# Patient Record
Sex: FEMALE | Race: BLACK OR AFRICAN AMERICAN | NOT HISPANIC OR LATINO | Employment: OTHER | URBAN - METROPOLITAN AREA
[De-identification: names, ages, dates, MRNs, and addresses within clinical notes are randomized per-mention and may not be internally consistent; named-entity substitution may affect disease eponyms.]

---

## 2017-03-21 ENCOUNTER — HOSPITAL ENCOUNTER (INPATIENT)
Facility: HOSPITAL | Age: 52
LOS: 3 days | Discharge: HOME HEALTH | DRG: 871 | End: 2017-03-24
Attending: EMERGENCY MEDICINE | Admitting: HOSPITALIST
Payer: MEDICARE

## 2017-03-21 DIAGNOSIS — I50.9 HEART FAILURE: ICD-10-CM

## 2017-03-21 DIAGNOSIS — N17.9 AKI (ACUTE KIDNEY INJURY): ICD-10-CM

## 2017-03-21 DIAGNOSIS — N39.0 URINARY TRACT INFECTION WITHOUT HEMATURIA, SITE UNSPECIFIED: ICD-10-CM

## 2017-03-21 DIAGNOSIS — N18.30 CKD (CHRONIC KIDNEY DISEASE) STAGE 3, GFR 30-59 ML/MIN: ICD-10-CM

## 2017-03-21 DIAGNOSIS — E86.0 DEHYDRATION: ICD-10-CM

## 2017-03-21 DIAGNOSIS — R74.8 ELEVATED LIPASE: ICD-10-CM

## 2017-03-21 DIAGNOSIS — R57.1 HYPOVOLEMIC SHOCK: ICD-10-CM

## 2017-03-21 LAB
ALBUMIN SERPL BCP-MCNC: 3.8 G/DL
ALP SERPL-CCNC: 182 U/L
ALT SERPL W/O P-5'-P-CCNC: 36 U/L
ANION GAP SERPL CALC-SCNC: 13 MMOL/L
AST SERPL-CCNC: 27 U/L
BACTERIA #/AREA URNS HPF: ABNORMAL /HPF
BASOPHILS # BLD AUTO: 0 K/UL
BASOPHILS NFR BLD: 0.3 %
BILIRUB SERPL-MCNC: 0.3 MG/DL
BILIRUB UR QL STRIP: NEGATIVE
BUN SERPL-MCNC: 118 MG/DL
CALCIUM SERPL-MCNC: 10.3 MG/DL
CHLORIDE SERPL-SCNC: 105 MMOL/L
CLARITY UR: ABNORMAL
CO2 SERPL-SCNC: 14 MMOL/L
COLOR UR: YELLOW
CREAT SERPL-MCNC: 5.5 MG/DL
DIFFERENTIAL METHOD: ABNORMAL
EOSINOPHIL # BLD AUTO: 0.1 K/UL
EOSINOPHIL NFR BLD: 0.8 %
ERYTHROCYTE [DISTWIDTH] IN BLOOD BY AUTOMATED COUNT: 14.1 %
EST. GFR  (AFRICAN AMERICAN): 9 ML/MIN/1.73 M^2
EST. GFR  (NON AFRICAN AMERICAN): 8 ML/MIN/1.73 M^2
GLUCOSE SERPL-MCNC: 375 MG/DL
GLUCOSE UR QL STRIP: NEGATIVE
HCT VFR BLD AUTO: 41.9 %
HGB BLD-MCNC: 13.3 G/DL
HGB UR QL STRIP: ABNORMAL
HYALINE CASTS #/AREA URNS LPF: 0 /LPF
KETONES UR QL STRIP: ABNORMAL
LEUKOCYTE ESTERASE UR QL STRIP: ABNORMAL
LIPASE SERPL-CCNC: 213 U/L
LYMPHOCYTES # BLD AUTO: 2.5 K/UL
LYMPHOCYTES NFR BLD: 25.3 %
MCH RBC QN AUTO: 28.3 PG
MCHC RBC AUTO-ENTMCNC: 31.7 %
MCV RBC AUTO: 89 FL
MICROSCOPIC COMMENT: ABNORMAL
MONOCYTES # BLD AUTO: 0.5 K/UL
MONOCYTES NFR BLD: 4.5 %
NEUTROPHILS # BLD AUTO: 6.9 K/UL
NEUTROPHILS NFR BLD: 69.1 %
NITRITE UR QL STRIP: NEGATIVE
PH UR STRIP: 6 [PH] (ref 5–8)
PLATELET # BLD AUTO: 374 K/UL
PMV BLD AUTO: 8.9 FL
POCT GLUCOSE: 322 MG/DL (ref 70–110)
POCT GLUCOSE: 325 MG/DL (ref 70–110)
POTASSIUM SERPL-SCNC: 5.5 MMOL/L
PROT SERPL-MCNC: 8.4 G/DL
PROT UR QL STRIP: ABNORMAL
RBC # BLD AUTO: 4.7 M/UL
RBC #/AREA URNS HPF: 10 /HPF (ref 0–4)
SODIUM SERPL-SCNC: 132 MMOL/L
SP GR UR STRIP: 1.02 (ref 1–1.03)
TROPONIN I SERPL DL<=0.01 NG/ML-MCNC: 0.13 NG/ML
URN SPEC COLLECT METH UR: ABNORMAL
UROBILINOGEN UR STRIP-ACNC: NEGATIVE EU/DL
WBC # BLD AUTO: 10 K/UL
WBC #/AREA URNS HPF: >100 /HPF (ref 0–5)

## 2017-03-21 PROCEDURE — 51701 INSERT BLADDER CATHETER: CPT

## 2017-03-21 PROCEDURE — 87186 SC STD MICRODIL/AGAR DIL: CPT

## 2017-03-21 PROCEDURE — 81000 URINALYSIS NONAUTO W/SCOPE: CPT

## 2017-03-21 PROCEDURE — 82962 GLUCOSE BLOOD TEST: CPT

## 2017-03-21 PROCEDURE — 85025 COMPLETE CBC W/AUTO DIFF WBC: CPT

## 2017-03-21 PROCEDURE — 36415 COLL VENOUS BLD VENIPUNCTURE: CPT

## 2017-03-21 PROCEDURE — 87077 CULTURE AEROBIC IDENTIFY: CPT

## 2017-03-21 PROCEDURE — 84484 ASSAY OF TROPONIN QUANT: CPT

## 2017-03-21 PROCEDURE — 80053 COMPREHEN METABOLIC PANEL: CPT

## 2017-03-21 PROCEDURE — 25000003 PHARM REV CODE 250: Performed by: EMERGENCY MEDICINE

## 2017-03-21 PROCEDURE — 87086 URINE CULTURE/COLONY COUNT: CPT

## 2017-03-21 PROCEDURE — 63600175 PHARM REV CODE 636 W HCPCS: Performed by: EMERGENCY MEDICINE

## 2017-03-21 PROCEDURE — 87088 URINE BACTERIA CULTURE: CPT

## 2017-03-21 PROCEDURE — 20000000 HC ICU ROOM

## 2017-03-21 PROCEDURE — 83690 ASSAY OF LIPASE: CPT

## 2017-03-21 PROCEDURE — 96361 HYDRATE IV INFUSION ADD-ON: CPT

## 2017-03-21 PROCEDURE — 93005 ELECTROCARDIOGRAM TRACING: CPT

## 2017-03-21 PROCEDURE — 99291 CRITICAL CARE FIRST HOUR: CPT | Mod: 25

## 2017-03-21 RX ADMIN — SODIUM CHLORIDE 1000 ML: 0.9 INJECTION, SOLUTION INTRAVENOUS at 09:03

## 2017-03-21 NOTE — IP AVS SNAPSHOT
[address] [place] [address]  Phone: [phone]           Patient Discharge Instructions     Our goal is to set you up for success. This packet includes information on your condition, medications, and your home care. It will help you to care for yourself so you don't get sicker and need to go back to the hospital.     Please ask your nurse if you have any questions.        There are many details to remember when preparing to leave the hospital. Here is what you will need to do:    1. Take your medicine. If you are prescribed medications, review your Medication List in the following pages. You may have new medications to  at the pharmacy and others that you'll need to stop taking. Review the instructions for how and when to take your medications. Talk with your doctor or nurses if you are unsure of what to do.     2. Go to your follow-up appointments. Specific follow-up information is listed in the following pages. Your may be contacted by a transition nurse or clinical provider about future appointments. Be sure we have all of the phone numbers to reach you, if needed. Please contact your provider's office if you are unable to make an appointment.     3. Watch for warning signs. Your doctor or nurse will give you detailed warning signs to watch for and when to call for assistance. These instructions may also include educational information about your condition. If you experience any of warning signs to your health, call your doctor.               ** Verify the list of medication(s) below is accurate and up to date. Carry this with you in case of emergency. If your medications have changed, please notify your healthcare provider.             Medication List      START taking these medications        Additional Info    Begin Date AM Noon PM Bedtime    ciprofloxacin HCl 500 MG tablet   Commonly known as:  CIPRO   Quantity:  8 tablet   Refills:  0   Dose:  500 mg    Indications:  Urinary Tract Infection    Instructions:  Take 1 tablet (500 mg total) by mouth every 12 (twelve) hours.                    3/24/17           sodium bicarbonate 650 MG tablet   Refills:  0   Dose:  650 mg    Last time this was given:  650 mg on 3/24/2017  3:11 PM   Instructions:  Take 1 tablet (650 mg total) by mouth 3 (three) times daily.                    3/24/17  Dose #3             CHANGE how you take these medications        Additional Info    Begin Date AM Noon PM Bedtime    HUMALOG KWIKPEN 100 unit/mL Inpn pen   Refills:  0   Dose:  7 Units   What changed:  how much to take   Generic drug:  insulin lispro    Instructions:  Inject 7 Units into the skin 3 (three) times daily before meals.                    3/24/17  With dinner           insulin detemir 100 unit/mL (3 mL) Inpn pen   Commonly known as:  LEVEMIR FLEXTOUCH   Quantity:  1 Box   Refills:  0   Dose:  10 Units   What changed:  how much to take    Last time this was given:  12 Units on 3/24/2017  9:23 AM   Instructions:  Inject 10 Units into the skin 2 (two) times daily.                    3/24/17             CONTINUE taking these medications        Additional Info    Begin Date AM Noon PM Bedtime    amlodipine 10 MG tablet   Commonly known as:  NORVASC   Refills:  0   Dose:  10 mg    Instructions:  Take 10 mg by mouth once daily.            3/25/17                   aspirin 81 MG Chew   Refills:  0   Dose:  81 mg    Last time this was given:  81 mg on 3/24/2017  9:24 AM   Instructions:  Take 81 mg by mouth once daily. Ran out            3/25/17                   atorvastatin 80 MG tablet   Commonly known as:  LIPITOR   Quantity:  30 tablet   Refills:  11   Dose:  80 mg    Last time this was given:  80 mg on 3/24/2017  9:23 AM   Instructions:  Take 1 tablet (80 mg total) by mouth once daily.            3/25/17                   blood sugar diagnostic Strp   Quantity:  400 strip   Refills:  3    Instructions:  Check blood glucose  4x/day. Freestyle meter; dx code e11.65                            blood-glucose meter kit   Quantity:  1 each   Refills:  0    Instructions:  Use as instructed; dispense freestyle lite meter or whatever brand is covered                            calcitRIOL 0.25 MCG Cap   Commonly known as:  ROCALTROL   Refills:  0    Last time this was given:  0.25 mcg on 3/24/2017  9:24 AM   Instructions:  once daily.            3/25/17                   citalopram 20 MG tablet   Commonly known as:  CELEXA   Refills:  0   Dose:  20 mg    Last time this was given:  20 mg on 3/24/2017  9:24 AM   Instructions:  Take 20 mg by mouth once daily.            3/25/17                   clopidogrel 75 mg tablet   Commonly known as:  PLAVIX   Quantity:  30 tablet   Refills:  1   Dose:  75 mg    Last time this was given:  75 mg on 3/24/2017  9:24 AM   Instructions:  Take 1 tablet (75 mg total) by mouth once daily.            3/25/17                   CREON 24,000-76,000 -120,000 unit capsule   Refills:  0   Dose:  1 capsule   Generic drug:  lipase-protease-amylase 24,000-76,000-120,000 units    Last time this was given:  1 capsule on 3/24/2017 11:47 AM   Instructions:  Take 1 capsule by mouth 4 (four) times daily with meals and nightly.                    3/24/17  With dinner           cyproheptadine 4 mg tablet   Commonly known as:  PERIACTIN   Refills:  0   Dose:  4 mg    Last time this was given:  4 mg on 3/24/2017  9:24 AM   Instructions:  Take 4 mg by mouth 2 (two) times daily.                    3/24/17           ferrous sulfate 325 (65 FE) MG EC tablet   Quantity:  60 tablet   Refills:  0   Dose:  325 mg    Instructions:  Take 1 tablet (325 mg total) by mouth 2 (two) times daily.                    3/24/17           furosemide 40 MG tablet   Commonly known as:  LASIX   Quantity:  30 tablet   Refills:  1   Dose:  40 mg    Instructions:  Take 1 tablet (40 mg total) by mouth once daily.            3/25/17                   lancets [facility]  "  Quantity:  400 each   Refills:  3    Instructions:  Check blood glucose 4x/day. Freestyle meter; dx code e11.65                            lisinopril 2.5 MG tablet   Commonly known as:  PRINIVILZESTRIL   Refills:  0   Dose:  2.5 mg    Instructions:  Take 2.5 mg by mouth once daily.                            magnesium oxide 400 mg tablet   Commonly known as:  MAG-OX   Refills:  0   Dose:  400 mg    Instructions:  Take 1 tablet (400 mg total) by mouth once daily.                            metoprolol succinate 25 MG 24 hr tablet   Commonly known as:  TOPROL-XL   Quantity:  30 tablet   Refills:  0   Dose:  25 mg    Instructions:  Take 1 tablet (25 mg total) by mouth once daily.                    3/24/17           ONE-A-DAY ESSENTIAL ORAL   Refills:  0    Instructions:  Take by mouth once daily.                            PEN NEEDLE 31 gauge x 5/16" Ndle   Refills:  0   Generic drug:  pen needle, diabetic                             quetiapine 100 MG Tab   Commonly known as:  SEROQUEL   Refills:  0   Dose:  100 mg    Last time this was given:  100 mg on 3/23/2017  9:41 PM   Instructions:  100 mg every evening.                        3/24/17       VITAMIN C ORAL   Refills:  0   Dose:  500 mg    Last time this was given:  500 mg on 3/24/2017  9:24 AM   Instructions:  Take 500 mg by mouth once daily.                              STOP taking these medications     potassium chloride SA 10 MEQ tablet   Commonly known as:  K-DUR,KLOR-DAVONTE            Where to Get Your Medications      These medications were sent to [facility] [address] [place] [address] [facility]  [address] [facility] [address]     Phone:  [phone]     ciprofloxacin HCl 500 MG tablet    insulin detemir 100 unit/mL (3 mL) Inpn pen         You can get these medications from any pharmacy     You don't need a prescription for these medications     sodium bicarbonate 650 MG tablet         Information about where to get these medications is " not yet available     ! Ask your nurse or doctor about these medications     HUMALOG KWIKPEN 100 unit/mL Inpn pen                  Please bring to all follow up appointments:    1. A copy of your discharge instructions.  2. All medicines you are currently taking in their original bottles.  3. Identification and insurance card.    Please arrive 15 minutes ahead of scheduled appointment time.    Please call 24 hours in advance if you must reschedule your appointment and/or time.        Your Scheduled Appointments     Apr 07, 2017  8:30 AM CDT   Established Patient with MAKENNA Jack,DINORAHP-TARA Connelly - Endocrinology ([place])    [address] [facility] [address]   [phone]            Apr 17, 2017 10:00 AM CDT   Non-Fasting Lab with NITESH TRAN SAT   [facility] ([place])    [address] [facility] [address]   [phone]            Apr 17, 2017 10:15 AM CDT   Urine with NITESH TRAN SAT   [facility] ([place])    [address] [facility] [address]   [phone]            Apr 24, 2017 10:40 AM CDT   Established Patient Visit with Morris Villegas MD   [facility] ([place]    [address] [facility] [address] [place] [id number] [phone]              Follow-up Information     Follow up with Chica Irene DO In 1 week.    Specialty:  Family Medicine    Contact information:    [address] [facility] [address] [phone]          Follow up with Morris Villegas MD In 1 week.    Specialty:  Nephrology    Contact information:    [address]  [address] FLO[address]  [facility] [address]  [phone]          Follow up with Kelli Bui NP In 1 week.    Specialty:  Endocrinology    Contact information:    [address] [facility] [address] [phone]        Referrals     Future Orders    Amb Referral to Diabetes Empowerment     Ambulatory consult to Diabetic Education     Questions:    Diagnosis:      Reason For Referral (Please  "Check Appropriate Boxes):      Diabetes Self-Management Education Program:      Referral to Home health         Discharge Instructions     Future Orders    RENAL FUNCTION PANEL     Diet renal         Discharge Instructions       Thank you for choosing [facility] for your medical care. The primary doctor who is taking care of you at the time of your discharge is Juan Skinner MD.     You were admitted to the hospital with Hypovolemic shock.     Please note your discharge instructions, including diet/activity restrictions, follow-up appointments, and medication changes.  If you have any questions about your medical issues, prescriptions, or any other questions, please feel free to contact the [facility] Medicine Dept at [phone] and we will help.    If you are previously with Home health, outpatient PT/OT or under a therapy program, you are cleared to return to those programs.    Please direct all long term medication refills and follow up to your primary care provider, Chica Irene DO. Thank you again for letting us take care of your health care needs.        Discharge References/Attachments     BLOOD SUGAR LOG, USING A (ENGLISH)    BLOOD SUGAR, HOW TO CHECK YOUR (ENGLISH)        Primary Diagnosis     Your primary diagnosis was:  Shock      Admission Information     Date & Time Provider Department CSN    3/21/2017  8:33 PM Juan Skinner MD [facility] [id number]      Care Providers     Provider Role Specialty Primary office phone    Juan Skinner MD Attending Provider Rheumatology [phone]    Dk Burks MD Consulting Physician  Nephrology  [phone]      Your Vitals Were     BP Pulse Temp Resp Height Weight    175/94 (BP Location: Right arm, Patient Position: Lying) 84 98.1 °F (36.7 °C) (Oral) 18 5' 7" (1.702 m) 50.3 kg (110 lb 14.3 oz)    Last Period SpO2 BMI          08/15/2012 (Approximate) 99% 17.37 kg/m2        Recent Lab Values        " 7/17/2014 11/7/2014 6/27/2015 10/27/2015 12/26/2015 5/2/2016 10/4/2016 3/22/2017      5:56 AM 11:37 AM  5:30 PM  7:06 AM 10:34 AM  9:29 AM 12:37 PM  3:57 AM    A1C 12.2 (H) 9.6 (H) 13.0 (H) 7.7 (H) 7.9 (H) 9.4 (H) 8.9 (H) 15.3 (H)         7.9 (H)       Comment for A1C at 12:37 PM on 10/4/2016:  According to ADA guidelines, hemoglobin A1C <7.0% represents  optimal control in non-pregnant diabetic patients.  Different  metrics may apply to specific populations.   Standards of Medical Care in Diabetes - 2016.  For the purpose of screening for the presence of diabetes:  <5.7%     Consistent with the absence of diabetes  5.7-6.4%  Consistent with increasing risk for diabetes   (prediabetes)  >or=6.5%  Consistent with diabetes  Currently no consensus exists for use of hemoglobin A1C  for diagnosis of diabetes for children.      Comment for A1C at  3:57 AM on 3/22/2017:  According to ADA guidelines, hemoglobin A1C <7.0% represents  optimal control in non-pregnant diabetic patients.  Different  metrics may apply to specific populations.   Standards of Medical Care in Diabetes - 2016.  For the purpose of screening for the presence of diabetes:  <5.7%     Consistent with the absence of diabetes  5.7-6.4%  Consistent with increasing risk for diabetes   (prediabetes)  >or=6.5%  Consistent with diabetes  Currently no consensus exists for use of hemoglobin A1C  for diagnosis of diabetes for children.        Pending Labs     Order Current Status    Blood culture Preliminary result    Blood culture Preliminary result      Allergies as of 3/24/2017     No Known Allergies      [facility]     [facility] Nurse Care Line - 24/7 Assistance  Unless otherwise directed by your provider, please contact [facility], our nurse care line that is available for 24/7 assistance.     Registered nurses in the [facility] provide clinical advisement, health education, appointment booking, and other advisory services.  Call  for this free service at [phone].        Advance Directives     An advance directive is a document which, in the event you are no longer able to make decisions for yourself, tells your healthcare team what kind of treatment you do or do not want to receive, or who you would like to make those decisions for you.  If you do not currently have an advance directive, [facility] encourages you to create one.  For more information call:  [phone]), [phone] ([phone]),  or log on to [email or web address].        Smoking Cessation     If you would like to quit smoking:   You may be eligible for free services if you are a [place] resident and started smoking cigarettes before September 1, 1988.  Call the Smoking Cessation Trust (SCT) toll free at (531) 399-0876 or (055) 341-4067.   Call 0-367-QUIT-NOW if you do not meet the above criteria.            Language Assistance Services     ATTENTION: Language assistance services are available, free of charge. Please call [phone].      ATENCIÓN: Si habla español, tiene a ridley disposición servicios gratuitos de asistencia lingüística. Llame al [phone].     CHÚ Ý: N?u b?n nói Ti?ng Vi?t, có các d?ch v? h? tr? ngôn ng? mi?n phí dành cho b?n. G?i s? [phone].        Heart Failure Education       Heart Failure: Being Active  You have a condition called heart failure. Being active doesnt mean that you have to wear yourself out. Even a little movement each day helps to strengthen your heart. If you cant get out to exercise, you can do simple stretching and strengthening exercises at home. These are good ways to keep you well-conditioned and prevent you and your heart from becoming excessively weak.    Ideas to get you started  · Add a little movement to things you do now. Walk to mail letters. Park your car at the far end of the parking lot and walk to the store. Walk up a flight of stairs instead of taking the elevator.  · Choose  activities you enjoy. You might walk, swim, or ride an exercise bike. Things like gardening and washing the car count, too. Other possibilities include: washing dishes, walking the dog, walking around the mall, and doing aerobic activities with friends.  · Join a group exercise program at a [facility] or [facility], a senior center, or a community center. Or look into a hospital cardiac rehabilitation program. Ask your doctor if you qualify.  Tips to keep you going  · Get up and get dressed each day. Go to a coffee shop and read a newspaper or go somewhere that you'll be in the presence of other active people. Youll feel more like being active.  · Make a plan. Choose one or more activities that you enjoy and that you can easily do. Then plan to do at least one each day. You might write your plan on a calendar.  · Go with a friend or a group if you like company. This can help you feel supported and stay motivated, too.  · Plan social events that you enjoy. This will keep you mentally engaged as well as physically motivated to do things you find pleasure in.  For your safety  · Talk with your healthcare provider before starting an exercise program.  · Exercise indoors when its too hot or too cold outside, or when the air quality is poor. Try walking at a shopping mall.  · Wear socks and sturdy shoes to maintain your balance and prevent falls.  · Start slowly. Do a few minutes several times a day at first. Increase your time and speed little by little.  · Stop and rest whenever you feel tired or get short of breath.  · Dont push yourself on days when you dont feel well.  Date Last Reviewed: 3/20/2016  © 6654-5987 "[employer]. [address] [place] [address], [place] [address]. All rights reserved. This information is not intended as a substitute for professional medical care. Always follow your healthcare professional's instructions.              Heart Failure: Evaluating Your Heart  You have a condition called heart  failure. To evaluate your condition, your doctor will examine you, ask questions, and do some tests. Along with looking for signs of heart failure, the doctor looks for any other health problems that may have led to heart failure. The results of your evaluation will help your doctor form a treatment plan.  Health history and physical exam  Your visit will start with a health history. Tell the doctor about any symptoms youve noticed and about all medicines you take. Then youll have a physical exam. This includes listening to your heartbeat and breathing. Youll also be checked for swelling (edema) in your legs and neck. When you have fluid buildup or fluid in the lungs, it may be called congestive heart failure.  Diagnosing heart failure     During an echocardiogram, sound waves bounce off the heart. These are converted into a picture on the screen.   The following may be done to help your doctor form a diagnosis:  · X-rays show the size and shape of your heart. These pictures can also show fluid in your lungs.  · An electrocardiogram (ECG or EKG) shows the pattern of your heartbeat. Small pads (electrodes) are placed on your chest, arms, and legs. Wires connect the pads to the ECG machine, which records your hearts electrical signals. This can give the doctor information about heart function.  · An echocardiogram uses ultrasound waves to show the structure and movement of your heart muscle. This shows how well the heart pumps. It also shows the thickness of the heart walls, and if the heart is enlarged. It is one of the most useful, non-invasive tests as it provides information about the heart's general function. This helps your doctor make treatment decisions.  · Lab tests evaluate small amounts of blood or urine for signs of problems. A BNP lab test can help diagnose and evaluate heart failure. BNP stands for B-type natriuretic peptide. The ventricles secrete more BNP when heart failure worsens. Lab tests can  also provide information about metabolic dysfunction or heart dysfunction.  Your treatment plan  Based on the results of your evaluation and tests, your doctor will develop a treatment plan. This plan is designed to relieve some of your heart failure symptoms and help make you more comfortable. Your treatment plan may include:  · Medicine to help your heart work better and improve your quality of life  · Changes in what you eat and drink to help prevent fluid from backing up in your body  · Daily monitoring of your weight and heart failure symptoms to see how well your treatment plan is working  · Exercise to help you stay healthy  · Help with quitting smoking  · Emotional and psychological support to help adjust to the changes  · Referrals to other specialists to make sure you are being treated comprehensively  Date Last Reviewed: 3/21/2016  © 7149-6537 [employer]. [address] [place] [address], [place] [address]. All rights reserved. This information is not intended as a substitute for professional medical care. Always follow your healthcare professional's instructions.              Heart Failure: Making Changes to Your Diet  You have a condition called heart failure. When you have heart failure, excess fluid is more likely to build up in your body because your heart isn't working well. This makes the heart work harder to pump blood. Fluid buildup causes symptoms such as shortness of breath and swelling (edema). This is often referred to as congestive heart failure or CHF. Controlling the amount of salt (sodium) you eat may help stop fluid from building up. Your doctor may also tell you to reduce the amount of fluid you drink.  Reading food labels    Your healthcare provider will tell you how much sodium you can eat each day. Read food labels to keep track. Keep in mind that certain foods are high in salt. These include canned, frozen, and processed foods. Check the amount of sodium in each serving. Watch  out for high-sodium ingredients. These include MSG (monosodium glutamate), baking soda, and sodium phosphate.   Eating less salt  Give yourself time to get used to eating less salt. It may take a little while. Here are some tips to help:  · Take the saltshaker off the table. Replace it with salt-free herb mixes and spices.  · Eat fresh or plain frozen vegetables. These have much less salt than canned vegetables.  · Choose low-sodium snacks like sodium-free pretzels, crackers, or air-popped popcorn.  · Dont add salt to your food when youre cooking. Instead, season your foods with pepper, lemon, garlic, or onion.  · When you eat out, ask that your food be cooked without added salt.  · Avoid eating fried foods as these often have a great deal of salt.  If youre told to limit fluids  You may need to limit how much fluid you have to help prevent swelling. This includes anything that is liquid at room temperature, such as ice cream and soup. If your doctor tells you to limit fluid, try these tips:  · Measure drinks in a measuring cup before you drink them. This will help you meet daily goals.  · Chill drinks to make them more refreshing.  · Suck on frozen lemon wedges to quench thirst.  · Only drink when youre thirsty.  · Chew sugarless gum or suck on hard candy to keep your mouth moist.  · Weigh yourself daily to know if your body's fluid content is rising.  My sodium goal  Your healthcare provider may give you a sodium goal to meet each day. This includes sodium found in food as well as salt that you add. My goal is to eat no more than ___________ mg of sodium per day.     When to call your doctor  Call your doctor right away if you have any symptoms of worsening heart failure. These can include:  · Sudden weight gain  · Increased swelling of your legs or ankles  · Trouble breathing when youre resting or at night  · Increase in the number of pillows you have to sleep on  · Chest pain, pressure, discomfort, or pain  in the jaw, neck, or back   Date Last Reviewed: 3/21/2016  © 7736-0777 [employer]. [address] [place] [address], [place] [address]. All rights reserved. This information is not intended as a substitute for professional medical care. Always follow your healthcare professional's instructions.              Heart Failure: Medicines to Help Your Heart    You have a condition called heart failure (also known as congestive heart failure, or CHF). Your doctor will likely prescribe medicines for heart failure and any underlying health problems you have. Most heart failure patients take one or more types of medicinen. Your healthcare provider will work to find the combination of medicines that works best for you.  Heart failure medicines  Here are the most common heart failure medicines:  · ACE inhibitors lower blood pressure and decrease strain on the heart. This makes it easier for the heart to pump. Angiotensin receptor blockers have similar effects. These are prescribed for some patients instead of ACE inhibitors.  · Beta-blockers relieve stress on the heart. They also improve symptoms. They may also improve the heart's pumping action over time.  · Diuretics (also called water pills) help rid your body of excess water. This can help rid your body of swelling (edema). Having less fluid to pump means your heart doesnt have to work as hard. Some diuretics make your body lose a mineral called potassium. Your doctor will tell you if you need to take supplements or eat more foods high in potassium.  · Digoxin helps your heart pump with more strength. This helps your heart pump more blood with each beat. So, more oxygen-rich blood travels to the rest of the body.  · Aldosterone antagonists help alter hormones and decrease strain on the heart.  · Hydralazine and nitrates are two separate medicines used together to treat heart failure. They may come in one combination pill. They lower blood pressure and decrease how  hard the heart has to pump.  Medicines for related conditions  Controlling other heart problems helps keep heart failure under control, too. Depending on other heart problems you have, medicines may be prescribed to:  · Lower blood pressure (antihypertensives).  · Lower cholesterol levels (statins).  · Prevent blood clots (anticoagulants or aspirin).  · Keep the heartbeat steady (antiarrhythmics).  Date Last Reviewed: 3/5/2016  © 6852-7190 [employer]. [address] [place] [address], [place] [address]. All rights reserved. This information is not intended as a substitute for professional medical care. Always follow your healthcare professional's instructions.              Heart Failure: Procedures That May Help    The heart is a muscle that pumps oxygen-rich blood to all parts of the body. When you have heart failure, the heart is not able to pump as well as it should. Blood and fluid may back up into the lungs (congestive heart failure), and some parts of the body dont get enough oxygen-rich blood to work normally. These problems lead to the symptoms of heart failure.     Certain procedures may help the heart pump better in some cases of heart failure. Some procedures are done to treat health problems that may have caused the heart failure such as coronary artery disease or heart rhythm problems. For more serious heart failure, other options are available.  Treating artery and valve problems  If you have coronary artery disease or valve disease, procedures may be done to improve blood flow. This helps the heart pump better, which can improve heart failure symptoms. First, your doctor may do a cardiac catheterization to help detect clogged blood vessels or valve damage. During this procedure, a  thin tube (catheter) in inserted into a blood vessel and guided to the heart. There a dye is injected and a special type of X-ray (angiogram) is taken of the blood vessels. Procedures to open a blocked artery or fix  damaged valves can also be done using catheterization.  · Angioplasty uses a balloon-tipped instrument at the end of the catheter. The balloon is inflated to widen the narrowed artery. In many cases, a stent is expanded to further support the narrowed artery. A stent is a metal mesh tube.  · Valve surgery repairs or replacement of faulty valves can also be done during catheterization so blood can flow properly through the chambers of the heart.  Bypass surgery is another option to help treat blocked arteries. It uses a healthy blood vessel from elsewhere in the body. The healthy blood vessel is attached above and below the blocked area so that blood can flow around the blocked artery.  Treating heart rhythm problems  A device may be placed in the chest to help a weak heart maintain a healthy, heartbeat so the heart can pump more effectively:  · Pacemaker. A pacemaker is an implanted device that regulates your heartbeat electronically. It monitors your heart's rhythm and generates a painless electric impulse that helps the heart beat in a regular rhythm. A pacemaker is programmed to meet your specific heart rhythm needs.  · Biventricular pacing/cardiac resynchronization therapy. A type of pacemaker that paces both pumping chambers of the heart at the same time to coordinate contractions and to improve the heart's function. Some people with heart failure are candidates for this therapy.  · Implantable cardioverter defibrillator. A device similar to a pacemaker that senses when the heart is beating too fast and delivers an electrical shock to convert the fast rhythm to a normal rhythm. This can be a life saving device.  In severe cases  In more serious cases of heart failure when other treatments no longer work, other options may include:  · Ventricular assist devices (VADs). These are mechanical devices used to take over the pumping function for one or both of the heart's ventricles, or pumping chambers. A VAD may be  necessary when heart failure progresses to the point that medicines and other treatments no longer help. In some cases, a VAD may be used as a bridge to transplant.  · Heart transplant. This is replacing the diseased heart with a healthy one from a donor. This is an option for a few people who are very sick. A heart transplant is very serious and not an option for all patients. Your doctor can tell you more.  Date Last Reviewed: 3/20/2016  © 7568-5707 [employer]. [address] [place] [address]. All rights reserved. This information is not intended as a substitute for professional medical care. Always follow your healthcare professional's instructions.              Heart Failure: Tracking Your Weight  You have a condition called heart failure. When you have heart failure, a sudden weight gain or a steady rise in weight is a warning sign that your body is retaining too much water and salt. This could mean your heart failure is getting worse. If left untreated, it can cause problems for your lungs and result in shortness of breath. Weighing yourself each day is the best way to know if youre retaining water. If your weight goes up quickly, call your doctor. You will be given instructions on how to get rid of the excess water. You will likely need medicines and to avoid salt. This will help your heart work better.  Call your doctor if you gain more than 2 pounds in 1 day, more than 5 pounds in 1 week, or whatever weight gain you were told to report by your doctor. This is often a sign of worsening heart failure and needs to be evaluated and treated. Your doctor will tell you what to do next.   Tips for weighing yourself    · Weigh yourself at the same time each morning, wearing the same clothes. Weigh yourself after urinating and before eating.  · Use the same scale each day. Make sure the numbers are easy to read. Put the scale on a flat, hard surface -- not on a rug or carpet.  · Do not stop  weighing yourself. If you forget one day, weigh again the next morning.  How to use your weight chart  · Keep your weight chart near the scale. Write your weight on the chart as soon as you get off the scale.  · Fill in the month and the start date on the chart. Then write down your weight each day. Your chart will look like this:    · If you miss a day, leave the space blank. Weigh yourself the next day and write your weight in the next space.  · Take your weight chart with you when you go to see your doctor.  Date Last Reviewed: 3/20/2016  © 9030-5025 [employer]. [address] [place] [address], [place] [address]. All rights reserved. This information is not intended as a substitute for professional medical care. Always follow your healthcare professional's instructions.              Heart Failure: Warning Signs of a Flare-Up  You have a condition called heart failure. Once you have heart failure, flare-ups can happen. Below are signs that can mean your heart failure is getting worse. If you notice any of these warning signs, call your healthcare provider.  Swelling    · Your feet, ankles, or lower legs get puffier.  · You notice skin changes on your lower legs.  · Your shoes feel too tight.  · Your clothes are tighter in the waist.  · You have trouble getting rings on or off your fingers.  Shortness of breath  · You have to breathe harder even when youre doing your normal activities or when youre resting.  · You are short of breath walking up stairs or even short distances.  · You wake up at night short of breath or coughing.  · You need to use more pillows or sit up to sleep.  · You wake up tired or restless.  Other warning signs  · You feel weaker, dizzy, or more tired.  · You have chest pain or changes in your heartbeat.  · You have a cough that wont go away.  · You cant remember things or dont feel like eating.  Tracking your weight  Gaining weight is often the first warning sign that heart failure  is getting worse. Gaining even a few pounds can be a sign that your body is retaining excess water and salt. Weighing yourself each day in the morning after you urinate and before you eat, is the best way to know if you're retaining water. Get a scale that is easy to read and make sure you wear the same clothes and use the same scale every time you weigh. Your healthcare provider will show you how to track your weight. Call your doctor if you gain more than 2 pounds in 1 day, 5 pounds in 1 week, or whatever weight gain you were told to report by your doctor. This is often a sign of worsening heart failure and needs to be evaluated and treated before it compromises your breathing. Your doctor will tell you what to do next.    Date Last Reviewed: 3/15/2016  © 1710-3146 [employer]. [address] [place] [address]. All rights reserved. This information is not intended as a substitute for professional medical care. Always follow your healthcare professional's instructions.              Pneumonmia Discharge Instructions                Chronic Kindey Disease Education             Diabetes Discharge Instructions                                    [facility] complies with applicable Federal civil rights laws and does not discriminate on the basis of race, color, national origin, age, disability, or sex.

## 2017-03-22 PROBLEM — E44.1 MILD MALNUTRITION: Status: ACTIVE | Noted: 2017-03-22

## 2017-03-22 PROBLEM — E86.0 DEHYDRATION, SEVERE: Status: ACTIVE | Noted: 2017-03-22

## 2017-03-22 PROBLEM — K86.1 CHRONIC PANCREATITIS: Status: ACTIVE | Noted: 2017-03-22

## 2017-03-22 PROBLEM — R57.1 HYPOVOLEMIC SHOCK: Status: ACTIVE | Noted: 2017-03-22

## 2017-03-22 PROBLEM — I95.9 HYPOTENSION: Status: ACTIVE | Noted: 2017-03-22

## 2017-03-22 PROBLEM — E44.1 MILD MALNUTRITION: Status: RESOLVED | Noted: 2017-03-22 | Resolved: 2017-03-22

## 2017-03-22 LAB
ALBUMIN SERPL BCP-MCNC: 3.3 G/DL
ALP SERPL-CCNC: 168 U/L
ALT SERPL W/O P-5'-P-CCNC: 35 U/L
ANION GAP SERPL CALC-SCNC: 11 MMOL/L
ANION GAP SERPL CALC-SCNC: 8 MMOL/L
AST SERPL-CCNC: 35 U/L
BASOPHILS # BLD AUTO: 0 K/UL
BASOPHILS NFR BLD: 0.2 %
BILIRUB SERPL-MCNC: 0.3 MG/DL
BUN SERPL-MCNC: 104 MG/DL
BUN SERPL-MCNC: 83 MG/DL
CALCIUM SERPL-MCNC: 9 MG/DL
CALCIUM SERPL-MCNC: 9.2 MG/DL
CHLORIDE SERPL-SCNC: 115 MMOL/L
CHLORIDE SERPL-SCNC: 116 MMOL/L
CO2 SERPL-SCNC: 12 MMOL/L
CO2 SERPL-SCNC: 14 MMOL/L
CREAT SERPL-MCNC: 3.4 MG/DL
CREAT SERPL-MCNC: 4.5 MG/DL
CREAT UR-MCNC: 41.2 MG/DL
DIASTOLIC DYSFUNCTION: NO
DIFFERENTIAL METHOD: ABNORMAL
EOSINOPHIL # BLD AUTO: 0.1 K/UL
EOSINOPHIL NFR BLD: 0.7 %
ERYTHROCYTE [DISTWIDTH] IN BLOOD BY AUTOMATED COUNT: 14.2 %
EST. GFR  (AFRICAN AMERICAN): 12 ML/MIN/1.73 M^2
EST. GFR  (AFRICAN AMERICAN): 17 ML/MIN/1.73 M^2
EST. GFR  (NON AFRICAN AMERICAN): 10 ML/MIN/1.73 M^2
EST. GFR  (NON AFRICAN AMERICAN): 15 ML/MIN/1.73 M^2
ESTIMATED AVG GLUCOSE: 392 MG/DL
ESTIMATED PA SYSTOLIC PRESSURE: 22.36
GLUCOSE SERPL-MCNC: 102 MG/DL
GLUCOSE SERPL-MCNC: 222 MG/DL
HBA1C MFR BLD HPLC: 15.3 %
HCT VFR BLD AUTO: 38 %
HGB BLD-MCNC: 12.3 G/DL
HIV1+2 IGG SERPL QL IA.RAPID: NEGATIVE
LACTATE SERPL-SCNC: 0.6 MMOL/L
LIPASE SERPL-CCNC: 182 U/L
LYMPHOCYTES # BLD AUTO: 2.4 K/UL
LYMPHOCYTES NFR BLD: 23.9 %
MAGNESIUM SERPL-MCNC: 2.1 MG/DL
MCH RBC QN AUTO: 29.3 PG
MCHC RBC AUTO-ENTMCNC: 32.4 %
MCV RBC AUTO: 90 FL
MONOCYTES # BLD AUTO: 0.4 K/UL
MONOCYTES NFR BLD: 3.6 %
NEUTROPHILS # BLD AUTO: 7.2 K/UL
NEUTROPHILS NFR BLD: 71.6 %
PHOSPHATE SERPL-MCNC: 4.3 MG/DL
PLATELET # BLD AUTO: 339 K/UL
PMV BLD AUTO: 8.8 FL
POCT GLUCOSE: 106 MG/DL (ref 70–110)
POCT GLUCOSE: 159 MG/DL (ref 70–110)
POCT GLUCOSE: 225 MG/DL (ref 70–110)
POCT GLUCOSE: 250 MG/DL (ref 70–110)
POCT GLUCOSE: 347 MG/DL (ref 70–110)
POCT GLUCOSE: 59 MG/DL (ref 70–110)
POCT GLUCOSE: 81 MG/DL (ref 70–110)
POTASSIUM SERPL-SCNC: 4.7 MMOL/L
POTASSIUM SERPL-SCNC: 5.1 MMOL/L
PROT SERPL-MCNC: 7.1 G/DL
RBC # BLD AUTO: 4.22 M/UL
RETIRED EF AND QEF - SEE NOTES: 70 (ref 55–65)
SODIUM SERPL-SCNC: 137 MMOL/L
SODIUM SERPL-SCNC: 139 MMOL/L
SODIUM UR-SCNC: 97 MMOL/L
T4 FREE SERPL-MCNC: 0.84 NG/DL
TRICUSPID VALVE REGURGITATION: NORMAL
TROPONIN I SERPL DL<=0.01 NG/ML-MCNC: 0.13 NG/ML
TSH SERPL DL<=0.005 MIU/L-ACNC: 0.29 UIU/ML
URATE UR-MCNC: 14.4 MG/DL
VANCOMYCIN SERPL-MCNC: <1.1 UG/ML
WBC # BLD AUTO: 10.1 K/UL

## 2017-03-22 PROCEDURE — 20000000 HC ICU ROOM

## 2017-03-22 PROCEDURE — 25000003 PHARM REV CODE 250: Performed by: INTERNAL MEDICINE

## 2017-03-22 PROCEDURE — 97802 MEDICAL NUTRITION INDIV IN: CPT

## 2017-03-22 PROCEDURE — 83690 ASSAY OF LIPASE: CPT

## 2017-03-22 PROCEDURE — 83036 HEMOGLOBIN GLYCOSYLATED A1C: CPT

## 2017-03-22 PROCEDURE — 84100 ASSAY OF PHOSPHORUS: CPT

## 2017-03-22 PROCEDURE — 25000003 PHARM REV CODE 250: Performed by: PHYSICIAN ASSISTANT

## 2017-03-22 PROCEDURE — 84439 ASSAY OF FREE THYROXINE: CPT

## 2017-03-22 PROCEDURE — 83735 ASSAY OF MAGNESIUM: CPT

## 2017-03-22 PROCEDURE — 80048 BASIC METABOLIC PNL TOTAL CA: CPT

## 2017-03-22 PROCEDURE — 84484 ASSAY OF TROPONIN QUANT: CPT

## 2017-03-22 PROCEDURE — 84300 ASSAY OF URINE SODIUM: CPT

## 2017-03-22 PROCEDURE — 85025 COMPLETE CBC W/AUTO DIFF WBC: CPT

## 2017-03-22 PROCEDURE — 96365 THER/PROPH/DIAG IV INF INIT: CPT

## 2017-03-22 PROCEDURE — 84560 ASSAY OF URINE/URIC ACID: CPT

## 2017-03-22 PROCEDURE — 25000003 PHARM REV CODE 250: Performed by: EMERGENCY MEDICINE

## 2017-03-22 PROCEDURE — 87040 BLOOD CULTURE FOR BACTERIA: CPT | Mod: 59

## 2017-03-22 PROCEDURE — C8929 TTE W OR WO FOL WCON,DOPPLER: HCPCS

## 2017-03-22 PROCEDURE — 86701 HIV-1ANTIBODY: CPT

## 2017-03-22 PROCEDURE — 80202 ASSAY OF VANCOMYCIN: CPT

## 2017-03-22 PROCEDURE — 83605 ASSAY OF LACTIC ACID: CPT

## 2017-03-22 PROCEDURE — 63600175 PHARM REV CODE 636 W HCPCS: Performed by: INTERNAL MEDICINE

## 2017-03-22 PROCEDURE — 63600175 PHARM REV CODE 636 W HCPCS: Performed by: PHYSICIAN ASSISTANT

## 2017-03-22 PROCEDURE — 80053 COMPREHEN METABOLIC PANEL: CPT

## 2017-03-22 PROCEDURE — 82570 ASSAY OF URINE CREATININE: CPT

## 2017-03-22 PROCEDURE — 36415 COLL VENOUS BLD VENIPUNCTURE: CPT

## 2017-03-22 PROCEDURE — 86480 TB TEST CELL IMMUN MEASURE: CPT

## 2017-03-22 PROCEDURE — 84443 ASSAY THYROID STIM HORMONE: CPT

## 2017-03-22 PROCEDURE — 25000003 PHARM REV CODE 250: Performed by: HOSPITALIST

## 2017-03-22 PROCEDURE — 82962 GLUCOSE BLOOD TEST: CPT

## 2017-03-22 PROCEDURE — 63600175 PHARM REV CODE 636 W HCPCS: Performed by: HOSPITALIST

## 2017-03-22 RX ORDER — HEPARIN SODIUM 5000 [USP'U]/ML
5000 INJECTION, SOLUTION INTRAVENOUS; SUBCUTANEOUS EVERY 8 HOURS
Status: DISCONTINUED | OUTPATIENT
Start: 2017-03-22 | End: 2017-03-24 | Stop reason: HOSPADM

## 2017-03-22 RX ORDER — CITALOPRAM 10 MG/1
20 TABLET ORAL DAILY
Status: DISCONTINUED | OUTPATIENT
Start: 2017-03-22 | End: 2017-03-24 | Stop reason: HOSPADM

## 2017-03-22 RX ORDER — IBUPROFEN 200 MG
16 TABLET ORAL
Status: DISCONTINUED | OUTPATIENT
Start: 2017-03-22 | End: 2017-03-24 | Stop reason: HOSPADM

## 2017-03-22 RX ORDER — ASCORBIC ACID 500 MG
500 TABLET ORAL DAILY
Status: DISCONTINUED | OUTPATIENT
Start: 2017-03-22 | End: 2017-03-24 | Stop reason: HOSPADM

## 2017-03-22 RX ORDER — CALCITRIOL 0.25 UG/1
0.25 CAPSULE ORAL DAILY
Status: DISCONTINUED | OUTPATIENT
Start: 2017-03-22 | End: 2017-03-24 | Stop reason: HOSPADM

## 2017-03-22 RX ORDER — ATORVASTATIN CALCIUM 40 MG/1
80 TABLET, FILM COATED ORAL DAILY
Status: DISCONTINUED | OUTPATIENT
Start: 2017-03-22 | End: 2017-03-24 | Stop reason: HOSPADM

## 2017-03-22 RX ORDER — NAPROXEN SODIUM 220 MG/1
81 TABLET, FILM COATED ORAL DAILY
Status: DISCONTINUED | OUTPATIENT
Start: 2017-03-22 | End: 2017-03-24 | Stop reason: HOSPADM

## 2017-03-22 RX ORDER — IBUPROFEN 200 MG
24 TABLET ORAL
Status: DISCONTINUED | OUTPATIENT
Start: 2017-03-22 | End: 2017-03-24 | Stop reason: HOSPADM

## 2017-03-22 RX ORDER — INSULIN ASPART 100 [IU]/ML
5 INJECTION, SOLUTION INTRAVENOUS; SUBCUTANEOUS
Status: DISCONTINUED | OUTPATIENT
Start: 2017-03-22 | End: 2017-03-24

## 2017-03-22 RX ORDER — AMOXICILLIN 250 MG
1 CAPSULE ORAL 2 TIMES DAILY PRN
Status: DISCONTINUED | OUTPATIENT
Start: 2017-03-22 | End: 2017-03-24 | Stop reason: HOSPADM

## 2017-03-22 RX ORDER — ONDANSETRON 2 MG/ML
4 INJECTION INTRAMUSCULAR; INTRAVENOUS EVERY 6 HOURS PRN
Status: DISCONTINUED | OUTPATIENT
Start: 2017-03-22 | End: 2017-03-24 | Stop reason: HOSPADM

## 2017-03-22 RX ORDER — QUETIAPINE FUMARATE 100 MG/1
100 TABLET, FILM COATED ORAL NIGHTLY
Status: DISCONTINUED | OUTPATIENT
Start: 2017-03-22 | End: 2017-03-24 | Stop reason: HOSPADM

## 2017-03-22 RX ORDER — CLOPIDOGREL BISULFATE 75 MG/1
75 TABLET ORAL DAILY
Status: DISCONTINUED | OUTPATIENT
Start: 2017-03-22 | End: 2017-03-24 | Stop reason: HOSPADM

## 2017-03-22 RX ORDER — INSULIN ASPART 100 [IU]/ML
0-5 INJECTION, SOLUTION INTRAVENOUS; SUBCUTANEOUS
Status: DISCONTINUED | OUTPATIENT
Start: 2017-03-22 | End: 2017-03-24 | Stop reason: HOSPADM

## 2017-03-22 RX ORDER — ACETAMINOPHEN 500 MG
1000 TABLET ORAL EVERY 6 HOURS PRN
Status: DISCONTINUED | OUTPATIENT
Start: 2017-03-22 | End: 2017-03-24 | Stop reason: HOSPADM

## 2017-03-22 RX ORDER — CYPROHEPTADINE HYDROCHLORIDE 4 MG/1
4 TABLET ORAL 2 TIMES DAILY
Status: DISCONTINUED | OUTPATIENT
Start: 2017-03-22 | End: 2017-03-24 | Stop reason: HOSPADM

## 2017-03-22 RX ORDER — SODIUM CHLORIDE 9 MG/ML
INJECTION, SOLUTION INTRAVENOUS CONTINUOUS
Status: DISCONTINUED | OUTPATIENT
Start: 2017-03-22 | End: 2017-03-24 | Stop reason: HOSPADM

## 2017-03-22 RX ORDER — GLUCAGON 1 MG
1 KIT INJECTION
Status: DISCONTINUED | OUTPATIENT
Start: 2017-03-22 | End: 2017-03-24 | Stop reason: HOSPADM

## 2017-03-22 RX ORDER — ACETAMINOPHEN 325 MG/1
650 TABLET ORAL EVERY 8 HOURS PRN
Status: DISCONTINUED | OUTPATIENT
Start: 2017-03-22 | End: 2017-03-22

## 2017-03-22 RX ADMIN — ATORVASTATIN CALCIUM 80 MG: 40 TABLET, FILM COATED ORAL at 09:03

## 2017-03-22 RX ADMIN — HUMAN ALBUMIN MICROSPHERES AND PERFLUTREN 0.11 MG: 10; .22 INJECTION, SOLUTION INTRAVENOUS at 01:03

## 2017-03-22 RX ADMIN — SODIUM CHLORIDE: 0.9 INJECTION, SOLUTION INTRAVENOUS at 03:03

## 2017-03-22 RX ADMIN — Medication 16 G: at 11:03

## 2017-03-22 RX ADMIN — PANCRELIPASE 1 CAPSULE: 24000; 76000; 120000 CAPSULE, DELAYED RELEASE PELLETS ORAL at 09:03

## 2017-03-22 RX ADMIN — ASPIRIN 81 MG CHEWABLE TABLET 81 MG: 81 TABLET CHEWABLE at 11:03

## 2017-03-22 RX ADMIN — INSULIN ASPART 1 UNITS: 100 INJECTION, SOLUTION INTRAVENOUS; SUBCUTANEOUS at 09:03

## 2017-03-22 RX ADMIN — PANCRELIPASE 1 CAPSULE: 24000; 76000; 120000 CAPSULE, DELAYED RELEASE PELLETS ORAL at 05:03

## 2017-03-22 RX ADMIN — INSULIN ASPART 4 UNITS: 100 INJECTION, SOLUTION INTRAVENOUS; SUBCUTANEOUS at 05:03

## 2017-03-22 RX ADMIN — OXYCODONE HYDROCHLORIDE AND ACETAMINOPHEN 500 MG: 500 TABLET ORAL at 09:03

## 2017-03-22 RX ADMIN — SODIUM CHLORIDE: 0.9 INJECTION, SOLUTION INTRAVENOUS at 10:03

## 2017-03-22 RX ADMIN — INSULIN DETEMIR 12 UNITS: 100 INJECTION, SOLUTION SUBCUTANEOUS at 11:03

## 2017-03-22 RX ADMIN — PIPERACILLIN AND TAZOBACTAM 4.5 G: 4; .5 INJECTION, POWDER, LYOPHILIZED, FOR SOLUTION INTRAVENOUS; PARENTERAL at 01:03

## 2017-03-22 RX ADMIN — HEPARIN SODIUM 5000 UNITS: 5000 INJECTION, SOLUTION INTRAVENOUS; SUBCUTANEOUS at 06:03

## 2017-03-22 RX ADMIN — HEPARIN SODIUM 5000 UNITS: 5000 INJECTION, SOLUTION INTRAVENOUS; SUBCUTANEOUS at 09:03

## 2017-03-22 RX ADMIN — QUETIAPINE FUMARATE 100 MG: 100 TABLET, FILM COATED ORAL at 09:03

## 2017-03-22 RX ADMIN — CITALOPRAM HYDROBROMIDE 20 MG: 10 TABLET ORAL at 11:03

## 2017-03-22 RX ADMIN — VANCOMYCIN HYDROCHLORIDE 1000 MG: 1 INJECTION, POWDER, LYOPHILIZED, FOR SOLUTION INTRAVENOUS at 01:03

## 2017-03-22 RX ADMIN — HEPARIN SODIUM 5000 UNITS: 5000 INJECTION, SOLUTION INTRAVENOUS; SUBCUTANEOUS at 01:03

## 2017-03-22 RX ADMIN — PANCRELIPASE 1 CAPSULE: 24000; 76000; 120000 CAPSULE, DELAYED RELEASE PELLETS ORAL at 11:03

## 2017-03-22 RX ADMIN — SODIUM CHLORIDE: 0.9 INJECTION, SOLUTION INTRAVENOUS at 01:03

## 2017-03-22 RX ADMIN — CLOPIDOGREL BISULFATE 75 MG: 75 TABLET ORAL at 09:03

## 2017-03-22 RX ADMIN — SODIUM CHLORIDE 1000 ML: 0.9 INJECTION, SOLUTION INTRAVENOUS at 03:03

## 2017-03-22 RX ADMIN — CYPROHEPTADINE HYDROCHLORIDE 4 MG: 4 TABLET ORAL at 09:03

## 2017-03-22 RX ADMIN — PIPERACILLIN AND TAZOBACTAM 3.38 G: 3; .375 INJECTION, POWDER, LYOPHILIZED, FOR SOLUTION INTRAVENOUS; PARENTERAL at 12:03

## 2017-03-22 RX ADMIN — CALCITRIOL 0.25 MCG: 0.25 CAPSULE, LIQUID FILLED ORAL at 09:03

## 2017-03-22 RX ADMIN — CYPROHEPTADINE HYDROCHLORIDE 4 MG: 4 TABLET ORAL at 11:03

## 2017-03-22 RX ADMIN — INSULIN ASPART 5 UNITS: 100 INJECTION, SOLUTION INTRAVENOUS; SUBCUTANEOUS at 05:03

## 2017-03-22 NOTE — ED NOTES
Patient is resting in bed with eyes closed, chest rise is symmetrical, respirations are regular and unlabored. LUCÍA BARRETO

## 2017-03-22 NOTE — ASSESSMENT & PLAN NOTE
Severe. Likely due to hypotension, hypovolemia.  Continue IV fluid  Monitor urine output closely  Monitor for signs, symptoms of uremia. Trend potassium. Monitor for edema.  Avoid non-essential nephrotoxins (, dose medications according to GFR  Check urine sodium, creatinine - calculate FENa  Check urine uric acid - calculate FEUrea  Check renal bladder US to rule out obstructive nephropathy  Consult a nephrologist for evaluation, ?need for initiation of HD

## 2017-03-22 NOTE — PROGRESS NOTES
Patient refused benaprotein powder and nutrition drink for both lunch and dinner. Patient refused to taste it, after telling her she may like it if she tries it.

## 2017-03-22 NOTE — PLAN OF CARE
Problem: Patient Care Overview  Goal: Individualization & Mutuality  Outcome: Ongoing (interventions implemented as appropriate)  0140 received pt to room 514 from er,alert and oriented,vs stable,no obvious distress noted,Abi RN assumed care of pt upon admission to icu

## 2017-03-22 NOTE — PLAN OF CARE
Problem: Patient Care Overview  Goal: Plan of Care Review  Outcome: Ongoing (interventions implemented as appropriate)  Care plan reviewed with patient and .

## 2017-03-22 NOTE — CONSULTS
[facility]  Adult Nutrition  Consult Note    SUMMARY     Recommendations    Recommendation/Intervention: 1) Recommend renal, diabetic 1800 calorie diet for blood glucose control and wt gain 2) Recommend d/c Beneprotein and Boost Glucose Control and add Suplena BID due to renal concerns  3) RD follow up with CHO counting educaiton  Goals: 1) Meal intake at least 75% 2) Utilize nutrition supplement 3) Pt will receive diabetic education  Nutrition Goal Status: new  Communication of RD Recs: reviewed with RN    Continuum of Care Plan    Referral to Outpatient Services:  (Discharge Plan: Recommend renal, diabetic 1800 calorie diet )    Reason for Assessment    Reason for Assessment: physician consult     Interdisciplinary Rounds: attended     General Information Comments: Spoke with pt and spouse, who report they have had diabetic nutrition education previously. Noted they are interested in further information.  Reports her appetite is poor, yet ate 100% of lunch meal.     Nutrition Prescription Ordered    Current Diet Order: Diabetic 2000 calorie    Evaluation of Received Nutrients/Fluid Intake     Energy Calories Required: meeting needs (during hospitalization)  Protein Required: exceed needs (r/t limitations of protein in renal disease)   IV Fluid (mL): 125 (mls/hr)  Other Fluid (mL): 720 (per I&O)   Fluid Required: meeting needs     Nutrition Risk Screen     Nutrition Risk Screen: reduced oral intake over the last month    Nutrition/Diet History    Patient Reported Diet/Restrictions/Preferences: general  Food Preferences: No religous or cultural preferences identified     Labs/Tests/Procedures/Meds     Pertinent Labs Reviewed: reviewed, pertinent  Pertinent Labs Comments: POCT 250, 159, 81, 59, 106; A1C 15.3;  GFR 10/12, , Creatinie 4.5  Pertinent Medications Reviewed: reviewed, pertinent  Pertinent Medications Comments: Vitamin c, statin, insulin, vancomycin    Physical Findings      Oral/Mouth Cavity: teeth absent states she has no chewing difficulties  Skin:  (Michael score 20)    Anthropometrics    Height Method: Stated  Height (inches): 66.93 in  Weight Method: Stated  Weight (kg): 49.5 kg  Ideal Body Weight (IBW), Female: 134.65 lb  % Ideal Body Weight, Female (lb): 81.05 lb  BMI (kg/m2): 17.13  BMI Grade: 16 - 16.9 protein-energy malnutrition grade II  Usual Body Weight (UBW), k.27 kg (in undetermined time frame)  % Usual Body Weight: 94.7  Weight Loss: unintentional     Anthropometrics (Special Considerations)      Estimated/Assessed Needs    Weight Used For Calorie Calculations: 49.5 kg (109 lb 2 oz)   Height (cm): 170 cm     Energy Need Method: Kcal/kg (25-35 kcal/kg 2057-9511 per renal recs and 35 kcal/kg for wt gain)  25 kcal/kg (kcal): 1237.5 and 35 kcal/kg (kcal): 1732.5   RMR ([place]-St. Jeor Equation): 1135.25   Weight Used For Protein Calculations: 49.5 kg (109 lb 2 oz)  0.6 gm Protein (gm): 29.76 and 0.8 gm Protein (gm): 39.68  Fluid Need Method:  (Urine output + 500 or per MD rec) 1220 mls goal based on UOP 3/22  Electrolyte/Mineral Requirements: Potassium, Phosphorus (Monitor)     Monitor and Evaluation    Food and Nutrient Intake: energy intake  Food and Nutrient Adminstration: diet order  Knowledge/Beliefs/Attitudes: food and nutrition knowledge/skill  Anthropometric Measurements: weight, weight change  Biochemical Data, Medical Tests and Procedures: electrolyte and renal panel, glucose/endocrine profile, inflammatory profile, lipid profile  Nutrition-Focused Physical Findings: overall appearance, skin    Nutrition Risk    High, 2 x weekly    Nutrition Follow-Up    RD Follow-up?: Yes    Assessment and Plan    Mild malnutrition  Nutrition Problem:   Mild Malnutrition    % Intake of Estimated Energy Needs: 50 - 75 %  % Meal Intake: 100% for one meal, new admit, only one meal provided  Malnutrition Reason: Illness Related  Moderate Weight Loss Acute Illness/Injury: Other:  BMI 17.13/Pt underweight    Etiology/Related to:   Uncontrolled blood glucose as evidenced by A1C 15.3 with 5% unintentional wt loss in undetermined time resulting in pt underweight    Treatment Strategy:   Recommend Renal, diabetic 1800 calorie diet with Suplena supplement, BID and diabetes education    Nutrition Diagnosis Status:   New

## 2017-03-22 NOTE — EICU
51 years old female admitted with acute renal failure,metabolic acidosis with ketosis and most probably urosepsis in the background of poor glycemic control.  Blood pressure very responsive to fluid bolus.Urine output minimal.  Now BP 90/70 I have given her another 1 liter normal saline bolus and will follow hourly urine output.May require pressors if fluid bolus not improving her MAP.  Ordered urine culture(received antibiotics in ED)on piperacillin/tazobactam and vancomycin

## 2017-03-22 NOTE — ASSESSMENT & PLAN NOTE
Due to TIMOTHY  Trend potassium. Treat if potassium rises or ECG abnormalities. Monitor on telemetry

## 2017-03-22 NOTE — ASSESSMENT & PLAN NOTE
Due to poor oral intake - ?due to depression vs other  Antidepressant and encourage oral intake  Rule out HIV, TB  Glucerna, protein supplementation if she can tolerate with renal function/dysfunction

## 2017-03-22 NOTE — PROGRESS NOTES
Brief Renal Consult note.    Chart reviewed.  Agree with current plan and management.  Full note to follow soon.    Dk Burks MD  3/22/2017  11:30 AM

## 2017-03-22 NOTE — ED NOTES
Presents to the ER with c/o generalized illness. Associated complaints are decreased appetite, fatigue and hypotension. Family reports they were unable to get a BP reading 2 days ago. Mucous membranes are pink and moist. Skin is warm, dry and intact. Lungs are clear bilaterally, respirations are regular and unlabored. Denies cough, congestion, rhinorrhea or SOB. BS active x4, no tenderness with palpation, abd is soft and not distended. Denies any appetite or activity change. S1S2, capillary refill is < 2 seconds. Denies dysuria, difficulty urinating, frequency, numbness, tingling or weakness. MARY LOU STEVENSS

## 2017-03-22 NOTE — PLAN OF CARE
Problem: Nutrition, Imbalanced: Inadequate Oral Intake (Adult)  Intervention: Promote/Optimize Nutrition  Recommendation/Intervention: 1) Recommend renal, diabetic 1800 calorie diet for blood glucose control and wt gain 2) Recommend d/c Beneprotein and Boost Glucose Control and add Suplena BID due to renal concerns 3) RD follow up with CHO counting educaiton  Goals: 1) Meal intake at least 75% 2) Utilize nutrition supplement 3) Pt will receive diabetic education  Nutrition Goal Status: new  Communication of RD Recs: reviewed with RN

## 2017-03-22 NOTE — ASSESSMENT & PLAN NOTE
Diabetes poorly-controlled.   Check BG prior to meals and QHS. Administer rapid-acting insulin according to low-dose sliding scale  Consider dosing long-acting and scheduled premeal insulin, with caution given TIMOTHY

## 2017-03-22 NOTE — CONSULTS
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 She feels much better now.    Past Medical History:   Diagnosis Date    Arthritis     Asthma     Blood transfusion     CHF (congestive heart failure)     Coronary artery disease     Diabetes mellitus     Hypertension     Pancreatitis     Type 2 diabetes mellitus with hyperglycemia 7/13/2015     Past Surgical History:   Procedure Laterality Date    CARDIAC SURGERY      CABG    CHOLECYSTECTOMY      CORONARY ARTERY BYPASS GRAFT       Family History   Problem Relation Age of Onset    Diabetes Mother     Diabetes Father     Diabetes Sister     Hearing loss Sister     Heart disease Sister     Hypertension Sister     Learning disabilities Sister     Vision loss Sister     Asthma Brother     Depression Brother     Cancer Maternal Grandmother      Social History   Substance Use Topics    Smoking status: Current Every Day Smoker     Packs/day: 0.50     Years: 35.00     Types: Cigarettes    Smokeless tobacco: Never Used    Alcohol use No      Comment: questionable per        Review of patient's allergies indicates:  No Known Allergies     Review of Systems:  General ROS: negative for - fever or night sweats  Psychological ROS: negative for - behavioral disorder or depression  ENT ROS: negative for - headaches or visual changes  Hematological and Lymphatic ROS: negative for - bleeding problems or bruising  Endocrine ROS: negative for - temperature intolerance or unexpected weight changes  Respiratory ROS: no cough, shortness of breath, or wheezing  Cardiovascular ROS: no chest pain or dyspnea on  exertion  Gastrointestinal ROS: no abdominal pain, change in bowel habits, or black or bloody stools  Genito-Urinary ROS: no dysuria, trouble voiding, or hematuria  Musculoskeletal ROS: negative for - joint pain or joint swelling  Neurological ROS: no TIA or stroke symptoms  Dermatological ROS: negative for rash and skin lesion changes    OBJECTIVE:     Vital Signs Range (Last 24H):  Temp:  [97.5 °F (36.4 °C)-97.8 °F (36.6 °C)]   Pulse:  [74-97]   Resp:  [14-23]   BP: ()/(47-83)   SpO2:  [22 %-100 %]     Physical Exam:  General- Patient alert and oriented x3 in NAD  HEENT- WNL  Neck- supple  CV- Regular rate and rhythm  Resp- Lungs No increased WOB  GI- Non tender/non-distended  Extrem- No cyanosis, clubbing, edema.  Derm- No rashes, masses, or lesions noted  Neuro-  No flap. No focal deficits noted.     Body mass index is 17.09 kg/(m^2).    Laboratory:  CBC:   Recent Labs  Lab 03/22/17 0357   WBC 10.10   RBC 4.22   HGB 12.3   HCT 38.0      MCV 90   MCH 29.3   MCHC 32.4     CMP:   Recent Labs  Lab 03/22/17 0357      CALCIUM 9.2   ALBUMIN 3.3*   PROT 7.1      K 5.1   CO2 12*   *   *   CREATININE 4.5*   ALKPHOS 168*   ALT 35   AST 35   BILITOT 0.3     LFTs:   Recent Labs  Lab 03/22/17 0357   ALT 35   AST 35   ALKPHOS 168*   BILITOT 0.3   PROT 7.1   ALBUMIN 3.3*       Cardiac markers:   Recent Labs  Lab 03/22/17 0357   TROPONINI 0.134*     ABGs: No results for input(s): PH, PCO2, PO2, HCO3, POCSATURATED, BE in the last 168 hours.  Microbiology Results (last 7 days)     Procedure Component Value Units Date/Time    Blood culture [[id number]] Collected:  03/22/17 1314    Order Status:  Sent Specimen:  Blood Updated:  03/22/17 1315    Narrative:       Take 2 sets, 1 from central line, one from peripheral site.  Take both aerobic and anaerobic bottles.    Blood culture [[id number]] Collected:  03/22/17 1301    Order Status:  Sent Specimen:  Blood Updated:  03/22/17 1301    Narrative:        Take 2 sets, 1 from central line, one from peripheral site.  Take both aerobic and anaerobic bottles.    Urine culture [[id number]] Collected:  03/21/17 2144    Order Status:  Sent Specimen:  Urine from Urine, Clean Catch Updated:  03/22/17 0007    Narrative:       Cath if necessary        Specimen     None          Recent Labs  Lab 03/21/17 2144   COLORU Yellow   SPECGRAV 1.020   PHUR 6.0   PROTEINUA 2+*   BACTERIA Many*   NITRITE Negative   LEUKOCYTESUR 3+*   UROBILINOGEN Negative   HYALINECASTS 0       Diagnostic Results:  Labs: Reviewed  X-Ray: Reviewed      ASSESSMENT/PLAN:     Active Hospital Problems    Diagnosis  POA    *TIMOTHY (acute kidney injury) [N17.9]  Yes    Dehydration, severe [E86.0]  Yes    Chronic pancreatitis [K86.1]  Yes    Hypotension [I95.9]  Yes    Moderate tobacco dependence [F17.200]  Yes    Type 2 diabetes mellitus with complication, with long-term current use of insulin [E11.8, Z79.4]  Not Applicable    Chronic diastolic CHF (congestive heart failure) [I50.32]  Yes    Hyperkalemia [E87.5]  Yes    Metabolic acidosis- severe [E87.2]  Yes    Coronary artery disease involving native coronary artery without angina pectoris [I25.10]  Yes    Protein-calorie malnutrition, severe [E43]  Yes    Elevated troponin [R79.89]  Yes    Major depressive disorder [F32.9]  Yes    Hypertension associated with diabetes [E11.59, I10]  Yes    Hypercholesteremia [E78.00]  Yes      Resolved Hospital Problems    Diagnosis Date Resolved POA    Mild malnutrition [E44.1] 03/22/2017 Yes       Assessment and plan:    TIMOTHY, likely hemodynamic/infection related ATN  Diabetic proteinuric CKD stage 3-4  HTN  Anermia  SHPT  HTN  CHF      TIMOTHY - agree with current management.   Treat infection per cultures. Broad coverage for now.  IVF, monitor UO, dose meds for GFR < 30 ml/min.  Avoid NSAIDs, ACEI, COXii, IV contrast and other toxins.  Renal, low K diet.    Anemia - stable, monitor for now.  SHPT - monitor  Ca and Phos, contineu calcitriol.  DM2 - optimize BG as best as possible, management per primary team.    Thank you for allowing us to participate in the care of your patient.   We will follow the patient and provide recommendations as needed.

## 2017-03-22 NOTE — SUBJECTIVE & OBJECTIVE
Past Medical History:   Diagnosis Date    Arthritis     Asthma     Blood transfusion     CHF (congestive heart failure)     Coronary artery disease     Diabetes mellitus     Hypertension     Pancreatitis     Type 2 diabetes mellitus with hyperglycemia 7/13/2015       Past Surgical History:   Procedure Laterality Date    CARDIAC SURGERY      CABG    CHOLECYSTECTOMY      CORONARY ARTERY BYPASS GRAFT         Review of patient's allergies indicates:  No Known Allergies    No current facility-administered medications on file prior to encounter.      Current Outpatient Prescriptions on File Prior to Encounter   Medication Sig    amlodipine (NORVASC) 10 MG tablet Take 10 mg by mouth once daily.     ASCORBATE CALCIUM (VITAMIN C ORAL) Take 500 mg by mouth once daily.     aspirin 81 MG chewable tablet Take 81 mg by mouth once daily. Ran out    atorvastatin (LIPITOR) 80 MG tablet Take 1 tablet (80 mg total) by mouth once daily.    blood sugar diagnostic Strp Check blood glucose 4x/day. Freestyle meter; dx code e11.65    blood-glucose meter kit Use as instructed; dispense freestyle lite meter or whatever brand is covered    calcitRIOL (ROCALTROL) 0.25 MCG Cap once daily.     citalopram (CELEXA) 20 MG tablet Take 20 mg by mouth once daily.     clopidogrel (PLAVIX) 75 mg tablet Take 1 tablet (75 mg total) by mouth once daily.    CREON 24,000-76,000 -120,000 unit capsule Take 1 capsule by mouth 4 (four) times daily with meals and nightly.     cyproheptadine (PERIACTIN) 4 mg tablet Take 4 mg by mouth 2 (two) times daily.    ferrous sulfate 325 (65 FE) MG EC tablet Take 1 tablet (325 mg total) by mouth 2 (two) times daily.    furosemide (LASIX) 40 MG tablet Take 1 tablet (40 mg total) by mouth once daily.    HUMALOG KWIKPEN 100 unit/mL InPn pen Inject 12 Units into the skin 3 (three) times daily before meals.    insulin detemir (LEVEMIR FLEXTOUCH) 100 unit/mL (3 mL) SubQ InPn pen Inject 12 Units into the  "skin 2 (two) times daily.    lancets Misc Check blood glucose 4x/day. Freestyle meter; dx code e11.65    lisinopril (PRINIVIL,ZESTRIL) 2.5 MG tablet Take 2.5 mg by mouth once daily.     magnesium oxide (MAG-OX) 400 mg tablet Take 1 tablet (400 mg total) by mouth once daily.    metoprolol succinate (TOPROL-XL) 25 MG 24 hr tablet Take 1 tablet (25 mg total) by mouth once daily.    MULTIVITAMIN (ONE-A-DAY ESSENTIAL ORAL) Take by mouth once daily.    PEN NEEDLE 31 X 5/16 " Ndle     potassium chloride SA (K-DUR,KLOR-CON) 10 MEQ tablet Take 2 tablets (20 mEq total) by mouth once daily.    quetiapine (SEROQUEL) 100 MG Tab 100 mg every evening.      Family History     Problem Relation (Age of Onset)    Asthma Brother    Cancer Maternal Grandmother    Depression Brother    Diabetes Mother, Father, Sister    Hearing loss Sister    Heart disease Sister    Hypertension Sister    Learning disabilities Sister    Vision loss Sister        Social History Main Topics    Smoking status: Current Every Day Smoker     Packs/day: 0.50     Years: 35.00     Types: Cigarettes    Smokeless tobacco: Never Used    Alcohol use No      Comment: questionable per     Drug use: No    Sexual activity: Not Currently     Partners: Male     Birth control/ protection: None     Review of Systems   Constitutional: Positive for appetite change. Negative for chills and fever.   HENT: Negative for congestion and sore throat.    Eyes: Negative for photophobia and visual disturbance.   Respiratory: Negative for chest tightness and shortness of breath.    Gastrointestinal: Negative for abdominal pain, constipation, diarrhea and vomiting.   Genitourinary: Negative for dysuria, flank pain and hematuria.   Musculoskeletal: Negative for neck pain and neck stiffness.   Skin: Negative for rash and wound.   Neurological: Positive for weakness. Negative for dizziness and syncope.   Psychiatric/Behavioral: Positive for dysphoric mood. The patient is " not nervous/anxious.      Objective:     Vital Signs (Most Recent):  Temp: 97.6 °F (36.4 °C) (03/22/17 0148)  Pulse: 90 (03/22/17 0148)  Resp: 18 (03/22/17 0148)  BP: 127/63 (03/22/17 0148)  SpO2: 98 % (03/22/17 0148) Vital Signs (24h Range):  Temp:  [97.5 °F (36.4 °C)-97.6 °F (36.4 °C)] 97.6 °F (36.4 °C)  Pulse:  [87-97] 90  Resp:  [16-18] 18  SpO2:  [98 %-100 %] 98 %  BP: ()/(47-69) 127/63     Weight: 49.5 kg (109 lb 2 oz)  Body mass index is 17.09 kg/(m^2).    Physical Exam   Constitutional: She is oriented to person, place, and time. She appears well-developed. No distress.   HENT:   Head: Normocephalic and atraumatic.   Nose: Nose normal.   Oral mucosa dry   Eyes: Conjunctivae are normal. Right eye exhibits no discharge. Left eye exhibits no discharge.   Neck: Neck supple. No JVD present.   Cardiovascular: Normal rate and regular rhythm.    Pulmonary/Chest: Effort normal. No respiratory distress. She has no wheezes.   Abdominal: Soft. Bowel sounds are normal. She exhibits no distension.   Suprapubic tenderness to palpation   Musculoskeletal: She exhibits no edema or tenderness.   Neurological: She is alert and oriented to person, place, and time.   Skin: Skin is warm and dry. She is not diaphoretic.   Psychiatric: Her affect is blunt. She is not agitated and not aggressive.   Nursing note and vitals reviewed.       Significant Labs:   Recent Lab Results       03/22/17  0046 03/22/17  0020 03/21/17  2356 03/21/17  2144 03/21/17  2139      Albumin     3.8     Alkaline Phosphatase     182(H)     ALT     36     Anion Gap     13     Appearance, UA    Cloudy(A)      AST     27     Bacteria, UA    Many(A)      Baso #     0.00     Basophil%     0.3     Bilirubin (UA)    Negative      Total Bilirubin     0.3  Comment:  For infants and newborns, interpretation of results should be based  on gestational age, weight and in agreement with clinical  observations.  Premature Infant recommended reference ranges:  Up to  24 hours.............<8.0 mg/dL  Up to 48 hours............<12.0 mg/dL  3-5 days..................<15.0 mg/dL  6-29 days.................<15.0 mg/dL       BUN, Bld     118(H)     Calcium     10.3     Chloride     105     CO2     14(L)     Color, UA    Yellow      Creatinine     5.5(H)     Differential Method     Automated     eGFR if      9(A)     eGFR if non      8  Comment:  Calculation used to obtain the estimated glomerular filtration  rate (eGFR) is the CKD-EPI equation. Since race is unknown   in our information system, the eGFR values for   -American and Non--American patients are given   for each creatinine result.  (A)     Eos #     0.1     Eosinophil%     0.8     Glucose     375(H)     Glucose, UA    Negative      Gran #     6.9     Gran%     69.1     Hematocrit     41.9     Hemoglobin     13.3     Hyaline Casts, UA    0      Ketones, UA    Trace(A)      Lactate, Philip  0.6  Comment:  Falsely low lactic acid results can be found in samples   containing >=13.0 mg/dL total bilirubin and/or >=3.5 mg/dL   direct bilirubin.          Leukocytes, UA    3+(A)      Lipase     213(H)     Lymph #     2.5     Lymph%     25.3     MCH     28.3     MCHC     31.7(L)     MCV     89     Microscopic Comment    SEE COMMENT  Comment:  Other formed elements not mentioned in the report are not   present in the microscopic examination.         Mono #     0.5     Mono%     4.5     MPV     8.9(L)     Nitrite, UA    Negative      Occult Blood UA    Trace(A)      pH, UA    6.0      Platelets     374(H)     POCT Glucose 250(H)  322(H)       Potassium     5.5  Comment:  No visible hemolysis(H)     Total Protein     8.4     Protein, UA    2+  Comment:  Recommend a 24 hour urine protein or a urine   protein/creatinine ratio if globulin induced proteinuria is  clinically suspected.  (A)      RBC     4.70     RBC, UA    10(H)      RDW     14.1     Sodium     132(L)     Specific Gravity, UA     1.020      Specimen UA    Urine, Catheterized      Troponin I     0.129  Comment:  The reference interval for Troponin I represents the 99th percentile   cutoff   for our facility and is consistent with 3rd generation assay   performance.  (H)     Urobilinogen, UA    Negative      WBC, UA    >100(H)      WBC     10.00                 03/21/17  2038      Albumin      Alkaline Phosphatase      ALT      Anion Gap      Appearance, UA      AST      Bacteria, UA      Baso #      Basophil%      Bilirubin (UA)      Total Bilirubin      BUN, Bld      Calcium      Chloride      CO2      Color, UA      Creatinine      Differential Method      eGFR if       eGFR if non       Eos #      Eosinophil%      Glucose      Glucose, UA      Gran #      Gran%      Hematocrit      Hemoglobin      Hyaline Casts, UA      Ketones, UA      Lactate, Philip      Leukocytes, UA      Lipase      Lymph #      Lymph%      MCH      MCHC      MCV      Microscopic Comment      Mono #      Mono%      MPV      Nitrite, UA      Occult Blood UA      pH, UA      Platelets      POCT Glucose 325(H)     Potassium      Total Protein      Protein, UA      RBC      RBC, UA      RDW      Sodium      Specific Gravity, UA      Specimen UA      Troponin I      Urobilinogen, UA      WBC, UA      WBC          All pertinent labs within the past 24 hours have been reviewed.    ECG: Not peaked t waves    Significant Imaging:     Chest x-ray (independantly interpreted. Compared to prior):  No obvious detrimental change when compared to prior

## 2017-03-22 NOTE — PLAN OF CARE
"Met with pt to complete her assessment.  Pt lives at home with her sig other and two other male friends.  Pt verified her PCP as Dr. Irene and insurance as [employer].  Prior to admission pt did not have home health.  Pt is requesting home health upon discharge.Pt's prior home health was with [facility].  CM will continue to follow.         03/22/17 4275   Discharge Assessment   Assessment Type Discharge Planning Assessment   Confirmed/corrected address and phone number on facesheet? Yes   Assessment information obtained from? Patient   Communicated expected length of stay with patient/caregiver no   Type of Healthcare Directive Received (Denies.  )   If Healthcare Directive is received, is it scanned into Epic? no (comment)   Prior to hospitilization cognitive status: Alert/Oriented   Prior to hospitalization functional status: Needs Assistance  ("sometimes need help with dresisng")   Current cognitive status: Alert/Oriented   Current Functional Status: Needs Assistance   Arrived From home or self-care   Lives With significant other;friend(s)  (significant other and two male friends)   Able to Return to Prior Arrangements yes   Is patient able to care for self after discharge? Unable to determine at this time (comments)   How many people do you have in your home that can help with your care after discharge? 1   Who are your caregiver(s) and their phone number(s)? (sig other li Green, [phone])   Patient's perception of discharge disposition home health   Readmission Within The Last 30 Days no previous admission in last 30 days   Patient currently being followed by outpatient case management? Yes   If yes, name of outpatient case management following: insurance company assigned oupatient case management  (Pt does not know her name and states it's been several months since she received a call. )   Patient currently receives home health services? No  (Hx with [facility])   Does the patient currently use HME? " Yes   Patient currently receives private duty nursing? No   Patient currently receives any other outside agency services? No   Equipment Currently Used at Home cane, straight;bedside commode   Do you have any problems affording any of your prescribed medications? No  (pharmacy is [facility])   Is the patient taking medications as prescribed? yes   Do you have any financial concerns preventing you from receiving the healthcare you need? No   Does the patient have transportation to healthcare appointments? Yes   Transportation Available family or friend will provide   On Dialysis? No   Does the patient receive services at the [facility]mad[facility] Clinic? No   Discharge Plan A Home Health   Discharge Plan B Home with family   Patient/Family In Agreement With Plan yes

## 2017-03-22 NOTE — ASSESSMENT & PLAN NOTE
Due to poor oral intake, poor appetite  IV fluid - NS  Monitor for electrolyte derangements  Consider appetite stimulant, antidepressant (?Mirtazapine)

## 2017-03-22 NOTE — ED PROVIDER NOTES
"Encounter Date: 3/21/2017    SCRIBE #1 NOTE: I, Keri Ochoa, am scribing for, and in the presence of, Dr. Riojas.       History     Chief Complaint   Patient presents with    General Illness     pt denies pain states " just dont feel good; decreased appetite      Review of patient's allergies indicates:  No Known Allergies  HPI Comments: 3/21/2017  8:41 PM     Chief Complaint: General illness    The patient is a 51 y.o. female with PMHx of DM, HTN, CHF, CAD and pancreatitis who presents with "I just don't feel good". Patient's family reports gradual onset of decreased appetite that has been constant for 3-4 days. Pt is not eating and does not have an appetite. Pt developed dull pains to the upper mid abdomen 1 hour ago. Her pain has subsided on its own. No current abdominal pain. Patient's family also reports he was unable to get a reading of blood pressure 2 days ago. Pt's blood sugar usually runs in 200's and 300's. No fevers, sob, cough, cp, dysuria, leg swelling, nausea, vomiting, diarrhea, blood in stools or black tarry stools. Shx of cholecystectomy, cardiac surgery and CABG.    The history is provided by the patient.     Past Medical History:   Diagnosis Date    Arthritis     Asthma     Blood transfusion     CHF (congestive heart failure)     Coronary artery disease     Diabetes mellitus     Hypertension     Pancreatitis     Type 2 diabetes mellitus with hyperglycemia 7/13/2015     Past Surgical History:   Procedure Laterality Date    CARDIAC SURGERY      CABG    CHOLECYSTECTOMY      CORONARY ARTERY BYPASS GRAFT       Family History   Problem Relation Age of Onset    Diabetes Mother     Diabetes Father     Diabetes Sister     Hearing loss Sister     Heart disease Sister     Hypertension Sister     Learning disabilities Sister     Vision loss Sister     Asthma Brother     Depression Brother     Cancer Maternal Grandmother      Social History   Substance Use Topics    Smoking " status: Current Every Day Smoker     Packs/day: 0.50     Years: 35.00     Types: Cigarettes    Smokeless tobacco: Never Used    Alcohol use No      Comment: questionable per      Review of Systems   Constitutional: Positive for appetite change (decreased). Negative for chills and fever.   HENT: Negative for congestion, rhinorrhea and sore throat.    Respiratory: Negative for cough and shortness of breath.    Cardiovascular: Negative for chest pain.   Gastrointestinal: Positive for abdominal pain. Negative for diarrhea, nausea and vomiting.   Genitourinary: Negative for dysuria.   Musculoskeletal: Negative for back pain and myalgias.   Skin: Negative for rash.   Neurological: Negative for weakness and numbness.   Hematological: Does not bruise/bleed easily.   All other systems reviewed and are negative.      Physical Exam   Initial Vitals   BP Pulse Resp Temp SpO2   03/21/17 2028 03/21/17 2028 03/21/17 2028 03/21/17 2028 03/21/17 2028   67/47 95 16 97.5 °F (36.4 °C) 99 %     Physical Exam    Nursing note and vitals reviewed.  Constitutional: No distress.   HENT:   Head: Normocephalic and atraumatic.   Mouth/Throat: Oropharynx is clear and moist. Mucous membranes are dry.   Eyes: EOM are normal. Pupils are equal, round, and reactive to light.   Neck: Normal range of motion.   Cardiovascular: Normal rate, regular rhythm, normal heart sounds and intact distal pulses. Exam reveals no gallop and no friction rub.    No murmur heard.  Pulmonary/Chest: Breath sounds normal. She has no wheezes. She has no rhonchi. She has no rales.   Abdominal: Soft. She exhibits no distension. There is no tenderness. There is no rebound and no guarding.   No reproducible abdominal TTP. No flank TTP.   Musculoskeletal: Normal range of motion. She exhibits no edema.   Neurological: She is alert and oriented to person, place, and time. She has normal strength.   Skin: Skin is dry. No rash noted. No erythema.   Psychiatric: She has a  normal mood and affect.         ED Course   Critical Care  Date/Time: 3/22/2017 6:55 AM  Performed by: CINDI LANE  Authorized by: REGINA CORDOBA   Direct patient critical care time: 16 minutes  Additional history critical care time: 4 minutes  Ordering / reviewing critical care time: 6 minutes  Documentation critical care time: 4 minutes  Consulting other physicians critical care time: 4 minutes  Total critical care time (exclusive of procedural time) : 34 minutes        Labs Reviewed   CBC W/ AUTO DIFFERENTIAL - Abnormal; Notable for the following:        Result Value    MCHC 31.7 (*)     Platelets 374 (*)     MPV 8.9 (*)     All other components within normal limits   COMPREHENSIVE METABOLIC PANEL - Abnormal; Notable for the following:     Sodium 132 (*)     Potassium 5.5 (*)     CO2 14 (*)     Glucose 375 (*)     BUN, Bld 118 (*)     Creatinine 5.5 (*)     Alkaline Phosphatase 182 (*)     eGFR if  9 (*)     eGFR if non  8 (*)     All other components within normal limits   LIPASE - Abnormal; Notable for the following:     Lipase 213 (*)     All other components within normal limits   TROPONIN I - Abnormal; Notable for the following:     Troponin I 0.129 (*)     All other components within normal limits   URINALYSIS - Abnormal; Notable for the following:     Appearance, UA Cloudy (*)     Protein, UA 2+ (*)     Ketones, UA Trace (*)     Occult Blood UA Trace (*)     Leukocytes, UA 3+ (*)     All other components within normal limits   URINALYSIS MICROSCOPIC - Abnormal; Notable for the following:     RBC, UA 10 (*)     WBC, UA >100 (*)     Bacteria, UA Many (*)     All other components within normal limits   POCT GLUCOSE - Abnormal; Notable for the following:     POCT Glucose 325 (*)     All other components within normal limits   POCT GLUCOSE - Abnormal; Notable for the following:     POCT Glucose 322 (*)     All other components within normal limits   POCT GLUCOSE -  Abnormal; Notable for the following:     POCT Glucose 250 (*)     All other components within normal limits   CULTURE, URINE    Narrative:     Cath if necessary   LACTIC ACID, PLASMA                        Scribe Attestation:   Scribe #1: I performed the above scribed service and the documentation accurately describes the services I performed. I attest to the accuracy of the note.    Attending Attestation:           Physician Attestation for Scribe:  Physician Attestation Statement for Scribe #1: I, Dr. Riojas, reviewed documentation, as scribed by Keri Ochoa in my presence, and it is both accurate and complete.         Errol Keita is a 51 y.o. female presenting with Suspicion of sepsis with aggressive fluid resuscitation resolving hypotension likely secondary to volume depletion in the setting of renal failure and DKA with likely UTI.  Broad-spectrum antibiotics following urine culture initiated here with insulin drip for benign gap acidosis and hyperglycemia pending resolution of acidosis and anion gap.  Mild hyperkalemia without EKG changes associated evident in concordance with renal failure.  I do not think IV calcium or intracellular shifting is indicated.  I have discussed with hospitalist service who will assume care.          ED Course   Comment By Time   EKG:  NSR, rate of 98, normal intervals.  L axis.  No new ST/T wave changes compared to last prior with no sign of acute ischemia or infarction.  Old borderline lateral elevation as well as other changes are all old. Edwin Riojas MD 03/21 2050     Clinical Impression:   The primary encounter diagnosis was TIMOTHY (acute kidney injury). Diagnoses of Dehydration, Urinary tract infection without hematuria, site unspecified, and Elevated lipase were also pertinent to this visit.          Edwin Riojas MD  03/22/17 [id number]

## 2017-03-22 NOTE — CONSULTS
Errol Keita [id number] is a 51 y.o. female who has been consulted for vancomycin dosing.    The patient has the following labs:     Date Creatinine (mg/dl)    WBC Count   3/22/2017 Estimated Creatinine Clearance: 11.3 mL/min (based on Cr of 4.5). Lab Results   Component Value Date    WBC 10.10 03/22/2017        Current weight is 49.5 kg (109 lb 2 oz)    Vancomycin being given for Urosepsis.    Pt's renal function is not stable.  Pharmacy will dose by level.  Vanc 1000 mg given initially 3/22/17 at 11:00.  Target goal is 15-20 mg/dL.   A vancomycin level will be ordered on 3/23/17 at 10:00.        Patient will be followed by pharmacy for changes in renal function, toxicity, and efficacy.    Thank you for allowing us to participate in this patient's care.     Meir Schneider, BronwynD

## 2017-03-22 NOTE — H&P
"[facility] Medicine  History & Physical    Patient Name: Errol Keita  MRN: [id number]  Admission Date: 3/21/2017  Attending Physician: Foreign Greco MD   Primary Care Provider: Chica Irene DO         Patient information was obtained from patient, past medical records and ER records.     Subjective:     Principal Problem:TIMOTHY (acute kidney injury)    Chief Complaint:   Chief Complaint   Patient presents with    General Illness     pt denies pain states " just dont feel good; decreased appetite         HPI: Miss Keita presents for evaluation of weakness and poor appetite. She has been experiencing this for 2 weeks. She denies dysphagia or odynophagia. She denies post prandial or abdominal pain. She denies vomiting, diarrhea, constipation. She denies fever or chills. She denies shortness of breath or cough. She denies dysuria, hematuria, decreased urination. She denies taking new medication. She states her blood glucose has been in the 300s. She denies dizziness, LOC. She denies chest pain or palpitations. She denies melena or hematochezia. She is unsure when her last colonoscopy was. She is unsure when her last mammogram was. She denies know abnormal pap smear. She denies history of immediate family members with malignancy. She denies history of multiple sexual partners. She denies history of illicit drug use/abuse. She denies history of TB. She reports smoking 1+ packs of cigarettes daily. She denies recent hospitalization.    Past Medical History:   Diagnosis Date    Arthritis     Asthma     Blood transfusion     CHF (congestive heart failure)     Coronary artery disease     Diabetes mellitus     Hypertension     Pancreatitis     Type 2 diabetes mellitus with hyperglycemia 7/13/2015       Past Surgical History:   Procedure Laterality Date    CARDIAC SURGERY      CABG    CHOLECYSTECTOMY      CORONARY ARTERY BYPASS GRAFT         Review of patient's allergies " indicates:  No Known Allergies    No current facility-administered medications on file prior to encounter.      Current Outpatient Prescriptions on File Prior to Encounter   Medication Sig    amlodipine (NORVASC) 10 MG tablet Take 10 mg by mouth once daily.     ASCORBATE CALCIUM (VITAMIN C ORAL) Take 500 mg by mouth once daily.     aspirin 81 MG chewable tablet Take 81 mg by mouth once daily. Ran out    atorvastatin (LIPITOR) 80 MG tablet Take 1 tablet (80 mg total) by mouth once daily.    blood sugar diagnostic Strp Check blood glucose 4x/day. Freestyle meter; dx code e11.65    blood-glucose meter kit Use as instructed; dispense freestyle lite meter or whatever brand is covered    calcitRIOL (ROCALTROL) 0.25 MCG Cap once daily.     citalopram (CELEXA) 20 MG tablet Take 20 mg by mouth once daily.     clopidogrel (PLAVIX) 75 mg tablet Take 1 tablet (75 mg total) by mouth once daily.    CREON 24,000-76,000 -120,000 unit capsule Take 1 capsule by mouth 4 (four) times daily with meals and nightly.     cyproheptadine (PERIACTIN) 4 mg tablet Take 4 mg by mouth 2 (two) times daily.    ferrous sulfate 325 (65 FE) MG EC tablet Take 1 tablet (325 mg total) by mouth 2 (two) times daily.    furosemide (LASIX) 40 MG tablet Take 1 tablet (40 mg total) by mouth once daily.    HUMALOG KWIKPEN 100 unit/mL InPn pen Inject 12 Units into the skin 3 (three) times daily before meals.    insulin detemir (LEVEMIR FLEXTOUCH) 100 unit/mL (3 mL) SubQ InPn pen Inject 12 Units into the skin 2 (two) times daily.    lancets Misc Check blood glucose 4x/day. Freestyle meter; dx code e11.65    lisinopril (PRINIVIL,ZESTRIL) 2.5 MG tablet Take 2.5 mg by mouth once daily.     magnesium oxide (MAG-OX) 400 mg tablet Take 1 tablet (400 mg total) by mouth once daily.    metoprolol succinate (TOPROL-XL) 25 MG 24 hr tablet Take 1 tablet (25 mg total) by mouth once daily.    MULTIVITAMIN (ONE-A-DAY ESSENTIAL ORAL) Take by mouth once daily.  "   PEN NEEDLE 31 X 5/16 " Ndle     potassium chloride SA (K-DUR,KLOR-CON) 10 MEQ tablet Take 2 tablets (20 mEq total) by mouth once daily.    quetiapine (SEROQUEL) 100 MG Tab 100 mg every evening.      Family History     Problem Relation (Age of Onset)    Asthma Brother    Cancer Maternal Grandmother    Depression Brother    Diabetes Mother, Father, Sister    Hearing loss Sister    Heart disease Sister    Hypertension Sister    Learning disabilities Sister    Vision loss Sister        Social History Main Topics    Smoking status: Current Every Day Smoker     Packs/day: 0.50     Years: 35.00     Types: Cigarettes    Smokeless tobacco: Never Used    Alcohol use No      Comment: questionable per     Drug use: No    Sexual activity: Not Currently     Partners: Male     Birth control/ protection: None     Review of Systems   Constitutional: Positive for appetite change. Negative for chills and fever.   HENT: Negative for congestion and sore throat.    Eyes: Negative for photophobia and visual disturbance.   Respiratory: Negative for chest tightness and shortness of breath.    Gastrointestinal: Negative for abdominal pain, constipation, diarrhea and vomiting.   Genitourinary: Negative for dysuria, flank pain and hematuria.   Musculoskeletal: Negative for neck pain and neck stiffness.   Skin: Negative for rash and wound.   Neurological: Positive for weakness. Negative for dizziness and syncope.   Psychiatric/Behavioral: Positive for dysphoric mood. The patient is not nervous/anxious.      Objective:     Vital Signs (Most Recent):  Temp: 97.6 °F (36.4 °C) (03/22/17 0148)  Pulse: 90 (03/22/17 0148)  Resp: 18 (03/22/17 0148)  BP: 127/63 (03/22/17 0148)  SpO2: 98 % (03/22/17 0148) Vital Signs (24h Range):  Temp:  [97.5 °F (36.4 °C)-97.6 °F (36.4 °C)] 97.6 °F (36.4 °C)  Pulse:  [87-97] 90  Resp:  [16-18] 18  SpO2:  [98 %-100 %] 98 %  BP: ()/(47-69) 127/63     Weight: 49.5 kg (109 lb 2 oz)  Body mass index " is 17.09 kg/(m^2).    Physical Exam   Constitutional: She is oriented to person, place, and time. She appears well-developed. No distress.   HENT:   Head: Normocephalic and atraumatic.   Nose: Nose normal.   Oral mucosa dry   Eyes: Conjunctivae are normal. Right eye exhibits no discharge. Left eye exhibits no discharge.   Neck: Neck supple. No JVD present.   Cardiovascular: Normal rate and regular rhythm.    Pulmonary/Chest: Effort normal. No respiratory distress. She has no wheezes.   Abdominal: Soft. Bowel sounds are normal. She exhibits no distension.   Suprapubic tenderness to palpation   Musculoskeletal: She exhibits no edema or tenderness.   Neurological: She is alert and oriented to person, place, and time.   Skin: Skin is warm and dry. She is not diaphoretic.   Psychiatric: Her affect is blunt. She is not agitated and not aggressive.   Nursing note and vitals reviewed.       Significant Labs:   Recent Lab Results       03/22/17  0046 03/22/17  0020 03/21/17  2356 03/21/17  2144 03/21/17  2139      Albumin     3.8     Alkaline Phosphatase     182(H)     ALT     36     Anion Gap     13     Appearance, UA    Cloudy(A)      AST     27     Bacteria, UA    Many(A)      Baso #     0.00     Basophil%     0.3     Bilirubin (UA)    Negative      Total Bilirubin     0.3  Comment:  For infants and newborns, interpretation of results should be based  on gestational age, weight and in agreement with clinical  observations.  Premature Infant recommended reference ranges:  Up to 24 hours.............<8.0 mg/dL  Up to 48 hours............<12.0 mg/dL  3-5 days..................<15.0 mg/dL  6-29 days.................<15.0 mg/dL       BUN, Bld     118(H)     Calcium     10.3     Chloride     105     CO2     14(L)     Color, UA    Yellow      Creatinine     5.5(H)     Differential Method     Automated     eGFR if      9(A)     eGFR if non      8  Comment:  Calculation used to obtain the estimated  glomerular filtration  rate (eGFR) is the CKD-EPI equation. Since race is unknown   in our information system, the eGFR values for   -American and Non--American patients are given   for each creatinine result.  (A)     Eos #     0.1     Eosinophil%     0.8     Glucose     375(H)     Glucose, UA    Negative      Gran #     6.9     Gran%     69.1     Hematocrit     41.9     Hemoglobin     13.3     Hyaline Casts, UA    0      Ketones, UA    Trace(A)      Lactate, Philip  0.6  Comment:  Falsely low lactic acid results can be found in samples   containing >=13.0 mg/dL total bilirubin and/or >=3.5 mg/dL   direct bilirubin.          Leukocytes, UA    3+(A)      Lipase     213(H)     Lymph #     2.5     Lymph%     25.3     MCH     28.3     MCHC     31.7(L)     MCV     89     Microscopic Comment    SEE COMMENT  Comment:  Other formed elements not mentioned in the report are not   present in the microscopic examination.         Mono #     0.5     Mono%     4.5     MPV     8.9(L)     Nitrite, UA    Negative      Occult Blood UA    Trace(A)      pH, UA    6.0      Platelets     374(H)     POCT Glucose 250(H)  322(H)       Potassium     5.5  Comment:  No visible hemolysis(H)     Total Protein     8.4     Protein, UA    2+  Comment:  Recommend a 24 hour urine protein or a urine   protein/creatinine ratio if globulin induced proteinuria is  clinically suspected.  (A)      RBC     4.70     RBC, UA    10(H)      RDW     14.1     Sodium     132(L)     Specific Gravity, UA    1.020      Specimen UA    Urine, Catheterized      Troponin I     0.129  Comment:  The reference interval for Troponin I represents the 99th percentile   cutoff   for our facility and is consistent with 3rd generation assay   performance.  (H)     Urobilinogen, UA    Negative      WBC, UA    >100(H)      WBC     10.00                 03/21/17  2038      Albumin      Alkaline Phosphatase      ALT      Anion Gap      Appearance, UA      AST       Bacteria, UA      Baso #      Basophil%      Bilirubin (UA)      Total Bilirubin      BUN, Bld      Calcium      Chloride      CO2      Color, UA      Creatinine      Differential Method      eGFR if       eGFR if non       Eos #      Eosinophil%      Glucose      Glucose, UA      Gran #      Gran%      Hematocrit      Hemoglobin      Hyaline Casts, UA      Ketones, UA      Lactate, Philip      Leukocytes, UA      Lipase      Lymph #      Lymph%      MCH      MCHC      MCV      Microscopic Comment      Mono #      Mono%      MPV      Nitrite, UA      Occult Blood UA      pH, UA      Platelets      POCT Glucose 325(H)     Potassium      Total Protein      Protein, UA      RBC      RBC, UA      RDW      Sodium      Specific Gravity, UA      Specimen UA      Troponin I      Urobilinogen, UA      WBC, UA      WBC          All pertinent labs within the past 24 hours have been reviewed.    ECG: Not peaked t waves    Significant Imaging:     Chest x-ray (independantly interpreted. Compared to prior):  No obvious detrimental change when compared to prior    Assessment/Plan:     * TIMOTHY (acute kidney injury)  Severe. Likely due to hypotension, hypovolemia.  Continue IV fluid  Monitor urine output closely  Monitor for signs, symptoms of uremia. Trend potassium. Monitor for edema.  Avoid non-essential nephrotoxins (, dose medications according to GFR  Check urine sodium, creatinine - calculate FENa  Check urine uric acid - calculate FEUrea  Check renal bladder US to rule out obstructive nephropathy  Consult a nephrologist for evaluation, ?need for initiation of HD        Hypotension  Due to hypovolemia, dehydration  Continue IVF - NS  Monitor BP. Vasopressors if unable to keep MAP > 65  IV antibiotics for possible occult infection. Follow up blood and urine cultures      Metabolic acidosis- severe  Due to TIMOTHY, CKD  Consider adding sodium bicarbonate - oral or IV.       Hyperkalemia  Due to TIMOTHY  Trend  potassium. Treat if potassium rises or ECG abnormalities. Monitor on telemetry      Dehydration, severe  Due to poor oral intake, poor appetite  IV fluid - NS  Monitor for electrolyte derangements  Consider appetite stimulant, antidepressant (?Mirtazapine)        Elevated troponin  Due to TIMOTHY  Trend troponin and monitor for signs, symptoms of ACS      Chronic diastolic CHF (congestive heart failure)  Daily weight, monitor I/O  Hold Metoprolol and Furosemide because of hypotension and TIMOTHY      Hypercholesteremia  Continue Atorvastatin    Hypertension associated with diabetes  Currently hypotense  Hold antihypertensives for now      Major depressive disorder  Continue Citalopram. Consider another medication.      Protein-calorie malnutrition, severe  Due to poor oral intake - ?due to depression vs other  Antidepressant and encourage oral intake  Rule out HIV, TB  Glucerna, protein supplementation if she can tolerate with renal function/dysfunction      Coronary artery disease involving native coronary artery without angina pectoris  Continue Aspirin, Clopidogrel, Atorvastatin  Glycemic control  Monitor for ACS      Type 2 diabetes mellitus with complication, with long-term current use of insulin  Diabetes poorly-controlled.   Check BG prior to meals and QHS. Administer rapid-acting insulin according to low-dose sliding scale  Consider dosing long-acting and scheduled premeal insulin, with caution given TIMOTHY      Moderate tobacco dependence  Discussed smoking cesation  Continue discussion and consider starting medication - ?Wellbutrin      Chronic pancreatitis  With pancreatic insufficiency  Start Creon when advancing diet  Monitor for pain  Trend Lipase      Mild malnutrition, resolved as of 3/22/2017        VTE Risk Mitigation         Ordered     heparin (porcine) injection 5,000 Units  Every 8 hours     Route:  Subcutaneous        03/22/17 0147     Medium Risk of VTE  Once      03/22/17 0147        Marquise Simpson  DESIREE  Department of Hospital Medicine   [facility]

## 2017-03-22 NOTE — ED NOTES
Patient is resting in bed without any needs or questions at this time. Patient has been updated on plan of care and lab results.

## 2017-03-22 NOTE — ASSESSMENT & PLAN NOTE
Due to hypovolemia, dehydration  Continue IVF - NS  Monitor BP. Vasopressors if unable to keep MAP > 65

## 2017-03-22 NOTE — PLAN OF CARE
Problem: Patient Care Overview  Goal: Individualization & Mutuality  Outcome: Ongoing (interventions implemented as appropriate)  Room kept warm for patient and extra blankets provided.   Patient appetite fair to good. Has refused benaprotien and nutrition drinks.     Problem: Infection, Risk/Actual (Adult)  Goal: Infection Prevention/Resolution  Patient will demonstrate the desired outcomes by discharge/transition of care.   Outcome: Ongoing (interventions implemented as appropriate)  Patient receiving IV antibiotics for infection.   Patient has been afebrile this shift.     Problem: Renal Failure/Kidney Injury, Acute (Adult)  Goal: Signs and Symptoms of Listed Potential Problems Will be Absent, Minimized or Managed (Renal Failure/Kidney Injury, Acute)  Signs and symptoms of listed potential problems will be absent, minimized or managed by discharge/transition of care (reference Renal Failure/Kidney Injury, Acute (Adult) CPG).   Outcome: Ongoing (interventions implemented as appropriate)  Patient has fenton catheter and strict I&O is being followed.   Labs improving.     Problem: Pressure Ulcer Risk (Michael Scale) (Adult,Obstetrics,Pediatric)  Goal: Skin Integrity  Patient will demonstrate the desired outcomes by discharge/transition of care.   Outcome: Ongoing (interventions implemented as appropriate)  Patient on specialty bed, and does reposition self frequently.     Problem: Fall Risk (Adult)  Goal: Absence of Falls  Patient will demonstrate the desired outcomes by discharge/transition of care.   Outcome: Ongoing (interventions implemented as appropriate)  Patient safety maintained this shift. Patient assisted up to chair and to the bathroom today.     Problem: Diabetes, Type 2 (Adult)  Goal: Signs and Symptoms of Listed Potential Problems Will be Absent, Minimized or Managed (Diabetes, Type 2)  Signs and symptoms of listed potential problems will be absent, minimized or managed by discharge/transition of care  (reference Diabetes, Type 2 (Adult) CPG).  Outcome: Ongoing (interventions implemented as appropriate)  Patient blood sugars are AC&HS. Patient hypoglycemic episode with tremors noted, awake and alert, Dr. Greco notified with no new orders. Recheck was normal low, held noon insulin. Patient blood sugar covered at dinner time with routine and sliding scale insulin.

## 2017-03-22 NOTE — PROGRESS NOTES
Patients blood sugar was 59. Patient given apple juice with sugar added to drink. Will recheck in 20 minutes. Attempted to contact Dr. Greco. Will retry shortly to inform him of low blood sugar.   Heart echo being done at bedside at this time.   US of kidneys completed at bedside.

## 2017-03-22 NOTE — ASSESSMENT & PLAN NOTE
Nutrition Problem:   Mild Malnutrition    % Intake of Estimated Energy Needs: 50 - 75 %  % Meal Intake: 100% for one meal, new admit, only one meal provided  Malnutrition Reason: Illness Related  Moderate Weight Loss Acute Illness/Injury: Other: BMI 17.13/Pt underweight    Etiology/Related to:   Uncontrolled blood glucose as evidenced by A1C 15.3 with 5% unintentional wt loss in undetermined time resulting in pt underweight    Treatment Strategy:   Recommend Renal, diabetic 1800 calorie diet with Suplena supplement, BID and diabetes education    Nutrition Diagnosis Status:   New

## 2017-03-22 NOTE — PROGRESS NOTES
Dr. Greco notified of low blood sugar and actions taken. No new orders obtained.   Patient is eating the glucose tablets.

## 2017-03-23 PROBLEM — K85.90 ACUTE ON CHRONIC PANCREATITIS: Status: ACTIVE | Noted: 2017-03-23

## 2017-03-23 PROBLEM — R57.1 HYPOVOLEMIC SHOCK: Status: RESOLVED | Noted: 2017-03-22 | Resolved: 2017-03-23

## 2017-03-23 PROBLEM — K86.1 ACUTE ON CHRONIC PANCREATITIS: Status: ACTIVE | Noted: 2017-03-23

## 2017-03-23 PROBLEM — K86.89 PANCREATIC INSUFFICIENCY: Status: ACTIVE | Noted: 2017-03-22

## 2017-03-23 LAB
ALBUMIN SERPL BCP-MCNC: 3 G/DL
ALP SERPL-CCNC: 137 U/L
ALT SERPL W/O P-5'-P-CCNC: 37 U/L
ANION GAP SERPL CALC-SCNC: 6 MMOL/L
AST SERPL-CCNC: 36 U/L
BASOPHILS # BLD AUTO: 0 K/UL
BASOPHILS NFR BLD: 0.4 %
BILIRUB SERPL-MCNC: 0.3 MG/DL
BUN SERPL-MCNC: 71 MG/DL
CALCIUM SERPL-MCNC: 9.2 MG/DL
CHLORIDE SERPL-SCNC: 119 MMOL/L
CO2 SERPL-SCNC: 15 MMOL/L
CREAT SERPL-MCNC: 2.9 MG/DL
DIFFERENTIAL METHOD: ABNORMAL
EOSINOPHIL # BLD AUTO: 0.1 K/UL
EOSINOPHIL NFR BLD: 1.3 %
ERYTHROCYTE [DISTWIDTH] IN BLOOD BY AUTOMATED COUNT: 14.5 %
EST. GFR  (AFRICAN AMERICAN): 21 ML/MIN/1.73 M^2
EST. GFR  (NON AFRICAN AMERICAN): 18 ML/MIN/1.73 M^2
GLUCOSE SERPL-MCNC: 87 MG/DL
HCT VFR BLD AUTO: 36 %
HGB BLD-MCNC: 11.8 G/DL
LYMPHOCYTES # BLD AUTO: 1.5 K/UL
LYMPHOCYTES NFR BLD: 18 %
MAGNESIUM SERPL-MCNC: 1.4 MG/DL
MCH RBC QN AUTO: 29.3 PG
MCHC RBC AUTO-ENTMCNC: 32.7 %
MCV RBC AUTO: 90 FL
MITOG-NIL: 6.6 IU/ML
MITOGEN: 6.7 IU/ML
MONOCYTES # BLD AUTO: 0.3 K/UL
MONOCYTES NFR BLD: 3.8 %
NEUTROPHILS # BLD AUTO: 6.4 K/UL
NEUTROPHILS NFR BLD: 76.5 %
PHOSPHATE SERPL-MCNC: 3.7 MG/DL
PLATELET # BLD AUTO: 334 K/UL
PMV BLD AUTO: 8.4 FL
POCT GLUCOSE: 177 MG/DL (ref 70–110)
POCT GLUCOSE: 200 MG/DL (ref 70–110)
POCT GLUCOSE: 260 MG/DL (ref 70–110)
POCT GLUCOSE: 71 MG/DL (ref 70–110)
POTASSIUM SERPL-SCNC: 4.8 MMOL/L
PROT SERPL-MCNC: 6.7 G/DL
RBC # BLD AUTO: 4.02 M/UL
SODIUM SERPL-SCNC: 140 MMOL/L
TB AG NIL: 0.03 IU/ML
TB AG: 0.07 IU/ML
TB GOLD INTERP: NEGATIVE IU/ML
TB NIL: 0.04 IU/ML
VANCOMYCIN SERPL-MCNC: 11.5 UG/ML
VANCOMYCIN SERPL-MCNC: 13.4 UG/ML
WBC # BLD AUTO: 8.3 K/UL

## 2017-03-23 PROCEDURE — 97535 SELF CARE MNGMENT TRAINING: CPT

## 2017-03-23 PROCEDURE — 25000003 PHARM REV CODE 250: Performed by: HOSPITALIST

## 2017-03-23 PROCEDURE — 36415 COLL VENOUS BLD VENIPUNCTURE: CPT

## 2017-03-23 PROCEDURE — 12000002 HC ACUTE/MED SURGE SEMI-PRIVATE ROOM

## 2017-03-23 PROCEDURE — 80053 COMPREHEN METABOLIC PANEL: CPT

## 2017-03-23 PROCEDURE — 80202 ASSAY OF VANCOMYCIN: CPT | Mod: 91

## 2017-03-23 PROCEDURE — 85025 COMPLETE CBC W/AUTO DIFF WBC: CPT

## 2017-03-23 PROCEDURE — 63600175 PHARM REV CODE 636 W HCPCS: Performed by: HOSPITALIST

## 2017-03-23 PROCEDURE — 97165 OT EVAL LOW COMPLEX 30 MIN: CPT

## 2017-03-23 PROCEDURE — 97116 GAIT TRAINING THERAPY: CPT

## 2017-03-23 PROCEDURE — 25000003 PHARM REV CODE 250: Performed by: PHYSICIAN ASSISTANT

## 2017-03-23 PROCEDURE — 83735 ASSAY OF MAGNESIUM: CPT

## 2017-03-23 PROCEDURE — 25000003 PHARM REV CODE 250: Performed by: INTERNAL MEDICINE

## 2017-03-23 PROCEDURE — 84100 ASSAY OF PHOSPHORUS: CPT

## 2017-03-23 PROCEDURE — 80202 ASSAY OF VANCOMYCIN: CPT

## 2017-03-23 PROCEDURE — 97162 PT EVAL MOD COMPLEX 30 MIN: CPT

## 2017-03-23 RX ORDER — MAGNESIUM SULFATE HEPTAHYDRATE 40 MG/ML
2 INJECTION, SOLUTION INTRAVENOUS ONCE
Status: COMPLETED | OUTPATIENT
Start: 2017-03-23 | End: 2017-03-23

## 2017-03-23 RX ORDER — SODIUM BICARBONATE 650 MG/1
650 TABLET ORAL 3 TIMES DAILY
Status: DISCONTINUED | OUTPATIENT
Start: 2017-03-23 | End: 2017-03-24 | Stop reason: HOSPADM

## 2017-03-23 RX ORDER — SODIUM BICARBONATE 650 MG/1
1300 TABLET ORAL ONCE
Status: COMPLETED | OUTPATIENT
Start: 2017-03-23 | End: 2017-03-23

## 2017-03-23 RX ADMIN — CITALOPRAM HYDROBROMIDE 20 MG: 10 TABLET ORAL at 10:03

## 2017-03-23 RX ADMIN — CLOPIDOGREL BISULFATE 75 MG: 75 TABLET ORAL at 10:03

## 2017-03-23 RX ADMIN — MAGNESIUM SULFATE IN WATER 2 G: 40 INJECTION, SOLUTION INTRAVENOUS at 02:03

## 2017-03-23 RX ADMIN — HEPARIN SODIUM 5000 UNITS: 5000 INJECTION, SOLUTION INTRAVENOUS; SUBCUTANEOUS at 06:03

## 2017-03-23 RX ADMIN — ASPIRIN 81 MG CHEWABLE TABLET 81 MG: 81 TABLET CHEWABLE at 10:03

## 2017-03-23 RX ADMIN — CYPROHEPTADINE HYDROCHLORIDE 4 MG: 4 TABLET ORAL at 10:03

## 2017-03-23 RX ADMIN — SODIUM BICARBONATE 650 MG TABLET 650 MG: at 09:03

## 2017-03-23 RX ADMIN — ATORVASTATIN CALCIUM 80 MG: 40 TABLET, FILM COATED ORAL at 10:03

## 2017-03-23 RX ADMIN — PANCRELIPASE 1 CAPSULE: 24000; 76000; 120000 CAPSULE, DELAYED RELEASE PELLETS ORAL at 09:03

## 2017-03-23 RX ADMIN — PANCRELIPASE 1 CAPSULE: 24000; 76000; 120000 CAPSULE, DELAYED RELEASE PELLETS ORAL at 10:03

## 2017-03-23 RX ADMIN — PIPERACILLIN AND TAZOBACTAM 4.5 G: 4; .5 INJECTION, POWDER, LYOPHILIZED, FOR SOLUTION INTRAVENOUS; PARENTERAL at 12:03

## 2017-03-23 RX ADMIN — HEPARIN SODIUM 5000 UNITS: 5000 INJECTION, SOLUTION INTRAVENOUS; SUBCUTANEOUS at 02:03

## 2017-03-23 RX ADMIN — PIPERACILLIN AND TAZOBACTAM 4.5 G: 4; .5 INJECTION, POWDER, LYOPHILIZED, FOR SOLUTION INTRAVENOUS; PARENTERAL at 02:03

## 2017-03-23 RX ADMIN — VANCOMYCIN HYDROCHLORIDE 500 MG: 500 INJECTION, POWDER, LYOPHILIZED, FOR SOLUTION INTRAVENOUS at 05:03

## 2017-03-23 RX ADMIN — PANCRELIPASE 1 CAPSULE: 24000; 76000; 120000 CAPSULE, DELAYED RELEASE PELLETS ORAL at 12:03

## 2017-03-23 RX ADMIN — OXYCODONE HYDROCHLORIDE AND ACETAMINOPHEN 500 MG: 500 TABLET ORAL at 10:03

## 2017-03-23 RX ADMIN — SODIUM BICARBONATE 650 MG TABLET 1300 MG: at 02:03

## 2017-03-23 RX ADMIN — QUETIAPINE FUMARATE 100 MG: 100 TABLET, FILM COATED ORAL at 09:03

## 2017-03-23 RX ADMIN — SODIUM CHLORIDE: 0.9 INJECTION, SOLUTION INTRAVENOUS at 06:03

## 2017-03-23 RX ADMIN — CALCITRIOL 0.25 MCG: 0.25 CAPSULE, LIQUID FILLED ORAL at 10:03

## 2017-03-23 RX ADMIN — INSULIN ASPART 5 UNITS: 100 INJECTION, SOLUTION INTRAVENOUS; SUBCUTANEOUS at 12:03

## 2017-03-23 RX ADMIN — CYPROHEPTADINE HYDROCHLORIDE 4 MG: 4 TABLET ORAL at 09:03

## 2017-03-23 RX ADMIN — HEPARIN SODIUM 5000 UNITS: 5000 INJECTION, SOLUTION INTRAVENOUS; SUBCUTANEOUS at 09:03

## 2017-03-23 RX ADMIN — PANCRELIPASE 1 CAPSULE: 24000; 76000; 120000 CAPSULE, DELAYED RELEASE PELLETS ORAL at 05:03

## 2017-03-23 RX ADMIN — INSULIN DETEMIR 12 UNITS: 100 INJECTION, SOLUTION SUBCUTANEOUS at 09:03

## 2017-03-23 NOTE — PT/OT/SLP EVAL
Physical Therapy  Evaluation    Errol Keita   MRN: [id number]   Admitting Diagnosis: Hypovolemic shock    PT Received On: 17  PT Start Time: 1131     PT Stop Time: 1154    PT Total Time (min): 23 min       Billable Minutes:  Evaluation 10 and Gait [id number]    Diagnosis: Hypovolemic shock      Past Medical History:   Diagnosis Date    Arthritis     Asthma     Blood transfusion     CHF (congestive heart failure)     Coronary artery disease     Diabetes mellitus     Hypertension     Pancreatitis     Type 2 diabetes mellitus with hyperglycemia 2015      Past Surgical History:   Procedure Laterality Date    CARDIAC SURGERY      CABG    CHOLECYSTECTOMY      CORONARY ARTERY BYPASS GRAFT         Referring physician: Fee  Date referred to PT: 2017    General Precautions: Standard, fall, diabetic  Orthopedic Precautions: N/A   Braces: N/A            Patient History:  Lives With: friend(s), significant other  Living Arrangements: mobile home  Home Accessibility: stairs to enter home  Number of Stairs to Enter Home: 5  Stair Railings at Home: outside, present at both sides  Transportation Available: family or friend will provide  DME owned (not currently used): rolling walker, single point cane and bedside commode    Previous Level of Function:  Ambulation Skills: independent  Transfer Skills: independent  ADL Skills: independent    Subjective:  Communicated with ICU nurse prior to session.  Pt found OOB in chair on phone- significant other at bedside  Pt quiet and soft spoken  Per pt's significant other, pt was independent with mobility at home.      Chief Complaint:   Patient goals:     Pain Ratin/10                    Objective:   Patient found with: blood pressure cuff, peripheral IV, central line, fenton catheter, pulse ox (continuous), telemetry     Cognitive Exam:  Oriented to: Person, Place and Situation    Follows Commands/attention: Follows multistep  commands  Communication:  clear/fluent  Safety awareness/insight to disability: intact    Physical Exam:  Postural examination/scapula alignment: Rounded shoulder and Head forward    Skin integrity: Visible skin intact  Edema: None noted     Sensation:   Intact      Lower Extremity Range of Motion:  Right Lower Extremity: WFL  Left Lower Extremity: WFL    Lower Extremity Strength:  Right Lower Extremity: 4-/5  Left Lower Extremity: 4-/5     Fine motor coordination:      Gross motor coordination:     Functional Mobility:  Bed Mobility:       Transfers:  Sit <> Stand Assistance: Minimum Assistance  Sit <> Stand Assistive Device: Rolling Walker  Bed <> Chair Technique: Stand Pivot  Bed <> Chair Assistance: Minimum Assistance  Bed <> Chair Assistive Device: Rolling Walker    Gait:   Gait Distance: 100 ft   Assistance 1: Minimum assistance  Gait Assistive Device: Rolling walker  Gait Deviation(s): decreased rm, increased time in double stance, decreased velocity of limb motion    Stairs:      Balance:   Static Sit: GOOD-: Takes MODERATE challenges from all directions but inconsistently  Dynamic Sit: GOOD-: Maintains balance through MODERATE excursions of active trunk movement,     Static Stand: FAIR+: Takes MINIMAL challenges from all directions  Dynamic stand: POOR: N/A    Therapeutic Activities and Exercises:  Pt progressed to standing from bedside chair with no complaints of dizziness. Pt ambulated to hallways and able to hold conversation. Pt returned to room and remained OOB in chair.     AM-PAC 6 CLICK MOBILITY  How much help from another person does this patient currently need?   1 = Unable, Total/Dependent Assistance  2 = A lot, Maximum/Moderate Assistance  3 = A little, Minimum/Contact Guard/Supervision  4 = None, Modified [place]/Independent          AM-PAC Raw Score CMS G-Code Modifier Level of Impairment Assistance   6 % Total / Unable   7 - 9 CM 80 - 100% Maximal Assist   10 - 14 CL 60 - 80% Moderate Assist   15 -  19 CK 40 - 60% Moderate Assist   20 - 22 CJ 20 - 40% Minimal Assist   23 CI 1-20% SBA / CGA   24 CH 0% Independent/ Mod I     Patient left up in chair with all lines intact, call button in reach and pt's significant other present.    Assessment:   Errol Keita is a 51 y.o. female with a medical diagnosis of Hypovolemic shock and presents with weakness and general deconditioning. Pt has hx of prior CVA. PTA pt was independent with ADLs, mobility around the house, and cooking. Pt would benefit from continued therapy via HHPT to address functional deficits.      Rehab identified problem list/impairments: Rehab identified problem list/impairments: weakness, impaired endurance, impaired self care skills, impaired functional mobilty, gait instability    Rehab potential is fair.    Activity tolerance: Fair    Discharge recommendations:   Home Health PT     Barriers to discharge:   none    Equipment recommendations:   none    GOALS:   Physical Therapy Goals        Problem: Physical Therapy Goal    Goal Priority Disciplines Outcome Goal Variances Interventions   Physical Therapy Goal    High PT/OT, PT      Description:  Goals to be met by: 2017     Patient will increase functional independence with mobility by performin. Bed to chair transfer with Contact Guard Assistance using Rolling Walker  2. Gait  x 240 feet with Contact Guard Assistance using Rolling Walker.   3. Lower extremity exercise program x10 reps x2 per handout, with assistance as needed                PLAN:    Patient to be seen   to address the above listed problems via    Plan of Care expires:    Plan of Care reviewed with:      Deidre Wen, SPT  2017      I certify that I was present in the room directing the student in service delivery and guiding them using my skilled judgment. As the co-signing therapist I have reviewed the students documentation and am responsible for the treatment, assessment, and plan.        Heather Yo,  PT  03/23/2017

## 2017-03-23 NOTE — PLAN OF CARE
Problem: Patient Care Overview  Goal: Plan of Care Review  Outcome: Ongoing (interventions implemented as appropriate)  Patient remains confused to year and situation. Pleasant and cooperative. BP decreased when sound asleep to 79/55, rechecked and 90/52.    Problem: Infection, Risk/Actual (Adult)  Intervention: Manage Suspected/Actual Infection  Antibiotics given as ordered.      Problem: Renal Failure/Kidney Injury, Acute (Adult)  Intervention: Monitor/Manage Fluid, Acid Base Balance  NS infusing at 125cc/hr. Good urine output this shift.      Problem: Pressure Ulcer Risk (Michael Scale) (Adult,Obstetrics,Pediatric)  Intervention: Turn/Reposition Often  Rotation mode on bed in use to decrease risk of breakdown.      Problem: Diabetes, Type 2 (Adult)  Intervention: Optimize Glycemic Control  BS at HS = 225 and one unit insulin given per sliding scale. AM BS= 71. Apple juice given.

## 2017-03-23 NOTE — PROGRESS NOTES
Consult Note  Nephrology    Consult Requested By: Juan Skinner MD    Reason for Consult: TIMOTHY, diabetic CKD stage 3-4,     SUBJECTIVE:     Interval HPI: Feels better, no complains. Afebrile.    Past Medical History:   Diagnosis Date    Arthritis     Asthma     Blood transfusion     CHF (congestive heart failure)     Coronary artery disease     Diabetes mellitus     Hypertension     Pancreatitis     Type 2 diabetes mellitus with hyperglycemia 7/13/2015       OBJECTIVE:     Vital Signs Range (Last 24H):  Temp:  [97.2 °F (36.2 °C)-98.6 °F (37 °C)]   Pulse:  [65-87]   Resp:  [11-26]   BP: ()/(50-91)   SpO2:  [98 %-100 %]     Physical Exam:  General- Patient alert and oriented x3 in NAD  HEENT- WNL  Neck- supple  CV- Regular rate and rhythm  Resp- Lungs No increased WOB  GI- Non tender/non-distended  Extrem- No cyanosis, clubbing, edema.  Derm- No rashes, masses, or lesions noted  Neuro-  No flap. No focal deficits noted.     Body mass index is 17.37 kg/(m^2).    Laboratory:  CBC:     Recent Labs  Lab 03/23/17  0659   WBC 8.30   RBC 4.02   HGB 11.8*   HCT 36.0*      MCV 90   MCH 29.3   MCHC 32.7     CMP:     Recent Labs  Lab 03/23/17  0659   GLU 87   CALCIUM 9.2   ALBUMIN 3.0*   PROT 6.7      K 4.8   CO2 15*   *   BUN 71*   CREATININE 2.9*   ALKPHOS 137*   ALT 37   AST 36   BILITOT 0.3     LFTs:     Recent Labs  Lab 03/23/17  0659   ALT 37   AST 36   ALKPHOS 137*   BILITOT 0.3   PROT 6.7   ALBUMIN 3.0*       Cardiac markers:     Recent Labs  Lab 03/22/17  0357   TROPONINI 0.134*     ABGs: No results for input(s): PH, PCO2, PO2, HCO3, POCSATURATED, BE in the last 168 hours.  Microbiology Results (last 7 days)     Procedure Component Value Units Date/Time    Urine culture [[id number]] Collected:  03/21/17 9716    Order Status:  Completed Specimen:  Urine from Urine, Clean Catch Updated:  03/23/17 1521     Urine Culture, Routine --     KLEBSIELLA PNEUMONIAE  >100,000 cfu/ml  Susceptibility  pending      Narrative:       Cath if necessary    Blood culture [[id number]] Collected:  03/22/17 1314    Order Status:  Completed Specimen:  Blood Updated:  03/23/17 0315     Blood Culture, Routine No Growth to date    Narrative:       Take 2 sets, 1 from central line, one from peripheral site.  Take both aerobic and anaerobic bottles.    Blood culture [[id number]] Collected:  03/22/17 1301    Order Status:  Completed Specimen:  Blood Updated:  03/23/17 0315     Blood Culture, Routine No Growth to date    Narrative:       Take 2 sets, 1 from central line, one from peripheral site.  Take both aerobic and anaerobic bottles.        Specimen     None          Recent Labs  Lab 03/21/17  2144   COLORU Yellow   SPECGRAV 1.020   PHUR 6.0   PROTEINUA 2+*   BACTERIA Many*   NITRITE Negative   LEUKOCYTESUR 3+*   UROBILINOGEN Negative   HYALINECASTS 0       Diagnostic Results:  Labs: Reviewed  X-Ray: Reviewed      ASSESSMENT/PLAN:     Active Hospital Problems    Diagnosis  POA    *Hypovolemic/Septic shock [R57.1]  Yes    Chronic pancreatitis [K86.1]  Yes    Moderate tobacco dependence [F17.200]  Yes    Type 2 diabetes mellitus with complication, with long-term current use of insulin [E11.8, Z79.4]  Not Applicable    TIMOTHY (acute kidney injury) [N17.9]  Yes    Chronic diastolic CHF (congestive heart failure) [I50.32]  Yes    Hyperkalemia [E87.5]  Yes    Metabolic acidosis- severe [E87.2]  Yes    Coronary artery disease involving native coronary artery without angina pectoris [I25.10]  Yes    Protein-calorie malnutrition, severe [E43]  Yes    Major depressive disorder [F32.9]  Yes    Hypertension associated with diabetes [E11.59, I10]  Yes    Hypercholesteremia [E78.00]  Yes      Resolved Hospital Problems    Diagnosis Date Resolved POA    Mild malnutrition [E44.1] 03/22/2017 Yes       Assessment and plan:    TIMOTHY, likely hemodynamic/infection related ATN  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   Treat infection per cultures. Broad coverage for now.  IVF, monitor UO, dose meds for GFR < 30 ml/min.  Avoid NSAIDs, ACEI, COXii, IV contrast and other toxins.  Renal, low K diet.    Anemia - stable, monitor for now.  SHPT - monitor Ca and Phos, continue calcitriol.  DM2 - optimize BG as best as possible, management per primary team.  Acidosis - CO2 is low at 15. Start oral bicarb at 650 TID, continue on dispo if CO2 < 20.    Thank you for allowing us to participate in the care of your patient.   We will follow the patient and provide recommendations as needed.

## 2017-03-23 NOTE — CONSULTS
Errol Keita [id number] is a 51 y.o. female who has been consulted for vancomycin dosing.    The patient has the following labs:     Date Creatinine (mg/dl)    BUN WBC Count   3/23/2017 Estimated Creatinine Clearance: 17.8 mL/min (based on Cr of 2.9). Lab Results   Component Value Date    BUN 71 (H) 03/23/2017     Lab Results   Component Value Date    WBC 8.30 03/23/2017        Current weight is 50.3 kg (110 lb 14.3 oz)    Vancomycin level from 03/23 at 955 was 13.4 mg/dL.   Pharmacy will re-dose vancomycin 500mg on 03/23 at 1730. A vancomycin level has been ordered for 03/24@1700.       Patient will be followed by pharmacy for changes in renal function, toxicity, and efficacy.  Thank you for allowing us to participate in this patient's care.     Maddi Patterson

## 2017-03-23 NOTE — PT/OT/SLP EVAL
Occupational Therapy  Evaluation    Errol HERNANDEZ Keita   MRN: [id number]   Admitting Diagnosis: Hypovolemic shock    OT Date of Treatment: 17   OT Start Time: 1033  OT Stop Time: 1105  OT Total Time (min): 32 min    Billable Minutes:  Evaluation 10  Self Care/Home Management 22    Diagnosis: Hypovolemic shock   Sepsis, DM1    Past Medical History:   Diagnosis Date    Arthritis     Asthma     Blood transfusion     CHF (congestive heart failure)     Coronary artery disease     Diabetes mellitus     Hypertension     Pancreatitis     Type 2 diabetes mellitus with hyperglycemia 2015      Past Surgical History:   Procedure Laterality Date    CARDIAC SURGERY      CABG    CHOLECYSTECTOMY      CORONARY ARTERY BYPASS GRAFT         Referring physician: Angus  Date referred to OT: 3/22/17    General Precautions: Standard, diabetic, fall  Orthopedic Precautions: N/A  Braces: N/A          Patient History:  Living Environment  Lives With: significant other, friend(s)  Living Arrangements: mobile home  Home Accessibility: stairs to enter home  Number of Stairs to Enter Home: 5  Stair Railings at Home: outside, present at both sides  Transportation Available: family or friend will provide  Equipment Currently Used at Home: cane, straight, rollator, bedside commode (Pt stated she has not used any DME in a while but owns the listed DME.)    Prior level of function:   Bed Mobility/Transfers: independent  Grooming: independent  Bathing: needs assist  Upper Body Dressing: independent  Lower Body Dressing: independent  Toileting: independent  Homemaking Responsibilities: Yes  Meal Prep Responsibility: Primary     Dominant hand: right    Subjective:  Communicated with nurse Luisa prior to session.  Stated patient was cleared for OT today.  Chief Complaint: none stated  Patient/Family stated goals: to go home    Pain Ratin/10              Pain Rating Post-Intervention: 0/10    Objective:  Patient found with: central  line, fenton catheter, telemetry, pulse ox (continuous), peripheral IV, blood pressure cuff    Cognitive Exam:  Oriented to: Person, Place and year, not month  Follows Commands/attention: Follows two-step commands  Communication: clear/fluent  Memory:  Impaired STM  Safety awareness/insight to disability: intact  Coping skills/emotional control: flat    Visual/perceptual:  Intact    Physical Exam:  Postural examination/scapula alignment: Rounded shoulder  Skin integrity: open scab on R forearm near wrist  Edema: None noted     Sensation:   Intact    Upper Extremity Range of Motion:  Right Upper Extremity: WFL  Left Upper Extremity: WFL    Upper Extremity Strength:  Right Upper Extremity: WFL  Left Upper Extremity: WFL   Strength: weak grasp B hands    Fine motor coordination:   Intact    Gross motor coordination: WFL    Functional Mobility:  Bed Mobility:  Rolling/Turning Right: Stand by assistance, With side rail  Scooting/Bridging: Stand by Assistance, With side rail  Supine to Sit: Stand by Assistance, WIth side rail    Transfers:  Sit <> Stand Assistance: Contact Guard Assistance cues for hand placement with stand to sit  Sit <> Stand Assistive Device: No Assistive Device  Bed <> Chair Technique: Stand Pivot  Bed <> Chair Transfer Assistance: Contact Guard Assistance  Bed <> Chair Assistive Device: No Assistive Device    Functional Ambulation: Pt took 5 steps bedside to chair with CGA and no assistive device. No LOB.     Activities of Daily Living:  UE Dressing: Min A due to multiple lines  LE Dressing Level of Assistance: Stand by assistance (to don/doff socks)    Grooming Position: bedside chair  Grooming Level of Assistance:  (set-up)  Toileting Where Assessed:  (has a fenton)       Balance:   Static Sit: NORMAL: No deviations seen in posture held statically  Dynamic Sit: GOOD+: Maintains balance through MAXIMAL excursions of active trunk motion  Static Stand: GOOD-: Takes MODERATE challenges from all  "directions inconsistently  Dynamic stand: FAIR+: Needs CLOSE SUPERVISION during gait and is able to right self with minor LOB    Therapeutic Activities and Exercises:  OT ed pt on OT role & POC as well as discharge recommendations.  OT ed patient on safety with functional mobility with cues for hand placement.    AM-PAC 6 CLICK ADL  How much help from another person does this patient currently need?  1 = Unable, Total/Dependent Assistance  2 = A lot, Maximum/Moderate Assistance  3 = A little, Minimum/Contact Guard/Supervision  4 = None, Modified [place]/Independent    Putting on and taking off regular lower body clothing? : 3  Bathing (including washing, rinsing, drying)?: 3  Toileting, which includes using toilet, bedpan, or urinal? : 3  Putting on and taking off regular upper body clothing?: 3  Taking care of personal grooming such as brushing teeth?: 3  Eating meals?: 4  Total Score: 19    AM-PAC Raw Score CMS "G-Code Modifier Level of Impairment Assistance   6 % Total / Unable   7 - 9 CM 80 - 100% Maximal Assist   10 - 14 CL 60 - 80% Moderate Assist   15 - 19 CK 40 - 60% Moderate Assist   20 - 22 CJ 20 - 40% Minimal Assist   23 CI 1-20% SBA / CGA   24 CH 0% Independent/ Mod I       Patient left up in chair with all lines intact, call button in reach, Luisa, nurse notified and significant other present    Assessment:  Errol Keita is a 51 y.o. female with a medical diagnosis of Hypovolemic shock and presents with decline in functional status, impacting ADLs and functional mobility. Recommend OT treatment to maximize endurance, safety & independence with ADLs.    Rehab identified problem list/impairments: Rehab identified problem list/impairments: weakness, impaired endurance, impaired self care skills, impaired functional mobilty    Rehab potential is good.    Activity tolerance: Fair    Discharge recommendations: Discharge Facility/Level Of Care Needs: home health PT     Barriers to discharge:   " none    Equipment recommendations: none     GOALS:   Occupational Therapy Goals        Problem: Occupational Therapy Goal    Goal Priority Disciplines Outcome Interventions   Occupational Therapy Goal     OT, PT/OT Ongoing (interventions implemented as appropriate)    Description:  Goals to be met by: 4/2/17     Patient will increase functional independence with ADLs by performing:    Toileting from toilet with Supervision and Assistive Devices as needed for hygiene and clothing management.   Toilet transfer to toilet with Supervision.  Upper extremity exercise program x10 reps per handout, with assistance as needed.  Pt to utilize energy conservation techniques during ADL task performance with occasional cues.                  PLAN:  Patient to be seen 2 x/week, 3 x/week to address the above listed problems via self-care/home management, therapeutic activities, therapeutic exercises  Plan of Care expires: 04/02/17  Plan of Care reviewed with: patient, significant other         BALJIT Perkins  03/23/2017

## 2017-03-23 NOTE — NURSING
Patient received to room 216A post report from Luisa BEAN in ICU.  Patient oriented to room and call system.  Patient was able to demonstrate the use of the call light/red button to summons nurse.   Patient instructed to not get out of bed without assistance/bed alarm set

## 2017-03-23 NOTE — PROGRESS NOTES
Remote tele placed on patient. Patient belongings gathered. Patient transferred to room 216-1. Nurse Jennifer met patient at room. Patient assisted into bed. Bin meds given to Jennifer. Jennifer informed that pharmacy was notified that patient is transferred and that the Zosyn that is due will be brought to PCU.

## 2017-03-23 NOTE — PLAN OF CARE
Problem: Occupational Therapy Goal  Goal: Occupational Therapy Goal  Goals to be met by: 4/2/17     Patient will increase functional independence with ADLs by performing:    Toileting from toilet with Supervision and Assistive Devices as needed for hygiene and clothing management.   Toilet transfer to toilet with Supervision.  Upper extremity exercise program x10 reps per handout, with assistance as needed.  Pt to utilize energy conservation techniques during ADL task performance with occasional cues.  Outcome: Ongoing (interventions implemented as appropriate)  OT evaluation completed today. Goals & care plan established.     BALJIT Edmond  3/23/2017

## 2017-03-24 ENCOUNTER — TELEPHONE (OUTPATIENT)
Dept: ENDOCRINOLOGY | Facility: CLINIC | Age: 52
End: 2017-03-24

## 2017-03-24 VITALS
BODY MASS INDEX: 17.4 KG/M2 | SYSTOLIC BLOOD PRESSURE: 175 MMHG | HEIGHT: 67 IN | TEMPERATURE: 98 F | OXYGEN SATURATION: 99 % | RESPIRATION RATE: 18 BRPM | WEIGHT: 110.88 LBS | HEART RATE: 84 BPM | DIASTOLIC BLOOD PRESSURE: 94 MMHG

## 2017-03-24 LAB
ALBUMIN SERPL BCP-MCNC: 3.4 G/DL
ANION GAP SERPL CALC-SCNC: 10 MMOL/L
BACTERIA UR CULT: NORMAL
BUN SERPL-MCNC: 47 MG/DL
CALCIUM SERPL-MCNC: 9.9 MG/DL
CHLORIDE SERPL-SCNC: 115 MMOL/L
CO2 SERPL-SCNC: 17 MMOL/L
CREAT SERPL-MCNC: 2.2 MG/DL
EST. GFR  (AFRICAN AMERICAN): 29 ML/MIN/1.73 M^2
EST. GFR  (NON AFRICAN AMERICAN): 25 ML/MIN/1.73 M^2
GLUCOSE SERPL-MCNC: 215 MG/DL
PHOSPHATE SERPL-MCNC: 2.9 MG/DL
POCT GLUCOSE: 218 MG/DL (ref 70–110)
POCT GLUCOSE: 348 MG/DL (ref 70–110)
POTASSIUM SERPL-SCNC: 4.7 MMOL/L
SODIUM SERPL-SCNC: 142 MMOL/L
VANCOMYCIN SERPL-MCNC: 13.1 UG/ML

## 2017-03-24 PROCEDURE — 36415 COLL VENOUS BLD VENIPUNCTURE: CPT

## 2017-03-24 PROCEDURE — 63600175 PHARM REV CODE 636 W HCPCS: Performed by: HOSPITALIST

## 2017-03-24 PROCEDURE — 80069 RENAL FUNCTION PANEL: CPT

## 2017-03-24 PROCEDURE — 25000003 PHARM REV CODE 250: Performed by: PHYSICIAN ASSISTANT

## 2017-03-24 PROCEDURE — 25000003 PHARM REV CODE 250: Performed by: INTERNAL MEDICINE

## 2017-03-24 PROCEDURE — 25000003 PHARM REV CODE 250: Performed by: HOSPITALIST

## 2017-03-24 PROCEDURE — 80202 ASSAY OF VANCOMYCIN: CPT

## 2017-03-24 PROCEDURE — 97110 THERAPEUTIC EXERCISES: CPT

## 2017-03-24 PROCEDURE — 97116 GAIT TRAINING THERAPY: CPT

## 2017-03-24 PROCEDURE — 97803 MED NUTRITION INDIV SUBSEQ: CPT | Performed by: DIETITIAN, REGISTERED

## 2017-03-24 RX ORDER — INSULIN LISPRO 100 [IU]/ML
7 INJECTION, SOLUTION INTRAVENOUS; SUBCUTANEOUS
Start: 2017-03-24 | End: 2018-07-06 | Stop reason: SDUPTHER

## 2017-03-24 RX ORDER — SODIUM BICARBONATE 650 MG/1
650 TABLET ORAL 3 TIMES DAILY
COMMUNITY
Start: 2017-03-24 | End: 2017-04-24

## 2017-03-24 RX ORDER — CIPROFLOXACIN 500 MG/1
500 TABLET ORAL EVERY 12 HOURS
Qty: 8 TABLET | Refills: 0 | Status: SHIPPED | OUTPATIENT
Start: 2017-03-24 | End: 2017-03-28

## 2017-03-24 RX ORDER — CIPROFLOXACIN 500 MG/1
500 TABLET ORAL EVERY 12 HOURS
Status: DISCONTINUED | OUTPATIENT
Start: 2017-03-24 | End: 2017-03-24 | Stop reason: HOSPADM

## 2017-03-24 RX ORDER — INSULIN ASPART 100 [IU]/ML
7 INJECTION, SOLUTION INTRAVENOUS; SUBCUTANEOUS
Status: DISCONTINUED | OUTPATIENT
Start: 2017-03-24 | End: 2017-03-24 | Stop reason: HOSPADM

## 2017-03-24 RX ADMIN — SODIUM BICARBONATE 650 MG TABLET 650 MG: at 06:03

## 2017-03-24 RX ADMIN — HEPARIN SODIUM 5000 UNITS: 5000 INJECTION, SOLUTION INTRAVENOUS; SUBCUTANEOUS at 06:03

## 2017-03-24 RX ADMIN — ATORVASTATIN CALCIUM 80 MG: 40 TABLET, FILM COATED ORAL at 09:03

## 2017-03-24 RX ADMIN — OXYCODONE HYDROCHLORIDE AND ACETAMINOPHEN 500 MG: 500 TABLET ORAL at 09:03

## 2017-03-24 RX ADMIN — ASPIRIN 81 MG CHEWABLE TABLET 81 MG: 81 TABLET CHEWABLE at 09:03

## 2017-03-24 RX ADMIN — CALCITRIOL 0.25 MCG: 0.25 CAPSULE, LIQUID FILLED ORAL at 09:03

## 2017-03-24 RX ADMIN — CLOPIDOGREL BISULFATE 75 MG: 75 TABLET ORAL at 09:03

## 2017-03-24 RX ADMIN — HEPARIN SODIUM 5000 UNITS: 5000 INJECTION, SOLUTION INTRAVENOUS; SUBCUTANEOUS at 03:03

## 2017-03-24 RX ADMIN — INSULIN ASPART 5 UNITS: 100 INJECTION, SOLUTION INTRAVENOUS; SUBCUTANEOUS at 11:03

## 2017-03-24 RX ADMIN — SODIUM BICARBONATE 650 MG TABLET 650 MG: at 03:03

## 2017-03-24 RX ADMIN — PANCRELIPASE 1 CAPSULE: 24000; 76000; 120000 CAPSULE, DELAYED RELEASE PELLETS ORAL at 09:03

## 2017-03-24 RX ADMIN — PANCRELIPASE 1 CAPSULE: 24000; 76000; 120000 CAPSULE, DELAYED RELEASE PELLETS ORAL at 11:03

## 2017-03-24 RX ADMIN — CITALOPRAM HYDROBROMIDE 20 MG: 10 TABLET ORAL at 09:03

## 2017-03-24 RX ADMIN — INSULIN DETEMIR 12 UNITS: 100 INJECTION, SOLUTION SUBCUTANEOUS at 09:03

## 2017-03-24 RX ADMIN — CYPROHEPTADINE HYDROCHLORIDE 4 MG: 4 TABLET ORAL at 09:03

## 2017-03-24 RX ADMIN — INSULIN ASPART 4 UNITS: 100 INJECTION, SOLUTION INTRAVENOUS; SUBCUTANEOUS at 11:03

## 2017-03-24 RX ADMIN — CEFTRIAXONE 1 G: 1 INJECTION, SOLUTION INTRAVENOUS at 12:03

## 2017-03-24 NOTE — PLAN OF CARE
03/24/17 1643   Final Note   Assessment Type Final Discharge Note   Discharge Disposition Home-Health   Discharge planning education complete? Yes

## 2017-03-24 NOTE — PROGRESS NOTES
[facility]  Adult Nutrition  Progress Note    SUMMARY     Recommendations  Recommendation/Intervention: 1.) Continue with Renal diet, encouraging PO itnake + Boost glucose control    Goals: 1) Meal intake at least 75% 2) Utilize nutrition supplement   Nutrition Goal Status: 1.) progressing towards goal 2.) goal met  Communication of RD Recs: other (comment) (care plan)     1. TIMOTHY (acute kidney injury)    2. Dehydration    3. Urinary tract infection without hematuria, site unspecified    4. Elevated lipase    5. Heart failure      Past Medical History:   Diagnosis Date    Arthritis     Asthma     Blood transfusion     CHF (congestive heart failure)     Coronary artery disease     Diabetes mellitus     Hypertension     Pancreatitis     Type 2 diabetes mellitus with hyperglycemia 7/13/2015         Continuum of Care Plan  Referral to Outpatient Services:  (Discharge Plan: Recommend renal, diabetic 1800 calorie diet )    Reason for Assessment  Reason for Assessment: RD follow-up  Interdisciplinary Rounds: attended  General Information Comments: Spoke with pt and spouse, who report they have had diabetic nutrition education previously.   RD f/u: still with somewhat poor appetite. Some meals with 100% intake. Boost ordered. Denies nausea/vomiting. Not recommending additional diet restrictions to due fair appetite.   TIMOTHY seem to be improving.     Nutrition Prescription Ordered  Current Diet Order: Renal diet  Oral Nutrition Supplement: Boost glucose control      Evaluation of Received Nutrients/Fluid Intake  IV Fluid (mL): 1800  Tolerance: tolerating     Nutrition Risk Screen  Nutrition Risk Screen: reduced oral intake over the last month    Nutrition/Diet History  Patient Reported Diet/Restrictions/Preferences: general  Food Preferences: No religous or cultural preferences identified    Labs/Tests/Procedures/Meds  Diagnostic Test/Procedure Review: reviewed, pertinent  Pertinent Labs Reviewed:  reviewed, pertinent   BMP  Lab Results   Component Value Date     2017    K 4.7 2017     (H) 2017    CO2 17 (L) 2017    BUN 47 (H) 2017    CREATININE 2.2 (H) 2017    CALCIUM 9.9 2017    ANIONGAP 10 2017    ESTGFRAFRICA 29 (A) 2017    EGFRNONAA 25 (A) 2017     Lab Results   Component Value Date    ALBUMIN 3.4 (L) 2017     Lab Results   Component Value Date    CALCIUM 9.9 2017    PHOS 2.9 2017       Recent Labs  Lab 17  0618   POCTGLUCOSE 218*       Pertinent Medications Reviewed: reviewed  Scheduled Meds:   ascorbic acid (vitamin C)  500 mg Oral Daily    aspirin  81 mg Oral Daily    atorvastatin  80 mg Oral Daily    calcitRIOL  0.25 mcg Oral Daily    cefTRIAXone (ROCEPHIN) IVPB  1 g Intravenous Q24H    citalopram  20 mg Oral Daily    clopidogrel  75 mg Oral Daily    cyproheptadine  4 mg Oral BID    heparin (porcine)  5,000 Units Subcutaneous Q8H    insulin aspart  5 Units Subcutaneous TIDWM    insulin detemir  12 Units Subcutaneous Daily    lipase-protease-amylase 24,000-76,000-120,000 units  1 capsule Oral QID (WM & HS)    quetiapine  100 mg Oral QHS    sodium bicarbonate  650 mg Oral TID     Continuous Infusions:   sodium chloride 0.9% 75 mL/hr (17 1257)         Physical Findings  Overall Physical Appearance: underweight  Oral/Mouth Cavity: tooth/teeth missing  Skin:  (Michael score 19)    Anthropometrics  Height Method: Stated  Height (inches): 67.01 in  Weight Method: Bed Scale  Weight (kg): 50.3 kg  Ideal Body Weight (IBW), Female: 135.05 lb  % Ideal Body Weight, Female (lb): 82.11 lb  BMI (kg/m2): 17.36  BMI Grade: 17 - 18.4 protein-energy malnutrition grade I  Usual Body Weight (UBW), k.27 kg  % Usual Body Weight: 96.23  Weight Loss: unintentional      Estimated/Assessed Needs  Weight Used For Calorie Calculations: 50.3 kg (110 lb 14.3 oz)   Height (cm): 170.2 cm  Energy Need Method:  Kcal/kg  25 kcal/kg (kcal): 1257.5 and 35 kcal/kg (kcal): 1760.5   RMR (Keaton-St. Jeor Equation): 1144.49  Weight Used For Protein Calculations: 50.3 kg (110 lb 14.3 oz)  0.8 gm Protein (gm): 40.32 and 1.2 gm Protein (gm): 60.49  Fluid Need Method:  (Urine output + 500 or per MD rec)  Electrolyte/Mineral Requirements: Potassium, Phosphorus (Monitor)    Monitor and Evaluation  Food and Nutrient Intake: energy intake, food and beverage intake  Food and Nutrient Adminstration: diet order  Knowledge/Beliefs/Attitudes: food and nutrition knowledge/skill  Anthropometric Measurements: weight, weight change, body mass index  Biochemical Data, Medical Tests and Procedures: electrolyte and renal panel, glucose/endocrine profile, lipid profile  Nutrition-Focused Physical Findings: skin    Nutrition Risk  Level of Risk:  (x2 weekly)    Nutrition Follow-Up  RD Follow-up?: Yes    Assessment and Plan    Protein-calorie malnutrition, severe  Nutrition Problem:   Mild Malnutrition    % Intake of Estimated Energy Needs: 25 - 50 %  % Meal Intake: 50%    Etiology/Related to:   Decreased ability to consume sufficient energy AEB BMI <18     Treatment Strategy:   1.) Continue with Renal diet, encouraging PO intake + Boost glucose control BID     Nutrition Diagnosis Status:   Continues

## 2017-03-24 NOTE — ASSESSMENT & PLAN NOTE
Improving  ATN from hypovolemia with hypotension, on diabetic nephropathy with likely hyperglycemic injury as well. Nephrology consulted, avoid nephrotoxic drugs and renally dose meds

## 2017-03-24 NOTE — PROGRESS NOTES
Left voicemail on Ti Green's () cell phone informing him that pt has been dc'd.  Awaiting call back.

## 2017-03-24 NOTE — PLAN OF CARE
Faxed home health orders to [facility] via [facility], pending approval. PRISCILA Harris    4552-The referral for the patient in [facility]/PCU, room 216, bed 216 A admitted at 3/21/2017 8:33 PM has been updated by [email or web address].  Update: Declined, not in network. PRISCILA Harris     Faxed orders to [facility]. PRISCILA Harris     3/25/17 1035am- Called [facility]  to follow-up on acceptance, provided on-call personnel patient demographics, Awaiting call back from on-call nursing. PRISCILA Harris    1045- Received call back from Yin hernandez/ Kylie, patient will be admitted and seen on today. PRISCILA Harris

## 2017-03-24 NOTE — ASSESSMENT & PLAN NOTE
Daily weight, monitor I/O  Restart Beta blocker, hold diuretic while gently hydrating and monitor fluid status closely.

## 2017-03-24 NOTE — PLAN OF CARE
Problem: Patient Care Overview  Goal: Plan of Care Review  Outcome: Ongoing (interventions implemented as appropriate)  Pt AAO  Continent to bowel and bladder  Denies any pain or discomfort at present time  Tolerates PO medication and IV therapy with no adverse reactions  Appetite poor  Bed locked in lowest position side rails up x2  Call light and bedside table in reach   Will continue to monitor

## 2017-03-24 NOTE — DISCHARGE INSTRUCTIONS
Thank you for choosing [facility] for your medical care. The primary doctor who is taking care of you at the time of your discharge is Juan Skinner MD.     You were admitted to the hospital with Hypovolemic shock.     Please note your discharge instructions, including diet/activity restrictions, follow-up appointments, and medication changes.  If you have any questions about your medical issues, prescriptions, or any other questions, please feel free to contact the [facility] Medicine Dept at [phone] and we will help.    If you are previously with Home health, outpatient PT/OT or under a therapy program, you are cleared to return to those programs.    Please direct all long term medication refills and follow up to your primary care provider, Chica Irene DO. Thank you again for letting us take care of your health care needs.

## 2017-03-24 NOTE — ASSESSMENT & PLAN NOTE
Due to poor oral intake - ?due to depression vs other  Antidepressant and encourage oral intake  Rule out HIV, TB  Glucerna, protein supplementation if she can tolerate with renal function/dysfunction    Likely hypovolemic from diuretic effect of glucose as well.

## 2017-03-24 NOTE — PROGRESS NOTES
Pt cleared for d/c.  IV's and tele removed.  Education provided to pt and significant other regarding home medication regimen, follow up appts, when to seek medical attention, CHF, DM2, CKD, and pneumonia.  Pt's significant other verbalized understanding.  Pt and belongings transported to [facility] via w/c by Rinku Sullivan.  Pt to be driven home by significant other.  Pt free of falls or injury.  Pt safe.

## 2017-03-24 NOTE — ASSESSMENT & PLAN NOTE
Stable condition; hypoglycemic brain injury concurrent as well. Patient may have grave disability and may benefit from long term nursing home placement for management of disease but unlikely she would agree to this.

## 2017-03-24 NOTE — SUBJECTIVE & OBJECTIVE
Past Medical History:   Diagnosis Date    Arthritis     Asthma     Blood transfusion     CHF (congestive heart failure)     Coronary artery disease     Diabetes mellitus     Hypertension     Pancreatitis     Type 2 diabetes mellitus with hyperglycemia 7/13/2015       Past Surgical History:   Procedure Laterality Date    CARDIAC SURGERY      CABG    CHOLECYSTECTOMY      CORONARY ARTERY BYPASS GRAFT         Review of patient's allergies indicates:  No Known Allergies    No current facility-administered medications on file prior to encounter.      Current Outpatient Prescriptions on File Prior to Encounter   Medication Sig    amlodipine (NORVASC) 10 MG tablet Take 10 mg by mouth once daily.     ASCORBATE CALCIUM (VITAMIN C ORAL) Take 500 mg by mouth once daily.     aspirin 81 MG chewable tablet Take 81 mg by mouth once daily. Ran out    atorvastatin (LIPITOR) 80 MG tablet Take 1 tablet (80 mg total) by mouth once daily.    blood sugar diagnostic Strp Check blood glucose 4x/day. Freestyle meter; dx code e11.65    blood-glucose meter kit Use as instructed; dispense freestyle lite meter or whatever brand is covered    calcitRIOL (ROCALTROL) 0.25 MCG Cap once daily.     citalopram (CELEXA) 20 MG tablet Take 20 mg by mouth once daily.     clopidogrel (PLAVIX) 75 mg tablet Take 1 tablet (75 mg total) by mouth once daily.    CREON 24,000-76,000 -120,000 unit capsule Take 1 capsule by mouth 4 (four) times daily with meals and nightly.     cyproheptadine (PERIACTIN) 4 mg tablet Take 4 mg by mouth 2 (two) times daily.    ferrous sulfate 325 (65 FE) MG EC tablet Take 1 tablet (325 mg total) by mouth 2 (two) times daily.    furosemide (LASIX) 40 MG tablet Take 1 tablet (40 mg total) by mouth once daily.    HUMALOG KWIKPEN 100 unit/mL InPn pen Inject 12 Units into the skin 3 (three) times daily before meals.    insulin detemir (LEVEMIR FLEXTOUCH) 100 unit/mL (3 mL) SubQ InPn pen Inject 12 Units into the  "skin 2 (two) times daily.    lancets Misc Check blood glucose 4x/day. Freestyle meter; dx code e11.65    lisinopril (PRINIVIL,ZESTRIL) 2.5 MG tablet Take 2.5 mg by mouth once daily.     magnesium oxide (MAG-OX) 400 mg tablet Take 1 tablet (400 mg total) by mouth once daily.    metoprolol succinate (TOPROL-XL) 25 MG 24 hr tablet Take 1 tablet (25 mg total) by mouth once daily.    MULTIVITAMIN (ONE-A-DAY ESSENTIAL ORAL) Take by mouth once daily.    PEN NEEDLE 31 X 5/16 " Ndle     potassium chloride SA (K-DUR,KLOR-CON) 10 MEQ tablet Take 2 tablets (20 mEq total) by mouth once daily.    quetiapine (SEROQUEL) 100 MG Tab 100 mg every evening.      Family History     Problem Relation (Age of Onset)    Asthma Brother    Cancer Maternal Grandmother    Depression Brother    Diabetes Mother, Father, Sister    Hearing loss Sister    Heart disease Sister    Hypertension Sister    Learning disabilities Sister    Vision loss Sister        Social History Main Topics    Smoking status: Current Every Day Smoker     Packs/day: 0.50     Years: 35.00     Types: Cigarettes    Smokeless tobacco: Never Used    Alcohol use No      Comment: questionable per     Drug use: No    Sexual activity: Not Currently     Partners: Male     Birth control/ protection: None     Review of Systems   Constitutional: Negative for chills and fever.   HENT: Negative for congestion and sore throat.    Eyes: Negative for photophobia and visual disturbance.   Respiratory: Negative for chest tightness and shortness of breath.    Gastrointestinal: Negative for abdominal pain, constipation, diarrhea and vomiting.   Genitourinary: Negative for dysuria, flank pain and hematuria.   Skin: Negative for rash and wound.   Neurological: Negative for dizziness and syncope.     Objective:     Vital Signs (Most Recent):  Temp: 98 °F (36.7 °C) (03/23/17 1930)  Pulse: 82 (03/23/17 1930)  Resp: 16 (03/23/17 1930)  BP: 132/85 (03/23/17 1930)  SpO2: 100 % " (03/23/17 1930) Vital Signs (24h Range):  Temp:  [97.2 °F (36.2 °C)-98.6 °F (37 °C)] 98 °F (36.7 °C)  Pulse:  [65-87] 82  Resp:  [11-26] 16  SpO2:  [98 %-100 %] 100 %  BP: ()/(50-91) 132/85     Weight: 50.3 kg (110 lb 14.3 oz)  Body mass index is 17.37 kg/(m^2).    Physical Exam   Constitutional: She is oriented to person, place, and time. She appears well-developed. No distress.   HENT:   Head: Normocephalic and atraumatic.   Nose: Nose normal.   Eyes: Conjunctivae are normal. Right eye exhibits no discharge. Left eye exhibits no discharge.   Neck: Neck supple. No JVD present.   Cardiovascular: Normal rate and regular rhythm.    Pulmonary/Chest: Effort normal. No respiratory distress. She has no wheezes.   Abdominal: Soft. Bowel sounds are normal. She exhibits no distension.   Musculoskeletal: She exhibits no edema or tenderness.   Neurological: She is alert and oriented to person, place, and time.   Skin: Skin is warm and dry. She is not diaphoretic.   Psychiatric: Her affect is blunt. She is not agitated and not aggressive.   Nursing note and vitals reviewed.       Significant Labs:   Recent Lab Results       03/23/17  1932 03/23/17  1734 03/23/17  1607 03/23/17  1133 03/23/17  0955      Albumin          Alkaline Phosphatase          ALT          Anion Gap          AST          Baso #          Basophil%          Total Bilirubin          BUN, Bld          Calcium          Chloride          CO2          Creatinine          Differential Method          eGFR if           eGFR if non           Eos #          Eosinophil%          Glucose          Gran #          Gran%          Hematocrit          Hemoglobin          Lymph #          Lymph%          Magnesium          MCH          MCHC          MCV          Mono #          Mono%          MPV          Phosphorus          Platelets          POCT Glucose 260(H)  177(H) 200(H)      Potassium          Total Protein          RBC           RDW          Sodium          Vancomycin, Random  11.5   13.4     WBC                      03/23/17  0659 03/23/17  0626      Albumin 3.0(L)      Alkaline Phosphatase 137(H)      ALT 37      Anion Gap 6(L)      AST 36      Baso # 0.00      Basophil% 0.4      Total Bilirubin 0.3  Comment:  For infants and newborns, interpretation of results should be based  on gestational age, weight and in agreement with clinical  observations.  Premature Infant recommended reference ranges:  Up to 24 hours.............<8.0 mg/dL  Up to 48 hours............<12.0 mg/dL  3-5 days..................<15.0 mg/dL  6-29 days.................<15.0 mg/dL        BUN, Bld 71(H)      Calcium 9.2      Chloride 119(H)      CO2 15(L)      Creatinine 2.9(H)      Differential Method Automated      eGFR if  21(A)      eGFR if non  18  Comment:  Calculation used to obtain the estimated glomerular filtration  rate (eGFR) is the CKD-EPI equation. Since race is unknown   in our information system, the eGFR values for   -American and Non--American patients are given   for each creatinine result.  (A)      Eos # 0.1      Eosinophil% 1.3      Glucose 87      Gran # 6.4      Gran% 76.5(H)      Hematocrit 36.0(L)      Hemoglobin 11.8(L)      Lymph # 1.5      Lymph% 18.0      Magnesium 1.4(L)      MCH 29.3      MCHC 32.7      MCV 90      Mono # 0.3      Mono% 3.8(L)      MPV 8.4(L)      Phosphorus 3.7      Platelets 334      POCT Glucose  71     Potassium 4.8      Total Protein 6.7      RBC 4.02      RDW 14.5      Sodium 140      Vancomycin, Random       WBC 8.30          All pertinent labs within the past 24 hours have been reviewed.    ECG: Not peaked t waves  2d ago      Urine Culture, Routine KLEBSIELLA PNEUMONIAE  >100,000 cfu/ml  Susceptibility pending  P   Resulting Agency [facility]          Significant Imaging:     Chest x-ray (independantly interpreted. Compared to prior):  No obvious detrimental change when  compared to prior

## 2017-03-24 NOTE — PLAN OF CARE
Problem: Nutrition, Imbalanced: Inadequate Oral Intake (Adult)  Intervention: Promote/Optimize Nutrition  Recommendations  Recommendation/Intervention: 1.) Continue with Renal diet, encouraging PO itnake + Boost glucose control    Goals: 1) Meal intake at least 75% 2) Utilize nutrition supplement   Nutrition Goal Status: 1.) progressing towards goal 2.) goal met  Communication of RD Recs: other (comment) (care plan)

## 2017-03-24 NOTE — PT/OT/SLP PROGRESS
"Physical Therapy  Treatment    Errol HERNANDEZ Keita   MRN: [id number]   Admitting Diagnosis: Hypovolemic shock    PT Received On: 17  PT Start Time: 1337     PT Stop Time: 1403    PT Total Time (min): 26 min       Billable Minutes:  Gait [id number] and Therapeutic Exercise 10    Treatment Type: Treatment  PT/PTA: PT             General Precautions: Standard, fall  Orthopedic Precautions: N/A   Braces: N/A         Subjective:  Pt found dozing in bed  Pt stated she felt tired and would rather stay in bed but agreeable to PT      Pain Ratin/10              Pain Rating Post-Intervention: 0/10    Objective:   Patient found with: peripheral IV, telemetry    Functional Mobility:  Bed Mobility:   Rolling/Turning Right: Stand by assistance  Scooting/Bridging: Stand by Assistance  Supine to Sit: Stand by Assistance  Sit to Supine: Stand by Assistance    Transfers:  Sit <> Stand Assistance: Contact Guard Assistance  Sit <> Stand Assistive Device: Rolling Walker    Gait:   Gait Distance: 240 ft- required standing rest break  Assistance 1: Contact Guard Assistance  Gait Assistive Device: Rolling walker  Gait Pattern: 2-point gait  Gait Deviation(s): decreased rm, decreased velocity of limb motion (uncompensated Trendelenberg)    Stairs:      Balance:   Static Sit: GOOD: Takes MODERATE challenges from all directions  Dynamic Sit: GOOD: Maintains balance through MODERATE excursions of active trunk movement  Static Stand: GOOD-: Takes MODERATE challenges from all directions inconsistently  Dynamic stand: FAIR: Needs CONTACT GUARD during gait     Therapeutic Activities and Exercises:  Pt performed LE exercise EOB with assist, 10x: ankle pumps, knee extension, and marches. Pt ambulated to hallways with RW- c/o RLE pain towards end of gait training that resolved once returned to bed. Pt reported fatigue, stating "I feel like a just went running". Pt's diaper found to be wet- diaper changed and PT performed hygiene " care.    AM-PAC 6 CLICK MOBILITY  How much help from another person does this patient currently need?   1 = Unable, Total/Dependent Assistance  2 = A lot, Maximum/Moderate Assistance  3 = A little, Minimum/Contact Guard/Supervision  4 = None, Modified [place]/Independent         AM-PAC Raw Score CMS G-Code Modifier Level of Impairment Assistance   6 % Total / Unable   7 - 9 CM 80 - 100% Maximal Assist   10 - 14 CL 60 - 80% Moderate Assist   15 - 19 CK 40 - 60% Moderate Assist   20 - 22 CJ 20 - 40% Minimal Assist   23 CI 1-20% SBA / CGA   24 CH 0% Independent/ Mod I     Patient left HOB elevated with all lines intact and call button in reach.    Assessment:  Errol Keita is a 51 y.o. female with a medical diagnosis of Hypovolemic shock and presents with deconditioning, muted affect, and impaired/slowed comprehension. Pt able to ambulate further today although experienced some fatigue. Pt would benefit from continued therapy via HHPT to address functional deficits.    Rehab identified problem list/impairments: Rehab identified problem list/impairments: weakness, impaired endurance, impaired functional mobilty, impaired self care skills, gait instability    Rehab potential is fair.    Activity tolerance: Fair    Discharge recommendations: Discharge Facility/Level Of Care Needs: home health PT     Barriers to discharge: Barriers to Discharge: None    Equipment recommendations:       GOALS:   Physical Therapy Goals     Not on file      Multidisciplinary Problems (Resolved)        Problem: Physical Therapy Goal    Goal Priority Disciplines Outcome Goal Variances Interventions   Physical Therapy Goal   (Resolved)    High PT/OT, PT Outcome(s) achieved     Description:  Goals to be met by: 2017     Patient will increase functional independence with mobility by performin. Bed to chair transfer with Contact Guard Assistance using Rolling Walker  2. Gait  x 240 feet with Contact Guard Assistance  using Rolling Walker.   3. Lower extremity exercise program x10 reps x2 per handout, with assistance as needed                PLAN:    Patient to be seen 6 x/week  to address the above listed problems via gait training, therapeutic activities, therapeutic exercises  Plan of Care expires: 03/30/17  Plan of Care reviewed with: patient         Carlos Wen, SPT  03/24/2017

## 2017-03-24 NOTE — TELEPHONE ENCOUNTER
----- Message from Swati Sexton RN sent at 3/24/2017  3:35 PM CDT -----  Patient is discharging today and needs a f/u appt.  Please contact patient with appt.  Thank you

## 2017-03-24 NOTE — PROGRESS NOTES
Message sent to endocrinology office via [employer] requesting an appt; they will contact patient with appt.    appt scheduled for 04/07/17 at 0830 at the [facility]; placed on [employer].

## 2017-03-24 NOTE — PROGRESS NOTES
[facility] Hospital Medicine  Progress Note    Patient Name: Errol Keita  MRN: [id number]  Patient Class: IP- Inpatient   Admission Date: 3/21/2017  Length of Stay: 2 days  Attending Physician: Juan Skinner MD  Primary Care Provider: Chica Irene DO        Subjective:     Principal Problem:Hypovolemic shock    HPI:  Miss Keita presents for evaluation of weakness and poor appetite. She has been experiencing this for 2 weeks. She denies dysphagia or odynophagia. She denies post prandial or abdominal pain. She denies vomiting, diarrhea, constipation. She denies fever or chills. She denies shortness of breath or cough. She denies dysuria, hematuria, decreased urination. She denies taking new medication. She states her blood glucose has been in the 300s. She denies dizziness, LOC. She denies chest pain or palpitations. She denies melena or hematochezia. She is unsure when her last colonoscopy was. She is unsure when her last mammogram was. She denies know abnormal pap smear. She denies history of immediate family members with malignancy. She denies history of multiple sexual partners. She denies history of illicit drug use/abuse. She denies history of TB. She reports smoking 1+ packs of cigarettes daily. She denies recent hospitalization.    Hospital Course:  Admitted with hyperglycemia and TIMOTHY in shock from hypovolemia and possible sepsis. She was given IVF and insulin and hyperglycemia resolved and TIMOTHY improving, shock resolved. Nephrology consulted, agreed with fluid resuscitation and BG control.  She was initially admitted to ICU on 3/21/17 but transferred to floor 3/23/17.         Past Medical History:   Diagnosis Date    Arthritis     Asthma     Blood transfusion     CHF (congestive heart failure)     Coronary artery disease     Diabetes mellitus     Hypertension     Pancreatitis     Type 2 diabetes mellitus with hyperglycemia 7/13/2015       Past Surgical History:   Procedure  Laterality Date    CARDIAC SURGERY      CABG    CHOLECYSTECTOMY      CORONARY ARTERY BYPASS GRAFT         Review of patient's allergies indicates:  No Known Allergies    No current facility-administered medications on file prior to encounter.      Current Outpatient Prescriptions on File Prior to Encounter   Medication Sig    amlodipine (NORVASC) 10 MG tablet Take 10 mg by mouth once daily.     ASCORBATE CALCIUM (VITAMIN C ORAL) Take 500 mg by mouth once daily.     aspirin 81 MG chewable tablet Take 81 mg by mouth once daily. Ran out    atorvastatin (LIPITOR) 80 MG tablet Take 1 tablet (80 mg total) by mouth once daily.    blood sugar diagnostic Strp Check blood glucose 4x/day. Freestyle meter; dx code e11.65    blood-glucose meter kit Use as instructed; dispense freestyle lite meter or whatever brand is covered    calcitRIOL (ROCALTROL) 0.25 MCG Cap once daily.     citalopram (CELEXA) 20 MG tablet Take 20 mg by mouth once daily.     clopidogrel (PLAVIX) 75 mg tablet Take 1 tablet (75 mg total) by mouth once daily.    CREON 24,000-76,000 -120,000 unit capsule Take 1 capsule by mouth 4 (four) times daily with meals and nightly.     cyproheptadine (PERIACTIN) 4 mg tablet Take 4 mg by mouth 2 (two) times daily.    ferrous sulfate 325 (65 FE) MG EC tablet Take 1 tablet (325 mg total) by mouth 2 (two) times daily.    furosemide (LASIX) 40 MG tablet Take 1 tablet (40 mg total) by mouth once daily.    HUMALOG KWIKPEN 100 unit/mL InPn pen Inject 12 Units into the skin 3 (three) times daily before meals.    insulin detemir (LEVEMIR FLEXTOUCH) 100 unit/mL (3 mL) SubQ InPn pen Inject 12 Units into the skin 2 (two) times daily.    lancets Misc Check blood glucose 4x/day. Freestyle meter; dx code e11.65    lisinopril (PRINIVIL,ZESTRIL) 2.5 MG tablet Take 2.5 mg by mouth once daily.     magnesium oxide (MAG-OX) 400 mg tablet Take 1 tablet (400 mg total) by mouth once daily.    metoprolol succinate  "(TOPROL-XL) 25 MG 24 hr tablet Take 1 tablet (25 mg total) by mouth once daily.    MULTIVITAMIN (ONE-A-DAY ESSENTIAL ORAL) Take by mouth once daily.    PEN NEEDLE 31 X 5/16 " Ndle     potassium chloride SA (K-DUR,KLOR-CON) 10 MEQ tablet Take 2 tablets (20 mEq total) by mouth once daily.    quetiapine (SEROQUEL) 100 MG Tab 100 mg every evening.      Family History     Problem Relation (Age of Onset)    Asthma Brother    Cancer Maternal Grandmother    Depression Brother    Diabetes Mother, Father, Sister    Hearing loss Sister    Heart disease Sister    Hypertension Sister    Learning disabilities Sister    Vision loss Sister        Social History Main Topics    Smoking status: Current Every Day Smoker     Packs/day: 0.50     Years: 35.00     Types: Cigarettes    Smokeless tobacco: Never Used    Alcohol use No      Comment: questionable per     Drug use: No    Sexual activity: Not Currently     Partners: Male     Birth control/ protection: None     Review of Systems   Constitutional: Negative for chills and fever.   HENT: Negative for congestion and sore throat.    Eyes: Negative for photophobia and visual disturbance.   Respiratory: Negative for chest tightness and shortness of breath.    Gastrointestinal: Negative for abdominal pain, constipation, diarrhea and vomiting.   Genitourinary: Negative for dysuria, flank pain and hematuria.   Skin: Negative for rash and wound.   Neurological: Negative for dizziness and syncope.     Objective:     Vital Signs (Most Recent):  Temp: 98 °F (36.7 °C) (03/23/17 1930)  Pulse: 82 (03/23/17 1930)  Resp: 16 (03/23/17 1930)  BP: 132/85 (03/23/17 1930)  SpO2: 100 % (03/23/17 1930) Vital Signs (24h Range):  Temp:  [97.2 °F (36.2 °C)-98.6 °F (37 °C)] 98 °F (36.7 °C)  Pulse:  [65-87] 82  Resp:  [11-26] 16  SpO2:  [98 %-100 %] 100 %  BP: ()/(50-91) 132/85     Weight: 50.3 kg (110 lb 14.3 oz)  Body mass index is 17.37 kg/(m^2).    Physical Exam   Constitutional: She " is oriented to person, place, and time. She appears well-developed. No distress.   HENT:   Head: Normocephalic and atraumatic.   Nose: Nose normal.   Eyes: Conjunctivae are normal. Right eye exhibits no discharge. Left eye exhibits no discharge.   Neck: Neck supple. No JVD present.   Cardiovascular: Normal rate and regular rhythm.    Pulmonary/Chest: Effort normal. No respiratory distress. She has no wheezes.   Abdominal: Soft. Bowel sounds are normal. She exhibits no distension.   Musculoskeletal: She exhibits no edema or tenderness.   Neurological: She is alert and oriented to person, place, and time.   Skin: Skin is warm and dry. She is not diaphoretic.   Psychiatric: Her affect is blunt. She is not agitated and not aggressive.   Nursing note and vitals reviewed.       Significant Labs:   Recent Lab Results       03/23/17  1932 03/23/17  1734 03/23/17  1607 03/23/17  1133 03/23/17  0955      Albumin          Alkaline Phosphatase          ALT          Anion Gap          AST          Baso #          Basophil%          Total Bilirubin          BUN, Bld          Calcium          Chloride          CO2          Creatinine          Differential Method          eGFR if           eGFR if non           Eos #          Eosinophil%          Glucose          Gran #          Gran%          Hematocrit          Hemoglobin          Lymph #          Lymph%          Magnesium          MCH          MCHC          MCV          Mono #          Mono%          MPV          Phosphorus          Platelets          POCT Glucose 260(H)  177(H) 200(H)      Potassium          Total Protein          RBC          RDW          Sodium          Vancomycin, Random  11.5   13.4     WBC                      03/23/17  0659 03/23/17  0626      Albumin 3.0(L)      Alkaline Phosphatase 137(H)      ALT 37      Anion Gap 6(L)      AST 36      Baso # 0.00      Basophil% 0.4      Total Bilirubin 0.3  Comment:  For infants and  newborns, interpretation of results should be based  on gestational age, weight and in agreement with clinical  observations.  Premature Infant recommended reference ranges:  Up to 24 hours.............<8.0 mg/dL  Up to 48 hours............<12.0 mg/dL  3-5 days..................<15.0 mg/dL  6-29 days.................<15.0 mg/dL        BUN, Bld 71(H)      Calcium 9.2      Chloride 119(H)      CO2 15(L)      Creatinine 2.9(H)      Differential Method Automated      eGFR if  21(A)      eGFR if non  18  Comment:  Calculation used to obtain the estimated glomerular filtration  rate (eGFR) is the CKD-EPI equation. Since race is unknown   in our information system, the eGFR values for   -American and Non--American patients are given   for each creatinine result.  (A)      Eos # 0.1      Eosinophil% 1.3      Glucose 87      Gran # 6.4      Gran% 76.5(H)      Hematocrit 36.0(L)      Hemoglobin 11.8(L)      Lymph # 1.5      Lymph% 18.0      Magnesium 1.4(L)      MCH 29.3      MCHC 32.7      MCV 90      Mono # 0.3      Mono% 3.8(L)      MPV 8.4(L)      Phosphorus 3.7      Platelets 334      POCT Glucose  71     Potassium 4.8      Total Protein 6.7      RBC 4.02      RDW 14.5      Sodium 140      Vancomycin, Random       WBC 8.30          All pertinent labs within the past 24 hours have been reviewed.    ECG: Not peaked t waves  2d ago      Urine Culture, Routine KLEBSIELLA PNEUMONIAE  >100,000 cfu/ml  Susceptibility pending  P   Resulting Agency [facility]          Significant Imaging:     Chest x-ray (independantly interpreted. Compared to prior):  No obvious detrimental change when compared to prior    Assessment/Plan:      Hypercholesteremia  Continue Atorvastatin    Hypertension associated with diabetes  Currently hypotense  Hold antihypertensives for now      Major depressive disorder  Continue Citalopram. May me gravely disabled due to depression.     Protein-calorie malnutrition,  severe  Due to poor oral intake - ?due to depression vs other  Antidepressant and encourage oral intake  Rule out HIV, TB  Glucerna, protein supplementation if she can tolerate with renal function/dysfunction    Likely hypovolemic from diuretic effect of glucose as well.      Coronary artery disease involving native coronary artery without angina pectoris  Continue Aspirin, Clopidogrel, Atorvastatin  Glycemic control  Monitor for ACS      Metabolic acidosis- severe  Continue sodium bicarb per nephrology recommendations. Appreciate assistance.      Chronic diastolic CHF (congestive heart failure)  Daily weight, monitor I/O  Restart Beta blocker, hold diuretic while gently hydrating and monitor fluid status closely.      Vascular dementia without behavioral disturbance  Stable condition; hypoglycemic brain injury concurrent as well. Patient may have grave disability and may benefit from long term nursing home placement for management of disease but unlikely she would agree to this.       TIMOTHY (acute kidney injury)  Improving  ATN from hypovolemia with hypotension, on diabetic nephropathy with likely hyperglycemic injury as well. Nephrology consulted, avoid nephrotoxic drugs and renally dose meds      Type 2 diabetes mellitus with complication, with long-term current use of insulin  Diabetes poorly-controlled.   Check BG prior to meals and QHS. Administer rapid-acting insulin according to low-dose sliding scale  Consider dosing long-acting and scheduled premeal insulin, with caution given TIMOTHY      Moderate tobacco dependence  Discussed smoking cesation.  Refer to smoking cessation education on discharge      Pancreatic insufficiency  Creon with food      Acute on chronic pancreatitis  Supportive care; advancing diet. Stop serial lipase monitoring.      * Hypovolemic/Septic shock, resolved as of 3/23/2017  Due to poor oral intake, poor appetite  IV fluid - NS          VTE Risk Mitigation         Ordered     heparin  (porcine) injection 5,000 Units  Every 8 hours     Route:  Subcutaneous        03/22/17 0147     Medium Risk of VTE  Once      03/22/17 0147          Juan Skinner MD  Department of Hospital Medicine   [facility]

## 2017-03-24 NOTE — ASSESSMENT & PLAN NOTE
Nutrition Problem:   Mild Malnutrition    % Intake of Estimated Energy Needs: 25 - 50 %  % Meal Intake: 50%    Etiology/Related to:   Decreased ability to consume sufficient energy AEB BMI <18     Treatment Strategy:   1.) Continue with Renal diet, encouraging PO intake + Boost glucose control BID     Nutrition Diagnosis Status:   Continues

## 2017-03-25 NOTE — PROGRESS NOTES
Consult Note  Nephrology    Consult Requested By: No att. 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 Afebrile.    Past Medical History:   Diagnosis Date    Arthritis     Asthma     Blood transfusion     CHF (congestive heart failure)     Coronary artery disease     Diabetes mellitus     Hypertension     Pancreatitis     Type 2 diabetes mellitus with hyperglycemia 7/13/2015       OBJECTIVE:     Vital Signs Range (Last 24H):  Temp:  [97.9 °F (36.6 °C)-98.1 °F (36.7 °C)]   Pulse:  [80-87]   Resp:  [16-18]   BP: (131-175)/(85-95)   SpO2:  [99 %-100 %]     Physical Exam:  General- Patient alert and oriented x3 in NAD  HEENT- WNL  Neck- supple  CV- Regular rate and rhythm  Resp- Lungs No increased WOB  GI- Non tender/non-distended  Extrem- No cyanosis, clubbing, edema.  Derm- No rashes, masses, or lesions noted  Neuro-  No flap. No focal deficits noted.     Body mass index is 17.37 kg/(m^2).    Laboratory:  CBC:     Recent Labs  Lab 03/23/17  0659   WBC 8.30   RBC 4.02   HGB 11.8*   HCT 36.0*      MCV 90   MCH 29.3   MCHC 32.7     CMP:     Recent Labs  Lab 03/23/17  0659 03/24/17  0848   GLU 87 215*   CALCIUM 9.2 9.9   ALBUMIN 3.0* 3.4*   PROT 6.7  --     142   K 4.8 4.7   CO2 15* 17*   * 115*   BUN 71* 47*   CREATININE 2.9* 2.2*   ALKPHOS 137*  --    ALT 37  --    AST 36  --    BILITOT 0.3  --      LFTs:     Recent Labs  Lab 03/23/17  0659 03/24/17  0848   ALT 37  --    AST 36  --    ALKPHOS 137*  --    BILITOT 0.3  --    PROT 6.7  --    ALBUMIN 3.0* 3.4*       Cardiac markers:     Recent Labs  Lab 03/22/17  0357   TROPONINI 0.134*     ABGs: No results for input(s): PH, PCO2, PO2, HCO3, POCSATURATED, BE in the last 168 hours.  Microbiology Results (last 7 days)     Procedure Component Value Units Date/Time    Urine culture [[id number]]  (Susceptibility) Collected:  03/21/17 2148    Order Status:  Completed  Specimen:  Urine from Urine, Clean Catch Updated:  03/24/17 1030     Urine Culture, Routine --     KLEBSIELLA PNEUMONIAE  >100,000 cfu/ml      Narrative:       Cath if necessary    Blood culture [[id number]] Collected:  03/22/17 1301    Order Status:  Completed Specimen:  Blood Updated:  03/23/17 2012     Blood Culture, Routine No Growth to date     Blood Culture, Routine No Growth to date    Narrative:       Take 2 sets, 1 from central line, one from peripheral site.  Take both aerobic and anaerobic bottles.    Blood culture [[id number]] Collected:  03/22/17 1314    Order Status:  Completed Specimen:  Blood Updated:  03/23/17 2012     Blood Culture, Routine No Growth to date     Blood Culture, Routine No Growth to date    Narrative:       Take 2 sets, 1 from central line, one from peripheral site.  Take both aerobic and anaerobic bottles.        Specimen     None          Recent Labs  Lab 03/21/17 2144   COLORU Yellow   SPECGRAV 1.020   PHUR 6.0   PROTEINUA 2+*   BACTERIA Many*   NITRITE Negative   LEUKOCYTESUR 3+*   UROBILINOGEN Negative   HYALINECASTS 0       Diagnostic Results:  Labs: Reviewed  X-Ray: Reviewed      ASSESSMENT/PLAN:     Active Hospital Problems    Diagnosis  POA    Acute on chronic pancreatitis [K85.90, K86.1]  Yes    Pancreatic insufficiency [K86.89]  Yes    Moderate tobacco dependence [F17.200]  Yes    Type 2 diabetes mellitus with complication, with long-term current use of insulin [E11.8, Z79.4]  Not Applicable    TIMOTHY (acute kidney injury) [N17.9]  Yes    Vascular dementia without behavioral disturbance [F01.50]  Yes    Chronic diastolic CHF (congestive heart failure) [I50.32]  Yes    Metabolic acidosis- severe [E87.2]  Yes    Coronary artery disease involving native coronary artery without angina pectoris [I25.10]  Yes    Protein-calorie malnutrition, severe [E43]  Yes    Major depressive disorder [F32.9]  Yes    Hypertension associated with diabetes [E11.59, I10]  Yes     Hypercholesteremia [E78.00]  Yes      Resolved Hospital Problems    Diagnosis Date Resolved POA    *Hypovolemic/Septic shock [R57.1] 03/23/2017 Yes    Mild malnutrition [E44.1] 03/22/2017 Yes    Hyperkalemia [E87.5] 03/23/2017 Yes       Assessment and plan:    TIMOTHY, likely hemodynamic/infection related ATN  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   Avoid NSAIDs, ACEI, COXii, IV contrast and other toxins.  Renal, low K diet.    Anemia - stable, monitor for now.  SHPT - monitor Ca and Phos, continue calcitriol.  DM2 - optimize BG as best as possible, management per primary team.  Acidosis - CO2 is low at 17. Needs bicarb as outpatient.    Thank you for allowing us to participate in the care of your patient.   We will follow the patient and provide recommendations as needed.

## 2017-03-27 LAB
BACTERIA BLD CULT: NORMAL
BACTERIA BLD CULT: NORMAL

## 2017-03-27 NOTE — DISCHARGE SUMMARY
[facility] Medicine  Discharge Summary      Patient Name: Errol Keita  MRN: [id number]  Admission Date: 3/21/2017  Hospital Length of Stay: 3 days  Discharge Date and Time: 3/24/2017  4:49 PM  Attending Physician: Juan Skinner MD  Discharging Provider: Juan Skinner MD  Primary Care Provider: Chica Irene DO      HPI:   Miss Keita presents for evaluation of weakness and poor appetite. She has been experiencing this for 2 weeks. She denies dysphagia or odynophagia. She denies post prandial or abdominal pain. She denies vomiting, diarrhea, constipation. She denies fever or chills. She denies shortness of breath or cough. She denies dysuria, hematuria, decreased urination. She denies taking new medication. She states her blood glucose has been in the 300s. She denies dizziness, LOC. She denies chest pain or palpitations. She denies melena or hematochezia. She is unsure when her last colonoscopy was. She is unsure when her last mammogram was. She denies know abnormal pap smear. She denies history of immediate family members with malignancy. She denies history of multiple sexual partners. She denies history of illicit drug use/abuse. She denies history of TB. She reports smoking 1+ packs of cigarettes daily. She denies recent hospitalization.    * No surgery found *      Indwelling Lines/Drains at time of discharge:   Lines/Drains/Airways          No matching active lines, drains, or airways        Hospital Course:   Admitted with hyperglycemia and TIMOTHY in shock from hypovolemia and possible sepsis. She was given IVF and insulin and hyperglycemia resolved and TIMOTHY improving, shock resolved. Nephrology consulted, agreed with fluid resuscitation and BG control.  She was initially admitted to ICU on 3/21/17 but transferred to floor 3/23/17.     BG control was improved with titration of insulin regimen.  TIMOTHY resolved somewhat with IVF.  I discussed case with nephrologist and felt renal function  was stable for discharge given that she likely would have a higher baseline creatinine due to diabetic nephropathy and she should have close follow up with her outpatient nephrologist.     Also referred to diabetic education and instructed close follow up with endocrinology clinic with whom she follows.          Consults:   Consults         Status Ordering Provider     IP consult to dietary  Once     Provider:  (Not yet assigned)    REGINA Mccallum          Significant Diagnostic Studies: Labs: All labs within the past 24 hours have been reviewed   Recent Results (from the past 336 hour(s))   Basic metabolic panel    Collection Time: 03/22/17  6:51 PM   Result Value Ref Range    Sodium 137 136 - 145 mmol/L    Potassium 4.7 3.5 - 5.1 mmol/L    Chloride 115 (H) 95 - 110 mmol/L    CO2 14 (L) 23 - 29 mmol/L    BUN, Bld 83 (H) 6 - 20 mg/dL    Creatinine 3.4 (H) 0.5 - 1.4 mg/dL    Calcium 9.0 8.7 - 10.5 mg/dL    Anion Gap 8 8 - 16 mmol/L     Recent Results (from the past 336 hour(s))   CBC auto differential    Collection Time: 03/23/17  6:59 AM   Result Value Ref Range    WBC 8.30 3.90 - 12.70 K/uL    Hemoglobin 11.8 (L) 12.0 - 16.0 g/dL    Hematocrit 36.0 (L) 37.0 - 48.5 %    Platelets 334 150 - 350 K/uL   CBC with Automated Differential    Collection Time: 03/22/17  3:57 AM   Result Value Ref Range    WBC 10.10 3.90 - 12.70 K/uL    Hemoglobin 12.3 12.0 - 16.0 g/dL    Hematocrit 38.0 37.0 - 48.5 %    Platelets 339 150 - 350 K/uL   CBC auto differential    Collection Time: 03/21/17  9:39 PM   Result Value Ref Range    WBC 10.00 3.90 - 12.70 K/uL    Hemoglobin 13.3 12.0 - 16.0 g/dL    Hematocrit 41.9 37.0 - 48.5 %    Platelets 374 (H) 150 - 350 K/uL     Lab Results   Component Value Date    HGBA1C 15.3 (H) 03/22/2017       Microbiology:   Blood Culture   Lab Results   Component Value Date    LABBLOO No Growth to date 03/22/2017    LABBLOO No Growth to date 03/22/2017    LABBLOO No Growth to date 03/22/2017     LABBLOO No Growth to date 03/22/2017    LABBLOO No Growth to date 03/22/2017    and Urine Culture    Lab Results   Component Value Date    LABURIN KLEBSIELLA PNEUMONIAE  >100,000 cfu/ml   03/21/2017     Radiology: X-Ray: CXR: X-Ray Chest 1 View (CXR):   Results for orders placed or performed during the hospital encounter of 03/21/17   X-Ray Chest 1 View    Narrative    The patient has had a prior sternotomy.  Calcification is noted in the aorta.  A coronary artery stent is noted in place on the left.  The cardiac size is within normal limits.  No intrapulmonary mass or infiltrate is seen.  No pneumothorax is noted.    Impression     Prior sternotomy.  Atherosclerosis.  Coronary artery stent in place.        Incidentally noted is an irregularly calcified mass within the left breast measuring 2 cm.  No mammogram is seen within the patient's x-ray folder and a mammogram is strongly recommended      Electronically signed by: John Bryan MD  Date:     03/22/17  Time:    08:08     and X-Ray Chest PA and Lateral (CXR): No results found for this visit on 03/21/17.    Pending Diagnostic Studies:     None        Final Active Diagnoses:    Diagnosis Date Noted POA    Acute on chronic pancreatitis [K85.90, K86.1] 03/23/2017 Yes    Pancreatic insufficiency [K86.89] 03/22/2017 Yes    Moderate tobacco dependence [F17.200] 10/20/2016 Yes    Type 2 diabetes mellitus with complication, with long-term current use of insulin [E11.8, Z79.4] 05/09/2016 Not Applicable    TIMOTHY (acute kidney injury) [N17.9] 04/06/2016 Yes    Vascular dementia without behavioral disturbance [F01.50] 01/22/2016 Yes    Chronic diastolic CHF (congestive heart failure) [I50.32] 12/26/2015 Yes    Metabolic acidosis- severe [E87.2] 10/27/2015 Yes    Coronary artery disease involving native coronary artery without angina pectoris [I25.10] 07/20/2015 Yes    Protein-calorie malnutrition, severe [E43] 07/13/2015 Yes    Major depressive disorder [F32.9]  06/05/2013 Yes    Hypertension associated with diabetes [E11.59, I10] 11/25/2012 Yes    Hypercholesteremia [E78.00] 05/14/2012 Yes      Problems Resolved During this Admission:    Diagnosis Date Noted Date Resolved POA    PRINCIPAL PROBLEM:  Hypovolemic/Septic shock [R57.1] 03/22/2017 03/23/2017 Yes    Mild malnutrition [E44.1] 03/22/2017 03/22/2017 Yes    Hyperkalemia [E87.5] 10/28/2015 03/23/2017 Yes      Hypercholesteremia  Continue Atorvastatin    Hypertension associated with diabetes  Resumed antihypertensives, stable      Major depressive disorder  Continue Citalopram. May me gravely disabled due to depression.     Protein-calorie malnutrition, severe  Due to poor oral intake - ?due to depression vs other  Antidepressant and encourage oral intake  Rule out HIV, TB  Glucerna, protein supplementation        Coronary artery disease involving native coronary artery without angina pectoris  Continue Aspirin, Clopidogrel, Atorvastatin  Glycemic control  Monitor for ACS      Metabolic acidosis- severe  Continue sodium bicarb per nephrology recommendations. Appreciate assistance.      Chronic diastolic CHF (congestive heart failure)  Daily weight, monitor I/O  Restart Beta blocker, hold diuretic while gently hydrating and monitor fluid status closely.   Resume diuretic on discharge.       Vascular dementia without behavioral disturbance  Stable condition; hypoglycemic brain injury concurrent as well. Patient may have grave disability and may benefit from long term nursing home placement for management of disease but unlikely she would agree to this.   She relies upon significant other for assistance.       TIMOTHY (acute kidney injury)  Improving  ATN from hypovolemia with hypotension, on diabetic nephropathy with likely hyperglycemic injury as well. Nephrology consulted, avoid nephrotoxic drugs and renally dose meds  Stable for discharge per nephrology    Type 2 diabetes mellitus with complication, with long-term  current use of insulin  Diabetes poorly-controlled.   Check BG prior to meals and QHS. Administer rapid-acting insulin according to low-dose sliding scale  Resumed regimen of basal prandial insulin as well      Moderate tobacco dependence  Discussed smoking cesation.  Refer to smoking cessation education on discharge      Pancreatic insufficiency  Creon with food      Acute on chronic pancreatitis  Resolved ab pain, nausea        Discharged Condition: good    Disposition: Home-Health Care [facility]    Follow Up:  Follow-up Information     Follow up with Chica Irene DO In 1 week.    Specialty:  Family Medicine    Contact information:    [address] [facility] [address]  [phone]          Follow up with Morris Villegas MD In 1 week.    Specialty:  Nephrology    Contact information:    [address] [facility]  [address]ND FLOOR  [facility] [address]  [phone]          Follow up with Kelli Bui NP In 1 week.    Specialty:  Endocrinology    Contact information:    [address] [facility] [address]  [phone]          Follow up with [facility].    Specialty:  Home Health Services    Why:  Home Health    Contact information:    [address] [place] [address]  [phone]          Patient Instructions:     RENAL FUNCTION PANEL   Standing Status: Future  Standing Exp. Date: 05/23/18     Amb Referral to Diabetes Empowerment   Referral Priority: Routine Referral Type: Consultation   Referral Reason: Specialty Services Required    Requested Specialty: Diabetes    Number of Visits Requested: 1      Referral to Home health   Referral Priority: Routine Referral Type: Home Health Care   Referral Reason: Specialty Services Required    Requested Specialty: Home Health Services    Number of Visits Requested: 1      Ambulatory consult to Diabetic Education   Referral Priority: Routine Referral Type: Consultation   Referral Reason: Specialty Services Required    Requested Specialty: Diabetes     Number of Visits Requested: 1      Diet renal       Medications:  Reconciled Home Medications:   Discharge Medication List as of 3/24/2017  4:37 PM      START taking these medications    Details   ciprofloxacin HCl (CIPRO) 500 MG tablet Take 1 tablet (500 mg total) by mouth every 12 (twelve) hours., Starting 3/24/2017, Until Tue 3/28/17, Normal      sodium bicarbonate 650 MG tablet Take 1 tablet (650 mg total) by mouth 3 (three) times daily., Starting 3/24/2017, Until Sat 3/24/18, OTC         CONTINUE these medications which have CHANGED    Details   HUMALOG KWIKPEN 100 unit/mL InPn pen Inject 7 Units into the skin 3 (three) times daily before meals., Starting 3/24/2017, Until Discontinued, No Print      insulin detemir (LEVEMIR FLEXTOUCH) 100 unit/mL (3 mL) SubQ InPn pen Inject 10 Units into the skin 2 (two) times daily., Starting 3/24/2017, Until Sat 3/24/18, Normal         CONTINUE these medications which have NOT CHANGED    Details   amlodipine (NORVASC) 10 MG tablet Take 10 mg by mouth once daily. , Until Discontinued, Historical Med      ASCORBATE CALCIUM (VITAMIN C ORAL) Take 500 mg by mouth once daily. , Starting 9/13/2016, Until Discontinued, Historical Med      aspirin 81 MG chewable tablet Take 81 mg by mouth once daily. Ran out, Until Discontinued, Historical Med      atorvastatin (LIPITOR) 80 MG tablet Take 1 tablet (80 mg total) by mouth once daily., Starting 7/27/2015, Until Discontinued, Print      blood sugar diagnostic Strp Check blood glucose 4x/day. Freestyle meter; dx code e11.65, Normal      blood-glucose meter kit Use as instructed; dispense freestyle lite meter or whatever brand is covered, Normal      calcitRIOL (ROCALTROL) 0.25 MCG Cap once daily. , Starting 12/19/2016, Until Discontinued, Historical Med      citalopram (CELEXA) 20 MG tablet Take 20 mg by mouth once daily. , Starting 7/22/2015, Until Discontinued, Historical Med      clopidogrel (PLAVIX) 75 mg tablet Take 1 tablet (75 mg  "total) by mouth once daily., Starting 7/13/2015, Until Sat 12/23/17, Print      CREON 24,000-76,000 -120,000 unit capsule Take 1 capsule by mouth 4 (four) times daily with meals and nightly. , Starting 8/26/2016, Until Discontinued, Historical Med      cyproheptadine (PERIACTIN) 4 mg tablet Take 4 mg by mouth 2 (two) times daily., Until Discontinued, Historical Med      ferrous sulfate 325 (65 FE) MG EC tablet Take 1 tablet (325 mg total) by mouth 2 (two) times daily., Starting 7/13/2015, Until Discontinued, Print      furosemide (LASIX) 40 MG tablet Take 1 tablet (40 mg total) by mouth once daily., Starting 10/20/2016, Until Discontinued, Print      lancets Misc Check blood glucose 4x/day. Freestyle meter; dx code e11.65, Normal      lisinopril (PRINIVIL,ZESTRIL) 2.5 MG tablet Take 2.5 mg by mouth once daily. , Starting 8/26/2016, Until Discontinued, Historical Med      magnesium oxide (MAG-OX) 400 mg tablet Take 1 tablet (400 mg total) by mouth once daily., Starting 7/13/2015, Until Discontinued, OTC      metoprolol succinate (TOPROL-XL) 25 MG 24 hr tablet Take 1 tablet (25 mg total) by mouth once daily., Starting 10/20/2016, Until Fri 10/20/17, Normal      MULTIVITAMIN (ONE-A-DAY ESSENTIAL ORAL) Take by mouth once daily., Until Discontinued, Historical Med      PEN NEEDLE 31 X 5/16 " Ndle Starting 5/5/2015, Until Discontinued, Historical Med      quetiapine (SEROQUEL) 100 MG Tab 100 mg every evening. , Starting 7/16/2015, Until Discontinued, Historical Med         STOP taking these medications       potassium chloride SA (K-DUR,KLOR-CON) 10 MEQ tablet Comments:   Reason for Stopping:             Time spent on the discharge of patient: 34 minutes    Juan Skinner MD  Department of Hospital Medicine  [facility]  "

## 2017-03-27 NOTE — ASSESSMENT & PLAN NOTE
Diabetes poorly-controlled.   Check BG prior to meals and QHS. Administer rapid-acting insulin according to low-dose sliding scale  Resumed regimen of basal prandial insulin as well

## 2017-03-27 NOTE — ASSESSMENT & PLAN NOTE
Due to poor oral intake - ?due to depression vs other  Antidepressant and encourage oral intake  Rule out HIV, TB  Glucerna, protein supplementation

## 2017-03-27 NOTE — ASSESSMENT & PLAN NOTE
Improving  ATN from hypovolemia with hypotension, on diabetic nephropathy with likely hyperglycemic injury as well. Nephrology consulted, avoid nephrotoxic drugs and renally dose meds  Stable for discharge per nephrology

## 2017-03-27 NOTE — ASSESSMENT & PLAN NOTE
Daily weight, monitor I/O  Restart Beta blocker, hold diuretic while gently hydrating and monitor fluid status closely.   Resume diuretic on discharge.

## 2017-03-27 NOTE — ASSESSMENT & PLAN NOTE
Stable condition; hypoglycemic brain injury concurrent as well. Patient may have grave disability and may benefit from long term nursing home placement for management of disease but unlikely she would agree to this.   She relies upon significant other for assistance.

## 2017-04-17 ENCOUNTER — LAB VISIT (OUTPATIENT)
Dept: LAB | Facility: HOSPITAL | Age: 52
End: 2017-04-17
Attending: INTERNAL MEDICINE
Payer: MEDICARE

## 2017-04-17 DIAGNOSIS — N18.30 CKD (CHRONIC KIDNEY DISEASE) STAGE 3, GFR 30-59 ML/MIN: ICD-10-CM

## 2017-04-17 LAB
ALBUMIN SERPL BCP-MCNC: 3.5 G/DL
ANION GAP SERPL CALC-SCNC: 12 MMOL/L
BUN SERPL-MCNC: 34 MG/DL
CALCIUM SERPL-MCNC: 9 MG/DL
CHLORIDE SERPL-SCNC: 109 MMOL/L
CO2 SERPL-SCNC: 22 MMOL/L
CREAT SERPL-MCNC: 1.8 MG/DL
CREAT UR-MCNC: 25.7 MG/DL
EST. GFR  (AFRICAN AMERICAN): 37 ML/MIN/1.73 M^2
EST. GFR  (NON AFRICAN AMERICAN): 32 ML/MIN/1.73 M^2
GLUCOSE SERPL-MCNC: 157 MG/DL
PHOSPHATE SERPL-MCNC: 3.2 MG/DL
POTASSIUM SERPL-SCNC: 3.6 MMOL/L
PROT UR-MCNC: 37 MG/DL
PROT/CREAT RATIO, UR: 1.44
PTH-INTACT SERPL-MCNC: 286.5 PG/ML
SODIUM SERPL-SCNC: 143 MMOL/L

## 2017-04-17 PROCEDURE — 36415 COLL VENOUS BLD VENIPUNCTURE: CPT

## 2017-04-17 PROCEDURE — 83970 ASSAY OF PARATHORMONE: CPT

## 2017-04-17 PROCEDURE — 80069 RENAL FUNCTION PANEL: CPT

## 2017-04-17 PROCEDURE — 84156 ASSAY OF PROTEIN URINE: CPT

## 2017-04-24 ENCOUNTER — OFFICE VISIT (OUTPATIENT)
Dept: NEPHROLOGY | Facility: CLINIC | Age: 52
End: 2017-04-24
Payer: MEDICARE

## 2017-04-24 VITALS
OXYGEN SATURATION: 99 % | SYSTOLIC BLOOD PRESSURE: 114 MMHG | TEMPERATURE: 98 F | BODY MASS INDEX: 18.62 KG/M2 | WEIGHT: 118.63 LBS | HEART RATE: 86 BPM | DIASTOLIC BLOOD PRESSURE: 66 MMHG | HEIGHT: 67 IN

## 2017-04-24 DIAGNOSIS — E11.59 HYPERTENSION ASSOCIATED WITH DIABETES: ICD-10-CM

## 2017-04-24 DIAGNOSIS — Z86.73 H/O: CVA (CEREBROVASCULAR ACCIDENT): ICD-10-CM

## 2017-04-24 DIAGNOSIS — Z95.5 S/P CORONARY ARTERY STENT PLACEMENT: ICD-10-CM

## 2017-04-24 DIAGNOSIS — N18.30 ANEMIA OF CHRONIC RENAL FAILURE, STAGE 3 (MODERATE): ICD-10-CM

## 2017-04-24 DIAGNOSIS — E11.21 DIABETIC NEPHROPATHY WITH PROTEINURIA: ICD-10-CM

## 2017-04-24 DIAGNOSIS — Z72.0 TOBACCO ABUSE: ICD-10-CM

## 2017-04-24 DIAGNOSIS — N18.30 CKD (CHRONIC KIDNEY DISEASE) STAGE 3, GFR 30-59 ML/MIN: Primary | ICD-10-CM

## 2017-04-24 DIAGNOSIS — D63.1 ANEMIA OF CHRONIC RENAL FAILURE, STAGE 3 (MODERATE): ICD-10-CM

## 2017-04-24 DIAGNOSIS — E87.20 METABOLIC ACIDOSIS: ICD-10-CM

## 2017-04-24 DIAGNOSIS — I15.2 HYPERTENSION ASSOCIATED WITH DIABETES: ICD-10-CM

## 2017-04-24 PROCEDURE — 3078F DIAST BP <80 MM HG: CPT | Mod: S$GLB,,, | Performed by: INTERNAL MEDICINE

## 2017-04-24 PROCEDURE — 99999 PR PBB SHADOW E&M-EST. PATIENT-LVL IV: CPT | Mod: PBBFAC,,, | Performed by: INTERNAL MEDICINE

## 2017-04-24 PROCEDURE — 1160F RVW MEDS BY RX/DR IN RCRD: CPT | Mod: S$GLB,,, | Performed by: INTERNAL MEDICINE

## 2017-04-24 PROCEDURE — 3046F HEMOGLOBIN A1C LEVEL >9.0%: CPT | Mod: S$GLB,,, | Performed by: INTERNAL MEDICINE

## 2017-04-24 PROCEDURE — 99214 OFFICE O/P EST MOD 30 MIN: CPT | Mod: S$GLB,,, | Performed by: INTERNAL MEDICINE

## 2017-04-24 PROCEDURE — 3066F NEPHROPATHY DOC TX: CPT | Mod: S$GLB,,, | Performed by: INTERNAL MEDICINE

## 2017-04-24 PROCEDURE — 3074F SYST BP LT 130 MM HG: CPT | Mod: S$GLB,,, | Performed by: INTERNAL MEDICINE

## 2017-04-24 RX ORDER — CALCIUM CARBONATE/VITAMIN D3 500-10/5ML
LIQUID (ML) ORAL
COMMUNITY
Start: 2017-01-19 | End: 2017-04-24 | Stop reason: SDUPTHER

## 2017-04-24 NOTE — MR AVS SNAPSHOT
[facility]  [address] [facility] [address]  [place] [address]  Phone: [phone]                  Errol Keita   2017 10:40 AM   Office Visit    Description:  Female : 1965   Provider:  Morris Villegas MD   Department:  [facility]                To Do List           Goals (5 Years of Data)     None      [facility]     [facility] -  Assistance  Unless otherwise directed by your provider, please contact [facility], our nurse care line that is available for  assistance.     Registered nurses in the [facility] provide: appointment scheduling, clinical advisement, health education, and other advisory services.  Call: [phone] (toll free)               Medications           Message regarding Medications     Verify the changes and/or additions to your medication regime listed below are the same as discussed with your clinician today.  If any of these changes or additions are incorrect, please notify your healthcare provider.        STOP taking these medications     ASCORBATE CALCIUM (VITAMIN C ORAL) Take 500 mg by mouth once daily.     cyproheptadine (PERIACTIN) 4 mg tablet Take 4 mg by mouth 2 (two) times daily.    magnesium oxide 400 mg Cap     metoprolol succinate (TOPROL-XL) 25 MG 24 hr tablet Take 1 tablet (25 mg total) by mouth once daily.    MULTIVITAMIN (ONE-A-DAY ESSENTIAL ORAL) Take by mouth once daily.    sodium bicarbonate 650 MG tablet Take 1 tablet (650 mg total) by mouth 3 (three) times daily.           Verify that the below list of medications is an accurate representation of the medications you are currently taking.  If none reported, the list may be blank. If incorrect, please contact your healthcare provider. Carry this list with you in case of emergency.           Current Medications     amlodipine (NORVASC) 10 MG tablet Take 10 mg by mouth once daily.     aspirin 81 MG chewable tablet Take 81 mg by mouth  "once daily. Ran out    atorvastatin (LIPITOR) 80 MG tablet Take 1 tablet (80 mg total) by mouth once daily.    blood sugar diagnostic Strp Check blood glucose 4x/day. Freestyle meter; dx code e11.65    blood-glucose meter kit Use as instructed; dispense freestyle lite meter or whatever brand is covered    calcitRIOL (ROCALTROL) 0.25 MCG Cap once daily.     citalopram (CELEXA) 20 MG tablet Take 20 mg by mouth once daily.     clopidogrel (PLAVIX) 75 mg tablet Take 1 tablet (75 mg total) by mouth once daily.    CREON 24,000-76,000 -120,000 unit capsule Take 1 capsule by mouth 4 (four) times daily with meals and nightly.     ferrous sulfate 325 (65 FE) MG EC tablet Take 1 tablet (325 mg total) by mouth 2 (two) times daily.    furosemide (LASIX) 40 MG tablet Take 1 tablet (40 mg total) by mouth once daily.    HUMALOG KWIKPEN 100 unit/mL InPn pen Inject 7 Units into the skin 3 (three) times daily before meals.    insulin detemir (LEVEMIR FLEXTOUCH) 100 unit/mL (3 mL) SubQ InPn pen Inject 10 Units into the skin 2 (two) times daily.    lancets Misc Check blood glucose 4x/day. Freestyle meter; dx code e11.65    lisinopril (PRINIVIL,ZESTRIL) 2.5 MG tablet Take 2.5 mg by mouth once daily.     magnesium oxide (MAG-OX) 400 mg tablet Take 1 tablet (400 mg total) by mouth once daily.    PEN NEEDLE 31 X 5/16 " Ndle     quetiapine (SEROQUEL) 100 MG Tab 100 mg every evening.            Clinical Reference Information           Your Vitals Were     BP Pulse Temp Height Weight Last Period    114/66 (BP Location: Right arm, Patient Position: Sitting) 86 97.8 °F (36.6 °C) 5' 7" (1.702 m) 53.8 kg (118 lb 9.7 oz) 08/15/2012 (Approximate)    SpO2 BMI             99% 18.58 kg/m2         Blood Pressure          Most Recent Value    BP  114/66      Allergies as of 4/24/2017     No Known Allergies      Immunizations Administered on Date of Encounter - 4/24/2017     None      Smoking Cessation     If you would like to quit smoking:   You may be " eligible for free services if you are a [place] resident and started smoking cigarettes before September 1, 1988.  Call the Smoking Cessation Trust ([facility]) toll free at [phone] or [phone] [phone].   Call 1-800-QUIT-NOW if you do not meet the above criteria.   Contact us via email: [email or web address]   View our website for more information: [email or web address]        Language Assistance Services     ATTENTION: Language assistance services are available, free of charge. Please call [phone].      ATENCIÓN: Si habla español, tiene a ridley disposición servicios gratuitos de asistencia lingüística. Llame al [phone].     CHÚ Ý: N?u b?n nói Ti?ng Vi?t, có các d?ch v? h? tr? ngôn ng? mi?n phí dành cho b?n. G?i s? [phone].         [facility] complies with applicable Federal civil rights laws and does not discriminate on the basis of race, color, national origin, age, disability, or sex.

## 2017-04-26 ENCOUNTER — TELEPHONE (OUTPATIENT)
Dept: NEPHROLOGY | Facility: CLINIC | Age: 52
End: 2017-04-26

## 2017-04-26 DIAGNOSIS — N18.30 CONTROLLED TYPE 2 DIABETES MELLITUS WITH STAGE 3 CHRONIC KIDNEY DISEASE, WITH LONG-TERM CURRENT USE OF INSULIN: Primary | ICD-10-CM

## 2017-04-26 DIAGNOSIS — Z79.4 CONTROLLED TYPE 2 DIABETES MELLITUS WITH STAGE 3 CHRONIC KIDNEY DISEASE, WITH LONG-TERM CURRENT USE OF INSULIN: Primary | ICD-10-CM

## 2017-04-26 DIAGNOSIS — E11.22 CONTROLLED TYPE 2 DIABETES MELLITUS WITH STAGE 3 CHRONIC KIDNEY DISEASE, WITH LONG-TERM CURRENT USE OF INSULIN: Primary | ICD-10-CM

## 2017-04-26 NOTE — PROGRESS NOTES
Subjective:       Patient ID: Errol Keita is a 51 y.o. Black or  female who presents for follow-up evaluation of Chronic Kidney Disease    HPI     She was hospitalized for TIMOTHY and UTI. Last Hba1c was 15.3--she missed her initial consult with Endocrine. No edema nor SOB. She denies LUTS. Her appetite is similar. No NSAID use     Review of Systems   Constitutional: Negative for activity change, appetite change, fatigue and unexpected weight change.   HENT: Negative for facial swelling.    Respiratory: Negative for shortness of breath.    Cardiovascular: Negative for chest pain and leg swelling.   Gastrointestinal: Negative for abdominal pain.   Genitourinary: Negative for difficulty urinating and dysuria.   Musculoskeletal: Negative for arthralgias.   Neurological: Negative for weakness.       Objective:      Physical Exam   Constitutional: She is oriented to person, place, and time. She appears well-nourished. No distress.   HENT:   Mouth/Throat: Oropharynx is clear and moist.   Neck: No JVD present.   Cardiovascular: S1 normal and S2 normal.  Exam reveals no friction rub.    Pulmonary/Chest: Breath sounds normal. She has no wheezes. She has no rales.   Abdominal: Soft.   Musculoskeletal: She exhibits no edema.   Neurological: She is alert and oriented to person, place, and time.   Skin: Skin is warm and dry.   Psychiatric: She has a normal mood and affect.   Vitals reviewed.      Assessment:       1. CKD (chronic kidney disease) stage 3, GFR 30-59 ml/min    2. Hypertension associated with diabetes    3. Metabolic acidosis- severe    4. Diabetic nephropathy with proteinuria    5. Anemia of chronic renal failure, stage 3 (moderate)    6. Uncontrolled type 2 diabetes mellitus with stage 3 chronic kidney disease, with long-term current use of insulin    7. H/O: CVA (cerebrovascular accident)    8. Tobacco abuse, >100 pack years    9. S/P coronary artery stent placement, 2 NESTOR 7/27/2015        Plan:              S/p TIMOTHY    CKD stage 3 with stable kidney function.      DM--refer to Endocrine    Smoking cessation discussed    HTN is controlled      RTC 4 months with labs prior

## 2017-04-27 ENCOUNTER — TELEPHONE (OUTPATIENT)
Dept: ENDOCRINOLOGY | Facility: CLINIC | Age: 52
End: 2017-04-27

## 2017-08-14 ENCOUNTER — LAB VISIT (OUTPATIENT)
Dept: LAB | Facility: HOSPITAL | Age: 52
End: 2017-08-14
Attending: INTERNAL MEDICINE
Payer: MEDICARE

## 2017-08-14 ENCOUNTER — OFFICE VISIT (OUTPATIENT)
Dept: NEPHROLOGY | Facility: CLINIC | Age: 52
End: 2017-08-14
Payer: MEDICARE

## 2017-08-14 VITALS
OXYGEN SATURATION: 97 % | HEART RATE: 98 BPM | DIASTOLIC BLOOD PRESSURE: 52 MMHG | HEIGHT: 67 IN | SYSTOLIC BLOOD PRESSURE: 80 MMHG | TEMPERATURE: 98 F | WEIGHT: 106.5 LBS | BODY MASS INDEX: 16.72 KG/M2

## 2017-08-14 DIAGNOSIS — N18.4 CKD STAGE 4 DUE TO TYPE 2 DIABETES MELLITUS: Primary | Chronic | ICD-10-CM

## 2017-08-14 DIAGNOSIS — E11.59 HYPERTENSION ASSOCIATED WITH DIABETES: ICD-10-CM

## 2017-08-14 DIAGNOSIS — E11.22 CKD STAGE 4 DUE TO TYPE 2 DIABETES MELLITUS: Primary | Chronic | ICD-10-CM

## 2017-08-14 DIAGNOSIS — I15.2 HYPERTENSION ASSOCIATED WITH DIABETES: ICD-10-CM

## 2017-08-14 DIAGNOSIS — N18.30 CKD (CHRONIC KIDNEY DISEASE) STAGE 3, GFR 30-59 ML/MIN: ICD-10-CM

## 2017-08-14 LAB
ALBUMIN SERPL BCP-MCNC: 3.4 G/DL
ANION GAP SERPL CALC-SCNC: 15 MMOL/L
BUN SERPL-MCNC: 41 MG/DL
CALCIUM SERPL-MCNC: 9.1 MG/DL
CHLORIDE SERPL-SCNC: 111 MMOL/L
CO2 SERPL-SCNC: 16 MMOL/L
CREAT SERPL-MCNC: 3 MG/DL
EST. GFR  (AFRICAN AMERICAN): 19.6 ML/MIN/1.73 M^2
EST. GFR  (NON AFRICAN AMERICAN): 17 ML/MIN/1.73 M^2
GLUCOSE SERPL-MCNC: 414 MG/DL
PHOSPHATE SERPL-MCNC: 5 MG/DL
POTASSIUM SERPL-SCNC: 3.5 MMOL/L
PTH-INTACT SERPL-MCNC: 178 PG/ML
SODIUM SERPL-SCNC: 142 MMOL/L

## 2017-08-14 PROCEDURE — 99214 OFFICE O/P EST MOD 30 MIN: CPT | Mod: S$GLB,,, | Performed by: INTERNAL MEDICINE

## 2017-08-14 PROCEDURE — 3066F NEPHROPATHY DOC TX: CPT | Mod: S$GLB,,, | Performed by: INTERNAL MEDICINE

## 2017-08-14 PROCEDURE — 3046F HEMOGLOBIN A1C LEVEL >9.0%: CPT | Mod: S$GLB,,, | Performed by: INTERNAL MEDICINE

## 2017-08-14 PROCEDURE — 3074F SYST BP LT 130 MM HG: CPT | Mod: S$GLB,,, | Performed by: INTERNAL MEDICINE

## 2017-08-14 PROCEDURE — 36415 COLL VENOUS BLD VENIPUNCTURE: CPT | Mod: PO

## 2017-08-14 PROCEDURE — 83970 ASSAY OF PARATHORMONE: CPT

## 2017-08-14 PROCEDURE — 80069 RENAL FUNCTION PANEL: CPT

## 2017-08-14 PROCEDURE — 99999 PR PBB SHADOW E&M-EST. PATIENT-LVL IV: CPT | Mod: PBBFAC,,, | Performed by: INTERNAL MEDICINE

## 2017-08-14 PROCEDURE — 3008F BODY MASS INDEX DOCD: CPT | Mod: S$GLB,,, | Performed by: INTERNAL MEDICINE

## 2017-08-14 PROCEDURE — 3078F DIAST BP <80 MM HG: CPT | Mod: S$GLB,,, | Performed by: INTERNAL MEDICINE

## 2017-08-14 RX ORDER — PANTOPRAZOLE SODIUM 40 MG/1
TABLET, DELAYED RELEASE ORAL
COMMUNITY
Start: 2017-07-15 | End: 2017-08-14

## 2017-08-15 ENCOUNTER — TELEPHONE (OUTPATIENT)
Dept: NEPHROLOGY | Facility: CLINIC | Age: 52
End: 2017-08-15

## 2017-08-15 DIAGNOSIS — N17.9 AKI (ACUTE KIDNEY INJURY): Primary | ICD-10-CM

## 2017-08-15 NOTE — TELEPHONE ENCOUNTER
Please inform pt's caretaker that since her BS was 414 the day she had labs the kidneys looked very dehydrated. When can she go get more labs? BMP ordered    Also I stopped her Norvasc 10mg at last visit. How is her BP running?

## 2017-08-21 NOTE — PROGRESS NOTES
Subjective:       Patient ID: rErol Keita is a 52 y.o. Black or  female who presents for follow-up evaluation of Chronic Kidney Disease    HPI  Review of Systems    Objective:      Physical Exam    Assessment:       1. CKD stage 4 due to type 2 diabetes mellitus        Plan:             TIMOTHY--glucose 414. Hydrate, control BS and repeat labs    Stop Norvasc. BP log

## 2017-11-26 ENCOUNTER — HOSPITAL ENCOUNTER (EMERGENCY)
Facility: HOSPITAL | Age: 52
Discharge: HOME | End: 2017-11-26
Attending: EMERGENCY MEDICINE
Payer: MEDICARE

## 2017-11-26 VITALS
OXYGEN SATURATION: 98 % | SYSTOLIC BLOOD PRESSURE: 168 MMHG | HEART RATE: 78 BPM | TEMPERATURE: 99 F | BODY MASS INDEX: 15.7 KG/M2 | WEIGHT: 100 LBS | RESPIRATION RATE: 18 BRPM | DIASTOLIC BLOOD PRESSURE: 84 MMHG | HEIGHT: 67 IN

## 2017-11-26 DIAGNOSIS — M25.519 SHOULDER PAIN: ICD-10-CM

## 2017-11-26 DIAGNOSIS — M25.511 ACUTE PAIN OF RIGHT SHOULDER: Primary | ICD-10-CM

## 2017-11-26 PROCEDURE — 99283 EMERGENCY DEPT VISIT LOW MDM: CPT | Mod: 25

## 2017-11-26 PROCEDURE — 25000003 PHARM REV CODE 250: Performed by: NURSE PRACTITIONER

## 2017-11-26 PROCEDURE — 96374 THER/PROPH/DIAG INJ IV PUSH: CPT

## 2017-11-26 PROCEDURE — 63600175 PHARM REV CODE 636 W HCPCS: Performed by: NURSE PRACTITIONER

## 2017-11-26 RX ORDER — HYDROCODONE BITARTRATE AND ACETAMINOPHEN 10; 325 MG/1; MG/1
1 TABLET ORAL EVERY 4 HOURS PRN
Qty: 12 TABLET | Refills: 0 | Status: SHIPPED | OUTPATIENT
Start: 2017-11-26 | End: 2018-01-12

## 2017-11-26 RX ORDER — HYDROCODONE BITARTRATE AND ACETAMINOPHEN 5; 325 MG/1; MG/1
1 TABLET ORAL
Status: COMPLETED | OUTPATIENT
Start: 2017-11-26 | End: 2017-11-26

## 2017-11-26 RX ORDER — MORPHINE SULFATE 4 MG/ML
4 INJECTION, SOLUTION INTRAMUSCULAR; INTRAVENOUS
Status: COMPLETED | OUTPATIENT
Start: 2017-11-26 | End: 2017-11-26

## 2017-11-26 RX ORDER — DOCUSATE SODIUM 100 MG/1
100 CAPSULE, LIQUID FILLED ORAL 2 TIMES DAILY PRN
Qty: 30 CAPSULE | Refills: 0 | Status: SHIPPED | OUTPATIENT
Start: 2017-11-26 | End: 2018-01-12

## 2017-11-26 RX ADMIN — MORPHINE SULFATE 4 MG: 4 INJECTION INTRAVENOUS at 07:11

## 2017-11-26 RX ADMIN — HYDROCODONE BITARTRATE AND ACETAMINOPHEN 1 TABLET: 5; 325 TABLET ORAL at 06:11

## 2017-11-26 NOTE — ED PROVIDER NOTES
Encounter Date: 11/26/2017    SCRIBE #1 NOTE: IVito, am scribing for, and in the presence of, Mira Cuevas NP.       History     Chief Complaint   Patient presents with    Shoulder Pain     right, beginning yesterday. Denies trauma or injury       11/26/2017 5:25 PM     Chief complaint: R shoulder pain      Errol Keita is a 52 y.o. female with a hx of DM, HTN, Pancreatitis, Arthritis, CHF, CKD III and Asthma who presents to the ED with complaints of right shoulder pain since last night. Pt states she slept on the right shoulder and exacerbated the pain. She notes painful movement of the affected shoulder. She denies trauma, injury, fever, CP, SOB, nausea, vomiting and diarrhea, tobacco and alcohol use. NKDA.       The history is provided by the patient.     Review of patient's allergies indicates:  No Known Allergies  Past Medical History:   Diagnosis Date    Arthritis     Asthma     Blood transfusion     CHF (congestive heart failure)     Coronary artery disease     Diabetes mellitus     Hypertension     Pancreatitis     Type 2 diabetes mellitus with hyperglycemia 7/13/2015     Past Surgical History:   Procedure Laterality Date    CARDIAC SURGERY      CABG    CHOLECYSTECTOMY      CORONARY ARTERY BYPASS GRAFT       Family History   Problem Relation Age of Onset    Diabetes Mother     Diabetes Father     Diabetes Sister     Hearing loss Sister     Heart disease Sister     Hypertension Sister     Learning disabilities Sister     Vision loss Sister     Asthma Brother     Depression Brother     Cancer Maternal Grandmother      Social History   Substance Use Topics    Smoking status: Current Every Day Smoker     Packs/day: 0.50     Years: 35.00     Types: Cigarettes    Smokeless tobacco: Never Used    Alcohol use No      Comment: questionable per      Review of Systems   Constitutional: Negative for chills and fever.   HENT: Negative for sore throat.    Respiratory:  Negative for shortness of breath.    Cardiovascular: Negative for chest pain.   Gastrointestinal: Negative for diarrhea, nausea and vomiting.   Genitourinary: Negative for dysuria.   Musculoskeletal: Positive for arthralgias (right shoulder) and myalgias (R shoulder pain). Negative for back pain, joint swelling, neck pain and neck stiffness.   Skin: Negative for color change and wound.   Neurological: Negative for weakness and numbness.       Physical Exam     Initial Vitals [11/26/17 1717]   BP Pulse Resp Temp SpO2   (!) 195/97 88 18 99.4 °F (37.4 °C) 98 %      MAP       129.67         Physical Exam    Nursing note and vitals reviewed.  Constitutional: Vital signs are normal. She appears well-developed and well-nourished.   HENT:   Head: Normocephalic and atraumatic.   Eyes: Pupils are equal, round, and reactive to light.   Neck: Normal range of motion. Neck supple.   Cardiovascular: Normal rate, regular rhythm, normal heart sounds and intact distal pulses. Exam reveals no gallop and no friction rub.    No murmur heard.  Pulses:       Radial pulses are 2+ on the right side.   Pulmonary/Chest: Breath sounds normal. No respiratory distress. She has no wheezes. She has no rhonchi. She has no rales. She exhibits no tenderness.   Abdominal: Normal appearance.   Musculoskeletal: She exhibits tenderness.        Right shoulder: She exhibits decreased range of motion, tenderness, bony tenderness, deformity, laceration and pain. She exhibits no swelling, no effusion, no crepitus, no spasm, normal pulse and normal strength.        Right elbow: Normal.       Right wrist: Normal.        Cervical back: Normal.        Arms:  Neurological: She is alert and oriented to person, place, and time. She has normal strength.   Normal radial median and ulnar n function in RUE distal to shoulder   Skin: Skin is warm, dry and intact.   Psychiatric: She has a normal mood and affect. Her speech is normal and behavior is normal.         ED  Course   Procedures  Labs Reviewed - No data to display          Medical Decision Making:   Differential Diagnosis:   Septic joint  Tendonitis  Adhesive capsulitis  Bursitis  Arthritis   Dislocation  fracture       APC / Resident Notes:   Patient is a 52 y.o. female who presents to the ED 11/27/2017 who underwent emergent evaluation gradual onset atraumatic right shoulder pain. On exam patient is non toxic appearing; appears older than stated age. Patient is guarding right shoulder and has no passive or active ROM; no swelling, effusion or crepitus of right shoulder; no deformity; no erythema;  right elbow with normal ROM and no swelling or tenderness as well as right wrist and hand; no weakness nor numbness; right arm warm and pink with + 2 radial pulse right; no signs of neurovascular compromise; patient is afebrile; doubt septic joint based on exam; patients has pain 10/10 and is guarding for exam; patient given norco without relief; ordered morphine and given with moderate relief of symptoms; patient ambulating in and out of ED according to RN; patient's xray of right shoulder without acute findings such as fracture, dislocation, osteoarthritis or osteopenia or effusion; doubt septic joint; patient given RX for pain medication; sling; and recommendation for orthopedic follow up and as well as return precautions; patient verbalized understanding.             Scribe Attestation:   Scribe #1: I performed the above scribed service and the documentation accurately describes the services I performed. I attest to the accuracy of the note.    Attending Attestation:     Physician Attestation Statement for NP/PA:   I have conducted a face to face encounter with this patient in addition to the NP/PA, due to Medical Complexity    Other NP/PA Attestation Additions:      Medical Decision Making: I provided a face to face evaluation of this patient.  I discussed the patient's care with Advanced Practice Clinician.  I reviewed  their note and agree with the history, physical, assessment, diagnosis, treatment, and discharge plan provided by the Advanced Practice Clinician. My overall impression is r shoulder pain.  The patient has been instructed to follow up with their physician or the one provided as well as specific return precautions.          Physician Attestation for Scribe:  Physician Attestation Statement for Scribe #1: I, Mira Cuevas, reviewed documentation, as scribed by in my presence, and it is both accurate and complete.     Comments: I, TWAN Pedraza, personally performed the services described in this documentation. All medical record entries made by the scribe were at my direction and in my presence.  I have reviewed the chart and agree that the record reflects my personal performance and is accurate and complete. TWAN Pedraza.  2:08 PM 11/27/2017             ED Course as of Nov 27 1358   Sun Nov 26, 2017 2024 IMPRESSION:  No acute findings.  Thank you for allowing us to participate in the care of your patient.  Dictated and Authenticated by: Bala Pacheco MD  11/26/2017 8:20 PM Central Time ([place] & [place])  [EF]   2028 52-year-old with multiple chronic health problems presents with 2 days of right shoulder pain.  Decreased range of motion but no effusion or erythema or calor or swelling to suggest infectious cause.  X-rays are unremarkable with no sign of any dislocation or fracture.  She has not had any recent trauma.  Symptoms most likely due to tendinitis but she has been advised that should she develop any fevers or swelling to return to the emergency room for repeat evaluation.  She will be discharged home with a prescription for pain medicine a sling and referred to orthopedic surgery.  Return to the ER for any worsening or concerning symptoms.  [EF]      ED Course User Index  [EF] Esteban Langston MD     Clinical Impression:     1. Shoulder pain        Disposition:   Disposition: Discharged  Condition:  Stable                        Mira Cuevas NP  11/27/17 1403       Esteban Langston MD  11/27/17 0011

## 2017-11-29 ENCOUNTER — OFFICE VISIT (OUTPATIENT)
Dept: ORTHOPEDICS | Facility: CLINIC | Age: 52
End: 2017-11-29
Payer: MEDICARE

## 2017-11-29 VITALS
DIASTOLIC BLOOD PRESSURE: 70 MMHG | BODY MASS INDEX: 15.74 KG/M2 | WEIGHT: 100.31 LBS | HEIGHT: 67 IN | HEART RATE: 71 BPM | SYSTOLIC BLOOD PRESSURE: 139 MMHG

## 2017-11-29 DIAGNOSIS — M25.521 RIGHT ELBOW PAIN: ICD-10-CM

## 2017-11-29 DIAGNOSIS — M25.511 ACUTE PAIN OF RIGHT SHOULDER: Primary | ICD-10-CM

## 2017-11-29 PROCEDURE — 20605 DRAIN/INJ JOINT/BURSA W/O US: CPT | Mod: RT,S$GLB,, | Performed by: ORTHOPAEDIC SURGERY

## 2017-11-29 PROCEDURE — 99203 OFFICE O/P NEW LOW 30 MIN: CPT | Mod: 25,S$GLB,, | Performed by: ORTHOPAEDIC SURGERY

## 2017-11-29 PROCEDURE — 99999 PR PBB SHADOW E&M-EST. PATIENT-LVL III: CPT | Mod: PBBFAC,,, | Performed by: ORTHOPAEDIC SURGERY

## 2017-11-29 RX ADMIN — TRIAMCINOLONE ACETONIDE 40 MG: 40 INJECTION, SUSPENSION INTRA-ARTICULAR; INTRAMUSCULAR at 09:11

## 2017-11-30 RX ORDER — TRIAMCINOLONE ACETONIDE 40 MG/ML
40 INJECTION, SUSPENSION INTRA-ARTICULAR; INTRAMUSCULAR
Status: DISCONTINUED | OUTPATIENT
Start: 2017-11-29 | End: 2017-11-30 | Stop reason: HOSPADM

## 2017-12-01 NOTE — PROGRESS NOTES
Past Medical History:   Diagnosis Date    Arthritis     Asthma     Blood transfusion     CHF (congestive heart failure)     Coronary artery disease     Diabetes mellitus     Hypertension     Pancreatitis     Type 2 diabetes mellitus with hyperglycemia 7/13/2015       Past Surgical History:   Procedure Laterality Date    CARDIAC SURGERY      CABG    CHOLECYSTECTOMY      CORONARY ARTERY BYPASS GRAFT         Current Outpatient Prescriptions   Medication Sig    aspirin 81 MG chewable tablet Take 81 mg by mouth once daily. Ran out    atorvastatin (LIPITOR) 80 MG tablet Take 1 tablet (80 mg total) by mouth once daily.    blood sugar diagnostic Strp Check blood glucose 4x/day. Freestyle meter; dx code e11.65    blood-glucose meter kit Use as instructed; dispense freestyle lite meter or whatever brand is covered    calcitRIOL (ROCALTROL) 0.25 MCG Cap once daily.     citalopram (CELEXA) 20 MG tablet Take 20 mg by mouth once daily.     clopidogrel (PLAVIX) 75 mg tablet Take 1 tablet (75 mg total) by mouth once daily.    CREON 24,000-76,000 -120,000 unit capsule Take 1 capsule by mouth 4 (four) times daily with meals and nightly.     docusate sodium (COLACE) 100 MG capsule Take 1 capsule (100 mg total) by mouth 2 (two) times daily as needed for Constipation.    ferrous sulfate 325 (65 FE) MG EC tablet Take 1 tablet (325 mg total) by mouth 2 (two) times daily.    furosemide (LASIX) 40 MG tablet Take 1 tablet (40 mg total) by mouth once daily.    HUMALOG KWIKPEN 100 unit/mL InPn pen Inject 7 Units into the skin 3 (three) times daily before meals.    hydrocodone-acetaminophen 10-325mg (NORCO)  mg Tab Take 1 tablet by mouth every 4 (four) hours as needed for Pain.    insulin detemir (LEVEMIR FLEXTOUCH) 100 unit/mL (3 mL) SubQ InPn pen Inject 10 Units into the skin 2 (two) times daily.    lancets Misc Check blood glucose 4x/day. Freestyle meter; dx code e11.65    lisinopril (PRINIVIL,ZESTRIL) 2.5  "MG tablet Take 2.5 mg by mouth once daily.     magnesium oxide (MAG-OX) 400 mg tablet Take 1 tablet (400 mg total) by mouth once daily.    PEN NEEDLE 31 X 5/16 " Ndle     quetiapine (SEROQUEL) 100 MG Tab 100 mg every evening.      No current facility-administered medications for this visit.        Review of patient's allergies indicates:  No Known Allergies    Family History   Problem Relation Age of Onset    Diabetes Mother     Diabetes Father     Diabetes Sister     Hearing loss Sister     Heart disease Sister     Hypertension Sister     Learning disabilities Sister     Vision loss Sister     Asthma Brother     Depression Brother     Cancer Maternal Grandmother        Social History     Social History    Marital status:      Spouse name: N/A    Number of children: N/A    Years of education: N/A     Occupational History    Not on file.     Social History Main Topics    Smoking status: Current Every Day Smoker     Packs/day: 0.50     Years: 35.00     Types: Cigarettes    Smokeless tobacco: Never Used    Alcohol use No      Comment: questionable per     Drug use: No    Sexual activity: Not Currently     Partners: Male     Birth control/ protection: None     Other Topics Concern    Not on file     Social History Narrative    No narrative on file       Chief Complaint:   Chief Complaint   Patient presents with    Right Shoulder - Pain       Consulting Physician: Emergency, Dept Physici*    History of present illness:    This is a 52 y.o. female who complains of right elbow pain for weeks. No injury. History of stroke with weakness. Pain 5/10 and worse with movement. States pain started in shoulder but in now all in elbow. Denies shoulder pain.    Review of Systems:    Constitution: Denies chills, fever, and sweats.  HENT: Denies headaches or blurry vision.  Cardiovascular: Denies chest pain or irregular heart beat.  Respiratory: Denies cough or shortness of " "breath.  Gastrointestinal: Denies abdominal pain, nausea, or vomiting.  Musculoskeletal:  Denies muscle cramps.  Neurological: Denies dizziness or focal weakness.  Psychiatric/Behavioral: Normal mental status.  Hematologic/Lymphatic: Denies bleeding problem or easy bruising/bleeding.  Skin: Denies rash or suspicious lesions.    Examination:    Vital Signs:    Vitals:    11/29/17 0935   BP: 139/70   Pulse: 71   Weight: 45.5 kg (100 lb 5 oz)   Height: 5' 7" (1.702 m)   PainSc:   5   PainLoc: Shoulder       Body mass index is 15.71 kg/m².    This a well-developed, well nourished patient in no acute distress.    Alert and oriented x 3 and cooperative to examination.       Physical Exam: Right Shoulder Exam     Skin  Rash:   None  Scars:   None    Inspection/Observation   Swelling:   none  Erythema:   none  Bruising:   none  Wounds:   none  Scapular Winging:  none  Scapular Dyskinesia:  none  Atrophy:   none  Masses:   None  Lymphadenopathy: None    Tenderness   AC Joint:   mild    Range of Motion   Active Forward Flexion:  30  Active External Rotation:  0  Active Internal Rotation:  0    Passive Forward Flexion:  80  Passive External Rotation:  10    Muscle Strength   Forward Flexion:  4/5  External Rotation:  4/5  Internal Rotation:  4/5    Tests & Signs   Cross Arm:   positive  Impingement:   positive    Other   Sensation:   normal  Pulse:    2+ radial    Right Elbow Exam    Skin  Scars:   None  Rash:   None    Inspection  Erythema:  None  Bruising:  None  Swelling:  None  Masses:  None  Lymphadenopathy: None    Range of Motion  Flexion:  100°  Extension:  20°  Supination:  60°  Pronation:  60°    Tenderness  Medial Epicondyle: none  Lateral Epicondyle: yes  Olecranon:  None    Stability  Valgus Stress:  Stable  Varus Stress:  Stable    Strength:  Normal  Sensation:  Intact  Cubital Tinel's:     Negative    Vascular  Pulses:  Palpable  Capillary Refill: Normal            Imaging: X-rays ordered and reviewed today " personally of the right shoulder are normal        Assessment: Acute pain of right shoulder  -     Large Joint Aspiration/Injection        Plan:  She has minimal shoulder motion from stroke and minimal elbow motion due to pain. Most pain in elbow. Will inject lateral epicondyle and see how she responds.      DISCLAIMER: This note may have been dictated using voice recognition software and may contain grammatical errors.     NOTE: Consult report sent to referring provider via [facility] EMR.

## 2017-12-01 NOTE — PROCEDURES
Large Joint Aspiration/Injection  Date/Time: 11/29/2017 9:05 PM  Performed by: IRENE SORTO  Authorized by: IRENE SORTO     Consent Done?:  Yes (Verbal)  Indications:  Pain  Timeout: Prior to procedure the correct patient, procedure, and site was verified      Location:  Shoulder  Site:  R subacromial bursa  Prep: Patient was prepped and draped in usual sterile fashion    Approach:  Lateral  Medications:  40 mg triamcinolone acetonide 40 mg/mL

## 2017-12-01 NOTE — PROCEDURES
R lateral epicondyleIntermediate Joint Aspiration/Injection  Date/Time: 11/29/2017 9:10 PM  Performed by: IRENE SORTO  Authorized by: IRENE SORTO     Consent Done?: Yes (Verbal)  Indications:  Pain  Timeout: Prior to procedure the correct patient, procedure, and site was verified      Location:  Elbow  Prep: Patient was prepped and draped in usual sterile fashion    Approach:  Posterolateral  Medications:  40 mg triamcinolone acetonide 40 mg/mL

## 2018-01-12 ENCOUNTER — HOSPITAL ENCOUNTER (INPATIENT)
Facility: HOSPITAL | Age: 53
LOS: 5 days | Discharge: HOME | DRG: 291 | End: 2018-01-17
Attending: EMERGENCY MEDICINE | Admitting: INTERNAL MEDICINE
Payer: MEDICARE

## 2018-01-12 DIAGNOSIS — F02.80 DEMENTIA ASSOCIATED WITH OTHER UNDERLYING DISEASE WITHOUT BEHAVIORAL DISTURBANCE: ICD-10-CM

## 2018-01-12 DIAGNOSIS — I50.33 ACUTE ON CHRONIC DIASTOLIC CONGESTIVE HEART FAILURE: ICD-10-CM

## 2018-01-12 DIAGNOSIS — E44.0 MODERATE MALNUTRITION: Primary | ICD-10-CM

## 2018-01-12 DIAGNOSIS — M79.89 LEG SWELLING: ICD-10-CM

## 2018-01-12 DIAGNOSIS — N18.30 CKD (CHRONIC KIDNEY DISEASE) STAGE 3, GFR 30-59 ML/MIN: ICD-10-CM

## 2018-01-12 DIAGNOSIS — I50.9 CHF EXACERBATION: ICD-10-CM

## 2018-01-12 DIAGNOSIS — E88.09 HYPOALBUMINEMIA: ICD-10-CM

## 2018-01-12 DIAGNOSIS — E11.59 HYPERTENSION ASSOCIATED WITH DIABETES: ICD-10-CM

## 2018-01-12 DIAGNOSIS — I50.33 DIASTOLIC CHF, ACUTE ON CHRONIC: ICD-10-CM

## 2018-01-12 DIAGNOSIS — I15.2 HYPERTENSION ASSOCIATED WITH DIABETES: ICD-10-CM

## 2018-01-12 DIAGNOSIS — I25.10 CORONARY ARTERY DISEASE INVOLVING NATIVE CORONARY ARTERY WITHOUT ANGINA PECTORIS, UNSPECIFIED WHETHER NATIVE OR TRANSPLANTED HEART: ICD-10-CM

## 2018-01-12 LAB
ALBUMIN SERPL BCP-MCNC: 2.2 G/DL
ALP SERPL-CCNC: 173 U/L
ALT SERPL W/O P-5'-P-CCNC: 13 U/L
ANION GAP SERPL CALC-SCNC: 11 MMOL/L
AST SERPL-CCNC: 25 U/L
BASOPHILS # BLD AUTO: 0 K/UL
BASOPHILS NFR BLD: 0.4 %
BILIRUB SERPL-MCNC: 0.2 MG/DL
BNP SERPL-MCNC: 909 PG/ML
BUN SERPL-MCNC: 18 MG/DL
CALCIUM SERPL-MCNC: 7.1 MG/DL
CHLORIDE SERPL-SCNC: 107 MMOL/L
CK MB SERPL-MCNC: 1.8 NG/ML
CK MB SERPL-RTO: 0.6 %
CK SERPL-CCNC: 278 U/L
CO2 SERPL-SCNC: 25 MMOL/L
CREAT SERPL-MCNC: 1.9 MG/DL
DIFFERENTIAL METHOD: ABNORMAL
EOSINOPHIL # BLD AUTO: 0.1 K/UL
EOSINOPHIL NFR BLD: 0.6 %
ERYTHROCYTE [DISTWIDTH] IN BLOOD BY AUTOMATED COUNT: 15.4 %
EST. GFR  (AFRICAN AMERICAN): 34 ML/MIN/1.73 M^2
EST. GFR  (NON AFRICAN AMERICAN): 29 ML/MIN/1.73 M^2
GLUCOSE SERPL-MCNC: 105 MG/DL
HCT VFR BLD AUTO: 33.7 %
HGB BLD-MCNC: 11.1 G/DL
LYMPHOCYTES # BLD AUTO: 2.6 K/UL
LYMPHOCYTES NFR BLD: 24.7 %
MCH RBC QN AUTO: 30.5 PG
MCHC RBC AUTO-ENTMCNC: 33 G/DL
MCV RBC AUTO: 92 FL
MONOCYTES # BLD AUTO: 0.4 K/UL
MONOCYTES NFR BLD: 4.2 %
NEUTROPHILS # BLD AUTO: 7.3 K/UL
NEUTROPHILS NFR BLD: 70.1 %
PLATELET # BLD AUTO: 396 K/UL
PMV BLD AUTO: 8.2 FL
POCT GLUCOSE: 96 MG/DL (ref 70–110)
POTASSIUM SERPL-SCNC: 2.8 MMOL/L
PROT SERPL-MCNC: 6.8 G/DL
RBC # BLD AUTO: 3.65 M/UL
SODIUM SERPL-SCNC: 143 MMOL/L
TROPONIN I SERPL DL<=0.01 NG/ML-MCNC: 0.18 NG/ML
TROPONIN I SERPL DL<=0.01 NG/ML-MCNC: 0.18 NG/ML
WBC # BLD AUTO: 10.4 K/UL

## 2018-01-12 PROCEDURE — 83880 ASSAY OF NATRIURETIC PEPTIDE: CPT

## 2018-01-12 PROCEDURE — A4216 STERILE WATER/SALINE, 10 ML: HCPCS | Performed by: EMERGENCY MEDICINE

## 2018-01-12 PROCEDURE — 99284 EMERGENCY DEPT VISIT MOD MDM: CPT

## 2018-01-12 PROCEDURE — 93005 ELECTROCARDIOGRAM TRACING: CPT

## 2018-01-12 PROCEDURE — 25000003 PHARM REV CODE 250: Performed by: EMERGENCY MEDICINE

## 2018-01-12 PROCEDURE — 82553 CREATINE MB FRACTION: CPT

## 2018-01-12 PROCEDURE — 63600175 PHARM REV CODE 636 W HCPCS: Performed by: INTERNAL MEDICINE

## 2018-01-12 PROCEDURE — 83036 HEMOGLOBIN GLYCOSYLATED A1C: CPT

## 2018-01-12 PROCEDURE — 84484 ASSAY OF TROPONIN QUANT: CPT

## 2018-01-12 PROCEDURE — 12000002 HC ACUTE/MED SURGE SEMI-PRIVATE ROOM

## 2018-01-12 PROCEDURE — 85025 COMPLETE CBC W/AUTO DIFF WBC: CPT

## 2018-01-12 PROCEDURE — 99285 EMERGENCY DEPT VISIT HI MDM: CPT

## 2018-01-12 PROCEDURE — 80053 COMPREHEN METABOLIC PANEL: CPT

## 2018-01-12 PROCEDURE — 99223 1ST HOSP IP/OBS HIGH 75: CPT | Mod: AI,,, | Performed by: INTERNAL MEDICINE

## 2018-01-12 PROCEDURE — 36415 COLL VENOUS BLD VENIPUNCTURE: CPT

## 2018-01-12 RX ORDER — GLUCAGON 1 MG
1 KIT INJECTION
Status: DISCONTINUED | OUTPATIENT
Start: 2018-01-12 | End: 2018-01-17 | Stop reason: HOSPADM

## 2018-01-12 RX ORDER — SODIUM CHLORIDE 0.9 % (FLUSH) 0.9 %
3 SYRINGE (ML) INJECTION EVERY 8 HOURS
Status: DISCONTINUED | OUTPATIENT
Start: 2018-01-12 | End: 2018-01-17 | Stop reason: HOSPADM

## 2018-01-12 RX ORDER — INSULIN ASPART 100 [IU]/ML
7 INJECTION, SOLUTION INTRAVENOUS; SUBCUTANEOUS
Status: DISCONTINUED | OUTPATIENT
Start: 2018-01-13 | End: 2018-01-13

## 2018-01-12 RX ORDER — CITALOPRAM 10 MG/1
20 TABLET ORAL DAILY
Status: DISCONTINUED | OUTPATIENT
Start: 2018-01-13 | End: 2018-01-17 | Stop reason: HOSPADM

## 2018-01-12 RX ORDER — IBUPROFEN 200 MG
16 TABLET ORAL
Status: DISCONTINUED | OUTPATIENT
Start: 2018-01-12 | End: 2018-01-17 | Stop reason: HOSPADM

## 2018-01-12 RX ORDER — ACETAMINOPHEN 325 MG/1
650 TABLET ORAL EVERY 4 HOURS PRN
Status: DISCONTINUED | OUTPATIENT
Start: 2018-01-12 | End: 2018-01-17 | Stop reason: HOSPADM

## 2018-01-12 RX ORDER — FUROSEMIDE 40 MG/1
40 TABLET ORAL DAILY
Status: DISCONTINUED | OUTPATIENT
Start: 2018-01-13 | End: 2018-01-14

## 2018-01-12 RX ORDER — NAPROXEN SODIUM 220 MG/1
81 TABLET, FILM COATED ORAL DAILY
Status: DISCONTINUED | OUTPATIENT
Start: 2018-01-13 | End: 2018-01-17 | Stop reason: HOSPADM

## 2018-01-12 RX ORDER — HEPARIN SODIUM 5000 [USP'U]/ML
5000 INJECTION, SOLUTION INTRAVENOUS; SUBCUTANEOUS EVERY 12 HOURS
Status: DISCONTINUED | OUTPATIENT
Start: 2018-01-12 | End: 2018-01-17 | Stop reason: HOSPADM

## 2018-01-12 RX ORDER — LISINOPRIL 2.5 MG/1
2.5 TABLET ORAL DAILY
Status: DISCONTINUED | OUTPATIENT
Start: 2018-01-13 | End: 2018-01-17 | Stop reason: HOSPADM

## 2018-01-12 RX ORDER — ATORVASTATIN CALCIUM 40 MG/1
80 TABLET, FILM COATED ORAL DAILY
Status: DISCONTINUED | OUTPATIENT
Start: 2018-01-13 | End: 2018-01-17 | Stop reason: HOSPADM

## 2018-01-12 RX ORDER — POTASSIUM CHLORIDE 20 MEQ/1
40 TABLET, EXTENDED RELEASE ORAL
Status: COMPLETED | OUTPATIENT
Start: 2018-01-12 | End: 2018-01-12

## 2018-01-12 RX ORDER — FUROSEMIDE 10 MG/ML
40 INJECTION INTRAMUSCULAR; INTRAVENOUS DAILY
Status: DISCONTINUED | OUTPATIENT
Start: 2018-01-13 | End: 2018-01-17 | Stop reason: HOSPADM

## 2018-01-12 RX ORDER — QUETIAPINE FUMARATE 100 MG/1
100 TABLET, FILM COATED ORAL NIGHTLY
Status: DISCONTINUED | OUTPATIENT
Start: 2018-01-12 | End: 2018-01-17 | Stop reason: HOSPADM

## 2018-01-12 RX ORDER — INSULIN ASPART 100 [IU]/ML
0-5 INJECTION, SOLUTION INTRAVENOUS; SUBCUTANEOUS
Status: DISCONTINUED | OUTPATIENT
Start: 2018-01-12 | End: 2018-01-17 | Stop reason: HOSPADM

## 2018-01-12 RX ORDER — CLOPIDOGREL BISULFATE 75 MG/1
75 TABLET ORAL DAILY
Status: DISCONTINUED | OUTPATIENT
Start: 2018-01-13 | End: 2018-01-17 | Stop reason: HOSPADM

## 2018-01-12 RX ORDER — IBUPROFEN 200 MG
24 TABLET ORAL
Status: DISCONTINUED | OUTPATIENT
Start: 2018-01-12 | End: 2018-01-17 | Stop reason: HOSPADM

## 2018-01-12 RX ADMIN — SODIUM CHLORIDE, PRESERVATIVE FREE 3 ML: 5 INJECTION INTRAVENOUS at 09:01

## 2018-01-12 RX ADMIN — POTASSIUM CHLORIDE 40 MEQ: 20 TABLET, EXTENDED RELEASE ORAL at 06:01

## 2018-01-12 RX ADMIN — QUETIAPINE FUMARATE 100 MG: 100 TABLET, FILM COATED ORAL at 09:01

## 2018-01-12 RX ADMIN — HEPARIN SODIUM 5000 UNITS: 5000 INJECTION, SOLUTION INTRAVENOUS; SUBCUTANEOUS at 09:01

## 2018-01-12 RX ADMIN — PANCRELIPASE 1 CAPSULE: 24000; 76000; 120000 CAPSULE, DELAYED RELEASE PELLETS ORAL at 09:01

## 2018-01-12 NOTE — ED PROVIDER NOTES
Encounter Date: 1/12/2018    SCRIBE #1 NOTE: Rita RIVAS am scribing for, and in the presence of,  .       History     Chief Complaint   Patient presents with    Leg Swelling       01/12/2018 3:55 PM     Chief complaint: BLE edema      Errol Keita is a 52 y.o. female with who presents to the ED with CHF, CKD III, DM type II, Asthma, CAD, HTN, Pancreatitis, Pancreatitis, onset 2 days patient has had this in the past. She presents with  Bilateral pedal edema. Patient denies both SOB and chest pain or discomfort. She denies fever, cough, cold, congestion, or vomiting. Patient is unaware of being dx with CHF previously. No SHx noted. No known drug allergies noted.      The history is provided by the patient. No  was used.     Review of patient's allergies indicates:  No Known Allergies  Past Medical History:   Diagnosis Date    Arthritis     Asthma     Blood transfusion     CHF (congestive heart failure)     Coronary artery disease     Diabetes mellitus     Hypertension     Pancreatitis     Type 2 diabetes mellitus with hyperglycemia 7/13/2015     Past Surgical History:   Procedure Laterality Date    CARDIAC SURGERY      CABG    CHOLECYSTECTOMY      CORONARY ARTERY BYPASS GRAFT       Family History   Problem Relation Age of Onset    Diabetes Mother     Diabetes Father     Diabetes Sister     Hearing loss Sister     Heart disease Sister     Hypertension Sister     Learning disabilities Sister     Vision loss Sister     Asthma Brother     Depression Brother     Cancer Maternal Grandmother      Social History   Substance Use Topics    Smoking status: Current Every Day Smoker     Packs/day: 0.50     Years: 35.00     Types: Cigarettes    Smokeless tobacco: Never Used    Alcohol use No      Comment: questionable per      Review of Systems   Constitutional: Negative for fever.   Respiratory: Negative for shortness of breath.    Cardiovascular: Positive  for leg swelling (bilateral pedal edema ). Negative for chest pain.   Gastrointestinal: Negative for nausea.   Musculoskeletal: Negative for back pain.   Skin: Negative for rash.   Neurological: Negative for weakness.   Hematological: Does not bruise/bleed easily.   All other systems reviewed and are negative.      Physical Exam     Initial Vitals   BP Pulse Resp Temp SpO2   01/12/18 1541 01/12/18 1540 01/12/18 1540 01/12/18 1540 --   (!) 191/81 77 16 99.6 °F (37.6 °C)       MAP       01/12/18 1541       117.67         Physical Exam    Nursing note and vitals reviewed.  Constitutional: She appears well-developed and well-nourished. She is not diaphoretic. No distress.   Poor dentition. Frail and chronically ill appearing but no acute distress   HENT:   Head: Normocephalic and atraumatic.   Eyes: EOM are normal.   Neck: Normal range of motion. Neck supple.   Cardiovascular: Normal rate, regular rhythm and normal heart sounds. Exam reveals no gallop and no friction rub.    No murmur heard.  Pulmonary/Chest: Breath sounds normal. No respiratory distress. She has no wheezes. She has no rhonchi. She has no rales.   Musculoskeletal: Normal range of motion. She exhibits edema.   Bilateral LE pitting edema 1+.   Neurological: She is alert and oriented to person, place, and time.   Skin: Skin is warm and dry.   Psychiatric: She has a normal mood and affect. Her behavior is normal. Judgment and thought content normal.         ED Course   Procedures  Labs Reviewed - No data to display          Medical Decision Making:   History:   Old Medical Records: I decided to obtain old medical records.  Clinical Tests:   Lab Tests: Ordered and Reviewed  Radiological Study: Ordered and Reviewed  Medical Tests: Reviewed and Ordered            Scribe Attestation:   Scribe #1: I performed the above scribed service and the documentation accurately describes the services I performed. I attest to the accuracy of the note.            ED Course  as of Jan 17 1629 Fri Jan 12, 2018   1843 BNP: (!) 909 [EF]      ED Course User Index  [EF] Esteban Langston MD     Clinical Impression:   There were no encounter diagnoses.          52 year old female in poor health here with LE swelling. NAD, doesn't report hx of chf but appears to have CHF by records. Medically very fragile, will admit to IM for suspected CHF                  Esteban Langston MD  01/17/18 1631

## 2018-01-13 LAB
ALBUMIN SERPL BCP-MCNC: 1.9 G/DL
ALP SERPL-CCNC: 154 U/L
ALT SERPL W/O P-5'-P-CCNC: 12 U/L
ANION GAP SERPL CALC-SCNC: 10 MMOL/L
ANION GAP SERPL CALC-SCNC: 11 MMOL/L
AST SERPL-CCNC: 23 U/L
BASOPHILS # BLD AUTO: 0 K/UL
BASOPHILS NFR BLD: 0.5 %
BILIRUB SERPL-MCNC: 0.2 MG/DL
BUN SERPL-MCNC: 17 MG/DL
BUN SERPL-MCNC: 17 MG/DL
CALCIUM SERPL-MCNC: 6.6 MG/DL
CALCIUM SERPL-MCNC: 6.7 MG/DL
CHLORIDE SERPL-SCNC: 109 MMOL/L
CHLORIDE SERPL-SCNC: 112 MMOL/L
CK MB SERPL-MCNC: 2 NG/ML
CK MB SERPL-MCNC: 2.3 NG/ML
CK MB SERPL-RTO: 0.8 %
CK MB SERPL-RTO: 0.9 %
CK SERPL-CCNC: 249 U/L
CK SERPL-CCNC: 253 U/L
CO2 SERPL-SCNC: 22 MMOL/L
CO2 SERPL-SCNC: 23 MMOL/L
CREAT SERPL-MCNC: 1.7 MG/DL
CREAT SERPL-MCNC: 1.7 MG/DL
DIFFERENTIAL METHOD: ABNORMAL
EOSINOPHIL # BLD AUTO: 0.1 K/UL
EOSINOPHIL NFR BLD: 0.9 %
ERYTHROCYTE [DISTWIDTH] IN BLOOD BY AUTOMATED COUNT: 15.1 %
EST. GFR  (AFRICAN AMERICAN): 39 ML/MIN/1.73 M^2
EST. GFR  (AFRICAN AMERICAN): 39 ML/MIN/1.73 M^2
EST. GFR  (NON AFRICAN AMERICAN): 34 ML/MIN/1.73 M^2
EST. GFR  (NON AFRICAN AMERICAN): 34 ML/MIN/1.73 M^2
ESTIMATED AVG GLUCOSE: 232 MG/DL
FERRITIN SERPL-MCNC: 636 NG/ML
FOLATE SERPL-MCNC: 15.2 NG/ML
GLUCOSE SERPL-MCNC: 114 MG/DL
GLUCOSE SERPL-MCNC: 189 MG/DL
GLUCOSE SERPL-MCNC: 54 MG/DL
HBA1C MFR BLD HPLC: 9.7 %
HCT VFR BLD AUTO: 29.3 %
HGB BLD-MCNC: 9.5 G/DL
LYMPHOCYTES # BLD AUTO: 3 K/UL
LYMPHOCYTES NFR BLD: 35.9 %
MAGNESIUM SERPL-MCNC: 2 MG/DL
MCH RBC QN AUTO: 30.2 PG
MCHC RBC AUTO-ENTMCNC: 32.5 G/DL
MCV RBC AUTO: 93 FL
MONOCYTES # BLD AUTO: 0.4 K/UL
MONOCYTES NFR BLD: 4.9 %
NEUTROPHILS # BLD AUTO: 4.8 K/UL
NEUTROPHILS NFR BLD: 57.8 %
PLATELET # BLD AUTO: 363 K/UL
PMV BLD AUTO: 8.6 FL
POCT GLUCOSE: 116 MG/DL (ref 70–110)
POCT GLUCOSE: 188 MG/DL (ref 70–110)
POCT GLUCOSE: 204 MG/DL (ref 70–110)
POCT GLUCOSE: 256 MG/DL (ref 70–110)
POCT GLUCOSE: 27 MG/DL (ref 70–110)
POCT GLUCOSE: 60 MG/DL (ref 70–110)
POCT GLUCOSE: 64 MG/DL (ref 70–110)
POCT GLUCOSE: 70 MG/DL (ref 70–110)
POTASSIUM SERPL-SCNC: 3 MMOL/L
POTASSIUM SERPL-SCNC: 4.5 MMOL/L
PROT SERPL-MCNC: 5.9 G/DL
RBC # BLD AUTO: 3.16 M/UL
SODIUM SERPL-SCNC: 143 MMOL/L
SODIUM SERPL-SCNC: 144 MMOL/L
TROPONIN I SERPL DL<=0.01 NG/ML-MCNC: 0.16 NG/ML
VIT B12 SERPL-MCNC: 670 PG/ML
WBC # BLD AUTO: 8.3 K/UL

## 2018-01-13 PROCEDURE — 83735 ASSAY OF MAGNESIUM: CPT

## 2018-01-13 PROCEDURE — 12000002 HC ACUTE/MED SURGE SEMI-PRIVATE ROOM

## 2018-01-13 PROCEDURE — 99223 1ST HOSP IP/OBS HIGH 75: CPT | Mod: ,,, | Performed by: INTERNAL MEDICINE

## 2018-01-13 PROCEDURE — 63600175 PHARM REV CODE 636 W HCPCS: Performed by: INTERNAL MEDICINE

## 2018-01-13 PROCEDURE — 25000003 PHARM REV CODE 250: Performed by: INTERNAL MEDICINE

## 2018-01-13 PROCEDURE — 82728 ASSAY OF FERRITIN: CPT

## 2018-01-13 PROCEDURE — 80053 COMPREHEN METABOLIC PANEL: CPT

## 2018-01-13 PROCEDURE — 93306 TTE W/DOPPLER COMPLETE: CPT

## 2018-01-13 PROCEDURE — 82947 ASSAY GLUCOSE BLOOD QUANT: CPT

## 2018-01-13 PROCEDURE — 82746 ASSAY OF FOLIC ACID SERUM: CPT

## 2018-01-13 PROCEDURE — 36415 COLL VENOUS BLD VENIPUNCTURE: CPT

## 2018-01-13 PROCEDURE — 82553 CREATINE MB FRACTION: CPT | Mod: 91

## 2018-01-13 PROCEDURE — 84484 ASSAY OF TROPONIN QUANT: CPT

## 2018-01-13 PROCEDURE — 85025 COMPLETE CBC W/AUTO DIFF WBC: CPT

## 2018-01-13 PROCEDURE — A4216 STERILE WATER/SALINE, 10 ML: HCPCS | Performed by: EMERGENCY MEDICINE

## 2018-01-13 PROCEDURE — 63600175 PHARM REV CODE 636 W HCPCS: Performed by: EMERGENCY MEDICINE

## 2018-01-13 PROCEDURE — 25000003 PHARM REV CODE 250: Performed by: EMERGENCY MEDICINE

## 2018-01-13 PROCEDURE — 82607 VITAMIN B-12: CPT

## 2018-01-13 PROCEDURE — 80048 BASIC METABOLIC PNL TOTAL CA: CPT

## 2018-01-13 RX ORDER — POTASSIUM CHLORIDE 20 MEQ/15ML
40 SOLUTION ORAL ONCE
Status: COMPLETED | OUTPATIENT
Start: 2018-01-13 | End: 2018-01-13

## 2018-01-13 RX ORDER — CALCITRIOL 0.25 UG/1
0.25 CAPSULE ORAL DAILY
COMMUNITY
End: 2018-02-08

## 2018-01-13 RX ADMIN — CALCIUM GLUCONATE 1000 MG: 94 INJECTION, SOLUTION INTRAVENOUS at 07:01

## 2018-01-13 RX ADMIN — QUETIAPINE FUMARATE 100 MG: 100 TABLET, FILM COATED ORAL at 08:01

## 2018-01-13 RX ADMIN — INSULIN DETEMIR 4 UNITS: 100 INJECTION, SOLUTION SUBCUTANEOUS at 08:01

## 2018-01-13 RX ADMIN — SODIUM CHLORIDE, PRESERVATIVE FREE 3 ML: 5 INJECTION INTRAVENOUS at 02:01

## 2018-01-13 RX ADMIN — CLOPIDOGREL BISULFATE 75 MG: 75 TABLET ORAL at 08:01

## 2018-01-13 RX ADMIN — CITALOPRAM HYDROBROMIDE 20 MG: 10 TABLET ORAL at 08:01

## 2018-01-13 RX ADMIN — SODIUM CHLORIDE, PRESERVATIVE FREE 3 ML: 5 INJECTION INTRAVENOUS at 08:01

## 2018-01-13 RX ADMIN — PANCRELIPASE 1 CAPSULE: 24000; 76000; 120000 CAPSULE, DELAYED RELEASE PELLETS ORAL at 05:01

## 2018-01-13 RX ADMIN — ASPIRIN 81 MG CHEWABLE TABLET 81 MG: 81 TABLET CHEWABLE at 08:01

## 2018-01-13 RX ADMIN — HEPARIN SODIUM 5000 UNITS: 5000 INJECTION, SOLUTION INTRAVENOUS; SUBCUTANEOUS at 08:01

## 2018-01-13 RX ADMIN — INSULIN ASPART 7 UNITS: 100 INJECTION, SOLUTION INTRAVENOUS; SUBCUTANEOUS at 08:01

## 2018-01-13 RX ADMIN — INSULIN ASPART 2 UNITS: 100 INJECTION, SOLUTION INTRAVENOUS; SUBCUTANEOUS at 05:01

## 2018-01-13 RX ADMIN — LISINOPRIL 2.5 MG: 2.5 TABLET ORAL at 08:01

## 2018-01-13 RX ADMIN — POTASSIUM CHLORIDE 40 MEQ: 20 SOLUTION ORAL at 12:01

## 2018-01-13 RX ADMIN — PANCRELIPASE 1 CAPSULE: 24000; 76000; 120000 CAPSULE, DELAYED RELEASE PELLETS ORAL at 12:01

## 2018-01-13 RX ADMIN — FUROSEMIDE 40 MG: 10 INJECTION, SOLUTION INTRAVENOUS at 09:01

## 2018-01-13 RX ADMIN — PANCRELIPASE 1 CAPSULE: 24000; 76000; 120000 CAPSULE, DELAYED RELEASE PELLETS ORAL at 08:01

## 2018-01-13 RX ADMIN — ATORVASTATIN CALCIUM 80 MG: 40 TABLET, FILM COATED ORAL at 08:01

## 2018-01-13 NOTE — ED NOTES
To Room:  [address]B by W/C:   AA & O x 3, skin warm and dry, in NAD, Saline Lock in place and patent, site clean and dry.  States she is feeilng better.

## 2018-01-13 NOTE — CONSULTS
[facility]  Cardiology  Consult Note    Patient Name: Errol Keita  MRN: [id number]  Admission Date: 1/12/2018  Hospital Length of Stay: 1 days  Code Status: Full Code   Attending Provider: Michael Sadler MD  Consulting Provider: Michael Sadler MD  Primary Care Physician: Chica Irene DO  Principal Problem:Diastolic CHF, acute on chronic    Patient information was obtained from patient and ER records.     Consults  Subjective:     Chief Complaint:  Ankle swelling     HPI:   Chief complaint: BLE edema        Errol Keita is a 52 y.o. female with prior Inferior ST elevation MI, who presents to the ED with CHF, CKD III, DM type II, Asthma, CAD, HTN, Pancreatitis,  She presents with  Bilateral pedal edema mostly on the ankle.  She has cough and phlegm .    Denies sob or chest pain.       Past Medical History:   Diagnosis Date    Arthritis     Asthma     Blood transfusion     CHF (congestive heart failure)     Coronary artery disease     Diabetes mellitus     Hypertension     Pancreatitis     Type 2 diabetes mellitus with hyperglycemia 7/13/2015       Past Surgical History:   Procedure Laterality Date    CARDIAC SURGERY      CABG    CHOLECYSTECTOMY      CORONARY ARTERY BYPASS GRAFT         Review of patient's allergies indicates:  No Known Allergies    No current facility-administered medications on file prior to encounter.      Current Outpatient Prescriptions on File Prior to Encounter   Medication Sig    aspirin 81 MG chewable tablet Take 81 mg by mouth once daily. Ran out    atorvastatin (LIPITOR) 80 MG tablet Take 1 tablet (80 mg total) by mouth once daily.    citalopram (CELEXA) 20 MG tablet Take 20 mg by mouth once daily.     CREON 24,000-76,000 -120,000 unit capsule Take 1 capsule by mouth 4 (four) times daily with meals and nightly.     ferrous sulfate 325 (65 FE) MG EC tablet Take 1 tablet (325 mg total) by mouth 2 (two) times daily.    furosemide (LASIX) 40 MG  tablet Take 1 tablet (40 mg total) by mouth once daily.    HUMALOG KWIKPEN 100 unit/mL InPn pen Inject 7 Units into the skin 3 (three) times daily before meals. (Patient taking differently: Inject 7 Units into the skin 3 (three) times daily before meals. Sliding scale)    insulin detemir (LEVEMIR FLEXTOUCH) 100 unit/mL (3 mL) SubQ InPn pen Inject 10 Units into the skin 2 (two) times daily. (Patient taking differently: Inject 4 Units into the skin 2 (two) times daily. )    lisinopril (PRINIVIL,ZESTRIL) 2.5 MG tablet Take 2.5 mg by mouth once daily.     quetiapine (SEROQUEL) 100 MG Tab 100 mg every evening.      Family History     Problem Relation (Age of Onset)    Asthma Brother    Cancer Maternal Grandmother    Depression Brother    Diabetes Mother, Father, Sister    Hearing loss Sister    Heart disease Sister    Hypertension Sister    Learning disabilities Sister    Vision loss Sister        Social History Main Topics    Smoking status: Current Every Day Smoker     Packs/day: 0.50     Years: 35.00     Types: Cigarettes    Smokeless tobacco: Never Used    Alcohol use No      Comment: questionable per     Drug use: No    Sexual activity: Not Currently     Partners: Male     Birth control/ protection: None     Review of Systems   Cardiovascular: Positive for leg swelling.   Respiratory: Positive for cough and sputum production.      Objective:     Vital Signs (Most Recent):  Temp: 97 °F (36.1 °C) (01/13/18 0811)  Pulse: 77 (01/13/18 0811)  Resp: 17 (01/13/18 0811)  BP: (!) 181/84 (01/13/18 0811)  SpO2: 98 % (01/13/18 0811) Vital Signs (24h Range):  Temp:  [97 °F (36.1 °C)-99.6 °F (37.6 °C)] 97 °F (36.1 °C)  Pulse:  [56-84] 77  Resp:  [16-19] 17  SpO2:  [96 %-100 %] 98 %  BP: (156-191)/() 181/84     Weight: 50.9 kg (112 lb 3.4 oz)  Body mass index is 17.58 kg/m².    SpO2: 98 %  O2 Device (Oxygen Therapy): room air      Intake/Output Summary (Last 24 hours) at 01/13/18 1002  Last data filed at  01/13/18 0400   Gross per 24 hour   Intake              360 ml   Output                0 ml   Net              360 ml       Lines/Drains/Airways     Peripheral Intravenous Line                 Peripheral IV - Single Lumen 01/12/18 1900 Left Other less than 1 day                Physical Exam   Constitutional: She is oriented to person, place, and time. She appears well-developed. No distress.   HENT:   Head: Normocephalic and atraumatic.   Eyes: Pupils are equal, round, and reactive to light.   Neck: No JVD present. No tracheal deviation present.   Cardiovascular: Normal rate, regular rhythm, normal heart sounds and intact distal pulses.  Exam reveals no gallop and no friction rub.    No murmur heard.  Pulmonary/Chest: No stridor. No respiratory distress. She has no wheezes. She has rales. She exhibits no tenderness.   Abdominal: There is no tenderness.   Musculoskeletal: She exhibits no edema or tenderness.   Neurological: She is oriented to person, place, and time.   Skin: No rash noted. She is not diaphoretic. No erythema. No pallor.       Significant Labs:   Blood Culture: No results for input(s): LABBLOO in the last 48 hours., BMP:   Recent Labs  Lab 01/12/18  1705 01/13/18  0453 01/13/18  0651    189* 114*    143 144   K 2.8* 4.5 3.0*    109 112*   CO2 25 23 22*   BUN 18 17 17   CREATININE 1.9* 1.7* 1.7*   CALCIUM 7.1* 6.6* 6.7*   MG  --  2.0  --    , CMP   Recent Labs  Lab 01/12/18  1705 01/13/18  0453 01/13/18  0651    143 144   K 2.8* 4.5 3.0*    109 112*   CO2 25 23 22*    189* 114*   BUN 18 17 17   CREATININE 1.9* 1.7* 1.7*   CALCIUM 7.1* 6.6* 6.7*   PROT 6.8  --  5.9*   ALBUMIN 2.2*  --  1.9*   BILITOT 0.2  --  0.2   ALKPHOS 173*  --  154*   AST 25  --  23   ALT 13  --  12   ANIONGAP 11 11 10   ESTGFRAFRICA 34* 39* 39*   EGFRNONAA 29* 34* 34*   , CBC   Recent Labs  Lab 01/12/18  1705 01/13/18  0453   WBC 10.40 8.30   HGB 11.1* 9.5*   HCT 33.7* 29.3*   * 363*    , INR No results for input(s): INR, PROTIME in the last 48 hours., Lipid Panel No results for input(s): CHOL, HDL, LDLCALC, TRIG, CHOLHDL in the last 48 hours. and Troponin   Recent Labs  Lab 01/12/18  2252 01/13/18  0453   TROPONINI 0.183*  0.183* 0.162*     EKG- NSR. T wave invertion in the inferior and lateral leads.  3.2017    CONCLUSIONS     1 - Concentric remodeling.     2 - Hyperdynamic left ventricular systolic function (EF 65-70%).     3 - Normal left ventricular diastolic function.     4 - Right ventricular enlargement with normal systolic function.     5 - The estimated PA systolic pressure is 22 mmHg.     6 - Difficult apical windows, Optison contrast used for wall motion analysis.     7 - Suggest improvement in the LV diastolic function and the inferoseptal segment, which now appears normal from Echo in 10/2016.     Assessment and Plan:     No notes have been filed under this hospital service.  Service: Cardiology    1. CAD  2. Pedal edema  3. Hypertensive urgency.  4. Cough  5. Mild troponin elevation.  6. Tobacco abuse      Continue careful diuresis.  No indication for cardiac catheterization now.  Await echo. Would recommend increaing dose of lisinopril to 5 mg bid. Monitor renal function. Possible nephrology input as well.  Treat possible RTI.  Smoking cessation counseling provided.    VTE Risk Mitigation         Ordered     heparin (porcine) injection 5,000 Units  Every 12 hours     Route:  Subcutaneous        01/12/18 2017     Place PABLO hose  Until discontinued      01/12/18 2017          Thank you for your consult. I will follow-up with patient. Please contact us if you have any additional questions.    Michael Sadler MD  Cardiology   [facility]

## 2018-01-13 NOTE — PLAN OF CARE
Problem: Patient Care Overview  Goal: Plan of Care Review  Outcome: Revised  General weakness  States is out of some of her home meds and hasn't be taking them.  Potassium level normal this am  Calcium level 6.6.  Will report to NP  Troponin elevated  For 2 d echo today

## 2018-01-13 NOTE — HPI
Chief complaint: BLE edema        Errol Keita is a 52 y.o. female with prior Inferior ST elevation MI, who presents to the ED with CHF, CKD III, DM type II, Asthma, CAD, HTN, Pancreatitis,  She presents with  Bilateral pedal edema mostly on the ankle.  She has cough and phlegm .    Denies sob or chest pain.

## 2018-01-13 NOTE — PLAN OF CARE
"11:08  SW met w/ pt for assessment and per consult re: medication compliance.  Pt lives w/ her significant other Ti Green, denies HH, has a walker and glucometer.  Pt denies receiving services from any other agency including case t w/ [employer].  Pt states that she has not been taking any of her meds, states she cannot afford them.  Pt was confused about which pharmacy she uses, states she has "a hard time remembering".  Possible that her pharmacy is [facility] on [address]       01/13/18 1108   Discharge Assessment   Assessment Type Discharge Planning Assessment   Confirmed/corrected address and phone number on facesheet? Yes   Assessment information obtained from? Patient   Prior to hospitilization cognitive status: Alert/Oriented   Prior to hospitalization functional status: Independent   Current cognitive status: Alert/Oriented   Current Functional Status: Independent   Facility Arrived From: home   Lives With significant other   Able to Return to Prior Arrangements yes   Is patient able to care for self after discharge? Yes   Who are your caregiver(s) and their phone number(s)? significant other:  Ti Green  [phone]     sister: Damaris Cook [phone]   Patient's perception of discharge disposition home or selfcare   Readmission Within The Last 30 Days no previous admission in last 30 days   Patient currently being followed by outpatient case management? No   Patient currently receives any other outside agency services? No   Equipment Currently Used at Home walker, rolling;glucometer;cane, straight   Do you have any problems affording any of your prescribed medications? Yes   If yes, what medications? all meds   Is the patient taking medications as prescribed? no   If no, which medications is patient not taking? all meds   Does the patient have transportation home? Yes   Transportation Available family or friend will provide   Does the patient receive services at the [facility]? No "   Discharge Plan A Home with family   Discharge Plan B Home with family   Patient/Family In Agreement With Plan yes

## 2018-01-13 NOTE — PROGRESS NOTES
"[facility]    CC: lower extremity edema    HPI 52 y.o. female seen and examined with no acute events overnight.  Hypoglycemic this morning but asymptomatic.       Past Medical History:   Diagnosis Date    Arthritis     Asthma     Blood transfusion     CHF (congestive heart failure)     Coronary artery disease     Diabetes mellitus     Hypertension     Pancreatitis     Type 2 diabetes mellitus with hyperglycemia 7/13/2015         Review of Systems  General ROS: negative for chills, fever or weight loss  Cardiovascular ROS: no chest pain or dyspnea on exertion  Gastrointestinal ROS: no abdominal pain, change in bowel habits, or black/ bloody stools    Physical Examination  BP (!) 181/84 (BP Location: Left arm, Patient Position: Lying)   Pulse 77   Temp 97 °F (36.1 °C) (Oral)   Resp 17   Ht 5' 7" (1.702 m)   Wt 50.9 kg (112 lb 3.4 oz)   LMP 09/03/2012 (Exact Date)   SpO2 98%   Breastfeeding? No   BMI 17.58 kg/m²   General appearance: alert, cooperative, no distress  HENT: Normocephalic, atraumatic, neck symmetrical, no nasal discharge   Lungs: clear to auscultation bilaterally, no dullness to percussion bilaterally  Heart: regular rate and rhythm without rub; no displacement of the PMI   Abdomen: soft, non-tender; bowel sounds normoactive; no organomegaly  Extremities: extremities symmetric; no clubbing, cyanosis, or edema  Neurologic: Alert and oriented X 3, normal strength, normal coordination and gait    Labs:  Lab Results   Component Value Date    WBC 8.30 01/13/2018    HGB 9.5 (L) 01/13/2018    HCT 29.3 (L) 01/13/2018    MCV 93 01/13/2018     (H) 01/13/2018         Imaging:    Assessment and Plan:  Acute on Chronic diastolic CHF (congestive heart failure) with elevated troponin  Supplemental O2 via nasal canula; titrate O2 saturation to >92%.  Diuretic administration. Monitor electrolytes for hypokalemia and hypomagnesemia.  2-D Echocardiogram for evaluation of left ventricle systolic " function or any valvular heart disease.  Contiue diuresis and await echo.   Appreciate cardiology input     Hypercholesteremia  Continue Atorvastatin     Hypertension associated with diabetes  Chronic problem. Will continue chronic medications and monitor for any changes, adjusting as needed.  Use Hydralazine prn IV for SBP > 160 prn.   Will increase ACEI once renal function near baseline     Major depressive disorder  Continue Citalopram. Consider another medication.        Protein-calorie malnutrition, severe  Nutrition consulted. Encourage maximal PO intake. Diet supplementation ordered per nutrition approval. Will encourage PO and monitor closely for weight changes        Coronary artery disease involving native coronary artery without angina pectoris  Continue Aspirin, Clopidogrel, Atorvastatin  Glycemic control  Monitor for ACS     Hypokalemia  Replete KCl. Follow BMP.      Type 2 diabetes mellitus with complication, with long-term current use of insulin  Diabetes poorly-controlled.   Check BG prior to meals and QHS. Administer rapid-acting insulin according to low-dose sliding scale   Will hold meal time dose given hypoglycemia     Moderate tobacco dependence  Smoking cessation counseling performed. Dangers of cigarette smoking were reviewed with patient in detail and patient was encouraged to quit. Nicotine replacement options were discussed for > 3 minutes.     Chronic pancreatitis  With pancreatic insufficiency  Continue  Creon when advancing diet  Monitor for pain.     DVT prophylaxis: Heparin 5K SQ q  12 hrs

## 2018-01-13 NOTE — SUBJECTIVE & OBJECTIVE
Past Medical History:   Diagnosis Date    Arthritis     Asthma     Blood transfusion     CHF (congestive heart failure)     Coronary artery disease     Diabetes mellitus     Hypertension     Pancreatitis     Type 2 diabetes mellitus with hyperglycemia 7/13/2015       Past Surgical History:   Procedure Laterality Date    CARDIAC SURGERY      CABG    CHOLECYSTECTOMY      CORONARY ARTERY BYPASS GRAFT         Review of patient's allergies indicates:  No Known Allergies    No current facility-administered medications on file prior to encounter.      Current Outpatient Prescriptions on File Prior to Encounter   Medication Sig    aspirin 81 MG chewable tablet Take 81 mg by mouth once daily. Ran out    atorvastatin (LIPITOR) 80 MG tablet Take 1 tablet (80 mg total) by mouth once daily.    citalopram (CELEXA) 20 MG tablet Take 20 mg by mouth once daily.     CREON 24,000-76,000 -120,000 unit capsule Take 1 capsule by mouth 4 (four) times daily with meals and nightly.     ferrous sulfate 325 (65 FE) MG EC tablet Take 1 tablet (325 mg total) by mouth 2 (two) times daily.    furosemide (LASIX) 40 MG tablet Take 1 tablet (40 mg total) by mouth once daily.    HUMALOG KWIKPEN 100 unit/mL InPn pen Inject 7 Units into the skin 3 (three) times daily before meals. (Patient taking differently: Inject 7 Units into the skin 3 (three) times daily before meals. Sliding scale)    insulin detemir (LEVEMIR FLEXTOUCH) 100 unit/mL (3 mL) SubQ InPn pen Inject 10 Units into the skin 2 (two) times daily. (Patient taking differently: Inject 4 Units into the skin 2 (two) times daily. )    lisinopril (PRINIVIL,ZESTRIL) 2.5 MG tablet Take 2.5 mg by mouth once daily.     quetiapine (SEROQUEL) 100 MG Tab 100 mg every evening.      Family History     Problem Relation (Age of Onset)    Asthma Brother    Cancer Maternal Grandmother    Depression Brother    Diabetes Mother, Father, Sister    Hearing loss Sister    Heart disease Sister     Hypertension Sister    Learning disabilities Sister    Vision loss Sister        Social History Main Topics    Smoking status: Current Every Day Smoker     Packs/day: 0.50     Years: 35.00     Types: Cigarettes    Smokeless tobacco: Never Used    Alcohol use No      Comment: questionable per     Drug use: No    Sexual activity: Not Currently     Partners: Male     Birth control/ protection: None     Review of Systems   Cardiovascular: Positive for leg swelling.   Respiratory: Positive for cough and sputum production.      Objective:     Vital Signs (Most Recent):  Temp: 97 °F (36.1 °C) (01/13/18 0811)  Pulse: 77 (01/13/18 0811)  Resp: 17 (01/13/18 0811)  BP: (!) 181/84 (01/13/18 0811)  SpO2: 98 % (01/13/18 0811) Vital Signs (24h Range):  Temp:  [97 °F (36.1 °C)-99.6 °F (37.6 °C)] 97 °F (36.1 °C)  Pulse:  [56-84] 77  Resp:  [16-19] 17  SpO2:  [96 %-100 %] 98 %  BP: (156-191)/() 181/84     Weight: 50.9 kg (112 lb 3.4 oz)  Body mass index is 17.58 kg/m².    SpO2: 98 %  O2 Device (Oxygen Therapy): room air      Intake/Output Summary (Last 24 hours) at 01/13/18 1002  Last data filed at 01/13/18 0400   Gross per 24 hour   Intake              360 ml   Output                0 ml   Net              360 ml       Lines/Drains/Airways     Peripheral Intravenous Line                 Peripheral IV - Single Lumen 01/12/18 1900 Left Other less than 1 day                Physical Exam   Constitutional: She is oriented to person, place, and time. She appears well-developed. No distress.   HENT:   Head: Normocephalic and atraumatic.   Eyes: Pupils are equal, round, and reactive to light.   Neck: No JVD present. No tracheal deviation present.   Cardiovascular: Normal rate, regular rhythm, normal heart sounds and intact distal pulses.  Exam reveals no gallop and no friction rub.    No murmur heard.  Pulmonary/Chest: No stridor. No respiratory distress. She has no wheezes. She has rales. She exhibits no tenderness.    Abdominal: There is no tenderness.   Musculoskeletal: She exhibits no edema or tenderness.   Neurological: She is oriented to person, place, and time.   Skin: No rash noted. She is not diaphoretic. No erythema. No pallor.       Significant Labs:   Blood Culture: No results for input(s): LABBLOO in the last 48 hours., BMP:   Recent Labs  Lab 01/12/18  1705 01/13/18  0453 01/13/18  0651    189* 114*    143 144   K 2.8* 4.5 3.0*    109 112*   CO2 25 23 22*   BUN 18 17 17   CREATININE 1.9* 1.7* 1.7*   CALCIUM 7.1* 6.6* 6.7*   MG  --  2.0  --    , CMP   Recent Labs  Lab 01/12/18  1705 01/13/18  0453 01/13/18  0651    143 144   K 2.8* 4.5 3.0*    109 112*   CO2 25 23 22*    189* 114*   BUN 18 17 17   CREATININE 1.9* 1.7* 1.7*   CALCIUM 7.1* 6.6* 6.7*   PROT 6.8  --  5.9*   ALBUMIN 2.2*  --  1.9*   BILITOT 0.2  --  0.2   ALKPHOS 173*  --  154*   AST 25  --  23   ALT 13  --  12   ANIONGAP 11 11 10   ESTGFRAFRICA 34* 39* 39*   EGFRNONAA 29* 34* 34*   , CBC   Recent Labs  Lab 01/12/18  1705 01/13/18  0453   WBC 10.40 8.30   HGB 11.1* 9.5*   HCT 33.7* 29.3*   * 363*   , INR No results for input(s): INR, PROTIME in the last 48 hours., Lipid Panel No results for input(s): CHOL, HDL, LDLCALC, TRIG, CHOLHDL in the last 48 hours. and Troponin   Recent Labs  Lab 01/12/18 2252 01/13/18  0453   TROPONINI 0.183*  0.183* 0.162*     3.2017    CONCLUSIONS     1 - Concentric remodeling.     2 - Hyperdynamic left ventricular systolic function (EF 65-70%).     3 - Normal left ventricular diastolic function.     4 - Right ventricular enlargement with normal systolic function.     5 - The estimated PA systolic pressure is 22 mmHg.     6 - Difficult apical windows, Optison contrast used for wall motion analysis.     7 - Suggest improvement in the LV diastolic function and the inferoseptal segment, which now appears normal from Echo in 10/2016.

## 2018-01-13 NOTE — PROGRESS NOTES
Spoke with Dr. Mcclure and reported critical calcium level and elevated troponin.  New orders to repeat CMP level for albumin level.  No new orders r/t troponin.

## 2018-01-13 NOTE — NURSING
Alert and oriented. Speech slow and deliberate. C/o rate 8/10 pain to bilateral feet. Víctor hose applied. Dependent edema noted. Swallows without problems. Instructed on plan of care. Verbalized understanding.

## 2018-01-13 NOTE — H&P
PCP: Chica Irene DO    History & Physical    Chief Complaint: Bilateral leg swellings    History of Present Illness:  Patient is a 52 y.o. female admitted to Hospitalist Service from [facility] Emergency Room with complaint of bilateral leg swellings. Patient has PMH significant for DM-2, hypertension, CAD, arthritis, asthma, CHF and pancreatitis. Patient is a poor historian. She presents with  Bilateral pedal edema, as well as right lower leg and left medial knee. Patient denies both SOB and chest pain or discomfort. She denies fever, cough, cold, congestion, or vomiting. Patient is prescribed and should be taking 40mg Lasix QID however unaware of being dx with CHF. Patient denied chest pain, abdominal pain, nausea, vomiting, headache, vision changes, focal neuro-deficits, cough or fever.    Past Medical History:   Diagnosis Date    Arthritis     Asthma     Blood transfusion     CHF (congestive heart failure)     Coronary artery disease     Diabetes mellitus     Hypertension     Pancreatitis     Type 2 diabetes mellitus with hyperglycemia 7/13/2015     Past Surgical History:   Procedure Laterality Date    CARDIAC SURGERY      CABG    CHOLECYSTECTOMY      CORONARY ARTERY BYPASS GRAFT       Family History   Problem Relation Age of Onset    Diabetes Mother     Diabetes Father     Diabetes Sister     Hearing loss Sister     Heart disease Sister     Hypertension Sister     Learning disabilities Sister     Vision loss Sister     Asthma Brother     Depression Brother     Cancer Maternal Grandmother      Social History   Substance Use Topics    Smoking status: Current Every Day Smoker     Packs/day: 0.50     Years: 35.00     Types: Cigarettes    Smokeless tobacco: Never Used    Alcohol use No      Comment: questionable per       Review of patient's allergies indicates:  No Known Allergies    (Not in a hospital admission)  Review of Systems:  Constitutional: no fever or  chills  Eyes: no visual changes  Ears, nose, mouth, throat, and face: no nasal congestion or sore throat  Respiratory: no cough or shorness of breath  Cardiovascular: no chest pain or palpitations  Gastrointestinal: no nausea or vomiting, no abdominal pain or change in bowel habits  Genitourinary: no hematuria or dysuria  Integument/breast: no rash or pruritis  Hematologic/lymphatic: no easy bruising or lymphadenopathy  Musculoskeletal: no arthralgias or myalgias. + leg swelling  Neurological: no seizures or tremors.  Behavioral/Psych: no auditory or visual hallucinations  Endocrine: no heat or cold intolerance     OBJECTIVE:     Vital Signs (Most Recent)  Temp: 99.6 °F (37.6 °C) (01/12/18 1540)  Pulse: 78 (01/12/18 1901)  Resp: 16 (01/12/18 1729)  BP: (!) 185/89 (01/12/18 1901)  SpO2: 98 % (01/12/18 1901)    Physical Exam:  General appearance: well developed, appears stated age, malnourished look  Head: normocephalic, atraumatic  Eyes:  conjunctivae/corneas clear. PERRL.  Nose: Nares normal. Septum midline.  Throat: lips, mucosa, and tongue normal; + Poor dentition, no throat erythema.  Neck: supple, symmetrical, trachea midline, no JVD and thyroid not enlarged, symmetric, no tenderness/mass/nodules  Lungs:  clear to auscultation bilaterally and normal respiratory effort  Chest wall: no tenderness  Heart: regular rate and rhythm, S1, S2 normal, no murmur, click, rub or gallop  Abdomen: soft, non-tender non-distented; bowel sounds normal; no masses,  no organomegaly  Extremities: no cyanosis, clubbing. 1+ edema.   Pulses: 2+ and symmetric  Skin: Skin color, texture, turgor normal. No rashes or lesions.  Lymph nodes: Cervical, supraclavicular, and axillary nodes normal.  Neurologic: Normal strength and tone. No focal numbness or weakness. CNII-XII intact.      Laboratory:   CBC:   Recent Labs  Lab 01/12/18  1705   WBC 10.40   RBC 3.65*   HGB 11.1*   HCT 33.7*   *   MCV 92   MCH 30.5   MCHC 33.0   BNP:  909    CMP:   Recent Labs  Lab 01/12/18  1705      CALCIUM 7.1*   ALBUMIN 2.2*   PROT 6.8      K 2.8*   CO2 25      BUN 18   CREATININE 1.9*   ALKPHOS 173*   ALT 13   AST 25   BILITOT 0.2     Hemoglobin A1C   Date Value Ref Range Status   03/22/2017 15.3 (H) 4.5 - 6.2 % Final     Comment:     According to ADA guidelines, hemoglobin A1C <7.0% represents  optimal control in non-pregnant diabetic patients.  Different  metrics may apply to specific populations.   Standards of Medical Care in Diabetes - 2016.  For the purpose of screening for the presence of diabetes:  <5.7%     Consistent with the absence of diabetes  5.7-6.4%  Consistent with increasing risk for diabetes   (prediabetes)  >or=6.5%  Consistent with diabetes  Currently no consensus exists for use of hemoglobin A1C  for diagnosis of diabetes for children.     10/04/2016 8.9 (H) 4.5 - 6.2 % Final     Comment:     According to ADA guidelines, hemoglobin A1C <7.0% represents  optimal control in non-pregnant diabetic patients.  Different  metrics may apply to specific populations.   Standards of Medical Care in Diabetes - 2016.  For the purpose of screening for the presence of diabetes:  <5.7%     Consistent with the absence of diabetes  5.7-6.4%  Consistent with increasing risk for diabetes   (prediabetes)  >or=6.5%  Consistent with diabetes  Currently no consensus exists for use of hemoglobin A1C  for diagnosis of diabetes for children.     05/02/2016 9.4 (H) 4.5 - 6.2 % Final   08/07/2013 8.5 (H) 4.8 - 5.6 % Final     Comment:              Increased risk for diabetes: 5.7 - 6.4           Diabetes: >6.4           Glycemic control for adults with diabetes: <7.0  **In order to standardize %HbA1c results worldwide, as of October 11, 2010,  the %HbA1c is being calculated using the master equation recommended in the  consensus statement adopted by the ADA (American Diabetes Assoc), EASD  (European Assoc for the Study of Diabetes), IFCC  (International Federation  of Clinical Chemistry and Laboratory Medicine) and IDF (International  Diabetes Federation). Result units: %HgbA1c (DCCT/NGSP).  In common with other methods, Hb A1C values may not accurately reflect mean  blood glucose in patients with hemoglobin variants (HgbF, HgbS and HgbC).  Any cause of shortened erythrocyte survival will reduce exposure of  erythrocytes to glucose with a consequent decrease in HbA1c (%) values, even  though the time-averaged blood glucose level may be elevated. Causes of  shortened erythrocyte lifetime might be hemolytic anemia or other hemolytic  diseases, homozygous sickle cell trait, pregnancy, recent significant or  chronic blood loss, etc. Caution should be used when interpreting the HbA1c  results from patients with these conditions.   07/16/2013 7.6 (H) 4.8 - 5.6 % Final     Comment:              Increased risk for diabetes: 5.7 - 6.4           Diabetes: >6.4           Glycemic control for adults with diabetes: <7.0  **In order to standardize %HbA1c results worldwide, as of October 11, 2010,  the %HbA1c is being calculated using the master equation recommended in the  consensus statement adopted by the ADA (American Diabetes Assoc), EASD  (European Assoc for the Study of Diabetes), IFCC (International Federation  of Clinical Chemistry and Laboratory Medicine) and IDF (International  Diabetes Federation). Result units: %HgbA1c (DCCT/NGSP).  In common with other methods, Hb A1C values may not accurately reflect mean  blood glucose in patients with hemoglobin variants (HgbF, HgbS and HgbC).  Any cause of shortened erythrocyte survival will reduce exposure of  erythrocytes to glucose with a consequent decrease in HbA1c (%) values, even  though the time-averaged blood glucose level may be elevated. Causes of  shortened erythrocyte lifetime might be hemolytic anemia or other hemolytic  diseases, homozygous sickle cell trait, pregnancy, recent significant  or  chronic blood loss, etc. Caution should be used when interpreting the HbA1c  results from patients with these conditions.   07/15/2013 7.5 (H) 4.8 - 5.6 % Final     Comment:              Increased risk for diabetes: 5.7 - 6.4           Diabetes: >6.4           Glycemic control for adults with diabetes: <7.0  **In order to standardize %HbA1c results worldwide, as of October 11, 2010,  the %HbA1c is being calculated using the master equation recommended in the  consensus statement adopted by the ADA (American Diabetes Assoc), EASD  (European Assoc for the Study of Diabetes), IFCC (International Federation  of Clinical Chemistry and Laboratory Medicine) and IDF (International  Diabetes Federation). Result units: %HgbA1c (DCCT/NGSP).  In common with other methods, Hb A1C values may not accurately reflect mean  blood glucose in patients with hemoglobin variants (HgbF, HgbS and HgbC).  Any cause of shortened erythrocyte survival will reduce exposure of  erythrocytes to glucose with a consequent decrease in HbA1c (%) values, even  though the time-averaged blood glucose level may be elevated. Causes of  shortened erythrocyte lifetime might be hemolytic anemia or other hemolytic  diseases, homozygous sickle cell trait, pregnancy, recent significant or  chronic blood loss, etc. Caution should be used when interpreting the HbA1c  results from patients with these conditions.     Microbiology Results (last 7 days)     ** No results found for the last 168 hours. **        Diagnostic Results:  Chest X-Ray: Centreal venous congestion B/L. Official results pending.     ECHO ():    1 - Concentric remodeling.     2 - Hyperdynamic left ventricular systolic function (EF 65-70%).     3 - Normal left ventricular diastolic function.     4 - Right ventricular enlargement with normal systolic function.     5 - The estimated PA systolic pressure is 22 mmHg.     6 - Difficult apical windows, Optison contrast used for wall motion  analysis.     7 - Suggest improvement in the LV diastolic function and the inferoseptal segment, which now appears normal from Echo in 10/2016.     Assessment/Plan:     Acute on Chronic diastolic CHF (congestive heart failure)  Supplemental O2 via nasal canula; titrate O2 saturation to >92%.  Serial Cardiac enzymes × 3.  Diuretic administration. Monitor electrolytes for hypokalemia and hypomagnesemia.  Cardiology Consultation.  2-D Echocardiogram for evaluation of left ventricle systolic function or any valvular heart disease.  Daily weights.  Strict I/Os.  Fluid restriction.  Na restriction <2g/d.      Hypercholesteremia  Continue Atorvastatin     Hypertension associated with diabetes  Chronic problem. Will continue chronic medications and monitor for any changes, adjusting as needed.  Use Hydralazine prn IV for SBP > 160 prn.      Major depressive disorder  Continue Citalopram. Consider another medication.        Protein-calorie malnutrition, severe  Nutrition consulted. Encourage maximal PO intake. Diet supplementation ordered per nutrition approval. Will encourage PO and monitor closely for weight changes        Coronary artery disease involving native coronary artery without angina pectoris  Continue Aspirin, Clopidogrel, Atorvastatin  Glycemic control  Monitor for ACS    Hypokalemia  Replete KCl. Follow BMP.      Type 2 diabetes mellitus with complication, with long-term current use of insulin  Diabetes poorly-controlled.   Check BG prior to meals and QHS. Administer rapid-acting insulin according to low-dose sliding scale      Moderate tobacco dependence  Smoking cessation counseling performed. Dangers of cigarette smoking were reviewed with patient in detail and patient was encouraged to quit. Nicotine replacement options were discussed for > 3 minutes.     Chronic pancreatitis  With pancreatic insufficiency  Continue  Creon when advancing diet  Monitor for pain.    DVT prophylaxis: Heparin 5K SQ q  12  shari.    Ophelia Dash MD  Department of Hospital Medicine   [facility]

## 2018-01-13 NOTE — PROGRESS NOTES
Upon rounding, tried to wake pt to empty her bladder.  Pt is very drowsy,drooling on herself, and difficult to wake.  Blood sugar checked and is 188.  Pt noted to have had large, loose BM in bed, and then urinated on self.  Pt cleaned and placed in brief.  She woke with much stimulation to get up to bedside scale and required 2 person assist to get up.  Speech is clear, facial movement is symmetrical, moves all extremities.  Will continue to monitor.

## 2018-01-13 NOTE — PROGRESS NOTES
Called JULIEN Ritchie to report critical calcium level of 6.6 and elevated troponin level.  No answer.  Left voice message to return call.

## 2018-01-14 LAB
ANION GAP SERPL CALC-SCNC: 9 MMOL/L
BASOPHILS # BLD AUTO: 0 K/UL
BASOPHILS NFR BLD: 0.3 %
BUN SERPL-MCNC: 20 MG/DL
CALCIUM SERPL-MCNC: 7 MG/DL
CHLORIDE SERPL-SCNC: 110 MMOL/L
CO2 SERPL-SCNC: 26 MMOL/L
CREAT SERPL-MCNC: 1.7 MG/DL
DIFFERENTIAL METHOD: ABNORMAL
EOSINOPHIL # BLD AUTO: 0.1 K/UL
EOSINOPHIL NFR BLD: 0.9 %
ERYTHROCYTE [DISTWIDTH] IN BLOOD BY AUTOMATED COUNT: 15.4 %
EST. GFR  (AFRICAN AMERICAN): 39 ML/MIN/1.73 M^2
EST. GFR  (NON AFRICAN AMERICAN): 34 ML/MIN/1.73 M^2
GLUCOSE SERPL-MCNC: 141 MG/DL
HCT VFR BLD AUTO: 30.3 %
HGB BLD-MCNC: 10 G/DL
LYMPHOCYTES # BLD AUTO: 2.6 K/UL
LYMPHOCYTES NFR BLD: 31.3 %
MAGNESIUM SERPL-MCNC: 1.3 MG/DL
MCH RBC QN AUTO: 30.4 PG
MCHC RBC AUTO-ENTMCNC: 33 G/DL
MCV RBC AUTO: 92 FL
MONOCYTES # BLD AUTO: 0.4 K/UL
MONOCYTES NFR BLD: 5.1 %
NEUTROPHILS # BLD AUTO: 5.2 K/UL
NEUTROPHILS NFR BLD: 62.4 %
PLATELET # BLD AUTO: 415 K/UL
PMV BLD AUTO: 7.9 FL
POCT GLUCOSE: 155 MG/DL (ref 70–110)
POCT GLUCOSE: 188 MG/DL (ref 70–110)
POCT GLUCOSE: 24 MG/DL (ref 70–110)
POCT GLUCOSE: 273 MG/DL (ref 70–110)
POCT GLUCOSE: 319 MG/DL (ref 70–110)
POCT GLUCOSE: 33 MG/DL (ref 70–110)
POCT GLUCOSE: 76 MG/DL (ref 70–110)
POTASSIUM SERPL-SCNC: 2.7 MMOL/L
RBC # BLD AUTO: 3.29 M/UL
SODIUM SERPL-SCNC: 145 MMOL/L
WBC # BLD AUTO: 8.4 K/UL

## 2018-01-14 PROCEDURE — 63600175 PHARM REV CODE 636 W HCPCS: Performed by: INTERNAL MEDICINE

## 2018-01-14 PROCEDURE — 80048 BASIC METABOLIC PNL TOTAL CA: CPT

## 2018-01-14 PROCEDURE — 12000002 HC ACUTE/MED SURGE SEMI-PRIVATE ROOM

## 2018-01-14 PROCEDURE — 85025 COMPLETE CBC W/AUTO DIFF WBC: CPT

## 2018-01-14 PROCEDURE — 25000003 PHARM REV CODE 250: Performed by: EMERGENCY MEDICINE

## 2018-01-14 PROCEDURE — 36415 COLL VENOUS BLD VENIPUNCTURE: CPT

## 2018-01-14 PROCEDURE — 83735 ASSAY OF MAGNESIUM: CPT

## 2018-01-14 PROCEDURE — A4216 STERILE WATER/SALINE, 10 ML: HCPCS | Performed by: EMERGENCY MEDICINE

## 2018-01-14 RX ORDER — POTASSIUM CHLORIDE 14.9 MG/ML
40 INJECTION INTRAVENOUS ONCE
Status: DISCONTINUED | OUTPATIENT
Start: 2018-01-14 | End: 2018-01-14

## 2018-01-14 RX ORDER — POTASSIUM CHLORIDE 7.45 MG/ML
10 INJECTION INTRAVENOUS
Status: COMPLETED | OUTPATIENT
Start: 2018-01-14 | End: 2018-01-14

## 2018-01-14 RX ADMIN — PANCRELIPASE 1 CAPSULE: 24000; 76000; 120000 CAPSULE, DELAYED RELEASE PELLETS ORAL at 09:01

## 2018-01-14 RX ADMIN — ASPIRIN 81 MG CHEWABLE TABLET 81 MG: 81 TABLET CHEWABLE at 08:01

## 2018-01-14 RX ADMIN — LISINOPRIL 2.5 MG: 2.5 TABLET ORAL at 08:01

## 2018-01-14 RX ADMIN — FUROSEMIDE 40 MG: 10 INJECTION, SOLUTION INTRAVENOUS at 09:01

## 2018-01-14 RX ADMIN — POTASSIUM CHLORIDE 10 MEQ: 10 INJECTION, SOLUTION INTRAVENOUS at 03:01

## 2018-01-14 RX ADMIN — HEPARIN SODIUM 5000 UNITS: 5000 INJECTION, SOLUTION INTRAVENOUS; SUBCUTANEOUS at 09:01

## 2018-01-14 RX ADMIN — CITALOPRAM HYDROBROMIDE 20 MG: 10 TABLET ORAL at 08:01

## 2018-01-14 RX ADMIN — SODIUM CHLORIDE, PRESERVATIVE FREE 3 ML: 5 INJECTION INTRAVENOUS at 09:01

## 2018-01-14 RX ADMIN — INSULIN ASPART 2 UNITS: 100 INJECTION, SOLUTION INTRAVENOUS; SUBCUTANEOUS at 10:01

## 2018-01-14 RX ADMIN — HEPARIN SODIUM 5000 UNITS: 5000 INJECTION, SOLUTION INTRAVENOUS; SUBCUTANEOUS at 08:01

## 2018-01-14 RX ADMIN — CLOPIDOGREL BISULFATE 75 MG: 75 TABLET ORAL at 08:01

## 2018-01-14 RX ADMIN — PANCRELIPASE 1 CAPSULE: 24000; 76000; 120000 CAPSULE, DELAYED RELEASE PELLETS ORAL at 08:01

## 2018-01-14 RX ADMIN — POTASSIUM CHLORIDE 10 MEQ: 10 INJECTION, SOLUTION INTRAVENOUS at 04:01

## 2018-01-14 RX ADMIN — QUETIAPINE FUMARATE 100 MG: 100 TABLET, FILM COATED ORAL at 09:01

## 2018-01-14 RX ADMIN — POTASSIUM CHLORIDE 10 MEQ: 10 INJECTION, SOLUTION INTRAVENOUS at 06:01

## 2018-01-14 RX ADMIN — PANCRELIPASE 1 CAPSULE: 24000; 76000; 120000 CAPSULE, DELAYED RELEASE PELLETS ORAL at 05:01

## 2018-01-14 RX ADMIN — DEXTROSE MONOHYDRATE 25 G: 25 INJECTION, SOLUTION INTRAVENOUS at 02:01

## 2018-01-14 RX ADMIN — INSULIN ASPART 3 UNITS: 100 INJECTION, SOLUTION INTRAVENOUS; SUBCUTANEOUS at 11:01

## 2018-01-14 RX ADMIN — ATORVASTATIN CALCIUM 80 MG: 40 TABLET, FILM COATED ORAL at 08:01

## 2018-01-14 RX ADMIN — POTASSIUM CHLORIDE 10 MEQ: 10 INJECTION, SOLUTION INTRAVENOUS at 05:01

## 2018-01-14 RX ADMIN — PANCRELIPASE 1 CAPSULE: 24000; 76000; 120000 CAPSULE, DELAYED RELEASE PELLETS ORAL at 11:01

## 2018-01-14 NOTE — PROGRESS NOTES
"[facility]    CC: lower extremity edema    HPI 52 y.o. female seen and examined with no acute events overnight.  Again had nocturnal hypoglycemia.     Past Medical History:   Diagnosis Date    Arthritis     Asthma     Blood transfusion     CHF (congestive heart failure)     Coronary artery disease     Diabetes mellitus     Hypertension     Pancreatitis     Type 2 diabetes mellitus with hyperglycemia 7/13/2015         Review of Systems  General ROS: negative for chills, fever or weight loss  Cardiovascular ROS: no chest pain or dyspnea on exertion  Gastrointestinal ROS: no abdominal pain, change in bowel habits, or black/ bloody stools    Physical Examination  BP (!) 177/84   Pulse 73   Temp 96.3 °F (35.7 °C)   Resp 17   Ht 5' 7" (1.702 m)   Wt 50.5 kg (111 lb 5.3 oz)   LMP 09/03/2012 (Exact Date)   SpO2 (!) 93%   Breastfeeding? No   BMI 17.44 kg/m²   General appearance: alert, cooperative, no distress  HENT: Normocephalic, atraumatic, neck symmetrical, no nasal discharge   Lungs: clear to auscultation bilaterally, no dullness to percussion bilaterally  Heart: regular rate and rhythm without rub; no displacement of the PMI   Abdomen: soft, non-tender; bowel sounds normoactive; no organomegaly  Extremities: extremities symmetric; no clubbing, cyanosis, or edema  Neurologic: Alert and oriented X 3, normal strength, normal coordination and gait    Labs:  Lab Results   Component Value Date    WBC 8.40 01/14/2018    HGB 10.0 (L) 01/14/2018    HCT 30.3 (L) 01/14/2018    MCV 92 01/14/2018     (H) 01/14/2018         Imaging:    Assessment and Plan:  Acute on Chronic diastolic CHF (congestive heart failure) with elevated troponin  Supplemental O2 via nasal canula; titrate O2 saturation to >92%.  Diuretic administration. Monitor electrolytes for hypokalemia and hypomagnesemis and await echo.   Appreciate cardiology input     Hypercholesteremia  Continue Atorvastatin     Hypertension associated with " diabetes  Chronic problem. Will continue chronic medications and monitor for any changes, adjusting as needed.  Use Hydralazine prn IV for SBP > 160 prn.   Will increase ACEI once renal function near baseline     Major depressive disorder  Continue Citalopram. Consider another medication.        Protein-calorie malnutrition, severe  Nutrition consulted. Encourage maximal PO intake. Diet supplementation ordered per nutrition approval. Will encourage PO and monitor closely for weight changes        Coronary artery disease involving native coronary artery without angina pectoris  Continue Aspirin, Clopidogrel, Atorvastatin  Glycemic control  Monitor for ACS     Hypokalemia  Replete KCl. Follow BMP.      Type 2 diabetes mellitus with complication, with long-term current use of insulin  Diabetes poorly-controlled.   Check BG prior to meals and QHS. Administer rapid-acting insulin according to low-dose sliding scale   Will hold meal time dose given hypoglycemia  Will split basal insulin to 2 mg in the morning and 2 mg at night.      Moderate tobacco dependence  Smoking cessation counseling performed. Dangers of cigarette smoking were reviewed with patient in detail and patient was encouraged to quit. Nicotine replacement options were discussed for > 3 minutes.     Chronic pancreatitis  With pancreatic insufficiency  Continue  Creon when advancing diet  Monitor for pain.     DVT prophylaxis: Heparin 5K SQ q  12 hrs

## 2018-01-14 NOTE — PLAN OF CARE
Problem: Patient Care Overview  Goal: Plan of Care Review  Outcome: Revised  Glucose dropped tonight to 24  Potassium level at 2.7; IV replacement in progress  Denies pain  Incontinence  Not moving much; is fall risk

## 2018-01-14 NOTE — PROGRESS NOTES
Random glucose check as patient's blood sugar dropped earlier yesterday.  accucheck results are <24, and upon repeat <33.  Random glucose ordered from lab per protocol.  Did not wait for lab to draw before treating patient.  Pt given 1 amp D50%.  She wakes easily and seems symptomatic other than drowsiness.  Will recheck glucose in 30 minutes.

## 2018-01-15 LAB
ANION GAP SERPL CALC-SCNC: 9 MMOL/L
BASOPHILS # BLD AUTO: 0.1 K/UL
BASOPHILS NFR BLD: 0.6 %
BUN SERPL-MCNC: 18 MG/DL
CALCIUM SERPL-MCNC: 7.2 MG/DL
CHLORIDE SERPL-SCNC: 106 MMOL/L
CO2 SERPL-SCNC: 28 MMOL/L
CREAT SERPL-MCNC: 1.8 MG/DL
DIASTOLIC DYSFUNCTION: YES
DIFFERENTIAL METHOD: ABNORMAL
EOSINOPHIL # BLD AUTO: 0 K/UL
EOSINOPHIL NFR BLD: 0.4 %
ERYTHROCYTE [DISTWIDTH] IN BLOOD BY AUTOMATED COUNT: 15 %
EST. GFR  (AFRICAN AMERICAN): 36 ML/MIN/1.73 M^2
EST. GFR  (NON AFRICAN AMERICAN): 31 ML/MIN/1.73 M^2
ESTIMATED PA SYSTOLIC PRESSURE: 20.61
GLUCOSE SERPL-MCNC: 195 MG/DL
HCT VFR BLD AUTO: 27.5 %
HGB BLD-MCNC: 8.9 G/DL
LYMPHOCYTES # BLD AUTO: 2.4 K/UL
LYMPHOCYTES NFR BLD: 24.5 %
MAGNESIUM SERPL-MCNC: 1.3 MG/DL
MCH RBC QN AUTO: 30.1 PG
MCHC RBC AUTO-ENTMCNC: 32.5 G/DL
MCV RBC AUTO: 93 FL
MONOCYTES # BLD AUTO: 0.4 K/UL
MONOCYTES NFR BLD: 4.4 %
NEUTROPHILS # BLD AUTO: 6.8 K/UL
NEUTROPHILS NFR BLD: 70.1 %
PLATELET # BLD AUTO: 404 K/UL
PMV BLD AUTO: 7.8 FL
POCT GLUCOSE: 165 MG/DL (ref 70–110)
POCT GLUCOSE: 246 MG/DL (ref 70–110)
POCT GLUCOSE: 313 MG/DL (ref 70–110)
POCT GLUCOSE: 313 MG/DL (ref 70–110)
POTASSIUM SERPL-SCNC: 2.9 MMOL/L
RBC # BLD AUTO: 2.97 M/UL
RETIRED EF AND QEF - SEE NOTES: 75 (ref 55–65)
SODIUM SERPL-SCNC: 143 MMOL/L
TRICUSPID VALVE REGURGITATION: ABNORMAL
WBC # BLD AUTO: 9.8 K/UL

## 2018-01-15 PROCEDURE — 12000002 HC ACUTE/MED SURGE SEMI-PRIVATE ROOM

## 2018-01-15 PROCEDURE — 97162 PT EVAL MOD COMPLEX 30 MIN: CPT

## 2018-01-15 PROCEDURE — 63600175 PHARM REV CODE 636 W HCPCS: Performed by: INTERNAL MEDICINE

## 2018-01-15 PROCEDURE — A4216 STERILE WATER/SALINE, 10 ML: HCPCS | Performed by: EMERGENCY MEDICINE

## 2018-01-15 PROCEDURE — 97802 MEDICAL NUTRITION INDIV IN: CPT | Performed by: DIETITIAN, REGISTERED

## 2018-01-15 PROCEDURE — 83735 ASSAY OF MAGNESIUM: CPT

## 2018-01-15 PROCEDURE — 85025 COMPLETE CBC W/AUTO DIFF WBC: CPT

## 2018-01-15 PROCEDURE — 97116 GAIT TRAINING THERAPY: CPT

## 2018-01-15 PROCEDURE — 36415 COLL VENOUS BLD VENIPUNCTURE: CPT

## 2018-01-15 PROCEDURE — 80048 BASIC METABOLIC PNL TOTAL CA: CPT

## 2018-01-15 PROCEDURE — 25000003 PHARM REV CODE 250: Performed by: EMERGENCY MEDICINE

## 2018-01-15 PROCEDURE — 25000003 PHARM REV CODE 250: Performed by: INTERNAL MEDICINE

## 2018-01-15 PROCEDURE — 99232 SBSQ HOSP IP/OBS MODERATE 35: CPT | Mod: ,,, | Performed by: INTERNAL MEDICINE

## 2018-01-15 RX ORDER — POTASSIUM CHLORIDE 20 MEQ/1
40 TABLET, EXTENDED RELEASE ORAL ONCE
Status: COMPLETED | OUTPATIENT
Start: 2018-01-15 | End: 2018-01-15

## 2018-01-15 RX ORDER — AMLODIPINE BESYLATE 5 MG/1
5 TABLET ORAL DAILY
Status: DISCONTINUED | OUTPATIENT
Start: 2018-01-15 | End: 2018-01-17 | Stop reason: HOSPADM

## 2018-01-15 RX ORDER — POTASSIUM CHLORIDE 20 MEQ/1
60 TABLET, EXTENDED RELEASE ORAL ONCE
Status: DISCONTINUED | OUTPATIENT
Start: 2018-01-15 | End: 2018-01-15

## 2018-01-15 RX ORDER — MAGNESIUM SULFATE HEPTAHYDRATE 40 MG/ML
2 INJECTION, SOLUTION INTRAVENOUS ONCE
Status: COMPLETED | OUTPATIENT
Start: 2018-01-15 | End: 2018-01-15

## 2018-01-15 RX ADMIN — QUETIAPINE FUMARATE 100 MG: 100 TABLET, FILM COATED ORAL at 09:01

## 2018-01-15 RX ADMIN — PANCRELIPASE 1 CAPSULE: 24000; 76000; 120000 CAPSULE, DELAYED RELEASE PELLETS ORAL at 09:01

## 2018-01-15 RX ADMIN — HEPARIN SODIUM 5000 UNITS: 5000 INJECTION, SOLUTION INTRAVENOUS; SUBCUTANEOUS at 09:01

## 2018-01-15 RX ADMIN — LISINOPRIL 2.5 MG: 2.5 TABLET ORAL at 09:01

## 2018-01-15 RX ADMIN — MAGNESIUM SULFATE HEPTAHYDRATE 2 G: 40 INJECTION, SOLUTION INTRAVENOUS at 11:01

## 2018-01-15 RX ADMIN — SODIUM CHLORIDE, PRESERVATIVE FREE 3 ML: 5 INJECTION INTRAVENOUS at 09:01

## 2018-01-15 RX ADMIN — CLOPIDOGREL BISULFATE 75 MG: 75 TABLET ORAL at 09:01

## 2018-01-15 RX ADMIN — CITALOPRAM HYDROBROMIDE 20 MG: 10 TABLET ORAL at 09:01

## 2018-01-15 RX ADMIN — ATORVASTATIN CALCIUM 80 MG: 40 TABLET, FILM COATED ORAL at 09:01

## 2018-01-15 RX ADMIN — FUROSEMIDE 40 MG: 10 INJECTION, SOLUTION INTRAVENOUS at 09:01

## 2018-01-15 RX ADMIN — SODIUM CHLORIDE, PRESERVATIVE FREE 3 ML: 5 INJECTION INTRAVENOUS at 05:01

## 2018-01-15 RX ADMIN — AMLODIPINE BESYLATE 5 MG: 5 TABLET ORAL at 11:01

## 2018-01-15 RX ADMIN — PANCRELIPASE 1 CAPSULE: 24000; 76000; 120000 CAPSULE, DELAYED RELEASE PELLETS ORAL at 11:01

## 2018-01-15 RX ADMIN — ASPIRIN 81 MG CHEWABLE TABLET 81 MG: 81 TABLET CHEWABLE at 09:01

## 2018-01-15 RX ADMIN — PANCRELIPASE 1 CAPSULE: 24000; 76000; 120000 CAPSULE, DELAYED RELEASE PELLETS ORAL at 05:01

## 2018-01-15 RX ADMIN — INSULIN ASPART 1 UNITS: 100 INJECTION, SOLUTION INTRAVENOUS; SUBCUTANEOUS at 09:01

## 2018-01-15 RX ADMIN — POTASSIUM CHLORIDE 40 MEQ: 1500 TABLET, EXTENDED RELEASE ORAL at 11:01

## 2018-01-15 RX ADMIN — SODIUM CHLORIDE, PRESERVATIVE FREE 3 ML: 5 INJECTION INTRAVENOUS at 02:01

## 2018-01-15 RX ADMIN — INSULIN ASPART 4 UNITS: 100 INJECTION, SOLUTION INTRAVENOUS; SUBCUTANEOUS at 05:01

## 2018-01-15 NOTE — PT/OT/SLP EVAL
Physical Therapy Evaluation    Patient Name:  Errol Keita   MRN:  [id number]    Recommendations:     Discharge Recommendations:  home with home health   Discharge Equipment Recommendations: none   Barriers to discharge: None    Assessment:     Errol Keita is a 52 y.o. female admitted with a medical diagnosis of Diastolic CHF, acute on chronic.  She presents with the following impairments/functional limitations:  weakness, gait instability, impaired functional mobilty, pain, impaired cardiopulmonary response to activity . Pt with wet cough and RK pain. Pt ambulated with RW to hallways. Should lprogress well with PT    Rehab Prognosis:  fair; patient would benefit from acute skilled PT services to address these deficits and reach maximum level of function.      Recent Surgery: * No surgery found *      Plan:     During this hospitalization, patient to be seen 6 x/week to address the above listed problems via gait training, therapeutic activities, therapeutic exercises  · Plan of Care Expires:      Plan of Care Reviewed with: patient    Subjective     Communicated with nurse de la cruz prior to session.  Patient found supine upon PT entry to room, agreeable to evaluation.      Chief Complaint: RK pain- denies trauma  Or arthritis  Patient comments/goals: get well  Pain/Comfort:  · Pain Rating 1: 10/10  · Location - Side 1: Right  · Location 1: knee  · Pain Addressed 1: Reposition, Distraction    Patients cultural, spiritual, [employer]ybeterian conflicts given the current situation:      Living Environment:  Home with Boy friend    Prior to admission, patients level of function was independent.  Patient has the following equipment: walker, rolling.  DME owned (not currently used): .  Upon discharge, patient will have assistance from SO.    Objective:     Patient found with: peripheral IV     General Precautions: Standard, fall   Orthopedic Precautions:N/A   Braces: N/A     Exams:  · RLE ROM: Deficits: limited by pain  · RLE  Strength: Deficits: 3+/5  · LLE ROM: WFL  · LLE Strength: WFL    Functional Mobility:  · Bed Mobility:     · Rolling Right: contact guard assistance  · Supine to Sit: contact guard assistance  · Sit to Supine: contact guard assistance  · Transfers:     · Sit to Stand:  minimum assistance with rolling walker  · Gait: 240ft with RW    AM-PAC 6 CLICK MOBILITY  Total Score:16       Therapeutic Activities and Exercises:   EOB sitting, attempted standing with RK pain and sat back down again  Pt instructed to bear weight through arms  Gait at hallways with RW  Back to room/ supine  Several coughing spells- wet cough    Patient left HOB elevated with all lines intact and call button in reach.    GOALS:    Physical Therapy Goals        Problem: Physical Therapy Goal    Goal Priority Disciplines Outcome Goal Variances Interventions   Physical Therapy Goal    High PT/OT, PT Ongoing (interventions implemented as appropriate)     Description:  Goals to be met by: 2018     Patient will increase functional independence with mobility by performin. Supine to sit with Contact Guard Assistance  2. Sit to stand transfer with Contact Guard Assistance  3. Bed to chair transfer with Contact Guard Assistance using Rolling Walker  4. Gait  x 240x2 feet with Contact Guard Assistance using Rolling Walker.   5. Lower extremity exercise program x20 reps per handout, with assistance as needed                      History:     Past Medical History:   Diagnosis Date    Arthritis     Asthma     Blood transfusion     CHF (congestive heart failure)     Coronary artery disease     Diabetes mellitus     Hypertension     Pancreatitis     Type 2 diabetes mellitus with hyperglycemia 2015       Past Surgical History:   Procedure Laterality Date    CARDIAC SURGERY      CABG    CHOLECYSTECTOMY      CORONARY ARTERY BYPASS GRAFT         Clinical Decision Making:     History  Co-morbidities and personal factors that may impact the  plan of care Examination  Body Structures and Functions, activity limitations and participation restrictions that may impact the plan of care Clinical Presentation   Decision Making/ Complexity Score   Co-morbidities:   [] Time since onset of injury / illness / exacerbation  [] Status of current condition  []Patient's cognitive status and safety concerns    [] Multiple Medical Problems (see med hx)  Personal Factors:   [] Patient's age  [] Prior Level of function   [] Patient's home situation (environment and family support)  [] Patient's level of motivation  [] Expected progression of patient      HISTORY:(criteria)    [] 50811 - no personal factors/history    [] 38419 - has 1-2 personal factor/comorbidity     [] 56436 - has >3 personal factor/comorbidity     Body Regions:  [] Objective examination findings  [] Head     []  Neck  [] Trunk   [] Upper Extremity  [] Lower Extremity    Body Systems:  [] For communication ability, affect, cognition, language, and learning style: the assessment of the ability to make needs known, consciousness, orientation (person, place, and time), expected emotional /behavioral responses, and learning preferences (eg, learning barriers, education  needs)  [] For the neuromuscular system: a general assessment of gross coordinated movement (eg, balance, gait, locomotion, transfers, and transitions) and motor function  (motor control and motor learning)  [] For the musculoskeletal system: the assessment of gross symmetry, gross range of motion, gross strength, height, and weight  [] For the integumentary system: the assessment of pliability(texture), presence of scar formation, skin color, and skin integrity  [] For cardiovascular/pulmonary system: the assessment of heart rate, respiratory rate, blood pressure, and edema     Activity limitations:    [] Patient's cognitive status and saf ety concerns          [] Status of current condition      [] Weight bearing restriction  []  Cardiopulmunary Restriction    Participation Restrictions:   [] Goals and goal agreement with the patient     [] Rehab potential (prognosis) and probable outcome      Examination of Body System: (criteria)    [] 56123 - addressing 1-2 elements    [] 34264 - addressing a total of 3 or more elements     [] 72853 -  Addressing a total of 4 or more elements         Clinical Presentation: (criteria)  Choose one     On examination of body system using standardized tests and measures patient presents with (CHOOSE ONE) elements from any of the following: body structures and functions, activity limitations, and/or participation restrictions.  Leading to a clinical presentation that is considered (CHOOSE ONE)                              Clinical Decision Making  (Eval Complexity):  Choose One     Time Tracking:     PT Received On: 01/15/18  PT Start Time: 1342     PT Stop Time: 1402  PT Total Time (min): 20 min     Billable Minutes: Evaluation 10 and Gait Training 10      Heather Yo, PT  01/15/2018

## 2018-01-15 NOTE — ASSESSMENT & PLAN NOTE
Related to (etiology):   Decreased ability to consume sufficient energy    Signs and Symptoms (as evidenced by):   1.) 9.2% weight loss in 4 months   2.) +2 edema   3.) Mild muscle loss     Interventions/Recommendations (treatment strategy):  Continue with diet, patient refusing ONS with meals     Nutrition Diagnosis Status:   New

## 2018-01-15 NOTE — CONSULTS
[facility]  Adult Nutrition  Consult Note    SUMMARY     Recommendations  Recommendation/Intervention: 1.) Continue with Cardiac, Low Sodium diet 2.) Educated patient on CHF diet. See education tab. Provided nutrition handout for after discharge with contact info.   Goals: 1.) patient will consume 75% of meals while admitted  Nutrition Goal Status: new  Communication of RD Recs: other (comment) (care plan)    1. CHF exacerbation    2. Leg swelling    3. Coronary artery disease involving native coronary artery without angina pectoris, unspecified whether native or transplanted heart    4. Diastolic CHF, acute on chronic    5. Hypertension associated with diabetes    6. Hypoalbuminemia      Past Medical History:   Diagnosis Date    Arthritis     Asthma     Blood transfusion     CHF (congestive heart failure)     Coronary artery disease     Diabetes mellitus     Hypertension     Pancreatitis     Type 2 diabetes mellitus with hyperglycemia 7/13/2015       Reason for Assessment  Reason for Assessment: identified at risk by screening criteria  Interdisciplinary Rounds: attended  General Information Comments: Admits with bilateral leg swelling. Patient awake and alert during RD visit. States she ate well at breakfast today (75%). Reports at home she usually consumed x2 meals/day. Weight loss noted between December 2016 and April 2017 (12 lb weight loss). Verified with patient. Educated on CHF diet at home. NKFA. Denies nausea/vomiting. Refusing ONS with meals due to taste. Mild muscle/fat loss noted.       Nutrition Prescription Ordered  Current Diet Order: Low Na diet, Cardiac (Low chol/Low Na)    Evaluation of Received Nutrients/Fluid Intake  Oral Fluid (mL): 520  Tolerance: tolerating  % Intake of Estimated Energy Needs: 75 - 100 %  % Meal Intake: 75%     Nutrition Risk Screen  Nutrition Risk Screen: no indicators present    Nutrition/Diet History  Food Preferences: No cultural or  "Orthodox food preferences noted.     Labs/Tests/Procedures/Meds  Diagnostic Test/Procedure Review: reviewed, pertinent  Pertinent Labs Reviewed: reviewed, pertinent  BMP  Lab Results   Component Value Date     01/15/2018    K 2.9 (L) 01/15/2018     01/15/2018    CO2 28 01/15/2018    BUN 18 01/15/2018    CREATININE 1.8 (H) 01/15/2018    CALCIUM 7.2 (L) 01/15/2018    ANIONGAP 9 01/15/2018    ESTGFRAFRICA 36 (A) 01/15/2018    EGFRNONAA 31 (A) 01/15/2018     Lab Results   Component Value Date    ALBUMIN 1.9 (L) 2018     Lab Results   Component Value Date    CALCIUM 7.2 (L) 01/15/2018    PHOS 5.0 (H) 2017       Recent Labs  Lab 01/15/18  1115   POCTGLUCOSE 313*       Pertinent Medications Reviewed: reviewed  Scheduled Meds:   amLODIPine  5 mg Oral Daily    aspirin  81 mg Oral Daily    atorvastatin  80 mg Oral Daily    citalopram  20 mg Oral Daily    clopidogrel  75 mg Oral Daily    furosemide  40 mg Intravenous Daily    heparin (porcine)  5,000 Units Subcutaneous Q12H    insulin detemir  2 Units Subcutaneous BID    lipase-protease-amylase 24,000-76,000-120,000 units  1 capsule Oral QID (WM & HS)    lisinopril  2.5 mg Oral Daily    QUEtiapine  100 mg Oral QHS    sodium chloride 0.9%  3 mL Intravenous Q8H         Physical Findings  Overall Physical Appearance: loss of muscle mass  Oral/Mouth Cavity: tooth/teeth missing  Skin: edema (+2. Michael score 16)    Anthropometrics  Temp: 98.7 °F (37.1 °C)  Height: 5' 7"  Weight Method: Standard Scale  Weight: 51.5 kg (113 lb 8.6 oz)  Ideal Body Weight (IBW), Female: 135 lb  % Ideal Body Weight, Female (lb): 84.1 lb  BMI (Calculated): 17.8  BMI Grade: 16 - 16.9 protein-energy malnutrition grade II  Usual Body Weight (UBW), k kg (2016)  % Usual Body Weight: 87.47  % Weight Change From Usual Weight: -12.71 %      Estimated/Assessed Needs  Weight Used For Calorie Calculations: 51.5 kg (113 lb 8.6 oz)   Energy Need Method: [place] " Jeor (1.3-1.5)=6431-0140 kcals/day  RMR ([place] Equation): 1147.63  Weight Used For Protein Calculations: 51.5 kg (113 lb 8.6 oz)  1.0 gm Protein (gm): 51.61 and 1.2 gm Protein (gm): 61.93  Fluid Need Method: RDA Method (or per MD)      Assessment and Plan    Moderate malnutrition    Related to (etiology):   Decreased ability to consume sufficient energy    Signs and Symptoms (as evidenced by):   1.) 9.2% weight loss in 4 months   2.) +2 edema   3.) Mild muscle loss     Interventions/Recommendations (treatment strategy):  Continue with diet, patient refusing ONS with meals     Nutrition Diagnosis Status:   New              Monitor and Evaluation  Food and Nutrient Intake: energy intake, food and beverage intake  Food and Nutrient Adminstration: diet order  Anthropometric Measurements: weight, weight change, body mass index  Biochemical Data, Medical Tests and Procedures: electrolyte and renal panel, glucose/endocrine profile, lipid profile  Nutrition-Focused Physical Findings: overall appearance    Nutrition Risk  Level of Risk:  (x2 weekly)    Nutrition Follow-Up  RD Follow-up?: Yes    Discharge Planning: discharge on Low Na diet , see education tab.

## 2018-01-15 NOTE — PLAN OF CARE
Problem: Physical Therapy Goal  Goal: Physical Therapy Goal  Goals to be met by: 2018     Patient will increase functional independence with mobility by performin. Supine to sit with Contact Guard Assistance  2. Sit to stand transfer with Contact Guard Assistance  3. Bed to chair transfer with Contact Guard Assistance using Rolling Walker  4. Gait  x 240x2 feet with Contact Guard Assistance using Rolling Walker.   5. Lower extremity exercise program x20 reps per handout, with assistance as needed    Outcome: Ongoing (interventions implemented as appropriate)  PT eval and treat completed. Gait training with  ft with c/o RK pain

## 2018-01-15 NOTE — PLAN OF CARE
Problem: Patient Care Overview  Goal: Plan of Care Review  Outcome: Ongoing (interventions implemented as appropriate)  Recommendations  Recommendation/Intervention: 1.) Continue with Cardiac, Low Sodium diet 2.) Educated patient on CHF diet. See education tab. Provided nutrition handout for after discharge with contact info.   Goals: 1.) patient will consume 75% of meals while admitted  Nutrition Goal Status: new  Communication of RD Recs: other (comment) (care plan)

## 2018-01-15 NOTE — PROGRESS NOTES
Progress Note  Hospital Medicine  Patient Name:Errol Keita  MRN:  [id number]  Patient Class: IP- Inpatient  Admit Date: 1/12/2018  Length of Stay: 3 days  Expected Discharge Date: 1/13/2018  Attending Physician: Ophelia Dash MD  Primary Care Provider:  Chica Irene DO    SUBJECTIVE:     Principal Problem: Diastolic CHF, acute on chronic  Initial history of present illness: Patient is a 52 y.o. female admitted to Hospitalist Service from [facility] with complaint of bilateral leg swellings. Patient has PMH significant for DM-2, hypertension, CAD, arthritis, asthma, CHF and pancreatitis. Patient is a poor historian. She presents with  Bilateral pedal edema, as well as right lower leg and left medial knee. Patient denies both SOB and chest pain or discomfort. She denies fever, cough, cold, congestion, or vomiting. Patient is prescribed and should be taking 40mg Lasix QID however unaware of being dx with CHF. Patient denied chest pain, abdominal pain, nausea, vomiting, headache, vision changes, focal neuro-deficits, cough or fever.    PMH/PSH/SH/FH/Meds: reviewed.    Symptoms/Review of Systems: Looking and feeling better, no hypoglycemia reported. No shortness of breath, cough, chest pain or headache, fever or abdominal pain.     Diet:  Adequate intake.    Activity level: Up with assistance  Pain:  Patient reports no pain.       OBJECTIVE:   Vital Signs (Most Recent):      Temp: 98.7 °F (37.1 °C) (01/15/18 0754)  Pulse: 72 (01/15/18 0754)  Resp: 18 (01/15/18 0754)  BP: (!) 184/85 (01/15/18 0754)  SpO2: 95 % (01/15/18 0754)       Vital Signs Range (Last 24H):  Temp:  [97.7 °F (36.5 °C)-98.7 °F (37.1 °C)]   Pulse:  [70-80]   Resp:  [16-18]   BP: (157-184)/(76-88)   SpO2:  [94 %-96 %]     Weight: 51.5 kg (113 lb 8.6 oz) (bed scale. unable to stand)  Body mass index is 17.78 kg/m².    Intake/Output Summary (Last 24 hours) at 01/15/18 1034  Last data filed at 01/15/18 0600   Gross per 24 hour    Intake              520 ml   Output                0 ml   Net              520 ml     Physical Examination:  General appearance: well developed, appears stated age, malnourished look  Head: normocephalic, atraumatic  Eyes:  conjunctivae/corneas clear. PERRL.  Nose: Nares normal. Septum midline.  Throat: lips, mucosa, and tongue normal; + Poor dentition, no throat erythema.  Neck: supple, symmetrical, trachea midline, no JVD and thyroid not enlarged, symmetric, no tenderness/mass/nodules  Lungs:  clear to auscultation bilaterally and normal respiratory effort  Chest wall: no tenderness  Heart: regular rate and rhythm, S1, S2 normal, no murmur, click, rub or gallop  Abdomen: soft, non-tender non-distented; bowel sounds normal; no masses,  no organomegaly  Extremities: no cyanosis, clubbing. 1+ edema.   Pulses: 2+ and symmetric  Skin: Skin color, texture, turgor normal. No rashes or lesions.  Lymph nodes: Cervical, supraclavicular, and axillary nodes normal.  Neurologic: Normal strength and tone. No focal numbness or weakness. CNII-XII intact.    CBC:    Recent Labs  Lab 01/13/18  0453 01/14/18  0226 01/15/18  0453   WBC 8.30 8.40 9.80   RBC 3.16* 3.29* 2.97*   HGB 9.5* 10.0* 8.9*   HCT 29.3* 30.3* 27.5*   * 415* 404*   MCV 93 92 93   MCH 30.2 30.4 30.1   MCHC 32.5 33.0 32.5   BMP    Recent Labs  Lab 01/13/18  0453 01/13/18  0651 01/13/18  1151 01/14/18  0226 01/15/18  0453   * 114* 54* 141* 195*    144  --  145 143   K 4.5 3.0*  --  2.7* 2.9*    112*  --  110 106   CO2 23 22*  --  26 28   BUN 17 17  --  20 18   CREATININE 1.7* 1.7*  --  1.7* 1.8*   CALCIUM 6.6* 6.7*  --  7.0* 7.2*   MG 2.0  --   --  1.3* 1.3*      Diagnostic Results:  Microbiology Results (last 7 days)     ** No results found for the last 168 hours. **         Chest X-Ray: Centreal venous congestion B/L. Official results pending.      ECHO ():    1 - Concentric remodeling.     2 - Hyperdynamic left ventricular  systolic function (EF 65-70%).     3 - Normal left ventricular diastolic function.     4 - Right ventricular enlargement with normal systolic function.     5 - The estimated PA systolic pressure is 22 mmHg.     6 - Difficult apical windows, Optison contrast used for wall motion analysis.     7 - Suggest improvement in the LV diastolic function and the inferoseptal segment, which now appears normal from Echo in 10/2016.     Assessment/Plan:     Acute on Chronic diastolic CHF (congestive heart failure)  Supplemental O2 via nasal canula; titrate O2 saturation to >92%.  Diuretic administration. Monitor electrolytes for hypokalemia and hypomagnesemia.  Cardiology Consultation.  2-D Echocardiogram for evaluation of left ventricle systolic function or any valvular heart disease.  Daily weights.  Strict I/Os.  Fluid restriction.  Na restriction <2g/d.      Hypercholesteremia  Continue Atorvastatin     Hypertension associated with diabetes  Add Norvasc 5 mg po q day.  Use Hydralazine prn IV for SBP > 160 prn.      Major depressive disorder  Continue Citalopram. Consider another medication.     Protein-calorie malnutrition, severe  Encourage maximal PO intake. Diet supplementation ordered per nutrition approval. Will encourage PO and monitor closely for weight changes     Coronary artery disease involving native coronary artery without angina pectoris  Continue Aspirin, Clopidogrel, Atorvastatin  Glycemic control  Monitor for ACS     Hypokalemia  Replete KCl. Follow BMP.     Hypomagnesemia  Replete Mag. Follow level.     Type 2 diabetes mellitus with complication, with long-term current use of insulin  Diabetes poorly-controlled.   Check BG prior to meals and QHS. Administer rapid-acting insulin according to low-dose sliding scale      Moderate tobacco dependence  Smoking cessation counseling performed. Dangers of cigarette smoking were reviewed with patient in detail and patient was encouraged to quit. Nicotine replacement  options were discussed for > 3 minutes.     Chronic pancreatitis  With pancreatic insufficiency  Continue  Creon when advancing diet  Monitor for pain.     DVT prophylaxis: Heparin 5K SQ q  12 hrs.    Ophelia Dash MD  Department of Hospital Medicine   [facility]

## 2018-01-16 LAB
ANION GAP SERPL CALC-SCNC: 10 MMOL/L
BASOPHILS # BLD AUTO: 0 K/UL
BASOPHILS NFR BLD: 0.3 %
BUN SERPL-MCNC: 18 MG/DL
CALCIUM SERPL-MCNC: 7.8 MG/DL
CHLORIDE SERPL-SCNC: 109 MMOL/L
CO2 SERPL-SCNC: 26 MMOL/L
CREAT SERPL-MCNC: 1.8 MG/DL
DIFFERENTIAL METHOD: ABNORMAL
EOSINOPHIL # BLD AUTO: 0 K/UL
EOSINOPHIL NFR BLD: 0.3 %
ERYTHROCYTE [DISTWIDTH] IN BLOOD BY AUTOMATED COUNT: 15 %
EST. GFR  (AFRICAN AMERICAN): 36 ML/MIN/1.73 M^2
EST. GFR  (NON AFRICAN AMERICAN): 31 ML/MIN/1.73 M^2
GLUCOSE SERPL-MCNC: 169 MG/DL
HCT VFR BLD AUTO: 31.1 %
HGB BLD-MCNC: 10.1 G/DL
LYMPHOCYTES # BLD AUTO: 2.3 K/UL
LYMPHOCYTES NFR BLD: 21.1 %
MAGNESIUM SERPL-MCNC: 1.6 MG/DL
MCH RBC QN AUTO: 29.8 PG
MCHC RBC AUTO-ENTMCNC: 32.5 G/DL
MCV RBC AUTO: 92 FL
MONOCYTES # BLD AUTO: 0.5 K/UL
MONOCYTES NFR BLD: 4.5 %
NEUTROPHILS # BLD AUTO: 8 K/UL
NEUTROPHILS NFR BLD: 73.8 %
PLATELET # BLD AUTO: 460 K/UL
PMV BLD AUTO: 8 FL
POCT GLUCOSE: 174 MG/DL (ref 70–110)
POCT GLUCOSE: 231 MG/DL (ref 70–110)
POCT GLUCOSE: 276 MG/DL (ref 70–110)
POCT GLUCOSE: 370 MG/DL (ref 70–110)
POTASSIUM SERPL-SCNC: 3.2 MMOL/L
RBC # BLD AUTO: 3.4 M/UL
SODIUM SERPL-SCNC: 145 MMOL/L
WBC # BLD AUTO: 10.9 K/UL

## 2018-01-16 PROCEDURE — 99232 SBSQ HOSP IP/OBS MODERATE 35: CPT | Mod: ,,, | Performed by: INTERNAL MEDICINE

## 2018-01-16 PROCEDURE — 80048 BASIC METABOLIC PNL TOTAL CA: CPT

## 2018-01-16 PROCEDURE — 25000003 PHARM REV CODE 250: Performed by: EMERGENCY MEDICINE

## 2018-01-16 PROCEDURE — 12000002 HC ACUTE/MED SURGE SEMI-PRIVATE ROOM

## 2018-01-16 PROCEDURE — 63600175 PHARM REV CODE 636 W HCPCS: Performed by: INTERNAL MEDICINE

## 2018-01-16 PROCEDURE — A4216 STERILE WATER/SALINE, 10 ML: HCPCS | Performed by: EMERGENCY MEDICINE

## 2018-01-16 PROCEDURE — 36415 COLL VENOUS BLD VENIPUNCTURE: CPT

## 2018-01-16 PROCEDURE — 25000003 PHARM REV CODE 250: Performed by: INTERNAL MEDICINE

## 2018-01-16 PROCEDURE — 85025 COMPLETE CBC W/AUTO DIFF WBC: CPT

## 2018-01-16 PROCEDURE — 83735 ASSAY OF MAGNESIUM: CPT

## 2018-01-16 PROCEDURE — 97116 GAIT TRAINING THERAPY: CPT

## 2018-01-16 RX ORDER — POTASSIUM CHLORIDE 20 MEQ/1
40 TABLET, EXTENDED RELEASE ORAL ONCE
Status: COMPLETED | OUTPATIENT
Start: 2018-01-16 | End: 2018-01-16

## 2018-01-16 RX ADMIN — AMLODIPINE BESYLATE 5 MG: 5 TABLET ORAL at 10:01

## 2018-01-16 RX ADMIN — INSULIN ASPART 5 UNITS: 100 INJECTION, SOLUTION INTRAVENOUS; SUBCUTANEOUS at 04:01

## 2018-01-16 RX ADMIN — INSULIN ASPART 2 UNITS: 100 INJECTION, SOLUTION INTRAVENOUS; SUBCUTANEOUS at 12:01

## 2018-01-16 RX ADMIN — PANCRELIPASE 1 CAPSULE: 24000; 76000; 120000 CAPSULE, DELAYED RELEASE PELLETS ORAL at 04:01

## 2018-01-16 RX ADMIN — PANCRELIPASE 1 CAPSULE: 24000; 76000; 120000 CAPSULE, DELAYED RELEASE PELLETS ORAL at 09:01

## 2018-01-16 RX ADMIN — POTASSIUM CHLORIDE 40 MEQ: 1500 TABLET, EXTENDED RELEASE ORAL at 10:01

## 2018-01-16 RX ADMIN — HEPARIN SODIUM 5000 UNITS: 5000 INJECTION, SOLUTION INTRAVENOUS; SUBCUTANEOUS at 10:01

## 2018-01-16 RX ADMIN — SODIUM CHLORIDE, PRESERVATIVE FREE 3 ML: 5 INJECTION INTRAVENOUS at 02:01

## 2018-01-16 RX ADMIN — QUETIAPINE FUMARATE 100 MG: 100 TABLET, FILM COATED ORAL at 09:01

## 2018-01-16 RX ADMIN — LISINOPRIL 2.5 MG: 2.5 TABLET ORAL at 10:01

## 2018-01-16 RX ADMIN — PANCRELIPASE 1 CAPSULE: 24000; 76000; 120000 CAPSULE, DELAYED RELEASE PELLETS ORAL at 12:01

## 2018-01-16 RX ADMIN — ATORVASTATIN CALCIUM 80 MG: 40 TABLET, FILM COATED ORAL at 10:01

## 2018-01-16 RX ADMIN — SODIUM CHLORIDE, PRESERVATIVE FREE 3 ML: 5 INJECTION INTRAVENOUS at 09:01

## 2018-01-16 RX ADMIN — PANCRELIPASE 1 CAPSULE: 24000; 76000; 120000 CAPSULE, DELAYED RELEASE PELLETS ORAL at 10:01

## 2018-01-16 RX ADMIN — ASPIRIN 81 MG CHEWABLE TABLET 81 MG: 81 TABLET CHEWABLE at 10:01

## 2018-01-16 RX ADMIN — INSULIN ASPART 1 UNITS: 100 INJECTION, SOLUTION INTRAVENOUS; SUBCUTANEOUS at 09:01

## 2018-01-16 RX ADMIN — HEPARIN SODIUM 5000 UNITS: 5000 INJECTION, SOLUTION INTRAVENOUS; SUBCUTANEOUS at 09:01

## 2018-01-16 RX ADMIN — CITALOPRAM HYDROBROMIDE 20 MG: 10 TABLET ORAL at 10:01

## 2018-01-16 RX ADMIN — SODIUM CHLORIDE, PRESERVATIVE FREE 3 ML: 5 INJECTION INTRAVENOUS at 05:01

## 2018-01-16 RX ADMIN — CLOPIDOGREL BISULFATE 75 MG: 75 TABLET ORAL at 10:01

## 2018-01-16 RX ADMIN — FUROSEMIDE 40 MG: 10 INJECTION, SOLUTION INTRAVENOUS at 10:01

## 2018-01-16 NOTE — PROGRESS NOTES
Progress Note  Hospital Medicine  Patient Name:Errol Keita  MRN:  [id number]  Patient Class: IP- Inpatient  Admit Date: 1/12/2018  Length of Stay: 4 days  Expected Discharge Date: 1/13/2018  Attending Physician: Ophelia Dash MD  Primary Care Provider:  Chica Irene DO    SUBJECTIVE:     Principal Problem: Diastolic CHF, acute on chronic  Initial history of present illness: Patient is a 52 y.o. female admitted to Hospitalist Service from [facility] with complaint of bilateral leg swellings. Patient has PMH significant for DM-2, hypertension, CAD, arthritis, asthma, CHF and pancreatitis. Patient is a poor historian. She presents with  Bilateral pedal edema, as well as right lower leg and left medial knee. Patient denies both SOB and chest pain or discomfort. She denies fever, cough, cold, congestion, or vomiting. Patient is prescribed and should be taking 40mg Lasix QID however unaware of being dx with CHF. Patient denied chest pain, abdominal pain, nausea, vomiting, headache, vision changes, focal neuro-deficits, cough or fever.    PMH/PSH/SH/FH/Meds: reviewed.    Symptoms/Review of Systems: Looking and feeling better, Less SOB, no hypoglycemia reported. No shortness of breath, cough, chest pain or headache, fever or abdominal pain.     Diet:  Adequate intake.    Activity level: Up with assistance  Pain:  Patient reports no pain.       OBJECTIVE:   Vital Signs (Most Recent):      Temp: 98 °F (36.7 °C) (01/16/18 0849)  Pulse: 77 (01/16/18 0849)  Resp: 18 (01/16/18 0849)  BP: (!) 169/82 (01/16/18 0854)  SpO2: (!) 94 % (01/16/18 0849)       Vital Signs Range (Last 24H):  Temp:  [98 °F (36.7 °C)-98.4 °F (36.9 °C)]   Pulse:  [70-86]   Resp:  [16-18]   BP: (139-193)/(74-93)   SpO2:  [88 %-95 %]     Weight: 49.6 kg (109 lb 5.6 oz)  Body mass index is 17.13 kg/m².    Intake/Output Summary (Last 24 hours) at 01/16/18 1025  Last data filed at 01/16/18 0600   Gross per 24 hour   Intake               360 ml   Output                0 ml   Net              360 ml     Physical Examination:  General appearance: well developed, appears stated age, malnourished look  Head: normocephalic, atraumatic  Eyes:  conjunctivae/corneas clear. PERRL.  Nose: Nares normal. Septum midline.  Throat: lips, mucosa, and tongue normal; + Poor dentition, no throat erythema.  Neck: supple, symmetrical, trachea midline, no JVD and thyroid not enlarged, symmetric, no tenderness/mass/nodules  Lungs:  Fine bi-basal rales  Chest wall: no tenderness  Heart: regular rate and rhythm, S1, S2 normal, no murmur, click, rub or gallop  Abdomen: soft, non-tender non-distented; bowel sounds normal; no masses,  no organomegaly  Extremities: no cyanosis, clubbing. 1+ edema.   Pulses: 2+ and symmetric  Skin: Skin color, texture, turgor normal. No rashes or lesions.  Lymph nodes: Cervical, supraclavicular, and axillary nodes normal.  Neurologic: Normal strength and tone. No focal numbness or weakness. CNII-XII intact.    CBC:    Recent Labs  Lab 01/14/18  0226 01/15/18  0453 01/16/18  0516   WBC 8.40 9.80 10.90   RBC 3.29* 2.97* 3.40*   HGB 10.0* 8.9* 10.1*   HCT 30.3* 27.5* 31.1*   * 404* 460*   MCV 92 93 92   MCH 30.4 30.1 29.8   MCHC 33.0 32.5 32.5   BMP    Recent Labs  Lab 01/14/18  0226 01/15/18  0453 01/16/18  0516   * 195* 169*    143 145   K 2.7* 2.9* 3.2*    106 109   CO2 26 28 26   BUN 20 18 18   CREATININE 1.7* 1.8* 1.8*   CALCIUM 7.0* 7.2* 7.8*   MG 1.3* 1.3* 1.6      Diagnostic Results:  Microbiology Results (last 7 days)     ** No results found for the last 168 hours. **         Chest X-Ray: Centreal venous congestion B/L. Official results pending.      ECHO ():    1 - Concentric remodeling.     2 - Hyperdynamic left ventricular systolic function (EF 65-70%).     3 - Normal left ventricular diastolic function.     4 - Right ventricular enlargement with normal systolic function.     5 - The estimated PA  systolic pressure is 22 mmHg.     6 - Difficult apical windows, Optison contrast used for wall motion analysis.     7 - Suggest improvement in the LV diastolic function and the inferoseptal segment, which now appears normal from Echo in 10/2016.     Assessment/Plan:     Acute on Chronic diastolic CHF (congestive heart failure)  Supplemental O2 via nasal canula; titrate O2 saturation to >92%.  Diuretic administration. Monitor electrolytes for hypokalemia and hypomagnesemia.  Cardiology Consultation.  2-D Echocardiogram for evaluation of left ventricle systolic function or any valvular heart disease.  Daily weights.  Strict I/Os.  Fluid restriction.  Na restriction <2g/d.      Hypercholesteremia  Continue Atorvastatin     Hypertension associated with diabetes  Chronic problem. Will continue chronic medications and monitor for any changes, adjusting as needed.  Use Hydralazine prn IV for SBP > 160 prn.      Major depressive disorder  Continue Citalopram. Consider another medication.     Protein-calorie malnutrition, severe  Encourage maximal PO intake. Diet supplementation ordered per nutrition approval. Will encourage PO and monitor closely for weight changes     Coronary artery disease involving native coronary artery without angina pectoris  Continue Aspirin, Clopidogrel, Atorvastatin  Glycemic control  Monitor for ACS     Hypokalemia  Replete KCl. Follow BMP.     Hypomagnesemia  Replete Mag. Follow level.     Type 2 diabetes mellitus with complication, with long-term current use of insulin  Diabetes poorly-controlled.   Check BG prior to meals and QHS. Administer rapid-acting insulin according to low-dose sliding scale      Moderate tobacco dependence  Smoking cessation counseling performed. Dangers of cigarette smoking were reviewed with patient in detail and patient was encouraged to quit.      Chronic pancreatitis  With pancreatic insufficiency  Continue  Creon when advancing diet  Monitor for pain.     DVT  prophylaxis: Heparin 5K SQ q  12 hrs.    Ophelia Dash MD  Department of Hospital Medicine   [facility]

## 2018-01-16 NOTE — PLAN OF CARE
Problem: Physical Therapy Goal  Goal: Physical Therapy Goal  Goals to be met by: 2018     Patient will increase functional independence with mobility by performin. Supine to sit with Contact Guard Assistance  2. Sit to stand transfer with Contact Guard Assistance  3. Bed to chair transfer with Contact Guard Assistance using Rolling Walker  4. Gait  x 240x2 feet with Contact Guard Assistance using Rolling Walker.   5. Lower extremity exercise program x20 reps per handout, with assistance as needed     Outcome: Ongoing (interventions implemented as appropriate)  PT for gait training

## 2018-01-16 NOTE — PT/OT/SLP PROGRESS
Physical Therapy Treatment    Patient Name:  Errol Keita   MRN:  [id number]    Recommendations:     Discharge Recommendations:  home with home health   Discharge Equipment Recommendations: none       Assessment:     Errol Keita is a 52 y.o. female admitted with a medical diagnosis of Diastolic CHF, acute on chronic.  She presents with the following impairments/functional limitations:  weakness, impaired endurance, impaired functional mobilty, gait instability, decreased lower extremity function, pain .    Rehab Prognosis:  good; patient would benefit from acute skilled PT services to address these deficits and reach maximum level of function.      Recent Surgery: * No surgery found *      Plan:     During this hospitalization, patient to be seen 6 x/week to address the above listed problems via gait training, therapeutic activities, therapeutic exercises  · Plan of Care Expires:      Plan of Care Reviewed with: patient, family    Subjective     Communicated with nurse Glynn prior to session.  Patient found supine upon PT entry to room, agreeable to treatment.      Chief Complaint: pain in leg  Patient comments/goals: get better and go home  Pain/Comfort:  · Pain Rating 1:  (did not rate)  · Location - Side 1:  (pt initally reported pain in R leg, then reported pain was in L leg during gait)  · Location 1: leg  · Pain Addressed 1: Reposition, Distraction, Cessation of Activity, Nurse notified    Patients cultural, spiritual, Oriental orthodox conflicts given the current situation:      Objective:     Patient found with: peripheral IV, telemetry     General Precautions: Standard, fall   Orthopedic Precautions:N/A   Braces: N/A     Functional Mobility:  · Bed Mobility:     · Scooting: stand by assistance  · Supine to Sit: stand by assistance  · Transfers:     · Sit to Stand:  minimum assistance with rolling walker  · Bed to Chair: contact guard assistance with  rolling walker  using  Stand Pivot  · Gait: 200' with CGA  using RW. Pt required cueing for safety and upright posture. Brief standing break taken for energy conservation.        Therapeutic Activities and Exercises:   pt assisted to bedside chair following gait.    Patient left up in chair with all lines intact, call button in reach and nurse Gretta notified..    GOALS:    Physical Therapy Goals        Problem: Physical Therapy Goal    Goal Priority Disciplines Outcome Goal Variances Interventions   Physical Therapy Goal    High PT/OT, PT Ongoing (interventions implemented as appropriate)     Description:  Goals to be met by: 2018     Patient will increase functional independence with mobility by performin. Supine to sit with Contact Guard Assistance  2. Sit to stand transfer with Contact Guard Assistance  3. Bed to chair transfer with Contact Guard Assistance using Rolling Walker  4. Gait  x 240x2 feet with Contact Guard Assistance using Rolling Walker.   5. Lower extremity exercise program x20 reps per handout, with assistance as needed                      Time Tracking:     PT Received On: 18  PT Start Time: 958     PT Stop Time: 1010  PT Total Time (min): 12 min     Billable Minutes: Gait Training 12    Treatment Type: Treatment  PT/PTA: PTA     PTA Visit Number: 1     Adriana Elmore PTA  2018

## 2018-01-17 VITALS
RESPIRATION RATE: 18 BRPM | HEIGHT: 67 IN | BODY MASS INDEX: 17.13 KG/M2 | WEIGHT: 109.13 LBS | OXYGEN SATURATION: 95 % | SYSTOLIC BLOOD PRESSURE: 137 MMHG | TEMPERATURE: 99 F | HEART RATE: 76 BPM | DIASTOLIC BLOOD PRESSURE: 70 MMHG

## 2018-01-17 LAB
ANION GAP SERPL CALC-SCNC: 10 MMOL/L
BASOPHILS # BLD AUTO: 0 K/UL
BASOPHILS NFR BLD: 0.2 %
BUN SERPL-MCNC: 21 MG/DL
CALCIUM SERPL-MCNC: 7.9 MG/DL
CHLORIDE SERPL-SCNC: 109 MMOL/L
CO2 SERPL-SCNC: 27 MMOL/L
CREAT SERPL-MCNC: 1.9 MG/DL
DIFFERENTIAL METHOD: ABNORMAL
EOSINOPHIL # BLD AUTO: 0.1 K/UL
EOSINOPHIL NFR BLD: 0.5 %
ERYTHROCYTE [DISTWIDTH] IN BLOOD BY AUTOMATED COUNT: 15.3 %
EST. GFR  (AFRICAN AMERICAN): 34 ML/MIN/1.73 M^2
EST. GFR  (NON AFRICAN AMERICAN): 29 ML/MIN/1.73 M^2
GLUCOSE SERPL-MCNC: 91 MG/DL
HCT VFR BLD AUTO: 28.7 %
HGB BLD-MCNC: 9.5 G/DL
LYMPHOCYTES # BLD AUTO: 2.1 K/UL
LYMPHOCYTES NFR BLD: 19.3 %
MAGNESIUM SERPL-MCNC: 1.4 MG/DL
MCH RBC QN AUTO: 30.5 PG
MCHC RBC AUTO-ENTMCNC: 33.1 G/DL
MCV RBC AUTO: 92 FL
MONOCYTES # BLD AUTO: 0.4 K/UL
MONOCYTES NFR BLD: 4 %
NEUTROPHILS # BLD AUTO: 8.5 K/UL
NEUTROPHILS NFR BLD: 76 %
PLATELET # BLD AUTO: 415 K/UL
PMV BLD AUTO: 8 FL
POCT GLUCOSE: 196 MG/DL (ref 70–110)
POCT GLUCOSE: 91 MG/DL (ref 70–110)
POTASSIUM SERPL-SCNC: 3.3 MMOL/L
RBC # BLD AUTO: 3.11 M/UL
SODIUM SERPL-SCNC: 146 MMOL/L
WBC # BLD AUTO: 11.1 K/UL

## 2018-01-17 PROCEDURE — 80048 BASIC METABOLIC PNL TOTAL CA: CPT

## 2018-01-17 PROCEDURE — 99239 HOSP IP/OBS DSCHRG MGMT >30: CPT | Mod: ,,, | Performed by: INTERNAL MEDICINE

## 2018-01-17 PROCEDURE — 25000003 PHARM REV CODE 250: Performed by: EMERGENCY MEDICINE

## 2018-01-17 PROCEDURE — 97116 GAIT TRAINING THERAPY: CPT

## 2018-01-17 PROCEDURE — 36415 COLL VENOUS BLD VENIPUNCTURE: CPT

## 2018-01-17 PROCEDURE — A4216 STERILE WATER/SALINE, 10 ML: HCPCS | Performed by: EMERGENCY MEDICINE

## 2018-01-17 PROCEDURE — 85025 COMPLETE CBC W/AUTO DIFF WBC: CPT

## 2018-01-17 PROCEDURE — 83735 ASSAY OF MAGNESIUM: CPT

## 2018-01-17 PROCEDURE — 63600175 PHARM REV CODE 636 W HCPCS: Performed by: INTERNAL MEDICINE

## 2018-01-17 PROCEDURE — 25000003 PHARM REV CODE 250: Performed by: INTERNAL MEDICINE

## 2018-01-17 PROCEDURE — 97530 THERAPEUTIC ACTIVITIES: CPT

## 2018-01-17 RX ORDER — AMLODIPINE BESYLATE 5 MG/1
5 TABLET ORAL DAILY
Qty: 30 TABLET | Refills: 0 | Status: ON HOLD | OUTPATIENT
Start: 2018-01-18 | End: 2021-06-28 | Stop reason: HOSPADM

## 2018-01-17 RX ORDER — POTASSIUM CHLORIDE 20 MEQ/1
40 TABLET, EXTENDED RELEASE ORAL ONCE
Status: COMPLETED | OUTPATIENT
Start: 2018-01-17 | End: 2018-01-17

## 2018-01-17 RX ORDER — MAGNESIUM SULFATE HEPTAHYDRATE 40 MG/ML
2 INJECTION, SOLUTION INTRAVENOUS ONCE
Status: COMPLETED | OUTPATIENT
Start: 2018-01-17 | End: 2018-01-17

## 2018-01-17 RX ORDER — CLOPIDOGREL BISULFATE 75 MG/1
75 TABLET ORAL DAILY
Qty: 30 TABLET | Refills: 11 | Status: SHIPPED | OUTPATIENT
Start: 2018-01-18 | End: 2020-01-22

## 2018-01-17 RX ADMIN — FUROSEMIDE 40 MG: 10 INJECTION, SOLUTION INTRAVENOUS at 09:01

## 2018-01-17 RX ADMIN — CLOPIDOGREL BISULFATE 75 MG: 75 TABLET ORAL at 09:01

## 2018-01-17 RX ADMIN — PANCRELIPASE 1 CAPSULE: 24000; 76000; 120000 CAPSULE, DELAYED RELEASE PELLETS ORAL at 12:01

## 2018-01-17 RX ADMIN — PANCRELIPASE 1 CAPSULE: 24000; 76000; 120000 CAPSULE, DELAYED RELEASE PELLETS ORAL at 05:01

## 2018-01-17 RX ADMIN — ATORVASTATIN CALCIUM 80 MG: 40 TABLET, FILM COATED ORAL at 09:01

## 2018-01-17 RX ADMIN — SODIUM CHLORIDE, PRESERVATIVE FREE 3 ML: 5 INJECTION INTRAVENOUS at 03:01

## 2018-01-17 RX ADMIN — AMLODIPINE BESYLATE 5 MG: 5 TABLET ORAL at 09:01

## 2018-01-17 RX ADMIN — MAGNESIUM SULFATE HEPTAHYDRATE 2 G: 40 INJECTION, SOLUTION INTRAVENOUS at 12:01

## 2018-01-17 RX ADMIN — PANCRELIPASE 1 CAPSULE: 24000; 76000; 120000 CAPSULE, DELAYED RELEASE PELLETS ORAL at 08:01

## 2018-01-17 RX ADMIN — POTASSIUM CHLORIDE 40 MEQ: 20 TABLET, EXTENDED RELEASE ORAL at 12:01

## 2018-01-17 RX ADMIN — HEPARIN SODIUM 5000 UNITS: 5000 INJECTION, SOLUTION INTRAVENOUS; SUBCUTANEOUS at 09:01

## 2018-01-17 RX ADMIN — LISINOPRIL 2.5 MG: 2.5 TABLET ORAL at 09:01

## 2018-01-17 RX ADMIN — CITALOPRAM HYDROBROMIDE 20 MG: 10 TABLET ORAL at 09:01

## 2018-01-17 RX ADMIN — SODIUM CHLORIDE, PRESERVATIVE FREE 3 ML: 5 INJECTION INTRAVENOUS at 05:01

## 2018-01-17 RX ADMIN — ASPIRIN 81 MG CHEWABLE TABLET 81 MG: 81 TABLET CHEWABLE at 09:01

## 2018-01-17 NOTE — DISCHARGE SUMMARY
Discharge Summary  Hospital Medicine    Admit Date: 1/12/2018    Date and Time: 1/17/20183:20 PM    Discharge Attending Physician: Ophelia Dash MD    Primary Care Physician: Chica Irene DO    Diagnoses:  Active Hospital Problems    Diagnosis  POA    *Acute on chronic diastolic congestive heart failure [I50.33]  Yes    CHF exacerbation [I50.9]  Yes    Diabetic nephropathy associated with type 2 diabetes mellitus [E11.21]  Yes    Anemia of chronic renal failure [N18.9, D63.1]  Yes    Dementia associated with other underlying disease without behavioral disturbance [F02.80]  Yes    Coronary artery disease involving native coronary artery without angina pectoris [I25.10]  Yes    Moderate malnutrition [E44.0]  Yes    Hypertension associated with diabetes [E11.59, I10]  Yes      Resolved Hospital Problems    Diagnosis Date Resolved POA    Hypoalbuminemia [E88.09] 03/22/2017 Yes     Discharged Condition: Good    Hospital Course:   Patient is a 52 y.o. female admitted to Hospitalist Service from [facility] Emergency Room with complaint of bilateral leg swellings. Patient has PMH significant for DM-2, hypertension, CAD, arthritis, asthma, CHF and pancreatitis. Patient is a poor historian. She presents with  Bilateral pedal edema, as well as right lower leg and left medial knee. Patient denied both SOB and chest pain or discomfort. She denied fever, cough, cold, congestion, or vomiting. Patient is prescribed and should be taking 40mg Lasix QID however unaware of being dx with CHF. Patient denied chest pain, abdominal pain, nausea, vomiting, headache, vision changes, focal neuro-deficits, cough or fever. Patient was admitted to Hospitalist medicine service. Patient had serial cardiac enzymes. Patient was given IV lasix and intake and output closely monitored. Patient was evaluated by cardiologist and underwent ECHO. Patient's renal panel and electrolytes closely monitored. Patient had daily weights.  Patient was educated on monitoring daily weight, following low salt diet and in case if gain more than 3 pounds in 24 hours, to call their doctor for further advice. Patient's symptoms resolved. Patient was discharged home in stable condition with following discharge plan of care. Total time with the patient was 30 minutes and greater than 50% was spent in counseling and coordination of care. The assessment and plan have been discussed at length. Physicians' notes reviewed. Labs and procedure reviewed.     Consults: None    Significant Diagnostic Studies:   Chest X-Ray: Centreal venous congestion B/L. Official results pending.      ECHO ():    1 - Concentric remodeling.     2 - Hyperdynamic left ventricular systolic function (EF 65-70%).     3 - Normal left ventricular diastolic function.     4 - Right ventricular enlargement with normal systolic function.     5 - The estimated PA systolic pressure is 22 mmHg.     6 - Difficult apical windows, Optison contrast used for wall motion analysis.     7 - Suggest improvement in the LV diastolic function and the inferoseptal segment, which now appears normal from Echo in 10/2016.     Microbiology Results (last 7 days)     ** No results found for the last 168 hours. **        Special Treatments/Procedures: None  Disposition: Home or Self Care    Medications:  Reconciled Home Medications: Current Discharge Medication List      START taking these medications    Details   amLODIPine (NORVASC) 5 MG tablet Take 1 tablet (5 mg total) by mouth once daily.  Qty: 30 tablet, Refills: 0         CONTINUE these medications which have CHANGED    Details   clopidogrel (PLAVIX) 75 mg tablet Take 1 tablet (75 mg total) by mouth once daily.  Qty: 30 tablet, Refills: 11         CONTINUE these medications which have NOT CHANGED    Details   aspirin 81 MG chewable tablet Take 81 mg by mouth once daily. Ran out      atorvastatin (LIPITOR) 80 MG tablet Take 1 tablet (80 mg total) by mouth  once daily.  Qty: 30 tablet, Refills: 11      citalopram (CELEXA) 20 MG tablet Take 20 mg by mouth once daily.       CREON 24,000-76,000 -120,000 unit capsule Take 1 capsule by mouth 4 (four) times daily with meals and nightly.       ferrous sulfate 325 (65 FE) MG EC tablet Take 1 tablet (325 mg total) by mouth 2 (two) times daily.  Qty: 60 tablet, Refills: 0      furosemide (LASIX) 40 MG tablet Take 1 tablet (40 mg total) by mouth once daily.  Qty: 30 tablet, Refills: 1      HUMALOG KWIKPEN 100 unit/mL InPn pen Inject 7 Units into the skin 3 (three) times daily before meals.      insulin detemir (LEVEMIR FLEXTOUCH) 100 unit/mL (3 mL) SubQ InPn pen Inject 10 Units into the skin 2 (two) times daily.  Qty: 1 Box, Refills: 0      calcitRIOL (ROCALTROL) 0.25 MCG Cap Take 0.25 mcg by mouth once daily.      lisinopril (PRINIVIL,ZESTRIL) 2.5 MG tablet Take 2.5 mg by mouth once daily.       quetiapine (SEROQUEL) 100 MG Tab 100 mg every evening.              Discharge Procedure Orders  Diet diabetic     Diet Cardiac   Order Comments: Patient to monitor daily weight, follow low salt diet and in case if gain more than 3 pounds in 24 hours, please call your doctor for further advice.     Activity as tolerated   Order Comments: Observe fall precautions.     Call MD for:   Order Comments: For worsening symptoms, chest pain, shortness of breath, increased abdominal pain, high grade fever, stroke or stroke like symptoms, immediately go to the nearest Emergency Room or call 911 as soon as possible.       Follow-up Information     Chica Irene DO In 1 week.    Specialty:  Family Medicine  Contact information:  [place] [address] [phone]

## 2018-01-17 NOTE — PT/OT/SLP PROGRESS
Physical Therapy Treatment    Patient Name:  Errol Keita   MRN:  [id number]    Recommendations:     Discharge Recommendations:  home health PT   Discharge Equipment Recommendations: none   Barriers to discharge: None    Assessment:     Errol Keita is a 52 y.o. female admitted with a medical diagnosis of Diastolic CHF, acute on chronic.  She presents with the following impairments/functional limitations:  weakness, impaired endurance, impaired functional mobilty . Pt ambulated to hallways with Rw- noticeable bladder incontinence-/wet diaper.    Rehab Prognosis:  fair; patient would benefit from acute skilled PT services to address these deficits and reach maximum level of function.      Recent Surgery: * No surgery found *      Plan:     During this hospitalization, patient to be seen 6 x/week to address the above listed problems via gait training, therapeutic activities, therapeutic exercises  · Plan of Care Expires:  01/26/18   Plan of Care Reviewed with: patient    Subjective     Communicated with nurse Wang prior to session.  Patient found supine upon PT entry to room, agreeable to treatment.    Pt encouraged OOB    Chief Complaint: weakness  Patient comments/goals: edita em  Pain/Comfort:  · Pain Rating 1: 0/10    Patients cultural, spiritual, Uatsdin conflicts given the current situation:      Objective:     Patient found with: telemetry     General Precautions: Standard, fall   Orthopedic Precautions:N/A   Braces: N/A     Functional Mobility:  · Bed Mobility:     · Supine to Sit: contact guard assistance  · Transfers:     · Sit to Stand:  contact guard assistance with rolling walker  · Bed to Chair: contact guard assistance with  rolling walker  using  Stand Pivot  · Gait: 240ft with RW       AM-PAC 6 CLICK MOBILITY  Turning over in bed (including adjusting bedclothes, sheets and blankets)?: 3  Sitting down on and standing up from a chair with arms (e.g., wheelchair, bedside commode, etc.):  3  Moving from lying on back to sitting on the side of the bed?: 3  Moving to and from a bed to a chair (including a wheelchair)?: 3  Need to walk in hospital room?: 3  Climbing 3-5 steps with a railing?: 1  Total Score: 16       Therapeutic Activities and Exercises:   EOB sitting with CGA  Gait to hallways with RW  OOB to chair with all needs within reach  Tray table in front with spouse present    Patient left up in chair with all lines intact, call button in reach and nurse Felipe notified..    GOALS:    Physical Therapy Goals        Problem: Physical Therapy Goal    Goal Priority Disciplines Outcome Goal Variances Interventions   Physical Therapy Goal    High PT/OT, PT Ongoing (interventions implemented as appropriate)     Description:  Goals to be met by: 2018     Patient will increase functional independence with mobility by performin. Supine to sit with Contact Guard Assistance  2. Sit to stand transfer with Contact Guard Assistance  3. Bed to chair transfer with Contact Guard Assistance using Rolling Walker  4. Gait  x 240x2 feet with Contact Guard Assistance using Rolling Walker.   5. Lower extremity exercise program x20 reps per handout, with assistance as needed                      Time Tracking:     PT Received On: 18  PT Start Time: 1203     PT Stop Time: 1219  PT Total Time (min): 16 min     Billable Minutes: Gait Training 8 and Therapeutic Activity 8    Treatment Type: Treatment  PT/PTA: PT     PTA Visit Number: 1     Heather Yo, PT  2018

## 2018-01-17 NOTE — NURSING
Pt's medication and discharge instructions given and reviewed, understanding verbalized.  PIV and telemetry monitor removed.  Vitals stable.  Discharged home with family.  NAD noted.

## 2018-01-18 ENCOUNTER — PATIENT OUTREACH (OUTPATIENT)
Dept: ADMINISTRATIVE | Facility: CLINIC | Age: 53
End: 2018-01-18

## 2018-01-18 NOTE — PATIENT INSTRUCTIONS
Heart Failure: Making Changes to Your Diet  You have a condition called heart failure. It is also known as congestive heart failure, or CHF. When you have heart failure, excess fluid is more likely to build up in your body. This makes the heart work harder to pump blood. Fluid buildup causes symptoms such as shortness of breath and edema (swelling). Controlling the amount of salt (sodium) you eat may help stop fluid from building up. Your doctor may also tell you to reduce the amount of fluid you drink.  Reading Food Labels  Your healthcare provider will tell you how much sodium you can eat each day. Read food labels to keep track. Keep in mind that certain foods are high in salt. These include canned, frozen, and processed foods. Check the amount of sodium in each serving. Watch out for high-sodium ingredients. These include MSG (monosodium glutamate), baking soda, and sodium phosphate.   Eating Less Salt  Give yourself time to get used to eating less salt. It may take a little while. Here are some tips to help:  Take the saltshaker off the table. Replace it with salt-free herb mixes and spices.  Eat fresh or plain frozen vegetables. These have much less salt than canned vegetables.  Choose low-sodium snacks like sodium-free pretzels, crackers, or air-popped popcorn.  Dont add salt to your food when youre cooking. Instead, season your foods with pepper, lemon, garlic, or onion.  When you eat out, ask that your food be cooked without added salt.   If Youre Told to Limit Fluid  You may need to limit fluid intake to help prevent edema. This includes anything that is liquid at room temperature, such as ice cream and soup. If your doctor tells you to limit fluid, try these tips:  Measure drinks in a measuring cup before you drink them. This will help you meet daily goals.  Chill drinks to make them more refreshing.  Suck on frozen lemon wedges to quench thirst.  Only drink when youre thirsty.  Chew sugarless gum or  suck on hard candy to keep your mouth moist.   When to Call Your Doctor  Call your doctor right away if you have any symptoms of worsening heart failure. These can include:  Sudden weight gain  Increased swelling of your legs or ankles  Trouble breathing when youre resting or at night  © 7399-1165 [employer], [address] [place] [address], [place] [address]. All rights reserved. This information is not intended as a substitute for professional medical care. Always follow your healthcare professional's instructions.

## 2018-01-18 NOTE — PLAN OF CARE
01/18/18 0754   Final Note   Assessment Type Final Discharge Note   Discharge Disposition Home

## 2018-02-08 ENCOUNTER — HOSPITAL ENCOUNTER (EMERGENCY)
Facility: HOSPITAL | Age: 53
Discharge: HOME | End: 2018-02-08
Attending: EMERGENCY MEDICINE
Payer: MEDICARE

## 2018-02-08 VITALS
RESPIRATION RATE: 16 BRPM | OXYGEN SATURATION: 100 % | WEIGHT: 109.81 LBS | BODY MASS INDEX: 17.24 KG/M2 | DIASTOLIC BLOOD PRESSURE: 88 MMHG | HEIGHT: 67 IN | SYSTOLIC BLOOD PRESSURE: 187 MMHG | TEMPERATURE: 99 F | HEART RATE: 75 BPM

## 2018-02-08 DIAGNOSIS — M79.89 FOOT SWELLING: Primary | ICD-10-CM

## 2018-02-08 DIAGNOSIS — I50.9 CONGESTIVE HEART FAILURE, UNSPECIFIED CONGESTIVE HEART FAILURE CHRONICITY, UNSPECIFIED CONGESTIVE HEART FAILURE TYPE: ICD-10-CM

## 2018-02-08 DIAGNOSIS — R06.02 SHORTNESS OF BREATH: ICD-10-CM

## 2018-02-08 LAB
ALBUMIN SERPL BCP-MCNC: 2.8 G/DL
ALP SERPL-CCNC: 177 U/L
ALT SERPL W/O P-5'-P-CCNC: 20 U/L
ANION GAP SERPL CALC-SCNC: 13 MMOL/L
AST SERPL-CCNC: 22 U/L
BASOPHILS # BLD AUTO: 0 K/UL
BASOPHILS NFR BLD: 0.1 %
BILIRUB SERPL-MCNC: 0.2 MG/DL
BNP SERPL-MCNC: 471 PG/ML
BUN SERPL-MCNC: 28 MG/DL
CALCIUM SERPL-MCNC: 8 MG/DL
CHLORIDE SERPL-SCNC: 108 MMOL/L
CO2 SERPL-SCNC: 19 MMOL/L
CREAT SERPL-MCNC: 2.2 MG/DL
DIFFERENTIAL METHOD: ABNORMAL
EOSINOPHIL # BLD AUTO: 0 K/UL
EOSINOPHIL NFR BLD: 0.5 %
ERYTHROCYTE [DISTWIDTH] IN BLOOD BY AUTOMATED COUNT: 16.8 %
EST. GFR  (AFRICAN AMERICAN): 28 ML/MIN/1.73 M^2
EST. GFR  (NON AFRICAN AMERICAN): 25 ML/MIN/1.73 M^2
GLUCOSE SERPL-MCNC: 272 MG/DL
HCT VFR BLD AUTO: 36.7 %
HGB BLD-MCNC: 11.8 G/DL
INR PPP: 1
LYMPHOCYTES # BLD AUTO: 2.4 K/UL
LYMPHOCYTES NFR BLD: 29.6 %
MCH RBC QN AUTO: 30.1 PG
MCHC RBC AUTO-ENTMCNC: 32.3 G/DL
MCV RBC AUTO: 93 FL
MONOCYTES # BLD AUTO: 0.1 K/UL
MONOCYTES NFR BLD: 1 %
NEUTROPHILS # BLD AUTO: 5.5 K/UL
NEUTROPHILS NFR BLD: 68.8 %
PLATELET # BLD AUTO: 402 K/UL
PMV BLD AUTO: 8.1 FL
POTASSIUM SERPL-SCNC: 3.5 MMOL/L
PROT SERPL-MCNC: 7.4 G/DL
PROTHROMBIN TIME: 9.6 SEC
RBC # BLD AUTO: 3.93 M/UL
SODIUM SERPL-SCNC: 140 MMOL/L
TROPONIN I SERPL DL<=0.01 NG/ML-MCNC: 0.05 NG/ML
WBC # BLD AUTO: 8 K/UL

## 2018-02-08 PROCEDURE — 93005 ELECTROCARDIOGRAM TRACING: CPT

## 2018-02-08 PROCEDURE — 83880 ASSAY OF NATRIURETIC PEPTIDE: CPT

## 2018-02-08 PROCEDURE — 84484 ASSAY OF TROPONIN QUANT: CPT

## 2018-02-08 PROCEDURE — 85025 COMPLETE CBC W/AUTO DIFF WBC: CPT

## 2018-02-08 PROCEDURE — 99284 EMERGENCY DEPT VISIT MOD MDM: CPT

## 2018-02-08 PROCEDURE — 36415 COLL VENOUS BLD VENIPUNCTURE: CPT

## 2018-02-08 PROCEDURE — 85610 PROTHROMBIN TIME: CPT

## 2018-02-08 PROCEDURE — 80053 COMPREHEN METABOLIC PANEL: CPT

## 2018-02-08 RX ORDER — FUROSEMIDE 40 MG/1
40 TABLET ORAL DAILY
COMMUNITY
End: 2019-03-12 | Stop reason: CLARIF

## 2018-02-09 NOTE — ED PROVIDER NOTES
Encounter Date: 2/8/2018    SCRIBE #1 NOTE: Liz RIVAS, lona scribing for, and in the presence of, Dr. Calderon.       History     Chief Complaint   Patient presents with    Leg Swelling     R foot, lower leg       02/08/2018 7:34 PM     Chief complaint: Leg Swelling      Errol Keita is a 52 y.o. female with PMHx of DM, HTN, pancreatitis, CHF, and CAD who presents to the ED with complaints of bilateral foot swelling with no associating symptoms. The patient states the swelling started a week ago. She denies pain association with swelling, but states pain around the multiple sores on her lower extremities. She is unsure of where the sores came from. She endorses similar swelling in the past that was secondary to diabetes. The patient currently takes insulin and denies any recent elevated sugar levels. She is also currently taking Lasix. The patient denies increased walking, coughing, and trouble breathing when laying down flat. She denies history of blood clot in the lungs and legs. The patient endorses her feet are more swollen than usual. She currently denies having a cardiologist or nephrologist. The patient denies onset of any other symptoms. SHx includes cholecystectomy, cardiac surgery, and CABG.       The history is provided by the patient.     Review of patient's allergies indicates:  No Known Allergies  Past Medical History:   Diagnosis Date    Arthritis     Asthma     Blood transfusion     CHF (congestive heart failure)     Coronary artery disease     Diabetes mellitus     Hypertension     Pancreatitis     Type 2 diabetes mellitus with hyperglycemia 7/13/2015     Past Surgical History:   Procedure Laterality Date    CARDIAC SURGERY      CABG    CHOLECYSTECTOMY      CORONARY ARTERY BYPASS GRAFT       Family History   Problem Relation Age of Onset    Diabetes Mother     Diabetes Father     Diabetes Sister     Hearing loss Sister     Heart disease Sister     Hypertension Sister      Learning disabilities Sister     Vision loss Sister     Asthma Brother     Depression Brother     Cancer Maternal Grandmother      Social History   Substance Use Topics    Smoking status: Current Every Day Smoker     Packs/day: 0.50     Years: 35.00     Types: Cigarettes    Smokeless tobacco: Never Used    Alcohol use No      Comment: questionable per      Review of Systems   Constitutional: Negative for fatigue and fever.   Respiratory: Negative for apnea and shortness of breath.    Cardiovascular: Positive for leg swelling (bilateral). Negative for chest pain.   Gastrointestinal: Negative for abdominal distention, abdominal pain and nausea.   Genitourinary: Negative for difficulty urinating and dysuria.   Musculoskeletal: Negative for arthralgias and neck pain.   Skin: Negative for pallor and rash.   Neurological: Negative for headaches.   Hematological: Does not bruise/bleed easily.   Psychiatric/Behavioral: Negative for agitation.       Physical Exam     Initial Vitals [02/08/18 1837]   BP Pulse Resp Temp SpO2   (!) 172/84 86 16 98.9 °F (37.2 °C) 99 %      MAP       113.33         Physical Exam    Nursing note and vitals reviewed.  Constitutional: She appears well-developed and well-nourished. She is not diaphoretic. No distress.   Disheveled and frail appearing.    HENT:   Head: Normocephalic and atraumatic.   Eyes: Conjunctivae and EOM are normal.   Neck: Normal range of motion. Neck supple. No thyroid mass present.   Cardiovascular: Normal rate and regular rhythm.   Pulmonary/Chest: Effort normal and breath sounds normal. No respiratory distress. She has no decreased breath sounds. She has no wheezes. She has no rhonchi. She has no rales.   Abdominal: Soft. Normal appearance and bowel sounds are normal. There is no tenderness.   Musculoskeletal:   1+ pedal edema without lower leg swelling. Multiple area of excoriations with different stages of swelling. Abrasion over medial aspect of right shin.     Neurological: She is alert and oriented to person, place, and time. She has normal strength. No cranial nerve deficit or sensory deficit.   Skin: Skin is warm and dry. No rash noted. No erythema.   Psychiatric: She has a normal mood and affect. Her speech is normal. Cognition and memory are normal.         ED Course   Procedures  Labs Reviewed - No data to display  EKG Readings: (Independently Interpreted)   Normal sinus rhythm, 74 bpm, normal axis, rate atrial enlargement, inferior infarct, age undetermined, normal intervals, T-wave abnormality, consider lateral ischemia, old inversions, no STEMI     Imaging Results          X-Ray Chest AP Portable (In process)                    Medical Decision Making:   History:   Old Medical Records: I decided to obtain old medical records.  Clinical Tests:   Lab Tests: Reviewed and Ordered  Radiological Study: Reviewed and Ordered  Medical Tests: Reviewed and Ordered  ED Management:  This patient was emergently interviewed and examined.  Vital signs are stable with the exception of expected hypertension.  On initial assessment, the patient is seen in no acute distress and without respiratory complaints and only has a minor amount of dorsal pedal edema but does not affect the area proximal to the ankles.  The patient was recently admitted for a CHF exacerbation and has CKD with a persistent troponin leak but work up tonight indicates significant improvement in overall chronic values.  Chest x-ray is negative for significant interval worsening.  The patient will be asked to adhere to her CHF medications including Lasix, and to adhere to a low salt diet.  I instructed her that she may want to begin wearing compression stockings and elevate the feet.  She is asked to follow-up with her primary care doctor as soon as possible regarding improvement.  Patient agreeable with this plan for follow-up and she was discharged in stable condition.            Scribe Attestation:   Kevinibdea  #1: I performed the above scribed service and the documentation accurately describes the services I performed. I attest to the accuracy of the note.      I, Dr. Leonardo Calderon, personally performed the services described in this documentation. All medical record entries made by the scribe were at my direction and in my presence.  I have reviewed the chart and agree that the record reflects my personal performance and is accurate and complete. Leonardo Calderon MD.  8:18 PM 02/08/2018          ED Course      Clinical Impression:   The primary encounter diagnosis was Foot swelling. Diagnoses of Shortness of breath and Congestive heart failure, unspecified congestive heart failure chronicity, unspecified congestive heart failure type were also pertinent to this visit.    Disposition:   Disposition: Discharged  Condition: Stable                        Leonardo Calderon MD  02/09/18 0334

## 2018-03-23 ENCOUNTER — HOSPITAL ENCOUNTER (INPATIENT)
Facility: HOSPITAL | Age: 53
LOS: 2 days | Discharge: HOME | DRG: 637 | End: 2018-03-26
Attending: EMERGENCY MEDICINE | Admitting: HOSPITALIST
Payer: MEDICARE

## 2018-03-23 DIAGNOSIS — E11.00 TYPE 2 DIABETES MELLITUS WITH HYPEROSMOLAR NONKETOTIC HYPERGLYCEMIA: ICD-10-CM

## 2018-03-23 DIAGNOSIS — N17.9 ACUTE RENAL FAILURE SUPERIMPOSED ON CHRONIC KIDNEY DISEASE, UNSPECIFIED CKD STAGE, UNSPECIFIED ACUTE RENAL FAILURE TYPE: Primary | ICD-10-CM

## 2018-03-23 DIAGNOSIS — N18.9 ACUTE RENAL FAILURE SUPERIMPOSED ON CHRONIC KIDNEY DISEASE, UNSPECIFIED CKD STAGE, UNSPECIFIED ACUTE RENAL FAILURE TYPE: Primary | ICD-10-CM

## 2018-03-23 DIAGNOSIS — R53.83 FATIGUE: ICD-10-CM

## 2018-03-23 LAB
ALBUMIN SERPL BCP-MCNC: 3 G/DL
ALLENS TEST: ABNORMAL
ALP SERPL-CCNC: 216 U/L
ALT SERPL W/O P-5'-P-CCNC: 34 U/L
ANION GAP SERPL CALC-SCNC: 17 MMOL/L
AST SERPL-CCNC: 37 U/L
BASOPHILS # BLD AUTO: 0 K/UL
BASOPHILS NFR BLD: 0.2 %
BILIRUB SERPL-MCNC: 0.2 MG/DL
BNP SERPL-MCNC: 233 PG/ML
BUN SERPL-MCNC: 35 MG/DL
CALCIUM SERPL-MCNC: 8.1 MG/DL
CHLORIDE SERPL-SCNC: 98 MMOL/L
CO2 SERPL-SCNC: 18 MMOL/L
CREAT SERPL-MCNC: 2.9 MG/DL
DELSYS: ABNORMAL
DIFFERENTIAL METHOD: ABNORMAL
EOSINOPHIL # BLD AUTO: 0 K/UL
EOSINOPHIL NFR BLD: 0.4 %
ERYTHROCYTE [DISTWIDTH] IN BLOOD BY AUTOMATED COUNT: 14.8 %
ERYTHROCYTE [SEDIMENTATION RATE] IN BLOOD BY WESTERGREN METHOD: 10 MM/H
EST. GFR  (AFRICAN AMERICAN): 20 ML/MIN/1.73 M^2
EST. GFR  (NON AFRICAN AMERICAN): 18 ML/MIN/1.73 M^2
GLUCOSE SERPL-MCNC: 755 MG/DL
HCO3 UR-SCNC: 23 MMOL/L (ref 24–28)
HCT VFR BLD AUTO: 40.9 %
HGB BLD-MCNC: 13.8 G/DL
LACTATE SERPL-SCNC: 2.2 MMOL/L
LIPASE SERPL-CCNC: 238 U/L
LYMPHOCYTES # BLD AUTO: 1.8 K/UL
LYMPHOCYTES NFR BLD: 25.3 %
MCH RBC QN AUTO: 30.6 PG
MCHC RBC AUTO-ENTMCNC: 33.6 G/DL
MCV RBC AUTO: 91 FL
MODE: ABNORMAL
MONOCYTES # BLD AUTO: 0.2 K/UL
MONOCYTES NFR BLD: 2.8 %
NEUTROPHILS # BLD AUTO: 5 K/UL
NEUTROPHILS NFR BLD: 71.3 %
PCO2 BLDA: 40.1 MMHG (ref 35–45)
PH SMN: 7.37 [PH] (ref 7.35–7.45)
PLATELET # BLD AUTO: 282 K/UL
PMV BLD AUTO: 9.3 FL
PO2 BLDA: 90 MMHG (ref 80–100)
POC BE: -2 MMOL/L
POC SATURATED O2: 97 % (ref 95–100)
POC TCO2: 24 MMOL/L (ref 23–27)
POTASSIUM SERPL-SCNC: 3.4 MMOL/L
PROT SERPL-MCNC: 7.7 G/DL
RBC # BLD AUTO: 4.49 M/UL
SAMPLE: ABNORMAL
SITE: ABNORMAL
SODIUM SERPL-SCNC: 133 MMOL/L
SP02: 96
TROPONIN I SERPL DL<=0.01 NG/ML-MCNC: 0.14 NG/ML
WBC # BLD AUTO: 7.1 K/UL

## 2018-03-23 PROCEDURE — 96366 THER/PROPH/DIAG IV INF ADDON: CPT

## 2018-03-23 PROCEDURE — 83690 ASSAY OF LIPASE: CPT

## 2018-03-23 PROCEDURE — 82803 BLOOD GASES ANY COMBINATION: CPT

## 2018-03-23 PROCEDURE — 36600 WITHDRAWAL OF ARTERIAL BLOOD: CPT

## 2018-03-23 PROCEDURE — 84484 ASSAY OF TROPONIN QUANT: CPT

## 2018-03-23 PROCEDURE — 96365 THER/PROPH/DIAG IV INF INIT: CPT

## 2018-03-23 PROCEDURE — 36415 COLL VENOUS BLD VENIPUNCTURE: CPT

## 2018-03-23 PROCEDURE — 99291 CRITICAL CARE FIRST HOUR: CPT | Mod: 25

## 2018-03-23 PROCEDURE — 83880 ASSAY OF NATRIURETIC PEPTIDE: CPT

## 2018-03-23 PROCEDURE — 93005 ELECTROCARDIOGRAM TRACING: CPT

## 2018-03-23 PROCEDURE — 85025 COMPLETE CBC W/AUTO DIFF WBC: CPT

## 2018-03-23 PROCEDURE — 82962 GLUCOSE BLOOD TEST: CPT

## 2018-03-23 PROCEDURE — 80053 COMPREHEN METABOLIC PANEL: CPT

## 2018-03-23 PROCEDURE — 99900035 HC TECH TIME PER 15 MIN (STAT)

## 2018-03-23 PROCEDURE — 11000001 HC ACUTE MED/SURG PRIVATE ROOM

## 2018-03-23 PROCEDURE — 96376 TX/PRO/DX INJ SAME DRUG ADON: CPT

## 2018-03-23 PROCEDURE — 83605 ASSAY OF LACTIC ACID: CPT

## 2018-03-24 PROBLEM — G93.41 ENCEPHALOPATHY, METABOLIC: Status: ACTIVE | Noted: 2018-03-24

## 2018-03-24 PROBLEM — E11.00 TYPE 2 DIABETES MELLITUS WITH HYPEROSMOLAR NONKETOTIC HYPERGLYCEMIA: Status: ACTIVE | Noted: 2018-03-24

## 2018-03-24 LAB
ALBUMIN SERPL BCP-MCNC: 2.5 G/DL
ALP SERPL-CCNC: 177 U/L
ALT SERPL W/O P-5'-P-CCNC: 28 U/L
AMORPH CRY URNS QL MICRO: ABNORMAL
AMORPH CRY URNS QL MICRO: ABNORMAL
ANION GAP SERPL CALC-SCNC: 12 MMOL/L
ANION GAP SERPL CALC-SCNC: 14 MMOL/L
AST SERPL-CCNC: 35 U/L
B-OH-BUTYR BLD STRIP-SCNC: 0.3 MMOL/L
B-OH-BUTYR BLD STRIP-SCNC: 0.3 MMOL/L
BACTERIA #/AREA URNS HPF: ABNORMAL /HPF
BACTERIA #/AREA URNS HPF: ABNORMAL /HPF
BASOPHILS # BLD AUTO: 0 K/UL
BASOPHILS NFR BLD: 0.1 %
BILIRUB SERPL-MCNC: 0.2 MG/DL
BILIRUB UR QL STRIP: NEGATIVE
BILIRUB UR QL STRIP: NEGATIVE
BUN SERPL-MCNC: 30 MG/DL
BUN SERPL-MCNC: 35 MG/DL
CALCIUM SERPL-MCNC: 7.7 MG/DL
CALCIUM SERPL-MCNC: 7.7 MG/DL
CHLORIDE SERPL-SCNC: 109 MMOL/L
CHLORIDE SERPL-SCNC: 112 MMOL/L
CLARITY UR: ABNORMAL
CLARITY UR: ABNORMAL
CO2 SERPL-SCNC: 17 MMOL/L
CO2 SERPL-SCNC: 19 MMOL/L
COLOR UR: YELLOW
COLOR UR: YELLOW
CREAT SERPL-MCNC: 2 MG/DL
CREAT SERPL-MCNC: 2.4 MG/DL
DIFFERENTIAL METHOD: ABNORMAL
EOSINOPHIL # BLD AUTO: 0 K/UL
EOSINOPHIL NFR BLD: 0.5 %
ERYTHROCYTE [DISTWIDTH] IN BLOOD BY AUTOMATED COUNT: 14.7 %
EST. GFR  (AFRICAN AMERICAN): 26 ML/MIN/1.73 M^2
EST. GFR  (AFRICAN AMERICAN): 32 ML/MIN/1.73 M^2
EST. GFR  (NON AFRICAN AMERICAN): 22 ML/MIN/1.73 M^2
EST. GFR  (NON AFRICAN AMERICAN): 28 ML/MIN/1.73 M^2
ESTIMATED AVG GLUCOSE: 341 MG/DL
GLUCOSE SERPL-MCNC: 236 MG/DL
GLUCOSE SERPL-MCNC: 243 MG/DL
GLUCOSE UR QL STRIP: ABNORMAL
GLUCOSE UR QL STRIP: ABNORMAL
HBA1C MFR BLD HPLC: 13.5 %
HCT VFR BLD AUTO: 34.4 %
HGB BLD-MCNC: 11.5 G/DL
HGB UR QL STRIP: ABNORMAL
HGB UR QL STRIP: ABNORMAL
HYALINE CASTS #/AREA URNS LPF: 0 /LPF
HYALINE CASTS #/AREA URNS LPF: 0 /LPF
KETONES UR QL STRIP: NEGATIVE
KETONES UR QL STRIP: NEGATIVE
LEUKOCYTE ESTERASE UR QL STRIP: NEGATIVE
LEUKOCYTE ESTERASE UR QL STRIP: NEGATIVE
LYMPHOCYTES # BLD AUTO: 3 K/UL
LYMPHOCYTES NFR BLD: 32.7 %
MAGNESIUM SERPL-MCNC: 1.4 MG/DL
MAGNESIUM SERPL-MCNC: 1.4 MG/DL
MCH RBC QN AUTO: 30.4 PG
MCHC RBC AUTO-ENTMCNC: 33.4 G/DL
MCV RBC AUTO: 91 FL
MICROSCOPIC COMMENT: ABNORMAL
MICROSCOPIC COMMENT: ABNORMAL
MONOCYTES # BLD AUTO: 0.6 K/UL
MONOCYTES NFR BLD: 6 %
NEUTROPHILS # BLD AUTO: 5.6 K/UL
NEUTROPHILS NFR BLD: 60.7 %
NITRITE UR QL STRIP: NEGATIVE
NITRITE UR QL STRIP: NEGATIVE
PH UR STRIP: 5 [PH] (ref 5–8)
PH UR STRIP: 6 [PH] (ref 5–8)
PHOSPHATE SERPL-MCNC: 3.2 MG/DL
PLATELET # BLD AUTO: 272 K/UL
PMV BLD AUTO: 9.1 FL
POCT GLUCOSE: 115 MG/DL (ref 70–110)
POCT GLUCOSE: 140 MG/DL (ref 70–110)
POCT GLUCOSE: 142 MG/DL (ref 70–110)
POCT GLUCOSE: 219 MG/DL (ref 70–110)
POCT GLUCOSE: 234 MG/DL (ref 70–110)
POCT GLUCOSE: 333 MG/DL (ref 70–110)
POCT GLUCOSE: 352 MG/DL (ref 70–110)
POTASSIUM SERPL-SCNC: 2.3 MMOL/L
POTASSIUM SERPL-SCNC: 3.9 MMOL/L
PROT SERPL-MCNC: 6.2 G/DL
PROT UR QL STRIP: ABNORMAL
PROT UR QL STRIP: ABNORMAL
RBC # BLD AUTO: 3.78 M/UL
RBC #/AREA URNS HPF: 0 /HPF (ref 0–4)
RBC #/AREA URNS HPF: 23 /HPF (ref 0–4)
SODIUM SERPL-SCNC: 141 MMOL/L
SODIUM SERPL-SCNC: 142 MMOL/L
SP GR UR STRIP: 1.02 (ref 1–1.03)
SP GR UR STRIP: 1.02 (ref 1–1.03)
SQUAMOUS #/AREA URNS HPF: 28 /HPF
TROPONIN I SERPL DL<=0.01 NG/ML-MCNC: 0.15 NG/ML
URN SPEC COLLECT METH UR: ABNORMAL
URN SPEC COLLECT METH UR: ABNORMAL
UROBILINOGEN UR STRIP-ACNC: NEGATIVE EU/DL
UROBILINOGEN UR STRIP-ACNC: NEGATIVE EU/DL
WBC # BLD AUTO: 9.3 K/UL
WBC #/AREA URNS HPF: 15 /HPF (ref 0–5)
WBC #/AREA URNS HPF: 67 /HPF (ref 0–5)
YEAST URNS QL MICRO: ABNORMAL
YEAST URNS QL MICRO: ABNORMAL

## 2018-03-24 PROCEDURE — 63600175 PHARM REV CODE 636 W HCPCS: Performed by: EMERGENCY MEDICINE

## 2018-03-24 PROCEDURE — 97116 GAIT TRAINING THERAPY: CPT

## 2018-03-24 PROCEDURE — 80048 BASIC METABOLIC PNL TOTAL CA: CPT

## 2018-03-24 PROCEDURE — 63600175 PHARM REV CODE 636 W HCPCS: Performed by: NURSE PRACTITIONER

## 2018-03-24 PROCEDURE — 81000 URINALYSIS NONAUTO W/SCOPE: CPT | Mod: 91

## 2018-03-24 PROCEDURE — 25000003 PHARM REV CODE 250: Performed by: HOSPITALIST

## 2018-03-24 PROCEDURE — 85025 COMPLETE CBC W/AUTO DIFF WBC: CPT

## 2018-03-24 PROCEDURE — 80053 COMPREHEN METABOLIC PANEL: CPT

## 2018-03-24 PROCEDURE — G8978 MOBILITY CURRENT STATUS: HCPCS | Mod: CK

## 2018-03-24 PROCEDURE — 25000003 PHARM REV CODE 250: Performed by: NURSE PRACTITIONER

## 2018-03-24 PROCEDURE — 83735 ASSAY OF MAGNESIUM: CPT | Mod: 91

## 2018-03-24 PROCEDURE — 11000001 HC ACUTE MED/SURG PRIVATE ROOM

## 2018-03-24 PROCEDURE — 94761 N-INVAS EAR/PLS OXIMETRY MLT: CPT

## 2018-03-24 PROCEDURE — 81000 URINALYSIS NONAUTO W/SCOPE: CPT

## 2018-03-24 PROCEDURE — 84100 ASSAY OF PHOSPHORUS: CPT

## 2018-03-24 PROCEDURE — 97161 PT EVAL LOW COMPLEX 20 MIN: CPT

## 2018-03-24 PROCEDURE — 83036 HEMOGLOBIN GLYCOSYLATED A1C: CPT

## 2018-03-24 PROCEDURE — 82010 KETONE BODYS QUAN: CPT

## 2018-03-24 PROCEDURE — G8979 MOBILITY GOAL STATUS: HCPCS | Mod: CJ

## 2018-03-24 PROCEDURE — 87086 URINE CULTURE/COLONY COUNT: CPT

## 2018-03-24 PROCEDURE — 25000003 PHARM REV CODE 250: Performed by: EMERGENCY MEDICINE

## 2018-03-24 PROCEDURE — 83735 ASSAY OF MAGNESIUM: CPT

## 2018-03-24 PROCEDURE — 36415 COLL VENOUS BLD VENIPUNCTURE: CPT

## 2018-03-24 PROCEDURE — S4991 NICOTINE PATCH NONLEGEND: HCPCS | Performed by: NURSE PRACTITIONER

## 2018-03-24 PROCEDURE — 84484 ASSAY OF TROPONIN QUANT: CPT

## 2018-03-24 RX ORDER — POTASSIUM CHLORIDE 20 MEQ/15ML
40 SOLUTION ORAL ONCE
Status: COMPLETED | OUTPATIENT
Start: 2018-03-24 | End: 2018-03-24

## 2018-03-24 RX ORDER — ACETAMINOPHEN 500 MG
1000 TABLET ORAL EVERY 6 HOURS PRN
Status: DISCONTINUED | OUTPATIENT
Start: 2018-03-24 | End: 2018-03-26 | Stop reason: HOSPADM

## 2018-03-24 RX ORDER — SODIUM,POTASSIUM PHOSPHATES 280-250MG
2 POWDER IN PACKET (EA) ORAL
Status: DISCONTINUED | OUTPATIENT
Start: 2018-03-24 | End: 2018-03-26 | Stop reason: HOSPADM

## 2018-03-24 RX ORDER — FERROUS SULFATE 325(65) MG
325 TABLET, DELAYED RELEASE (ENTERIC COATED) ORAL 2 TIMES DAILY
Status: DISCONTINUED | OUTPATIENT
Start: 2018-03-24 | End: 2018-03-26 | Stop reason: HOSPADM

## 2018-03-24 RX ORDER — AMLODIPINE BESYLATE 5 MG/1
10 TABLET ORAL DAILY
Status: DISCONTINUED | OUTPATIENT
Start: 2018-03-24 | End: 2018-03-26 | Stop reason: HOSPADM

## 2018-03-24 RX ORDER — IBUPROFEN 200 MG
24 TABLET ORAL
Status: DISCONTINUED | OUTPATIENT
Start: 2018-03-24 | End: 2018-03-26 | Stop reason: HOSPADM

## 2018-03-24 RX ORDER — GLUCAGON 1 MG
1 KIT INJECTION
Status: DISCONTINUED | OUTPATIENT
Start: 2018-03-24 | End: 2018-03-26 | Stop reason: HOSPADM

## 2018-03-24 RX ORDER — LANOLIN ALCOHOL/MO/W.PET/CERES
800 CREAM (GRAM) TOPICAL
Status: DISCONTINUED | OUTPATIENT
Start: 2018-03-24 | End: 2018-03-26 | Stop reason: HOSPADM

## 2018-03-24 RX ORDER — RAMELTEON 8 MG/1
8 TABLET ORAL NIGHTLY PRN
Status: DISCONTINUED | OUTPATIENT
Start: 2018-03-24 | End: 2018-03-26 | Stop reason: HOSPADM

## 2018-03-24 RX ORDER — POTASSIUM CHLORIDE 7.45 MG/ML
10 INJECTION INTRAVENOUS
Status: COMPLETED | OUTPATIENT
Start: 2018-03-24 | End: 2018-03-24

## 2018-03-24 RX ORDER — ATORVASTATIN CALCIUM 40 MG/1
80 TABLET, FILM COATED ORAL DAILY
Status: DISCONTINUED | OUTPATIENT
Start: 2018-03-24 | End: 2018-03-26 | Stop reason: HOSPADM

## 2018-03-24 RX ORDER — SODIUM CHLORIDE 9 MG/ML
INJECTION, SOLUTION INTRAVENOUS CONTINUOUS
Status: DISCONTINUED | OUTPATIENT
Start: 2018-03-24 | End: 2018-03-25

## 2018-03-24 RX ORDER — POTASSIUM CHLORIDE 20 MEQ/15ML
40 SOLUTION ORAL
Status: DISCONTINUED | OUTPATIENT
Start: 2018-03-24 | End: 2018-03-26 | Stop reason: HOSPADM

## 2018-03-24 RX ORDER — POTASSIUM CHLORIDE 20 MEQ/15ML
60 SOLUTION ORAL
Status: DISCONTINUED | OUTPATIENT
Start: 2018-03-24 | End: 2018-03-26 | Stop reason: HOSPADM

## 2018-03-24 RX ORDER — AMOXICILLIN 250 MG
1 CAPSULE ORAL 2 TIMES DAILY
Status: DISCONTINUED | OUTPATIENT
Start: 2018-03-24 | End: 2018-03-24

## 2018-03-24 RX ORDER — CLOPIDOGREL BISULFATE 75 MG/1
75 TABLET ORAL DAILY
Status: DISCONTINUED | OUTPATIENT
Start: 2018-03-24 | End: 2018-03-26 | Stop reason: HOSPADM

## 2018-03-24 RX ORDER — INSULIN ASPART 100 [IU]/ML
0-5 INJECTION, SOLUTION INTRAVENOUS; SUBCUTANEOUS
Status: DISCONTINUED | OUTPATIENT
Start: 2018-03-24 | End: 2018-03-26 | Stop reason: HOSPADM

## 2018-03-24 RX ORDER — IBUPROFEN 200 MG
16 TABLET ORAL
Status: DISCONTINUED | OUTPATIENT
Start: 2018-03-24 | End: 2018-03-26 | Stop reason: HOSPADM

## 2018-03-24 RX ORDER — ONDANSETRON 2 MG/ML
8 INJECTION INTRAMUSCULAR; INTRAVENOUS EVERY 8 HOURS PRN
Status: DISCONTINUED | OUTPATIENT
Start: 2018-03-24 | End: 2018-03-26 | Stop reason: HOSPADM

## 2018-03-24 RX ORDER — ENOXAPARIN SODIUM 100 MG/ML
30 INJECTION SUBCUTANEOUS EVERY 24 HOURS
Status: DISCONTINUED | OUTPATIENT
Start: 2018-03-24 | End: 2018-03-26 | Stop reason: HOSPADM

## 2018-03-24 RX ORDER — IBUPROFEN 200 MG
1 TABLET ORAL DAILY
Status: DISCONTINUED | OUTPATIENT
Start: 2018-03-24 | End: 2018-03-26 | Stop reason: HOSPADM

## 2018-03-24 RX ORDER — SODIUM CHLORIDE 0.9 % (FLUSH) 0.9 %
5 SYRINGE (ML) INJECTION
Status: DISCONTINUED | OUTPATIENT
Start: 2018-03-24 | End: 2018-03-26 | Stop reason: HOSPADM

## 2018-03-24 RX ORDER — NAPROXEN SODIUM 220 MG/1
81 TABLET, FILM COATED ORAL DAILY
Status: DISCONTINUED | OUTPATIENT
Start: 2018-03-24 | End: 2018-03-26 | Stop reason: HOSPADM

## 2018-03-24 RX ORDER — ENOXAPARIN SODIUM 100 MG/ML
30 INJECTION SUBCUTANEOUS EVERY 24 HOURS
Status: DISCONTINUED | OUTPATIENT
Start: 2018-03-24 | End: 2018-03-24 | Stop reason: SDUPTHER

## 2018-03-24 RX ADMIN — POTASSIUM CHLORIDE 40 MEQ: 20 SOLUTION ORAL at 08:03

## 2018-03-24 RX ADMIN — ENOXAPARIN SODIUM 30 MG: 100 INJECTION SUBCUTANEOUS at 08:03

## 2018-03-24 RX ADMIN — Medication 800 MG: at 12:03

## 2018-03-24 RX ADMIN — POTASSIUM CHLORIDE 10 MEQ: 7.46 INJECTION, SOLUTION INTRAVENOUS at 09:03

## 2018-03-24 RX ADMIN — SODIUM CHLORIDE: 0.9 INJECTION, SOLUTION INTRAVENOUS at 10:03

## 2018-03-24 RX ADMIN — AMLODIPINE BESYLATE 10 MG: 5 TABLET ORAL at 09:03

## 2018-03-24 RX ADMIN — INSULIN ASPART 2 UNITS: 100 INJECTION, SOLUTION INTRAVENOUS; SUBCUTANEOUS at 04:03

## 2018-03-24 RX ADMIN — Medication 800 MG: at 04:03

## 2018-03-24 RX ADMIN — POTASSIUM CHLORIDE 10 MEQ: 7.46 INJECTION, SOLUTION INTRAVENOUS at 01:03

## 2018-03-24 RX ADMIN — PANCRELIPASE 1 CAPSULE: 24000; 76000; 120000 CAPSULE, DELAYED RELEASE PELLETS ORAL at 04:03

## 2018-03-24 RX ADMIN — FERROUS SULFATE TAB EC 325 MG (65 MG FE EQUIVALENT) 325 MG: 325 (65 FE) TABLET DELAYED RESPONSE at 09:03

## 2018-03-24 RX ADMIN — POTASSIUM CHLORIDE 10 MEQ: 7.46 INJECTION, SOLUTION INTRAVENOUS at 11:03

## 2018-03-24 RX ADMIN — INSULIN HUMAN 10 UNITS: 100 INJECTION, SOLUTION PARENTERAL at 12:03

## 2018-03-24 RX ADMIN — SODIUM CHLORIDE 2 UNITS/HR: 9 INJECTION, SOLUTION INTRAVENOUS at 03:03

## 2018-03-24 RX ADMIN — PANCRELIPASE 1 CAPSULE: 24000; 76000; 120000 CAPSULE, DELAYED RELEASE PELLETS ORAL at 08:03

## 2018-03-24 RX ADMIN — NICOTINE 1 PATCH: 14 PATCH, EXTENDED RELEASE TRANSDERMAL at 09:03

## 2018-03-24 RX ADMIN — INSULIN ASPART 2 UNITS: 100 INJECTION, SOLUTION INTRAVENOUS; SUBCUTANEOUS at 10:03

## 2018-03-24 RX ADMIN — SODIUM CHLORIDE: 0.9 INJECTION, SOLUTION INTRAVENOUS at 02:03

## 2018-03-24 RX ADMIN — ATORVASTATIN CALCIUM 80 MG: 40 TABLET, FILM COATED ORAL at 09:03

## 2018-03-24 RX ADMIN — INSULIN DETEMIR 4 UNITS: 100 INJECTION, SOLUTION SUBCUTANEOUS at 09:03

## 2018-03-24 RX ADMIN — ASPIRIN 81 MG CHEWABLE TABLET 81 MG: 81 TABLET CHEWABLE at 09:03

## 2018-03-24 RX ADMIN — PANCRELIPASE 1 CAPSULE: 24000; 76000; 120000 CAPSULE, DELAYED RELEASE PELLETS ORAL at 09:03

## 2018-03-24 RX ADMIN — CLOPIDOGREL BISULFATE 75 MG: 75 TABLET ORAL at 09:03

## 2018-03-24 RX ADMIN — POTASSIUM CHLORIDE 10 MEQ: 7.46 INJECTION, SOLUTION INTRAVENOUS at 10:03

## 2018-03-24 RX ADMIN — SODIUM CHLORIDE 500 ML: 0.9 INJECTION, SOLUTION INTRAVENOUS at 12:03

## 2018-03-24 RX ADMIN — PANCRELIPASE 1 CAPSULE: 24000; 76000; 120000 CAPSULE, DELAYED RELEASE PELLETS ORAL at 11:03

## 2018-03-24 NOTE — NURSING
POC reviewed with pt with verbalized understanding. Safety maintained, free from falls. Admitted pt during system downtime, paper charts available in pt's blue folder. Initiated insulin drip per orders and trended bp. Blood sugar was 219@ 0530 trended insulin according to protocol. Received call from JOEL Smith NP @1939 to d/c insulin drip.  @0661 blood sugar was 140.

## 2018-03-24 NOTE — ASSESSMENT & PLAN NOTE
Likely 2/2 IVVD associated with significant hyperglycemic state.  IV hydration.  Avoid non-essential nephrotoxins.  Renal dose medications for Estimated Creatinine Clearance: 14.7 mL/min (A) (based on SCr of 2.9 mg/dL (H)). Follow UA--pending.

## 2018-03-24 NOTE — PROGRESS NOTES
[facility] Hospital Medicine  Progress Note    Patient Name: Errol Keita  MRN: [id number]  Patient Class: IP- Inpatient   Admission Date: 3/23/2018  Length of Stay: 0 days  Attending Physician: Foreign Greco MD  Primary Care Provider: Chica Irene DO        Subjective:     Principal Problem:Type 2 diabetes mellitus with hyperosmolar nonketotic hyperglycemia    HPI:  Errol Keita is a 52 y.o. female with a history of DM, pancreatitis, HTN, and CHF.  She was admitted to the service of hospital medicine with HHS and TIMOTHY.  She presented to the ED after spouse found her to be very weak, fatigued and confused today.  She states that she hasnt been feeling well with poor appetite, diarrhea, and some abdominal pain.  She hasnt been eating or drinking the past several days.  She denies any fever, chills, dysuria, chest pain, or change from baseline SOB.  She does endorse increased urinary frequency--no vomiting noted.  She reports compliance with all medications including insulin.  In triage, her BG was more than 500.  She has presented similarly in the past.  Other pertinent medical history as below:     Hospital Course:  No notes on file    Interval History: Patient seen and examined.  Overnight, patient sugars came down from the 600s down in the 200s this morning.  Patient refused Levemir this morning.  Plan of care discussed the patient and the bedside nurse in detail.    Review of Systems   Constitutional: Positive for fatigue.   Respiratory: Negative for cough and shortness of breath.    Cardiovascular: Negative for chest pain and leg swelling.   Gastrointestinal: Negative for abdominal pain and nausea.   Neurological: Positive for weakness.   Psychiatric/Behavioral: Negative for confusion.   All other systems reviewed and are negative.    Objective:     Vital Signs (Most Recent):  Temp: 96.3 °F (35.7 °C) (03/24/18 1126)  Pulse: 80 (03/24/18 1126)  Resp: 16 (03/24/18 1126)  BP: (!)  158/94 (03/24/18 1126)  SpO2: 100 % (03/24/18 1126) Vital Signs (24h Range):  Temp:  [96.3 °F (35.7 °C)-97.8 °F (36.6 °C)] 96.3 °F (35.7 °C)  Pulse:  [] 80  Resp:  [14-18] 16  SpO2:  [99 %-100 %] 100 %  BP: (110-173)/(60-94) 158/94     Weight: 42 kg (92 lb 9.5 oz)  Body mass index is 14.5 kg/m².    Intake/Output Summary (Last 24 hours) at 03/24/18 1301  Last data filed at 03/24/18 1000   Gross per 24 hour   Intake              970 ml   Output                0 ml   Net              970 ml      Physical Exam   Constitutional: She is oriented to person, place, and time. She appears well-developed and well-nourished.   Appears older than stated age   HENT:   temporal wasting, poor dentitia   Eyes: EOM are normal. Pupils are equal, round, and reactive to light.   Cardiovascular: Normal rate, regular rhythm and intact distal pulses.    No murmur heard.  Pulmonary/Chest: Effort normal and breath sounds normal.   Abdominal: Soft. She exhibits no distension. There is no tenderness.   Neurological: She is alert and oriented to person, place, and time.   Skin: No rash noted. No pallor.   Nursing note and vitals reviewed.      Significant Labs: All pertinent labs within the past 24 hours have been reviewed.    Significant Imaging: I have reviewed all pertinent imaging results/findings within the past 24 hours.    Assessment/Plan:      * Type 2 diabetes mellitus with hyperosmolar nonketotic hyperglycemia    Patient now back on basal and bolus insulin.  Will restart basal insulin at lunchtime given the patient's refusal at breakfast.  Will monitor her sugars closely she is very brittle.  Patient's FSGs are not controlled on current hypoglycemics.   Last A1c reviewed-   Lab Results   Component Value Date    HGBA1C 13.5 (H) 03/24/2018     Most recent fingerstick glucose reviewed-   Recent Labs  Lab 03/24/18  1024   POCTGLUCOSE 142*     Current correctional scale  Low  Maintain anti-hyperglycemic dose as follows-    Antihyperglycemics     Start     Stop Route Frequency Ordered    03/24/18 0900  insulin detemir U-100 pen 4 Units      -- SubQ 2 times daily 03/24/18 0610    03/24/18 0713  insulin aspart U-100 pen 0-5 Units      -- SubQ Before meals & nightly PRN 03/24/18 0613                  Encephalopathy, metabolic    Encephalopathy noted as a secondary process related to the patient's acute illness. There is no specific treatment. Monitor neuro status, avoid medications such as narcotics and benzos which may worsen confusion, and use anti-psychotics to prevent behaviors of self harm.            Acute on chronic pancreatitis    Possibly triggered hyperglycemic state.  IV hydration.  Pain control with non-narcotic modalities for now as pain is mild and patient presented with encephalopathy. Continue pancreatic enzyme supplementation.          Moderate tobacco dependence    Health hazards associated with cigarette smoking were reviewed with patient and cessation was encouraged. Nicotine replacement and counseling options were discussed.  Spent 10 minutes counseling on cessation, patient not ready to quit-- nicotine patch ordered.            Diabetic nephropathy associated with type 2 diabetes mellitus              Hypokalemia    Patient has hypokalemia which is currently uncontrolled. Last electrolytes reviewed-   Recent Labs  Lab 03/23/18  2153 03/24/18  0532   K 3.4* 2.3*   . Will replace potassium and monitor electrolytes closely.             Anemia of chronic renal failure    H&H reviewed--stable with prior.  Monitor serial CBC.  Transfuse patient for H&H < 7/21 or for symptomatic anemia.          CKD (chronic kidney disease) stage 3, GFR 30-59 ml/min              TIMOTHY (acute kidney injury)    Likely 2/2 IVVD associated with significant hyperglycemic state.  IV hydration.  Avoid non-essential nephrotoxins.    Creatine stable for now. BMP reviewed- noted below. Monitor UOP and serial BMP and adjust therapy as needed. Renally  dose meds.  BMP  Lab Results   Component Value Date     03/24/2018    K 2.3 (LL) 03/24/2018     03/24/2018    CO2 19 (L) 03/24/2018    BUN 35 (H) 03/24/2018    CREATININE 2.4 (H) 03/24/2018    CALCIUM 7.7 (L) 03/24/2018    ANIONGAP 14 03/24/2018    ESTGFRAFRICA 26 (A) 03/24/2018    EGFRNONAA 22 (A) 03/24/2018               Hypomagnesemia    Magnesium reviewed-   Recent Labs  Lab 03/24/18  0532   MG 1.4*    Will replace electrolytes and continue to monitor closely.               Coronary artery disease involving native coronary artery without angina pectoris    Patient with known CAD s/p CABG, which is controlled Will continue Aspirin and Statin and monitor for S/Sx of angina/ACS. Continue to monitor on telemetry.             Severe malnutrition    Nutrition consulted. Body mass index is 14.5 kg/m².. Encourage maximal PO intake. Diet supplementation ordered per nutrition approval. Will encourage PO and monitor closely for weight changes.            Major depressive disorder    Chronic, stable.  Continue seroquel when clinically appropriate.          Elevated troponin    Chronically elevated with renal dysfunction.  No chest pain--  No evidence of ACS. Trend troponin.          Hypertension associated with diabetes    Chronic, poorly controlled.  Resume norvasc.  Holding lisinopril 2/2 TIMOTHY.  Resume once clinically appropriate.  Monitor BP and adjust regimen as required for sustained BP control.            VTE Risk Mitigation         Ordered     enoxaparin injection 30 mg  Daily     Route:  Subcutaneous        03/24/18 0732     IP VTE HIGH RISK PATIENT  Once      03/24/18 0544              Foreign Greco MD  Department of Hospital Medicine   [facility]

## 2018-03-24 NOTE — ASSESSMENT & PLAN NOTE
Patient now back on basal and bolus insulin.  Will restart basal insulin at lunchtime given the patient's refusal at breakfast.  Will monitor her sugars closely she is very brittle.  Patient's FSGs are not controlled on current hypoglycemics.   Last A1c reviewed-   Lab Results   Component Value Date    HGBA1C 13.5 (H) 03/24/2018     Most recent fingerstick glucose reviewed-   Recent Labs  Lab 03/24/18  1024   POCTGLUCOSE 142*     Current correctional scale  Low  Maintain anti-hyperglycemic dose as follows-   Antihyperglycemics     Start     Stop Route Frequency Ordered    03/24/18 0900  insulin detemir U-100 pen 4 Units      -- SubQ 2 times daily 03/24/18 0610    03/24/18 0713  insulin aspart U-100 pen 0-5 Units      -- SubQ Before meals & nightly PRN 03/24/18 0613

## 2018-03-24 NOTE — ASSESSMENT & PLAN NOTE
Chronic, poorly controlled.  Resume norvasc.  Holding lisinopril 2/2 TIMOTHY.  Resume once clinically appropriate.  Monitor BP and adjust regimen as required for sustained BP control.

## 2018-03-24 NOTE — PLAN OF CARE
Problem: Physical Therapy Goal  Goal: Physical Therapy Goal  Goals to be met by: 18     Patient will increase functional independence with mobility by performin. Supine to sit with Stand-by Assistance  2. Sit to stand transfer with Stand-by Assistance  3. Bed to chair transfer with Contact Guard Assistance using Rolling Walker  4. Gait  x 240 feet with Stand-by Assistance using Rolling Walker.   5. Lower extremity exercise program x20 reps per handout, with assistance as needed

## 2018-03-24 NOTE — ASSESSMENT & PLAN NOTE
Possibly triggered hyperglycemic state.  IV hydration.  Pain control with non-narcotic modalities for now as pain is mild and patient presented with encephalopathy. Continue pancreatic enzyme supplementation.

## 2018-03-24 NOTE — HPI
Errol Keita is a 52 y.o. female with a history of DM, pancreatitis, HTN, and CHF.  She was admitted to the service of hospital medicine with HHS and TIMOTHY.  She presented to the ED after spouse found her to be very weak, fatigued and confused today.  She states that she hasnt been feeling well with poor appetite, diarrhea, and some abdominal pain.  She hasnt been eating or drinking the past several days.  She denies any fever, chills, dysuria, chest pain, or change from baseline SOB.  She does endorse increased urinary frequency--no vomiting noted.  She reports compliance with all medications including insulin.  In triage, her BG was more than 500.  She has presented similarly in the past.  Other pertinent medical history as below:

## 2018-03-24 NOTE — SUBJECTIVE & OBJECTIVE
Past Medical History:   Diagnosis Date    Arthritis     Asthma     Blood transfusion     CHF (congestive heart failure)     Coronary artery disease     Diabetes mellitus     Hypertension     Pancreatitis     Type 2 diabetes mellitus with hyperglycemia 7/13/2015       Past Surgical History:   Procedure Laterality Date    CARDIAC SURGERY      CABG    CHOLECYSTECTOMY      CORONARY ARTERY BYPASS GRAFT         Review of patient's allergies indicates:  No Known Allergies    No current facility-administered medications on file prior to encounter.      Current Outpatient Prescriptions on File Prior to Encounter   Medication Sig    amLODIPine (NORVASC) 5 MG tablet Take 1 tablet (5 mg total) by mouth once daily. (Patient taking differently: Take 10 mg by mouth once daily. )    aspirin 81 MG chewable tablet Take 81 mg by mouth once daily. Ran out    atorvastatin (LIPITOR) 80 MG tablet Take 1 tablet (80 mg total) by mouth once daily.    clopidogrel (PLAVIX) 75 mg tablet Take 1 tablet (75 mg total) by mouth once daily.    CREON 24,000-76,000 -120,000 unit capsule Take 1 capsule by mouth 4 (four) times daily with meals and nightly.     ferrous sulfate 325 (65 FE) MG EC tablet Take 1 tablet (325 mg total) by mouth 2 (two) times daily.    furosemide (LASIX) 40 MG tablet Take 40 mg by mouth once daily.    HUMALOG KWIKPEN 100 unit/mL InPn pen Inject 7 Units into the skin 3 (three) times daily before meals. (Patient taking differently: Inject 7 Units into the skin 3 (three) times daily before meals. Sliding scale)    insulin detemir (LEVEMIR FLEXTOUCH) 100 unit/mL (3 mL) SubQ InPn pen Inject 10 Units into the skin 2 (two) times daily. (Patient taking differently: Inject 4 Units into the skin 2 (two) times daily. )    lisinopril (PRINIVIL,ZESTRIL) 2.5 MG tablet Take 2.5 mg by mouth once daily.     quetiapine (SEROQUEL) 100 MG Tab 100 mg every evening.      Family History     Problem Relation (Age of Onset)     Asthma Brother    Cancer Maternal Grandmother    Depression Brother    Diabetes Mother, Father, Sister    Hearing loss Sister    Heart disease Sister    Hypertension Sister    Learning disabilities Sister    Vision loss Sister        Social History Main Topics    Smoking status: Current Every Day Smoker     Packs/day: 0.50     Years: 35.00     Types: Cigarettes    Smokeless tobacco: Never Used    Alcohol use No      Comment: questionable per     Drug use: No    Sexual activity: Not Currently     Partners: Male     Birth control/ protection: None     Review of Systems   Constitutional: Positive for activity change, appetite change and fatigue. Negative for chills and fever.   HENT: Negative for congestion, postnasal drip, sinus pressure, sore throat and trouble swallowing.    Eyes: Negative for photophobia and visual disturbance.   Respiratory: Positive for shortness of breath (chronic). Negative for cough and wheezing.    Gastrointestinal: Positive for abdominal pain and diarrhea. Negative for abdominal distention, constipation, nausea and vomiting.   Genitourinary: Positive for frequency. Negative for dysuria, flank pain and hematuria.   Musculoskeletal: Negative for arthralgias and myalgias.   Skin: Negative for color change.   Neurological: Positive for weakness. Negative for dizziness, light-headedness and headaches.   Psychiatric/Behavioral: Negative for agitation and confusion.     Objective:     Vital Signs (Most Recent):  Temp: 97.8 °F (36.6 °C) (03/23/18 2105)  Pulse: 84 (03/24/18 0101)  Resp: 18 (03/23/18 2105)  BP: (!) 143/63 (03/24/18 0101)  SpO2: 100 % (03/24/18 0101) Vital Signs (24h Range):  Temp:  [97.8 °F (36.6 °C)] 97.8 °F (36.6 °C)  Pulse:  [] 84  Resp:  [18] 18  SpO2:  [99 %-100 %] 100 %  BP: (110-143)/(63-76) 143/63     Weight: 42 kg (92 lb 9.5 oz)  Body mass index is 14.5 kg/m².    Physical Exam   Constitutional: She is oriented to person, place, and time. No distress.    Cachectic     HENT:   Head: Normocephalic and atraumatic.   edentulous   Eyes: Conjunctivae and EOM are normal. Pupils are equal, round, and reactive to light.   Neck: Normal range of motion. No thyromegaly present.   Cardiovascular: Normal rate, regular rhythm and intact distal pulses.    Murmur heard.  Pulmonary/Chest: Effort normal and breath sounds normal. No respiratory distress. She has no wheezes.   Abdominal: Soft. Bowel sounds are normal. She exhibits no distension and no mass. There is tenderness (mid epigastrum). There is no guarding.   Musculoskeletal: Normal range of motion. She exhibits no edema or tenderness.   Neurological: She is alert and oriented to person, place, and time. No cranial nerve deficit.   drowsy   Skin: Skin is warm and dry. Capillary refill takes less than 2 seconds.   Psychiatric: She has a normal mood and affect. Her behavior is normal. Judgment and thought content normal.         CRANIAL NERVES     CN III, IV, VI   Pupils are equal, round, and reactive to light.  Extraocular motions are normal.        Significant Labs:   CBC:   Recent Labs  Lab 03/23/18 2153   WBC 7.10   HGB 13.8   HCT 40.9        CMP:   Recent Labs  Lab 03/23/18 2153   *   K 3.4*   CL 98   CO2 18*   *   BUN 35*   CREATININE 2.9*   CALCIUM 8.1*   PROT 7.7   ALBUMIN 3.0*   BILITOT 0.2   ALKPHOS 216*   AST 37   ALT 34   ANIONGAP 17*   EGFRNONAA 18*     Cardiac Markers:   Recent Labs  Lab 03/23/18 2153   *     Lactic Acid:   Recent Labs  Lab 03/23/18 2153   LACTATE 2.2     Lipase:   Recent Labs  Lab 03/23/18  2153   LIPASE 238*     Troponin:   Recent Labs  Lab 03/23/18 2153   TROPONINI 0.135*       Significant Imaging:  CXR: No acute findings (per [facility]).  I reviewed film.  No infiltrate or effusion appreciated.

## 2018-03-24 NOTE — PLAN OF CARE
Problem: Nutrition, Imbalanced: Inadequate Oral Intake (Adult)  Goal: Identify Related Risk Factors and Signs and Symptoms  Related risk factors and signs and symptoms are identified upon initiation of Human Response Clinical Practice Guideline (CPG)  Outcome: Ongoing (interventions implemented as appropriate)  Recommendations  Recommendation/Intervention: 1.) Recommend 1800 diabetic diet + boost glucose control TID   Goals: 1.) patient will meet >80% of EEN while admitted 2.) patient will tolerate diet   Nutrition Goal Status: new  Communication of RD Recs:  (second sign to MD)

## 2018-03-24 NOTE — ASSESSMENT & PLAN NOTE
Associated with significant hyperglycemia and TIMOTHY--improving.  Avoid sedatives.  Utilize non-pharmacologic modalities for delirium prevention.  Maintain fall precautions.

## 2018-03-24 NOTE — ASSESSMENT & PLAN NOTE
Encephalopathy noted as a secondary process related to the patient's acute illness. There is no specific treatment. Monitor neuro status, avoid medications such as narcotics and benzos which may worsen confusion, and use anti-psychotics to prevent behaviors of self harm.

## 2018-03-24 NOTE — ASSESSMENT & PLAN NOTE
H&H reviewed--stable with prior.  Monitor serial CBC.  Transfuse patient for H&H < 7/21 or for symptomatic anemia.

## 2018-03-24 NOTE — ASSESSMENT & PLAN NOTE
Health hazards associated with cigarette smoking were reviewed with patient and cessation was encouraged. Nicotine replacement and counseling options were discussed.  Spent 10 minutes counseling on cessation, patient not ready to quit-- nicotine patch ordered.

## 2018-03-24 NOTE — ED NOTES
Patient identifiers for Errol HERNANDEZ Keita checked and correct.  LOC:  Patient is awake, alert, and aware of environment with an appropriate affect. Patient is oriented x 3 and speaking appropriately.  Pt reports that she feels confused.    APPEARANCE:  Patient resting comfortably and in no acute distress. Patient is clean and well groomed, patient's clothing is properly fastened.  Numerous missing teeth and dental caries noted. Pt frail in appearance  SKIN:  The skin is warm and dry. Patient has normal skin turgor and dry mucus membranes. Skin is intact; no bruising or breakdown noted.  MUSCULOSKELETAL:  Patient is moving all extremities well, no obvious deformities noted. Pulses intact. Ambulates without assistance.  Pt endorses general fatigue today.    RESPIRATORY:  Airway is open and patent. Respirations are spontaneous and non-labored with normal effort and rate.  CARDIAC:  Patient has a normal rate and rhythm. No peripheral edema noted. Capillary refill < 3 seconds.  ABDOMEN:  No distention noted.  Soft and non-tender upon palpation.  NEUROLOGICAL:  PERRL. Facial expression is symmetrical. Hand grasps are equal bilaterally. Normal sensation in all extremities when touched with finger.  Allergies reported:  Review of patient's allergies indicates:  No Known Allergies  OTHER NOTES:

## 2018-03-24 NOTE — ASSESSMENT & PLAN NOTE
Patient has hypokalemia which is currently uncontrolled. Last electrolytes reviewed-   Recent Labs  Lab 03/23/18  2153 03/24/18  0532   K 3.4* 2.3*   . Will replace potassium and monitor electrolytes closely.

## 2018-03-24 NOTE — ASSESSMENT & PLAN NOTE
Chronically elevated with renal dysfunction.  No chest pain--  No evidence of ACS. Trend troponin.

## 2018-03-24 NOTE — PROGRESS NOTES
Results for WEISS, TERRA HERNANDEZ (MRN [id number]) as of 3/24/2018 12:15   Ref. Range 3/24/2018 05:32   Magnesium Latest Ref Range: 1.6 - 2.6 mg/dL 1.4 (L)     Replacement initiated based on sliding scale PRN order.    800 mg po mag administered.  Next doses due at 1600 and 2000.

## 2018-03-24 NOTE — ASSESSMENT & PLAN NOTE
ABG reviewed- not acidotic with normal serum ketones.  Admit for IV insulin infusion/IV hydration.  Monitor glucose hourly with titration of gtt as per protocol.  Clear liquid (sugar free diet).  Last HgbA1c 9.7 indicating poor glycemic control.  Consult diabetic educator.  Consult dietician.  Once glucose below 250 will resume patient's basal insulin with sliding scale.  Can add pre-meal if tolerating meals. Historically brittle.

## 2018-03-24 NOTE — ASSESSMENT & PLAN NOTE
Possibly triggered hyperglycemic state.  IV hydration.  Pain control with non-narcotic modalities for now as pain is mild and patient presented with encephalopathy..  Continue pancreatic enzyme supplementation.  Clear liquid diet for now advancing as tolerates.

## 2018-03-24 NOTE — PT/OT/SLP EVAL
Physical Therapy Evaluation    Patient Name:  Errol Keita   MRN:  [id number]    Recommendations:     Discharge Recommendations:  home with home health   Discharge Equipment Recommendations: none   Barriers to discharge: None    Assessment:     Errol Keita is a 52 y.o. female admitted with a medical diagnosis of Type 2 diabetes mellitus with hyperosmolar nonketotic hyperglycemia.  She presents with the following impairments/functional limitations:  weakness, impaired endurance, impaired self care skills, impaired functional mobilty, gait instability, impaired balance .    Rehab Prognosis:  Fair to good; patient would benefit from acute skilled PT services to address these deficits and reach maximum level of function.      Recent Surgery: * No surgery found *      Plan:     During this hospitalization, patient to be seen 6 x/week to address the above listed problems via gait training, therapeutic activities, therapeutic exercises  · Plan of Care Expires:  04/13/18   Plan of Care Reviewed with: patient    Subjective     Communicated with pt's nurse Kathi prior to session.  Patient found resting in bed upon PT entry to room, agreeable to evaluation.      Chief Complaint: tired  Patient comments/goals: regain strength to take care of herself  Pain/Comfort:  · Pain Rating 1: 0/10  · Pain Rating Post-Intervention 1: 0/10    Patients cultural, spiritual, Pentec[employer]alism conflicts given the current situation: none    Living Environment:  Lives w   Prior to admission, patients level of function was independent.  Patient has the following equipment: walker, rolling.  DME owned (not currently used): rolling walker.  Upon discharge, patient will have assistance from  (she also referred to him as her boyfriend).    Objective:     Patient found with: peripheral IV, telemetry     General Precautions: Standard, fall   Orthopedic Precautions:    Braces: N/A     Exams:  · Cognitive Exam:  Patient is oriented to Person,  Place and Situation and follows 100% of verbal commands   · RLE ROM: Deficits: tight hamstrings  · RLE Strength: 4+/5  · LLE ROM: tight hamstrings  · LLE Strength: 5/5    Functional Mobility:  · Bed Mobility:     · Rolling Left:  supervision  · Rolling Right: supervision  · Scooting: contact guard assistance  · Supine to Sit: minimum assistance  · Sit to Supine: minimum assistance  · Transfers:     · Sit to Stand:  minimum assistance with no AD  · Gait: 2 x 60 ft holding IV pole w R hand    AM-PAC 6 CLICK MOBILITY  Total Score:17       Therapeutic Activities and Exercises:   seated eob w pt instructed in HS stretch, ap's and LAQ  Gait training in hallway w 1 HHA on IV pole, CGA w 1 rest break for 30 sec   Pt declined using AD as she states she typically doesn't use it at home    Patient left HOB elevated with all lines intact, call button in reach and nursing notified.    GOALS:    Physical Therapy Goals        Problem: Physical Therapy Goal    Goal Priority Disciplines Outcome Goal Variances Interventions   Physical Therapy Goal     PT/OT, PT                      History:     Past Medical History:   Diagnosis Date    Arthritis     Asthma     Blood transfusion     CHF (congestive heart failure)     Coronary artery disease     Diabetes mellitus     Hypertension     Pancreatitis     Type 2 diabetes mellitus with hyperglycemia 7/13/2015       Past Surgical History:   Procedure Laterality Date    CARDIAC SURGERY      CABG    CHOLECYSTECTOMY      CORONARY ARTERY BYPASS GRAFT         Clinical Decision Making:     History  Co-morbidities and personal factors that may impact the plan of care Examination  Body Structures and Functions, activity limitations and participation restrictions that may impact the plan of care Clinical Presentation   Decision Making/ Complexity Score   Co-morbidities:   [] Time since onset of injury / illness / exacerbation  [x] Status of current condition  []Patient's cognitive status  and safety concerns    [] Multiple Medical Problems (see med hx)  Personal Factors:   [x] Patient's age  [] Prior Level of function   [] Patient's home situation (environment and family support)  [] Patient's level of motivation  [] Expected progression of patient      HISTORY:(criteria)    [] 49191 - no personal factors/history    [x] 58630 - has 1-2 personal factor/comorbidity     [] 16777 - has >3 personal factor/comorbidity     Body Regions:  [] Objective examination findings  [] Head     []  Neck  [x] Trunk   [] Upper Extremity  [x] Lower Extremity    Body Systems:  [] For communication ability, affect, cognition, language, and learning style: the assessment of the ability to make needs known, consciousness, orientation (person, place, and time), expected emotional /behavioral responses, and learning preferences (eg, learning barriers, education  needs)  [] For the neuromuscular system: a general assessment of gross coordinated movement (eg, balance, gait, locomotion, transfers, and transitions) and motor function  (motor control and motor learning)  [x] For the musculoskeletal system: the assessment of gross symmetry, gross range of motion, gross strength, height, and weight  [] For the integumentary system: the assessment of pliability(texture), presence of scar formation, skin color, and skin integrity  [] For cardiovascular/pulmonary system: the assessment of heart rate, respiratory rate, blood pressure, and edema     Activity limitations:    [] Patient's cognitive status and saf ety concerns          [] Status of current condition      [] Weight bearing restriction  [] Cardiopulmunary Restriction    Participation Restrictions:   [] Goals and goal agreement with the patient     [] Rehab potential (prognosis) and probable outcome      Examination of Body System: (criteria)    [x] 25367 - addressing 1-2 elements    [] 46011 - addressing a total of 3 or more elements     [] 32634 -  Addressing a total of 4 or  more elements         Clinical Presentation: (criteria)  Stable - 03685     On examination of body system using standardized tests and measures patient presents with 1-2 elements from any of the following: body structures and functions, activity limitations, and/or participation restrictions.  Leading to a clinical presentation that is considered stable and/or uncomplicated                              Clinical Decision Making  (Eval Complexity):  Low- 05242     Time Tracking:     PT Received On: 03/24/18  PT Start Time: 0925     PT Stop Time: 0950  PT Total Time (min): 25 min     Billable Minutes: Evaluation 10 and Gait Training 15      Karina Rose, PT  03/24/2018

## 2018-03-24 NOTE — ASSESSMENT & PLAN NOTE
Likely 2/2 IVVD associated with significant hyperglycemic state.  IV hydration.  Avoid non-essential nephrotoxins.    Creatine stable for now. BMP reviewed- noted below. Monitor UOP and serial BMP and adjust therapy as needed. Renally dose meds.  BMP  Lab Results   Component Value Date     03/24/2018    K 2.3 (LL) 03/24/2018     03/24/2018    CO2 19 (L) 03/24/2018    BUN 35 (H) 03/24/2018    CREATININE 2.4 (H) 03/24/2018    CALCIUM 7.7 (L) 03/24/2018    ANIONGAP 14 03/24/2018    ESTGFRAFRICA 26 (A) 03/24/2018    EGFRNONAA 22 (A) 03/24/2018

## 2018-03-24 NOTE — H&P
[facility] Hospital Medicine  History & Physical    Patient Name: Errol Keita  MRN: [id number]  Admission Date: 3/23/2018  Attending Physician: Gloria Frank MD   Primary Care Provider: Chica Irene DO         Patient information was obtained from patient and ER records.     Subjective:     Principal Problem:Type 2 diabetes mellitus with hyperosmolar nonketotic hyperglycemia    Chief Complaint:   Chief Complaint   Patient presents with    Fatigue     pt is not eating much and is an diabettic. Pt has lost more that 10 punds in a week according to family members. PT is not checkinghe Bg as she should. PT was 102lbs a week ago . She is 92lbs today bg in triag is greater than 500        HPI: Errol Keita is a 52 y.o. female with a history of DM, pancreatitis, HTN, and CHF.  She was admitted to the service of hospital medicine with HHS and TIMOTHY.  She presented to the ED after spouse found her to be very weak, fatigued and confused today.  She states that she hasnt been feeling well with poor appetite, diarrhea, and some abdominal pain.  She hasnt been eating or drinking the past several days.  She denies any fever, chills, dysuria, chest pain, or change from baseline SOB.  She does endorse increased urinary frequency--no vomiting noted.  She reports compliance with all medications including insulin.  In triage, her BG was more than 500.  She has presented similarly in the past.  Other pertinent medical history as below:     Past Medical History:   Diagnosis Date    Arthritis     Asthma     Blood transfusion     CHF (congestive heart failure)     Coronary artery disease     Diabetes mellitus     Hypertension     Pancreatitis     Type 2 diabetes mellitus with hyperglycemia 7/13/2015       Past Surgical History:   Procedure Laterality Date    CARDIAC SURGERY      CABG    CHOLECYSTECTOMY      CORONARY ARTERY BYPASS GRAFT         Review of patient's allergies indicates:  No Known  Allergies    No current facility-administered medications on file prior to encounter.      Current Outpatient Prescriptions on File Prior to Encounter   Medication Sig    amLODIPine (NORVASC) 5 MG tablet Take 1 tablet (5 mg total) by mouth once daily. (Patient taking differently: Take 10 mg by mouth once daily. )    aspirin 81 MG chewable tablet Take 81 mg by mouth once daily. Ran out    atorvastatin (LIPITOR) 80 MG tablet Take 1 tablet (80 mg total) by mouth once daily.    clopidogrel (PLAVIX) 75 mg tablet Take 1 tablet (75 mg total) by mouth once daily.    CREON 24,000-76,000 -120,000 unit capsule Take 1 capsule by mouth 4 (four) times daily with meals and nightly.     ferrous sulfate 325 (65 FE) MG EC tablet Take 1 tablet (325 mg total) by mouth 2 (two) times daily.    furosemide (LASIX) 40 MG tablet Take 40 mg by mouth once daily.    HUMALOG KWIKPEN 100 unit/mL InPn pen Inject 7 Units into the skin 3 (three) times daily before meals. (Patient taking differently: Inject 7 Units into the skin 3 (three) times daily before meals. Sliding scale)    insulin detemir (LEVEMIR FLEXTOUCH) 100 unit/mL (3 mL) SubQ InPn pen Inject 10 Units into the skin 2 (two) times daily. (Patient taking differently: Inject 4 Units into the skin 2 (two) times daily. )    lisinopril (PRINIVIL,ZESTRIL) 2.5 MG tablet Take 2.5 mg by mouth once daily.     quetiapine (SEROQUEL) 100 MG Tab 100 mg every evening.      Family History     Problem Relation (Age of Onset)    Asthma Brother    Cancer Maternal Grandmother    Depression Brother    Diabetes Mother, Father, Sister    Hearing loss Sister    Heart disease Sister    Hypertension Sister    Learning disabilities Sister    Vision loss Sister        Social History Main Topics    Smoking status: Current Every Day Smoker     Packs/day: 0.50     Years: 35.00     Types: Cigarettes    Smokeless tobacco: Never Used    Alcohol use No      Comment: questionable per     Drug use: No     Sexual activity: Not Currently     Partners: Male     Birth control/ protection: None     Review of Systems   Constitutional: Positive for activity change, appetite change and fatigue. Negative for chills and fever.   HENT: Negative for congestion, postnasal drip, sinus pressure, sore throat and trouble swallowing.    Eyes: Negative for photophobia and visual disturbance.   Respiratory: Positive for shortness of breath (chronic). Negative for cough and wheezing.    Gastrointestinal: Positive for abdominal pain and diarrhea. Negative for abdominal distention, constipation, nausea and vomiting.   Genitourinary: Positive for frequency. Negative for dysuria, flank pain and hematuria.   Musculoskeletal: Negative for arthralgias and myalgias.   Skin: Negative for color change.   Neurological: Positive for weakness. Negative for dizziness, light-headedness and headaches.   Psychiatric/Behavioral: Negative for agitation and confusion.     Objective:     Vital Signs (Most Recent):  Temp: 97.8 °F (36.6 °C) (03/23/18 2105)  Pulse: 84 (03/24/18 0101)  Resp: 18 (03/23/18 2105)  BP: (!) 143/63 (03/24/18 0101)  SpO2: 100 % (03/24/18 0101) Vital Signs (24h Range):  Temp:  [97.8 °F (36.6 °C)] 97.8 °F (36.6 °C)  Pulse:  [] 84  Resp:  [18] 18  SpO2:  [99 %-100 %] 100 %  BP: (110-143)/(63-76) 143/63     Weight: 42 kg (92 lb 9.5 oz)  Body mass index is 14.5 kg/m².    Physical Exam   Constitutional: She is oriented to person, place, and time. No distress.   Cachectic     HENT:   Head: Normocephalic and atraumatic.   edentulous   Eyes: Conjunctivae and EOM are normal. Pupils are equal, round, and reactive to light.   Neck: Normal range of motion. No thyromegaly present.   Cardiovascular: Normal rate, regular rhythm and intact distal pulses.    Murmur heard.  Pulmonary/Chest: Effort normal and breath sounds normal. No respiratory distress. She has no wheezes.   Abdominal: Soft. Bowel sounds are normal. She exhibits no distension  and no mass. There is tenderness (mid epigastrum). There is no guarding.   Musculoskeletal: Normal range of motion. She exhibits no edema or tenderness.   Neurological: She is alert and oriented to person, place, and time. No cranial nerve deficit.   drowsy   Skin: Skin is warm and dry. Capillary refill takes less than 2 seconds.   Psychiatric: She has a normal mood and affect. Her behavior is normal. Judgment and thought content normal.         CRANIAL NERVES     CN III, IV, VI   Pupils are equal, round, and reactive to light.  Extraocular motions are normal.        Significant Labs:   CBC:   Recent Labs  Lab 03/23/18 2153   WBC 7.10   HGB 13.8   HCT 40.9        CMP:   Recent Labs  Lab 03/23/18 2153   *   K 3.4*   CL 98   CO2 18*   *   BUN 35*   CREATININE 2.9*   CALCIUM 8.1*   PROT 7.7   ALBUMIN 3.0*   BILITOT 0.2   ALKPHOS 216*   AST 37   ALT 34   ANIONGAP 17*   EGFRNONAA 18*     Cardiac Markers:   Recent Labs  Lab 03/23/18 2153   *     Lactic Acid:   Recent Labs  Lab 03/23/18 2153   LACTATE 2.2     Lipase:   Recent Labs  Lab 03/23/18 2153   LIPASE 238*     Troponin:   Recent Labs  Lab 03/23/18 2153   TROPONINI 0.135*       Significant Imaging:  CXR: No acute findings (per [facility]).  I reviewed film.  No infiltrate or effusion appreciated.    Assessment/Plan:     * Type 2 diabetes mellitus with hyperosmolar nonketotic hyperglycemia      ABG reviewed- not acidotic with normal serum ketones.  Admit for IV insulin infusion/IV hydration.  Monitor glucose hourly with titration of gtt as per protocol.  Clear liquid (sugar free diet).  Last HgbA1c 9.7 indicating poor glycemic control.  Consult diabetic educator.  Consult dietician.  Once glucose below 250 will resume patient's basal insulin with sliding scale.  Can add pre-meal if tolerating meals. Historically brittle.          Encephalopathy, metabolic    Associated with significant hyperglycemia and TIMOTHY--improving.  Avoid sedatives.   Utilize non-pharmacologic modalities for delirium prevention.  Maintain fall precautions.          Acute on chronic pancreatitis    Possibly triggered hyperglycemic state.  IV hydration.  Pain control with non-narcotic modalities for now as pain is mild and patient presented with encephalopathy..  Continue pancreatic enzyme supplementation.  Clear liquid diet for now advancing as tolerates.          Moderate tobacco dependence    Health hazards associated with cigarette smoking were reviewed with patient and cessation was encouraged. Nicotine replacement and counseling options were discussed.  Spent 3 minutes counseling on cessation, patient not ready to quit-- nicotine patch ordered.            Anemia of chronic renal failure    H&H reviewed--stable with prior.  Monitor serial CBC.  Transfuse patient for H&H < 7/21 or for symptomatic anemia.          TIMOTHY (acute kidney injury) on CKD (chronic kidney disease) stage 3, GFR 30-59 ml/min   -Diabetic nephropathy associated with type 2 diabetes mellitus    Likely 2/2 IVVD associated with significant hyperglycemic state.  IV hydration.  Avoid non-essential nephrotoxins.  Renal dose medications for Estimated Creatinine Clearance: 14.7 mL/min (A) (based on SCr of 2.9 mg/dL (H)). Follow UA--pending.           Coronary artery disease involving native coronary artery without angina pectoris    No anginal symptoms.  Continue ASA/plavix, monitor on telemetry.          Severe malnutrition    Consult dietician, needs supplemental nutrition.           Major depressive disorder    Chronic, stable.  Continue seroquel when clinically appropriate.          Elevated troponin    Chronically elevated with renal dysfunction.  No chest pain--  No evidence of ACS.          Hypertension associated with diabetes    Chronic, poorly controlled.  Resume norvasc.  Holding lisinopril 2/2 TIMOTHY.  Resume once clinically appropriate.  Monitor BP and adjust regimen as required for sustained BP  control.            VTE Risk Mitigation         Ordered     enoxaparin injection 30 mg  Daily     Route:  Subcutaneous        03/24/18 0624     IP VTE HIGH RISK PATIENT  Once      03/24/18 0544             Mira Smith NP  Department of Hospital Medicine   [facility]

## 2018-03-24 NOTE — CONSULTS
"  [facility]  Adult Nutrition  Consult Note    SUMMARY     Recommendations  Recommendation/Intervention: 1.) Recommend 1800 diabetic diet + boost glucose control TID   Goals: 1.) patient will meet >80% of EEN while admitted 2.) patient will tolerate diet   Nutrition Goal Status: new  Communication of RD Recs:  (second sign to MD)     1. Acute renal failure superimposed on chronic kidney disease, unspecified CKD stage, unspecified acute renal failure type    2. Fatigue    3. Type 2 diabetes mellitus with hyperosmolar nonketotic hyperglycemia      Past Medical History:   Diagnosis Date    Arthritis     Asthma     Blood transfusion     CHF (congestive heart failure)     Coronary artery disease     Diabetes mellitus     Hypertension     Pancreatitis     Type 2 diabetes mellitus with hyperglycemia 2015         Reason for Assessment  Reason for Assessment: consult  General Information Comments: Admits with fatigue and elevated blood sugar. Alert/oriented during RD visit. Reports she ate well at breakfast (75%). Weightloss noted in the last 5 weeks. Educated patient on carb counting. see education tab. Wants boost glucose control with meals. Severe fat loss noted, most skin available in upper arm, severe muscle loss noted with prominent protrusion of clavicle, squared shoulders.     Nutrition Risk Screen  (-)    Nutrition/Diet History  Food Preferences: none   Food Allergies: NKFA    Anthropometrics  Temp: 97.6 °F (36.4 °C)  Height: 5' 7"  Height (inches): 67 in  Weight Method: Standard Scale  Weight: 42 kg (92 lb 9.5 oz)  Weight (lb): 92.59 lb  Ideal Body Weight (IBW), Female: 135 lb  % Ideal Body Weight, Female (lb): 68.59 lb  BMI (Calculated): 14.5  BMI Grade: less than 16 protein-energy malnutrition grade III  Usual Body Weight (UBW), k kg  % Usual Body Weight: 84.18  % Weight Change From Usual Weight: -16 %      Lab/Procedures/Meds  Pertinent Labs Reviewed: reviewed  BMP  Lab " "Results   Component Value Date     03/24/2018    K 2.3 (LL) 03/24/2018     03/24/2018    CO2 19 (L) 03/24/2018    BUN 35 (H) 03/24/2018    CREATININE 2.4 (H) 03/24/2018    CALCIUM 7.7 (L) 03/24/2018    ANIONGAP 14 03/24/2018    ESTGFRAFRICA 26 (A) 03/24/2018    EGFRNONAA 22 (A) 03/24/2018     Lab Results   Component Value Date    ALBUMIN 2.5 (L) 03/24/2018     Lab Results   Component Value Date    CALCIUM 7.7 (L) 03/24/2018    PHOS 3.2 03/24/2018       Recent Labs  Lab 03/24/18  1024   POCTGLUCOSE 142*       Pertinent Medications Reviewed: reviewed  Scheduled Meds:   amLODIPine  10 mg Oral Daily    aspirin  81 mg Oral Daily    atorvastatin  80 mg Oral Daily    clopidogrel  75 mg Oral Daily    enoxaparin  30 mg Subcutaneous Daily    ferrous sulfate  325 mg Oral BID    insulin detemir U-100  4 Units Subcutaneous BID    lipase-protease-amylase 24,000-76,000-120,000 units  1 capsule Oral QID (WM & HS)    nicotine  1 patch Transdermal Daily    potassium chloride  10 mEq Intravenous Q1H     Continuous Infusions:   sodium chloride 0.9% 125 mL/hr at 03/24/18 0258         Physical Findings/Assessment  Overall Physical Appearance: loss of subcutaneous fat, loss of muscle mass  Oral/Mouth Cavity: tooth/teeth missing  Skin: intact    Estimated/Assessed Needs  Weight Used For Calorie Calculations: 42 kg (92 lb 9.5 oz)  Height: 5' 7"  Energy Need Method: Kcal/kg  35 kcal/kg (kcal): 1470 - 40 kcal/kg (kcal): 1680  RMR ([place]-St. Jeor Equation): 1052.62  Weight Used For Protein Calculations: 42 kg (92 lb 9.5 oz)  1.5 gm Protein (gm): 63.13 - 2.0 gm Protein (gm): 84.18  Fluid Need Method: RDA Method (or per MD)       Nutrition Prescription Ordered  Current Diet Order: 2800 diabetic diet    Evaluation of Received Nutrient/Fluid Intake  IV Fluid (mL): 3000  Tolerance: tolerating  % Intake of Estimated Energy Needs: 50 - 75 %  % Meal Intake: 75%    Nutrition Risk  Level of Risk/Frequency of Follow-up:  (x2 " weekly)     Assessment and Plan    Severe malnutrition    Related to (etiology):   Decreased ability to consume sufficient energy     Signs and Symptoms (as evidenced by):   1.) 16% weight loss in 6 weeks  2.) Severe muscle loss, shoulder clavicle   3.) Severe fat loss, upper arms     Interventions/Recommendations (treatment strategy):  Continue with diet, Boost glucose control TID     Nutrition Diagnosis Status:   New               Monitor and Evaluation  Food and Nutrient Intake: energy intake, food and beverage intake  Food and Nutrient Adminstration: diet order  Anthropometric Measurements: weight, weight change, body mass index  Biochemical Data, Medical Tests and Procedures: electrolyte and renal panel, glucose/endocrine profile, lipid profile  Nutrition-Focused Physical Findings: skin     Nutrition Follow-Up  RD Follow-up?: Yes     Nutrition discharge planning: diabetic diet + ONS

## 2018-03-24 NOTE — ASSESSMENT & PLAN NOTE
Magnesium reviewed-   Recent Labs  Lab 03/24/18  0532   MG 1.4*    Will replace electrolytes and continue to monitor closely.

## 2018-03-24 NOTE — PROGRESS NOTES
Results for WEISS, TERRA HERNANDEZ (MRN [id number]) as of 3/24/2018 07:28   Ref. Range 3/24/2018 05:32   Potassium Latest Ref Range: 3.5 - 5.1 mmol/L 2.3 (LL)     New orders received from JOEL Smith NP.

## 2018-03-24 NOTE — PLAN OF CARE
Problem: Patient Care Overview  Goal: Plan of Care Review  Outcome: Ongoing (interventions implemented as appropriate)  02 saturation 100% on room air.

## 2018-03-24 NOTE — ASSESSMENT & PLAN NOTE
Related to (etiology):   Decreased ability to consume sufficient energy     Signs and Symptoms (as evidenced by):   1.) 16% weight loss in 6 weeks  2.) Severe muscle loss, shoulder clavicle   3.) Severe fat loss, upper arms     Interventions/Recommendations (treatment strategy):  Continue with diet, Boost glucose control TID     Nutrition Diagnosis Status:   New

## 2018-03-24 NOTE — SUBJECTIVE & OBJECTIVE
Interval History: Patient seen and examined.  Overnight, patient sugars came down from the 600s down in the 200s this morning.  Patient refused Levemir this morning.  Plan of care discussed the patient and the bedside nurse in detail.    Review of Systems   Constitutional: Positive for fatigue.   Respiratory: Negative for cough and shortness of breath.    Cardiovascular: Negative for chest pain and leg swelling.   Gastrointestinal: Negative for abdominal pain and nausea.   Neurological: Positive for weakness.   Psychiatric/Behavioral: Negative for confusion.   All other systems reviewed and are negative.    Objective:     Vital Signs (Most Recent):  Temp: 96.3 °F (35.7 °C) (03/24/18 1126)  Pulse: 80 (03/24/18 1126)  Resp: 16 (03/24/18 1126)  BP: (!) 158/94 (03/24/18 1126)  SpO2: 100 % (03/24/18 1126) Vital Signs (24h Range):  Temp:  [96.3 °F (35.7 °C)-97.8 °F (36.6 °C)] 96.3 °F (35.7 °C)  Pulse:  [] 80  Resp:  [14-18] 16  SpO2:  [99 %-100 %] 100 %  BP: (110-173)/(60-94) 158/94     Weight: 42 kg (92 lb 9.5 oz)  Body mass index is 14.5 kg/m².    Intake/Output Summary (Last 24 hours) at 03/24/18 1301  Last data filed at 03/24/18 1000   Gross per 24 hour   Intake              970 ml   Output                0 ml   Net              970 ml      Physical Exam   Constitutional: She is oriented to person, place, and time. She appears well-developed and well-nourished.   Appears older than stated age   HENT:   temporal wasting, poor dentitia   Eyes: EOM are normal. Pupils are equal, round, and reactive to light.   Cardiovascular: Normal rate, regular rhythm and intact distal pulses.    No murmur heard.  Pulmonary/Chest: Effort normal and breath sounds normal.   Abdominal: Soft. She exhibits no distension. There is no tenderness.   Neurological: She is alert and oriented to person, place, and time.   Skin: No rash noted. No pallor.   Nursing note and vitals reviewed.      Significant Labs: All pertinent labs within the  past 24 hours have been reviewed.    Significant Imaging: I have reviewed all pertinent imaging results/findings within the past 24 hours.

## 2018-03-24 NOTE — ASSESSMENT & PLAN NOTE
Health hazards associated with cigarette smoking were reviewed with patient and cessation was encouraged. Nicotine replacement and counseling options were discussed.  Spent 3 minutes counseling on cessation, patient not ready to quit-- nicotine patch ordered.

## 2018-03-24 NOTE — ASSESSMENT & PLAN NOTE
Nutrition consulted. Body mass index is 14.5 kg/m².. Encourage maximal PO intake. Diet supplementation ordered per nutrition approval. Will encourage PO and monitor closely for weight changes.

## 2018-03-25 LAB
ALBUMIN SERPL BCP-MCNC: 2.1 G/DL
ALP SERPL-CCNC: 155 U/L
ALT SERPL W/O P-5'-P-CCNC: 24 U/L
ANION GAP SERPL CALC-SCNC: 8 MMOL/L
AST SERPL-CCNC: 27 U/L
BASOPHILS # BLD AUTO: 0 K/UL
BASOPHILS NFR BLD: 0.3 %
BILIRUB SERPL-MCNC: 0.2 MG/DL
BUN SERPL-MCNC: 28 MG/DL
CALCIUM SERPL-MCNC: 7.4 MG/DL
CHLORIDE SERPL-SCNC: 118 MMOL/L
CO2 SERPL-SCNC: 19 MMOL/L
CREAT SERPL-MCNC: 1.9 MG/DL
DIFFERENTIAL METHOD: ABNORMAL
EOSINOPHIL # BLD AUTO: 0.1 K/UL
EOSINOPHIL NFR BLD: 0.7 %
ERYTHROCYTE [DISTWIDTH] IN BLOOD BY AUTOMATED COUNT: 14.6 %
EST. GFR  (AFRICAN AMERICAN): 34 ML/MIN/1.73 M^2
EST. GFR  (NON AFRICAN AMERICAN): 29 ML/MIN/1.73 M^2
GLUCOSE SERPL-MCNC: 214 MG/DL
HCT VFR BLD AUTO: 33 %
HGB BLD-MCNC: 11 G/DL
LYMPHOCYTES # BLD AUTO: 3 K/UL
LYMPHOCYTES NFR BLD: 36.8 %
MAGNESIUM SERPL-MCNC: 1.5 MG/DL
MCH RBC QN AUTO: 30.8 PG
MCHC RBC AUTO-ENTMCNC: 33.4 G/DL
MCV RBC AUTO: 92 FL
MONOCYTES # BLD AUTO: 0.3 K/UL
MONOCYTES NFR BLD: 4.2 %
NEUTROPHILS # BLD AUTO: 4.7 K/UL
NEUTROPHILS NFR BLD: 58 %
PHOSPHATE SERPL-MCNC: 2.6 MG/DL
PLATELET # BLD AUTO: 289 K/UL
PMV BLD AUTO: 9.3 FL
POCT GLUCOSE: 170 MG/DL (ref 70–110)
POCT GLUCOSE: 260 MG/DL (ref 70–110)
POCT GLUCOSE: 286 MG/DL (ref 70–110)
POCT GLUCOSE: 365 MG/DL (ref 70–110)
POCT GLUCOSE: 439 MG/DL (ref 70–110)
POTASSIUM SERPL-SCNC: 3 MMOL/L
PROT SERPL-MCNC: 5.4 G/DL
RBC # BLD AUTO: 3.58 M/UL
SODIUM SERPL-SCNC: 145 MMOL/L
WBC # BLD AUTO: 8.2 K/UL

## 2018-03-25 PROCEDURE — 11000001 HC ACUTE MED/SURG PRIVATE ROOM

## 2018-03-25 PROCEDURE — 94761 N-INVAS EAR/PLS OXIMETRY MLT: CPT

## 2018-03-25 PROCEDURE — 25000003 PHARM REV CODE 250: Performed by: HOSPITALIST

## 2018-03-25 PROCEDURE — 85025 COMPLETE CBC W/AUTO DIFF WBC: CPT

## 2018-03-25 PROCEDURE — 84100 ASSAY OF PHOSPHORUS: CPT

## 2018-03-25 PROCEDURE — 25000003 PHARM REV CODE 250: Performed by: NURSE PRACTITIONER

## 2018-03-25 PROCEDURE — 63600175 PHARM REV CODE 636 W HCPCS: Performed by: NURSE PRACTITIONER

## 2018-03-25 PROCEDURE — 80053 COMPREHEN METABOLIC PANEL: CPT

## 2018-03-25 PROCEDURE — S4991 NICOTINE PATCH NONLEGEND: HCPCS | Performed by: NURSE PRACTITIONER

## 2018-03-25 PROCEDURE — 83735 ASSAY OF MAGNESIUM: CPT

## 2018-03-25 PROCEDURE — 36415 COLL VENOUS BLD VENIPUNCTURE: CPT

## 2018-03-25 RX ORDER — CLONIDINE HYDROCHLORIDE 0.1 MG/1
0.1 TABLET ORAL EVERY 6 HOURS PRN
Status: DISCONTINUED | OUTPATIENT
Start: 2018-03-25 | End: 2018-03-26 | Stop reason: HOSPADM

## 2018-03-25 RX ADMIN — CLOPIDOGREL BISULFATE 75 MG: 75 TABLET ORAL at 08:03

## 2018-03-25 RX ADMIN — FERROUS SULFATE TAB EC 325 MG (65 MG FE EQUIVALENT) 325 MG: 325 (65 FE) TABLET DELAYED RESPONSE at 08:03

## 2018-03-25 RX ADMIN — PANCRELIPASE 1 CAPSULE: 24000; 76000; 120000 CAPSULE, DELAYED RELEASE PELLETS ORAL at 08:03

## 2018-03-25 RX ADMIN — POTASSIUM CHLORIDE 60 MEQ: 20 SOLUTION ORAL at 11:03

## 2018-03-25 RX ADMIN — INSULIN ASPART 3 UNITS: 100 INJECTION, SOLUTION INTRAVENOUS; SUBCUTANEOUS at 11:03

## 2018-03-25 RX ADMIN — NICOTINE 1 PATCH: 14 PATCH, EXTENDED RELEASE TRANSDERMAL at 08:03

## 2018-03-25 RX ADMIN — PANCRELIPASE 1 CAPSULE: 24000; 76000; 120000 CAPSULE, DELAYED RELEASE PELLETS ORAL at 05:03

## 2018-03-25 RX ADMIN — INSULIN DETEMIR 4 UNITS: 100 INJECTION, SOLUTION SUBCUTANEOUS at 08:03

## 2018-03-25 RX ADMIN — PANCRELIPASE 1 CAPSULE: 24000; 76000; 120000 CAPSULE, DELAYED RELEASE PELLETS ORAL at 11:03

## 2018-03-25 RX ADMIN — ENOXAPARIN SODIUM 30 MG: 100 INJECTION SUBCUTANEOUS at 08:03

## 2018-03-25 RX ADMIN — INSULIN ASPART 5 UNITS: 100 INJECTION, SOLUTION INTRAVENOUS; SUBCUTANEOUS at 08:03

## 2018-03-25 RX ADMIN — INSULIN ASPART 3 UNITS: 100 INJECTION, SOLUTION INTRAVENOUS; SUBCUTANEOUS at 05:03

## 2018-03-25 RX ADMIN — ATORVASTATIN CALCIUM 80 MG: 40 TABLET, FILM COATED ORAL at 08:03

## 2018-03-25 RX ADMIN — AMLODIPINE BESYLATE 10 MG: 5 TABLET ORAL at 08:03

## 2018-03-25 RX ADMIN — POTASSIUM CHLORIDE 60 MEQ: 20 SOLUTION ORAL at 02:03

## 2018-03-25 RX ADMIN — ASPIRIN 81 MG CHEWABLE TABLET 81 MG: 81 TABLET CHEWABLE at 08:03

## 2018-03-25 NOTE — ASSESSMENT & PLAN NOTE
Chronic, poorly controlled.  Resume norvasc.  Holding lisinopril 2/2 TIMOTHY.  Resume once clinically appropriate.  Monitor BP and adjust regimen as required for sustained BP control. Mildly elevated- order PRN clonidine.

## 2018-03-25 NOTE — ASSESSMENT & PLAN NOTE
Improved, back to baseline. Encephalopathy noted as a secondary process related to the patient's acute illness. There is no specific treatment. Monitor neuro status, avoid medications such as narcotics and benzos which may worsen confusion, and use anti-psychotics to prevent behaviors of self harm.

## 2018-03-25 NOTE — ASSESSMENT & PLAN NOTE
Patient now back on basal and bolus insulin.  Patient with very poor long-term control of her diabetes.  Will continue on basal/bolus insulin today and diabetic diet and continue to monitor.  Patient will need diabetic education and nutrition prior to going home.  Hopefully DC tomorrow.    Patient's FSGs are not controlled on current hypoglycemics.   Last A1c reviewed-   Lab Results   Component Value Date    HGBA1C 13.5 (H) 03/24/2018     Most recent fingerstick glucose reviewed-     Recent Labs  Lab 03/25/18  1058   POCTGLUCOSE 286*     Current correctional scale  Low  Maintain anti-hyperglycemic dose as follows-   Antihyperglycemics     Start     Stop Route Frequency Ordered    03/24/18 0900  insulin detemir U-100 pen 4 Units      -- SubQ 2 times daily 03/24/18 0610    03/24/18 0713  insulin aspart U-100 pen 0-5 Units      -- SubQ Before meals & nightly PRN 03/24/18 0613

## 2018-03-25 NOTE — ASSESSMENT & PLAN NOTE
Patient with known CAD s/p CABG, which is controlled Will continue Aspirin and Statin and monitor for S/Sx of angina/ACS. Continue to monitor on telemetry.

## 2018-03-25 NOTE — SUBJECTIVE & OBJECTIVE
Interval History: Patient seen and examined.  No acute events overnight.  Tolerating diabetic diet without issue.  No complaints this morning.  Noted hypertension after receiving amlodipine which is persistent and asymptomatic.    Review of Systems   Constitutional: Positive for fatigue.   Respiratory: Negative for cough and shortness of breath.    Cardiovascular: Negative for chest pain and leg swelling.   Gastrointestinal: Negative for abdominal pain and nausea.   Neurological: Positive for weakness.   Psychiatric/Behavioral: Negative for confusion.   All other systems reviewed and are negative.    Objective:     Vital Signs (Most Recent):  Temp: 97.7 °F (36.5 °C) (03/25/18 1114)  Pulse: 78 (03/25/18 1114)  Resp: 17 (03/25/18 1114)  BP: (!) 162/86 (03/25/18 1114)  SpO2: 98 % (03/25/18 1114) Vital Signs (24h Range):  Temp:  [96.9 °F (36.1 °C)-98.1 °F (36.7 °C)] 97.7 °F (36.5 °C)  Pulse:  [76-87] 78  Resp:  [14-18] 17  SpO2:  [97 %-100 %] 98 %  BP: (114-186)/(65-98) 162/86     Weight: 45.2 kg (99 lb 10.4 oz)  Body mass index is 15.61 kg/m².    Intake/Output Summary (Last 24 hours) at 03/25/18 1420  Last data filed at 03/25/18 0600   Gross per 24 hour   Intake             3485 ml   Output              325 ml   Net             3160 ml      Physical Exam   Constitutional: She is oriented to person, place, and time. She appears well-developed and well-nourished.   Appears older than stated age   HENT:   temporal wasting, poor dentitia   Eyes: EOM are normal. Pupils are equal, round, and reactive to light.   Cardiovascular: Normal rate, regular rhythm and intact distal pulses.    No murmur heard.  Pulmonary/Chest: Effort normal and breath sounds normal.   Abdominal: Soft. She exhibits no distension. There is no tenderness.   Neurological: She is alert and oriented to person, place, and time.   Skin: No rash noted. No pallor.   Nursing note and vitals reviewed.      Significant Labs: All pertinent labs within the past 24  hours have been reviewed.    Significant Imaging: I have reviewed all pertinent imaging results/findings within the past 24 hours.

## 2018-03-25 NOTE — PLAN OF CARE
Problem: Diabetes, Type 2 (Adult)  Goal: Signs and Symptoms of Listed Potential Problems Will be Absent, Minimized or Managed (Diabetes, Type 2)  Signs and symptoms of listed potential problems will be absent, minimized or managed by discharge/transition of care (reference Diabetes, Type 2 (Adult) CPG).   Outcome: Ongoing (interventions implemented as appropriate)  Alert/oriented  Denies pain  Swallows without problems  Out of bed with assistance.   Will monitor blood sugar  Safe

## 2018-03-25 NOTE — PROGRESS NOTES
[facility] Hospital Medicine  Progress Note    Patient Name: Errol Keita  MRN: [id number]  Patient Class: IP- Inpatient   Admission Date: 3/23/2018  Length of Stay: 1 days  Attending Physician: Foreign Greco MD  Primary Care Provider: Chica Irene DO        Subjective:     Principal Problem:Type 2 diabetes mellitus with hyperosmolar nonketotic hyperglycemia    HPI:  Errol Keita is a 52 y.o. female with a history of DM, pancreatitis, HTN, and CHF.  She was admitted to the service of hospital medicine with HHS and TIMOTHY.  She presented to the ED after spouse found her to be very weak, fatigued and confused today.  She states that she hasnt been feeling well with poor appetite, diarrhea, and some abdominal pain.  She hasnt been eating or drinking the past several days.  She denies any fever, chills, dysuria, chest pain, or change from baseline SOB.  She does endorse increased urinary frequency--no vomiting noted.  She reports compliance with all medications including insulin.  In triage, her BG was more than 500.  She has presented similarly in the past.  Other pertinent medical history as below:     Hospital Course:  No notes on file    Interval History: Patient seen and examined.  No acute events overnight.  Tolerating diabetic diet without issue.  No complaints this morning.  Noted hypertension after receiving amlodipine which is persistent and asymptomatic.    Review of Systems   Constitutional: Positive for fatigue.   Respiratory: Negative for cough and shortness of breath.    Cardiovascular: Negative for chest pain and leg swelling.   Gastrointestinal: Negative for abdominal pain and nausea.   Neurological: Positive for weakness.   Psychiatric/Behavioral: Negative for confusion.   All other systems reviewed and are negative.    Objective:     Vital Signs (Most Recent):  Temp: 97.7 °F (36.5 °C) (03/25/18 1114)  Pulse: 78 (03/25/18 1114)  Resp: 17 (03/25/18 1114)  BP: (!) 162/86  (03/25/18 1114)  SpO2: 98 % (03/25/18 1114) Vital Signs (24h Range):  Temp:  [96.9 °F (36.1 °C)-98.1 °F (36.7 °C)] 97.7 °F (36.5 °C)  Pulse:  [76-87] 78  Resp:  [14-18] 17  SpO2:  [97 %-100 %] 98 %  BP: (114-186)/(65-98) 162/86     Weight: 45.2 kg (99 lb 10.4 oz)  Body mass index is 15.61 kg/m².    Intake/Output Summary (Last 24 hours) at 03/25/18 1420  Last data filed at 03/25/18 0600   Gross per 24 hour   Intake             3485 ml   Output              325 ml   Net             3160 ml      Physical Exam   Constitutional: She is oriented to person, place, and time. She appears well-developed and well-nourished.   Appears older than stated age   HENT:   temporal wasting, poor dentitia   Eyes: EOM are normal. Pupils are equal, round, and reactive to light.   Cardiovascular: Normal rate, regular rhythm and intact distal pulses.    No murmur heard.  Pulmonary/Chest: Effort normal and breath sounds normal.   Abdominal: Soft. She exhibits no distension. There is no tenderness.   Neurological: She is alert and oriented to person, place, and time.   Skin: No rash noted. No pallor.   Nursing note and vitals reviewed.      Significant Labs: All pertinent labs within the past 24 hours have been reviewed.    Significant Imaging: I have reviewed all pertinent imaging results/findings within the past 24 hours.    Assessment/Plan:      * Type 2 diabetes mellitus with hyperosmolar nonketotic hyperglycemia    Patient now back on basal and bolus insulin.  Patient with very poor long-term control of her diabetes.  Will continue on basal/bolus insulin today and diabetic diet and continue to monitor.  Patient will need diabetic education and nutrition prior to going home.  Hopefully DC tomorrow.    Patient's FSGs are not controlled on current hypoglycemics.   Last A1c reviewed-   Lab Results   Component Value Date    HGBA1C 13.5 (H) 03/24/2018     Most recent fingerstick glucose reviewed-     Recent Labs  Lab 03/25/18  7932    POCTGLUCOSE 286*     Current correctional scale  Low  Maintain anti-hyperglycemic dose as follows-   Antihyperglycemics     Start     Stop Route Frequency Ordered    03/24/18 0900  insulin detemir U-100 pen 4 Units      -- SubQ 2 times daily 03/24/18 0610    03/24/18 0713  insulin aspart U-100 pen 0-5 Units      -- SubQ Before meals & nightly PRN 03/24/18 0613                  Encephalopathy, metabolic    Improved, back to baseline. Encephalopathy noted as a secondary process related to the patient's acute illness. There is no specific treatment. Monitor neuro status, avoid medications such as narcotics and benzos which may worsen confusion, and use anti-psychotics to prevent behaviors of self harm.            Acute on chronic pancreatitis    Possibly triggered hyperglycemic state.  IV hydration.  Pain control with non-narcotic modalities for now as pain is mild and patient presented with encephalopathy. Continue pancreatic enzyme supplementation.          Moderate tobacco dependence    Health hazards associated with cigarette smoking were reviewed with patient and cessation was encouraged. Nicotine replacement and counseling options were discussed.  Spent 10 minutes counseling on cessation, patient not ready to quit-- nicotine patch ordered.            Diabetic nephropathy associated with type 2 diabetes mellitus              Hypokalemia    Patient has hypokalemia which is currently uncontrolled. Last electrolytes reviewed-   Recent Labs  Lab 03/23/18  2153 03/24/18  0532   K 3.4* 2.3*   . Will replace potassium and monitor electrolytes closely.             Anemia of chronic renal failure    H&H reviewed--stable with prior.  Monitor serial CBC.  Transfuse patient for H&H < 7/21 or for symptomatic anemia.          CKD (chronic kidney disease) stage 3, GFR 30-59 ml/min              TIMOTHY (acute kidney injury)    Patient with TIMOTHY likely d/t IVVD  Which is currently controlled. Labs reviewed- BMP with Estimated  Creatinine Clearance: 24.2 mL/min (A) (based on SCr of 1.9 mg/dL (H)). according to latest data. Monitor UOP and serial BMP and adjust therapy as needed. Renally dose meds for GFR listed above.                    Hypomagnesemia    Magnesium reviewed-   Recent Labs  Lab 03/24/18  0532   MG 1.4*    Will replace electrolytes and continue to monitor closely.               Coronary artery disease involving native coronary artery without angina pectoris    Patient with known CAD s/p CABG, which is controlled Will continue Aspirin and Statin and monitor for S/Sx of angina/ACS. Continue to monitor on telemetry.             Severe malnutrition    Nutrition consulted. Body mass index is 15.61 kg/m².. Encourage maximal PO intake. Diet supplementation ordered per nutrition approval. Will encourage PO and monitor closely for weight changes.            Major depressive disorder    Chronic, stable.  Continue seroquel when clinically appropriate.          Elevated troponin    Chronically elevated with renal dysfunction.  No chest pain--  No evidence of ACS. Trend troponin.          Hypertension associated with diabetes    Chronic, poorly controlled.  Resume norvasc.  Holding lisinopril 2/2 TIMOTHY.  Resume once clinically appropriate.  Monitor BP and adjust regimen as required for sustained BP control. Mildly elevated- order PRN clonidine.            VTE Risk Mitigation         Ordered     enoxaparin injection 30 mg  Daily     Route:  Subcutaneous        03/24/18 0732     IP VTE HIGH RISK PATIENT  Once      03/24/18 0563              Foreign Greco MD  Department of Hospital Medicine   [facility]

## 2018-03-25 NOTE — NURSING
PRN electrolyte replacement not initiated d/t creatine 1.9, contraindicated per order instructions.

## 2018-03-25 NOTE — PLAN OF CARE
SW met w/ pt for assessment.  Pt denies current HH, uses walker.  Pt lives w/ significant other, is independent w/ ADL.  Her S.O. Provides transport to medical appointments, etc.  She states she has a [employer] .         03/25/18 0925   Discharge Assessment   Assessment Type Discharge Planning Assessment   Confirmed/corrected address and phone number on facesheet? Yes   Assessment information obtained from? Patient   Prior to hospitilization cognitive status: Alert/Oriented   Prior to hospitalization functional status: Independent   Current cognitive status: Alert/Oriented   Current Functional Status: Independent   Facility Arrived From: home   Lives With significant other   Able to Return to Prior Arrangements yes   Is patient able to care for self after discharge? Yes   Who are your caregiver(s) and their phone number(s)? significant other:  Ti Green  [phone]   Patient's perception of discharge disposition home or selfcare   Readmission Within The Last 30 Days no previous admission in last 30 days   Patient currently being followed by outpatient case management? Yes   If yes, name of outpatient case management following: insurance company assigned oupatient case management   Patient currently receives any other outside agency services? No   Equipment Currently Used at Home walker, rolling   Do you have any problems affording any of your prescribed medications? No   Is the patient taking medications as prescribed? yes   Does the patient have transportation home? Yes   Transportation Available family or friend will provide   Does the patient receive services at the Coumadin Clinic? No   Discharge Plan A Home with family   Discharge Plan B Home with family   Patient/Family In Agreement With Plan yes

## 2018-03-25 NOTE — ASSESSMENT & PLAN NOTE
Patient with TIMOTHY likely d/t IVVD  Which is currently controlled. Labs reviewed- BMP with Estimated Creatinine Clearance: 24.2 mL/min (A) (based on SCr of 1.9 mg/dL (H)). according to latest data. Monitor UOP and serial BMP and adjust therapy as needed. Renally dose meds for GFR listed above.

## 2018-03-25 NOTE — PLAN OF CARE
Problem: Patient Care Overview  Goal: Plan of Care Review  Outcome: Ongoing (interventions implemented as appropriate)  02 saturation 98% on room air.

## 2018-03-25 NOTE — ASSESSMENT & PLAN NOTE
Nutrition consulted. Body mass index is 15.61 kg/m².. Encourage maximal PO intake. Diet supplementation ordered per nutrition approval. Will encourage PO and monitor closely for weight changes.

## 2018-03-26 VITALS
WEIGHT: 102.94 LBS | BODY MASS INDEX: 16.16 KG/M2 | DIASTOLIC BLOOD PRESSURE: 89 MMHG | RESPIRATION RATE: 18 BRPM | SYSTOLIC BLOOD PRESSURE: 151 MMHG | HEIGHT: 67 IN | TEMPERATURE: 98 F | OXYGEN SATURATION: 99 % | HEART RATE: 76 BPM

## 2018-03-26 LAB
ALBUMIN SERPL BCP-MCNC: 2.2 G/DL
ALP SERPL-CCNC: 155 U/L
ALT SERPL W/O P-5'-P-CCNC: 25 U/L
ANION GAP SERPL CALC-SCNC: 6 MMOL/L
AST SERPL-CCNC: 38 U/L
BACTERIA UR CULT: NO GROWTH
BASOPHILS # BLD AUTO: 0 K/UL
BASOPHILS NFR BLD: 0.3 %
BILIRUB SERPL-MCNC: 0.1 MG/DL
BUN SERPL-MCNC: 24 MG/DL
CALCIUM SERPL-MCNC: 7.9 MG/DL
CHLORIDE SERPL-SCNC: 121 MMOL/L
CO2 SERPL-SCNC: 16 MMOL/L
CREAT SERPL-MCNC: 2 MG/DL
DIFFERENTIAL METHOD: ABNORMAL
EOSINOPHIL # BLD AUTO: 0.1 K/UL
EOSINOPHIL NFR BLD: 0.8 %
ERYTHROCYTE [DISTWIDTH] IN BLOOD BY AUTOMATED COUNT: 14.9 %
EST. GFR  (AFRICAN AMERICAN): 32 ML/MIN/1.73 M^2
EST. GFR  (NON AFRICAN AMERICAN): 28 ML/MIN/1.73 M^2
GLUCOSE SERPL-MCNC: 143 MG/DL
HCT VFR BLD AUTO: 33.3 %
HGB BLD-MCNC: 11.1 G/DL
LYMPHOCYTES # BLD AUTO: 3.1 K/UL
LYMPHOCYTES NFR BLD: 36.6 %
MAGNESIUM SERPL-MCNC: 1.5 MG/DL
MCH RBC QN AUTO: 30.9 PG
MCHC RBC AUTO-ENTMCNC: 33.3 G/DL
MCV RBC AUTO: 93 FL
MONOCYTES # BLD AUTO: 0.5 K/UL
MONOCYTES NFR BLD: 5.4 %
NEUTROPHILS # BLD AUTO: 4.8 K/UL
NEUTROPHILS NFR BLD: 56.9 %
PHOSPHATE SERPL-MCNC: 2.2 MG/DL
PLATELET # BLD AUTO: 278 K/UL
PMV BLD AUTO: 9.3 FL
POCT GLUCOSE: 280 MG/DL (ref 70–110)
POCT GLUCOSE: >500 MG/DL (ref 70–110)
POTASSIUM SERPL-SCNC: 4.8 MMOL/L
PROT SERPL-MCNC: 5.7 G/DL
RBC # BLD AUTO: 3.59 M/UL
SODIUM SERPL-SCNC: 143 MMOL/L
WBC # BLD AUTO: 8.4 K/UL

## 2018-03-26 PROCEDURE — 63600175 PHARM REV CODE 636 W HCPCS: Performed by: NURSE PRACTITIONER

## 2018-03-26 PROCEDURE — 36415 COLL VENOUS BLD VENIPUNCTURE: CPT

## 2018-03-26 PROCEDURE — G8987 SELF CARE CURRENT STATUS: HCPCS | Mod: CJ

## 2018-03-26 PROCEDURE — 97802 MEDICAL NUTRITION INDIV IN: CPT

## 2018-03-26 PROCEDURE — 85025 COMPLETE CBC W/AUTO DIFF WBC: CPT

## 2018-03-26 PROCEDURE — G8988 SELF CARE GOAL STATUS: HCPCS | Mod: CJ

## 2018-03-26 PROCEDURE — 25000003 PHARM REV CODE 250: Performed by: NURSE PRACTITIONER

## 2018-03-26 PROCEDURE — 97165 OT EVAL LOW COMPLEX 30 MIN: CPT

## 2018-03-26 PROCEDURE — 83735 ASSAY OF MAGNESIUM: CPT

## 2018-03-26 PROCEDURE — 84100 ASSAY OF PHOSPHORUS: CPT

## 2018-03-26 PROCEDURE — 80053 COMPREHEN METABOLIC PANEL: CPT

## 2018-03-26 PROCEDURE — S4991 NICOTINE PATCH NONLEGEND: HCPCS | Performed by: NURSE PRACTITIONER

## 2018-03-26 PROCEDURE — G8989 SELF CARE D/C STATUS: HCPCS | Mod: CJ

## 2018-03-26 RX ADMIN — ATORVASTATIN CALCIUM 80 MG: 40 TABLET, FILM COATED ORAL at 08:03

## 2018-03-26 RX ADMIN — ENOXAPARIN SODIUM 30 MG: 100 INJECTION SUBCUTANEOUS at 08:03

## 2018-03-26 RX ADMIN — CLOPIDOGREL BISULFATE 75 MG: 75 TABLET ORAL at 08:03

## 2018-03-26 RX ADMIN — ASPIRIN 81 MG CHEWABLE TABLET 81 MG: 81 TABLET CHEWABLE at 08:03

## 2018-03-26 RX ADMIN — AMLODIPINE BESYLATE 10 MG: 5 TABLET ORAL at 08:03

## 2018-03-26 RX ADMIN — PANCRELIPASE 1 CAPSULE: 24000; 76000; 120000 CAPSULE, DELAYED RELEASE PELLETS ORAL at 11:03

## 2018-03-26 RX ADMIN — PANCRELIPASE 1 CAPSULE: 24000; 76000; 120000 CAPSULE, DELAYED RELEASE PELLETS ORAL at 08:03

## 2018-03-26 RX ADMIN — FERROUS SULFATE TAB EC 325 MG (65 MG FE EQUIVALENT) 325 MG: 325 (65 FE) TABLET DELAYED RESPONSE at 08:03

## 2018-03-26 RX ADMIN — INSULIN DETEMIR 4 UNITS: 100 INJECTION, SOLUTION SUBCUTANEOUS at 08:03

## 2018-03-26 RX ADMIN — INSULIN ASPART 3 UNITS: 100 INJECTION, SOLUTION INTRAVENOUS; SUBCUTANEOUS at 11:03

## 2018-03-26 RX ADMIN — NICOTINE 1 PATCH: 14 PATCH, EXTENDED RELEASE TRANSDERMAL at 08:03

## 2018-03-26 NOTE — NURSING
Patient seen by nutritionist for education. Diabetic educator notified, states she has educated patient. Tele monitor and PIV removed with tip intact. D/C instructions provided to , V/U. Escorted from facility by w/c in NAD.

## 2018-03-26 NOTE — PLAN OF CARE
03/26/18 1159   Final Note   Assessment Type Final Discharge Note   Discharge Disposition Home

## 2018-03-26 NOTE — PT/OT/SLP EVAL
Occupational Therapy   Evaluation and Discharge Note    Name: Errol Keita  MRN: [id number]  Admitting Diagnosis:  Type 2 diabetes mellitus with hyperosmolar nonketotic hyperglycemia      Recommendations:     Discharge Recommendations: home with home health  Discharge Equipment Recommendations:  none  Barriers to discharge:  None    History:     Occupational Profile:  Living Environment: Lives with spouse in [facility]  Previous level of function: Independent with ADL, no DME  Roles and Routines: Pt reports she does not do much during the day except watch TV  Equipment Owned:  walker, rolling  Assistance upon Discharge: Spouse     Past Medical History:   Diagnosis Date    Arthritis     Asthma     Blood transfusion     CHF (congestive heart failure)     Coronary artery disease     Diabetes mellitus     Hypertension     Pancreatitis     Type 2 diabetes mellitus with hyperglycemia 7/13/2015       Past Surgical History:   Procedure Laterality Date    CARDIAC SURGERY      CABG    CHOLECYSTECTOMY      CORONARY ARTERY BYPASS GRAFT         Subjective     Chief Complaint: No complaints  Patient/Family stated goals: To go home   Communicated with: nurse prior to session.  Pain/Comfort:  · Pain Rating 1: 0/10    Patients cultural, spiritual, Anabaptism conflicts given the current situation: None    Objective:     Patient found with: bed alarm, peripheral IV, telemetry    General Precautions: Standard, fall   Orthopedic Precautions:N/A   Braces: N/A     Occupational Performance:    Bed Mobility:    · Patient completed Supine to Sit with modified independence  · Patient completed Sit to Supine with modified independence    Functional Mobility/Transfers:  · Patient completed Sit <> Stand Transfer with modified independence  with  no assistive device   · Patient completed Toilet Transfer Stand Pivot technique with supervision with  L hand rail - pt reports she has a a sink next to toilet at home to assist her in the  "descent/ascent from toilet   · Functional Mobility: Patient ambulating with SBA with no AD approximately 35 ft total with no LOB to/from bed, toilet x 2, and sink for grooming     Activities of Daily Living:  · Grooming: supervision standing sink side   · LB Dressing: modified independence seated EOB  · Toileting: supervision      Cognitive/Visual Perceptual:  Cognitive/Psychosocial Skills:     -       Oriented to: Person, Place and Situation   -       Follows Commands/attention:Follows multistep  commands    Physical Exam:  Postural examination/scapula alignment:    -       Rounded shoulders  Skin integrity: Visible skin intact  Upper Extremity Strength:    -       Right Upper Extremity: 3+/5 to 4-/5   -       Left Upper Extremity: 3+ to 4-/5    Patient left HOB elevated with all lines intact, call button in reach, bed alarm on and nurse notified    [facility] 6 Click:  [facility] Total Score: 23    Treatment & Education:  OTR instructing on OT role/POC, safety awareness, etc - pt verbalizes/demonstrates understanding when appropriate  Patient found with adult diaper saturated with urine - pt changing brief to clean one and performing fifi area cleaning with SBA to Min (A) to adjust new diaper   Education:    Assessment:     Errol Keita is a 52 y.o. female with a medical diagnosis of Type 2 diabetes mellitus with hyperosmolar nonketotic hyperglycemia. At this time, patient is functioning at their prior level of function and does not require further acute OT services.     Clinical Decision Makin.  OT Low:  "Pt evaluation falls under low complexity for evaluation coding due to performance deficits noted in 1-3 areas as stated above and 0 co-morbities affecting current functional status. Data obtained from problem focused assessments. No modifications or assistance was required for completion of evaluation. Only brief occupational profile and history review completed."     Plan:     During this hospitalization, " patient does not require further acute OT services.  Please re-consult if situation changes.    · Plan of Care Reviewed with: patient    This Plan of care has been discussed with the patient who was involved in its development and understands and is in agreement with the identified goals and treatment plan    GOALS:    Occupational Therapy Goals     Not on file                Time Tracking:     OT Date of Treatment: 03/26/18  OT Start Time: 1041  OT Stop Time: 1056  OT Total Time (min): 15 min    Billable Minutes:Evaluation 15    Xiomara Shepherd OT  3/26/2018

## 2018-03-26 NOTE — PLAN OF CARE
Well known to diabetes education from multiple previous admits.  Very poor self care ; expected compliance :poor.

## 2018-03-26 NOTE — DISCHARGE INSTRUCTIONS
Thank you for choosing [facility] for your medical care. The primary doctor who is taking care of you at the time of your discharge is Foreign Greco MD.     You were admitted to the hospital with Type 2 diabetes mellitus with hyperosmolar nonketotic hyperglycemia.     Please note your discharge instructions, including diet/activity restrictions, follow-up appointments, and medication changes.  If you have any questions about your medical issues, prescriptions, or any other questions, please feel free to contact the [facility] Medicine Dept at [phone] and we will help.    If you are previously with Home health, outpatient PT/OT or under a therapy program, you are cleared to return to those programs.    Please direct all long term medication refills and follow up to your primary care provider, Chica Irene DO. Thank you again for letting us take care of your health care needs.

## 2018-03-26 NOTE — CONSULTS
Food & Nutrition  Education    Diet Education: diabetic education  Time Spent: 15 minutes  Learners: pt and spouse      Nutrition Education provided with handouts: yes      Comments:Spoke with pt and spouse, who buys groceries and cooks. Discussed current A1c and what it means. Pt's spouse states he understands.   Provided education using CHO counting and plate method. Covered what a CHO is, consistent meal times and CHO amounts and sample meals. Encouraged use of a log to track foods and blood sugars.  Referred to diabetic support group.       All questions and concerns answered. Dietitian's contact information provided.       Follow-Up: n/a

## 2018-03-27 ENCOUNTER — TELEPHONE (OUTPATIENT)
Dept: MEDSURG UNIT | Facility: HOSPITAL | Age: 53
End: 2018-03-27

## 2018-03-27 ENCOUNTER — PATIENT OUTREACH (OUTPATIENT)
Dept: ADMINISTRATIVE | Facility: CLINIC | Age: 53
End: 2018-03-27

## 2018-03-27 NOTE — DISCHARGE SUMMARY
[facility] Hospital Medicine  Discharge Summary      Patient Name: Errol Keita  MRN: [id number]  Admission Date: 3/23/2018  Hospital Length of Stay: 2 days  Discharge Date and Time: 3/26/2018 12:48 PM  Attending Physician: No att. providers found   Discharging Provider: Foreign Greco MD  Primary Care Provider: Chica Irene DO      HPI:   Errol Keita is a 52 y.o. female with a history of DM, pancreatitis, HTN, and CHF.  She was admitted to the service of hospital medicine with HHS and TIMOTHY.  She presented to the ED after spouse found her to be very weak, fatigued and confused today.  She states that she hasnt been feeling well with poor appetite, diarrhea, and some abdominal pain.  She hasnt been eating or drinking the past several days.  She denies any fever, chills, dysuria, chest pain, or change from baseline SOB.  She does endorse increased urinary frequency--no vomiting noted.  She reports compliance with all medications including insulin.  In triage, her BG was more than 500.  She has presented similarly in the past.  Other pertinent medical history as below:     * No surgery found *      Hospital Course:   Patient is a 51-year-old -American female with a past medical history significant for very brittle diabetes diabetes and pancreatitis who presents to the hospital for hyperosmolar nonketotic state and acute kidney injury.  Patient has had multiple similar presentation to the hospital for these symptoms and has had complications related to her diabetes dating back several years.  She was found to have a markedly elevated A1c of 13.5 was started initially on insulin drip and then transitioned to basal bolus insulin.  She remains very brittle in the hospital which is her baseline but was back to her baseline level symptomatology.  Patient was encouraged greatly to get outpatient follow-up for her diabetes and better management related to her brittle sugars.  She was seen by  diabetic education and nutrition while in the hospital.  Her medications were not adjusted and she was stable on those medicines at home for a while and she does know how to adjust them on her own according to her blood sugars.  Patient was discharged home with follow-up with her primary care provider in approximately 7-10 days.     Consults:   Consults         Status Ordering Provider     Inpatient consult to Diabetes educator  Once     Provider:  (Not yet assigned)    Completed LORRAINE YOUNGBLOOD     Inpatient consult to Registered Dietitian/Nutritionist  Once     Provider:  (Not yet assigned)    Completed LORRAINE YOUNGBLOOD     Inpatient consult to Registered Dietitian/Nutritionist  Once     Provider:  (Not yet assigned)    Completed REGINA CORDOBA          No new Assessment & Plan notes have been filed under this hospital service since the last note was generated.  Service: Hospital Medicine    Final Active Diagnoses:    Diagnosis Date Noted POA    PRINCIPAL PROBLEM:  Type 2 diabetes mellitus with hyperosmolar nonketotic hyperglycemia [E11.01] 03/24/2018 Yes    Encephalopathy, metabolic [G93.41] 03/24/2018 Yes    Acute on chronic pancreatitis [K85.90, K86.1] 03/23/2017 Yes    Diabetic nephropathy associated with type 2 diabetes mellitus [E11.21] 10/20/2016 Yes    Moderate tobacco dependence [F17.200] 10/20/2016 Yes    Hypokalemia [E87.6] 10/20/2016 Yes    Anemia of chronic renal failure [N18.9, D63.1] 05/09/2016 Yes    TIMOTHY (acute kidney injury) [N17.9] 04/06/2016 Yes    Hypomagnesemia [E83.42] 10/28/2015 Yes    Coronary artery disease involving native coronary artery without angina pectoris [I25.10] 07/20/2015 Yes    Severe malnutrition [E43] 11/05/2014 Yes    Major depressive disorder [F32.9] 06/05/2013 Yes    Elevated troponin [R74.8] 06/05/2013 Yes    Hypertension associated with diabetes [E11.59, I10] 11/25/2012 Yes      Problems Resolved During this Admission:    Diagnosis Date Noted Date Resolved  POA       Discharged Condition: stable    Disposition: Home or Self Care    Follow Up:  Follow-up Information     Chica Irene DO In 2 weeks.    Specialty:  Family Medicine  Contact information:  [address] [id number] [phone]                 Patient Instructions:   No discharge procedures on file.    Significant Diagnostic Studies:     Pending Diagnostic Studies:     None         Medications:  Reconciled Home Medications:   Discharge Medication List as of 3/26/2018 12:30 PM      CONTINUE these medications which have NOT CHANGED    Details   amLODIPine (NORVASC) 5 MG tablet Take 1 tablet (5 mg total) by mouth once daily., Starting Thu 1/18/2018, Until Fri 1/18/2019, Print      aspirin 81 MG chewable tablet Take 81 mg by mouth once daily. Ran out, Until Discontinued, Historical Med      atorvastatin (LIPITOR) 80 MG tablet Take 1 tablet (80 mg total) by mouth once daily., Starting 7/27/2015, Until Discontinued, Print      clopidogrel (PLAVIX) 75 mg tablet Take 1 tablet (75 mg total) by mouth once daily., Starting Thu 1/18/2018, Until Fri 1/18/2019, Normal      CREON 24,000-76,000 -120,000 unit capsule Take 1 capsule by mouth 4 (four) times daily with meals and nightly. , Starting 8/26/2016, Until Discontinued, Historical Med      ferrous sulfate 325 (65 FE) MG EC tablet Take 1 tablet (325 mg total) by mouth 2 (two) times daily., Starting 7/13/2015, Until Discontinued, Print      furosemide (LASIX) 40 MG tablet Take 40 mg by mouth once daily., Historical Med      HUMALOG KWIKPEN 100 unit/mL InPn pen Inject 7 Units into the skin 3 (three) times daily before meals., Starting 3/24/2017, Until Discontinued, No Print      insulin detemir (LEVEMIR FLEXTOUCH) 100 unit/mL (3 mL) SubQ InPn pen Inject 10 Units into the skin 2 (two) times daily., Starting 3/24/2017, Until Sat 3/24/18, Normal      lisinopril (PRINIVIL,ZESTRIL) 2.5 MG tablet Take 2.5 mg by mouth once daily. , Starting 8/26/2016, Until  Discontinued, Historical Med      quetiapine (SEROQUEL) 100 MG Tab 100 mg every evening. , Starting 7/16/2015, Until Discontinued, Historical Med             Indwelling Lines/Drains at time of discharge:   Lines/Drains/Airways          No matching active lines, drains, or airways          Time spent on the discharge of patient: 38 minutes  Patient was seen and examined on the date of discharge and determined to be suitable for discharge.         Foreign Greco MD  Department of Hospital Medicine  [facility]

## 2018-03-27 NOTE — HOSPITAL COURSE
Patient is a 51-year-old -American female with a past medical history significant for very brittle diabetes diabetes and pancreatitis who presents to the hospital for hyperosmolar nonketotic state and acute kidney injury.  Patient has had multiple similar presentation to the hospital for these symptoms and has had complications related to her diabetes dating back several years.  She was found to have a markedly elevated A1c of 13.5 was started initially on insulin drip and then transitioned to basal bolus insulin.  She remains very brittle in the hospital which is her baseline but was back to her baseline level symptomatology.  Patient was encouraged greatly to get outpatient follow-up for her diabetes and better management related to her brittle sugars.  She was seen by diabetic education and nutrition while in the hospital.  Her medications were not adjusted and she was stable on those medicines at home for a while and she does know how to adjust them on her own according to her blood sugars.  Patient was discharged home with follow-up with her primary care provider in approximately 7-10 days.

## 2018-03-27 NOTE — PATIENT INSTRUCTIONS
Diabetes and Your Health  Over time, uncontrolled diabetes can cause serious complications and damage your body if it is left untreated. A lab test called Hemoglobin A1C is used to determine if your diabetes is in good control. The goal Hemoglobin A1C for most patients is less than 7%. Before you leave the hospital, every effort will be made to provide you with your most recent Hemoglobin A1C and the necessary information to help you to understand your diabetes. You will also be given instructions for outpatient follow-up.   How to take your diabetes medications   Make sure to follow your doctors instructions when taking your diabetes medications or insulin.  Both timing and consistency are important for your medications to work properly.  Report any side effects that you experience.   Blood glucose monitoring   If you take insulin, you need to check before and after meals.   If you take diabetes pills only, you may need to check once or twice daily.  Keep a blood glucose log and bring it to every diabetes check-up visit.  Always check your blood glucose before exercise.     Type 1 diabetes: Remember to check your urine for ketones and call your doctor immediately if they are moderate to large.   Healthy eating habits   The major food groups are carbohydrates (starch, fruit, milk, sweets, and vegetables), proteins (meat, fish, and poultry), and fats (oils, nuts, and gravies). Carbohydrates and fats should be eaten in moderation.  Even when people make healthy food choices, they often eat too much.  Use a small plate.  Share a meal or dessert when eating out.  Use dry measuring cups to observe portion sizes.  Timing of meals can control your blood glucose.  Space meals 4-5 hours apart.  Use small healthy snacks if meals are delayed.   Avoid skipping meals.   Importance of exercise   Exercise and physical activities are very important to help control your blood sugar.  Exercise can improve blood flow, strengthen  heart muscle, lower blood pressure, lower fats in blood, and give you more energy.  Set goals, pick something you enjoy, pace yourself, and start slowly.  Safety rules for exercise  Doctor's permission  Wear good shoes  Wear a medical I.D.  Carry glucose tabs, hard candy, or juice  Best exercise activities  Walking  Swimming  Biking  Dancing  Water walking  Hypoglycemia (Low Blood Sugar)  Causes: Too little food or skipping a meal, more active than usual  Onset: Often sudden  Symptoms: Shaky, fast heartbeat, sweating, dizzy, anxious, hungry, blurry vision, weakness, headache, irritable  Treatment: Check blood sugar right away and treat by eating 3-4 glucose tablets, 3-5 hard candies, or 4 ounces of fruit juice or regular soda. Recheck your glucose in 15 minutes and treat again if less than 100 mg/dL  Notify: Call your healthcare provider if symptoms don't stop  Hyperglycemia (High Blood Sugar)  Causes: Too much food, too little insulin or too few diabetes pills, illnesss, or stress  Onset: Often starts slowly  Symptoms: Extreme thirst, frequent urination, dry skin, hunger, blurry vision, drowsiness, slow healing wounds  Treatment: Follow sick day rules  Notify: Call your health care provider if your blood glucose levels are higher than ususal for 3 days and you don't know why   Sick day guidelines and emergency contact information  Always take your insulin or diabetes pills.  Check your blood glucose often.  Try to stick to your meal plan. If you have trouble eating solid foods, trysoup, applesauce, or yogurt.  Drink plenty of water and other sugar-free fluids to stay hydrated.  Call the doctor right away if  You cant keep liquids down for more than 4 hours.  You have vomiting or diarrhea for more than 6 hours.  Your blood glucose stays greater than 300 or less than 70.  You have not eaten normally for more than 24 hours.  You have a fever greater than 100.4°.  You have trouble breathing.  You cant stay awake or  think clearly.    I have been given a listing of all of my medications and prescriptions for any new medications that my doctor has ordered. I have been given follow-up appointments or instructions to call my doctor(s) for an appointment(s). I also understand that my doctor may have recommended a follow-up appointment for diabetes education with a nurse or dietitian and I may be contacted by the Diabetes Management Program to schedule an appointment.   ICD-9 codes: 250*, 648*, V65.41. ICD-10 codes: E10.65, E10.9, E11.65, E11.9, Z71.89.  © 2017 [facility] ([email or web address]) is a non-profit multi-specialty healthcare delivery system dedicated to patient care, research and education.

## 2018-04-24 ENCOUNTER — TELEPHONE (OUTPATIENT)
Dept: CARDIOLOGY | Facility: CLINIC | Age: 53
End: 2018-04-24

## 2018-04-24 NOTE — TELEPHONE ENCOUNTER
Called pt to confirm her appointment with Dr. Castaneda for tomorrow. Pt did not answer so a voicemail was left for Ms. Keita to call the office back to confirm or cancel her appointment.

## 2018-04-24 NOTE — TELEPHONE ENCOUNTER
Called pt to confirm her appointment with Dr. Castaneda 4/25. Pt did not answer. I will try and call pt later to get appointment time and date confirmed by pt.

## 2018-04-24 NOTE — TELEPHONE ENCOUNTER
Ms. Keita partner called back to confirm her appointment with Dr. Castaneda tomorrow. He asked for the location and the time of the appointment. He verbalized understanding the location , time and date for Ms.. Keita appointment. No further issues was discussed.

## 2018-04-25 ENCOUNTER — OFFICE VISIT (OUTPATIENT)
Dept: CARDIOLOGY | Facility: CLINIC | Age: 53
End: 2018-04-25
Payer: MEDICARE

## 2018-04-25 ENCOUNTER — TELEPHONE (OUTPATIENT)
Dept: CARDIOLOGY | Facility: CLINIC | Age: 53
End: 2018-04-25

## 2018-04-25 VITALS
RESPIRATION RATE: 16 BRPM | BODY MASS INDEX: 15.81 KG/M2 | HEIGHT: 67 IN | DIASTOLIC BLOOD PRESSURE: 76 MMHG | WEIGHT: 100.75 LBS | OXYGEN SATURATION: 99 % | HEART RATE: 85 BPM | SYSTOLIC BLOOD PRESSURE: 125 MMHG

## 2018-04-25 DIAGNOSIS — E11.21 DIABETIC NEPHROPATHY WITH PROTEINURIA: ICD-10-CM

## 2018-04-25 DIAGNOSIS — Z86.73 H/O: CVA (CEREBROVASCULAR ACCIDENT): ICD-10-CM

## 2018-04-25 DIAGNOSIS — R63.6 PATIENT UNDERWEIGHT: ICD-10-CM

## 2018-04-25 DIAGNOSIS — R60.0 PERIPHERAL EDEMA: ICD-10-CM

## 2018-04-25 DIAGNOSIS — Z72.0 TOBACCO ABUSE: ICD-10-CM

## 2018-04-25 DIAGNOSIS — K86.89 PANCREATIC INSUFFICIENCY: ICD-10-CM

## 2018-04-25 DIAGNOSIS — R94.31 ABNORMAL EKG: ICD-10-CM

## 2018-04-25 DIAGNOSIS — Z91.199 HISTORY OF NONCOMPLIANCE WITH MEDICAL TREATMENT: ICD-10-CM

## 2018-04-25 DIAGNOSIS — I15.2 HYPERTENSION ASSOCIATED WITH DIABETES: ICD-10-CM

## 2018-04-25 DIAGNOSIS — Z95.5 S/P CORONARY ARTERY STENT PLACEMENT: Primary | ICD-10-CM

## 2018-04-25 DIAGNOSIS — E78.00 HYPERCHOLESTEREMIA: ICD-10-CM

## 2018-04-25 DIAGNOSIS — R41.3 MEMORY DIFFICULTIES: ICD-10-CM

## 2018-04-25 DIAGNOSIS — E11.59 HYPERTENSION ASSOCIATED WITH DIABETES: ICD-10-CM

## 2018-04-25 DIAGNOSIS — E11.8 TYPE 2 DIABETES MELLITUS WITH COMPLICATION, WITH LONG-TERM CURRENT USE OF INSULIN: ICD-10-CM

## 2018-04-25 DIAGNOSIS — I25.10 ATHEROSCLEROSIS OF NATIVE CORONARY ARTERY OF NATIVE HEART WITHOUT ANGINA PECTORIS: ICD-10-CM

## 2018-04-25 DIAGNOSIS — Z79.4 TYPE 2 DIABETES MELLITUS WITH COMPLICATION, WITH LONG-TERM CURRENT USE OF INSULIN: ICD-10-CM

## 2018-04-25 DIAGNOSIS — R54 FRAILTY SYNDROME IN GERIATRIC PATIENT: ICD-10-CM

## 2018-04-25 DIAGNOSIS — N18.30 CKD (CHRONIC KIDNEY DISEASE) STAGE 3, GFR 30-59 ML/MIN: ICD-10-CM

## 2018-04-25 PROCEDURE — 3078F DIAST BP <80 MM HG: CPT | Mod: CPTII,S$GLB,, | Performed by: INTERNAL MEDICINE

## 2018-04-25 PROCEDURE — 3074F SYST BP LT 130 MM HG: CPT | Mod: CPTII,S$GLB,, | Performed by: INTERNAL MEDICINE

## 2018-04-25 PROCEDURE — 99999 PR PBB SHADOW E&M-EST. PATIENT-LVL III: CPT | Mod: PBBFAC,,, | Performed by: INTERNAL MEDICINE

## 2018-04-25 PROCEDURE — 99215 OFFICE O/P EST HI 40 MIN: CPT | Mod: S$GLB,,, | Performed by: INTERNAL MEDICINE

## 2018-04-25 PROCEDURE — 3046F HEMOGLOBIN A1C LEVEL >9.0%: CPT | Mod: CPTII,S$GLB,, | Performed by: INTERNAL MEDICINE

## 2018-04-25 RX ORDER — PANTOPRAZOLE SODIUM 40 MG/1
40 TABLET, DELAYED RELEASE ORAL DAILY
COMMUNITY
End: 2019-03-12 | Stop reason: CLARIF

## 2018-04-25 NOTE — TELEPHONE ENCOUNTER
Called pt about her appointment today with Dr. Castaneda. Talked to Mr. Green about Ms. Keita coming in earlier at 10:30 per Dr. Castaneda. Mr. Green said he would have her here by then. Mr. Green was asked to bring a medication list. Mr. Green confirmed the location of the office. No further issues discussed.

## 2018-04-25 NOTE — PROGRESS NOTES
"Subjective:    Patient ID:  Errol Keita is a 52 y.o. female who presents for evaluation of Establish Care (new pt - self referral)  For CAD with prior stent, tobacco addiction, dementia  PCP: Dr. Irene, now referred by Dr. Wiggins, seen 2 weeks ago  Nephrologist: Dr. Villegas, have not seen for 5-6 months.  Endo: no doctor  Cardiologist: Dr. Villanueva, last seen 8/18/2015  Lives with , Ti, the caretaker, here with patient, non-smoker    Difficult historian with memory difficulties.    HPI  DCS - "Patient is a 50 y.o. female admitted to Hospitalist Service from [facility] Emergency Room with complaint of worsening leg swellings for 2-3 days. Patient has PMH significant for DM-2, hypertension, CAD, arthritis, asthma, CHF and pancreatitis. Patient also reported chronic SOB which is slwo but progressive. Patient reported compliance with medication. No chest pain, cough or fever reported. No definite calf tenderness reported. Left leg appears bigger than right leg. Patient was admitted to Hospitalist medicine service. Patient had serial cardiac enzymes. Patient was given IV lasix and intake and output closely monitored. Patient was evaluated by cardiologist and underwent ECHO. Patient's renal panel and electrolytes closely monitored. Patient had daily weights. Patient was educated on monitoring daily weight, following low salt diet and in case if gain more than 3 pounds in 24 hours, to call their doctor for further advice. Left lower extremity US was negative. Patient's symptoms resolved. Patient to monitor daily weight, follow low salt diet and in case if gain more than 3 pounds in 24 hours, please call your doctor for further advice. Patient was discharged home in stable condition with following discharge plan of care."    Repeat ECHO CONCLUSIONS     1 - Concentric remodeling. Wall thickness is increased, with the septum and the posterior wall each measuring 1.4 cm across.    2 - Hyperdynamic " left ventricular systolic function (EF 65-70%).     3 - Left ventricular diastolic dysfunction. E/e'(lat) is 13    4 - Mildly depressed right ventricular systolic function .    5 - The estimated PA systolic pressure is 28 mmHg.     6 - Regional wall motion abnormalities consistent with ischemic heart disease involving the inferoseptal region.     7 - No significant change from Echo on 6/28/2015.    Active problems:  Peripheral edema due to dietary non-compliance  Elevated troponin I and BNP, negative CK-MB  CAD post IMI and stents 7/2015, not on BB  RV dysfunction with history of RV infarction  Poorly controlled DM, A1C 7.7%  CKD stage III, with anemia  Active smoker  Dementia    Old records:  LHC and PCI 7/27/2015 - HEMODYNAMIC RESULTS:    LVEDP (Pre): 18 mmHg  LVEDP (Post): 18 mmHg  Ejection Fraction: 55%    C. ANGIOGRAPHIC RESULTS:    DIAGNOSTIC:       Patient has a right dominant coronary artery.        - Left Main Coronary Artery:             The LM has luminal irregularities. There is YAKOV 3 flow.       - Left Anterior Descending Artery:             The LAD has luminal irregularities. There is YAKOV 3 flow.       - Left Circumflex Artery:             The LCX has a 95% stenosis. There is YAKOV 3 flow.       - Right Coronary Artery:             The proximal RCA is occluded. There is YAKOV 0 flow.                     Lesion Details:  This is a Type C lesion.                The mid RCA is occluded. There is YAKOV 0 flow.               The distal RCA is occluded. There is YAKOV 0 flow.       - Common Femoral Artery:             The right CFA is normal.    INTERVENTION:         Proximal RCA:              The lesion was successfully intervened. Post-stenosis of 0% and post-YAKOV 3 flow. The vessel was accessed natively.  The following items were used: Stent Resolute Rx 3.00x18 (NESTOR).       Mid RCA:              The lesion was successfully intervened. Post-stenosis of 0% and post-YAKOV 3 flow. The vessel was accessed  "natively.  The following items were used: Blln Sprinter Legend Rx 2.0 X 20 and Stent Resolute Rx 3.0x38 (NESTOR).       Distal RCA:              The lesion was successfully intervened. Post-stenosis of 0% and post-YAKOV 3 flow. The vessel was accessed natively.  The following items were used: Promus Premier Mr 3.0 X 38 (NESTOR).    D. SUMMARY/POST-OPERATIVE DIAGNOSIS:    1. Two vessel coronary artery disease.  2. Normal LVEF.  3. Occluded RCA.  4. High-grade stenosis in the LCX  5. Successful PCI/NESTOR of an occluded RCA during STEMI with restoration of YAKOV 3 coronary flow and no remaining stenosis    E. RECOMMENDATIONS:    1. Maximize medical management.  2. Risk factor reductions.  3. ASA 81mg.  4. Clopidogrel (Plavix) for one year.    Since home, using less salt, swelling much improve. Offers no complaints of CP nor SOB. Still smoking about half ppd    4/2018 return to reestablish care. Last seen in 1/2016 and advised for 6 months follow up. Had 2 hospitalization this year, in 3/2018.  DCS - "Hospital Course:   Patient is a 51-year-old -American female with a past medical history significant for very brittle diabetes diabetes and pancreatitis who presents to the hospital for hyperosmolar nonketotic state and acute kidney injury.  Patient has had multiple similar presentation to the hospital for these symptoms and has had complications related to her diabetes dating back several years.  She was found to have a markedly elevated A1c of 13.5 was started initially on insulin drip and then transitioned to basal bolus insulin.  She remains very brittle in the hospital which is her baseline but was back to her baseline level symptomatology.  Patient was encouraged greatly to get outpatient follow-up for her diabetes and better management related to her brittle sugars.  She was seen by diabetic education and nutrition while in the hospital.  Her medications were not adjusted and she was stable on those medicines at home " "for a while and she does know how to adjust them on her own according to her blood sugars.  Patient was discharged home with follow-up with her primary care provider in approximately 7-10 days."     In 1/2017 in [facility] for Diastolic CHF, acute on chronic  reviewed by Dr. Sadler - active problems:  1. CAD  2. Pedal edema  3. Hypertensive urgency.  4. Cough  5. Mild troponin elevation.  6. Tobacco abuse     Recommendations:   Continue careful diuresis.  No indication for cardiac catheterization now.  Await echo. Would recommend increaing dose of lisinopril to 5 mg bid. Monitor renal function. Possible nephrology input as well.  Treat possible RTI.  Smoking cessation counseling provided.    At home, doing alright per . Smokes a ppd, active but sits a lot. Cook occasionally, 2-3 times a month.  makes sure she takes the oral medications, some missing of insulin due to concern of hypoglycemia due to poor diet habits. Low BS at least once a month, no endocrinologist. ECG in 3/2018, NSR, rate 97, inferior-posterior and anterior MI, STT suggestive of ischemia. Denies any CP nor SOB.     Review of Systems   Constitution: Negative for diaphoresis, fever, weakness, malaise/fatigue, night sweats and weight gain.   HENT: Negative for nosebleeds and tinnitus.    Eyes: Negative for visual disturbance.   Cardiovascular: Negative for chest pain, claudication, cyanosis, dyspnea on exertion, irregular heartbeat, leg swelling, near-syncope, orthopnea, palpitations and paroxysmal nocturnal dyspnea.   Respiratory: Negative for cough, shortness of breath, sleep disturbances due to breathing, snoring and wheezing.    Endocrine: Positive for cold intolerance. Negative for polydipsia and polyuria.   Hematologic/Lymphatic: Does not bruise/bleed easily.   Skin: Negative for color change, flushing, nail changes, poor wound healing and suspicious lesions.   Musculoskeletal: Negative for arthritis, falls, gout, joint pain, joint " "swelling, muscle cramps, muscle weakness and myalgias.   Gastrointestinal: Negative for heartburn, hematemesis, hematochezia, melena and nausea.   Neurological: Negative for disturbances in coordination, excessive daytime sleepiness, dizziness, focal weakness, headaches, light-headedness, loss of balance, numbness and vertigo.   Psychiatric/Behavioral: Positive for memory loss. Negative for depression and substance abuse. The patient has insomnia and is nervous/anxious.         Sleep problem, day and night is off.     Houston score 1       Objective:    Physical Exam   Constitutional: She is oriented to person, place, and time. She appears well-developed and well-nourished.   Smell of urine, appears much older than stated age.   HENT:   Head: Normocephalic.   Eyes: Conjunctivae and EOM are normal. Pupils are equal, round, and reactive to light.   Neck: Normal range of motion. Neck supple. No JVD present. No thyromegaly present.   Circumference 14.5" down to 12"   Cardiovascular: Normal rate, regular rhythm and intact distal pulses.  Exam reveals no gallop and no friction rub.    Murmur heard.   Medium-pitched machinery early systolic murmur is present with a grade of 2/6  at the upper right sternal border, upper left sternal border  Pulmonary/Chest: Effort normal and breath sounds normal. She has no rales. She exhibits no tenderness.   Abdominal: Soft. Bowel sounds are normal. There is no tenderness.   Waist 32" down to 28", hip 37"   Musculoskeletal: Normal range of motion. She exhibits no edema.   Lymphadenopathy:     She has no cervical adenopathy.   Neurological: She is alert and oriented to person, place, and time.   Skin: Skin is warm and dry. No rash noted.         Assessment:       1. S/P coronary artery stent placement, 2 NESTOR 7/27/2015    2. Patient underweight, BMI 15.7    3. H/O: CVA (cerebrovascular accident)    4. Hypercholesteremia    5. Hypertension associated with diabetes    6. Tobacco abuse, >100 " pack years    7. Uncontrolled type 2 diabetes mellitus with stage 3 chronic kidney disease, with long-term current use of insulin    8. History of noncompliance with medical treatment    9. Abnormal EKG    10. Memory difficulties    11. Peripheral edema    12. Diabetic nephropathy with proteinuria    13. CKD (chronic kidney disease) stage 3, GFR 30-59 ml/min    14. Type 2 diabetes mellitus with complication, with long-term current use of insulin    15. Pancreatic insufficiency    16. Atherosclerosis of native coronary artery of native heart without angina pectoris     17. Frailty syndrome in geriatric patient         Plan:       Errol was seen today for follow-up.    Diagnoses and all orders for this visit:    Errol was seen today for establish care.    Diagnoses and all orders for this visit:    S/P coronary artery stent placement, 2 NESTOR 7/27/2015  -     Lipid panel; Future  -     High sensitivity CRP (Cardiac CRP); Future    Patient underweight, BMI 15.7  -     Ambulatory consult to Endocrinology    H/O: CVA (cerebrovascular accident)  -     High sensitivity CRP (Cardiac CRP); Future    Hypercholesteremia  -     Ambulatory consult to Endocrinology  -     Lipid panel; Future    Hypertension associated with diabetes    Tobacco abuse, >100 pack years  -     High sensitivity CRP (Cardiac CRP); Future    Uncontrolled type 2 diabetes mellitus with stage 3 chronic kidney disease, with long-term current use of insulin  -     Ambulatory consult to Endocrinology    History of noncompliance with medical treatment    Abnormal EKG    Memory difficulties    Peripheral edema    Diabetic nephropathy with proteinuria    CKD (chronic kidney disease) stage 3, GFR 30-59 ml/min    Type 2 diabetes mellitus with complication, with long-term current use of insulin    Pancreatic insufficiency  -     Ambulatory consult to Endocrinology  -     Lipid panel; Future    Atherosclerosis of native coronary artery of native heart without angina  pectoris   -     High sensitivity CRP (Cardiac CRP); Future    Frailty syndrome in geriatric patient      - CV status stable, continue current Rx, all medications reviewed, patient acknowledge good understanding.  - Instruction for Mediterranean diet and heart healthy exercise given.  - Highly recommend 30 minutes of exercise daily, can have Sunday off, with 2-3 sessions of muscle strengthening weekly. A  would be very helpful.  - Recommend at least biannual cardiovascular evaluation in view of her significant risk factors.    Peripheral edema - improved    Vascular dementia without behavioral disturbance    Patient Active Problem List   Diagnosis    H/O: CVA (cerebrovascular accident)    Hypercholesteremia    Hypertension associated with diabetes    Tobacco abuse, >100 pack years    Patient underweight, BMI 15.7    Type II diabetes mellitus, uncontrolled    History of noncompliance with medical treatment    Abnormal EKG    Elevated troponin    Major depressive disorder    LVH (left ventricular hypertrophy)    Disturbance in affect    Cardiomegaly    Heart murmur    Severe malnutrition    Elevated brain natriuretic peptide (BNP) level    Transfusion-dependent anemia    E-coli UTI    Moderate malnutrition    Anemia, iron deficiency, inadequate dietary intake    Coronary artery disease involving native coronary artery without angina pectoris    S/P coronary artery stent placement, 2 NESTOR 7/27/2015    ST elevation myocardial infarction involving left main coronary artery    Angina, class III    Metabolic acidosis- severe    CKD stage 4 due to type 2 diabetes mellitus    Hypomagnesemia    Leg swelling    Acute on chronic diastolic congestive heart failure    Memory difficulties    Dementia associated with other underlying disease without behavioral disturbance    Peripheral edema    Vascular dementia without behavioral disturbance    TIMOTHY (acute kidney injury)    Diabetic  nephropathy with proteinuria    CKD (chronic kidney disease) stage 3, GFR 30-59 ml/min    Type 2 diabetes mellitus with complication, with long-term current use of insulin    Anemia of chronic renal failure    Hypokalemia    Diabetic nephropathy associated with type 2 diabetes mellitus    Moderate tobacco dependence    Pancreatic insufficiency    Acute on chronic pancreatitis    CHF exacerbation    Type 2 diabetes mellitus with hyperosmolar nonketotic hyperglycemia    Encephalopathy, metabolic    Frailty syndrome in geriatric patient     Total face-to-face time with the patient was 35 minutes and greater than 50% was spent in counseling and coordination of care. The above assessment and plan have been discussed at length. Referring physician's note reviewed. Labs and procedure over the last 6 months reviewed. Problem List updated. Asked to bring in all active medications / pills bottles with next visit.

## 2018-06-16 ENCOUNTER — HOSPITAL ENCOUNTER (INPATIENT)
Facility: HOSPITAL | Age: 53
LOS: 6 days | Discharge: HOME HEALTH | DRG: 682 | End: 2018-06-22
Attending: EMERGENCY MEDICINE | Admitting: INTERNAL MEDICINE
Payer: MEDICARE

## 2018-06-16 DIAGNOSIS — R19.7 DIARRHEA, UNSPECIFIED TYPE: ICD-10-CM

## 2018-06-16 DIAGNOSIS — E43 SEVERE MALNUTRITION: ICD-10-CM

## 2018-06-16 DIAGNOSIS — N17.9 ACUTE KIDNEY INJURY SUPERIMPOSED ON CHRONIC KIDNEY DISEASE: ICD-10-CM

## 2018-06-16 DIAGNOSIS — I15.2 HYPERTENSION ASSOCIATED WITH DIABETES: ICD-10-CM

## 2018-06-16 DIAGNOSIS — I25.10 CORONARY ARTERY DISEASE INVOLVING NATIVE CORONARY ARTERY WITHOUT ANGINA PECTORIS, UNSPECIFIED WHETHER NATIVE OR TRANSPLANTED HEART: ICD-10-CM

## 2018-06-16 DIAGNOSIS — E11.59 HYPERTENSION ASSOCIATED WITH DIABETES: ICD-10-CM

## 2018-06-16 DIAGNOSIS — N28.9 RENAL INSUFFICIENCY: ICD-10-CM

## 2018-06-16 DIAGNOSIS — D63.1 ANEMIA OF CHRONIC RENAL FAILURE, STAGE 3 (MODERATE): ICD-10-CM

## 2018-06-16 DIAGNOSIS — N18.30 ANEMIA OF CHRONIC RENAL FAILURE, STAGE 3 (MODERATE): ICD-10-CM

## 2018-06-16 DIAGNOSIS — N18.9 ACUTE KIDNEY INJURY SUPERIMPOSED ON CHRONIC KIDNEY DISEASE: ICD-10-CM

## 2018-06-16 DIAGNOSIS — F33.0 MILD RECURRENT MAJOR DEPRESSION: ICD-10-CM

## 2018-06-16 DIAGNOSIS — E87.6 HYPOKALEMIA: Primary | ICD-10-CM

## 2018-06-16 DIAGNOSIS — F02.80 DEMENTIA ASSOCIATED WITH OTHER UNDERLYING DISEASE WITHOUT BEHAVIORAL DISTURBANCE: ICD-10-CM

## 2018-06-16 PROCEDURE — 99284 EMERGENCY DEPT VISIT MOD MDM: CPT | Mod: 25

## 2018-06-16 PROCEDURE — 96374 THER/PROPH/DIAG INJ IV PUSH: CPT

## 2018-06-16 PROCEDURE — 12000002 HC ACUTE/MED SURGE SEMI-PRIVATE ROOM

## 2018-06-17 PROBLEM — K52.9 GASTROENTERITIS: Status: ACTIVE | Noted: 2018-06-17

## 2018-06-17 LAB
ALBUMIN SERPL BCP-MCNC: 2 G/DL
ALBUMIN SERPL BCP-MCNC: 2.3 G/DL
ALP SERPL-CCNC: 137 U/L
ALP SERPL-CCNC: 154 U/L
ALT SERPL W/O P-5'-P-CCNC: 6 U/L
ALT SERPL W/O P-5'-P-CCNC: 7 U/L
ANION GAP SERPL CALC-SCNC: 11 MMOL/L
ANION GAP SERPL CALC-SCNC: 13 MMOL/L
ANION GAP SERPL CALC-SCNC: 14 MMOL/L
AST SERPL-CCNC: 11 U/L
AST SERPL-CCNC: 15 U/L
BACTERIA #/AREA URNS HPF: ABNORMAL /HPF
BASOPHILS # BLD AUTO: 0 K/UL
BASOPHILS # BLD AUTO: 0.2 K/UL
BASOPHILS NFR BLD: 0.4 %
BASOPHILS NFR BLD: 2.3 %
BILIRUB SERPL-MCNC: 0.2 MG/DL
BILIRUB SERPL-MCNC: 0.3 MG/DL
BILIRUB UR QL STRIP: NEGATIVE
BUN SERPL-MCNC: 24 MG/DL
BUN SERPL-MCNC: 24 MG/DL
BUN SERPL-MCNC: 26 MG/DL
CALCIUM SERPL-MCNC: 6.6 MG/DL
CALCIUM SERPL-MCNC: 6.9 MG/DL
CALCIUM SERPL-MCNC: 7.3 MG/DL
CHLORIDE SERPL-SCNC: 101 MMOL/L
CHLORIDE SERPL-SCNC: 104 MMOL/L
CHLORIDE SERPL-SCNC: 106 MMOL/L
CLARITY UR: ABNORMAL
CO2 SERPL-SCNC: 23 MMOL/L
CO2 SERPL-SCNC: 23 MMOL/L
CO2 SERPL-SCNC: 24 MMOL/L
COLOR UR: YELLOW
CREAT SERPL-MCNC: 2.6 MG/DL
CREAT SERPL-MCNC: 2.8 MG/DL
CREAT SERPL-MCNC: 3 MG/DL
DIFFERENTIAL METHOD: ABNORMAL
DIFFERENTIAL METHOD: ABNORMAL
EOSINOPHIL # BLD AUTO: 0 K/UL
EOSINOPHIL # BLD AUTO: 0.1 K/UL
EOSINOPHIL NFR BLD: 0.2 %
EOSINOPHIL NFR BLD: 0.5 %
ERYTHROCYTE [DISTWIDTH] IN BLOOD BY AUTOMATED COUNT: 12.6 %
ERYTHROCYTE [DISTWIDTH] IN BLOOD BY AUTOMATED COUNT: 13 %
EST. GFR  (AFRICAN AMERICAN): 20 ML/MIN/1.73 M^2
EST. GFR  (AFRICAN AMERICAN): 21 ML/MIN/1.73 M^2
EST. GFR  (AFRICAN AMERICAN): 23 ML/MIN/1.73 M^2
EST. GFR  (NON AFRICAN AMERICAN): 17 ML/MIN/1.73 M^2
EST. GFR  (NON AFRICAN AMERICAN): 18 ML/MIN/1.73 M^2
EST. GFR  (NON AFRICAN AMERICAN): 20 ML/MIN/1.73 M^2
GLUCOSE SERPL-MCNC: 130 MG/DL
GLUCOSE SERPL-MCNC: 147 MG/DL
GLUCOSE SERPL-MCNC: 172 MG/DL
GLUCOSE UR QL STRIP: NEGATIVE
HCT VFR BLD AUTO: 30.5 %
HCT VFR BLD AUTO: 35.7 %
HGB BLD-MCNC: 10.4 G/DL
HGB BLD-MCNC: 11.8 G/DL
HGB UR QL STRIP: ABNORMAL
HYALINE CASTS #/AREA URNS LPF: 0 /LPF
KETONES UR QL STRIP: NEGATIVE
LEUKOCYTE ESTERASE UR QL STRIP: ABNORMAL
LYMPHOCYTES # BLD AUTO: 2 K/UL
LYMPHOCYTES # BLD AUTO: 2.5 K/UL
LYMPHOCYTES NFR BLD: 21.2 %
LYMPHOCYTES NFR BLD: 23.3 %
MAGNESIUM SERPL-MCNC: 1.1 MG/DL
MAGNESIUM SERPL-MCNC: 1.1 MG/DL
MAGNESIUM SERPL-MCNC: 1.4 MG/DL
MCH RBC QN AUTO: 30.5 PG
MCH RBC QN AUTO: 31.4 PG
MCHC RBC AUTO-ENTMCNC: 33.1 G/DL
MCHC RBC AUTO-ENTMCNC: 34 G/DL
MCV RBC AUTO: 92 FL
MCV RBC AUTO: 92 FL
MICROSCOPIC COMMENT: ABNORMAL
MONOCYTES # BLD AUTO: 0.4 K/UL
MONOCYTES # BLD AUTO: 0.5 K/UL
MONOCYTES NFR BLD: 3.4 %
MONOCYTES NFR BLD: 5.2 %
NEUTROPHILS # BLD AUTO: 6.9 K/UL
NEUTROPHILS # BLD AUTO: 7.4 K/UL
NEUTROPHILS NFR BLD: 70.5 %
NEUTROPHILS NFR BLD: 73 %
NITRITE UR QL STRIP: NEGATIVE
PH UR STRIP: 6 [PH] (ref 5–8)
PHOSPHATE SERPL-MCNC: 3.1 MG/DL
PLATELET # BLD AUTO: 397 K/UL
PLATELET # BLD AUTO: 484 K/UL
PMV BLD AUTO: 8.1 FL
PMV BLD AUTO: 8.1 FL
POCT GLUCOSE: 166 MG/DL (ref 70–110)
POCT GLUCOSE: 197 MG/DL (ref 70–110)
POCT GLUCOSE: 285 MG/DL (ref 70–110)
POTASSIUM SERPL-SCNC: 2.2 MMOL/L
POTASSIUM SERPL-SCNC: 2.3 MMOL/L
POTASSIUM SERPL-SCNC: 2.3 MMOL/L
PROT SERPL-MCNC: 5.8 G/DL
PROT SERPL-MCNC: 6.6 G/DL
PROT UR QL STRIP: ABNORMAL
RBC # BLD AUTO: 3.31 M/UL
RBC # BLD AUTO: 3.88 M/UL
RBC #/AREA URNS HPF: 29 /HPF (ref 0–4)
SODIUM SERPL-SCNC: 139 MMOL/L
SODIUM SERPL-SCNC: 140 MMOL/L
SODIUM SERPL-SCNC: 140 MMOL/L
SP GR UR STRIP: 1.01 (ref 1–1.03)
SQUAMOUS #/AREA URNS HPF: 14 /HPF
URN SPEC COLLECT METH UR: ABNORMAL
UROBILINOGEN UR STRIP-ACNC: NEGATIVE EU/DL
WBC # BLD AUTO: 10.6 K/UL
WBC # BLD AUTO: 9.4 K/UL
WBC #/AREA URNS HPF: >100 /HPF (ref 0–5)

## 2018-06-17 PROCEDURE — 84100 ASSAY OF PHOSPHORUS: CPT

## 2018-06-17 PROCEDURE — 81000 URINALYSIS NONAUTO W/SCOPE: CPT

## 2018-06-17 PROCEDURE — 83735 ASSAY OF MAGNESIUM: CPT

## 2018-06-17 PROCEDURE — 63600175 PHARM REV CODE 636 W HCPCS: Performed by: NURSE PRACTITIONER

## 2018-06-17 PROCEDURE — 94760 N-INVAS EAR/PLS OXIMETRY 1: CPT

## 2018-06-17 PROCEDURE — 80053 COMPREHEN METABOLIC PANEL: CPT

## 2018-06-17 PROCEDURE — 63600175 PHARM REV CODE 636 W HCPCS: Performed by: INTERNAL MEDICINE

## 2018-06-17 PROCEDURE — 25000003 PHARM REV CODE 250: Performed by: INTERNAL MEDICINE

## 2018-06-17 PROCEDURE — 36415 COLL VENOUS BLD VENIPUNCTURE: CPT

## 2018-06-17 PROCEDURE — S4991 NICOTINE PATCH NONLEGEND: HCPCS | Performed by: NURSE PRACTITIONER

## 2018-06-17 PROCEDURE — 93005 ELECTROCARDIOGRAM TRACING: CPT

## 2018-06-17 PROCEDURE — 12000002 HC ACUTE/MED SURGE SEMI-PRIVATE ROOM

## 2018-06-17 PROCEDURE — 87086 URINE CULTURE/COLONY COUNT: CPT

## 2018-06-17 PROCEDURE — 80048 BASIC METABOLIC PNL TOTAL CA: CPT

## 2018-06-17 PROCEDURE — 25000003 PHARM REV CODE 250: Performed by: NURSE PRACTITIONER

## 2018-06-17 PROCEDURE — 80053 COMPREHEN METABOLIC PANEL: CPT | Mod: 91

## 2018-06-17 PROCEDURE — 85025 COMPLETE CBC W/AUTO DIFF WBC: CPT

## 2018-06-17 PROCEDURE — 99223 1ST HOSP IP/OBS HIGH 75: CPT | Mod: AI,,, | Performed by: INTERNAL MEDICINE

## 2018-06-17 PROCEDURE — 63600175 PHARM REV CODE 636 W HCPCS: Performed by: EMERGENCY MEDICINE

## 2018-06-17 RX ORDER — POTASSIUM CHLORIDE 7.45 MG/ML
10 INJECTION INTRAVENOUS ONCE
Status: COMPLETED | OUTPATIENT
Start: 2018-06-17 | End: 2018-06-17

## 2018-06-17 RX ORDER — RAMELTEON 8 MG/1
8 TABLET ORAL NIGHTLY PRN
Status: DISCONTINUED | OUTPATIENT
Start: 2018-06-17 | End: 2018-06-22 | Stop reason: HOSPADM

## 2018-06-17 RX ORDER — GLUCAGON 1 MG
1 KIT INJECTION
Status: DISCONTINUED | OUTPATIENT
Start: 2018-06-17 | End: 2018-06-22 | Stop reason: HOSPADM

## 2018-06-17 RX ORDER — LANOLIN ALCOHOL/MO/W.PET/CERES
800 CREAM (GRAM) TOPICAL
Status: DISCONTINUED | OUTPATIENT
Start: 2018-06-17 | End: 2018-06-22 | Stop reason: HOSPADM

## 2018-06-17 RX ORDER — POTASSIUM CHLORIDE 7.45 MG/ML
10 INJECTION INTRAVENOUS
Status: COMPLETED | OUTPATIENT
Start: 2018-06-17 | End: 2018-06-17

## 2018-06-17 RX ORDER — CALCIUM ACETATE 667 MG/1
667 CAPSULE ORAL
Status: DISCONTINUED | OUTPATIENT
Start: 2018-06-17 | End: 2018-06-22 | Stop reason: HOSPADM

## 2018-06-17 RX ORDER — PANTOPRAZOLE SODIUM 40 MG/1
40 TABLET, DELAYED RELEASE ORAL DAILY
Status: DISCONTINUED | OUTPATIENT
Start: 2018-06-17 | End: 2018-06-22 | Stop reason: HOSPADM

## 2018-06-17 RX ORDER — IBUPROFEN 200 MG
24 TABLET ORAL
Status: DISCONTINUED | OUTPATIENT
Start: 2018-06-17 | End: 2018-06-22 | Stop reason: HOSPADM

## 2018-06-17 RX ORDER — CEFTRIAXONE 1 G/50ML
1 INJECTION, SOLUTION INTRAVENOUS
Status: DISCONTINUED | OUTPATIENT
Start: 2018-06-18 | End: 2018-06-22 | Stop reason: HOSPADM

## 2018-06-17 RX ORDER — POTASSIUM CHLORIDE 20 MEQ/15ML
40 SOLUTION ORAL
Status: DISCONTINUED | OUTPATIENT
Start: 2018-06-17 | End: 2018-06-22 | Stop reason: HOSPADM

## 2018-06-17 RX ORDER — QUETIAPINE FUMARATE 100 MG/1
100 TABLET, FILM COATED ORAL NIGHTLY
Status: DISCONTINUED | OUTPATIENT
Start: 2018-06-17 | End: 2018-06-22 | Stop reason: HOSPADM

## 2018-06-17 RX ORDER — FERROUS SULFATE 325(65) MG
325 TABLET, DELAYED RELEASE (ENTERIC COATED) ORAL 2 TIMES DAILY
Status: DISCONTINUED | OUTPATIENT
Start: 2018-06-17 | End: 2018-06-22 | Stop reason: HOSPADM

## 2018-06-17 RX ORDER — ACETAMINOPHEN 500 MG
1000 TABLET ORAL EVERY 6 HOURS PRN
Status: DISCONTINUED | OUTPATIENT
Start: 2018-06-17 | End: 2018-06-22 | Stop reason: HOSPADM

## 2018-06-17 RX ORDER — IBUPROFEN 200 MG
1 TABLET ORAL DAILY
Status: DISCONTINUED | OUTPATIENT
Start: 2018-06-17 | End: 2018-06-22 | Stop reason: HOSPADM

## 2018-06-17 RX ORDER — AMOXICILLIN 250 MG
1 CAPSULE ORAL 2 TIMES DAILY
Status: DISCONTINUED | OUTPATIENT
Start: 2018-06-17 | End: 2018-06-22 | Stop reason: HOSPADM

## 2018-06-17 RX ORDER — NAPROXEN SODIUM 220 MG/1
81 TABLET, FILM COATED ORAL DAILY
Status: DISCONTINUED | OUTPATIENT
Start: 2018-06-17 | End: 2018-06-22 | Stop reason: HOSPADM

## 2018-06-17 RX ORDER — CLOPIDOGREL BISULFATE 75 MG/1
75 TABLET ORAL DAILY
Status: DISCONTINUED | OUTPATIENT
Start: 2018-06-17 | End: 2018-06-22 | Stop reason: HOSPADM

## 2018-06-17 RX ORDER — ENOXAPARIN SODIUM 100 MG/ML
30 INJECTION SUBCUTANEOUS EVERY 24 HOURS
Status: DISCONTINUED | OUTPATIENT
Start: 2018-06-17 | End: 2018-06-22 | Stop reason: HOSPADM

## 2018-06-17 RX ORDER — INSULIN ASPART 100 [IU]/ML
0-5 INJECTION, SOLUTION INTRAVENOUS; SUBCUTANEOUS
Status: DISCONTINUED | OUTPATIENT
Start: 2018-06-17 | End: 2018-06-22 | Stop reason: HOSPADM

## 2018-06-17 RX ORDER — ATORVASTATIN CALCIUM 40 MG/1
80 TABLET, FILM COATED ORAL DAILY
Status: DISCONTINUED | OUTPATIENT
Start: 2018-06-17 | End: 2018-06-22 | Stop reason: HOSPADM

## 2018-06-17 RX ORDER — ONDANSETRON 2 MG/ML
8 INJECTION INTRAMUSCULAR; INTRAVENOUS EVERY 8 HOURS PRN
Status: DISCONTINUED | OUTPATIENT
Start: 2018-06-17 | End: 2018-06-22 | Stop reason: HOSPADM

## 2018-06-17 RX ORDER — POTASSIUM CHLORIDE 20 MEQ/15ML
60 SOLUTION ORAL
Status: DISCONTINUED | OUTPATIENT
Start: 2018-06-17 | End: 2018-06-22 | Stop reason: HOSPADM

## 2018-06-17 RX ORDER — SODIUM CHLORIDE 0.9 % (FLUSH) 0.9 %
5 SYRINGE (ML) INJECTION
Status: DISCONTINUED | OUTPATIENT
Start: 2018-06-17 | End: 2018-06-22 | Stop reason: HOSPADM

## 2018-06-17 RX ORDER — SODIUM CHLORIDE 9 MG/ML
INJECTION, SOLUTION INTRAVENOUS CONTINUOUS
Status: DISCONTINUED | OUTPATIENT
Start: 2018-06-17 | End: 2018-06-22 | Stop reason: HOSPADM

## 2018-06-17 RX ORDER — IBUPROFEN 200 MG
16 TABLET ORAL
Status: DISCONTINUED | OUTPATIENT
Start: 2018-06-17 | End: 2018-06-22 | Stop reason: HOSPADM

## 2018-06-17 RX ORDER — AMLODIPINE BESYLATE 5 MG/1
10 TABLET ORAL DAILY
Status: DISCONTINUED | OUTPATIENT
Start: 2018-06-17 | End: 2018-06-22 | Stop reason: HOSPADM

## 2018-06-17 RX ADMIN — MAGNESIUM SULFATE HEPTAHYDRATE 3 G: 500 INJECTION, SOLUTION INTRAMUSCULAR; INTRAVENOUS at 12:06

## 2018-06-17 RX ADMIN — ACETAMINOPHEN 1000 MG: 500 TABLET, FILM COATED ORAL at 06:06

## 2018-06-17 RX ADMIN — QUETIAPINE FUMARATE 100 MG: 100 TABLET ORAL at 08:06

## 2018-06-17 RX ADMIN — CLOPIDOGREL 75 MG: 75 TABLET, FILM COATED ORAL at 08:06

## 2018-06-17 RX ADMIN — POTASSIUM CHLORIDE 10 MEQ: 7.46 INJECTION, SOLUTION INTRAVENOUS at 03:06

## 2018-06-17 RX ADMIN — POTASSIUM CHLORIDE 10 MEQ: 7.46 INJECTION, SOLUTION INTRAVENOUS at 02:06

## 2018-06-17 RX ADMIN — POTASSIUM CHLORIDE 10 MEQ: 7.46 INJECTION, SOLUTION INTRAVENOUS at 04:06

## 2018-06-17 RX ADMIN — NICOTINE 1 PATCH: 14 PATCH, EXTENDED RELEASE TRANSDERMAL at 08:06

## 2018-06-17 RX ADMIN — PANCRELIPASE 1 CAPSULE: 24000; 76000; 120000 CAPSULE, DELAYED RELEASE PELLETS ORAL at 08:06

## 2018-06-17 RX ADMIN — FERROUS SULFATE TAB EC 325 MG (65 MG FE EQUIVALENT) 325 MG: 325 (65 FE) TABLET DELAYED RESPONSE at 08:06

## 2018-06-17 RX ADMIN — ATORVASTATIN CALCIUM 80 MG: 40 TABLET, FILM COATED ORAL at 08:06

## 2018-06-17 RX ADMIN — CALCIUM ACETATE 667 MG: 667 CAPSULE ORAL at 05:06

## 2018-06-17 RX ADMIN — POTASSIUM CHLORIDE 10 MEQ: 7.46 INJECTION, SOLUTION INTRAVENOUS at 12:06

## 2018-06-17 RX ADMIN — CALCIUM ACETATE 667 MG: 667 CAPSULE ORAL at 12:06

## 2018-06-17 RX ADMIN — ASPIRIN 81 MG 81 MG: 81 TABLET ORAL at 08:06

## 2018-06-17 RX ADMIN — PANCRELIPASE 1 CAPSULE: 24000; 76000; 120000 CAPSULE, DELAYED RELEASE PELLETS ORAL at 05:06

## 2018-06-17 RX ADMIN — PANCRELIPASE 1 CAPSULE: 24000; 76000; 120000 CAPSULE, DELAYED RELEASE PELLETS ORAL at 12:06

## 2018-06-17 RX ADMIN — STANDARDIZED SENNA CONCENTRATE AND DOCUSATE SODIUM 1 TABLET: 8.6; 5 TABLET, FILM COATED ORAL at 08:06

## 2018-06-17 RX ADMIN — PANTOPRAZOLE SODIUM 40 MG: 40 TABLET, DELAYED RELEASE ORAL at 08:06

## 2018-06-17 RX ADMIN — AMLODIPINE BESYLATE 10 MG: 5 TABLET ORAL at 08:06

## 2018-06-17 RX ADMIN — INSULIN ASPART 1 UNITS: 100 INJECTION, SOLUTION INTRAVENOUS; SUBCUTANEOUS at 08:06

## 2018-06-17 RX ADMIN — RAMELTEON 8 MG: 8 TABLET, FILM COATED ORAL at 08:06

## 2018-06-17 RX ADMIN — SODIUM CHLORIDE: 0.9 INJECTION, SOLUTION INTRAVENOUS at 04:06

## 2018-06-17 RX ADMIN — POTASSIUM CHLORIDE 10 MEQ: 7.46 INJECTION, SOLUTION INTRAVENOUS at 05:06

## 2018-06-17 RX ADMIN — QUETIAPINE FUMARATE 100 MG: 100 TABLET ORAL at 03:06

## 2018-06-17 RX ADMIN — POTASSIUM CHLORIDE 10 MEQ: 7.46 INJECTION, SOLUTION INTRAVENOUS at 11:06

## 2018-06-17 RX ADMIN — ENOXAPARIN SODIUM 30 MG: 100 INJECTION SUBCUTANEOUS at 05:06

## 2018-06-17 NOTE — ED PROVIDER NOTES
Encounter Date: 6/16/2018    SCRIBE #1 NOTE: I, Jordi Cook, am scribing for, and in the presence of, Dr. Riojas.       History     Chief Complaint   Patient presents with    Dehydration     and subjective fever x 3 days      06/17/2018 12:15 AM     Chief complaint: Dehydration      Errol Keita is a 52 y.o. female who has a past medical history of Arthritis; Asthma; Blood transfusion; CHF (congestive heart failure); Coronary artery disease; Diabetes mellitus; Hypertension; cholecystectomy; Pancreatitis; and Type 2 diabetes mellitus with hyperglycemia (7/13/2015).    The patient presents to the ED with a desire to be evaluated for deydration. Since this morning, she has been expressing symptoms such as a subjective fever, diarrhea (3 episodes), and a decreased appetite. She notes that she has been able to hold down water. She denies any vomiting, blood in stool, abdominal pain, loss of consciousness, confusion, recent medication changes, positive sick contacts, light-headedness, urinary symptoms, rashes, or any recent course of antibiotics. She adds that she has difficulty walking due to prior complications in her legs. She currently takes lasix. No pertinent Shx noted. She presents with no other acute complaints.         The history is provided by the patient.     Review of patient's allergies indicates:  No Known Allergies  Past Medical History:   Diagnosis Date    Arthritis     Asthma     Blood transfusion     CHF (congestive heart failure)     Coronary artery disease     Diabetes mellitus     Hypertension     Pancreatitis     Type 2 diabetes mellitus with hyperglycemia 7/13/2015     Past Surgical History:   Procedure Laterality Date    CARDIAC SURGERY      CABG    CHOLECYSTECTOMY      CORONARY ARTERY BYPASS GRAFT       Family History   Problem Relation Age of Onset    Diabetes Mother     Diabetes Father     Diabetes Sister     Hearing loss Sister     Heart disease Sister      Hypertension Sister     Learning disabilities Sister     Vision loss Sister     Asthma Brother     Depression Brother     Cancer Maternal Grandmother      Social History   Substance Use Topics    Smoking status: Current Every Day Smoker     Packs/day: 0.50     Years: 35.00     Types: Cigarettes    Smokeless tobacco: Never Used    Alcohol use No      Comment: questionable per      Review of Systems   Constitutional: Positive for appetite change (decreased) and fever (subjective).   HENT: Negative for congestion.    Eyes: Negative for visual disturbance.   Respiratory: Negative for wheezing.    Cardiovascular: Negative for chest pain.   Gastrointestinal: Positive for diarrhea (3 episodes). Negative for abdominal pain, blood in stool and vomiting.   Genitourinary: Negative for difficulty urinating and dysuria.   Musculoskeletal: Negative for joint swelling.   Skin: Negative for rash.   Neurological: Negative for syncope and light-headedness.   Hematological: Does not bruise/bleed easily.   Psychiatric/Behavioral: Negative for confusion.       Physical Exam     Vitals:    06/17/18 0409   BP: (!) 158/76   Pulse: 74   Resp:    Temp:       Physical Exam    Nursing note and vitals reviewed.  Constitutional: She appears well-developed and well-nourished. She is not diaphoretic. No distress.   HENT:   Head: Normocephalic and atraumatic.   Mouth/Throat: Oropharynx is clear and moist and mucous membranes are normal.   She has moist mucous membranes.   Eyes: Conjunctivae are normal.   Neck: Neck supple.   Cardiovascular: Normal rate, regular rhythm and intact distal pulses. Exam reveals no gallop and no friction rub.    Murmur heard.   Systolic murmur is present with a grade of 2/6   Pulmonary/Chest: Breath sounds normal. She has no wheezes. She has no rhonchi. She has no rales.   Abdominal: Soft. She exhibits no distension. There is no tenderness. There is no rebound and no guarding.   Musculoskeletal: Normal  range of motion. She exhibits edema.   She has trace pitting pedal edema.   Neurological: She is alert and oriented to person, place, and time.   Skin: No rash noted. No erythema.   Psychiatric: Her speech is normal.         ED Course   Procedures  Labs Reviewed   CBC W/ AUTO DIFFERENTIAL - Abnormal; Notable for the following:        Result Value    RBC 3.88 (*)     Hemoglobin 11.8 (*)     Hematocrit 35.7 (*)     Platelets 484 (*)     MPV 8.1 (*)     Mono% 3.4 (*)     Basophil% 2.3 (*)     All other components within normal limits   BASIC METABOLIC PANEL - Abnormal; Notable for the following:     Potassium 2.3 (*)     Glucose 172 (*)     BUN, Bld 26 (*)     Creatinine 3.0 (*)     Calcium 7.3 (*)     eGFR if  20 (*)     eGFR if non  17 (*)     All other components within normal limits    Narrative:       K critical result(s) called and verbal readback obtained from   GARRET DERAS, 06/17/2018 02:05   MAGNESIUM - Abnormal; Notable for the following:     Magnesium 1.4 (*)     All other components within normal limits          No orders to display        Medical Decision Making:   History:   Old Medical Records: I decided to obtain old medical records.  Clinical Tests:   Lab Tests: Ordered and Reviewed  Medical Tests: Reviewed and Ordered            Scribe Attestation:   Scribe #1: I performed the above scribed service and the documentation accurately describes the services I performed. I attest to the accuracy of the note.    I, Dr. Edwin Riojas, personally performed the services described in this documentation. All medical record entries made by the scribe were at my direction and in my presence.  I have reviewed the chart and agree that the record reflects my personal performance and is accurate and complete. Edwin Riojas MD.  8:08 AM 06/17/2018    Errol Keita is a 52 y.o. female presenting with recent nonbloody diarrhea with hypokalemia likely multifactorial secondary  to diuretic therapy and more recent diarrhea as well as renal insufficiency.  I will admit for gentle hydration with electrolyte repletion and following of renal function recovery.  I have discussed with hospitalist service who will assume care.  Low suspicion for bacterial infectious process and I do not think antibiotics are indicated.  I doubt C diff.  Very low suspicion for emergent intra-abdominal process requiring surgical intervention and I do not think abdominal imaging is indicated.  I have spoken with hospitalist service who will assume care.          ED Course as of Jun 17 0808   Sun Jun 17, 2018   0033 EKG:  NSR, rate of 75, normal intervals and axis.  Old precordial T-wave inversions with no new ST/T wave changes compared to prior.  No sign of acute ischemia or infarction.    [MR]      ED Course User Index  [MR] Edwin Riojas MD     Clinical Impression:     1. Hypokalemia    2. Renal insufficiency    3. Diarrhea, unspecified type            Disposition:   Disposition: Discharged  Condition: Stable                        Edwin Riojas MD  06/17/18 0809

## 2018-06-17 NOTE — ASSESSMENT & PLAN NOTE
Suspect hypokalemia related to diarrhea and diuretics.  Replete potassium.  Follow BMP closely.  Tele-monitoring.

## 2018-06-17 NOTE — HPI
Errol Keita is a 52 y.o. female with a PMHx of Arthritis, Asthma, Diastolic CHF, Coronary artery disease, Diabetes mellitus, Hypertension and Pancreatitis. She was admitted to the service of hospital medicine with TIMOTHY superimposed on CKD and hypokalemia. She presented to the ED with a desire to be evaluated for dehydration secondary to diarrhea x 2 days and a decreased appetite.  Patient states that she also has had a fever and chills. She stated that she has been able to hold down fluids. She denied nausea, vomiting, blood in stool, abdominal pain, loss of consciousness, confusion, recent medication changes, positive sick contacts, light-headedness, urinary symptoms, rashes, or any recent course of antibiotics.

## 2018-06-17 NOTE — SUBJECTIVE & OBJECTIVE
Past Medical History:   Diagnosis Date    Arthritis     Asthma     Blood transfusion     CHF (congestive heart failure)     Coronary artery disease     Diabetes mellitus     Hypertension     Pancreatitis     Type 2 diabetes mellitus with hyperglycemia 7/13/2015       Past Surgical History:   Procedure Laterality Date    CARDIAC SURGERY      CABG    CHOLECYSTECTOMY      CORONARY ARTERY BYPASS GRAFT         Review of patient's allergies indicates:  No Known Allergies    No current facility-administered medications on file prior to encounter.      Current Outpatient Prescriptions on File Prior to Encounter   Medication Sig    amLODIPine (NORVASC) 5 MG tablet Take 1 tablet (5 mg total) by mouth once daily. (Patient taking differently: Take 10 mg by mouth once daily. )    aspirin 81 MG chewable tablet Take 81 mg by mouth once daily. Ran out    atorvastatin (LIPITOR) 80 MG tablet Take 1 tablet (80 mg total) by mouth once daily.    clopidogrel (PLAVIX) 75 mg tablet Take 1 tablet (75 mg total) by mouth once daily.    CREON 24,000-76,000 -120,000 unit capsule Take 1 capsule by mouth 4 (four) times daily with meals and nightly.     ferrous sulfate 325 (65 FE) MG EC tablet Take 1 tablet (325 mg total) by mouth 2 (two) times daily.    furosemide (LASIX) 40 MG tablet Take 40 mg by mouth once daily.    HUMALOG KWIKPEN 100 unit/mL InPn pen Inject 7 Units into the skin 3 (three) times daily before meals. (Patient taking differently: Inject 7 Units into the skin 3 (three) times daily before meals. Sliding scale)    insulin detemir (LEVEMIR FLEXTOUCH) 100 unit/mL (3 mL) SubQ InPn pen Inject 10 Units into the skin 2 (two) times daily. (Patient taking differently: Inject 4 Units into the skin 2 (two) times daily. )    lisinopril (PRINIVIL,ZESTRIL) 2.5 MG tablet Take 2.5 mg by mouth once daily.     pantoprazole (PROTONIX) 40 MG tablet Take 40 mg by mouth once daily.    quetiapine (SEROQUEL) 100 MG Tab 100 mg  every evening.      Family History     Problem Relation (Age of Onset)    Asthma Brother    Cancer Maternal Grandmother    Depression Brother    Diabetes Mother, Father, Sister    Hearing loss Sister    Heart disease Sister    Hypertension Sister    Learning disabilities Sister    Vision loss Sister        Social History Main Topics    Smoking status: Current Every Day Smoker     Packs/day: 0.50     Years: 35.00     Types: Cigarettes    Smokeless tobacco: Never Used    Alcohol use No      Comment: questionable per     Drug use: No    Sexual activity: Not Currently     Partners: Male     Birth control/ protection: None     Review of Systems   Constitutional: Positive for appetite change, chills and fever. Negative for fatigue.   HENT: Negative for hearing loss, postnasal drip, sore throat and trouble swallowing.    Eyes: Negative for photophobia and visual disturbance.   Respiratory: Negative for cough, shortness of breath and wheezing.    Cardiovascular: Negative for chest pain, palpitations and leg swelling.   Gastrointestinal: Positive for diarrhea. Negative for abdominal pain, nausea and vomiting.   Endocrine: Negative for polydipsia, polyphagia and polyuria.   Genitourinary: Negative for dysuria, frequency and urgency.   Musculoskeletal: Negative for gait problem, neck pain and neck stiffness.   Skin: Negative for rash and wound.   Neurological: Negative for dizziness, weakness, numbness and headaches.   Hematological: Does not bruise/bleed easily.   Psychiatric/Behavioral: Negative for confusion. The patient is not nervous/anxious.      Objective:     Vital Signs (Most Recent):  Temp: 98.5 °F (36.9 °C) (06/16/18 2328)  Pulse: 79 (06/16/18 2328)  Resp: 16 (06/16/18 2328)  BP: (!) 158/94 (06/16/18 2328)  SpO2: 98 % (06/16/18 2328) Vital Signs (24h Range):  Temp:  [98.5 °F (36.9 °C)] 98.5 °F (36.9 °C)  Pulse:  [79] 79  Resp:  [16] 16  SpO2:  [98 %] 98 %  BP: (158)/(94) 158/94     Weight: 45.4 kg (100  lb)  Body mass index is 15.66 kg/m².    Physical Exam   Constitutional: She is oriented to person, place, and time. She appears well-developed. No distress.   Appears older than stated age.  Frail, cachetic. Malodorous and disheveled.   HENT:   Head: Normocephalic and atraumatic.   Mouth/Throat: Abnormal dentition.   Eyes: Conjunctivae and EOM are normal. Pupils are equal, round, and reactive to light. No scleral icterus.   Neck: Normal range of motion. Neck supple. No JVD present. No tracheal deviation present. No thyromegaly present.   Cardiovascular: Normal rate, regular rhythm and intact distal pulses.  Exam reveals no gallop and no friction rub.    Murmur heard.  Pulses:       Dorsalis pedis pulses are 2+ on the right side, and 2+ on the left side.   Pulmonary/Chest: Effort normal and breath sounds normal. No accessory muscle usage. No respiratory distress. She has no wheezes. She has no rhonchi. She has no rales.   Abdominal: Soft. Bowel sounds are normal. She exhibits no distension and no mass. There is no tenderness. There is no guarding.   Musculoskeletal: Normal range of motion. She exhibits no edema, tenderness or deformity.   Neurological: She is alert and oriented to person, place, and time. She has normal strength. She exhibits normal muscle tone. Coordination normal.   Skin: Skin is warm, dry and intact. Capillary refill takes less than 2 seconds. No rash noted. She is not diaphoretic. No erythema.   Psychiatric: She has a normal mood and affect. Her speech is normal and behavior is normal.   Vitals reviewed.        CRANIAL NERVES     CN III, IV, VI   Pupils are equal, round, and reactive to light.  Extraocular motions are normal.        Significant Labs:   CBC:   Recent Labs  Lab 06/17/18  0113   WBC 10.60   HGB 11.8*   HCT 35.7*   *     CMP:   Recent Labs  Lab 06/17/18  0113      K 2.3*      CO2 24   *   BUN 26*   CREATININE 3.0*   CALCIUM 7.3*   ANIONGAP 14   EGFRNONAA 17*      Significant Imaging: none

## 2018-06-17 NOTE — PLAN OF CARE
Problem: Diabetes, Type 2 (Adult)  Goal: Signs and Symptoms of Listed Potential Problems Will be Absent, Minimized or Managed (Diabetes, Type 2)  Signs and symptoms of listed potential problems will be absent, minimized or managed by discharge/transition of care (reference Diabetes, Type 2 (Adult) CPG).   Outcome: Ongoing (interventions implemented as appropriate)  Pt was assessed for S/s of hypoglycemia and hyperglycemia. Pt showed no s/s of either. BS checked with accucheck per order.    Problem: Patient Care Overview  Goal: Individualization & Mutuality  Outcome: Ongoing (interventions implemented as appropriate)  Pt remained free of falls, injuries, or trauma during shift. Fall precautions maintained throughout shift. Bed kept in lowest position, locked. Call light within reach. Fall risk band on. Pt showed no new s/s of skin breakdown during shift. Pt repositions independently. Pt rounded on hourly, neuros checked every 4 hours. Pt confused to time and situation. IV electrolytes replaced per orders. Tele in place. VS and assessment performed per orders. VSS. Patient progressing towards goals as tolerated. Plan of care reviewed with pt, all concerns addressed. Will continue to monitor

## 2018-06-17 NOTE — ASSESSMENT & PLAN NOTE
Stable. Continue Iron supplementation.  Monitor CBC and transfuse for H/H <7/21 or if becomes symptomatic or hemodyamically unstable.

## 2018-06-17 NOTE — PROGRESS NOTES
[facility] Hospital Medicine  Progress Note    Patient Name: Errol Keita  MRN: [id number]  Patient Class: IP- Inpatient   Admission Date: 6/16/2018  Length of Stay: 0 days  Attending Physician: Mannie Coombs MD  Primary Care Provider: Bharat Wiggins MD        Subjective:     Principal Problem:Acute kidney injury superimposed on chronic kidney disease    HPI:  Errol Keita is a 52 y.o. female with a PMHx of Arthritis, Asthma, Diastolic CHF, Coronary artery disease, Diabetes mellitus, Hypertension and Pancreatitis. She was admitted to the service of hospital medicine with TIMOTHY superimposed on CKD and hypokalemia. She presented to the ED with a desire to be evaluated for dehydration secondary to diarrhea x 2 days and a decreased appetite.  Patient states that she also has had a fever and chills. She stated that she has been able to hold down fluids. She denied nausea, vomiting, blood in stool, abdominal pain, loss of consciousness, confusion, recent medication changes, positive sick contacts, light-headedness, urinary symptoms, rashes, or any recent course of antibiotics.     Hospital Course:  6/17: Pt remains fatigued and in bed. Mouth appears dry and admits she has not been drinking much water. No abdominal pain or diarrhea thus far today.     Review of Systems  Objective:     Vital Signs (Most Recent):  Temp: 96.7 °F (35.9 °C) (06/17/18 1131)  Pulse: 76 (06/17/18 1131)  Resp: 18 (06/17/18 1131)  BP: 116/67 (06/17/18 1131)  SpO2: 98 % (06/17/18 1131) Vital Signs (24h Range):  Temp:  [96.7 °F (35.9 °C)-98.5 °F (36.9 °C)] 96.7 °F (35.9 °C)  Pulse:  [70-79] 76  Resp:  [16-18] 18  SpO2:  [96 %-100 %] 98 %  BP: (116-199)/(67-97) 116/67     Weight: 41.7 kg (92 lb)  Body mass index is 14.41 kg/m².    Intake/Output Summary (Last 24 hours) at 06/17/18 1524  Last data filed at 06/17/18 0555   Gross per 24 hour   Intake              790 ml   Output              650 ml   Net              140 ml       Physical Exam   Constitutional: She is oriented to person, place, and time. She appears well-developed. No distress.   Appears older than stated age.  Frail, cachetic. Malodorous and disheveled.   HENT:   Head: Normocephalic and atraumatic.   Mouth/Throat: Abnormal dentition.   Eyes: Conjunctivae and EOM are normal. Pupils are equal, round, and reactive to light. No scleral icterus.   Neck: Normal range of motion. Neck supple. No JVD present. No tracheal deviation present. No thyromegaly present.   Cardiovascular: Normal rate, regular rhythm and intact distal pulses.  Exam reveals no gallop and no friction rub.    Murmur heard.  Pulses:       Dorsalis pedis pulses are 2+ on the right side, and 2+ on the left side.   Pulmonary/Chest: Effort normal and breath sounds normal. No accessory muscle usage. No respiratory distress. She has no wheezes. She has no rhonchi. She has no rales.   Abdominal: Soft. Bowel sounds are normal. She exhibits no distension and no mass. There is no tenderness. There is no guarding.   Musculoskeletal: Normal range of motion. She exhibits no edema, tenderness or deformity.   Neurological: She is alert and oriented to person, place, and time. She has normal strength. She exhibits normal muscle tone. Coordination normal.   Skin: Skin is warm, dry and intact. Capillary refill takes less than 2 seconds. No rash noted. She is not diaphoretic. No erythema.   Psychiatric: She has a normal mood and affect. Her speech is normal and behavior is normal.   Vitals reviewed.      Significant Labs:   BMP:   Recent Labs  Lab 06/17/18  0524 06/17/18  0751   * 130*    140   K 2.2* 2.3*    106   CO2 23 23   BUN 24* 24*   CREATININE 2.8* 2.6*   CALCIUM 6.9* 6.6*   MG 1.1*  1.1*  --            Assessment/Plan:      * Acute kidney injury superimposed on chronic kidney disease    - presumably 2/2 to IVVD from diarrheal illness and poor intake  - sCr 2.6 (baseline 1.8-2.0)  - holding ACE-I and  diuretics  - continue IV fluid hydration    I        Hypokalemia    - likely secondary to GI and renal wasting  - replace K and Mg orally and IV            Anemia of chronic renal failure    Stable. Continue Iron supplementation.  Monitor CBC and transfuse for H/H <7/21 or if becomes symptomatic or hemodyamically unstable.          Gastroenteritis    - no further diarrhea, F/C here in hospital  - probable viral and self-limiting  - supportive care and monitor for now        Moderate tobacco dependence    Health hazards associated with cigarette smoking were reviewed with patient and cessation was encouraged. Nicotine replacement and counseling options were discussed.  Spent 3 minutes counseling on cessation, patient states she's ready to quit-- nicotine patch ordered.            Coronary artery disease involving native coronary artery without angina pectoris    Historical diagnosis. No anginal symptoms.  Continue ASA/plavix, monitor on telemetry.          Mild recurrent major depression    Chronic problem.  Stable.  Continue Seroquel and monitor.          Hypertension associated with diabetes    Historically poorly controlled.  Presents with mild elevation in b/p.  Resume norvasc. Holding lisinopril 2/2 TIMOTHY. Resume once clinically appropriate. Monitor BP and adjust regimen as required for sustained BP control.            VTE Risk Mitigation         Ordered     enoxaparin injection 30 mg  Daily      06/17/18 0328     IP VTE HIGH RISK PATIENT  Once      06/17/18 0328              Mannie Coombs MD  Department of Hospital Medicine   [facility]

## 2018-06-17 NOTE — ASSESSMENT & PLAN NOTE
Likely 2/2 to IVVD from diarrheal illness and poor intake.  IV hydration.  Hold home lisinopril and diuretics - resume once clinically appropriate.  Monitor renal function panel and electrolytes.  Avoid non-essential nephrotoxins.  Renal dose medications for Estimated Creatinine Clearance: 15.4 mL/min (A) (based on SCr of 3 mg/dL (H)).

## 2018-06-17 NOTE — HOSPITAL COURSE
6/17: Pt remains fatigued and in bed. Mouth appears dry and admits she has not been drinking much water. No abdominal pain or diarrhea thus far today.

## 2018-06-17 NOTE — H&P
[facility] Hospital Medicine  History & Physical    Patient Name: Errol Keita  MRN: [id number]  Admission Date: 6/16/2018  Attending Physician: Ophelia Dash MD  Primary Care Provider: Bharat Wiggins MD         Patient information was obtained from patient, past medical records and ER records.     Subjective:     Principal Problem:Acute kidney injury superimposed on chronic kidney disease    Chief Complaint:   Chief Complaint   Patient presents with    Dehydration     and subjective fever x 3 days         HPI: Errol Keita is a 52 y.o. female with a PMHx of Arthritis, Asthma, Diastolic CHF, Coronary artery disease, Diabetes mellitus, Hypertension and Pancreatitis. She was admitted to the service of hospital medicine with TIMOTHY superimposed on CKD and hypokalemia. She presented to the ED with a desire to be evaluated for dehydration secondary to diarrhea x 2 days and a decreased appetite.  Patient states that she also has had a fever and chills. She stated that she has been able to hold down fluids. She denied nausea, vomiting, blood in stool, abdominal pain, loss of consciousness, confusion, recent medication changes, positive sick contacts, light-headedness, urinary symptoms, rashes, or any recent course of antibiotics.     Past Medical History:   Diagnosis Date    Arthritis     Asthma     Blood transfusion     CHF (congestive heart failure)     Coronary artery disease     Diabetes mellitus     Hypertension     Pancreatitis     Type 2 diabetes mellitus with hyperglycemia 7/13/2015       Past Surgical History:   Procedure Laterality Date    CARDIAC SURGERY      CABG    CHOLECYSTECTOMY      CORONARY ARTERY BYPASS GRAFT         Review of patient's allergies indicates:  No Known Allergies    No current facility-administered medications on file prior to encounter.      Current Outpatient Prescriptions on File Prior to Encounter   Medication Sig    amLODIPine (NORVASC) 5 MG tablet Take  1 tablet (5 mg total) by mouth once daily. (Patient taking differently: Take 10 mg by mouth once daily. )    aspirin 81 MG chewable tablet Take 81 mg by mouth once daily. Ran out    atorvastatin (LIPITOR) 80 MG tablet Take 1 tablet (80 mg total) by mouth once daily.    clopidogrel (PLAVIX) 75 mg tablet Take 1 tablet (75 mg total) by mouth once daily.    CREON 24,000-76,000 -120,000 unit capsule Take 1 capsule by mouth 4 (four) times daily with meals and nightly.     ferrous sulfate 325 (65 FE) MG EC tablet Take 1 tablet (325 mg total) by mouth 2 (two) times daily.    furosemide (LASIX) 40 MG tablet Take 40 mg by mouth once daily.    HUMALOG KWIKPEN 100 unit/mL InPn pen Inject 7 Units into the skin 3 (three) times daily before meals. (Patient taking differently: Inject 7 Units into the skin 3 (three) times daily before meals. Sliding scale)    insulin detemir (LEVEMIR FLEXTOUCH) 100 unit/mL (3 mL) SubQ InPn pen Inject 10 Units into the skin 2 (two) times daily. (Patient taking differently: Inject 4 Units into the skin 2 (two) times daily. )    lisinopril (PRINIVIL,ZESTRIL) 2.5 MG tablet Take 2.5 mg by mouth once daily.     pantoprazole (PROTONIX) 40 MG tablet Take 40 mg by mouth once daily.    quetiapine (SEROQUEL) 100 MG Tab 100 mg every evening.      Family History     Problem Relation (Age of Onset)    Asthma Brother    Cancer Maternal Grandmother    Depression Brother    Diabetes Mother, Father, Sister    Hearing loss Sister    Heart disease Sister    Hypertension Sister    Learning disabilities Sister    Vision loss Sister        Social History Main Topics    Smoking status: Current Every Day Smoker     Packs/day: 0.50     Years: 35.00     Types: Cigarettes    Smokeless tobacco: Never Used    Alcohol use No      Comment: questionable per     Drug use: No    Sexual activity: Not Currently     Partners: Male     Birth control/ protection: None     Review of Systems   Constitutional: Positive  for appetite change, chills and fever. Negative for fatigue.   HENT: Negative for hearing loss, postnasal drip, sore throat and trouble swallowing.    Eyes: Negative for photophobia and visual disturbance.   Respiratory: Negative for cough, shortness of breath and wheezing.    Cardiovascular: Negative for chest pain, palpitations and leg swelling.   Gastrointestinal: Positive for diarrhea. Negative for abdominal pain, nausea and vomiting.   Endocrine: Negative for polydipsia, polyphagia and polyuria.   Genitourinary: Negative for dysuria, frequency and urgency.   Musculoskeletal: Negative for gait problem, neck pain and neck stiffness.   Skin: Negative for rash and wound.   Neurological: Negative for dizziness, weakness, numbness and headaches.   Hematological: Does not bruise/bleed easily.   Psychiatric/Behavioral: Negative for confusion. The patient is not nervous/anxious.      Objective:     Vital Signs (Most Recent):  Temp: 98.5 °F (36.9 °C) (06/16/18 2328)  Pulse: 79 (06/16/18 2328)  Resp: 16 (06/16/18 2328)  BP: (!) 158/94 (06/16/18 2328)  SpO2: 98 % (06/16/18 2328) Vital Signs (24h Range):  Temp:  [98.5 °F (36.9 °C)] 98.5 °F (36.9 °C)  Pulse:  [79] 79  Resp:  [16] 16  SpO2:  [98 %] 98 %  BP: (158)/(94) 158/94     Weight: 45.4 kg (100 lb)  Body mass index is 15.66 kg/m².    Physical Exam   Constitutional: She is oriented to person, place, and time. She appears well-developed. No distress.   Appears older than stated age.  Frail, cachetic. Malodorous and disheveled.   HENT:   Head: Normocephalic and atraumatic.   Mouth/Throat: Abnormal dentition.   Eyes: Conjunctivae and EOM are normal. Pupils are equal, round, and reactive to light. No scleral icterus.   Neck: Normal range of motion. Neck supple. No JVD present. No tracheal deviation present. No thyromegaly present.   Cardiovascular: Normal rate, regular rhythm and intact distal pulses.  Exam reveals no gallop and no friction rub.    Murmur heard.  Pulses:        Dorsalis pedis pulses are 2+ on the right side, and 2+ on the left side.   Pulmonary/Chest: Effort normal and breath sounds normal. No accessory muscle usage. No respiratory distress. She has no wheezes. She has no rhonchi. She has no rales.   Abdominal: Soft. Bowel sounds are normal. She exhibits no distension and no mass. There is no tenderness. There is no guarding.   Musculoskeletal: Normal range of motion. She exhibits no edema, tenderness or deformity.   Neurological: She is alert and oriented to person, place, and time. She has normal strength. She exhibits normal muscle tone. Coordination normal.   Skin: Skin is warm, dry and intact. Capillary refill takes less than 2 seconds. No rash noted. She is not diaphoretic. No erythema.   Psychiatric: She has a normal mood and affect. Her speech is normal and behavior is normal.   Vitals reviewed.        CRANIAL NERVES     CN III, IV, VI   Pupils are equal, round, and reactive to light.  Extraocular motions are normal.        Significant Labs:   CBC:   Recent Labs  Lab 06/17/18  0113   WBC 10.60   HGB 11.8*   HCT 35.7*   *     CMP:   Recent Labs  Lab 06/17/18  0113      K 2.3*      CO2 24   *   BUN 26*   CREATININE 3.0*   CALCIUM 7.3*   ANIONGAP 14   EGFRNONAA 17*     Significant Imaging: none    Assessment/Plan:     * Acute kidney injury superimposed on chronic kidney disease    Likely 2/2 to IVVD from diarrheal illness and poor intake.  IV hydration.  Hold home lisinopril and diuretics - resume once clinically appropriate.  Monitor renal function panel and electrolytes.  Avoid non-essential nephrotoxins.  Renal dose medications for Estimated Creatinine Clearance: 15.4 mL/min (A) (based on SCr of 3 mg/dL (H)).            Hypokalemia    Suspect hypokalemia related to diarrhea and diuretics.  Replete potassium.  Follow BMP closely.  Tele-monitoring.        Moderate tobacco dependence    Health hazards associated with cigarette smoking were  reviewed with patient and cessation was encouraged. Nicotine replacement and counseling options were discussed.  Spent 3 minutes counseling on cessation, patient states she's ready to quit-- nicotine patch ordered.            Anemia of chronic renal failure    Stable. Continue Iron supplementation.  Monitor CBC and transfuse for H/H <7/21 or if becomes symptomatic or hemodyamically unstable.          Coronary artery disease involving native coronary artery without angina pectoris    Historical diagnosis. No anginal symptoms.  Continue ASA/plavix, monitor on telemetry.          Mild recurrent major depression    Chronic problem.  Stable.  Continue Seroquel and monitor.          Hypertension associated with diabetes    Historically poorly controlled.  Presents with mild elevation in b/p.  Resume norvasc. Holding lisinopril 2/2 TIMOTHY. Resume once clinically appropriate. Monitor BP and adjust regimen as required for sustained BP control.            VTE Risk Mitigation     Lovenox 30mg sc daily             Michelle Cardenas NP  Department of Hospital Medicine   [facility]

## 2018-06-17 NOTE — ASSESSMENT & PLAN NOTE
Historically poorly controlled.  Presents with mild elevation in b/p.  Resume norvasc. Holding lisinopril 2/2 TIMOTHY. Resume once clinically appropriate. Monitor BP and adjust regimen as required for sustained BP control.

## 2018-06-17 NOTE — ASSESSMENT & PLAN NOTE
- no further diarrhea, F/C here in hospital  - probable viral and self-limiting  - supportive care and monitor for now

## 2018-06-17 NOTE — PLAN OF CARE
Problem: Patient Care Overview  Goal: Plan of Care Review  Outcome: Ongoing (interventions implemented as appropriate)  Plan of care is ongoing for this patient. Pt is alert. Bed alarm maintained at all times for pt safety. No falls or injury. Diabetes is being monitored with bed side glucose checks as ordered with sliding scale coverage. Will conitnue to monitor

## 2018-06-17 NOTE — PLAN OF CARE
SW met w/ pt and SO for assessment.  Pt denies current HH.  Pt lives w/ SO, independent w/ ADL, does not drive.         06/17/18 1732   Discharge Assessment   Assessment Type Discharge Planning Assessment   Confirmed/corrected address and phone number on facesheet? Yes   Assessment information obtained from? Patient   Prior to hospitilization cognitive status: Alert/Oriented   Prior to hospitalization functional status: Independent   Current cognitive status: Alert/Oriented   Current Functional Status: Independent   Facility Arrived From: home   Lives With significant other   Able to Return to Prior Arrangements yes   Is patient able to care for self after discharge? Yes   Who are your caregiver(s) and their phone number(s)? significant other:  Ti Green  [phone]   Patient's perception of discharge disposition home or selfcare   Readmission Within The Last 30 Days no previous admission in last 30 days   Patient currently being followed by outpatient case management? No   Patient currently receives any other outside agency services? No   Equipment Currently Used at Home walker, rolling   Do you have any problems affording any of your prescribed medications? No   Is the patient taking medications as prescribed? yes   Does the patient have transportation home? Yes   Transportation Available family or friend will provide   Does the patient receive services at the [facility]? No   Discharge Plan A Home with family   Discharge Plan B Home with family   Patient/Family In Agreement With Plan yes

## 2018-06-17 NOTE — ASSESSMENT & PLAN NOTE
Health hazards associated with cigarette smoking were reviewed with patient and cessation was encouraged. Nicotine replacement and counseling options were discussed.  Spent 3 minutes counseling on cessation, patient states she's ready to quit-- nicotine patch ordered.

## 2018-06-17 NOTE — NURSING
Called placed to Dr. Whitaker about K+ and Ca+ results. Awaiting call back. Will continue to monitor patient.

## 2018-06-17 NOTE — SUBJECTIVE & OBJECTIVE
Review of Systems  Objective:     Vital Signs (Most Recent):  Temp: 96.7 °F (35.9 °C) (06/17/18 1131)  Pulse: 76 (06/17/18 1131)  Resp: 18 (06/17/18 1131)  BP: 116/67 (06/17/18 1131)  SpO2: 98 % (06/17/18 1131) Vital Signs (24h Range):  Temp:  [96.7 °F (35.9 °C)-98.5 °F (36.9 °C)] 96.7 °F (35.9 °C)  Pulse:  [70-79] 76  Resp:  [16-18] 18  SpO2:  [96 %-100 %] 98 %  BP: (116-199)/(67-97) 116/67     Weight: 41.7 kg (92 lb)  Body mass index is 14.41 kg/m².    Intake/Output Summary (Last 24 hours) at 06/17/18 1524  Last data filed at 06/17/18 0555   Gross per 24 hour   Intake              790 ml   Output              650 ml   Net              140 ml      Physical Exam   Constitutional: She is oriented to person, place, and time. She appears well-developed. No distress.   Appears older than stated age.  Frail, cachetic. Malodorous and disheveled.   HENT:   Head: Normocephalic and atraumatic.   Mouth/Throat: Abnormal dentition.   Eyes: Conjunctivae and EOM are normal. Pupils are equal, round, and reactive to light. No scleral icterus.   Neck: Normal range of motion. Neck supple. No JVD present. No tracheal deviation present. No thyromegaly present.   Cardiovascular: Normal rate, regular rhythm and intact distal pulses.  Exam reveals no gallop and no friction rub.    Murmur heard.  Pulses:       Dorsalis pedis pulses are 2+ on the right side, and 2+ on the left side.   Pulmonary/Chest: Effort normal and breath sounds normal. No accessory muscle usage. No respiratory distress. She has no wheezes. She has no rhonchi. She has no rales.   Abdominal: Soft. Bowel sounds are normal. She exhibits no distension and no mass. There is no tenderness. There is no guarding.   Musculoskeletal: Normal range of motion. She exhibits no edema, tenderness or deformity.   Neurological: She is alert and oriented to person, place, and time. She has normal strength. She exhibits normal muscle tone. Coordination normal.   Skin: Skin is warm, dry and  intact. Capillary refill takes less than 2 seconds. No rash noted. She is not diaphoretic. No erythema.   Psychiatric: She has a normal mood and affect. Her speech is normal and behavior is normal.   Vitals reviewed.      Significant Labs:   BMP:   Recent Labs  Lab 06/17/18  0524 06/17/18  0751   * 130*    140   K 2.2* 2.3*    106   CO2 23 23   BUN 24* 24*   CREATININE 2.8* 2.6*   CALCIUM 6.9* 6.6*   MG 1.1*  1.1*  --

## 2018-06-18 LAB
ALBUMIN SERPL BCP-MCNC: 1.9 G/DL
ALP SERPL-CCNC: 146 U/L
ALT SERPL W/O P-5'-P-CCNC: 9 U/L
ANION GAP SERPL CALC-SCNC: 13 MMOL/L
AST SERPL-CCNC: 13 U/L
BASOPHILS # BLD AUTO: 0 K/UL
BASOPHILS NFR BLD: 0.3 %
BILIRUB SERPL-MCNC: 0.2 MG/DL
BUN SERPL-MCNC: 25 MG/DL
CALCIUM SERPL-MCNC: 6.9 MG/DL
CHLORIDE SERPL-SCNC: 109 MMOL/L
CO2 SERPL-SCNC: 20 MMOL/L
CREAT SERPL-MCNC: 2.7 MG/DL
DIFFERENTIAL METHOD: ABNORMAL
EOSINOPHIL # BLD AUTO: 0.1 K/UL
EOSINOPHIL NFR BLD: 0.7 %
ERYTHROCYTE [DISTWIDTH] IN BLOOD BY AUTOMATED COUNT: 13.5 %
EST. GFR  (AFRICAN AMERICAN): 22 ML/MIN/1.73 M^2
EST. GFR  (NON AFRICAN AMERICAN): 19 ML/MIN/1.73 M^2
GLUCOSE SERPL-MCNC: 215 MG/DL
HCT VFR BLD AUTO: 29.6 %
HGB BLD-MCNC: 9.9 G/DL
LYMPHOCYTES # BLD AUTO: 2 K/UL
LYMPHOCYTES NFR BLD: 28.4 %
MAGNESIUM SERPL-MCNC: 2.2 MG/DL
MAGNESIUM SERPL-MCNC: 2.2 MG/DL
MCH RBC QN AUTO: 30.6 PG
MCHC RBC AUTO-ENTMCNC: 33.5 G/DL
MCV RBC AUTO: 91 FL
MONOCYTES # BLD AUTO: 0.3 K/UL
MONOCYTES NFR BLD: 4.7 %
NEUTROPHILS # BLD AUTO: 4.6 K/UL
NEUTROPHILS NFR BLD: 65.9 %
PHOSPHATE SERPL-MCNC: 3.4 MG/DL
PLATELET # BLD AUTO: 355 K/UL
PMV BLD AUTO: 8.3 FL
POCT GLUCOSE: 309 MG/DL (ref 70–110)
POCT GLUCOSE: 378 MG/DL (ref 70–110)
POCT GLUCOSE: 420 MG/DL (ref 70–110)
POCT GLUCOSE: 421 MG/DL (ref 70–110)
POTASSIUM SERPL-SCNC: 2.4 MMOL/L
PROT SERPL-MCNC: 5.8 G/DL
RBC # BLD AUTO: 3.24 M/UL
SODIUM SERPL-SCNC: 142 MMOL/L
WBC # BLD AUTO: 7 K/UL

## 2018-06-18 PROCEDURE — 83735 ASSAY OF MAGNESIUM: CPT

## 2018-06-18 PROCEDURE — 99232 SBSQ HOSP IP/OBS MODERATE 35: CPT | Mod: ,,, | Performed by: INTERNAL MEDICINE

## 2018-06-18 PROCEDURE — 85025 COMPLETE CBC W/AUTO DIFF WBC: CPT

## 2018-06-18 PROCEDURE — 25000003 PHARM REV CODE 250: Performed by: INTERNAL MEDICINE

## 2018-06-18 PROCEDURE — 12000002 HC ACUTE/MED SURGE SEMI-PRIVATE ROOM

## 2018-06-18 PROCEDURE — 25000003 PHARM REV CODE 250: Performed by: NURSE PRACTITIONER

## 2018-06-18 PROCEDURE — 63600175 PHARM REV CODE 636 W HCPCS: Performed by: NURSE PRACTITIONER

## 2018-06-18 PROCEDURE — S4991 NICOTINE PATCH NONLEGEND: HCPCS | Performed by: NURSE PRACTITIONER

## 2018-06-18 PROCEDURE — 63600175 PHARM REV CODE 636 W HCPCS: Performed by: INTERNAL MEDICINE

## 2018-06-18 PROCEDURE — 84100 ASSAY OF PHOSPHORUS: CPT

## 2018-06-18 PROCEDURE — 36415 COLL VENOUS BLD VENIPUNCTURE: CPT

## 2018-06-18 PROCEDURE — 94761 N-INVAS EAR/PLS OXIMETRY MLT: CPT

## 2018-06-18 PROCEDURE — 80053 COMPREHEN METABOLIC PANEL: CPT

## 2018-06-18 RX ORDER — INSULIN ASPART 100 [IU]/ML
7 INJECTION, SOLUTION INTRAVENOUS; SUBCUTANEOUS
Status: DISCONTINUED | OUTPATIENT
Start: 2018-06-19 | End: 2018-06-19

## 2018-06-18 RX ORDER — POTASSIUM CHLORIDE 20 MEQ/1
60 TABLET, EXTENDED RELEASE ORAL ONCE
Status: COMPLETED | OUTPATIENT
Start: 2018-06-18 | End: 2018-06-18

## 2018-06-18 RX ORDER — POTASSIUM CHLORIDE 7.45 MG/ML
10 INJECTION INTRAVENOUS
Status: COMPLETED | OUTPATIENT
Start: 2018-06-18 | End: 2018-06-18

## 2018-06-18 RX ADMIN — FERROUS SULFATE TAB EC 325 MG (65 MG FE EQUIVALENT) 325 MG: 325 (65 FE) TABLET DELAYED RESPONSE at 09:06

## 2018-06-18 RX ADMIN — INSULIN ASPART 4 UNITS: 100 INJECTION, SOLUTION INTRAVENOUS; SUBCUTANEOUS at 12:06

## 2018-06-18 RX ADMIN — ASPIRIN 81 MG 81 MG: 81 TABLET ORAL at 09:06

## 2018-06-18 RX ADMIN — INSULIN ASPART 3 UNITS: 100 INJECTION, SOLUTION INTRAVENOUS; SUBCUTANEOUS at 09:06

## 2018-06-18 RX ADMIN — PANCRELIPASE 1 CAPSULE: 24000; 76000; 120000 CAPSULE, DELAYED RELEASE PELLETS ORAL at 09:06

## 2018-06-18 RX ADMIN — INSULIN DETEMIR 10 UNITS: 100 INJECTION, SOLUTION SUBCUTANEOUS at 09:06

## 2018-06-18 RX ADMIN — INSULIN ASPART 2 UNITS: 100 INJECTION, SOLUTION INTRAVENOUS; SUBCUTANEOUS at 06:06

## 2018-06-18 RX ADMIN — AMLODIPINE BESYLATE 10 MG: 5 TABLET ORAL at 09:06

## 2018-06-18 RX ADMIN — ENOXAPARIN SODIUM 30 MG: 100 INJECTION SUBCUTANEOUS at 05:06

## 2018-06-18 RX ADMIN — CLOPIDOGREL 75 MG: 75 TABLET, FILM COATED ORAL at 09:06

## 2018-06-18 RX ADMIN — STANDARDIZED SENNA CONCENTRATE AND DOCUSATE SODIUM 1 TABLET: 8.6; 5 TABLET, FILM COATED ORAL at 09:06

## 2018-06-18 RX ADMIN — POTASSIUM CHLORIDE 60 MEQ: 1500 TABLET, EXTENDED RELEASE ORAL at 11:06

## 2018-06-18 RX ADMIN — POTASSIUM CHLORIDE 10 MEQ: 7.46 INJECTION, SOLUTION INTRAVENOUS at 11:06

## 2018-06-18 RX ADMIN — ATORVASTATIN CALCIUM 80 MG: 40 TABLET, FILM COATED ORAL at 09:06

## 2018-06-18 RX ADMIN — POTASSIUM CHLORIDE 10 MEQ: 7.46 INJECTION, SOLUTION INTRAVENOUS at 06:06

## 2018-06-18 RX ADMIN — QUETIAPINE FUMARATE 100 MG: 100 TABLET ORAL at 09:06

## 2018-06-18 RX ADMIN — NICOTINE 1 PATCH: 14 PATCH, EXTENDED RELEASE TRANSDERMAL at 09:06

## 2018-06-18 RX ADMIN — CALCIUM ACETATE 667 MG: 667 CAPSULE ORAL at 07:06

## 2018-06-18 RX ADMIN — PANCRELIPASE 1 CAPSULE: 24000; 76000; 120000 CAPSULE, DELAYED RELEASE PELLETS ORAL at 05:06

## 2018-06-18 RX ADMIN — CALCIUM ACETATE 667 MG: 667 CAPSULE ORAL at 05:06

## 2018-06-18 RX ADMIN — CALCIUM ACETATE 667 MG: 667 CAPSULE ORAL at 11:06

## 2018-06-18 RX ADMIN — CEFTRIAXONE 1 G: 1 INJECTION, SOLUTION INTRAVENOUS at 02:06

## 2018-06-18 RX ADMIN — POTASSIUM CHLORIDE 10 MEQ: 7.46 INJECTION, SOLUTION INTRAVENOUS at 07:06

## 2018-06-18 RX ADMIN — SODIUM CHLORIDE: 0.9 INJECTION, SOLUTION INTRAVENOUS at 08:06

## 2018-06-18 RX ADMIN — POTASSIUM CHLORIDE 10 MEQ: 7.46 INJECTION, SOLUTION INTRAVENOUS at 09:06

## 2018-06-18 RX ADMIN — INSULIN ASPART 5 UNITS: 100 INJECTION, SOLUTION INTRAVENOUS; SUBCUTANEOUS at 05:06

## 2018-06-18 RX ADMIN — PANTOPRAZOLE SODIUM 40 MG: 40 TABLET, DELAYED RELEASE ORAL at 09:06

## 2018-06-18 RX ADMIN — PANCRELIPASE 1 CAPSULE: 24000; 76000; 120000 CAPSULE, DELAYED RELEASE PELLETS ORAL at 07:06

## 2018-06-18 RX ADMIN — PANCRELIPASE 1 CAPSULE: 24000; 76000; 120000 CAPSULE, DELAYED RELEASE PELLETS ORAL at 12:06

## 2018-06-18 NOTE — PROGRESS NOTES
Progress Note  Hospital Medicine  Patient Name:Errol Keita  MRN:  [id number]  Patient Class: IP- Inpatient  Admit Date: 6/16/2018  Length of Stay: 1 days  Expected Discharge Date:   Attending Physician: Ophelia Dash MD  Primary Care Provider:  Bharat Wiggins MD    SUBJECTIVE:     Principal Problem: Acute kidney injury superimposed on chronic kidney disease  Initial history of present illness: Errol Keita is a 52 y.o. female with a PMHx of Arthritis, Asthma, Diastolic CHF, Coronary artery disease, Diabetes mellitus, Hypertension and Pancreatitis. She was admitted to the service of hospital medicine with TIMOTHY superimposed on CKD and hypokalemia. She presented to the ED with a desire to be evaluated for dehydration secondary to diarrhea x 2 days and a decreased appetite.  Patient states that she also has had a fever and chills. She stated that she has been able to hold down fluids. She denied nausea, vomiting, blood in stool, abdominal pain, loss of consciousness, confusion, recent medication changes, positive sick contacts, light-headedness, urinary symptoms, rashes, or any recent course of antibiotics.     PMH/PSH/SH/FH/Meds: reviewed.    Symptoms/Review of Systems: Frail malnourished female.  No shortness of breath, cough, chest pain or headache, fever or abdominal pain.     Diet:  Adequate intake.    Activity level: Normal.    Pain:  Patient reports no pain.       OBJECTIVE:   Vital Signs (Most Recent):      Temp: 97.7 °F (36.5 °C) (06/18/18 0747)  Pulse: 66 (06/18/18 0747)  Resp: 16 (06/18/18 0747)  BP: 138/81 (06/18/18 0747)  SpO2: 98 % (06/18/18 0747)       Vital Signs Range (Last 24H):  Temp:  [96 °F (35.6 °C)-97.7 °F (36.5 °C)]   Pulse:  [61-76]   Resp:  [16-18]   BP: (116-140)/(66-81)   SpO2:  [96 %-98 %]     Weight: 42 kg (92 lb 9.5 oz)  Body mass index is 14.5 kg/m².    Intake/Output Summary (Last 24 hours) at 06/18/18 0977  Last data filed at 06/18/18 0510   Gross per 24 hour   Intake          2520.83  ml   Output                0 ml   Net          2520.83 ml     Physical Examination:  Constitutional: She is oriented to person, place, and time. She appears well-developed. No distress.   Appears older than stated age.  Frail, cachetic. Malodorous and disheveled.   HENT:   Head: Normocephalic and atraumatic.   Mouth/Throat: Abnormal dentition.   Eyes: Conjunctivae and EOM are normal. Pupils are equal, round, and reactive to light. No scleral icterus.   Neck: Normal range of motion. Neck supple. No JVD present. No tracheal deviation present. No thyromegaly present.   Cardiovascular: Normal rate, regular rhythm and intact distal pulses.  Exam reveals no gallop and no friction rub.    Murmur heard.  Pulses:       Dorsalis pedis pulses are 2+ on the right side, and 2+ on the left side.   Pulmonary/Chest: Effort normal and breath sounds normal. No accessory muscle usage. No respiratory distress. She has no wheezes. She has no rhonchi. She has no rales.   Abdominal: Soft. Bowel sounds are normal. She exhibits no distension and no mass. There is no tenderness. There is no guarding.   Musculoskeletal: Normal range of motion. She exhibits no edema, tenderness or deformity.   Neurological: She is alert and oriented to person, place, and time. She has normal strength. She exhibits normal muscle tone. Coordination normal.   Skin: Skin is warm, dry and intact. Capillary refill takes less than 2 seconds. No rash noted. She is not diaphoretic. No erythema.   Psychiatric: She has a normal mood and affect. Her speech is normal and behavior is normal.   CRANIAL NERVES   CN III, IV, VI   Pupils are equal, round, and reactive to light.  Extraocular motions are normal.   Vitals reviewed.    CBC:    Recent Labs  Lab 06/17/18  0113 06/17/18  0524 06/18/18  0445   WBC 10.60 9.40 7.00   RBC 3.88* 3.31* 3.24*   HGB 11.8* 10.4* 9.9*   HCT 35.7* 30.5* 29.6*   * 397* 355*   MCV 92 92 91   MCH 30.5 31.4* 30.6   MCHC 33.1 34.0 33.5    BMP    Recent Labs  Lab 06/17/18  0113 06/17/18  0524 06/17/18  0751 06/18/18  0445   * 147* 130* 215*    140 140 142   K 2.3* 2.2* 2.3* 2.4*    104 106 109   CO2 24 23 23 20*   BUN 26* 24* 24* 25*   CREATININE 3.0* 2.8* 2.6* 2.7*   CALCIUM 7.3* 6.9* 6.6* 6.9*   MG 1.4* 1.1*  1.1*  --  2.2  2.2      Diagnostic Results:  Microbiology Results (last 7 days)     ** No results found for the last 168 hours. **         Assessment/Plan:     * Acute kidney injury superimposed on chronic kidney disease stage 4     Likely 2/2 to IVVD from diarrheal illness and poor intake.  IV hydration.  Hold home lisinopril and diuretics - resume once clinically appropriate.  Monitor renal function panel and electrolytes.  Avoid non-essential nephrotoxins.  Renal dose medications for Estimated Creatinine Clearance: 15.4 mL/min (A) (based on SCr of 3 mg/dL (H)).         Hypokalemia     Suspect hypokalemia related to diarrhea and diuretics.  Replete potassium.  Follow BMP closely.  Tele-monitoring.       Moderate tobacco dependence     Health hazards associated with cigarette smoking were reviewed with patient and cessation was encouraged. Nicotine replacement and counseling options were discussed.  Spent 3 minutes counseling on cessation, patient states she's ready to quit-- nicotine patch ordered.       Anemia of chronic renal failure     Stable. Continue Iron supplementation.  Monitor CBC and transfuse for H/H <7/21 or if becomes symptomatic or hemodyamically unstable.      UTI  Urine C/S. Continue IV Rocephin.     Coronary artery disease involving native coronary artery without angina pectoris     Historical diagnosis. No anginal symptoms.  Continue ASA/plavix, monitor on telemetry.         Mild recurrent major depression     Chronic problem.  Stable.  Continue Seroquel and monitor.       Hypertension associated with diabetes     Historically poorly controlled.  Presents with mild elevation in b/p.  Resume norvasc.  Holding lisinopril 2/2 TIMOTHY. Resume once clinically appropriate. Monitor BP and adjust regimen as required for sustained BP control.     VTE Risk Mitigation         Ordered     enoxaparin injection 30 mg  Daily      06/17/18 0328     IP VTE HIGH RISK PATIENT  Once      06/17/18 0328        Ophelia Dash MD  Department of Hospital Medicine   [facility]

## 2018-06-18 NOTE — PLAN OF CARE
Problem: Patient Care Overview  Goal: Plan of Care Review  Outcome: Ongoing (interventions implemented as appropriate)  Plan of care is ongoing. Pt is alert and oriented. Denies pain at present. Bed alarm is maintained at all times for pt safety. Room is close to nurse's station for close observation. No falls or injury. Diabetes is being moniotred with bedside glucose checks with sliding scale as needed. Pt moves well in bed. Will continue to monitor

## 2018-06-18 NOTE — PROGRESS NOTES
06/17/18 1937   Patient Assessment/Suction   Level of Consciousness (AVPU) alert   PRE-TX-O2-ETCO2   O2 Device (Oxygen Therapy) room air   SpO2 98 %   Pulse Oximetry Type Intermittent   $ Pulse Oximetry - Single Charge Pulse Oximetry - Single

## 2018-06-18 NOTE — PLAN OF CARE
Problem: Patient Care Overview  Goal: Plan of Care Review  Outcome: Ongoing (interventions implemented as appropriate)  Plan of care reviewed with patient at the beginning of the shift. Patient verbalized understanding. Hourly rounds completed on this patient this shift.  IV fluids infusing as ordered. Patient denies any pain this shift. BG monitored and sliding scale insulin Aspart given as needed. Telemetry monitoring maintained. Neuro checks Q4. Patient has remained free from fall/injury. Side rails up X2, bed in lowest, locked position, needs attended to , call light within reach will continue to monitor.

## 2018-06-19 LAB
ALBUMIN SERPL BCP-MCNC: 1.9 G/DL
ALP SERPL-CCNC: 141 U/L
ALT SERPL W/O P-5'-P-CCNC: 7 U/L
ANION GAP SERPL CALC-SCNC: 9 MMOL/L
AST SERPL-CCNC: 13 U/L
BACTERIA UR CULT: NORMAL
BASOPHILS # BLD AUTO: 0 K/UL
BASOPHILS NFR BLD: 0.4 %
BILIRUB SERPL-MCNC: 0.1 MG/DL
BUN SERPL-MCNC: 26 MG/DL
CALCIUM SERPL-MCNC: 7.1 MG/DL
CHLORIDE SERPL-SCNC: 114 MMOL/L
CO2 SERPL-SCNC: 21 MMOL/L
CREAT SERPL-MCNC: 2.5 MG/DL
DIFFERENTIAL METHOD: ABNORMAL
EOSINOPHIL # BLD AUTO: 0.1 K/UL
EOSINOPHIL NFR BLD: 0.8 %
ERYTHROCYTE [DISTWIDTH] IN BLOOD BY AUTOMATED COUNT: 13.4 %
EST. GFR  (AFRICAN AMERICAN): 24 ML/MIN/1.73 M^2
EST. GFR  (NON AFRICAN AMERICAN): 21 ML/MIN/1.73 M^2
GLUCOSE SERPL-MCNC: 46 MG/DL
HCT VFR BLD AUTO: 27.2 %
HGB BLD-MCNC: 9.1 G/DL
LYMPHOCYTES # BLD AUTO: 3.1 K/UL
LYMPHOCYTES NFR BLD: 35.5 %
MAGNESIUM SERPL-MCNC: 1.9 MG/DL
MAGNESIUM SERPL-MCNC: 1.9 MG/DL
MCH RBC QN AUTO: 30.7 PG
MCHC RBC AUTO-ENTMCNC: 33.4 G/DL
MCV RBC AUTO: 92 FL
MONOCYTES # BLD AUTO: 0.2 K/UL
MONOCYTES NFR BLD: 2.8 %
NEUTROPHILS # BLD AUTO: 5.4 K/UL
NEUTROPHILS NFR BLD: 60.5 %
PHOSPHATE SERPL-MCNC: 2.2 MG/DL
PLATELET # BLD AUTO: 397 K/UL
PMV BLD AUTO: 8.1 FL
POCT GLUCOSE: 121 MG/DL (ref 70–110)
POCT GLUCOSE: 150 MG/DL (ref 70–110)
POCT GLUCOSE: 222 MG/DL (ref 70–110)
POCT GLUCOSE: 223 MG/DL (ref 70–110)
POCT GLUCOSE: 44 MG/DL (ref 70–110)
POCT GLUCOSE: 47 MG/DL (ref 70–110)
POCT GLUCOSE: 71 MG/DL (ref 70–110)
POTASSIUM SERPL-SCNC: 2.9 MMOL/L
PROT SERPL-MCNC: 5.8 G/DL
RBC # BLD AUTO: 2.96 M/UL
SODIUM SERPL-SCNC: 144 MMOL/L
WBC # BLD AUTO: 8.9 K/UL

## 2018-06-19 PROCEDURE — S4991 NICOTINE PATCH NONLEGEND: HCPCS | Performed by: NURSE PRACTITIONER

## 2018-06-19 PROCEDURE — 63600175 PHARM REV CODE 636 W HCPCS: Performed by: INTERNAL MEDICINE

## 2018-06-19 PROCEDURE — 25000003 PHARM REV CODE 250: Performed by: INTERNAL MEDICINE

## 2018-06-19 PROCEDURE — 97162 PT EVAL MOD COMPLEX 30 MIN: CPT

## 2018-06-19 PROCEDURE — 12000002 HC ACUTE/MED SURGE SEMI-PRIVATE ROOM

## 2018-06-19 PROCEDURE — 63600175 PHARM REV CODE 636 W HCPCS: Performed by: NURSE PRACTITIONER

## 2018-06-19 PROCEDURE — 85025 COMPLETE CBC W/AUTO DIFF WBC: CPT

## 2018-06-19 PROCEDURE — 25000003 PHARM REV CODE 250: Performed by: NURSE PRACTITIONER

## 2018-06-19 PROCEDURE — 80053 COMPREHEN METABOLIC PANEL: CPT

## 2018-06-19 PROCEDURE — 84100 ASSAY OF PHOSPHORUS: CPT

## 2018-06-19 PROCEDURE — 99233 SBSQ HOSP IP/OBS HIGH 50: CPT | Mod: ,,, | Performed by: INTERNAL MEDICINE

## 2018-06-19 PROCEDURE — 97116 GAIT TRAINING THERAPY: CPT

## 2018-06-19 PROCEDURE — 36415 COLL VENOUS BLD VENIPUNCTURE: CPT

## 2018-06-19 PROCEDURE — 83735 ASSAY OF MAGNESIUM: CPT

## 2018-06-19 RX ORDER — POTASSIUM CHLORIDE 20 MEQ/1
20 TABLET, EXTENDED RELEASE ORAL ONCE
Status: COMPLETED | OUTPATIENT
Start: 2018-06-19 | End: 2018-06-19

## 2018-06-19 RX ORDER — INSULIN ASPART 100 [IU]/ML
7 INJECTION, SOLUTION INTRAVENOUS; SUBCUTANEOUS
Status: DISCONTINUED | OUTPATIENT
Start: 2018-06-20 | End: 2018-06-22 | Stop reason: HOSPADM

## 2018-06-19 RX ADMIN — ENOXAPARIN SODIUM 30 MG: 100 INJECTION SUBCUTANEOUS at 06:06

## 2018-06-19 RX ADMIN — QUETIAPINE FUMARATE 100 MG: 100 TABLET ORAL at 09:06

## 2018-06-19 RX ADMIN — DEXTROSE MONOHYDRATE 25 G: 25 INJECTION, SOLUTION INTRAVENOUS at 05:06

## 2018-06-19 RX ADMIN — CALCIUM ACETATE 667 MG: 667 CAPSULE ORAL at 06:06

## 2018-06-19 RX ADMIN — CEFTRIAXONE 1 G: 1 INJECTION, SOLUTION INTRAVENOUS at 02:06

## 2018-06-19 RX ADMIN — SODIUM CHLORIDE: 0.9 INJECTION, SOLUTION INTRAVENOUS at 11:06

## 2018-06-19 RX ADMIN — PANTOPRAZOLE SODIUM 40 MG: 40 TABLET, DELAYED RELEASE ORAL at 08:06

## 2018-06-19 RX ADMIN — CALCIUM GLUCONATE 1000 MG: 98 INJECTION, SOLUTION INTRAVENOUS at 11:06

## 2018-06-19 RX ADMIN — SODIUM CHLORIDE: 0.9 INJECTION, SOLUTION INTRAVENOUS at 04:06

## 2018-06-19 RX ADMIN — PANCRELIPASE 1 CAPSULE: 24000; 76000; 120000 CAPSULE, DELAYED RELEASE PELLETS ORAL at 11:06

## 2018-06-19 RX ADMIN — CALCIUM ACETATE 667 MG: 667 CAPSULE ORAL at 07:06

## 2018-06-19 RX ADMIN — CALCIUM ACETATE 667 MG: 667 CAPSULE ORAL at 11:06

## 2018-06-19 RX ADMIN — POTASSIUM CHLORIDE 20 MEQ: 1500 TABLET, EXTENDED RELEASE ORAL at 12:06

## 2018-06-19 RX ADMIN — FERROUS SULFATE TAB EC 325 MG (65 MG FE EQUIVALENT) 325 MG: 325 (65 FE) TABLET DELAYED RESPONSE at 08:06

## 2018-06-19 RX ADMIN — PANCRELIPASE 1 CAPSULE: 24000; 76000; 120000 CAPSULE, DELAYED RELEASE PELLETS ORAL at 09:06

## 2018-06-19 RX ADMIN — PANCRELIPASE 1 CAPSULE: 24000; 76000; 120000 CAPSULE, DELAYED RELEASE PELLETS ORAL at 06:06

## 2018-06-19 RX ADMIN — FERROUS SULFATE TAB EC 325 MG (65 MG FE EQUIVALENT) 325 MG: 325 (65 FE) TABLET DELAYED RESPONSE at 09:06

## 2018-06-19 RX ADMIN — ASPIRIN 81 MG 81 MG: 81 TABLET ORAL at 08:06

## 2018-06-19 RX ADMIN — AMLODIPINE BESYLATE 10 MG: 5 TABLET ORAL at 08:06

## 2018-06-19 RX ADMIN — NICOTINE 1 PATCH: 14 PATCH, EXTENDED RELEASE TRANSDERMAL at 08:06

## 2018-06-19 RX ADMIN — STANDARDIZED SENNA CONCENTRATE AND DOCUSATE SODIUM 1 TABLET: 8.6; 5 TABLET, FILM COATED ORAL at 09:06

## 2018-06-19 RX ADMIN — ATORVASTATIN CALCIUM 80 MG: 40 TABLET, FILM COATED ORAL at 08:06

## 2018-06-19 RX ADMIN — CLOPIDOGREL 75 MG: 75 TABLET, FILM COATED ORAL at 08:06

## 2018-06-19 RX ADMIN — PANCRELIPASE 1 CAPSULE: 24000; 76000; 120000 CAPSULE, DELAYED RELEASE PELLETS ORAL at 07:06

## 2018-06-19 NOTE — PROGRESS NOTES
Progress Note  Hospital Medicine  Patient Name:Errol Keita  MRN:  [id number]  Patient Class: IP- Inpatient  Admit Date: 6/16/2018  Length of Stay: 2 days  Expected Discharge Date:   Attending Physician: Ophelia Dash MD  Primary Care Provider:  Bharat Wiggins MD    SUBJECTIVE:     Principal Problem: Acute kidney injury superimposed on chronic kidney disease  Initial history of present illness: Errol Keita is a 52 y.o. female with a PMHx of Arthritis, Asthma, Diastolic CHF, Coronary artery disease, Diabetes mellitus, Hypertension and Pancreatitis. She was admitted to the service of hospital medicine with TIMOTHY superimposed on CKD and hypokalemia. She presented to the ED with a desire to be evaluated for dehydration secondary to diarrhea x 2 days and a decreased appetite.  Patient states that she also has had a fever and chills. She stated that she has been able to hold down fluids. She denied nausea, vomiting, blood in stool, abdominal pain, loss of consciousness, confusion, recent medication changes, positive sick contacts, light-headedness, urinary symptoms, rashes, or any recent course of antibiotics.     PMH/PSH/SH/FH/Meds: reviewed.    Symptoms/Review of Systems: Frail malnourished female. Patient had hypoglycemia this am ~ 47% mg. Needed 1 amp of D50. No shortness of breath, cough, chest pain or headache, fever or abdominal pain.     Diet:  Adequate intake.    Activity level: Up with assist   Pain:  Patient reports no pain.       OBJECTIVE:   Vital Signs (Most Recent):      Temp: 98 °F (36.7 °C) (06/19/18 0409)  Pulse: 83 (06/19/18 0822)  Resp: 18 (06/19/18 0822)  BP: (!) 159/81 (06/19/18 0822)  SpO2: 98 % (06/19/18 0822)       Vital Signs Range (Last 24H):  Temp:  [97.7 °F (36.5 °C)-98 °F (36.7 °C)]   Pulse:  [69-83]   Resp:  [17-20]   BP: (117-167)/(65-91)   SpO2:  [97 %-100 %]     Weight: 42 kg (92 lb 9.5 oz)  Body mass index is 14.5 kg/m².    Intake/Output Summary (Last 24 hours) at 06/19/18 0905  Last  data filed at 06/19/18 0600   Gross per 24 hour   Intake          4119.17 ml   Output                0 ml   Net          4119.17 ml     Physical Examination:  Constitutional: She is oriented to person, place, and time. She appears well-developed. No distress.   Appears older than stated age.  Frail, cachetic. Malodorous and disheveled.   HENT:   Head: Normocephalic and atraumatic.   Mouth/Throat: Abnormal dentition.   Eyes: Conjunctivae and EOM are normal. Pupils are equal, round, and reactive to light. No scleral icterus.   Neck: Normal range of motion. Neck supple. No JVD present. No tracheal deviation present. No thyromegaly present.   Cardiovascular: Normal rate, regular rhythm and intact distal pulses.  Exam reveals no gallop and no friction rub.    Murmur heard.  Pulses:       Dorsalis pedis pulses are 2+ on the right side, and 2+ on the left side.   Pulmonary/Chest: Effort normal and breath sounds normal. No accessory muscle usage. No respiratory distress. She has no wheezes. She has no rhonchi. She has no rales.   Abdominal: Soft. Bowel sounds are normal. She exhibits no distension and no mass. There is no tenderness. There is no guarding.   Musculoskeletal: Normal range of motion. She exhibits no edema, tenderness or deformity.   Neurological: She is alert and oriented to person, place, and time. She has normal strength. She exhibits normal muscle tone. Coordination normal.   Skin: Skin is warm, dry and intact. Capillary refill takes less than 2 seconds. No rash noted. She is not diaphoretic. No erythema.   Psychiatric: She has a normal mood and affect. Her speech is normal and behavior is normal.   CRANIAL NERVES   CN III, IV, VI   Pupils are equal, round, and reactive to light.  Extraocular motions are normal.   Vitals reviewed.    CBC:    Recent Labs  Lab 06/17/18  0524 06/18/18  0445 06/19/18  0403   WBC 9.40 7.00 8.90   RBC 3.31* 3.24* 2.96*   HGB 10.4* 9.9* 9.1*   HCT 30.5* 29.6* 27.2*   * 355*  397*   MCV 92 91 92   MCH 31.4* 30.6 30.7   MCHC 34.0 33.5 33.4   BMP    Recent Labs  Lab 06/17/18  0524 06/17/18  0751 06/18/18  0445 06/19/18  0403   * 130* 215* 46*    140 142 144   K 2.2* 2.3* 2.4* 2.9*    106 109 114*   CO2 23 23 20* 21*   BUN 24* 24* 25* 26*   CREATININE 2.8* 2.6* 2.7* 2.5*   CALCIUM 6.9* 6.6* 6.9* 7.1*   MG 1.1*  1.1*  --  2.2  2.2 1.9  1.9      Diagnostic Results:  Microbiology Results (last 7 days)     Procedure Component Value Units Date/Time    Urine culture [[id number]] Collected:  06/17/18 0440    Order Status:  Sent Specimen:  Urine Updated:  06/18/18 5420         Assessment/Plan:     * Acute kidney injury superimposed on chronic kidney disease stage 4     Likely 2/2 to IVVD from diarrheal illness and poor intake.  IV hydration.  Hold home lisinopril and diuretics - resume once clinically appropriate.  Monitor renal function panel and electrolytes.  Avoid non-essential nephrotoxins.  Renal dose medications for Estimated Creatinine Clearance: 15.4 mL/min (A) (based on SCr of 3 mg/dL (H)).      Uncontrolled DM-2 with hyperglycemia  Hypoglycemia  Check blood glucose level q AC/HS.  Will hold off short acting insulin use today and reduce Levemir 5 units BID.  Continue American Diabetic Association 1800 Kcal diet.  Check Blood glucose in 1 hrs.      Hypokalemia     Suspect hypokalemia related to diarrhea and diuretics.  Replete potassium.  Follow BMP closely.  Tele-monitoring.       Moderate tobacco dependence     Health hazards associated with cigarette smoking were reviewed with patient and cessation was encouraged. Nicotine replacement and counseling options were discussed.  Spent 3 minutes counseling on cessation, patient states she's ready to quit-- nicotine patch ordered.       Anemia of chronic renal failure     Stable. Continue Iron supplementation.  Monitor CBC and transfuse for H/H <7/21 or if becomes symptomatic or hemodyamically unstable.      UTI  Urine  C/S. Continue IV Rocephin.    Hypocalcemia  Replete Ca Gluconate 1 gram IVPB.     Coronary artery disease involving native coronary artery without angina pectoris     Historical diagnosis. No anginal symptoms.  Continue ASA/plavix, monitor on telemetry.         Mild recurrent major depression     Chronic problem.  Stable.  Continue Seroquel and monitor.       Hypertension associated with diabetes     Historically poorly controlled.  Presents with mild elevation in b/p.  Resume norvasc. Holding lisinopril 2/2 TIMOTHY. Resume once clinically appropriate. Monitor BP and adjust regimen as required for sustained BP control.     VTE Risk Mitigation         Ordered     enoxaparin injection 30 mg  Daily      06/17/18 0328     IP VTE HIGH RISK PATIENT  Once      06/17/18 0328        Ophelia Dash MD  Department of Hospital Medicine   [facility]

## 2018-06-19 NOTE — PLAN OF CARE
Problem: Physical Therapy Goal  Goal: Physical Therapy Goal  Goals to be met by: 2018     Patient will increase functional independence with mobility by performin. Supine to sit with Contact Guard Assistance  2. Sit to stand transfer with Maximum Assistance  3. Bed to chair transfer with Contact Guard Assistance using Rolling Walker  4. Gait  x 250x2 feet with Minimal Assistance using Rolling Walker.   5. Lower extremity exercise program x20 reps per handout, with assistance as needed    Outcome: Ongoing (interventions implemented as appropriate)  PT eval and treat completed. Gait training 250ft with RW. OOB to chair, encouraged ankle pumping exercises

## 2018-06-19 NOTE — PLAN OF CARE
Problem: Patient Care Overview  Goal: Plan of Care Review  Outcome: Ongoing (interventions implemented as appropriate)  Pt alert and oriented. PRN potassium given as ordered. All replacements given. All medications given. No new symptoms today. Blood sugars monitored and covered. Educated on plan of care. Denies pain, Denies nausea. Family member at bedside. Will continue to monitor.

## 2018-06-19 NOTE — PLAN OF CARE
Problem: Patient Care Overview  Goal: Plan of Care Review  Outcome: Ongoing (interventions implemented as appropriate)  Plan of care reviewed with patient. Safety maintained throughout shift. Bed in low and locked position, side rails up x2, call light within reach. IV fluids infused per orders. Blood glucose monitored per orders, covered with sliding scale insulin.Telemetry monitoring maintained. No c/o pain this shift. Hourly rounding utilized. Will continue to monitor.

## 2018-06-19 NOTE — PT/OT/SLP EVAL
"Physical Therapy Evaluation    Patient Name:  Errol Keita   MRN:  [id number]    Recommendations:     Discharge Recommendations:  home health PT   Discharge Equipment Recommendations: none   Barriers to discharge: None    Assessment:     Errol Keita is a 52 y.o. female admitted with a medical diagnosis of Acute kidney injury superimposed on chronic kidney disease.  She presents with the following impairments/functional limitations:  weakness, impaired endurance, impaired functional mobilty, gait instability, impaired self care skills, impaired balance . Pt presents with flat affect and gross weakness. Pt to benefit from continued therapies.    Rehab Prognosis:  fair; patient would benefit from acute skilled PT services to address these deficits and reach maximum level of function.      Recent Surgery: * No surgery found *      Plan:     During this hospitalization, patient to be seen 6 x/week to address the above listed problems via gait training, therapeutic activities, therapeutic exercises  · Plan of Care Expires:  06/29/18   Plan of Care Reviewed with: patient    Subjective     Communicated with nurse Billings prior to session.  Patient found supine upon PT entry to room, agreeable to evaluation.   Pt alert, cooperative, denies pain  "I just sent  him home as he gets on my nerves"- pertaining to SO   Stated legs feel tired and weak while ambulating back to room    Chief Complaint: weakness  Patient comments/goals: edita em  Pain/Comfort:  · Pain Rating 1: 0/10    Patients cultural, spiritual, Yarsanism conflicts given the current situation:      Living Environment:  Home with SO  Prior to admission, patients level of function was independent.  Patient has the following equipment: walker, rolling..  Upon discharge, patient will have assistance from SO.    Objective:     Patient found with: peripheral IV     General Precautions: Standard, fall   Orthopedic Precautions:N/A   Braces: N/A     Exams:  · Postural " Exam:  Patient presented with the following abnormalities:    · -       Rounded shoulders  · -       Forward head  · RLE ROM: WFL  · RLE Strength: WFL  · LLE ROM: WFL  · LLE Strength: WFL    Functional Mobility:  · Bed Mobility:     · Rolling Left:  contact guard assistance  · Scooting: minimum assistance  · Supine to Sit: minimum assistance  · Transfers:     · Sit to Stand:  minimum assistance with rolling walker  · Bed to Chair: minimum assistance with  rolling walker  using  Stand Pivot  · Gait: 250ft with Rw    AM-PAC 6 CLICK MOBILITY  Total Score:16       Therapeutic Activities and Exercises:   OOB to chair post PT with tray table in front  Pt encouraged ankle pumping exercises    Patient left up in chair with all lines intact and call button in reach.    GOALS:    Physical Therapy Goals        Problem: Physical Therapy Goal    Goal Priority Disciplines Outcome Goal Variances Interventions   Physical Therapy Goal     PT/OT, PT Ongoing (interventions implemented as appropriate)     Description:  Goals to be met by: 2018     Patient will increase functional independence with mobility by performin. Supine to sit with Contact Guard Assistance  2. Sit to stand transfer with Maximum Assistance  3. Bed to chair transfer with Contact Guard Assistance using Rolling Walker  4. Gait  x 250x2 feet with Minimal Assistance using Rolling Walker.   5. Lower extremity exercise program x20 reps per handout, with assistance as needed                      History:     Past Medical History:   Diagnosis Date    Arthritis     Asthma     Blood transfusion     CHF (congestive heart failure)     Coronary artery disease     Diabetes mellitus     Hypertension     Pancreatitis     Type 2 diabetes mellitus with hyperglycemia 2015       Past Surgical History:   Procedure Laterality Date    CARDIAC SURGERY      CABG    CHOLECYSTECTOMY      CORONARY ARTERY BYPASS GRAFT         Clinical Decision Making:      History  Co-morbidities and personal factors that may impact the plan of care Examination  Body Structures and Functions, activity limitations and participation restrictions that may impact the plan of care Clinical Presentation   Decision Making/ Complexity Score   Co-morbidities:   [] Time since onset of injury / illness / exacerbation  [] Status of current condition  []Patient's cognitive status and safety concerns    [] Multiple Medical Problems (see med hx)  Personal Factors:   [] Patient's age  [] Prior Level of function   [] Patient's home situation (environment and family support)  [] Patient's level of motivation  [] Expected progression of patient      HISTORY:(criteria)    [] 14767 - no personal factors/history    [] 18328 - has 1-2 personal factor/comorbidity     [] 74969 - has >3 personal factor/comorbidity     Body Regions:  [] Objective examination findings  [] Head     []  Neck  [] Trunk   [] Upper Extremity  [] Lower Extremity    Body Systems:  [] For communication ability, affect, cognition, language, and learning style: the assessment of the ability to make needs known, consciousness, orientation (person, place, and time), expected emotional /behavioral responses, and learning preferences (eg, learning barriers, education  needs)  [] For the neuromuscular system: a general assessment of gross coordinated movement (eg, balance, gait, locomotion, transfers, and transitions) and motor function  (motor control and motor learning)  [] For the musculoskeletal system: the assessment of gross symmetry, gross range of motion, gross strength, height, and weight  [] For the integumentary system: the assessment of pliability(texture), presence of scar formation, skin color, and skin integrity  [] For cardiovascular/pulmonary system: the assessment of heart rate, respiratory rate, blood pressure, and edema     Activity limitations:    [] Patient's cognitive status and saf ety concerns          [] Status of  current condition      [] Weight bearing restriction  [] Cardiopulmunary Restriction    Participation Restrictions:   [] Goals and goal agreement with the patient     [] Rehab potential (prognosis) and probable outcome      Examination of Body System: (criteria)    [] 55533 - addressing 1-2 elements    [] 18041 - addressing a total of 3 or more elements     [] 90759 -  Addressing a total of 4 or more elements         Clinical Presentation: (criteria)  Choose one     On examination of body system using standardized tests and measures patient presents with (CHOOSE ONE) elements from any of the following: body structures and functions, activity limitations, and/or participation restrictions.  Leading to a clinical presentation that is considered (CHOOSE ONE)                              Clinical Decision Making  (Eval Complexity):  Choose One     Time Tracking:     PT Received On: 06/19/18  PT Start Time: 1509     PT Stop Time: 1531  PT Total Time (min): 22 min     Billable Minutes: Evaluation 10 and Gait Training 12      Heather Yo, PT  06/19/2018

## 2018-06-20 LAB
ALBUMIN SERPL BCP-MCNC: 1.7 G/DL
ALP SERPL-CCNC: 126 U/L
ALT SERPL W/O P-5'-P-CCNC: 8 U/L
ANION GAP SERPL CALC-SCNC: 12 MMOL/L
ANION GAP SERPL CALC-SCNC: 6 MMOL/L
AST SERPL-CCNC: 14 U/L
BASOPHILS # BLD AUTO: 0 K/UL
BASOPHILS NFR BLD: 0.5 %
BILIRUB SERPL-MCNC: 0.1 MG/DL
BUN SERPL-MCNC: 20 MG/DL
BUN SERPL-MCNC: 22 MG/DL
CALCIUM SERPL-MCNC: 7.2 MG/DL
CALCIUM SERPL-MCNC: 8.1 MG/DL
CHLORIDE SERPL-SCNC: 113 MMOL/L
CHLORIDE SERPL-SCNC: 116 MMOL/L
CO2 SERPL-SCNC: 18 MMOL/L
CO2 SERPL-SCNC: 21 MMOL/L
CREAT SERPL-MCNC: 2.3 MG/DL
CREAT SERPL-MCNC: 2.4 MG/DL
DIFFERENTIAL METHOD: ABNORMAL
EOSINOPHIL # BLD AUTO: 0 K/UL
EOSINOPHIL NFR BLD: 0.6 %
ERYTHROCYTE [DISTWIDTH] IN BLOOD BY AUTOMATED COUNT: 13.6 %
EST. GFR  (AFRICAN AMERICAN): 26 ML/MIN/1.73 M^2
EST. GFR  (AFRICAN AMERICAN): 27 ML/MIN/1.73 M^2
EST. GFR  (NON AFRICAN AMERICAN): 22 ML/MIN/1.73 M^2
EST. GFR  (NON AFRICAN AMERICAN): 23 ML/MIN/1.73 M^2
GLUCOSE SERPL-MCNC: 123 MG/DL
GLUCOSE SERPL-MCNC: 179 MG/DL
HCT VFR BLD AUTO: 26.2 %
HGB BLD-MCNC: 8.6 G/DL
LYMPHOCYTES # BLD AUTO: 2.1 K/UL
LYMPHOCYTES NFR BLD: 27.5 %
MAGNESIUM SERPL-MCNC: 1.5 MG/DL
MAGNESIUM SERPL-MCNC: 1.5 MG/DL
MCH RBC QN AUTO: 30.8 PG
MCHC RBC AUTO-ENTMCNC: 32.9 G/DL
MCV RBC AUTO: 94 FL
MONOCYTES # BLD AUTO: 0.2 K/UL
MONOCYTES NFR BLD: 3.2 %
NEUTROPHILS # BLD AUTO: 5.1 K/UL
NEUTROPHILS NFR BLD: 68.2 %
PHOSPHATE SERPL-MCNC: 2.4 MG/DL
PLATELET # BLD AUTO: 393 K/UL
PMV BLD AUTO: 8 FL
POCT GLUCOSE: 142 MG/DL (ref 70–110)
POCT GLUCOSE: 145 MG/DL (ref 70–110)
POCT GLUCOSE: 183 MG/DL (ref 70–110)
POCT GLUCOSE: 309 MG/DL (ref 70–110)
POTASSIUM SERPL-SCNC: 2.9 MMOL/L
POTASSIUM SERPL-SCNC: 3.2 MMOL/L
PROT SERPL-MCNC: 5.3 G/DL
RBC # BLD AUTO: 2.8 M/UL
SODIUM SERPL-SCNC: 143 MMOL/L
SODIUM SERPL-SCNC: 143 MMOL/L
WBC # BLD AUTO: 7.5 K/UL

## 2018-06-20 PROCEDURE — 99233 SBSQ HOSP IP/OBS HIGH 50: CPT | Mod: ,,, | Performed by: INTERNAL MEDICINE

## 2018-06-20 PROCEDURE — 83735 ASSAY OF MAGNESIUM: CPT

## 2018-06-20 PROCEDURE — 97116 GAIT TRAINING THERAPY: CPT

## 2018-06-20 PROCEDURE — 85025 COMPLETE CBC W/AUTO DIFF WBC: CPT

## 2018-06-20 PROCEDURE — 80053 COMPREHEN METABOLIC PANEL: CPT

## 2018-06-20 PROCEDURE — 25000003 PHARM REV CODE 250: Performed by: NURSE PRACTITIONER

## 2018-06-20 PROCEDURE — 80048 BASIC METABOLIC PNL TOTAL CA: CPT

## 2018-06-20 PROCEDURE — 36415 COLL VENOUS BLD VENIPUNCTURE: CPT

## 2018-06-20 PROCEDURE — S4991 NICOTINE PATCH NONLEGEND: HCPCS | Performed by: NURSE PRACTITIONER

## 2018-06-20 PROCEDURE — 63600175 PHARM REV CODE 636 W HCPCS: Performed by: INTERNAL MEDICINE

## 2018-06-20 PROCEDURE — 97530 THERAPEUTIC ACTIVITIES: CPT

## 2018-06-20 PROCEDURE — 84100 ASSAY OF PHOSPHORUS: CPT

## 2018-06-20 PROCEDURE — 97802 MEDICAL NUTRITION INDIV IN: CPT

## 2018-06-20 PROCEDURE — 25000003 PHARM REV CODE 250: Performed by: INTERNAL MEDICINE

## 2018-06-20 PROCEDURE — 12000002 HC ACUTE/MED SURGE SEMI-PRIVATE ROOM

## 2018-06-20 PROCEDURE — 63600175 PHARM REV CODE 636 W HCPCS: Performed by: NURSE PRACTITIONER

## 2018-06-20 RX ORDER — MAGNESIUM SULFATE 1 G/100ML
1 INJECTION INTRAVENOUS ONCE
Status: COMPLETED | OUTPATIENT
Start: 2018-06-20 | End: 2018-06-20

## 2018-06-20 RX ORDER — MAGNESIUM SULFATE HEPTAHYDRATE 40 MG/ML
2 INJECTION, SOLUTION INTRAVENOUS ONCE
Status: COMPLETED | OUTPATIENT
Start: 2018-06-20 | End: 2018-06-20

## 2018-06-20 RX ADMIN — CEFTRIAXONE 1 G: 1 INJECTION, SOLUTION INTRAVENOUS at 02:06

## 2018-06-20 RX ADMIN — PANCRELIPASE 1 CAPSULE: 24000; 76000; 120000 CAPSULE, DELAYED RELEASE PELLETS ORAL at 09:06

## 2018-06-20 RX ADMIN — CLOPIDOGREL 75 MG: 75 TABLET, FILM COATED ORAL at 08:06

## 2018-06-20 RX ADMIN — QUETIAPINE FUMARATE 100 MG: 100 TABLET ORAL at 09:06

## 2018-06-20 RX ADMIN — CALCIUM ACETATE 667 MG: 667 CAPSULE ORAL at 05:06

## 2018-06-20 RX ADMIN — ATORVASTATIN CALCIUM 80 MG: 40 TABLET, FILM COATED ORAL at 08:06

## 2018-06-20 RX ADMIN — PANTOPRAZOLE SODIUM 40 MG: 40 TABLET, DELAYED RELEASE ORAL at 08:06

## 2018-06-20 RX ADMIN — NICOTINE 1 PATCH: 14 PATCH, EXTENDED RELEASE TRANSDERMAL at 08:06

## 2018-06-20 RX ADMIN — AMLODIPINE BESYLATE 10 MG: 5 TABLET ORAL at 08:06

## 2018-06-20 RX ADMIN — ASPIRIN 81 MG 81 MG: 81 TABLET ORAL at 08:06

## 2018-06-20 RX ADMIN — ENOXAPARIN SODIUM 30 MG: 100 INJECTION SUBCUTANEOUS at 05:06

## 2018-06-20 RX ADMIN — CALCIUM ACETATE 667 MG: 667 CAPSULE ORAL at 11:06

## 2018-06-20 RX ADMIN — MAGNESIUM SULFATE IN WATER 2 G: 40 INJECTION, SOLUTION INTRAVENOUS at 11:06

## 2018-06-20 RX ADMIN — PANCRELIPASE 1 CAPSULE: 24000; 76000; 120000 CAPSULE, DELAYED RELEASE PELLETS ORAL at 05:06

## 2018-06-20 RX ADMIN — MAGNESIUM SULFATE IN DEXTROSE 1 G: 10 INJECTION, SOLUTION INTRAVENOUS at 02:06

## 2018-06-20 RX ADMIN — PANCRELIPASE 1 CAPSULE: 24000; 76000; 120000 CAPSULE, DELAYED RELEASE PELLETS ORAL at 08:06

## 2018-06-20 RX ADMIN — CALCIUM ACETATE 667 MG: 667 CAPSULE ORAL at 08:06

## 2018-06-20 RX ADMIN — STANDARDIZED SENNA CONCENTRATE AND DOCUSATE SODIUM 1 TABLET: 8.6; 5 TABLET, FILM COATED ORAL at 08:06

## 2018-06-20 RX ADMIN — POTASSIUM PHOSPHATE, MONOBASIC AND POTASSIUM PHOSPHATE, DIBASIC 30 MMOL: 224; 236 INJECTION, SOLUTION INTRAVENOUS at 11:06

## 2018-06-20 RX ADMIN — PANCRELIPASE 1 CAPSULE: 24000; 76000; 120000 CAPSULE, DELAYED RELEASE PELLETS ORAL at 11:06

## 2018-06-20 RX ADMIN — FERROUS SULFATE TAB EC 325 MG (65 MG FE EQUIVALENT) 325 MG: 325 (65 FE) TABLET DELAYED RESPONSE at 08:06

## 2018-06-20 RX ADMIN — FERROUS SULFATE TAB EC 325 MG (65 MG FE EQUIVALENT) 325 MG: 325 (65 FE) TABLET DELAYED RESPONSE at 09:06

## 2018-06-20 NOTE — PLAN OF CARE
Problem: Physical Therapy Goal  Goal: Physical Therapy Goal  Goals to be met by: 2018     Patient will increase functional independence with mobility by performin. Supine to sit with Contact Guard Assistance  2. Sit to stand transfer with Maximum Assistance  3. Bed to chair transfer with Contact Guard Assistance using Rolling Walker  4. Gait  x 250x2 feet with Minimal Assistance using Rolling Walker.   5. Lower extremity exercise program x20 reps per handout, with assistance as needed     Outcome: Ongoing (interventions implemented as appropriate)  PT for functional mobility training, OOB to chair transfer and gait training

## 2018-06-20 NOTE — PLAN OF CARE
Problem: Patient Care Overview  Goal: Individualization & Mutuality  Recommendations    1) Recommend decreasing calories to 1500 and continue diabetic, cardiac, renal diet 2) Request weight checks 3) Monitor labs for possible refeeding syndrome  Goals: 1) Pt will consume at least 75% of meals during admit 2) Pt will not lose wt during admit  Nutrition Goal Status: new  Communication of RD Recs:  (POC, sticky note)

## 2018-06-20 NOTE — PROGRESS NOTES
" [facility]  Adult Nutrition  Progress Note    SUMMARY       Recommendations    1) Recommend decreasing calories to 1500 and continue diabetic, cardiac, renal diet 2) Request weight checks 3) Monitor labs for possible refeeding syndrome  Goals: 1) Pt will consume at least 75% of meals during admit 2) Pt will not lose wt during admit  Nutrition Goal Status: new  Communication of HENRY Recs:  (POC, sticky note)    Reason for Assessment    Reason for Assessment: identified at risk by screening criteria  1. Hypokalemia    2. Renal insufficiency    3. Diarrhea, unspecified type      Past Medical History:   Diagnosis Date    Arthritis     Asthma     Blood transfusion     CHF (congestive heart failure)     Coronary artery disease     Diabetes mellitus     Hypertension     Pancreatitis     Type 2 diabetes mellitus with hyperglycemia 7/13/2015     General Information Comments: Admitted with dehydration, diarrhea, altered dentition. Pt alert. Good appetite since admit. Eating 100% of diabetic 2000 calorie diet with EEN at 1680 calories for weight gain.  Reports her  cooks for her and helps her watch her sugar. Concerned that excessive calories may exacerbate blood sugar status and cause further weight loss.     Nutrition Risk Screen    Nutrition Risk Screen: other (see comments) (under weight)    Nutrition/Diet History    Patient Reported Diet/Restrictions/Preferences:  ("watches her sugar")  Food Preferences: no intolerances noted, uses ONS sometimes  Do you have any cultural, spiritual, Uatsdin conflicts, given your current situation?: none  Food Allergies: NKFA    Anthropometrics    Temp: 96.9 °F (36.1 °C)  Height Method: Stated  Height: 5' 7"  Height (inches): 67 in  Weight Method: Bed Scale  Weight: 42 kg (92 lb 9.5 oz)  Weight (lb): 92.59 lb  Ideal Body Weight (IBW), Female: 135 lb  % Ideal Body Weight, Female (lb): 68.59 lb  BMI (Calculated): 14.5  Weight Loss: unintentional  Usual " Body Weight (UBW), k.5 kg (per chart review 1/15/18)  % Usual Body Weight: 81.72  % Weight Change From Usual Weight: -18.45 %       Lab/Procedures/Meds    Pertinent Labs Reviewed: reviewed  BMP  Lab Results   Component Value Date     2018    K 3.2 (L) 2018     (H) 2018    CO2 18 (L) 2018    BUN 20 2018    CREATININE 2.3 (H) 2018    CALCIUM 8.1 (L) 2018    ANIONGAP 12 2018    ESTGFRAFRICA 27 (A) 2018    EGFRNONAA 23 (A) 2018     Lab Results   Component Value Date    CALCIUM 8.1 (L) 2018    PHOS 2.4 (L) 2018       Recent Labs  Lab 18  1724   POCTGLUCOSE 183*     Lab Results   Component Value Date    HGBA1C 13.5 (H) 2018     Lab Results   Component Value Date    ALBUMIN 1.7 (L) 2018     Lab Results   Component Value Date    WBC 7.50 2018     Pertinent Medications Reviewed: reviewed  Scheduled Meds:   amLODIPine  10 mg Oral Daily    aspirin  81 mg Oral Daily    atorvastatin  80 mg Oral Daily    calcium acetate  667 mg Oral TID WM    cefTRIAXone (ROCEPHIN) IVPB  1 g Intravenous Q24H    clopidogrel  75 mg Oral Daily    enoxaparin  30 mg Subcutaneous Daily    ferrous sulfate  325 mg Oral BID    insulin aspart U-100  7 Units Subcutaneous TIDWM    insulin detemir U-100  5 Units Subcutaneous BID    lipase-protease-amylase 24,000-76,000-120,000 units  1 capsule Oral QID (WM & HS)    nicotine  1 patch Transdermal Daily    pantoprazole  40 mg Oral Daily    QUEtiapine  100 mg Oral QHS    senna-docusate 8.6-50 mg  1 tablet Oral BID     Continuous Infusions:   sodium chloride 0.9% 125 mL/hr at 18 2304       Physical Findings/Assessment    Overall Physical Appearance: generalized wasting, loss of muscle mass, loss of subcutaneous fat, underweight  Oral/Mouth Cavity: tooth/teeth missing  Skin:  (Michael score 17)    Estimated/Assessed Needs    Weight Used For Calorie Calculations: 42 kg (92 lb 9.5  oz)     Energy Need Method: Kcal/kg   35-40 kcal/[id number]     Weight Used For Protein Calculations: 42 kg (92 lb 9.5 oz)   1.2-1.5 gm/kg/day: 51-63     Fluid Need Method: RDA Method (or per MD)     CHO Requirement: 45-50% of EEN      Nutrition Prescription Ordered    Current Diet Order: diabetic 2000 calorie, cardiac    Evaluation of Received Nutrient/Fluid Intake    IV Fluid (mL): 125 (mls/hr)  Energy Calories Required: meeting needs  Protein Required: meeting needs  Fluid Required: meeting needs  Tolerance: tolerating  % Intake of Estimated Energy Needs: exceeds 100%  % Meal Intake: 75 - 100 %    Nutrition Risk    Level of Risk/Frequency of Follow-up:  (2 x wkly)     Assessment and Plan    Severe malnutrition    Malnutrition in the context of chronic    Related to (etiology):  Increased energy needs     Signs and Symptoms (as evidenced by):  Body Fat Depletion: Moderate depletion noted in upper arms without ample fat stores   Muscle Mass Depletion: Severe depletion noted with prominent protrusion of clavicle, squared shoulders and prominent depression  In dorsal area of hand  Weight Loss: 18.45% x 5 months, 1 week (42 kg, 18 and 51.5 kg, 1/15/18)    Interventions/Recommendations (treatment strategy):  See above    Nutrition Diagnosis Status:  New                 Monitor and Evaluation    Food and Nutrient Intake: energy intake  Food and Nutrient Adminstration: diet order  Anthropometric Measurements: weight, weight change  Biochemical Data, Medical Tests and Procedures: electrolyte and renal panel, glucose/endocrine profile, inflammatory profile  Nutrition-Focused Physical Findings: overall appearance, skin     Discharge Plan    Diabetic 1500 calorie, cardiac, renal diet

## 2018-06-20 NOTE — PHYSICIAN QUERY
"PT Name: Errol Keita  MR #: [id number]    Physician Query Form - Nutrition Clarification     CDS/: Mira Snyder RN              Contact information:  [phone]    This form is a permanent document in the medical record.     Query Date: June 20, 2018    By submitting this query, we are merely seeking further clarification of documentation.. Please utilize your independent clinical judgment when addressing the question(s) below.    The Medical record contains the following:   Indicators  Supporting Clinical Findings Location in Medical Record   x % of Estimated Energy Intake over a time frame from p.o., TF, or TPN Positive for appetite change, chills and fever    Dehydration secondary to diarreha x2 days and decreased appetite 6/17 [facility]    Weight Status over a time frame     x Subcutaneous Fat and/or Muscle Loss Frail, malnourished female    Looks malnourished and frail   6/18 Hosp med note    6/17 [facility]    Fluid Accumulation or Edema      Reduced  Strength     x Wt / BMI / Usual Body Weight BMI- 15.7  Wt= 42kg  Ht= 5'7"   6/20 Flowsheet    Delayed Wound Healing / Failure to Thrive     x Acute or Chronic Illness TIMOTHY superimposed on CKD stage 4-- Likely from diarrheal illness and poor intake    Uncontrolled MD with hyperglycemia  Moderate tobaocco dependence  Anemia of chronic renal failure  UTI  Hypoglycemia  CAD  HTN   6/19 Hosp med note    Medication     x Treatment Strict intake and output (1) Document % meals taken (2) # of urinations (3) # and consistency of BMs     Weight daily 6/17 NSG orders    Other       AND / ASPEN Clinical Characteristics (October 2011)  A minimum of two characteristics is recommended for diagnosing either moderate or severe malnutrition   Mild Malnutrition Moderate Malnutrition Severe Malnutrition   Energy Intake from p.o., TF or TPN. < 75% intake of estimated energy needs for less than 7 days < 75% intake of estimated energy needs for greater than 7 days < 50% intake of " estimated energy needs for > 5 days   Weight Loss 1-2% in 1 month  5% in 3 months  7.5% in 6 months  10% in 1 year 1-2 % in 1 week  5% in 1 month  7.5% in 3 months  10% in 6 months  20% in 1 year > 2% in 1 week  > 5% in 1 month  > 7.5% in 3 months  > 10% in 6 months  > 20% in 1 year   Physical Findings     None *Mild subcutaneous fat and/or muscle loss  *Mild fluid accumulation  *Stage II decubitus  *Surgical wound or non-healing wound *Mod/severe subcutaneous fat and/or muscle loss  *Mod/severe fluid accumulation  *Stage III or IV decubitus  *Non-healing surgical wound     Provider, please specify diagnosis or diagnoses associated with above clinical findings.    [ ] Mild Protein-Calorie Malnutrition  [ ] Moderate Protein-Calorie Malnutrition  [ x] Severe Protein-Calorie Malnutrition  [ ] Underweight  [ ] Other Nutritional Diagnosis (please specify): ____________________________________  [ ] Other: ________________________________  [ ] Clinically Undetermined    Please document in your progress notes daily for the duration of treatment until resolved and include in your discharge summary.

## 2018-06-20 NOTE — ASSESSMENT & PLAN NOTE
Malnutrition in the context of chronic    Related to (etiology):  Uncontrolled diabetes   Lab Results   Component Value Date    HGBA1C 13.5 (H) 03/24/2018     Signs and Symptoms (as evidenced by):  Body Fat Depletion: Moderate depletion noted in upper arms without ample fat stores   Muscle Mass Depletion: Severe depletion noted with prominent protrusion of clavicle, squared shoulders and prominent depression  In dorsal area of hand  Weight Loss: 18.45% x 5 months, 1 week (42 kg, 6/18/18 and 51.5 kg, 1/15/18)    Interventions/Recommendations (treatment strategy):  1) Recommend decreasing calories to 1500 and continue diabetic, cardiac, renal diet 2) Request weight checks 3) Monitor labs for possible refeeding syndrome    Nutrition Diagnosis Status:  New

## 2018-06-20 NOTE — PLAN OF CARE
Problem: Patient Care Overview  Goal: Plan of Care Review  Outcome: Ongoing (interventions implemented as appropriate)  Plan of care reviewed with patient. Safety maintained throughout shift. Bed in low and locked position, side rails up x2, call light within reach, bed alarm set. IV fluids/IV antibiotics infused per orders. Blood glucose monitored per orders. Telemetry monitoring maintained. No c/o pain. Hourly rounding utilized. Will continue to monitor.

## 2018-06-20 NOTE — PT/OT/SLP PROGRESS
"Physical Therapy Treatment    Patient Name:  Errol Keita   MRN:  [id number]    Recommendations:     Discharge Recommendations:  home health PT       Assessment:     Errol Keita is a 52 y.o. female admitted with a medical diagnosis of Acute kidney injury superimposed on chronic kidney disease.  She presents with the following impairments/functional limitations:  weakness, impaired endurance, gait instability, impaired self care skills, impaired functional mobilty .    Rehab Prognosis:  good; patient would benefit from acute skilled PT services to address these deficits and reach maximum level of function.      Recent Surgery: * No surgery found *      Plan:     During this hospitalization, patient to be seen 6 x/week to address the above listed problems via gait training, therapeutic activities, therapeutic exercises  · Plan of Care Expires:  06/29/18   Plan of Care Reviewed with: patient    Subjective     Communicated with nurse Xie prior to session.  Patient found supine upon PT entry to room, agreeable to treatment.      Chief Complaint: fatigue  Patient comments/goals: pt eager to mobilize, reported needing to be changed and stated "I'm wet."  Pain/Comfort:  · Pain Rating 1: 0/10    Patients cultural, spiritual, Muslim conflicts given the current situation:      Objective:     Patient found with: peripheral IV     General Precautions: Standard, fall   Orthopedic Precautions:N/A   Braces: N/A     Functional Mobility:  · Bed Mobility:     · Rolling Left:  minimum assistance  · Rolling Right: minimum assistance  · Scooting: minimum assistance  · Supine to Sit: minimum assistance    · Transfers:     · Sit to Stand:  minimum assistance with rolling walker  · Bed to Chair: contact guard assistance with  rolling walker  using  Stand Pivot    · Gait: 250' with CGA using RW. no LOB noted        Therapeutic Activities and Exercises:   pt noted to be soiled upon entering room.    pt assisted with bed mobility, " hygiene and changing per PTA and PCTRenetta.      pt then proceeded to gait training and assisted to bedside chair following gait.    Patient left up in chair with all lines intact, call button in reach and nurse Rojas notified..    GOALS:    Physical Therapy Goals        Problem: Physical Therapy Goal    Goal Priority Disciplines Outcome Goal Variances Interventions   Physical Therapy Goal     PT/OT, PT Ongoing (interventions implemented as appropriate)     Description:  Goals to be met by: 2018     Patient will increase functional independence with mobility by performin. Supine to sit with Contact Guard Assistance  2. Sit to stand transfer with Maximum Assistance  3. Bed to chair transfer with Contact Guard Assistance using Rolling Walker  4. Gait  x 250x2 feet with Minimal Assistance using Rolling Walker.   5. Lower extremity exercise program x20 reps per handout, with assistance as needed                      Time Tracking:     PT Received On: 18  PT Start Time: 1005     PT Stop Time: 1033  PT Total Time (min): 28 min     Billable Minutes: Gait Training 10 and Therapeutic Activity 15    Treatment Type: Treatment  PT/PTA: PTA     PTA Visit Number: 1     Adriana Elmore, PTA  2018

## 2018-06-20 NOTE — PLAN OF CARE
Problem: Patient Care Overview  Goal: Plan of Care Review  Outcome: Ongoing (interventions implemented as appropriate)  Plan of care reviewed, patient verbalized understanding. AAOx4. Up with assist to bedside chair.  PIV infusing per order. IV abx infusing per order. IV electrolytes infused per orders. Tele monitored, NSR. Accucheks AC&HS. Neuro checks, monitored. PT. Fall precautions maintained. Lovenox DVT prevention. VSS. Remain afebrile throughout shift. Complains of no pain throughout shift. Bed in lowest position, side rails x2, breaks locked, call light within reach. Patient remains free from fall and injury. Will continue to monitor.

## 2018-06-20 NOTE — PROGRESS NOTES
Progress Note  Hospital Medicine  Patient Name:Errol Keita  MRN:  [id number]  Patient Class: IP- Inpatient  Admit Date: 6/16/2018  Length of Stay: 3 days  Expected Discharge Date:   Attending Physician: Ophelia Dash MD  Primary Care Provider:  Bharat Wiggins MD    SUBJECTIVE:     Principal Problem: Acute kidney injury superimposed on chronic kidney disease  Initial history of present illness: Errol Keita is a 52 y.o. female with a PMHx of Arthritis, Asthma, Diastolic CHF, Coronary artery disease, Diabetes mellitus, Hypertension and Pancreatitis. She was admitted to the service of hospital medicine with TIMOTHY superimposed on CKD and hypokalemia. She presented to the ED with a desire to be evaluated for dehydration secondary to diarrhea x 2 days and a decreased appetite.  Patient states that she also has had a fever and chills. She stated that she has been able to hold down fluids. She denied nausea, vomiting, blood in stool, abdominal pain, loss of consciousness, confusion, recent medication changes, positive sick contacts, light-headedness, urinary symptoms, rashes, or any recent course of antibiotics.     PMH/PSH/SH/FH/Meds: reviewed.    Symptoms/Review of Systems: Frail malnourished female. Still deficient with electrolytes. Blood sugars are improving, no more hypoglycemic. No shortness of breath, cough, chest pain or headache, fever or abdominal pain.     Diet:  Adequate intake.    Activity level: Up with assist   Pain:  Patient reports no pain.       OBJECTIVE:   Vital Signs (Most Recent):      Temp: 98.6 °F (37 °C) (06/20/18 0819)  Pulse: 91 (06/20/18 0819)  Resp: 16 (06/20/18 0819)  BP: (!) 173/91 (06/20/18 0819)  SpO2: 96 % (06/20/18 0819)       Vital Signs Range (Last 24H):  Temp:  [97.7 °F (36.5 °C)-98.6 °F (37 °C)]   Pulse:  [70-91]   Resp:  [16-18]   BP: (124-173)/(61-91)   SpO2:  [95 %-98 %]     Weight: 42 kg (92 lb 9.5 oz)  Body mass index is 14.5 kg/m².    Intake/Output Summary (Last 24 hours) at  06/20/18 1017  Last data filed at 06/20/18 0600   Gross per 24 hour   Intake              530 ml   Output                0 ml   Net              530 ml     Physical Examination:  Constitutional: She is oriented to person, place, and time. She appears well-developed. No distress.   Appears older than stated age.  Frail, cachetic. Malodorous and disheveled.   HENT:   Head: Normocephalic and atraumatic.   Mouth/Throat: Abnormal dentition.   Eyes: Conjunctivae and EOM are normal. Pupils are equal, round, and reactive to light. No scleral icterus.   Neck: Normal range of motion. Neck supple. No JVD present. No tracheal deviation present. No thyromegaly present.   Cardiovascular: Normal rate, regular rhythm and intact distal pulses.  Exam reveals no gallop and no friction rub.    Murmur heard.  Pulses:       Dorsalis pedis pulses are 2+ on the right side, and 2+ on the left side.   Pulmonary/Chest: Effort normal and breath sounds normal. No accessory muscle usage. No respiratory distress. She has no wheezes. She has no rhonchi. She has no rales.   Abdominal: Soft. Bowel sounds are normal. She exhibits no distension and no mass. There is no tenderness. There is no guarding.   Musculoskeletal: Normal range of motion. She exhibits no edema, tenderness or deformity.   Neurological: She is alert and oriented to person, place, and time. She has normal strength. She exhibits normal muscle tone. Coordination normal.   Skin: Skin is warm, dry and intact. Capillary refill takes less than 2 seconds. No rash noted. She is not diaphoretic. No erythema.   Psychiatric: She has a normal mood and affect. Her speech is normal and behavior is normal.   CRANIAL NERVES   CN III, IV, VI   Pupils are equal, round, and reactive to light.  Extraocular motions are normal.   Vitals reviewed.    CBC:    Recent Labs  Lab 06/18/18  0445 06/19/18  0403 06/20/18  0442   WBC 7.00 8.90 7.50   RBC 3.24* 2.96* 2.80*   HGB 9.9* 9.1* 8.6*   HCT 29.6* 27.2*  26.2*   * 397* 393*   MCV 91 92 94   MCH 30.6 30.7 30.8   MCHC 33.5 33.4 32.9   BMP    Recent Labs  Lab 06/18/18 0445 06/19/18 0403 06/20/18 0442   * 46* 179*    144 143   K 2.4* 2.9* 2.9*    114* 116*   CO2 20* 21* 21*   BUN 25* 26* 22*   CREATININE 2.7* 2.5* 2.4*   CALCIUM 6.9* 7.1* 7.2*   MG 2.2  2.2 1.9  1.9 1.5*  1.5*      Diagnostic Results:  Microbiology Results (last 7 days)     Procedure Component Value Units Date/Time    Urine culture [[id number]] Collected:  06/17/18 0440    Order Status:  Completed Specimen:  Urine Updated:  06/19/18 1550     Urine Culture, Routine No significant growth         Assessment/Plan:     * Acute kidney injury superimposed on chronic kidney disease stage 4     Likely 2/2 to IVVD from diarrheal illness and poor intake.  IV hydration.  Hold home lisinopril and diuretics - resume once clinically appropriate.  Monitor renal function panel and electrolytes.  Avoid non-essential nephrotoxins.  Renal dose medications for Estimated Creatinine Clearance: 15.4 mL/min (A) (based on SCr of 3 mg/dL (H)).      Uncontrolled DM-2 with hyperglycemia  Hypoglycemia  Check blood glucose level q AC/HS.  Will hold off short acting insulin use today and reduce Levemir 5 units BID.  Continue American Diabetic Association 1800 Kcal diet.  Check Blood glucose in 1 hrs.      Hypokalemia     Suspect hypokalemia related to diarrhea and diuretics.  Replete potassium.  Follow BMP closely.  Tele-monitoring.       Moderate tobacco dependence     Health hazards associated with cigarette smoking were reviewed with patient and cessation was encouraged. Nicotine replacement and counseling options were discussed.  Spent 3 minutes counseling on cessation, patient states she's ready to quit-- nicotine patch ordered.       Anemia of chronic renal failure     Stable. Continue Iron supplementation.  Monitor CBC and transfuse for H/H <7/21 or if becomes symptomatic or hemodyamically  unstable.      UTI  Urine C/S. Continue IV Rocephin.    Hypocalcemia  Replete Ca Gluconate 1 gram IVPB.     Coronary artery disease involving native coronary artery without angina pectoris     Historical diagnosis. No anginal symptoms.  Continue ASA/plavix, monitor on telemetry.         Mild recurrent major depression     Chronic problem.  Stable.  Continue Seroquel and monitor.       Hypertension associated with diabetes     Historically poorly controlled.  Presents with mild elevation in b/p.  Resume norvasc. Holding lisinopril 2/2 TIMOTHY. Resume once clinically appropriate. Monitor BP and adjust regimen as required for sustained BP control.     VTE Risk Mitigation         Ordered     enoxaparin injection 30 mg  Daily      06/17/18 0328     IP VTE HIGH RISK PATIENT  Once      06/17/18 0328        Ophelia Dash MD  Department of Hospital Medicine   [facility]

## 2018-06-20 NOTE — PHYSICIAN QUERY
PT Name: Errol Keita  MR #: [id number]     Physician Query Form - Medication-Correlation for Diagnosis      CDS/: Mira Snyder RN              Contact information:  [phone]    This form is a permanent document in the medical record.     Query Date: June 20, 2018      By submitting this query, we are merely seeking further clarification of documentation.  Please utilize your independent clinical judgment when addressing the question(s) below.    The medical record contains the following:  The patient has an order for the following medication(s):      Magnesium sulfate 3GM IVPB     6/17 MAR       Please provider the corresponding diagnosis or diagnoses that support(s) the use of the medication(s)   Physician Use Only              Hypomagnesemia

## 2018-06-21 LAB
ALBUMIN SERPL BCP-MCNC: 1.9 G/DL
ALP SERPL-CCNC: 136 U/L
ALT SERPL W/O P-5'-P-CCNC: 15 U/L
ANION GAP SERPL CALC-SCNC: 11 MMOL/L
AST SERPL-CCNC: 26 U/L
BASOPHILS # BLD AUTO: 0 K/UL
BASOPHILS NFR BLD: 0.1 %
BILIRUB SERPL-MCNC: 0.2 MG/DL
BUN SERPL-MCNC: 21 MG/DL
CALCIUM SERPL-MCNC: 7.6 MG/DL
CHLORIDE SERPL-SCNC: 114 MMOL/L
CO2 SERPL-SCNC: 17 MMOL/L
CREAT SERPL-MCNC: 2.6 MG/DL
DIFFERENTIAL METHOD: ABNORMAL
EOSINOPHIL # BLD AUTO: 0 K/UL
EOSINOPHIL NFR BLD: 0.4 %
ERYTHROCYTE [DISTWIDTH] IN BLOOD BY AUTOMATED COUNT: 13.2 %
EST. GFR  (AFRICAN AMERICAN): 23 ML/MIN/1.73 M^2
EST. GFR  (NON AFRICAN AMERICAN): 20 ML/MIN/1.73 M^2
GLUCOSE SERPL-MCNC: 168 MG/DL
HCT VFR BLD AUTO: 29 %
HGB BLD-MCNC: 9.7 G/DL
LYMPHOCYTES # BLD AUTO: 1.6 K/UL
LYMPHOCYTES NFR BLD: 16.1 %
MAGNESIUM SERPL-MCNC: 2.3 MG/DL
MAGNESIUM SERPL-MCNC: 2.3 MG/DL
MCH RBC QN AUTO: 31 PG
MCHC RBC AUTO-ENTMCNC: 33.5 G/DL
MCV RBC AUTO: 92 FL
MONOCYTES # BLD AUTO: 0.2 K/UL
MONOCYTES NFR BLD: 1.6 %
NEUTROPHILS # BLD AUTO: 8.2 K/UL
NEUTROPHILS NFR BLD: 81.8 %
PHOSPHATE SERPL-MCNC: 3.9 MG/DL
PLATELET # BLD AUTO: 438 K/UL
PMV BLD AUTO: 8.1 FL
POCT GLUCOSE: 175 MG/DL (ref 70–110)
POCT GLUCOSE: 181 MG/DL (ref 70–110)
POCT GLUCOSE: 194 MG/DL (ref 70–110)
POCT GLUCOSE: 231 MG/DL (ref 70–110)
POTASSIUM SERPL-SCNC: 3 MMOL/L
PROT SERPL-MCNC: 6.1 G/DL
RBC # BLD AUTO: 3.14 M/UL
SODIUM SERPL-SCNC: 142 MMOL/L
WBC # BLD AUTO: 10 K/UL

## 2018-06-21 PROCEDURE — 12000002 HC ACUTE/MED SURGE SEMI-PRIVATE ROOM

## 2018-06-21 PROCEDURE — S4991 NICOTINE PATCH NONLEGEND: HCPCS | Performed by: NURSE PRACTITIONER

## 2018-06-21 PROCEDURE — 83735 ASSAY OF MAGNESIUM: CPT

## 2018-06-21 PROCEDURE — 84100 ASSAY OF PHOSPHORUS: CPT

## 2018-06-21 PROCEDURE — 85025 COMPLETE CBC W/AUTO DIFF WBC: CPT

## 2018-06-21 PROCEDURE — 63600175 PHARM REV CODE 636 W HCPCS: Performed by: NURSE PRACTITIONER

## 2018-06-21 PROCEDURE — 25000003 PHARM REV CODE 250: Performed by: INTERNAL MEDICINE

## 2018-06-21 PROCEDURE — 99232 SBSQ HOSP IP/OBS MODERATE 35: CPT | Mod: ,,, | Performed by: INTERNAL MEDICINE

## 2018-06-21 PROCEDURE — 97530 THERAPEUTIC ACTIVITIES: CPT

## 2018-06-21 PROCEDURE — 25000003 PHARM REV CODE 250: Performed by: NURSE PRACTITIONER

## 2018-06-21 PROCEDURE — 97116 GAIT TRAINING THERAPY: CPT

## 2018-06-21 PROCEDURE — 80053 COMPREHEN METABOLIC PANEL: CPT

## 2018-06-21 PROCEDURE — 36415 COLL VENOUS BLD VENIPUNCTURE: CPT

## 2018-06-21 RX ORDER — POTASSIUM CHLORIDE 20 MEQ/1
40 TABLET, EXTENDED RELEASE ORAL 3 TIMES DAILY
Status: COMPLETED | OUTPATIENT
Start: 2018-06-21 | End: 2018-06-22

## 2018-06-21 RX ORDER — LISINOPRIL 2.5 MG/1
2.5 TABLET ORAL DAILY
Status: DISCONTINUED | OUTPATIENT
Start: 2018-06-21 | End: 2018-06-22 | Stop reason: HOSPADM

## 2018-06-21 RX ADMIN — ASPIRIN 81 MG 81 MG: 81 TABLET ORAL at 08:06

## 2018-06-21 RX ADMIN — ATORVASTATIN CALCIUM 80 MG: 40 TABLET, FILM COATED ORAL at 08:06

## 2018-06-21 RX ADMIN — FERROUS SULFATE TAB EC 325 MG (65 MG FE EQUIVALENT) 325 MG: 325 (65 FE) TABLET DELAYED RESPONSE at 09:06

## 2018-06-21 RX ADMIN — PANCRELIPASE 1 CAPSULE: 24000; 76000; 120000 CAPSULE, DELAYED RELEASE PELLETS ORAL at 09:06

## 2018-06-21 RX ADMIN — ENOXAPARIN SODIUM 30 MG: 100 INJECTION SUBCUTANEOUS at 06:06

## 2018-06-21 RX ADMIN — CALCIUM ACETATE 667 MG: 667 CAPSULE ORAL at 12:06

## 2018-06-21 RX ADMIN — POTASSIUM CHLORIDE 40 MEQ: 1500 TABLET, EXTENDED RELEASE ORAL at 02:06

## 2018-06-21 RX ADMIN — CALCIUM ACETATE 667 MG: 667 CAPSULE ORAL at 06:06

## 2018-06-21 RX ADMIN — POTASSIUM CHLORIDE 40 MEQ: 1500 TABLET, EXTENDED RELEASE ORAL at 09:06

## 2018-06-21 RX ADMIN — LISINOPRIL 2.5 MG: 2.5 TABLET ORAL at 10:06

## 2018-06-21 RX ADMIN — STANDARDIZED SENNA CONCENTRATE AND DOCUSATE SODIUM 1 TABLET: 8.6; 5 TABLET, FILM COATED ORAL at 08:06

## 2018-06-21 RX ADMIN — PANTOPRAZOLE SODIUM 40 MG: 40 TABLET, DELAYED RELEASE ORAL at 08:06

## 2018-06-21 RX ADMIN — AMLODIPINE BESYLATE 10 MG: 5 TABLET ORAL at 08:06

## 2018-06-21 RX ADMIN — FERROUS SULFATE TAB EC 325 MG (65 MG FE EQUIVALENT) 325 MG: 325 (65 FE) TABLET DELAYED RESPONSE at 08:06

## 2018-06-21 RX ADMIN — PANCRELIPASE 1 CAPSULE: 24000; 76000; 120000 CAPSULE, DELAYED RELEASE PELLETS ORAL at 06:06

## 2018-06-21 RX ADMIN — SODIUM CHLORIDE: 0.9 INJECTION, SOLUTION INTRAVENOUS at 04:06

## 2018-06-21 RX ADMIN — CALCIUM ACETATE 667 MG: 667 CAPSULE ORAL at 08:06

## 2018-06-21 RX ADMIN — CLOPIDOGREL 75 MG: 75 TABLET, FILM COATED ORAL at 08:06

## 2018-06-21 RX ADMIN — NICOTINE 1 PATCH: 14 PATCH, EXTENDED RELEASE TRANSDERMAL at 08:06

## 2018-06-21 RX ADMIN — PANCRELIPASE 1 CAPSULE: 24000; 76000; 120000 CAPSULE, DELAYED RELEASE PELLETS ORAL at 08:06

## 2018-06-21 RX ADMIN — CEFTRIAXONE 1 G: 1 INJECTION, SOLUTION INTRAVENOUS at 01:06

## 2018-06-21 RX ADMIN — QUETIAPINE FUMARATE 100 MG: 100 TABLET ORAL at 09:06

## 2018-06-21 RX ADMIN — PANCRELIPASE 1 CAPSULE: 24000; 76000; 120000 CAPSULE, DELAYED RELEASE PELLETS ORAL at 12:06

## 2018-06-21 NOTE — PT/OT/SLP PROGRESS
"Physical Therapy Treatment    Patient Name:  Errol Keita   MRN:  [id number]    Recommendations:     Discharge Recommendations:  home health PT       Assessment:     Errol Keita is a 52 y.o. female admitted with a medical diagnosis of Acute kidney injury superimposed on chronic kidney disease.  She presents with the following impairments/functional limitations:  weakness, impaired endurance, impaired self care skills, impaired functional mobilty, gait instability .    Rehab Prognosis:  fair; patient would benefit from acute skilled PT services to address these deficits and reach maximum level of function.      Recent Surgery: * No surgery found *      Plan:     During this hospitalization, patient to be seen 6 x/week to address the above listed problems via gait training, therapeutic activities, therapeutic exercises  · Plan of Care Expires:  06/29/18   Plan of Care Reviewed with: patient    Subjective     Communicated with nurse Xie prior to session.  Patient found supine upon PT entry to room, agreeable to treatment.      Chief Complaint: generalized pain/soreness  Patient comments/goals: pt agreeable to gait training after some encouragement  Pain/Comfort:  · Pain Rating 1:  ("I'm hurting all over." pt did not specify or rate)  · Location - Orientation 1: generalized  · Pain Addressed 1: Reposition, Distraction, Cessation of Activity    Patients cultural, spiritual, Catholic conflicts given the current situation:      Objective:     Patient found with: peripheral IV, bed alarm     General Precautions: Standard, fall   Orthopedic Precautions:N/A   Braces: N/A     Functional Mobility:  · Bed Mobility:     · Scooting: contact guard assistance  · Supine to Sit: minimum assistance    · Transfers:     · Sit to Stand:  contact guard assistance with rolling walker    · Gait: 250' with CGA using RW. Generalized weakness and fatigue noted           Therapeutic Activities and Exercises:   pt assisted to bedside chair " following gait.   pt noted to be soiled, PCT notified/aware    Patient left up in chair with all lines intact, call button in reach and nursing and PCT notified..    GOALS:    Physical Therapy Goals        Problem: Physical Therapy Goal    Goal Priority Disciplines Outcome Goal Variances Interventions   Physical Therapy Goal     PT/OT, PT Ongoing (interventions implemented as appropriate)     Description:  Goals to be met by: 2018     Patient will increase functional independence with mobility by performin. Supine to sit with Contact Guard Assistance  2. Sit to stand transfer with Maximum Assistance  3. Bed to chair transfer with Contact Guard Assistance using Rolling Walker  4. Gait  x 250x2 feet with Minimal Assistance using Rolling Walker.   5. Lower extremity exercise program x20 reps per handout, with assistance as needed                      Time Tracking:     PT Received On: 18  PT Start Time: 1030     PT Stop Time: 1050  PT Total Time (min): 20 min     Billable Minutes: Gait Training 12 and Therapeutic Activity 8    Treatment Type: Treatment  PT/PTA: PTA     PTA Visit Number: 2     Adriana Elmore, PTA  2018

## 2018-06-21 NOTE — PROGRESS NOTES
Progress Note  Hospital Medicine  Patient Name:Errol Keita  MRN:  [id number]  Patient Class: IP- Inpatient  Admit Date: 6/16/2018  Length of Stay: 4 days  Expected Discharge Date:   Attending Physician: Ophelia Dash MD  Primary Care Provider:  Bharat Wiggins MD    SUBJECTIVE:     Principal Problem: Acute kidney injury superimposed on chronic kidney disease  Initial history of present illness: Errol Keita is a 52 y.o. female with a PMHx of Arthritis, Asthma, Diastolic CHF, Coronary artery disease, Diabetes mellitus, Hypertension and Pancreatitis. She was admitted to the service of hospital medicine with TIMOTHY superimposed on CKD and hypokalemia. She presented to the ED with a desire to be evaluated for dehydration secondary to diarrhea x 2 days and a decreased appetite.  Patient states that she also has had a fever and chills. She stated that she has been able to hold down fluids. She denied nausea, vomiting, blood in stool, abdominal pain, loss of consciousness, confusion, recent medication changes, positive sick contacts, light-headedness, urinary symptoms, rashes, or any recent course of antibiotics.     PMH/PSH/SH/FH/Meds: reviewed.    Symptoms/Review of Systems: Frail malnourished female. Still deficient with electrolytes. Blood sugars are improving, no more hypoglycemic. No shortness of breath, cough, chest pain or headache, fever or abdominal pain.     Diet:  Adequate intake.    Activity level: Up with assist   Pain:  Patient reports no pain.       OBJECTIVE:   Vital Signs (Most Recent):      Temp: 98.5 °F (36.9 °C) (06/21/18 0806)  Pulse: 96 (06/21/18 0806)  Resp: 16 (06/21/18 0806)  BP: (!) 172/93 (06/21/18 0806)  SpO2: 96 % (06/21/18 0806)       Vital Signs Range (Last 24H):  Temp:  [96.4 °F (35.8 °C)-98.5 °F (36.9 °C)]   Pulse:  [81-96]   Resp:  [16-18]   BP: (142-172)/(70-93)   SpO2:  [94 %-100 %]     Weight: 42 kg (92 lb 9.5 oz)  Body mass index is 14.5 kg/m².    Intake/Output Summary (Last 24 hours)  at 06/21/18 0944  Last data filed at 06/21/18 0600   Gross per 24 hour   Intake             4390 ml   Output                0 ml   Net             4390 ml     Physical Examination:  Constitutional: She is oriented to person, place, and time. She appears well-developed. No distress.   Appears older than stated age.  Frail, cachetic. Malodorous and disheveled.   HENT:   Head: Normocephalic and atraumatic.   Mouth/Throat: Abnormal dentition.   Eyes: Conjunctivae and EOM are normal. Pupils are equal, round, and reactive to light. No scleral icterus.   Neck: Normal range of motion. Neck supple. No JVD present. No tracheal deviation present. No thyromegaly present.   Cardiovascular: Normal rate, regular rhythm and intact distal pulses.  Exam reveals no gallop and no friction rub.    Murmur heard.  Pulses:       Dorsalis pedis pulses are 2+ on the right side, and 2+ on the left side.   Pulmonary/Chest: Effort normal and breath sounds normal. No accessory muscle usage. No respiratory distress. She has no wheezes. She has no rhonchi. She has no rales.   Abdominal: Soft. Bowel sounds are normal. She exhibits no distension and no mass. There is no tenderness. There is no guarding.   Musculoskeletal: Normal range of motion. She exhibits no edema, tenderness or deformity.   Neurological: She is alert and oriented to person, place, and time. She has normal strength. She exhibits normal muscle tone. Coordination normal.   Skin: Skin is warm, dry and intact. Capillary refill takes less than 2 seconds. No rash noted. She is not diaphoretic. No erythema.   Psychiatric: She has a normal mood and affect. Her speech is normal and behavior is normal.   CRANIAL NERVES   CN III, IV, VI   Pupils are equal, round, and reactive to light.  Extraocular motions are normal.   Vitals reviewed.    CBC:    Recent Labs  Lab 06/19/18  0403 06/20/18  0442 06/21/18  0600   WBC 8.90 7.50 10.00   RBC 2.96* 2.80* 3.14*   HGB 9.1* 8.6* 9.7*   HCT 27.2*  26.2* 29.0*   * 393* 438*   MCV 92 94 92   MCH 30.7 30.8 31.0   MCHC 33.4 32.9 33.5   BMP    Recent Labs  Lab 06/19/18  0403 06/20/18  0442 06/20/18  1430 06/21/18  0600   GLU 46* 179* 123* 168*    143 143 142   K 2.9* 2.9* 3.2* 3.0*   * 116* 113* 114*   CO2 21* 21* 18* 17*   BUN 26* 22* 20 21*   CREATININE 2.5* 2.4* 2.3* 2.6*   CALCIUM 7.1* 7.2* 8.1* 7.6*   MG 1.9  1.9 1.5*  1.5*  --  2.3  2.3      Diagnostic Results:  Microbiology Results (last 7 days)     Procedure Component Value Units Date/Time    Urine culture [[id number]] Collected:  06/17/18 0440    Order Status:  Completed Specimen:  Urine Updated:  06/19/18 1550     Urine Culture, Routine No significant growth         Assessment/Plan:     * Acute kidney injury superimposed on chronic kidney disease stage 4     Likely 2/2 to IVVD from diarrheal illness and poor intake.  IV hydration.  Hold home lisinopril and diuretics - resume once clinically appropriate.  Monitor renal function panel and electrolytes.  Avoid non-essential nephrotoxins.  Renal dose medications for Estimated Creatinine Clearance: 15.4 mL/min (A) (based on SCr of 3 mg/dL (H)).      Uncontrolled DM-2 with hyperglycemia  Hypoglycemia  Check blood glucose level q AC/HS.  Will hold off short acting insulin use today and reduce Levemir 5 units BID.  Continue American Diabetic Association 1800 Kcal diet.  Check Blood glucose in 1 hrs.      Hypokalemia     Suspect hypokalemia related to diarrhea and diuretics.  Replete potassium.  Follow BMP closely.  Tele-monitoring.       Moderate tobacco dependence     Health hazards associated with cigarette smoking were reviewed with patient and cessation was encouraged. Nicotine replacement and counseling options were discussed.  Spent 3 minutes counseling on cessation, patient states she's ready to quit-- nicotine patch ordered.       Anemia of chronic renal failure     Stable. Continue Iron supplementation.  Monitor CBC and transfuse  for H/H <7/21 or if becomes symptomatic or hemodyamically unstable.      UTI  Urine C/S. Continue IV Rocephin.    Hypocalcemia  Follow ca level.     Coronary artery disease involving native coronary artery without angina pectoris     Historical diagnosis. No anginal symptoms.  Continue ASA/plavix, monitor on telemetry.         Mild recurrent major depression     Chronic problem.  Stable.  Continue Seroquel and monitor.       Hypertension associated with diabetes     Historically poorly controlled.  Resume Lisinopril 2.5 mg daily.     VTE Risk Mitigation         Ordered     enoxaparin injection 30 mg  Daily      06/17/18 0328     IP VTE HIGH RISK PATIENT  Once      06/17/18 0328        Ophelia Dash MD  Department of Hospital Medicine   [facility]

## 2018-06-21 NOTE — PLAN OF CARE
Problem: Physical Therapy Goal  Goal: Physical Therapy Goal  Goals to be met by: 2018     Patient will increase functional independence with mobility by performin. Supine to sit with Contact Guard Assistance  2. Sit to stand transfer with Maximum Assistance  3. Bed to chair transfer with Contact Guard Assistance using Rolling Walker  4. Gait  x 250x2 feet with Minimal Assistance using Rolling Walker.   5. Lower extremity exercise program x20 reps per handout, with assistance as needed     Outcome: Ongoing (interventions implemented as appropriate)  PT for bed mobility, transfer OOB to chair and gait training

## 2018-06-21 NOTE — PLAN OF CARE
Problem: Patient Care Overview  Goal: Plan of Care Review  Outcome: Ongoing (interventions implemented as appropriate)  Plan of care reviewed, verbalized understanding. Safety maintained. Call light within reach. Pt A Ox3. Neuro check, glucose monitoring, & telemetry continued. Will continue to monitor.

## 2018-06-22 VITALS
HEIGHT: 67 IN | TEMPERATURE: 98 F | BODY MASS INDEX: 14.53 KG/M2 | WEIGHT: 92.56 LBS | HEART RATE: 18 BPM | SYSTOLIC BLOOD PRESSURE: 162 MMHG | DIASTOLIC BLOOD PRESSURE: 93 MMHG | RESPIRATION RATE: 18 BRPM | OXYGEN SATURATION: 95 %

## 2018-06-22 PROBLEM — R19.7 DIARRHEA: Status: ACTIVE | Noted: 2018-06-22

## 2018-06-22 LAB
ALBUMIN SERPL BCP-MCNC: 2 G/DL
ALP SERPL-CCNC: 163 U/L
ALT SERPL W/O P-5'-P-CCNC: 21 U/L
ANION GAP SERPL CALC-SCNC: 9 MMOL/L
AST SERPL-CCNC: 28 U/L
BASOPHILS # BLD AUTO: 0 K/UL
BASOPHILS NFR BLD: 0.2 %
BILIRUB SERPL-MCNC: 0.1 MG/DL
BUN SERPL-MCNC: 22 MG/DL
CALCIUM SERPL-MCNC: 8.1 MG/DL
CHLORIDE SERPL-SCNC: 115 MMOL/L
CO2 SERPL-SCNC: 18 MMOL/L
CREAT SERPL-MCNC: 2.5 MG/DL
DIFFERENTIAL METHOD: ABNORMAL
EOSINOPHIL # BLD AUTO: 0.1 K/UL
EOSINOPHIL NFR BLD: 0.7 %
ERYTHROCYTE [DISTWIDTH] IN BLOOD BY AUTOMATED COUNT: 13.6 %
EST. GFR  (AFRICAN AMERICAN): 24 ML/MIN/1.73 M^2
EST. GFR  (NON AFRICAN AMERICAN): 21 ML/MIN/1.73 M^2
GLUCOSE SERPL-MCNC: 139 MG/DL
HCT VFR BLD AUTO: 30.8 %
HGB BLD-MCNC: 10.4 G/DL
LYMPHOCYTES # BLD AUTO: 2 K/UL
LYMPHOCYTES NFR BLD: 23 %
MAGNESIUM SERPL-MCNC: 1.9 MG/DL
MAGNESIUM SERPL-MCNC: 1.9 MG/DL
MCH RBC QN AUTO: 31.5 PG
MCHC RBC AUTO-ENTMCNC: 33.9 G/DL
MCV RBC AUTO: 93 FL
MONOCYTES # BLD AUTO: 0.3 K/UL
MONOCYTES NFR BLD: 3.2 %
NEUTROPHILS # BLD AUTO: 6.2 K/UL
NEUTROPHILS NFR BLD: 72.9 %
PHOSPHATE SERPL-MCNC: 3.4 MG/DL
PLATELET # BLD AUTO: 489 K/UL
PMV BLD AUTO: 7.7 FL
POCT GLUCOSE: 155 MG/DL (ref 70–110)
POCT GLUCOSE: 190 MG/DL (ref 70–110)
POTASSIUM SERPL-SCNC: 3.5 MMOL/L
PROT SERPL-MCNC: 6.5 G/DL
RBC # BLD AUTO: 3.32 M/UL
SODIUM SERPL-SCNC: 142 MMOL/L
WBC # BLD AUTO: 8.5 K/UL

## 2018-06-22 PROCEDURE — 63600175 PHARM REV CODE 636 W HCPCS: Performed by: NURSE PRACTITIONER

## 2018-06-22 PROCEDURE — 80053 COMPREHEN METABOLIC PANEL: CPT

## 2018-06-22 PROCEDURE — 83735 ASSAY OF MAGNESIUM: CPT

## 2018-06-22 PROCEDURE — 97116 GAIT TRAINING THERAPY: CPT

## 2018-06-22 PROCEDURE — 84100 ASSAY OF PHOSPHORUS: CPT

## 2018-06-22 PROCEDURE — 99239 HOSP IP/OBS DSCHRG MGMT >30: CPT | Mod: ,,, | Performed by: INTERNAL MEDICINE

## 2018-06-22 PROCEDURE — 25000003 PHARM REV CODE 250: Performed by: NURSE PRACTITIONER

## 2018-06-22 PROCEDURE — 85025 COMPLETE CBC W/AUTO DIFF WBC: CPT

## 2018-06-22 PROCEDURE — 36415 COLL VENOUS BLD VENIPUNCTURE: CPT

## 2018-06-22 PROCEDURE — 25000003 PHARM REV CODE 250: Performed by: INTERNAL MEDICINE

## 2018-06-22 PROCEDURE — S4991 NICOTINE PATCH NONLEGEND: HCPCS | Performed by: NURSE PRACTITIONER

## 2018-06-22 RX ORDER — POTASSIUM CHLORIDE 1.5 G/1.58G
20 POWDER, FOR SOLUTION ORAL 2 TIMES DAILY
Qty: 60 PACKET | Refills: 0 | Status: SHIPPED | OUTPATIENT
Start: 2018-06-22 | End: 2019-03-12 | Stop reason: CLARIF

## 2018-06-22 RX ORDER — LANOLIN ALCOHOL/MO/W.PET/CERES
400 CREAM (GRAM) TOPICAL DAILY
Qty: 30 TABLET | Refills: 0 | Status: SHIPPED | OUTPATIENT
Start: 2018-06-22 | End: 2019-06-18

## 2018-06-22 RX ADMIN — ASPIRIN 81 MG 81 MG: 81 TABLET ORAL at 08:06

## 2018-06-22 RX ADMIN — CALCIUM ACETATE 667 MG: 667 CAPSULE ORAL at 11:06

## 2018-06-22 RX ADMIN — CLOPIDOGREL 75 MG: 75 TABLET, FILM COATED ORAL at 08:06

## 2018-06-22 RX ADMIN — NICOTINE 1 PATCH: 14 PATCH, EXTENDED RELEASE TRANSDERMAL at 08:06

## 2018-06-22 RX ADMIN — ACETAMINOPHEN 1000 MG: 500 TABLET, FILM COATED ORAL at 11:06

## 2018-06-22 RX ADMIN — PANTOPRAZOLE SODIUM 40 MG: 40 TABLET, DELAYED RELEASE ORAL at 08:06

## 2018-06-22 RX ADMIN — PANCRELIPASE 1 CAPSULE: 24000; 76000; 120000 CAPSULE, DELAYED RELEASE PELLETS ORAL at 11:06

## 2018-06-22 RX ADMIN — AMLODIPINE BESYLATE 10 MG: 5 TABLET ORAL at 08:06

## 2018-06-22 RX ADMIN — PANCRELIPASE 1 CAPSULE: 24000; 76000; 120000 CAPSULE, DELAYED RELEASE PELLETS ORAL at 08:06

## 2018-06-22 RX ADMIN — LISINOPRIL 2.5 MG: 2.5 TABLET ORAL at 08:06

## 2018-06-22 RX ADMIN — CEFTRIAXONE 1 G: 1 INJECTION, SOLUTION INTRAVENOUS at 02:06

## 2018-06-22 RX ADMIN — CALCIUM ACETATE 667 MG: 667 CAPSULE ORAL at 08:06

## 2018-06-22 RX ADMIN — FERROUS SULFATE TAB EC 325 MG (65 MG FE EQUIVALENT) 325 MG: 325 (65 FE) TABLET DELAYED RESPONSE at 08:06

## 2018-06-22 RX ADMIN — ATORVASTATIN CALCIUM 80 MG: 40 TABLET, FILM COATED ORAL at 08:06

## 2018-06-22 RX ADMIN — POTASSIUM CHLORIDE 40 MEQ: 1500 TABLET, EXTENDED RELEASE ORAL at 08:06

## 2018-06-22 NOTE — PLAN OF CARE
[facility] obtained patient signature for Patient Choice disclosure form; patient chose [facility] as her preferred agency. Sent home health referral to [facility] through [facility]; awaiting acceptance      1431-The referral for the patient in [id number], room 307, bed 307 A admitted at 6/16/2018 11:29 PM has been updated by [email or web address].  Update: Accepted

## 2018-06-22 NOTE — PLAN OF CARE
Problem: Physical Therapy Goal  Goal: Physical Therapy Goal  Goals to be met by: 2018     Patient will increase functional independence with mobility by performin. Supine to sit with Contact Guard Assistance  2. Sit to stand transfer with Maximum Assistance  3. Bed to chair transfer with Contact Guard Assistance using Rolling Walker  4. Gait  x 250x2 feet with Minimal Assistance using Rolling Walker.   5. Lower extremity exercise program x20 reps per handout, with assistance as needed     Outcome: Ongoing (interventions implemented as appropriate)  Ambulated 280' with rw and CGA.

## 2018-06-22 NOTE — DISCHARGE SUMMARY
Discharge Summary  Hospital Medicine    Admit Date: 6/16/2018    Date and Time: 6/22/20183:22 PM    Discharge Attending Physician: Ophelia Dash MD    Primary Care Physician: Bharat Wiggins MD    Diagnoses:  Active Hospital Problems    Diagnosis  POA    *Acute kidney injury superimposed on chronic kidney disease [N17.9, N18.9]  Yes    Diarrhea [R19.7]  Yes    Gastroenteritis [K52.9]  Yes    Hypokalemia [E87.6]  Yes    Moderate tobacco dependence [F17.200]  Yes    Anemia of chronic renal failure [N18.9, D63.1]  Yes    Coronary artery disease involving native coronary artery without angina pectoris [I25.10]  Yes    Severe malnutrition [E43]  Yes    Mild recurrent major depression [F33.0]  Yes    Hypertension associated with diabetes [E11.59, I10]  UTI  Uncontrolled DM-2 with hyperglycemia  Hypoglycemia  Yes        Discharged Condition: Good    Hospital Course:   Errol Keita is a 52 y.o. female with a PMHx of Arthritis, Asthma, Diastolic CHF, Coronary artery disease, Diabetes mellitus, Hypertension and Pancreatitis. She was admitted to the service of hospital medicine with TIMOTHY superimposed on CKD and hypokalemia. She presented to the ED with a desire to be evaluated for dehydration secondary to diarrhea x 2 days and a decreased appetite.  Patient stated that she also has had a fever and chills. She stated that she had been able to hold down fluids. She denied nausea, vomiting, blood in stool, abdominal pain, loss of consciousness, confusion, recent medication changes, positive sick contacts, light-headedness, urinary symptoms, rashes, or any recent course of antibiotics. Patient was admitted to Hospitalist medicine service. Patient was provided supportive care with IVF and aggressive IVF repletion performed. Patient noted to have uncontrolled DM and hypoglycemia as well. Insulin dose adjusted. Lytes are stabilized. Renal functions improved to baseline. Home health and home PT is arranged. Patient's  appetite is much better. Patient was discharged home in stable condition with following discharge plan of care. Patient to have surveillance labs performed next week. Total time with the patient was 30 minutes and greater than 50% was spent in counseling and coordination of care. The assessment and plan have been discussed at length. Physicians' notes reviewed. Labs and procedure reviewed.     Consults: None    Significant Diagnostic Studies:     Microbiology Results (last 7 days)     Procedure Component Value Units Date/Time    Urine culture [[id number]] Collected:  06/17/18 0440    Order Status:  Completed Specimen:  Urine Updated:  06/19/18 1550     Urine Culture, Routine No significant growth        Special Treatments/Procedures: None  Disposition: Home or Self Care    Medications:  Reconciled Home Medications: Discharge Medication List as of 6/22/2018  2:16 PM      START taking these medications    Details   magnesium oxide (MAG-OX) 400 mg tablet Take 1 tablet (400 mg total) by mouth once daily., Starting Fri 6/22/2018, Print      potassium chloride (KLOR-CON) 20 mEq Pack Take 20 mEq by mouth 2 (two) times daily., Starting Fri 6/22/2018, Print         CONTINUE these medications which have NOT CHANGED    Details   amLODIPine (NORVASC) 5 MG tablet Take 1 tablet (5 mg total) by mouth once daily., Starting Thu 1/18/2018, Until Fri 1/18/2019, Print      aspirin 81 MG chewable tablet Take 81 mg by mouth once daily. Ran out, Until Discontinued, Historical Med      atorvastatin (LIPITOR) 80 MG tablet Take 1 tablet (80 mg total) by mouth once daily., Starting 7/27/2015, Until Discontinued, Print      clopidogrel (PLAVIX) 75 mg tablet Take 1 tablet (75 mg total) by mouth once daily., Starting Thu 1/18/2018, Until Fri 1/18/2019, Normal      CREON 24,000-76,000 -120,000 unit capsule Take 1 capsule by mouth 4 (four) times daily with meals and nightly. , Starting 8/26/2016, Until Discontinued, Historical Med      ferrous  sulfate 325 (65 FE) MG EC tablet Take 1 tablet (325 mg total) by mouth 2 (two) times daily., Starting 7/13/2015, Until Discontinued, Print      furosemide (LASIX) 40 MG tablet Take 40 mg by mouth once daily., Historical Med      lisinopril (PRINIVIL,ZESTRIL) 2.5 MG tablet Take 2.5 mg by mouth once daily. , Starting 8/26/2016, Until Discontinued, Historical Med      pantoprazole (PROTONIX) 40 MG tablet Take 40 mg by mouth once daily., Historical Med      quetiapine (SEROQUEL) 100 MG Tab 100 mg every evening. , Starting 7/16/2015, Until Discontinued, Historical Med      HUMALOG KWIKPEN 100 unit/mL InPn pen Inject 7 Units into the skin 3 (three) times daily before meals., Starting 3/24/2017, Until Discontinued, No Print      insulin detemir (LEVEMIR FLEXTOUCH) 100 unit/mL (3 mL) SubQ InPn pen Inject 10 Units into the skin 2 (two) times daily., Starting 3/24/2017, Until Sat 3/24/18, Normal             Discharge Procedure Orders  Referral to Home health   Referral Priority: Routine Referral Type: Home Health   Referral Reason: Specialty Services Required    Requested Specialty: Home Health Services    Number of Visits Requested: 1      Diet diabetic     Diet Cardiac     Activity as tolerated   Order Comments: Observe fall precautions.     Call MD for:   Order Comments: For worsening symptoms, chest pain, shortness of breath, increased abdominal pain, high grade fever, stroke or stroke like symptoms, immediately go to the nearest Emergency Room or call 911 as soon as possible.       Follow-up Information     Bharat Wiggins MD On 7/12/2018.    Specialty:  Internal Medicine  Why:  @10:45am   Contact information:  [address] [place] [address] [address] [place] [address]  [phone]             [facility].    Specialties:  DME Provider, Home Health Services  Why:  Home Health  Contact information:  [address] [place] [address] [place] [address] [phone]

## 2018-06-22 NOTE — PT/OT/SLP PROGRESS
Physical Therapy Treatment    Patient Name:  Errol Keita   MRN:  [id number]    Recommendations:     Discharge Recommendations:  home health PT   Discharge Equipment Recommendations: none   Barriers to discharge: None    Assessment:     Errol Keita is a 52 y.o. female admitted with a medical diagnosis of Acute kidney injury superimposed on chronic kidney disease.  She presents with the following impairments/functional limitations:  weakness, impaired endurance, impaired self care skills, impaired functional mobilty, gait instability .    Rehab Prognosis:  fair; patient would benefit from acute skilled PT services to address these deficits and reach maximum level of function.      Recent Surgery: * No surgery found *      Plan:     During this hospitalization, patient to be seen 6 x/week to address the above listed problems via gait training, therapeutic activities, therapeutic exercises  · Plan of Care Expires:  06/29/18   Plan of Care Reviewed with: patient    Subjective     Communicated with nurse Indira Mills prior to session.  Patient found ridley[ine in bed upon PT entry to room, agreeable to treatment.      Chief Complaint: none stated  Patient comments/goals: to return home  Pain/Comfort:  · Pain Rating 1: 0/10    Patients cultural, spiritual, Confucianism conflicts given the current situation:      Objective:     Patient found with:       General Precautions: Standard, fall   Orthopedic Precautions:N/A   Braces: N/A     Functional Mobility:  · Bed Mobility:     · Rolling Left:  stand by assistance  · Supine to Sit: stand by assistance  · Transfers:     · Sit to Stand:  stand by assistance with rolling walker  · Gait: 280' with rw and CGA.      AM-PAC 6 CLICK MOBILITY          Therapeutic Activities and Exercises:    Returned to sit EOB . Lunch present. Caregiver at bedside.    Patient left seated EOB with all lines intact, call button in reach, nurse Mills notified and caregiver present..    GOALS:     Physical Therapy Goals        Problem: Physical Therapy Goal    Goal Priority Disciplines Outcome Goal Variances Interventions   Physical Therapy Goal     PT/OT, PT Ongoing (interventions implemented as appropriate)     Description:  Goals to be met by: 2018     Patient will increase functional independence with mobility by performin. Supine to sit with Contact Guard Assistance  2. Sit to stand transfer with Maximum Assistance  3. Bed to chair transfer with Contact Guard Assistance using Rolling Walker  4. Gait  x 250x2 feet with Minimal Assistance using Rolling Walker.   5. Lower extremity exercise program x20 reps per handout, with assistance as needed                      Time Tracking:     PT Received On: 18  PT Start Time: 1204     PT Stop Time: 1212  PT Total Time (min): 8 min     Billable Minutes: Gait Training 8min    Treatment Type: Treatment  PT/PTA: PTA     PTA Visit Number: 3     Nikki Robison PTA  2018

## 2018-06-22 NOTE — PLAN OF CARE
Problem: Patient Care Overview  Goal: Plan of Care Review  Outcome: Ongoing (interventions implemented as appropriate)  Pt A Ox4. Plan of care reviewed with patient. Pt remains free of falls, safety maintained. Telemetry monitoring continued. Call light within reach, bed alarm on. Will continue to monitor.

## 2018-06-22 NOTE — PLAN OF CARE
DC instructions explained to pt and , both verbalized understanding. New scripts given. Pt transferred off unit via WC by staff. VSS

## 2018-06-22 NOTE — PLAN OF CARE
Problem: Patient Care Overview  Goal: Plan of Care Review  Outcome: Ongoing (interventions implemented as appropriate)  Plan of care reviewed with pt. Pt verbalized understanding. Pt AAO x 4. Pt denies pain at this time. IV fluids infusing. Pt up to chair with assistance. Pt remained free from falls during shift. SR up x 2. Call light in reach.

## 2018-06-22 NOTE — PLAN OF CARE
Pt's PCP, Dr Wiggins will be out of town; next available appt for FU is 7/12/18, placed information on AVS....GENEVA Lora       06/22/18 1006   Discharge Reassessment   Assessment Type Discharge Planning Reassessment

## 2018-07-06 ENCOUNTER — OFFICE VISIT (OUTPATIENT)
Dept: ENDOCRINOLOGY | Facility: CLINIC | Age: 53
End: 2018-07-06
Payer: MEDICARE

## 2018-07-06 VITALS
WEIGHT: 106.69 LBS | HEIGHT: 67 IN | BODY MASS INDEX: 16.74 KG/M2 | HEART RATE: 68 BPM | SYSTOLIC BLOOD PRESSURE: 132 MMHG | TEMPERATURE: 99 F | DIASTOLIC BLOOD PRESSURE: 62 MMHG

## 2018-07-06 DIAGNOSIS — E11.59 HYPERTENSION ASSOCIATED WITH DIABETES: ICD-10-CM

## 2018-07-06 DIAGNOSIS — I50.9 ACUTE ON CHRONIC CONGESTIVE HEART FAILURE, UNSPECIFIED HEART FAILURE TYPE: ICD-10-CM

## 2018-07-06 DIAGNOSIS — E78.00 HYPERCHOLESTEREMIA: ICD-10-CM

## 2018-07-06 DIAGNOSIS — Z86.73 H/O: CVA (CEREBROVASCULAR ACCIDENT): ICD-10-CM

## 2018-07-06 DIAGNOSIS — I15.2 HYPERTENSION ASSOCIATED WITH DIABETES: ICD-10-CM

## 2018-07-06 DIAGNOSIS — N18.4 TYPE 2 DIABETES MELLITUS WITH STAGE 4 CHRONIC KIDNEY DISEASE, WITH LONG-TERM CURRENT USE OF INSULIN: Primary | ICD-10-CM

## 2018-07-06 DIAGNOSIS — I25.10 CORONARY ARTERY DISEASE INVOLVING NATIVE CORONARY ARTERY WITHOUT ANGINA PECTORIS, UNSPECIFIED WHETHER NATIVE OR TRANSPLANTED HEART: ICD-10-CM

## 2018-07-06 DIAGNOSIS — E11.22 TYPE 2 DIABETES MELLITUS WITH STAGE 4 CHRONIC KIDNEY DISEASE, WITH LONG-TERM CURRENT USE OF INSULIN: Primary | ICD-10-CM

## 2018-07-06 DIAGNOSIS — Z79.4 TYPE 2 DIABETES MELLITUS WITH STAGE 4 CHRONIC KIDNEY DISEASE, WITH LONG-TERM CURRENT USE OF INSULIN: Primary | ICD-10-CM

## 2018-07-06 DIAGNOSIS — N18.4 TYPE 1 DIABETES MELLITUS WITH STAGE 4 CHRONIC KIDNEY DISEASE: ICD-10-CM

## 2018-07-06 DIAGNOSIS — E10.22 TYPE 1 DIABETES MELLITUS WITH STAGE 4 CHRONIC KIDNEY DISEASE: ICD-10-CM

## 2018-07-06 PROBLEM — K52.9 GASTROENTERITIS: Status: RESOLVED | Noted: 2018-06-17 | Resolved: 2018-07-06

## 2018-07-06 PROBLEM — E11.00 TYPE 2 DIABETES MELLITUS WITH HYPEROSMOLAR NONKETOTIC HYPERGLYCEMIA: Status: RESOLVED | Noted: 2018-03-24 | Resolved: 2018-07-06

## 2018-07-06 PROCEDURE — 99999 PR PBB SHADOW E&M-EST. PATIENT-LVL IV: CPT | Mod: PBBFAC,,, | Performed by: NURSE PRACTITIONER

## 2018-07-06 PROCEDURE — 99214 OFFICE O/P EST MOD 30 MIN: CPT | Mod: S$GLB,,, | Performed by: NURSE PRACTITIONER

## 2018-07-06 RX ORDER — PEN NEEDLE, DIABETIC 30 GX3/16"
NEEDLE, DISPOSABLE MISCELLANEOUS
Qty: 150 EACH | Refills: 12 | Status: SHIPPED | OUTPATIENT
Start: 2018-07-06 | End: 2019-03-12 | Stop reason: CLARIF

## 2018-07-06 RX ORDER — LANCETS
EACH MISCELLANEOUS
Qty: 400 EACH | Refills: 3 | Status: SHIPPED | OUTPATIENT
Start: 2018-07-06 | End: 2019-03-12 | Stop reason: CLARIF

## 2018-07-06 RX ORDER — INSULIN LISPRO 100 [IU]/ML
2 INJECTION, SOLUTION INTRAVENOUS; SUBCUTANEOUS
Qty: 15 ML | Refills: 12
Start: 2018-07-06 | End: 2019-06-18 | Stop reason: SDUPTHER

## 2018-07-06 RX ORDER — INSULIN PUMP SYRINGE, 3 ML
EACH MISCELLANEOUS
Qty: 1 EACH | Refills: 0 | Status: SHIPPED | OUTPATIENT
Start: 2018-07-06 | End: 2019-03-12 | Stop reason: CLARIF

## 2018-07-06 NOTE — PROGRESS NOTES
CGMS  Freestyle Phil pro placed on patients right upper arm Sub Q. Patient tolerated procedure well. Food log explained, patient verbalized understanding. Patient will return on 7-20-18  for removal. SN [id number] Exp date 11- .

## 2018-07-06 NOTE — PROGRESS NOTES
CC: This 52 y.o. female presents for management of diabetes  and chronic conditions pending review including HTN, HLP, CAD s/p CABG, pancreatitis, CHF, dementia     HPI: She was diagnosed with T1DM in 2000. Insulin started ~ 2004. However, based on low c-peptide of 0.2 on 5/2016, patient is insulinopenic and is treated as type 1 diabetes. CJ negative.    Family hx of DM:   Arrives with her , who contributes heavily to interview. He is very involved in her care. He makes her insulin dosing decisions and he injects insulin sometimes as well.     Patient was admitted to [facility] with TIMOTHY,  hyperglycemia, and hypoglycemia on June 16, 2018. She was discharged in June 22, 2018  She has received no humalog since her discharge- per patient , he reports when give to her she will bottom out her blood sugar.   Her  also has not been giving her levemir as ordered, often holding one of her doses  She has not had her levemir this am.   She ate red beans and rice last night. She has not eaten this am. Did receive 7 units of levemir last night.  BG in office 209    Of note, they have not been checking any blood sugars. They do not have a working glucometer at home.        hypoglycemia at home- he reports not recently since cutting back on insulin doses  monitoring BG at home:      Diet: Eats 2  Meals a days, snacks when  is not home   Exercise: none    CURRENT DM MEDS: Levemir 7 u qd -bid   Timing prandial insulin 5-15 minutes before meals: no  Vial/pen:  Uses  pens  Glucometer type:  Need one    Standards of Care:  Eye exam: has an appt next week     ROS:   Gen: Appetite fair, very thin, smells of urine, denies fatigue and weakness.  Skin: Skin is intact and heals well, no rashes, no hair changes  Eyes: Denies visual disturbances  Resp: no SOB or BELLE, no cough  Cardiac: No palpitations, chest pain, no edema   GI: No nausea or vomiting, diarrhea, constipation, or abdominal pain.  /GYN: No nocturia,  burning or pain.   MS/Neuro: Denies numbness/ tingling in BLE; Gait steady, speech clear  Psych: Denies drug/ETOH abuse, + hx of depression.  Other systems: negative.    PE:  GENERAL: Well developed, well nourished.  PSYCH: AAOx3, appropriate mood and affect, pleasant expression, conversant, appears relaxed, well groomed.   EYES: Conjunctiva, corneas clear  NECK: Supple, trachea midline,no thyromegaly or nodules  CHEST: Resp even and unlabored, CTA bilateral.  CARDIAC: RRR, S1, S2 heard, + murmurs  ABDOMEN: Soft, non-tender, non-distended   VASCULAR: DP pulses +2/4 bilaterally, + 2 foot edema.  NEURO: Gait steady  SKIN: Skin warm and dry no acanthosis nigracans.  FOOT EXAMINATION: 7/6/18  No foot deformity, corns or callus formation,  nails in good condition and well trimmed, no interspace maceration or ulceration noted.  Decreased hair growth present over toes/feet.   Feet are dirty, Protective sensation intact with 10 gram monofilament.  +2 dorsalis pedis and posterior pulses noted.      No results found for: MICALBCREAT    Hemoglobin A1C   Date Value Ref Range Status   03/24/2018 13.5 (H) 4.0 - 5.6 % Final     Comment:     According to ADA guidelines, hemoglobin A1c <7.0% represents  optimal control in non-pregnant diabetic patients. Different  metrics may apply to specific patient populations.   Standards of Medical Care in Diabetes-2016.  For the purpose of screening for the presence of diabetes:  <5.7%     Consistent with the absence of diabetes  5.7-6.4%  Consistent with increasing risk for diabetes   (prediabetes)  >or=6.5%  Consistent with diabetes  Currently, no consensus exists for use of hemoglobin A1c  for diagnosis of diabetes for children.  This Hemoglobin A1c assay has significant interference with fetal   hemoglobin   (HbF). The results are invalid for patients with abnormal amounts of   HbF,   including those with known Hereditary Persistence   of Fetal Hemoglobin. Heterozygous hemoglobin variants  (HbAS, HbAC,   HbAD, HbAE, HbA2) do not significantly interfere with this assay;   however, presence of multiple variants in a sample may impact the %   interference.     01/12/2018 9.7 (H) 4.0 - 5.6 % Final     Comment:     According to ADA guidelines, hemoglobin A1c <7.0% represents  optimal control in non-pregnant diabetic patients. Different  metrics may apply to specific patient populations.   Standards of Medical Care in Diabetes-2016.  For the purpose of screening for the presence of diabetes:  <5.7%     Consistent with the absence of diabetes  5.7-6.4%  Consistent with increasing risk for diabetes   (prediabetes)  >or=6.5%  Consistent with diabetes  Currently, no consensus exists for use of hemoglobin A1c  for diagnosis of diabetes for children.  This Hemoglobin A1c assay has significant interference with fetal   hemoglobin   (HbF). The results are invalid for patients with abnormal amounts of   HbF,   including those with known Hereditary Persistence   of Fetal Hemoglobin. Heterozygous hemoglobin variants (HbAS, HbAC,   HbAD, HbAE, HbA2) do not significantly interfere with this assay;   however, presence of multiple variants in a sample may impact the %   interference.     03/22/2017 15.3 (H) 4.5 - 6.2 % Final     Comment:     According to ADA guidelines, hemoglobin A1C <7.0% represents  optimal control in non-pregnant diabetic patients.  Different  metrics may apply to specific populations.   Standards of Medical Care in Diabetes - 2016.  For the purpose of screening for the presence of diabetes:  <5.7%     Consistent with the absence of diabetes  5.7-6.4%  Consistent with increasing risk for diabetes   (prediabetes)  >or=6.5%  Consistent with diabetes  Currently no consensus exists for use of hemoglobin A1C  for diagnosis of diabetes for children.     08/07/2013 8.5 (H) 4.8 - 5.6 % Final     Comment:              Increased risk for diabetes: 5.7 - 6.4           Diabetes: >6.4           Glycemic control  for adults with diabetes: <7.0  **In order to standardize %HbA1c results worldwide, as of October 11, 2010,  the %HbA1c is being calculated using the master equation recommended in the  consensus statement adopted by the ADA (American Diabetes Assoc), EASD  (European Assoc for the Study of Diabetes), IFCC (International Federation  of Clinical Chemistry and Laboratory Medicine) and IDF (International  Diabetes Federation). Result units: %HgbA1c (DCCT/NGSP).  In common with other methods, Hb A1C values may not accurately reflect mean  blood glucose in patients with hemoglobin variants (HgbF, HgbS and HgbC).  Any cause of shortened erythrocyte survival will reduce exposure of  erythrocytes to glucose with a consequent decrease in HbA1c (%) values, even  though the time-averaged blood glucose level may be elevated. Causes of  shortened erythrocyte lifetime might be hemolytic anemia or other hemolytic  diseases, homozygous sickle cell trait, pregnancy, recent significant or  chronic blood loss, etc. Caution should be used when interpreting the HbA1c  results from patients with these conditions.   07/16/2013 7.6 (H) 4.8 - 5.6 % Final     Comment:              Increased risk for diabetes: 5.7 - 6.4           Diabetes: >6.4           Glycemic control for adults with diabetes: <7.0  **In order to standardize %HbA1c results worldwide, as of October 11, 2010,  the %HbA1c is being calculated using the master equation recommended in the  consensus statement adopted by the ADA (American Diabetes Assoc), EASD  (European Assoc for the Study of Diabetes), IFCC (International Federation  of Clinical Chemistry and Laboratory Medicine) and IDF (International  Diabetes Federation). Result units: %HgbA1c (DCCT/NGSP).  In common with other methods, Hb A1C values may not accurately reflect mean  blood glucose in patients with hemoglobin variants (HgbF, HgbS and HgbC).  Any cause of shortened erythrocyte survival will reduce exposure  of  erythrocytes to glucose with a consequent decrease in HbA1c (%) values, even  though the time-averaged blood glucose level may be elevated. Causes of  shortened erythrocyte lifetime might be hemolytic anemia or other hemolytic  diseases, homozygous sickle cell trait, pregnancy, recent significant or  chronic blood loss, etc. Caution should be used when interpreting the HbA1c  results from patients with these conditions.   07/15/2013 7.5 (H) 4.8 - 5.6 % Final     Comment:              Increased risk for diabetes: 5.7 - 6.4           Diabetes: >6.4           Glycemic control for adults with diabetes: <7.0  **In order to standardize %HbA1c results worldwide, as of October 11, 2010,  the %HbA1c is being calculated using the master equation recommended in the  consensus statement adopted by the ADA (American Diabetes Assoc), EASD  (European Assoc for the Study of Diabetes), IFCC (International Federation  of Clinical Chemistry and Laboratory Medicine) and IDF (International  Diabetes Federation). Result units: %HgbA1c (DCCT/NGSP).  In common with other methods, Hb A1C values may not accurately reflect mean  blood glucose in patients with hemoglobin variants (HgbF, HgbS and HgbC).  Any cause of shortened erythrocyte survival will reduce exposure of  erythrocytes to glucose with a consequent decrease in HbA1c (%) values, even  though the time-averaged blood glucose level may be elevated. Causes of  shortened erythrocyte lifetime might be hemolytic anemia or other hemolytic  diseases, homozygous sickle cell trait, pregnancy, recent significant or  chronic blood loss, etc. Caution should be used when interpreting the HbA1c  results from patients with these conditions.        ASSESSMENT and PLAN:    1. T1DM with hyperglycemia, CKD 4, DM PN, hypoglycemia  Change levemir 2 u bid, Humalog 2 u AC  CGMS today  Check bg ac/hs- Rx for meter sent to [facility]   Fax facesheet to [employer] for coverage       5/2/2016 09:29   Estimated  Avg Glucose 223 (H)   C-Peptide 0.2 (L)   Glutamic Acid Decarb Ab 0.00   Islet Cell Ab <5      Discussed A1c and BG goals.   Reviewed  hypoglycemia, s/s and appropriate tx.   Instructed to monitor BG and bring meter/ log to every clinic visit.   - takes ASA, ACEi, statin    2. HTN - controlled, continue meds as previously prescribed and monitor.     3. HLP -  on statin therapy, LFTs WNL    4. CKD 4- continue to follow w Dr Goldsmith     5, CAD, h/o CVA, CHF- optimize bg control      Follow-up: in 3 months with lab prior

## 2018-07-09 ENCOUNTER — HOSPITAL ENCOUNTER (EMERGENCY)
Facility: HOSPITAL | Age: 53
Discharge: HOME | End: 2018-07-09
Attending: EMERGENCY MEDICINE
Payer: MEDICARE

## 2018-07-09 VITALS
BODY MASS INDEX: 16.61 KG/M2 | SYSTOLIC BLOOD PRESSURE: 148 MMHG | RESPIRATION RATE: 16 BRPM | DIASTOLIC BLOOD PRESSURE: 79 MMHG | WEIGHT: 106.06 LBS | OXYGEN SATURATION: 100 % | TEMPERATURE: 98 F | HEART RATE: 72 BPM

## 2018-07-09 DIAGNOSIS — Z00.00 WELL ADULT HEALTH CHECK: Primary | ICD-10-CM

## 2018-07-09 LAB
ANION GAP SERPL CALC-SCNC: 11 MMOL/L
BUN SERPL-MCNC: 30 MG/DL
CALCIUM SERPL-MCNC: 7.6 MG/DL
CHLORIDE SERPL-SCNC: 113 MMOL/L
CO2 SERPL-SCNC: 18 MMOL/L
CREAT SERPL-MCNC: 2.5 MG/DL
EST. GFR  (AFRICAN AMERICAN): 24 ML/MIN/1.73 M^2
EST. GFR  (NON AFRICAN AMERICAN): 21 ML/MIN/1.73 M^2
GLUCOSE SERPL-MCNC: 233 MG/DL
MAGNESIUM SERPL-MCNC: 1.7 MG/DL
POTASSIUM SERPL-SCNC: 4.5 MMOL/L
SODIUM SERPL-SCNC: 142 MMOL/L

## 2018-07-09 PROCEDURE — 83735 ASSAY OF MAGNESIUM: CPT

## 2018-07-09 PROCEDURE — 80048 BASIC METABOLIC PNL TOTAL CA: CPT

## 2018-07-09 PROCEDURE — 36415 COLL VENOUS BLD VENIPUNCTURE: CPT

## 2018-07-09 PROCEDURE — 99283 EMERGENCY DEPT VISIT LOW MDM: CPT

## 2018-07-10 NOTE — ED NOTES
Presents to the ER with c/o abnormal lab value. Patient reports being called approximately 2 hours ago and was told to come to emergency room for evaluation for hypokalemia. Patient reports that she was discharged 3 weeks ago for hypokalemia. Patient khari any muscle cramping. AAOx4. Mucous membranes are pink and moist. Skin is warm, dry and intact. Lungs are clear bilaterally, respirations are regular and unlabored. Denies cough, congestion, rhinorrhea or SOB. BS active x4, no tenderness with palpation, abd is soft and not distended. Denies any appetite or activity change. S1S2, capillary refill is < 2 seconds. Denies dysuria, difficulty urinating, frequency, numbness, tingling or weakness. MARY LOU CASTREJON

## 2018-07-10 NOTE — ED PROVIDER NOTES
Encounter Date: 7/9/2018    SCRIBE #1 NOTE: I, Jean Pierre Kelsey, am scribing for, and in the presence of, Dr. Calderon.       History     Chief Complaint   Patient presents with    Hypokalemia     recent bloodwork and called pt today and told to come to ED due to low potassium       07/09/2018 10:04 PM     Chief complaint: Lab abnormality      Errol Keita is a 52 y.o. female with a past medical history of DM, HTN, pancreatitis, CHF, and CAD presenting to the ED with a sudden onset of acute lab abnormality beginning 2 hrs ago. The pt was advised to report to the ED for evaluation of abnormal lab showing low potassium. She stated she was discharged 3 weeks ago for hypokalemia. The pt stated she is in no current pain. The pt is currently a cigarette smoker. She has a past surgical history of cholecystectomy and CABG.       The history is provided by the patient.     Review of patient's allergies indicates:  No Known Allergies  Past Medical History:   Diagnosis Date    Arthritis     Asthma     Blood transfusion     CHF (congestive heart failure)     Coronary artery disease     Diabetes mellitus     Hypertension     Pancreatitis     Type 2 diabetes mellitus with hyperglycemia 7/13/2015     Past Surgical History:   Procedure Laterality Date    CARDIAC SURGERY      CABG    CHOLECYSTECTOMY      CORONARY ARTERY BYPASS GRAFT       Family History   Problem Relation Age of Onset    Diabetes Mother     Diabetes Father     Diabetes Sister     Hearing loss Sister     Heart disease Sister     Hypertension Sister     Learning disabilities Sister     Vision loss Sister     Asthma Brother     Depression Brother     Cancer Maternal Grandmother      Social History   Substance Use Topics    Smoking status: Current Every Day Smoker     Packs/day: 0.50     Years: 35.00     Types: Cigarettes    Smokeless tobacco: Never Used    Alcohol use No      Comment: questionable per      Review of Systems    Constitutional: Negative for fever.   HENT: Negative for congestion.    Eyes: Negative for visual disturbance.   Respiratory: Negative for wheezing.    Cardiovascular: Negative for chest pain.   Gastrointestinal: Negative for abdominal pain.   Genitourinary: Negative for dysuria.   Musculoskeletal: Negative for joint swelling.   Skin: Negative for rash.   Neurological: Negative for syncope.   Hematological: Does not bruise/bleed easily.        + Lab abnormality   Psychiatric/Behavioral: Negative for confusion.       Physical Exam     Initial Vitals [07/09/18 2154]   BP Pulse Resp Temp SpO2   (!) 148/79 72 16 98.1 °F (36.7 °C) 100 %      MAP       --         Physical Exam    Nursing note and vitals reviewed.  Constitutional: She appears well-nourished.   Chronically ill appearing  No acute distress   HENT:   Head: Normocephalic and atraumatic.   Poor dentition   Eyes: Conjunctivae and EOM are normal.   Neck: Normal range of motion. Neck supple. No thyroid mass present.   Cardiovascular: Normal rate, regular rhythm and normal heart sounds. Exam reveals no gallop and no friction rub.    No murmur heard.  Pulmonary/Chest: Breath sounds normal. She has no wheezes. She has no rhonchi. She has no rales.   Abdominal: Soft. Normal appearance and bowel sounds are normal. She exhibits no distension. There is no tenderness.   Neurological: She is alert and oriented to person, place, and time. She has normal strength. No cranial nerve deficit or sensory deficit.   Skin: Skin is warm and dry. No rash noted. No erythema.   Psychiatric: She has a normal mood and affect. Her speech is normal. Cognition and memory are normal.         ED Course   Procedures  Labs Reviewed   BASIC METABOLIC PANEL - Abnormal; Notable for the following:        Result Value    Chloride 113 (*)     CO2 18 (*)     Glucose 233 (*)     BUN, Bld 30 (*)     Creatinine 2.5 (*)     Calcium 7.6 (*)     eGFR if  24 (*)     eGFR if non   American 21 (*)     All other components within normal limits   MAGNESIUM          Imaging Results    None          Medical Decision Making:   History:   Old Medical Records: I decided to obtain old medical records.  Initial Assessment:   The pt was interviewed and examined emergently with no medical complaints at this time. The pt was unaware of potassium values from labs acquired a few days ago. The new labs drawn indicated stable electrolyte findings consistent with end stage renal disease. The pt was scheduled for dialysis and was urged to attend appointment and follow up with PCP as soon as possible. The pt and significant other was agreeable with plan and discharged in stable condition.  Clinical Tests:   Lab Tests: Ordered and Reviewed            Scribe Attestation:   Scribe #1: I performed the above scribed service and the documentation accurately describes the services I performed. I attest to the accuracy of the note.    I, Dr. Leonardo Calderon, personally performed the services described in this documentation. All medical record entries made by the scribe were at my direction and in my presence.  I have reviewed the chart and agree that the record reflects my personal performance and is accurate and complete. Leonardo Calderon MD.  5:45 AM 07/10/2018          Clinical Impression:   The encounter diagnosis was Well adult health check.      Disposition:   Disposition: Discharged  Condition: Stable                        Leonardo Calderon MD  07/10/18 0545

## 2018-07-10 NOTE — ED NOTES
Upon discharge, patient is AAOx4, no cardiac or respiratory complications. Follow up care reviewed with patient and has been instructed to return to the ER if needed. Patient verbalized understanding and ambulated to the lobby without difficulty. LUCÍA BARRETO

## 2018-07-25 ENCOUNTER — TELEPHONE (OUTPATIENT)
Dept: ENDOCRINOLOGY | Facility: CLINIC | Age: 53
End: 2018-07-25

## 2018-07-25 NOTE — TELEPHONE ENCOUNTER
Called patient and left a message informing her to call back about her CGMS results and that we were unable to get enough results for the CGMS.

## 2018-08-17 DIAGNOSIS — R63.4 UNINTENDED WEIGHT LOSS: Primary | ICD-10-CM

## 2018-08-18 ENCOUNTER — HOSPITAL ENCOUNTER (OUTPATIENT)
Dept: RADIOLOGY | Facility: HOSPITAL | Age: 53
Discharge: HOME | End: 2018-08-18
Attending: INTERNAL MEDICINE
Payer: MEDICARE

## 2018-08-18 DIAGNOSIS — R63.4 UNINTENDED WEIGHT LOSS: ICD-10-CM

## 2018-08-18 PROCEDURE — 74176 CT ABD & PELVIS W/O CONTRAST: CPT | Mod: TC

## 2018-08-18 PROCEDURE — 74176 CT ABD & PELVIS W/O CONTRAST: CPT | Mod: 26,,, | Performed by: RADIOLOGY

## 2019-01-05 ENCOUNTER — HOSPITAL ENCOUNTER (INPATIENT)
Facility: HOSPITAL | Age: 54
LOS: 14 days | Discharge: HOME HEALTH | DRG: 673 | End: 2019-01-19
Attending: EMERGENCY MEDICINE | Admitting: INTERNAL MEDICINE
Payer: MEDICARE

## 2019-01-05 DIAGNOSIS — I50.9 CHF (CONGESTIVE HEART FAILURE): ICD-10-CM

## 2019-01-05 DIAGNOSIS — E83.51 HYPOCALCEMIA: ICD-10-CM

## 2019-01-05 DIAGNOSIS — N18.6 ESRD (END STAGE RENAL DISEASE): ICD-10-CM

## 2019-01-05 DIAGNOSIS — N18.30 ACUTE RENAL FAILURE WITH ACUTE TUBULAR NECROSIS SUPERIMPOSED ON STAGE 3 CHRONIC KIDNEY DISEASE: Primary | ICD-10-CM

## 2019-01-05 DIAGNOSIS — R60.0 LOWER EXTREMITY EDEMA: ICD-10-CM

## 2019-01-05 DIAGNOSIS — R60.0 BILATERAL LOWER EXTREMITY EDEMA: ICD-10-CM

## 2019-01-05 DIAGNOSIS — M79.89 LEG SWELLING: ICD-10-CM

## 2019-01-05 DIAGNOSIS — N17.9 ACUTE RENAL FAILURE SUPERIMPOSED ON STAGE 4 CHRONIC KIDNEY DISEASE: ICD-10-CM

## 2019-01-05 DIAGNOSIS — N17.0 ACUTE RENAL FAILURE WITH ACUTE TUBULAR NECROSIS SUPERIMPOSED ON STAGE 3 CHRONIC KIDNEY DISEASE: Primary | ICD-10-CM

## 2019-01-05 DIAGNOSIS — N18.4 ACUTE RENAL FAILURE SUPERIMPOSED ON STAGE 4 CHRONIC KIDNEY DISEASE: ICD-10-CM

## 2019-01-05 LAB
ALBUMIN SERPL BCP-MCNC: 2.7 G/DL
ALP SERPL-CCNC: 164 U/L
ALT SERPL W/O P-5'-P-CCNC: 22 U/L
ANION GAP SERPL CALC-SCNC: 16 MMOL/L
AST SERPL-CCNC: 19 U/L
BASOPHILS # BLD AUTO: 0.1 K/UL
BASOPHILS NFR BLD: 0.9 %
BILIRUB SERPL-MCNC: 0.2 MG/DL
BUN SERPL-MCNC: 34 MG/DL
CALCIUM SERPL-MCNC: 6.4 MG/DL
CHLORIDE SERPL-SCNC: 109 MMOL/L
CO2 SERPL-SCNC: 16 MMOL/L
CREAT SERPL-MCNC: 3.6 MG/DL
DIFFERENTIAL METHOD: ABNORMAL
EOSINOPHIL # BLD AUTO: 0 K/UL
EOSINOPHIL NFR BLD: 0.4 %
ERYTHROCYTE [DISTWIDTH] IN BLOOD BY AUTOMATED COUNT: 13.9 %
EST. GFR  (AFRICAN AMERICAN): 16 ML/MIN/1.73 M^2
EST. GFR  (NON AFRICAN AMERICAN): 14 ML/MIN/1.73 M^2
GLUCOSE SERPL-MCNC: 188 MG/DL
HCT VFR BLD AUTO: 34.7 %
HGB BLD-MCNC: 11.5 G/DL
LYMPHOCYTES # BLD AUTO: 2.3 K/UL
LYMPHOCYTES NFR BLD: 26.8 %
MCH RBC QN AUTO: 31.5 PG
MCHC RBC AUTO-ENTMCNC: 33.1 G/DL
MCV RBC AUTO: 95 FL
MONOCYTES # BLD AUTO: 0.4 K/UL
MONOCYTES NFR BLD: 4.9 %
NEUTROPHILS # BLD AUTO: 5.8 K/UL
NEUTROPHILS NFR BLD: 67 %
PLATELET # BLD AUTO: 365 K/UL
PMV BLD AUTO: 8.2 FL
POTASSIUM SERPL-SCNC: 3.5 MMOL/L
PROT SERPL-MCNC: 7.1 G/DL
RBC # BLD AUTO: 3.64 M/UL
SODIUM SERPL-SCNC: 141 MMOL/L
TSH SERPL DL<=0.005 MIU/L-ACNC: 2.2 UIU/ML
WBC # BLD AUTO: 8.7 K/UL

## 2019-01-05 PROCEDURE — 96374 THER/PROPH/DIAG INJ IV PUSH: CPT

## 2019-01-05 PROCEDURE — 25000003 PHARM REV CODE 250: Performed by: NURSE PRACTITIONER

## 2019-01-05 PROCEDURE — 12000002 HC ACUTE/MED SURGE SEMI-PRIVATE ROOM

## 2019-01-05 PROCEDURE — 63600175 PHARM REV CODE 636 W HCPCS: Performed by: EMERGENCY MEDICINE

## 2019-01-05 PROCEDURE — G0378 HOSPITAL OBSERVATION PER HR: HCPCS

## 2019-01-05 PROCEDURE — 85025 COMPLETE CBC W/AUTO DIFF WBC: CPT

## 2019-01-05 PROCEDURE — 25000003 PHARM REV CODE 250: Performed by: EMERGENCY MEDICINE

## 2019-01-05 PROCEDURE — 93005 ELECTROCARDIOGRAM TRACING: CPT

## 2019-01-05 PROCEDURE — 36415 COLL VENOUS BLD VENIPUNCTURE: CPT

## 2019-01-05 PROCEDURE — 96375 TX/PRO/DX INJ NEW DRUG ADDON: CPT

## 2019-01-05 PROCEDURE — 84443 ASSAY THYROID STIM HORMONE: CPT

## 2019-01-05 PROCEDURE — 63600175 PHARM REV CODE 636 W HCPCS: Performed by: NURSE PRACTITIONER

## 2019-01-05 PROCEDURE — 80053 COMPREHEN METABOLIC PANEL: CPT

## 2019-01-05 PROCEDURE — 99285 EMERGENCY DEPT VISIT HI MDM: CPT | Mod: 25

## 2019-01-05 PROCEDURE — 94760 N-INVAS EAR/PLS OXIMETRY 1: CPT

## 2019-01-05 RX ORDER — CLONIDINE HYDROCHLORIDE 0.1 MG/1
0.1 TABLET ORAL
Status: COMPLETED | OUTPATIENT
Start: 2019-01-05 | End: 2019-01-05

## 2019-01-05 RX ORDER — AMOXICILLIN 250 MG
1 CAPSULE ORAL 2 TIMES DAILY
Status: DISCONTINUED | OUTPATIENT
Start: 2019-01-05 | End: 2019-01-19 | Stop reason: HOSPADM

## 2019-01-05 RX ORDER — IBUPROFEN 200 MG
16 TABLET ORAL
Status: DISCONTINUED | OUTPATIENT
Start: 2019-01-05 | End: 2019-01-19 | Stop reason: HOSPADM

## 2019-01-05 RX ORDER — NAPROXEN SODIUM 220 MG/1
81 TABLET, FILM COATED ORAL DAILY
Status: DISCONTINUED | OUTPATIENT
Start: 2019-01-06 | End: 2019-01-18

## 2019-01-05 RX ORDER — FUROSEMIDE 10 MG/ML
60 INJECTION INTRAMUSCULAR; INTRAVENOUS
Status: COMPLETED | OUTPATIENT
Start: 2019-01-05 | End: 2019-01-05

## 2019-01-05 RX ORDER — ONDANSETRON 2 MG/ML
4 INJECTION INTRAMUSCULAR; INTRAVENOUS EVERY 6 HOURS PRN
Status: DISCONTINUED | OUTPATIENT
Start: 2019-01-05 | End: 2019-01-19 | Stop reason: HOSPADM

## 2019-01-05 RX ORDER — ACETAMINOPHEN 325 MG/1
650 TABLET ORAL EVERY 4 HOURS PRN
Status: DISCONTINUED | OUTPATIENT
Start: 2019-01-05 | End: 2019-01-19 | Stop reason: HOSPADM

## 2019-01-05 RX ORDER — SODIUM CHLORIDE 0.9 % (FLUSH) 0.9 %
5 SYRINGE (ML) INJECTION
Status: DISCONTINUED | OUTPATIENT
Start: 2019-01-05 | End: 2019-01-19 | Stop reason: HOSPADM

## 2019-01-05 RX ORDER — GLUCAGON 1 MG
1 KIT INJECTION
Status: DISCONTINUED | OUTPATIENT
Start: 2019-01-05 | End: 2019-01-19 | Stop reason: HOSPADM

## 2019-01-05 RX ORDER — AMLODIPINE BESYLATE 5 MG/1
10 TABLET ORAL DAILY
Status: DISCONTINUED | OUTPATIENT
Start: 2019-01-06 | End: 2019-01-19 | Stop reason: HOSPADM

## 2019-01-05 RX ORDER — ENOXAPARIN SODIUM 100 MG/ML
30 INJECTION SUBCUTANEOUS EVERY 24 HOURS
Status: DISCONTINUED | OUTPATIENT
Start: 2019-01-05 | End: 2019-01-18

## 2019-01-05 RX ORDER — ATORVASTATIN CALCIUM 40 MG/1
80 TABLET, FILM COATED ORAL DAILY
Status: DISCONTINUED | OUTPATIENT
Start: 2019-01-06 | End: 2019-01-19 | Stop reason: HOSPADM

## 2019-01-05 RX ORDER — CLOPIDOGREL BISULFATE 75 MG/1
75 TABLET ORAL DAILY
Status: DISCONTINUED | OUTPATIENT
Start: 2019-01-06 | End: 2019-01-19 | Stop reason: HOSPADM

## 2019-01-05 RX ORDER — IBUPROFEN 200 MG
24 TABLET ORAL
Status: DISCONTINUED | OUTPATIENT
Start: 2019-01-05 | End: 2019-01-19 | Stop reason: HOSPADM

## 2019-01-05 RX ORDER — FERROUS SULFATE 325(65) MG
325 TABLET, DELAYED RELEASE (ENTERIC COATED) ORAL 2 TIMES DAILY
Status: DISCONTINUED | OUTPATIENT
Start: 2019-01-05 | End: 2019-01-19 | Stop reason: HOSPADM

## 2019-01-05 RX ORDER — CALCIUM CARBONATE 200(500)MG
500 TABLET,CHEWABLE ORAL
Status: COMPLETED | OUTPATIENT
Start: 2019-01-05 | End: 2019-01-05

## 2019-01-05 RX ORDER — QUETIAPINE FUMARATE 100 MG/1
100 TABLET, FILM COATED ORAL NIGHTLY
Status: DISCONTINUED | OUTPATIENT
Start: 2019-01-05 | End: 2019-01-19 | Stop reason: HOSPADM

## 2019-01-05 RX ORDER — PANTOPRAZOLE SODIUM 40 MG/1
40 TABLET, DELAYED RELEASE ORAL DAILY
Status: DISCONTINUED | OUTPATIENT
Start: 2019-01-06 | End: 2019-01-08

## 2019-01-05 RX ORDER — CALCIUM GLUCONATE 98 MG/ML
1 INJECTION, SOLUTION INTRAVENOUS
Status: COMPLETED | OUTPATIENT
Start: 2019-01-05 | End: 2019-01-05

## 2019-01-05 RX ADMIN — CLONIDINE HYDROCHLORIDE 0.1 MG: 0.1 TABLET ORAL at 06:01

## 2019-01-05 RX ADMIN — FERROUS SULFATE TAB EC 325 MG (65 MG FE EQUIVALENT) 325 MG: 325 (65 FE) TABLET DELAYED RESPONSE at 11:01

## 2019-01-05 RX ADMIN — CALCIUM GLUCONATE 1 G: 98 INJECTION, SOLUTION INTRAVENOUS at 08:01

## 2019-01-05 RX ADMIN — FUROSEMIDE 60 MG: 10 INJECTION, SOLUTION INTRAVENOUS at 06:01

## 2019-01-05 RX ADMIN — SENNOSIDES AND DOCUSATE SODIUM 1 TABLET: 8.6; 5 TABLET ORAL at 11:01

## 2019-01-05 RX ADMIN — PANCRELIPASE 1 CAPSULE: 24000; 76000; 120000 CAPSULE, DELAYED RELEASE PELLETS ORAL at 11:01

## 2019-01-05 RX ADMIN — ENOXAPARIN SODIUM 30 MG: 100 INJECTION SUBCUTANEOUS at 11:01

## 2019-01-05 RX ADMIN — QUETIAPINE FUMARATE 100 MG: 100 TABLET ORAL at 11:01

## 2019-01-05 RX ADMIN — CALCIUM CARBONATE (ANTACID) CHEW TAB 500 MG 500 MG: 500 CHEW TAB at 08:01

## 2019-01-06 PROBLEM — N18.4 ACUTE RENAL FAILURE SUPERIMPOSED ON STAGE 4 CHRONIC KIDNEY DISEASE: Status: ACTIVE | Noted: 2019-01-06

## 2019-01-06 PROBLEM — E83.42 HYPOMAGNESEMIA: Status: ACTIVE | Noted: 2019-01-06

## 2019-01-06 PROBLEM — N18.9 ACUTE ON CHRONIC RENAL FAILURE: Status: ACTIVE | Noted: 2019-01-06

## 2019-01-06 PROBLEM — E87.6 HYPOKALEMIA: Status: ACTIVE | Noted: 2019-01-06

## 2019-01-06 PROBLEM — N17.9 ACUTE ON CHRONIC RENAL FAILURE: Status: ACTIVE | Noted: 2019-01-06

## 2019-01-06 LAB
ALBUMIN SERPL BCP-MCNC: 2.2 G/DL
ALP SERPL-CCNC: 140 U/L
ALT SERPL W/O P-5'-P-CCNC: 18 U/L
ANION GAP SERPL CALC-SCNC: 12 MMOL/L
AST SERPL-CCNC: 17 U/L
BACTERIA #/AREA URNS HPF: ABNORMAL /HPF
BASOPHILS # BLD AUTO: 0 K/UL
BASOPHILS NFR BLD: 0.4 %
BILIRUB SERPL-MCNC: 0.2 MG/DL
BILIRUB UR QL STRIP: NEGATIVE
BNP SERPL-MCNC: 1217 PG/ML
BUN SERPL-MCNC: 37 MG/DL
CALCIUM SERPL-MCNC: 6.5 MG/DL
CHLORIDE SERPL-SCNC: 112 MMOL/L
CLARITY UR: ABNORMAL
CO2 SERPL-SCNC: 18 MMOL/L
COLOR UR: YELLOW
CREAT SERPL-MCNC: 3.4 MG/DL
CREAT UR-MCNC: 25.8 MG/DL
DIFFERENTIAL METHOD: ABNORMAL
EOSINOPHIL # BLD AUTO: 0 K/UL
EOSINOPHIL NFR BLD: 0.4 %
ERYTHROCYTE [DISTWIDTH] IN BLOOD BY AUTOMATED COUNT: 14.1 %
EST. GFR  (AFRICAN AMERICAN): 17 ML/MIN/1.73 M^2
EST. GFR  (NON AFRICAN AMERICAN): 14 ML/MIN/1.73 M^2
ESTIMATED AVG GLUCOSE: 186 MG/DL
GLUCOSE SERPL-MCNC: 158 MG/DL
GLUCOSE UR QL STRIP: ABNORMAL
HBA1C MFR BLD HPLC: 8.1 %
HCT VFR BLD AUTO: 31.4 %
HGB BLD-MCNC: 10.3 G/DL
HGB UR QL STRIP: ABNORMAL
HYALINE CASTS #/AREA URNS LPF: 0 /LPF
KETONES UR QL STRIP: NEGATIVE
LEUKOCYTE ESTERASE UR QL STRIP: ABNORMAL
LYMPHOCYTES # BLD AUTO: 2.3 K/UL
LYMPHOCYTES NFR BLD: 30.5 %
MAGNESIUM SERPL-MCNC: 1.3 MG/DL
MCH RBC QN AUTO: 31.4 PG
MCHC RBC AUTO-ENTMCNC: 32.8 G/DL
MCV RBC AUTO: 96 FL
MICROSCOPIC COMMENT: ABNORMAL
MONOCYTES # BLD AUTO: 0.4 K/UL
MONOCYTES NFR BLD: 5.7 %
NEUTROPHILS # BLD AUTO: 4.7 K/UL
NEUTROPHILS NFR BLD: 63 %
NITRITE UR QL STRIP: POSITIVE
PH UR STRIP: 6 [PH] (ref 5–8)
PHOSPHATE SERPL-MCNC: 4.3 MG/DL
PLATELET # BLD AUTO: 330 K/UL
PMV BLD AUTO: 8.2 FL
POCT GLUCOSE: 167 MG/DL (ref 70–110)
POCT GLUCOSE: 248 MG/DL (ref 70–110)
POTASSIUM SERPL-SCNC: 2.8 MMOL/L
PROT SERPL-MCNC: 5.8 G/DL
PROT UR QL STRIP: ABNORMAL
PROT UR-MCNC: 270 MG/DL
PTH-INTACT SERPL-MCNC: 514.9 PG/ML
RBC # BLD AUTO: 3.28 M/UL
RBC #/AREA URNS HPF: 9 /HPF (ref 0–4)
SODIUM SERPL-SCNC: 142 MMOL/L
SODIUM UR-SCNC: 105 MMOL/L
SP GR UR STRIP: 1.02 (ref 1–1.03)
SQUAMOUS #/AREA URNS HPF: 6 /HPF
URN SPEC COLLECT METH UR: ABNORMAL
UROBILINOGEN UR STRIP-ACNC: NEGATIVE EU/DL
WBC # BLD AUTO: 7.5 K/UL
WBC #/AREA URNS HPF: 80 /HPF (ref 0–5)

## 2019-01-06 PROCEDURE — 25000003 PHARM REV CODE 250: Performed by: INTERNAL MEDICINE

## 2019-01-06 PROCEDURE — 63600175 PHARM REV CODE 636 W HCPCS: Performed by: NURSE PRACTITIONER

## 2019-01-06 PROCEDURE — 83735 ASSAY OF MAGNESIUM: CPT

## 2019-01-06 PROCEDURE — 87086 URINE CULTURE/COLONY COUNT: CPT

## 2019-01-06 PROCEDURE — 81000 URINALYSIS NONAUTO W/SCOPE: CPT

## 2019-01-06 PROCEDURE — 84300 ASSAY OF URINE SODIUM: CPT

## 2019-01-06 PROCEDURE — 80053 COMPREHEN METABOLIC PANEL: CPT

## 2019-01-06 PROCEDURE — 25000003 PHARM REV CODE 250: Performed by: NURSE PRACTITIONER

## 2019-01-06 PROCEDURE — 87088 URINE BACTERIA CULTURE: CPT

## 2019-01-06 PROCEDURE — 87186 SC STD MICRODIL/AGAR DIL: CPT

## 2019-01-06 PROCEDURE — 83880 ASSAY OF NATRIURETIC PEPTIDE: CPT

## 2019-01-06 PROCEDURE — 83036 HEMOGLOBIN GLYCOSYLATED A1C: CPT

## 2019-01-06 PROCEDURE — 84100 ASSAY OF PHOSPHORUS: CPT

## 2019-01-06 PROCEDURE — 84156 ASSAY OF PROTEIN URINE: CPT

## 2019-01-06 PROCEDURE — 36415 COLL VENOUS BLD VENIPUNCTURE: CPT

## 2019-01-06 PROCEDURE — 87077 CULTURE AEROBIC IDENTIFY: CPT

## 2019-01-06 PROCEDURE — 12000002 HC ACUTE/MED SURGE SEMI-PRIVATE ROOM

## 2019-01-06 PROCEDURE — 83970 ASSAY OF PARATHORMONE: CPT

## 2019-01-06 PROCEDURE — 85025 COMPLETE CBC W/AUTO DIFF WBC: CPT

## 2019-01-06 PROCEDURE — 82570 ASSAY OF URINE CREATININE: CPT

## 2019-01-06 RX ORDER — POTASSIUM CHLORIDE 750 MG/1
20 TABLET, EXTENDED RELEASE ORAL ONCE
Status: DISCONTINUED | OUTPATIENT
Start: 2019-01-06 | End: 2019-01-06

## 2019-01-06 RX ORDER — INSULIN ASPART 100 [IU]/ML
0-5 INJECTION, SOLUTION INTRAVENOUS; SUBCUTANEOUS
Status: DISCONTINUED | OUTPATIENT
Start: 2019-01-06 | End: 2019-01-12

## 2019-01-06 RX ORDER — HYDRALAZINE HYDROCHLORIDE 10 MG/1
10 TABLET, FILM COATED ORAL EVERY 12 HOURS
Status: DISCONTINUED | OUTPATIENT
Start: 2019-01-06 | End: 2019-01-19 | Stop reason: HOSPADM

## 2019-01-06 RX ORDER — CLONIDINE HYDROCHLORIDE 0.1 MG/1
0.1 TABLET ORAL EVERY 6 HOURS PRN
Status: DISCONTINUED | OUTPATIENT
Start: 2019-01-06 | End: 2019-01-19 | Stop reason: HOSPADM

## 2019-01-06 RX ORDER — MAGNESIUM SULFATE HEPTAHYDRATE 40 MG/ML
2 INJECTION, SOLUTION INTRAVENOUS ONCE
Status: COMPLETED | OUTPATIENT
Start: 2019-01-06 | End: 2019-01-06

## 2019-01-06 RX ORDER — SODIUM CHLORIDE 9 MG/ML
INJECTION, SOLUTION INTRAVENOUS CONTINUOUS
Status: DISCONTINUED | OUTPATIENT
Start: 2019-01-06 | End: 2019-01-06

## 2019-01-06 RX ORDER — POTASSIUM CHLORIDE 750 MG/1
60 TABLET, EXTENDED RELEASE ORAL ONCE
Status: COMPLETED | OUTPATIENT
Start: 2019-01-06 | End: 2019-01-06

## 2019-01-06 RX ADMIN — PANTOPRAZOLE SODIUM 40 MG: 40 TABLET, DELAYED RELEASE ORAL at 08:01

## 2019-01-06 RX ADMIN — SODIUM CHLORIDE: 0.9 INJECTION, SOLUTION INTRAVENOUS at 02:01

## 2019-01-06 RX ADMIN — SENNOSIDES AND DOCUSATE SODIUM 1 TABLET: 8.6; 5 TABLET ORAL at 08:01

## 2019-01-06 RX ADMIN — CLOPIDOGREL BISULFATE 75 MG: 75 TABLET ORAL at 08:01

## 2019-01-06 RX ADMIN — AMLODIPINE BESYLATE 10 MG: 5 TABLET ORAL at 08:01

## 2019-01-06 RX ADMIN — CLONIDINE HYDROCHLORIDE 0.1 MG: 0.1 TABLET ORAL at 03:01

## 2019-01-06 RX ADMIN — ASPIRIN 81 MG CHEWABLE TABLET 81 MG: 81 TABLET CHEWABLE at 08:01

## 2019-01-06 RX ADMIN — PANCRELIPASE 1 CAPSULE: 24000; 76000; 120000 CAPSULE, DELAYED RELEASE PELLETS ORAL at 05:01

## 2019-01-06 RX ADMIN — ENOXAPARIN SODIUM 30 MG: 100 INJECTION SUBCUTANEOUS at 05:01

## 2019-01-06 RX ADMIN — MAGNESIUM SULFATE IN WATER 2 G: 40 INJECTION, SOLUTION INTRAVENOUS at 08:01

## 2019-01-06 RX ADMIN — ATORVASTATIN CALCIUM 80 MG: 40 TABLET, FILM COATED ORAL at 08:01

## 2019-01-06 RX ADMIN — PANCRELIPASE 1 CAPSULE: 24000; 76000; 120000 CAPSULE, DELAYED RELEASE PELLETS ORAL at 11:01

## 2019-01-06 RX ADMIN — PANCRELIPASE 1 CAPSULE: 24000; 76000; 120000 CAPSULE, DELAYED RELEASE PELLETS ORAL at 08:01

## 2019-01-06 RX ADMIN — QUETIAPINE FUMARATE 100 MG: 100 TABLET ORAL at 08:01

## 2019-01-06 RX ADMIN — FERROUS SULFATE TAB EC 325 MG (65 MG FE EQUIVALENT) 325 MG: 325 (65 FE) TABLET DELAYED RESPONSE at 08:01

## 2019-01-06 RX ADMIN — HYDRALAZINE HYDROCHLORIDE 10 MG: 10 TABLET, FILM COATED ORAL at 08:01

## 2019-01-06 RX ADMIN — INSULIN ASPART 3 UNITS: 100 INJECTION, SOLUTION INTRAVENOUS; SUBCUTANEOUS at 08:01

## 2019-01-06 RX ADMIN — POTASSIUM CHLORIDE 60 MEQ: 10 TABLET, EXTENDED RELEASE ORAL at 11:01

## 2019-01-06 RX ADMIN — PANCRELIPASE 1 CAPSULE: 24000; 76000; 120000 CAPSULE, DELAYED RELEASE PELLETS ORAL at 09:01

## 2019-01-06 RX ADMIN — HYDRALAZINE HYDROCHLORIDE 10 MG: 10 TABLET, FILM COATED ORAL at 11:01

## 2019-01-06 NOTE — ASSESSMENT & PLAN NOTE
Chronic problem.  Stable.  Monitor H/H.  Continue iron supplementation.  Transfuse for hemodynamic instability and/or H/H <7/21

## 2019-01-06 NOTE — ED PROVIDER NOTES
Encounter Date: 1/5/2019    SCRIBE #1 NOTE: I, Bala Hernandez, am scribing for, and in the presence of, Dr. Arana.       History     Chief Complaint   Patient presents with    Leg Pain     C/o atraumatic bl leg pain onset 1 hr PTA.      Time seen by provider: 6:38 PM on 01/05/2019      Errol Keita is a 53 y.o. female with a PMHx of T2DM, HTN, pancreatitis, CAD, and CHF who presents to the ED for further evaluation of bilateral leg swelling that has been ongoing for 2 weeks. She states that she is having bilateral leg swelling and leg pain she describes as a tightness. She does endorse some mild SOB with this leg edema. Significant other relays that the patient has some swelling to the left forearm as well. The patient does admit to missing several doses of her lasix medication. Significant other relays that the patient is scheduled to see a kidney specialist in the coming month. The patient denies fever, chest pain, abdominal pain, vomiting, diarrhea, and headache. The patient has a PSHx of CABG and cholecystectomy. She has a SHx of current everyday smoker smoking .5 ppd.             The history is provided by the patient and a significant other.     Review of patient's allergies indicates:  No Known Allergies  Past Medical History:   Diagnosis Date    Arthritis     Asthma     Blood transfusion     CHF (congestive heart failure)     Coronary artery disease     Diabetes mellitus     Hypertension     Pancreatitis     Type 2 diabetes mellitus with hyperglycemia 7/13/2015     Past Surgical History:   Procedure Laterality Date    ASPIRATION ABSCESS N/A 10/27/2015    Performed [facility] Surgeon at [facility] YECENIA    CARDIAC SURGERY      CABG    CHOLECYSTECTOMY      COLECTOMY-RIGHT N/A 8/25/2014    Performed by Harlan Regalado [facility] at [facility] OR    CORONARY ARTERY BYPASS GRAFT      EGD (ESOPHAGOGASTRODUODENOSCOPY) N/A 4/16/2014    Performed by Crow [facility]ley [facility] at [facility] ENDO    EGD  (ESOPHAGOGASTRODUODENOSCOPY) N/A 4/2/2014    [facility] ENDO    EGD (ESOPHAGOGASTRODUODENOSCOPY) N/A 6/6/2013    Performed by [facility] at [facility] ENDO    ESOPHAGOGASTRODUODENOSCOPY (EGD) N/A 8/22/2014    Performed by Satnam Nguyen MD at [facility] ENDO    STERNOTOMY N/A 6/6/2013    [facility]formed by Ole Ingram [facility] at [facility] OR    THORACOTOMY N/A 6/6/2013    Performed by [facility] at [facility] OR     Family History   Problem Relation Age of Onset    Diabetes Mother     Diabetes Father     Diabetes Sister     Hearing loss Sister     Heart disease Sister     Hypertension Sister     Learning disabilities Sister     Vision loss Sister     Asthma Brother     Depression Brother     Cancer Maternal Grandmother      Social History     Tobacco Use    Smoking status: Current Every Day Smoker     Packs/day: 0.50     Years: 35.00     Pack years: 17.50     Types: Cigarettes    Smokeless tobacco: Never Used   Substance Use Topics    Alcohol use: No     Alcohol/week: 0.0 oz     Comment: questionable per     Drug use: No     Review of Systems   REVIEW OF SYSTEMS:  CONSTITUTIONAL: no fevers, chills, appetite changes, weight changes  ENT: no sore throat, congestion, hearing deficitis, or ear pain  EYES: no blurred vision, eye pain,   Neck: no pain, stiffness   CV: no chest pain, palpitations, or chest wall pain. Positive for edema.   RESP: no wheezing, dyspnea on exertion, orthopnea, or cough. Positive for SOB.  GI: no abdominal pain, nausea, vomiting, diarrhea, or bloody stools  : no dysuria, hematuria, discharge,   MUSCULOSKELETAL: no myalgias, arthralgia, back pain, or difficulty walking  SKIN: no rashes or lesions  NEURO: no numbness, weakness, headaches, syncope      Physical Exam     Initial Vitals   BP Pulse Resp Temp SpO2   01/05/19 1822 01/05/19 1821 01/05/19 1821 01/05/19 1821 01/05/19 1821   (!) 233/105 86 18 98.1 °F (36.7 °C) 99 %      MAP       --                 Physical Exam    Nursing note and vitals reviewed.  Constitutional: She appears well-developed and well-nourished.   HENT:   Head: Normocephalic and atraumatic.   Eyes: EOM are normal. Pupils are equal, round, and reactive to light.   Neck: Neck supple.   Cardiovascular: Normal rate, regular rhythm, normal heart sounds and intact distal pulses. Exam reveals no gallop and no friction rub.    No murmur heard.  Pulmonary/Chest: Breath sounds normal. No respiratory distress. She has no decreased breath sounds. She has no wheezes. She has no rhonchi. She has no rales.   Abdominal: Soft. Bowel sounds are normal. She exhibits no distension. There is no tenderness.   Musculoskeletal: Normal range of motion. She exhibits edema. She exhibits no tenderness.   3+ pitting edema to the bilateral lower extremities. No lower extremity tenderness. Edema to the left forearm. No deep venous tenderness appreciated.    Neurological: She is alert and oriented to person, place, and time.   Skin: Skin is warm and dry.   Psychiatric: She has a normal mood and affect.         ED Course   Procedures  Labs Reviewed - No data to display       Imaging Results    None          Medical Decision Making:   History:   Old Medical Records: I decided to obtain old medical records.  Clinical Tests:   Lab Tests: Ordered and Reviewed  53-year-old female presents with increasing lower extremity edema.  Patient has worsening a KI likely needs diuresis.  I am not charge compliant she is with her medicines given her history of dementia.  She also significant hypocalcemia and I will replace this.  Feel that she needs to be admitted given her worsening renal function and I will order a bilateral lower extremity venous ultrasound rule out DVT.            Scribe Attestation:   Scribe #1: I performed the above scribed service and the documentation accurately describes the services I performed. I attest to the accuracy of the note.    I, Dr. Bala Arana  personally performed the services described in this documentation. All medical record entries made by the scribe were at my direction and in my presence.  I have reviewed the chart and agree that the record reflects my personal performance and is accurate and complete. Bala Arana MD.  8:06 PM 01/05/2019    DISCLAIMER: This note was prepared with Dragon NaturallySpeaking voice recognition transcription software. Garbled syntax, mangled pronouns, and other bizarre constructions may be attributed to that software system            Clinical Impression:   The primary encounter diagnosis was Hypocalcemia. Diagnoses of Lower extremity edema, Acute renal failure with acute tubular necrosis superimposed on stage 3 chronic kidney disease, and Bilateral lower extremity edema were also pertinent to this visit.                             Bala Arana MD  01/05/19 2007

## 2019-01-06 NOTE — ASSESSMENT & PLAN NOTE
Likely 2/2 IVVD  Initiate gentle IVF hydratio-- DC iV fluids due to hypervolemia  Follow renal panel and electrolytes closely.  Consult nephrology  Check Renal Ultrasound.  Adjust renal dose medications for Estimated Creatinine Clearance: 16.2 mL/min (A) (based on SCr of 3.4 mg/dL (H)).  Avoid NSAIDs, Coyle-II inhibitors, ACE-I, Angiotensin Receptor Blockers, or Aminoglycosides.  Urine analysis.  Check  Urine Na, Cr, Protein.

## 2019-01-06 NOTE — PLAN OF CARE
Problem: Adult Inpatient Plan of Care  Goal: Plan of Care Review  Outcome: Ongoing (interventions implemented as appropriate)  Pt. Rested well during the night with bed alarm on. Monitoring BP, PRN medication for elevated BP. IV hydration, NS at 100mL/hr. Tele showing normal sinus. NPO. US retroperitoneal complete to be done today. Urine sample needs to be collected. Blood glucose monitored. Will continue to monitor.

## 2019-01-06 NOTE — ASSESSMENT & PLAN NOTE
Corrected calcium 7.4 on admission.  Repleted.  Monitor level and treat as necessary.  Check PTH intact  Resume calcium supplementation

## 2019-01-06 NOTE — CONSULTS
INPATIENT NEPHROLOGY CONSULT   [facility] NEPHROLOGY    Errol Keita  01/06/2019    Reason for consultation:    Acute kidney injury    Chief Complaint:   Chief Complaint   Patient presents with    Leg Pain     C/o atraumatic bl leg pain onset 1 hr PTA.           History of Present Illness:     Errol Keita is a 53 y.o. female with a PMHx of T2DM, HTN, pancreatitis, CAD, and CHF who presents to the ED for further evaluation of bilateral leg swelling that has been ongoing for 2 weeks. She states that she is having bilateral leg swelling and leg pain she describes as a tightness. She does endorse some mild SOB with this leg edema. Significant other relays that the patient has some swelling to the left forearm as well. The patient does admit to missing several doses of her lasix medication. Significant other relays that the patient is scheduled to see a kidney specialist in the coming month. The patient denies fever, chest pain, abdominal pain, vomiting, diarrhea, and headache. The patient has a PSHx of CABG and cholecystectomy. She has a SHx of current everyday smoker smoking .5 ppd.     1/6  No n,v,chest pain, sob, dysuria or diarrhea.  No new neuro symptoms        Plan of Care:       Assessment:    Acute kidney injury  Hypokalemia  Edema/pulmonary crackles  anemia  Hypertension    Plan:    Stop iv fluids  Check ua with micro  Hold diuretics until k repleted  No nsaids, coxibs  Pa and lat cxr  pvr bladder scan  Renal dose medication   Hold lisinopril  Add hydralazine 10mg po bid    Thank you for allowing us to participate in this patient's care. We will continue to follow.    Vital Signs:  Temp Readings from Last 3 Encounters:   01/06/19 97.3 °F (36.3 °C) (Oral)   07/09/18 98.1 °F (36.7 °C) (Oral)   07/06/18 98.7 °F (37.1 °C) (Oral)       Pulse Readings from Last 3 Encounters:   01/06/19 70   07/09/18 72   07/06/18 68       BP Readings from Last 3 Encounters:   01/06/19 (!) 194/104   07/09/18 (!) 148/79    07/06/18 132/62       Weight:  Wt Readings from Last 3 Encounters:   01/05/19 55 kg (121 lb 4.1 oz)   07/09/18 48.1 kg (106 lb 0.7 oz)   07/06/18 48.4 kg (106 lb 11.2 oz)       Past Medical & Surgical History:  Past Medical History:   Diagnosis Date    Arthritis     Asthma     CHF (congestive heart failure)     Coronary artery disease     Diabetes mellitus     Hypertension     Pancreatitis     Type 2 diabetes mellitus with hyperglycemia 7/13/2015       Past Surgical History:   Procedure Laterality Date    ASPIRATION ABSCESS N/A 10/27/2015    Performed by Yecenia Surgeon at [facility] YECENIA    CARDIAC SURGERY      CABG    CHOLECYSTECTOMY      COLECTOMY-RIGHT N/A 8/25/2014    Per[facility] by [facility]alado [facility] at [facility] OR    CORONARY ARTERY BYPASS GRAFT      EGD (ESOPHAGOGASTRODUODENOSCOPY) N/A 4/16/2014    Performed by [facility]ley [facility] at [facility] ENDO    EGD (ESOPHAGOGASTRODUODENOSCOPY) N/A 4/2/2014    Performed by [facility] at [facility] ENDO    EGD (ESOPHAGOGASTRODUODENOSCOPY) N/A 6/6/2013    Performed by [facility]ley [facility] at [facility] ENDO    ESOPHAGOGASTRODUODENOSCOPY (EGD) N/A 8/22/2014    Performed by Satnam Nguyen MD at [facility] ENDO    STERNOTOMY N/A 6/6/2013    Performed by Ole Ingram MD at [facility] OR    THORACOTOMY N/A 6/6/2013    Performed by Ole Ingram MD at [facility] OR       Past Social History:  Social History     Socioeconomic History    Marital status:      Spouse name: None    Number of children: None    Years of education: None    Highest education level: None   Social Needs    Financial resource strain: None    Food insecurity - worry: None    Food insecurity - inability: None    Transportation needs - medical: None    Transportation needs - non-medical: None   Occupational History    None   Tobacco Use    Smoking status: Current Every Day Smoker     Packs/day: 0.50     Years: 35.00     Pack years: 17.50     Types: Cigarettes    Smokeless tobacco: Never Used    Substance and Sexual Activity    Alcohol use: No     Alcohol/week: 0.0 oz     Comment: questionable per     Drug use: No    Sexual activity: Not Currently     Partners: Male     Birth control/protection: None   Other Topics Concern    None   Social History Narrative    None       Medications:  No current facility-administered medications on file prior to encounter.      Current Outpatient Medications on File Prior to Encounter   Medication Sig Dispense Refill    amLODIPine (NORVASC) 5 MG tablet Take 1 tablet (5 mg total) by mouth once daily. (Patient taking differently: Take 10 mg by mouth once daily. ) 30 tablet 0    aspirin 81 MG chewable tablet Take 81 mg by mouth once daily. Ran out      atorvastatin (LIPITOR) 80 MG tablet Take 1 tablet (80 mg total) by mouth once daily. 30 tablet 11    blood sugar diagnostic Strp To check BG 4 times daily, to use with insurance preferred meter 400 strip 3    blood-glucose meter kit To check BG 4 times daily, to use with insurance preferred meter 1 each 0    clopidogrel (PLAVIX) 75 mg tablet Take 1 tablet (75 mg total) by mouth once daily. 30 tablet 11    CREON 24,000-76,000 -120,000 unit capsule Take 1 capsule by mouth 4 (four) times daily with meals and nightly.       ferrous sulfate 325 (65 FE) MG EC tablet Take 1 tablet (325 mg total) by mouth 2 (two) times daily. 60 tablet 0    furosemide (LASIX) 40 MG tablet Take 40 mg by mouth once daily.      HUMALOG KWIKPEN INSULIN 100 unit/mL InPn pen Inject 2 Units into the skin 3 (three) times daily before meals. 15 mL 12    insulin detemir U-100 (LEVEMIR FLEXTOUCH) 100 unit/mL (3 mL) SubQ InPn pen Inject 2 Units into the skin 2 (two) times daily. 1 Box 0    lancets Misc To check BG 4 times daily, to use with insurance preferred meter 400 each 3    lisinopril (PRINIVIL,ZESTRIL) 2.5 MG tablet Take 2.5 mg by mouth once daily.       magnesium oxide (MAG-OX) 400 mg tablet Take 1 tablet (400 mg total) by mouth  "once daily. 30 tablet 0    pantoprazole (PROTONIX) 40 MG tablet Take 40 mg by mouth once daily.      pen needle, diabetic (BD ULTRA-FINE AZIZA PEN NEEDLE) 32 gauge x 5/32" Ndle Uses 4 daily 150 each 12    potassium chloride (KLOR-CON) 20 mEq Pack Take 20 mEq by mouth 2 (two) times daily. 60 packet 0    quetiapine (SEROQUEL) 100 MG Tab 100 mg every evening.        Scheduled Meds:   amLODIPine  10 mg Oral Daily    aspirin  81 mg Oral Daily    atorvastatin  80 mg Oral Daily    clopidogrel  75 mg Oral Daily    enoxaparin  30 mg Subcutaneous Daily    ferrous sulfate  325 mg Oral BID    lipase-protease-amylase 24,000-76,000-120,000 units  1 capsule Oral QID (WM & HS)    magnesium sulfate IVPB  2 g Intravenous Once    pantoprazole  40 mg Oral Daily    potassium chloride  20 mEq Oral Once    QUEtiapine  100 mg Oral QHS    senna-docusate 8.6-50 mg  1 tablet Oral BID     Continuous Infusions:  PRN Meds:.acetaminophen, cloNIDine, dextrose 50%, dextrose 50%, glucagon (human recombinant), glucose, glucose, ondansetron, sodium chloride 0.9%    Allergies:  Patient has no known allergies.    Past Family History:  Reviewed; refer to Hospitalist Admission Note    Review of Systems:  Review of Systems - All 14 systems reviewed and negative, except as noted in HPI    Physical Exam:    BP (!) 194/104   Pulse 70   Temp 97.3 °F (36.3 °C) (Oral)   Resp 16   Ht 5' 7" (1.702 m)   Wt 55 kg (121 lb 4.1 oz)   LMP 09/03/2012 (Exact Date)   SpO2 97%   Breastfeeding? No   BMI 18.99 kg/m²     General Appearance:    Alert, cooperative, no distress, appears stated age   Head:    Normocephalic, without obvious abnormality, atraumatic   Eyes:    PER, conjunctiva/corneas clear, EOM's intact in both eyes        Throat:   Lips, mucosa, and tongue normal; teeth and gums normal   Back:     Symmetric, no curvature, ROM normal, no CVA tenderness   Lungs:     Clear to auscultation bilaterally, respirations unlabored   Chest wall:    No " tenderness or deformity   Heart:    Regular rate and rhythm, S1 and S2 normal, no murmur, rub   or gallop   Abdomen:     Soft, non-tender, bowel sounds active all four quadrants,     no masses, no organomegaly   Extremities:   Extremities normal, atraumatic, no cyanosis or edema   Pulses:   2+ and symmetric all extremities   MSK:   No joint or muscle swelling, tenderness or deformity   Skin:   Skin color, texture, turgor normal, no rashes or lesions   Neurologic:   CNII-XII intact, normal strength and sensation       Throughout.  No flap     Results:  Lab Results   Component Value Date     01/06/2019    K 2.8 (L) 01/06/2019     (H) 01/06/2019    CO2 18 (L) 01/06/2019    BUN 37 (H) 01/06/2019    CREATININE 3.4 (H) 01/06/2019    CALCIUM 6.5 (LL) 01/06/2019    ANIONGAP 12 01/06/2019    ESTGFRAFRICA 17 (A) 01/06/2019    EGFRNONAA 14 (A) 01/06/2019       Lab Results   Component Value Date    CALCIUM 6.5 (LL) 01/06/2019    PHOS 4.3 01/06/2019       Recent Labs   Lab 01/06/19  0538   WBC 7.50   RBC 3.28*   HGB 10.3*   HCT 31.4*      MCV 96   MCH 31.4*   MCHC 32.8       I have personally reviewed pertinent radiological imaging and reports.

## 2019-01-06 NOTE — ASSESSMENT & PLAN NOTE
Dangers of cigarette smoking were reviewed with patient in detail for 3 minutes and patient was encouraged to quit. Nicotine replacement options were discussed.

## 2019-01-06 NOTE — PLAN OF CARE
Problem: Adult Inpatient Plan of Care  Goal: Plan of Care Review  Outcome: Ongoing (interventions implemented as appropriate)  POC reviewed with pt and spouse, understanding verbalized. Incontinent at times. Up to BSC with stand-by assist. Strict I/O. 1.5L fluid restriction. SR/SB on tele. Safety maintained. Will monitor.

## 2019-01-06 NOTE — PROGRESS NOTES
[facility] Hospital Medicine  Progress Note    Patient Name: Errol Keita  MRN: [id number]  Patient Class: IP- Inpatient   Admission Date: 1/5/2019  Length of Stay: 0 days  Attending Physician: Jaquelin Gunn MD  Primary Care Provider: Bharat Wiggins MD        Subjective:     Principal Problem:Acute renal failure superimposed on stage 4 chronic kidney disease    HPI:  Errol Keita is a 53 y.o. female with a PMHx of T2DM, HTN, pancreatitis, CAD, and CHF who presented to the ED for further evaluation of bilateral leg swelling that has been ongoing for 2 weeks. She states that she is having bilateral leg swelling and leg pain she describes as a tightness. Per ED record, pt endorsed some SOB, but she denies this to me.  Pt denies fever, chest pain, abdominal pain, vomiting, diarrhea, and headache.  ED evaluation reveals TIMOTHY.  She reports that she is a patient of Dr. Tolbert.    Hospital Course:  No notes on file    Interval History: no new issues. Still with dyspnea and peripheral edema. Renal function remains decreased.   Eating breakfast.    Review of Systems   Constitutional: Positive for fatigue. Negative for diaphoresis and fever.   Respiratory: Positive for cough and shortness of breath.    Cardiovascular: Positive for leg swelling. Negative for chest pain.   Gastrointestinal: Negative for abdominal distention, abdominal pain and constipation.   Musculoskeletal: Positive for arthralgias and gait problem.   Skin: Negative for rash.   Neurological: Negative for speech difficulty and numbness.     Objective:     Vital Signs (Most Recent):  Temp: 97 °F (36.1 °C) (01/06/19 1145)  Pulse: 72 (01/06/19 1145)  Resp: 16 (01/06/19 1145)  BP: (!) 163/82 (01/06/19 1145)  SpO2: 97 % (01/06/19 1145) Vital Signs (24h Range):  Temp:  [96.5 °F (35.8 °C)-98.7 °F (37.1 °C)] 97 °F (36.1 °C)  Pulse:  [64-86] 72  Resp:  [16-20] 16  SpO2:  [94 %-100 %] 97 %  BP: (163-233)/() 163/82     Weight: 55  kg (121 lb 4.1 oz)  Body mass index is 18.99 kg/m².    Intake/Output Summary (Last 24 hours) at 1/6/2019 1216  Last data filed at 1/6/2019 0500  Gross per 24 hour   Intake 291.67 ml   Output --   Net 291.67 ml      Physical Exam   Constitutional: She is oriented to person, place, and time. She appears well-developed and well-nourished.   HENT:   Head: Normocephalic and atraumatic.   Mouth/Throat: Oropharynx is clear and moist.   Eyes: Conjunctivae and EOM are normal. Pupils are equal, round, and reactive to light.   Neck: Normal range of motion. Neck supple.   Cardiovascular: Normal rate, regular rhythm, normal heart sounds and intact distal pulses.   No murmur heard.  +2-3 pretib edema    Pulmonary/Chest: Effort normal. She has rales.   jeffry basilar    Abdominal: Soft. Bowel sounds are normal. There is no tenderness.   Musculoskeletal: Normal range of motion.   Neurological: She is alert and oriented to person, place, and time. Coordination normal.   Skin: Skin is warm and dry.   Psychiatric: She has a normal mood and affect. Her behavior is normal. Judgment normal.   Nursing note and vitals reviewed.      Significant Labs:   BMP:   Recent Labs   Lab 01/06/19  0538   *      K 2.8*   *   CO2 18*   BUN 37*   CREATININE 3.4*   CALCIUM 6.5*   MG 1.3*     CBC:   Recent Labs   Lab 01/05/19  1905 01/06/19  0538   WBC 8.70 7.50   HGB 11.5* 10.3*   HCT 34.7* 31.4*   * 330       Significant Imaging: I have reviewed and interpreted all pertinent imaging results/findings within the past 24 hours.    Assessment/Plan:      * Acute renal failure superimposed on stage 4 chronic kidney disease    Likely 2/2 IVVD  Initiate gentle IVF hydratio-- DC iV fluids due to hypervolemia  Follow renal panel and electrolytes closely.  Consult nephrology  Check Renal Ultrasound.  Adjust renal dose medications for Estimated Creatinine Clearance: 16.2 mL/min (A) (based on SCr of 3.4 mg/dL (H)).  Avoid NSAIDs, Coyle-II  inhibitors, ACE-I, Angiotensin Receptor Blockers, or Aminoglycosides.  Urine analysis.  Check  Urine Na, Cr, Protein.                 Hypokalemia    Replace with oral supplementation.   Trend K       Hypomagnesemia    Replace with IV supplementation.   Trend Mg         Lower extremity edema    Check BNP; clinically appears dry.  No evidence of DVT per ultrasound.       Acute on chronic congestive heart failure    DC IV fluids.   BNP elevated.   Check CXR and echo  Fluid restriction 1.5 liters/day         Moderate tobacco dependence    Dangers of cigarette smoking were reviewed with patient in detail for 3 minutes and patient was encouraged to quit. Nicotine replacement options were discussed.         Type 2 diabetes mellitus    Chronic; poorly controlled historically.  Obtain hgb a1c to assess control.  Last was 9 mos ago and was 13.5.    Accuchecks ac/hs with SSI coverage as needed.  Will hold basal insulin for now and resume as indicated.       Anemia of chronic renal failure    Chronic problem.  Stable.  Monitor H/H.  Continue iron supplementation.  Transfuse for hemodynamic instability and/or H/H <7/21         Coronary artery disease involving native coronary artery without angina pectoris    Historical diagnosis. No anginal symptoms.  Continue ASA/plavix, monitor on telemetry.       Moderate malnutrition    Consult nutrition       Hypocalcemia    Corrected calcium 7.4 on admission.  Repleted.  Monitor level and treat as necessary.  Check PTH intact  Resume calcium supplementation     Mild recurrent major depression    Chronic problem.  Stable.  Continue Seroquel and monitor.       Hypertension associated with diabetes    Historically poorly controlled. Continue daily norvasc; hold ACEi 2/2 anjum.  Treat elevated bp prn with clonidine.         VTE Risk Mitigation (From admission, onward)        Ordered     Place PABLO hose  Until discontinued      01/06/19 1008     enoxaparin injection 30 mg  Daily      01/05/19  2121     IP VTE HIGH RISK PATIENT  Once      01/05/19 2121     Place PABLO hose  Until discontinued      01/05/19 2121     Place sequential compression device  Until discontinued      01/05/19 2121              Desiree Begum NP  Department of Hospital Medicine   [facility]

## 2019-01-06 NOTE — HPI
This is a 52 yo  female with a PMHx of T2DM, HTN, pancreatitis, CAD, and CHF who presented to the ED for further evaluation of bilateral leg swelling that has been ongoing for 2 weeks. She states that she is having bilateral leg swelling and leg pain she describes as a tightness. Per ED record, pt endorsed some SOB, but she denies this to me.  Pt denies fever, chest pain, abdominal pain, vomiting, diarrhea, and headache.  ED evaluation reveals TIMOTHY.  She reports that she is a patient of Dr. Tolbert. Patient was evaluated in the ER and noted to have TIMOTHY and admitted for further evaluation and treatment.

## 2019-01-06 NOTE — SUBJECTIVE & OBJECTIVE
Past Medical History:   Diagnosis Date    Arthritis     Asthma     CHF (congestive heart failure)     Coronary artery disease     Diabetes mellitus     Hypertension     Pancreatitis     Type 2 diabetes mellitus with hyperglycemia 7/13/2015       Past Surgical History:   Procedure Laterality Date    ASPIRATION ABSCESS N/A 10/27/2015    Performed by Yecenia Surgeon at [facility] YECENIA    CARDIAC SURGERY      CABG    CHOLECYSTECTOMY      COLECTOMY-RIGHT N/A 8/25/2014    Performed by Harlan Regalado MD at [facility] OR    CORONARY ARTERY BYPASS GRAFT      EGD (ESOPHAGOGASTRODUODENOSCOPY) N/A 4/16/2014    Performed by Crow Brantley MD at [facility] ENDO    EGD (ESOPHAGOGASTRODUODENOSCOPY) N/A 4/2/2014    Performed by Crow Brantley MD at [facility] ENDO    EGD (ESOPHAGOGASTRODUODENOSCOPY) N/A 6/6/2013    Performed by Crow Brantley MD at [facility] ENDO    ESOPHAGOGASTRODUODENOSCOPY (EGD) N/A 8/22/2014    Performed by Satnam Nguyen MD at [facility] ENDO    STERNOTOMY N/A 6/6/2013    Performed by Ole Ingram MD at [facility] OR    THORACOTOMY N/A 6/6/2013    Performed by Ole Ingram MD at [facility] OR       Review of patient's allergies indicates:  No Known Allergies    No current facility-administered medications on file prior to encounter.      Current Outpatient Medications on File Prior to Encounter   Medication Sig    amLODIPine (NORVASC) 5 MG tablet Take 1 tablet (5 mg total) by mouth once daily. (Patient taking differently: Take 10 mg by mouth once daily. )    aspirin 81 MG chewable tablet Take 81 mg by mouth once daily. Ran out    atorvastatin (LIPITOR) 80 MG tablet Take 1 tablet (80 mg total) by mouth once daily.    blood sugar diagnostic Strp To check BG 4 times daily, to use with insurance preferred meter    blood-glucose meter kit To check BG 4 times daily, to use with insurance preferred meter    clopidogrel (PLAVIX) 75 mg tablet Take 1 tablet (75 mg total) by mouth once daily.    CREON 24,000-76,000  "-120,000 unit capsule Take 1 capsule by mouth 4 (four) times daily with meals and nightly.     ferrous sulfate 325 (65 FE) MG EC tablet Take 1 tablet (325 mg total) by mouth 2 (two) times daily.    furosemide (LASIX) 40 MG tablet Take 40 mg by mouth once daily.    HUMALOG KWIKPEN INSULIN 100 unit/mL InPn pen Inject 2 Units into the skin 3 (three) times daily before meals.    insulin detemir U-100 (LEVEMIR FLEXTOUCH) 100 unit/mL (3 mL) SubQ InPn pen Inject 2 Units into the skin 2 (two) times daily.    lancets Misc To check BG 4 times daily, to use with insurance preferred meter    lisinopril (PRINIVIL,ZESTRIL) 2.5 MG tablet Take 2.5 mg by mouth once daily.     magnesium oxide (MAG-OX) 400 mg tablet Take 1 tablet (400 mg total) by mouth once daily.    pantoprazole (PROTONIX) 40 MG tablet Take 40 mg by mouth once daily.    pen needle, diabetic (BD ULTRA-FINE AZIZA PEN NEEDLE) 32 gauge x 5/32" Ndle Uses 4 daily    potassium chloride (KLOR-CON) 20 mEq Pack Take 20 mEq by mouth 2 (two) times daily.    quetiapine (SEROQUEL) 100 MG Tab 100 mg every evening.      Family History     Problem Relation (Age of Onset)    Asthma Brother    Cancer Maternal Grandmother    Depression Brother    Diabetes Mother, Father, Sister    Hearing loss Sister    Heart disease Sister    Hypertension Sister    Learning disabilities Sister    Vision loss Sister        Tobacco Use    Smoking status: Current Every Day Smoker     Packs/day: 0.50     Years: 35.00     Pack years: 17.50     Types: Cigarettes    Smokeless tobacco: Never Used   Substance and Sexual Activity    Alcohol use: No     Alcohol/week: 0.0 oz     Comment: questionable per     Drug use: No    Sexual activity: Not Currently     Partners: Male     Birth control/protection: None     Review of Systems   Constitutional: Positive for appetite change and fatigue.   HENT: Negative for sinus pain and sore throat.    Eyes: Negative for photophobia and visual " disturbance.   Respiratory: Negative for cough, shortness of breath and wheezing.    Cardiovascular: Positive for leg swelling. Negative for chest pain and palpitations.   Gastrointestinal: Negative for abdominal pain, diarrhea, nausea and vomiting.   Genitourinary: Negative for dysuria and flank pain.   Musculoskeletal: Negative for back pain and neck pain.   Skin: Negative for rash and wound.   Neurological: Negative for tremors and headaches.   Hematological: Negative for adenopathy.   Psychiatric/Behavioral: Positive for confusion. Negative for agitation.   All other systems reviewed and are negative.    Objective:     Vital Signs (Most Recent):  Temp: 98.7 °F (37.1 °C) (01/06/19 0028)  Pulse: 66 (01/06/19 0039)  Resp: 20 (01/06/19 0028)  BP: (!) 163/89 (01/06/19 0039)  SpO2: (!) 94 % (01/06/19 0028) Vital Signs (24h Range):  Temp:  [96.5 °F (35.8 °C)-98.7 °F (37.1 °C)] 98.7 °F (37.1 °C)  Pulse:  [64-86] 66  Resp:  [18-20] 20  SpO2:  [94 %-100 %] 94 %  BP: (163-233)/() 163/89     Weight: 55 kg (121 lb 4.1 oz)  Body mass index is 18.99 kg/m².    Physical Exam   Constitutional: She is oriented to person, place, and time. She appears well-developed and well-nourished.   HENT:   Head: Normocephalic and atraumatic.   Mouth/Throat: Mucous membranes are dry.   Eyes: Conjunctivae and EOM are normal. Pupils are equal, round, and reactive to light.   Neck: Normal range of motion. Neck supple. No JVD present.   Cardiovascular: Normal rate, regular rhythm and intact distal pulses.   No murmur heard.  Pulmonary/Chest: Effort normal and breath sounds normal. No respiratory distress.   Abdominal: Soft. Bowel sounds are normal. She exhibits no distension. There is no tenderness.   Genitourinary:   Genitourinary Comments: deferred   Musculoskeletal: Normal range of motion. She exhibits edema (3+ BLE).   Neurological: She is alert and oriented to person, place, and time.   Skin: Skin is warm and dry. No rash noted.    Psychiatric: She has a normal mood and affect. Her behavior is normal. Judgment and thought content normal.   Vitals reviewed.        CRANIAL NERVES     CN III, IV, VI   Pupils are equal, round, and reactive to light.  Extraocular motions are normal.        Significant Labs:   CBC:   Recent Labs   Lab 01/05/19 1905   WBC 8.70   HGB 11.5*   HCT 34.7*   *     CMP:   Recent Labs   Lab 01/05/19  1905      K 3.5      CO2 16*   *   BUN 34*   CREATININE 3.6*   CALCIUM 6.4*   PROT 7.1   ALBUMIN 2.7*   BILITOT 0.2   ALKPHOS 164*   AST 19   ALT 22   ANIONGAP 16   EGFRNONAA 14*       Significant Imaging: US BLE ([facility]):negative for DVT; subcutaneous edema bilaterally.

## 2019-01-06 NOTE — SUBJECTIVE & OBJECTIVE
Interval History: no new issues. Still with dyspnea and peripheral edema. Renal function remains decreased.   Eating breakfast.    Review of Systems   Constitutional: Positive for fatigue. Negative for diaphoresis and fever.   Respiratory: Positive for cough and shortness of breath.    Cardiovascular: Positive for leg swelling. Negative for chest pain.   Gastrointestinal: Negative for abdominal distention, abdominal pain and constipation.   Musculoskeletal: Positive for arthralgias and gait problem.   Skin: Negative for rash.   Neurological: Negative for speech difficulty and numbness.     Objective:     Vital Signs (Most Recent):  Temp: 97 °F (36.1 °C) (01/06/19 1145)  Pulse: 72 (01/06/19 1145)  Resp: 16 (01/06/19 1145)  BP: (!) 163/82 (01/06/19 1145)  SpO2: 97 % (01/06/19 1145) Vital Signs (24h Range):  Temp:  [96.5 °F (35.8 °C)-98.7 °F (37.1 °C)] 97 °F (36.1 °C)  Pulse:  [64-86] 72  Resp:  [16-20] 16  SpO2:  [94 %-100 %] 97 %  BP: (163-233)/() 163/82     Weight: 55 kg (121 lb 4.1 oz)  Body mass index is 18.99 kg/m².    Intake/Output Summary (Last 24 hours) at 1/6/2019 1216  Last data filed at 1/6/2019 0500  Gross per 24 hour   Intake 291.67 ml   Output --   Net 291.67 ml      Physical Exam   Constitutional: She is oriented to person, place, and time. She appears well-developed and well-nourished.   HENT:   Head: Normocephalic and atraumatic.   Mouth/Throat: Oropharynx is clear and moist.   Eyes: Conjunctivae and EOM are normal. Pupils are equal, round, and reactive to light.   Neck: Normal range of motion. Neck supple.   Cardiovascular: Normal rate, regular rhythm, normal heart sounds and intact distal pulses.   No murmur heard.  +2-3 pretib edema    Pulmonary/Chest: Effort normal. She has rales.   jeffry basilar    Abdominal: Soft. Bowel sounds are normal. There is no tenderness.   Musculoskeletal: Normal range of motion.   Neurological: She is alert and oriented to person, place, and time. Coordination  normal.   Skin: Skin is warm and dry.   Psychiatric: She has a normal mood and affect. Her behavior is normal. Judgment normal.   Nursing note and vitals reviewed.      Significant Labs:   BMP:   Recent Labs   Lab 01/06/19  0538   *      K 2.8*   *   CO2 18*   BUN 37*   CREATININE 3.4*   CALCIUM 6.5*   MG 1.3*     CBC:   Recent Labs   Lab 01/05/19  1905 01/06/19  0538   WBC 8.70 7.50   HGB 11.5* 10.3*   HCT 34.7* 31.4*   * 330       Significant Imaging: I have reviewed and interpreted all pertinent imaging results/findings within the past 24 hours.

## 2019-01-06 NOTE — ASSESSMENT & PLAN NOTE
Chronic; poorly controlled historically.  Obtain hgb a1c to assess control.  Last was 9 mos ago and was 13.5.    Accuchecks ac/hs with SSI coverage as needed.  Will hold basal insulin for now and resume as indicated.

## 2019-01-06 NOTE — ASSESSMENT & PLAN NOTE
Likely 2/2 IVVD  Initiate gentle IVF hydration.  Follow renal panel and electrolytes closely.  Consult nephrology  Check Renal Ultrasound.  Adjust renal dose medications for Estimated Creatinine Clearance: 15.3 mL/min (A) (based on SCr of 3.6 mg/dL (H)).  Avoid NSAIDs, Coyle-II inhibitors, ACE-I, Angiotensin Receptor Blockers, or Aminoglycosides.  Urine analysis.  Check  Urine Na, Cr, Protein.

## 2019-01-06 NOTE — ASSESSMENT & PLAN NOTE
Historically poorly controlled. Continue daily norvasc; hold ACEi 2/2 anjum.  Treat elevated bp prn with clonidine.

## 2019-01-06 NOTE — H&P
[facility] Hospital Medicine  History & Physical    Patient Name: Errol Keita  MRN: [id number]  Admission Date: 1/5/2019  Attending Physician: Jaquelin Gunn MD   Primary Care Provider: Bharat Wiggins MD         Patient information was obtained from patient, past medical records and ER records.     Subjective:     Principal Problem:Acute renal failure superimposed on stage 4 chronic kidney disease    Chief Complaint:   Chief Complaint   Patient presents with    Leg Pain     C/o atraumatic bl leg pain onset 1 hr PTA.         HPI: Errol Keita is a 53 y.o. female with a PMHx of T2DM, HTN, pancreatitis, CAD, and CHF who presented to the ED for further evaluation of bilateral leg swelling that has been ongoing for 2 weeks. She states that she is having bilateral leg swelling and leg pain she describes as a tightness. Per ED record, pt endorsed some SOB, but she denies this to me.  Pt denies fever, chest pain, abdominal pain, vomiting, diarrhea, and headache.  ED evaluation reveals TIMOTHY.  She reports that she is a patient of Dr. Tolbert.    Past Medical History:   Diagnosis Date    Arthritis     Asthma     CHF (congestive heart failure)     Coronary artery disease     Diabetes mellitus     Hypertension     Pancreatitis     Type 2 diabetes mellitus with hyperglycemia 7/13/2015       Past Surgical History:   Procedure Laterality Date    ASPIRATION ABSCESS N/A 10/27/2015    Performed by Yecenia Surgeon at [facility] YECENIA    CARDIAC SURGERY      CABG    CHOLECYSTECTOMY      COLECTOMY-RIGHT N/A 8/25/2014    Performed by Harlan Regalado MD at [facility] OR    CORONARY ARTERY BYPASS GRAFT      EGD (ESOPHAGOGASTRODUODENOSCOPY) N/A 4/16/2014    Performed by Crow Brantley MD at [facility] ENDO    EGD (ESOPHAGOGASTRODUODENOSCOPY) N/A 4/2/2014    Performed by Crow Brantley MD at [facility] ENDO    EGD (ESOPHAGOGASTRODUODENOSCOPY) N/A 6/6/2013    Performed by Crow Brantley MD at [facility] ENDO     ESOPHAGOGASTRODUODENOSCOPY (EGD) N/A 8/22/2014    Performed by Satnam Nguyen MD at [facility] ENDO    STERNOTOMY N/A 6/6/2013    Performed by Ole Ingram MD at [facility] OR    THORACOTOMY N/A 6/6/2013    Performed by Ole Ingram MD at [facility] OR       Review of patient's allergies indicates:  No Known Allergies    No current facility-administered medications on file prior to encounter.      Current Outpatient Medications on File Prior to Encounter   Medication Sig    amLODIPine (NORVASC) 5 MG tablet Take 1 tablet (5 mg total) by mouth once daily. (Patient taking differently: Take 10 mg by mouth once daily. )    aspirin 81 MG chewable tablet Take 81 mg by mouth once daily. Ran out    atorvastatin (LIPITOR) 80 MG tablet Take 1 tablet (80 mg total) by mouth once daily.    blood sugar diagnostic Strp To check BG 4 times daily, to use with insurance preferred meter    blood-glucose meter kit To check BG 4 times daily, to use with insurance preferred meter    clopidogrel (PLAVIX) 75 mg tablet Take 1 tablet (75 mg total) by mouth once daily.    CREON 24,000-76,000 -120,000 unit capsule Take 1 capsule by mouth 4 (four) times daily with meals and nightly.     ferrous sulfate 325 (65 FE) MG EC tablet Take 1 tablet (325 mg total) by mouth 2 (two) times daily.    furosemide (LASIX) 40 MG tablet Take 40 mg by mouth once daily.    HUMALOG KWIKPEN INSULIN 100 unit/mL InPn pen Inject 2 Units into the skin 3 (three) times daily before meals.    insulin detemir U-100 (LEVEMIR FLEXTOUCH) 100 unit/mL (3 mL) SubQ InPn pen Inject 2 Units into the skin 2 (two) times daily.    lancets Misc To check BG 4 times daily, to use with insurance preferred meter    lisinopril (PRINIVIL,ZESTRIL) 2.5 MG tablet Take 2.5 mg by mouth once daily.     magnesium oxide (MAG-OX) 400 mg tablet Take 1 tablet (400 mg total) by mouth once daily.    pantoprazole (PROTONIX) 40 MG tablet Take 40 mg by mouth once daily.    pen needle, diabetic (BD  "ULTRA-FINE AZIZA PEN NEEDLE) 32 gauge x 5/32" Ndle Uses 4 daily    potassium chloride (KLOR-CON) 20 mEq Pack Take 20 mEq by mouth 2 (two) times daily.    quetiapine (SEROQUEL) 100 MG Tab 100 mg every evening.      Family History     Problem Relation (Age of Onset)    Asthma Brother    Cancer Maternal Grandmother    Depression Brother    Diabetes Mother, Father, Sister    Hearing loss Sister    Heart disease Sister    Hypertension Sister    Learning disabilities Sister    Vision loss Sister        Tobacco Use    Smoking status: Current Every Day Smoker     Packs/day: 0.50     Years: 35.00     Pack years: 17.50     Types: Cigarettes    Smokeless tobacco: Never Used   Substance and Sexual Activity    Alcohol use: No     Alcohol/week: 0.0 oz     Comment: questionable per     Drug use: No    Sexual activity: Not Currently     Partners: Male     Birth control/protection: None     Review of Systems   Constitutional: Positive for appetite change and fatigue.   HENT: Negative for sinus pain and sore throat.    Eyes: Negative for photophobia and visual disturbance.   Respiratory: Negative for cough, shortness of breath and wheezing.    Cardiovascular: Positive for leg swelling. Negative for chest pain and palpitations.   Gastrointestinal: Negative for abdominal pain, diarrhea, nausea and vomiting.   Genitourinary: Negative for dysuria and flank pain.   Musculoskeletal: Negative for back pain and neck pain.   Skin: Negative for rash and wound.   Neurological: Negative for tremors and headaches.   Hematological: Negative for adenopathy.   Psychiatric/Behavioral: Positive for confusion. Negative for agitation.   All other systems reviewed and are negative.    Objective:     Vital Signs (Most Recent):  Temp: 98.7 °F (37.1 °C) (01/06/19 0028)  Pulse: 66 (01/06/19 0039)  Resp: 20 (01/06/19 0028)  BP: (!) 163/89 (01/06/19 0039)  SpO2: (!) 94 % (01/06/19 0028) Vital Signs (24h Range):  Temp:  [96.5 °F (35.8 °C)-98.7 °F " (37.1 °C)] 98.7 °F (37.1 °C)  Pulse:  [64-86] 66  Resp:  [18-20] 20  SpO2:  [94 %-100 %] 94 %  BP: (163-233)/() 163/89     Weight: 55 kg (121 lb 4.1 oz)  Body mass index is 18.99 kg/m².    Physical Exam   Constitutional: She is oriented to person, place, and time. She appears well-developed and well-nourished.   HENT:   Head: Normocephalic and atraumatic.   Mouth/Throat: Mucous membranes are dry.   Eyes: Conjunctivae and EOM are normal. Pupils are equal, round, and reactive to light.   Neck: Normal range of motion. Neck supple. No JVD present.   Cardiovascular: Normal rate, regular rhythm and intact distal pulses.   No murmur heard.  Pulmonary/Chest: Effort normal and breath sounds normal. No respiratory distress.   Abdominal: Soft. Bowel sounds are normal. She exhibits no distension. There is no tenderness.   Genitourinary:   Genitourinary Comments: deferred   Musculoskeletal: Normal range of motion. She exhibits edema (3+ BLE).   Neurological: She is alert and oriented to person, place, and time.   Skin: Skin is warm and dry. No rash noted.   Psychiatric: She has a normal mood and affect. Her behavior is normal. Judgment and thought content normal.   Vitals reviewed.        CRANIAL NERVES     CN III, IV, VI   Pupils are equal, round, and reactive to light.  Extraocular motions are normal.        Significant Labs:   CBC:   Recent Labs   Lab 01/05/19  1905   WBC 8.70   HGB 11.5*   HCT 34.7*   *     CMP:   Recent Labs   Lab 01/05/19  1905      K 3.5      CO2 16*   *   BUN 34*   CREATININE 3.6*   CALCIUM 6.4*   PROT 7.1   ALBUMIN 2.7*   BILITOT 0.2   ALKPHOS 164*   AST 19   ALT 22   ANIONGAP 16   EGFRNONAA 14*       Significant Imaging: US BLE (VRAD):negative for DVT; subcutaneous edema bilaterally.    Assessment/Plan:     * Acute renal failure superimposed on stage 4 chronic kidney disease    Likely 2/2 IVVD  Initiate gentle IVF hydration.  Follow renal panel and electrolytes  closely.  Consult nephrology  Check Renal Ultrasound.  Adjust renal dose medications for Estimated Creatinine Clearance: 15.3 mL/min (A) (based on SCr of 3.6 mg/dL (H)).  Avoid NSAIDs, Coyle-II inhibitors, ACE-I, Angiotensin Receptor Blockers, or Aminoglycosides.  Urine analysis.  Check  Urine Na, Cr, Protein.                 Lower extremity edema    Check BNP; clinically appears dry.  No evidence of DVT per ultrasound.       Moderate tobacco dependence    Dangers of cigarette smoking were reviewed with patient in detail for 3 minutes and patient was encouraged to quit. Nicotine replacement options were discussed.         Type 2 diabetes mellitus    Chronic; poorly controlled historically.  Obtain hgb a1c to assess control.  Last was 9 mos ago and was 13.5.    Accuchecks ac/hs with SSI coverage as needed.  Will hold basal insulin for now and resume as indicated.       Anemia of chronic renal failure    Chronic problem.  Stable.  Monitor H/H.  Continue iron supplementation.  Transfuse for hemodynamic instability and/or H/H <7/21         Coronary artery disease involving native coronary artery without angina pectoris    Historical diagnosis. No anginal symptoms.  Continue ASA/plavix, monitor on telemetry.       Moderate malnutrition    Consult nutrition       Hypocalcemia    Corrected calcium 7.4 on admission.  Repleted.  Monitor level and treat as necessary.       Mild recurrent major depression    Chronic problem.  Stable.  Continue Seroquel and monitor.       Hypertension associated with diabetes    Historically poorly controlled. Continue daily norvasc; hold ACEi 2/2 anjum.  Treat elevated bp prn with clonidine.         VTE Risk Mitigation (From admission, onward)        Ordered     enoxaparin injection 30 mg  Daily      01/05/19 2121     IP VTE HIGH RISK PATIENT  Once      01/05/19 2121     Place PABLO hose  Until discontinued      01/05/19 2121     Place sequential compression device  Until discontinued      01/05/19  2121         Time spent seeing patient( greater than 1/2 spent in direct contact) : 60 minutes    GEORGE Lennon  Department of Hospital Medicine   [facility]

## 2019-01-06 NOTE — PLAN OF CARE
01/05/19 9011   Patient Assessment/Suction   Level of Consciousness (AVPU) alert   PRE-TX-O2-ETCO2   O2 Device (Oxygen Therapy) room air   SpO2 97 %   Pulse Oximetry Type Intermittent   $ Pulse Oximetry - Single Charge Pulse Oximetry - Single

## 2019-01-06 NOTE — NURSING
Results for WEISS, TERRA HOUSE (MRN [id number]) as of 1/6/2019 14:57   Ref. Range 1/6/2019 05:38   Sodium Latest Ref Range: 136 - 145 mmol/L 142   Potassium Latest Ref Range: 3.5 - 5.1 mmol/L 2.8 (L)   Chloride Latest Ref Range: 95 - 110 mmol/L 112 (H)   CO2 Latest Ref Range: 23 - 29 mmol/L 18 (L)   Anion Gap Latest Ref Range: 8 - 16 mmol/L 12   BUN, Bld Latest Ref Range: 6 - 20 mg/dL 37 (H)   Creatinine Latest Ref Range: 0.5 - 1.4 mg/dL 3.4 (H)   eGFR if non African American Latest Ref Range: >60 mL/min/1.73 m^2 14 (A)   eGFR if African American Latest Ref Range: >60 mL/min/1.73 m^2 17 (A)   Glucose Latest Ref Range: 70 - 110 mg/dL 158 (H)   Calcium Latest Ref Range: 8.7 - 10.5 mg/dL 6.5 (LL)   Phosphorus Latest Ref Range: 2.7 - 4.5 mg/dL 4.3   Magnesium Latest Ref Range: 1.6 - 2.6 mg/dL 1.3 (L)   Alkaline Phosphatase Latest Ref Range: 55 - 135 U/L 140 (H)   Total Protein Latest Ref Range: 6.0 - 8.4 g/dL 5.8 (L)   Albumin Latest Ref Range: 3.5 - 5.2 g/dL 2.2 (L)   Total Bilirubin Latest Ref Range: 0.1 - 1.0 mg/dL 0.2   AST Latest Ref Range: 10 - 40 U/L 17   ALT Latest Ref Range: 10 - 44 U/L 18       Notified Desiree Begum NP

## 2019-01-07 LAB
ALBUMIN SERPL BCP-MCNC: 2 G/DL
ALP SERPL-CCNC: 129 U/L
ALT SERPL W/O P-5'-P-CCNC: 14 U/L
ANION GAP SERPL CALC-SCNC: 12 MMOL/L
AST SERPL-CCNC: 12 U/L
BASOPHILS # BLD AUTO: 0 K/UL
BASOPHILS NFR BLD: 0.4 %
BILIRUB SERPL-MCNC: 0.2 MG/DL
BUN SERPL-MCNC: 40 MG/DL
CALCIUM SERPL-MCNC: 6.3 MG/DL
CHLORIDE SERPL-SCNC: 113 MMOL/L
CK SERPL-CCNC: 242 U/L
CO2 SERPL-SCNC: 16 MMOL/L
CREAT SERPL-MCNC: 3.6 MG/DL
DIFFERENTIAL METHOD: ABNORMAL
EOSINOPHIL # BLD AUTO: 0 K/UL
EOSINOPHIL NFR BLD: 0.4 %
ERYTHROCYTE [DISTWIDTH] IN BLOOD BY AUTOMATED COUNT: 14.1 %
EST. GFR  (AFRICAN AMERICAN): 16 ML/MIN/1.73 M^2
EST. GFR  (NON AFRICAN AMERICAN): 14 ML/MIN/1.73 M^2
GLUCOSE SERPL-MCNC: 123 MG/DL
HCT VFR BLD AUTO: 27.3 %
HGB BLD-MCNC: 9.2 G/DL
IRON SERPL-MCNC: 23 UG/DL
LYMPHOCYTES # BLD AUTO: 1.8 K/UL
LYMPHOCYTES NFR BLD: 24.9 %
MAGNESIUM SERPL-MCNC: 1.7 MG/DL
MCH RBC QN AUTO: 31.9 PG
MCHC RBC AUTO-ENTMCNC: 33.7 G/DL
MCV RBC AUTO: 95 FL
MONOCYTES # BLD AUTO: 0.4 K/UL
MONOCYTES NFR BLD: 5.5 %
NEUTROPHILS # BLD AUTO: 4.9 K/UL
NEUTROPHILS NFR BLD: 68.8 %
PHOSPHATE SERPL-MCNC: 4.3 MG/DL
PLATELET # BLD AUTO: 287 K/UL
PMV BLD AUTO: 7.9 FL
POCT GLUCOSE: 124 MG/DL (ref 70–110)
POCT GLUCOSE: 198 MG/DL (ref 70–110)
POCT GLUCOSE: 282 MG/DL (ref 70–110)
POCT GLUCOSE: 291 MG/DL (ref 70–110)
POCT GLUCOSE: 353 MG/DL (ref 70–110)
POCT GLUCOSE: 371 MG/DL (ref 70–110)
POTASSIUM SERPL-SCNC: 2.9 MMOL/L
PROT SERPL-MCNC: 5.3 G/DL
RBC # BLD AUTO: 2.88 M/UL
SATURATED IRON: 15 %
SODIUM SERPL-SCNC: 141 MMOL/L
TOTAL IRON BINDING CAPACITY: 157 UG/DL
TRANSFERRIN SERPL-MCNC: 106 MG/DL
WBC # BLD AUTO: 7.2 K/UL

## 2019-01-07 PROCEDURE — 63600175 PHARM REV CODE 636 W HCPCS: Performed by: NURSE PRACTITIONER

## 2019-01-07 PROCEDURE — 84100 ASSAY OF PHOSPHORUS: CPT

## 2019-01-07 PROCEDURE — 85025 COMPLETE CBC W/AUTO DIFF WBC: CPT

## 2019-01-07 PROCEDURE — 36415 COLL VENOUS BLD VENIPUNCTURE: CPT

## 2019-01-07 PROCEDURE — 25000003 PHARM REV CODE 250: Performed by: INTERNAL MEDICINE

## 2019-01-07 PROCEDURE — 83735 ASSAY OF MAGNESIUM: CPT

## 2019-01-07 PROCEDURE — 63600175 PHARM REV CODE 636 W HCPCS: Performed by: INTERNAL MEDICINE

## 2019-01-07 PROCEDURE — 83540 ASSAY OF IRON: CPT

## 2019-01-07 PROCEDURE — 25000003 PHARM REV CODE 250: Performed by: NURSE PRACTITIONER

## 2019-01-07 PROCEDURE — 94761 N-INVAS EAR/PLS OXIMETRY MLT: CPT

## 2019-01-07 PROCEDURE — 97802 MEDICAL NUTRITION INDIV IN: CPT

## 2019-01-07 PROCEDURE — 12000002 HC ACUTE/MED SURGE SEMI-PRIVATE ROOM

## 2019-01-07 PROCEDURE — 82550 ASSAY OF CK (CPK): CPT

## 2019-01-07 PROCEDURE — 80053 COMPREHEN METABOLIC PANEL: CPT

## 2019-01-07 RX ORDER — SODIUM BICARBONATE 650 MG/1
650 TABLET ORAL 3 TIMES DAILY
Status: DISCONTINUED | OUTPATIENT
Start: 2019-01-07 | End: 2019-01-08

## 2019-01-07 RX ORDER — POTASSIUM CHLORIDE 20 MEQ/1
40 TABLET, EXTENDED RELEASE ORAL ONCE
Status: COMPLETED | OUTPATIENT
Start: 2019-01-07 | End: 2019-01-07

## 2019-01-07 RX ORDER — MAGNESIUM SULFATE HEPTAHYDRATE 40 MG/ML
2 INJECTION, SOLUTION INTRAVENOUS ONCE
Status: COMPLETED | OUTPATIENT
Start: 2019-01-07 | End: 2019-01-07

## 2019-01-07 RX ADMIN — HYDRALAZINE HYDROCHLORIDE 10 MG: 10 TABLET, FILM COATED ORAL at 08:01

## 2019-01-07 RX ADMIN — CLOPIDOGREL BISULFATE 75 MG: 75 TABLET ORAL at 08:01

## 2019-01-07 RX ADMIN — INSULIN ASPART 3 UNITS: 100 INJECTION, SOLUTION INTRAVENOUS; SUBCUTANEOUS at 08:01

## 2019-01-07 RX ADMIN — SODIUM BICARBONATE 650 MG TABLET 650 MG: at 04:01

## 2019-01-07 RX ADMIN — PANCRELIPASE 1 CAPSULE: 24000; 76000; 120000 CAPSULE, DELAYED RELEASE PELLETS ORAL at 12:01

## 2019-01-07 RX ADMIN — FERROUS SULFATE TAB EC 325 MG (65 MG FE EQUIVALENT) 325 MG: 325 (65 FE) TABLET DELAYED RESPONSE at 08:01

## 2019-01-07 RX ADMIN — QUETIAPINE FUMARATE 100 MG: 100 TABLET ORAL at 08:01

## 2019-01-07 RX ADMIN — PANCRELIPASE 1 CAPSULE: 24000; 76000; 120000 CAPSULE, DELAYED RELEASE PELLETS ORAL at 05:01

## 2019-01-07 RX ADMIN — ENOXAPARIN SODIUM 30 MG: 100 INJECTION SUBCUTANEOUS at 05:01

## 2019-01-07 RX ADMIN — MAGNESIUM SULFATE HEPTAHYDRATE 2 G: 40 INJECTION, SOLUTION INTRAVENOUS at 12:01

## 2019-01-07 RX ADMIN — AMLODIPINE BESYLATE 10 MG: 5 TABLET ORAL at 08:01

## 2019-01-07 RX ADMIN — PANTOPRAZOLE SODIUM 40 MG: 40 TABLET, DELAYED RELEASE ORAL at 08:01

## 2019-01-07 RX ADMIN — INSULIN ASPART 3 UNITS: 100 INJECTION, SOLUTION INTRAVENOUS; SUBCUTANEOUS at 05:01

## 2019-01-07 RX ADMIN — ASPIRIN 81 MG CHEWABLE TABLET 81 MG: 81 TABLET CHEWABLE at 08:01

## 2019-01-07 RX ADMIN — SODIUM BICARBONATE 650 MG TABLET 650 MG: at 08:01

## 2019-01-07 RX ADMIN — SENNOSIDES AND DOCUSATE SODIUM 1 TABLET: 8.6; 5 TABLET ORAL at 08:01

## 2019-01-07 RX ADMIN — PANCRELIPASE 1 CAPSULE: 24000; 76000; 120000 CAPSULE, DELAYED RELEASE PELLETS ORAL at 08:01

## 2019-01-07 RX ADMIN — ATORVASTATIN CALCIUM 80 MG: 40 TABLET, FILM COATED ORAL at 08:01

## 2019-01-07 RX ADMIN — PANCRELIPASE 1 CAPSULE: 24000; 76000; 120000 CAPSULE, DELAYED RELEASE PELLETS ORAL at 07:01

## 2019-01-07 RX ADMIN — POTASSIUM CHLORIDE 40 MEQ: 20 TABLET, EXTENDED RELEASE ORAL at 07:01

## 2019-01-07 NOTE — PLAN OF CARE
01/06/19 2030   Patient Assessment/Suction   Level of Consciousness (AVPU) alert   PRE-TX-O2-ETCO2   O2 Device (Oxygen Therapy) room air   SpO2 95 %   Pulse Oximetry Type Intermittent

## 2019-01-07 NOTE — ASSESSMENT & PLAN NOTE
Moderate chronic illness related malnutrition          Related to (etiology):  Decreased Appetite     Signs and Symptoms (as evidenced by):  1) Body Fat Depletion: Moderate-severe depletion noted in upper arms, orbital area, pattellar area, and ribs  2)Muscle Mass Depletion: Moderate-severe depletion noted with prominent protrusion of clavicle, squared shoulders, depleted biceps, calves, and thighs, and prominent depression  In dorsal area of hand  3) Historical wt loss 18.45% x 5 months, 1 week (42 kg, 6/18/18 and 51.5 kg, 1/15/18)  4) moderate fluid accumulation     Interventions/Recommendations (treatment strategy):  See above     Nutrition Diagnosis Status:  Continues

## 2019-01-07 NOTE — CARE UPDATE
Pt educated on smoking cessation. Pt states she is not interested in quitting. Smoking cessation information left with Pt.

## 2019-01-07 NOTE — PLAN OF CARE
Problem: Adult Inpatient Plan of Care  Goal: Plan of Care Review  Outcome: Ongoing (interventions implemented as appropriate)  Pt. Rested well during the night with bed alarm on. Monitoring BP, PRN medication for elevated BP. Tele showing normal sinus. Renal diet, fluid restriction of 1.5 L. Blood glucose monitored. Can get up to the bedside commode with assist. Will continue to monitor.

## 2019-01-07 NOTE — CONSULTS
[facility]  Adult Nutrition  Progress Note    SUMMARY   Intervention: Nutrition Supplement Therpy-Commercial beverage, Nutrition Education, and carbohydrate/sodium/phosphorus/potassium modified diet    Recommendation:   1) Rec. DM renal diet (already 2 gm Na) 3-4 carb servings/meal- no calorie or fat restriction as pt with poor/variable appetite and needs to gain wt   2) Remove K and Phos restrictions if WNL and approved by nephrology ( to improve PO intake)  3) Add Boost Glucose control daily  mixed with no sugar added ice cream   4) Weigh pt weekly   5) Pain management per MD    Goals: 1) Po intakes > 50% of meals and supplements  @ f/u  Nutrition Goal Status: new  Communication of RD Recs: (POC, sticky note, second sign )    Reason for Assessment    Reason For Assessment: identified at risk by screening criteria  Diagnosis: (TIMOTHY on CKD 4)  Relevant Medical History: CHF, CAD, DM2, HTN, pancreatitis, major depression  Interdisciplinary Rounds: attended    General Information Comments: 54 y/o female admitted with acute renal failure on CKD 4, on renal diet. At this time Phos WDL and K depleted. Pt with history of chronic moderate malnutrition, continues to have variable appetite r/t pain inpatient and was only snacking on junk food at home pt states. glucose in the 200's normally at home.  Pt's  does the cooking and shopping., educated pt on diet for CHF and DM2, considerations for wt gain, and left educational materials. Pt dislikes plain Boost/Ensure will take with ice cream (1 dairy choice allowed daily). NFPE reveals moderate-severe muscle/fat loss.    Nutrition Discharge Planning: To be determined- Renal, cardiac, 2 gm Na, carbohydrate consistent diet (3-4 carbs/meal) + Boost Glucose control with ice cream daily    Nutrition Risk Screen    Nutrition Risk Screen: no indicators present    Nutrition/Diet History    Patient Reported Diet/Restrictions/Preferences: low salt  Spiritual,  "Cultural Beliefs, Baptist Practices, Values that Affect Care: no  Food Allergies: NKFA(or intolerances)  Factors Affecting Nutritional Intake: pain, decreased appetite    Anthropometrics    Temp: 98.1 °F (36.7 °C)  Height Method: Measured  Height: 5' 7"(Admit 18)  Height (inches): 67 in  Weight Method: Bed Scale  Weight: 54.9 kg (121 lb 0.5 oz)  Weight (lb): 121.03 lb  Ideal Body Weight (IBW), Female: 135 lb  % Ideal Body Weight, Female (lb): 89.65 lb  BMI (Calculated): 19  BMI Grade: 18.5-24.9 - normal  Usual Body Weight (UBW), k.4 kg  % Usual Body Weight: 107.03  % Weight Change From Usual Weight: 6.81 %       Lab/Procedures/Meds    Pertinent Labs Reviewed: reviewed  Lab Results   Component Value Date    ALBUMIN 2.0 (L) 2019     BMP  Lab Results   Component Value Date     2019    K 2.9 (L) 2019     (H) 2019    CO2 16 (L) 2019    BUN 40 (H) 2019    CREATININE 3.6 (H) 2019    CALCIUM 6.3 (LL) 2019    ANIONGAP 12 2019    ESTGFRAFRICA 16 (A) 2019    EGFRNONAA 14 (A) 2019     BNP  Recent Labs   Lab 19  0538   BNP 1,217*     POCT Glucose   Date Value Ref Range Status   2019 198 (H) 70 - 110 mg/dL Final   2019 124 (H) 70 - 110 mg/dL Final   2019 353 (H) 70 - 110 mg/dL Final   2019 248 (H) 70 - 110 mg/dL Final   2019 167 (H) 70 - 110 mg/dL Final     Lab Results   Component Value Date    HGBA1C 8.1 (H) 2019       Pertinent Medications Reviewed: reviewed  Pertinent Medications Comments: insulin, zofran, iron, lipase, mg sulfate, KCl, senna    Estimated/Assessed Needs    Weight Used For Calorie Calculations: 54.9 kg (121 lb 0.5 oz)  Energy Calorie Requirements (kcal): [place] Jeor ( x 1.4-1.5 wt gain)= 7080-4106 kcal  Energy Need Method: [place]  Protein Requirements: 0.9 g protein/kg ( TIMOTHY on CKD 4 vs. muscle loss ) = 49 g protein  Weight Used For Protein Calculations: 54.9 kg (121 " lb 0.5 oz)  Fluid Requirements (mL): 1650 ml or restriction per MD  Estimated Fluid Requirement Method: RDA Method  CHO Requirement: 45-50%      Nutrition Prescription Ordered    Current Diet Order: Renal    Evaluation of Received Nutrient/Fluid Intake    Energy Calories Required: not meeting needs  Protein Required: not meeting needs  Fluid Required: not meeting needs  Total Fluid Intake (mL/kg): 2-3 cups daily PO per pt  Tolerance: tolerating  % Intake of Estimated Energy Needs: Most < 50%  % Meal Intake: 0-100% variable    Nutrition Risk    Level of Risk/Frequency of Follow-up: moderate 2 x weekly    Assessment and Plan    Type 2 diabetes mellitus    Contributing Nutrition Diagnosis  Altered Nutrition Related Lab values    Related to (etiology):   Inconsistent carbohydrate intake    Signs and Symptoms (as evidenced by):   1) Elevated glucose and A1C    Interventions/Recommendations (treatment strategy):  1) 3-4 carbohydrate serving limit per meal 2) DM diet education provided    Nutrition Diagnosis Status:   New       Moderate malnutrition    Moderate chronic illness related malnutrition          Related to (etiology):  Decreased Appetite     Signs and Symptoms (as evidenced by):  1) Body Fat Depletion: Moderate-severe depletion noted in upper arms, orbital area, pattellar area, and ribs  2)Muscle Mass Depletion: Moderate-severe depletion noted with prominent protrusion of clavicle, squared shoulders, depleted biceps, calves, and thighs, and prominent depression  In dorsal area of hand  3) Historical wt loss 18.45% x 5 months, 1 week (42 kg, 6/18/18 and 51.5 kg, 1/15/18)  4) moderate fluid accumulation     Interventions/Recommendations (treatment strategy):  See above     Nutrition Diagnosis Status:  Continues                   Monitor and Evaluation    Food and Nutrient Intake: energy intake, food and beverage intake  Food and Nutrient Adminstration: diet order  Anthropometric Measurements: weight  Biochemical  Data, Medical Tests and Procedures: electrolyte and renal panel, glucose/endocrine profile  Nutrition-Focused Physical Findings: overall appearance, extremities, muscles and bones     Malnutrition Assessment  Malnutrition Type: chronic illness  Energy Intake: moderate energy intake  Skin (Micronutrient): (Michael = 19, 2+ arm and 3+ leg edema)  Hair/Scalp (Micronutrient): sparse  Lips/Mucous Membranes (Micronutrient): (dry)  Teeth (Micronutrient): (Missing some)   Micronutrient Evaluation: suspected deficiency  Micronutrient Evaluation Comments: iron on supplement, K on supplement   Weight Loss (Malnutrition): other (see comments)  Energy Intake (Malnutrition): (18% x 5 months historical now regaining)  Subcutaneous Fat (Malnutrition): moderate depletion  Muscle Mass (Malnutrition): severe depletion  Fluid Accumulation (Malnutrition): moderate   Orbital Region (Subcutaneous Fat Loss): moderate depletion  Upper Arm Region (Subcutaneous Fat Loss): severe depletion  Thoracic and Lumbar Region: moderate depletion   [place] Region (Muscle Loss): moderate depletion  Clavicle Bone Region (Muscle Loss): moderate depletion  Clavicle and Acromion Bone Region (Muscle Loss): moderate depletion  Scapular Bone Region (Muscle Loss): moderate depletion  Dorsal Hand (Muscle Loss): moderate depletion  Patellar Region (Muscle Loss): moderate depletion  Anterior Thigh Region (Muscle Loss): severe depletion  Posterior Calf Region (Muscle Loss): severe depletion   Edema (Fluid Accumulation): 3-->moderate   Subcutaneous Fat Loss (Final Summary): severe protein-calorie malnutrition  Muscle Loss Evaluation (Final Summary): severe protein-calorie malnutrition  Fluid Accumulation Evaluation: moderate    Moderate Weight Loss (Malnutrition): other (see comments)    Nutrition Follow-Up     Yes

## 2019-01-07 NOTE — PLAN OF CARE
Problem: Malnutrition  Goal: Improved Nutritional Intake    Intervention: Promote and Optimize Oral Intake  Intervention: Nutrition Supplement Therpy-Commercial beverage, Nutrition Education, and carbohydrate/sodium/phosphorus/potassium modified diet    Recommendation:   1) Rec. DM renal diet (already 2 gm Na) 3-4 carb servings/meal- no calorie or fat restriction as pt with poor/variable appetite and needs to gain wt   2) Remove K and Phos restrictions if WNL and approved by nephrology ( to improve PO intake)  3) Add Boost Glucose control daily  mixed with no sugar added ice cream   4) Weigh pt weekly   5) Pain management per MD    Goals: 1) Po intakes > 50% of meals and supplements  @ f/u  Nutrition Goal Status: new  Communication of RD Recs: (POC, sticky note, second sign )

## 2019-01-07 NOTE — PLAN OF CARE
Cm completed the assessment at pt's bedside.  PCP is Dr. Wiggins.  Insurance verified as [employer]/Medicaid.  Pt is a diabetic.  Disposition:  Pt will discharge to home with family.  Pt can benefit with HH. Pt verbalized her dme was stollen, so she does not have a walker.  Cn checked previous hospital stays, she had [facility].     01/06/19 6161   Discharge Assessment   Assessment Type Discharge Planning Assessment   Confirmed/corrected address and phone number on facesheet? Yes   Assessment information obtained from? Patient   Prior to hospitilization cognitive status: Alert/Oriented   Prior to hospitalization functional status: Needs Assistance   Current cognitive status: Alert/Oriented   Current Functional Status: Assistive Equipment;Needs Assistance   Facility Arrived From: home   Lives With spouse   Able to Return to Prior Arrangements yes   Is patient able to care for self after discharge? No   Who are your caregiver(s) and their phone number(s)? spouse - Ti Green - [phone]   Patient's perception of discharge disposition home or selfcare;home health   Readmission Within the Last 30 Days no previous admission in last 30 days   Patient currently being followed by outpatient case management? Yes   If yes, name of outpatient case management following: insurance company assigned oupatient case management   Patient currently receives any other outside agency services? Yes   Name and contact number of agency or person providing outside services Pt cannot recal the hh agency   Is it the patient/care giver preference to resume care with the current outside agency? Yes   Equipment Currently Used at Home glucometer   Do you have any problems affording any of your prescribed medications? No   Is the patient taking medications as prescribed? yes   Does the patient have transportation home? Yes   Transportation Anticipated public transportation;family or friend will provide   Dialysis Name and Scheduled days na   Does  the patient receive services at the [facility]? No   Discharge Plan A Home with family;Home Health   Patient/Family in Agreement with Plan yes

## 2019-01-07 NOTE — ASSESSMENT & PLAN NOTE
Contributing Nutrition Diagnosis  Altered Nutrition Related Lab values    Related to (etiology):   Inconsistent carbohydrate intake    Signs and Symptoms (as evidenced by):   1) Elevated glucose and A1C    Interventions/Recommendations (treatment strategy):  1) 3-4 carbohydrate serving limit per meal 2) DM diet education provided    Nutrition Diagnosis Status:   New

## 2019-01-07 NOTE — PROGRESS NOTES
[facility] Hospital Medicine  Progress Note    Patient Name: Errol Keita  MRN: [id number]  Patient Class: IP- Inpatient   Admission Date: 1/5/2019  Length of Stay: 1 days  Attending Physician: Jaquelin Gunn MD  Primary Care Provider: Bharat Wiggins MD        Subjective:     Principal Problem:Acute renal failure superimposed on stage 4 chronic kidney disease    HPI:  Errol Keita is a 53 y.o. female with a PMHx of T2DM, HTN, pancreatitis, CAD, and CHF who presented to the ED for further evaluation of bilateral leg swelling that has been ongoing for 2 weeks. She states that she is having bilateral leg swelling and leg pain she describes as a tightness. Per ED record, pt endorsed some SOB, but she denies this to me.  Pt denies fever, chest pain, abdominal pain, vomiting, diarrhea, and headache.  ED evaluation reveals TIMOTHY.  She reports that she is a patient of Dr. Tolbert.    Hospital Course:  No notes on file    Interval History: no new issues. Still with BELLE and peripheral edema. Feeling a little better.  Renal function remains decreased.   Appears comfortable.   K decreased.       Review of Systems   Constitutional: Positive for fatigue. Negative for diaphoresis and fever.   Respiratory: Positive for cough and shortness of breath.    Cardiovascular: Positive for leg swelling. Negative for chest pain.   Gastrointestinal: Negative for abdominal distention, abdominal pain and constipation.   Musculoskeletal: Positive for arthralgias and gait problem.   Skin: Negative for rash.   Neurological: Negative for speech difficulty and numbness.     Objective:     Vital Signs (Most Recent):  Temp: 98.1 °F (36.7 °C) (01/07/19 1141)  Pulse: 74 (01/07/19 1141)  Resp: 20 (01/07/19 1141)  BP: 132/72 (01/07/19 1141)  SpO2: (!) 92 % (01/07/19 1141) Vital Signs (24h Range):  Temp:  [96.9 °F (36.1 °C)-98.9 °F (37.2 °C)] 98.1 °F (36.7 °C)  Pulse:  [68-76] 74  Resp:  [17-20] 20  SpO2:  [92 %-96 %] 92 %  BP:  (129-157)/(70-90) 132/72     Weight: 54.9 kg (121 lb 0.5 oz)  Body mass index is 18.96 kg/m².    Intake/Output Summary (Last 24 hours) at 1/7/2019 1410  Last data filed at 1/7/2019 0756  Gross per 24 hour   Intake 740 ml   Output 1150 ml   Net -410 ml      Physical Exam   Constitutional: She is oriented to person, place, and time. She appears well-developed and well-nourished.   Thin female   HENT:   Head: Normocephalic and atraumatic.   Mouth/Throat: Oropharynx is clear and moist.   Eyes: Conjunctivae and EOM are normal. Pupils are equal, round, and reactive to light.   Neck: Normal range of motion. Neck supple.   Cardiovascular: Normal rate, regular rhythm, normal heart sounds and intact distal pulses.   No murmur heard.  +2 pretib edema    Pulmonary/Chest: Effort normal. She has rales.   jeffry basilar    Abdominal: Soft. Bowel sounds are normal. There is no tenderness.   Musculoskeletal: Normal range of motion.   Neurological: She is alert and oriented to person, place, and time. Coordination normal.   Skin: Skin is warm and dry.   Psychiatric: She has a normal mood and affect. Her behavior is normal. Judgment normal.   Nursing note and vitals reviewed.      Significant Labs:   BMP:   Recent Labs   Lab 01/07/19  0449   *      K 2.9*   *   CO2 16*   BUN 40*   CREATININE 3.6*   CALCIUM 6.3*   MG 1.7     CBC:   Recent Labs   Lab 01/05/19  1905 01/06/19  0538 01/07/19  0449   WBC 8.70 7.50 7.20   HGB 11.5* 10.3* 9.2*   HCT 34.7* 31.4* 27.3*   * 330 287       Significant Imaging: I have reviewed and interpreted all pertinent imaging results/findings within the past 24 hours.    Assessment/Plan:      * Acute renal failure superimposed on stage 4 chronic kidney disease/ metabolic acidosis    Likely 2/2 IVVD  Initiate gentle IVF hydratio-- DC iV fluids due to hypervolemia  Follow renal panel and electrolytes closely.  Consult nephrology  Check Renal Ultrasound.  Adjust renal dose medications for  Estimated Creatinine Clearance: 16.2 mL/min (A) (based on SCr of 3.4 mg/dL (H)).  Avoid NSAIDs, Coyle-II inhibitors, ACE-I, Angiotensin Receptor Blockers, or Aminoglycosides.  Urine analysis.  Check  Urine Na, Cr, Protein- reviewed  Add Nabicarb for metabolic acidosis.         Hypokalemia    Replace with oral supplementation.   Trend K       Hypomagnesemia    Replace with IV supplementation.   Trend Mg         Lower extremity edema    Check BNP; clinically appears dry.  No evidence of DVT per ultrasound.       Acute on chronic congestive heart failure    DC IV fluids.   BNP elevated.   Check CXR and echo  Fluid restriction 1.5 liters/day         Moderate tobacco dependence    Dangers of cigarette smoking were reviewed with patient in detail for 3 minutes and patient was encouraged to quit. Nicotine replacement options were discussed.         Type 2 diabetes mellitus    Chronic; poorly controlled historically.  Obtain hgb a1c to assess control.  Last was 9 mos ago and was 13.5.    Accuchecks ac/hs with SSI coverage as needed.  Will hold basal insulin for now and resume as indicated.       Anemia of chronic renal failure    Chronic problem.  Stable.  Monitor H/H.  Continue iron supplementation.  Transfuse for hemodynamic instability and/or H/H <7/21         Coronary artery disease involving native coronary artery without angina pectoris    Historical diagnosis. No anginal symptoms.  Continue ASA/plavix, monitor on telemetry.       Iron deficiency anemia secondary to inadequate dietary iron intake    Add oral fergon  Iron sat 15%.        Moderate malnutrition    Consult nutrition  Add Boost with meals     Hypocalcemia    Corrected calcium 7.4 on admission.  Repleted.  Monitor level and treat as necessary.  Check PTH intact  Resume calcium supplementation     Mild recurrent major depression    Chronic problem.  Stable.  Continue Seroquel and monitor.       Hypertension associated with diabetes    Historically poorly  controlled. Continue daily norvasc; hold ACEi 2/2 anjum.  Treat elevated bp prn with clonidine.         VTE Risk Mitigation (From admission, onward)        Ordered     Place PABLO hose  Until discontinued      01/06/19 1008     enoxaparin injection 30 mg  Daily      01/05/19 2121     IP VTE HIGH RISK PATIENT  Once      01/05/19 2121     Place PABLO hose  Until discontinued      01/05/19 2121     Place sequential compression device  Until discontinued      01/05/19 2121              Desiree Begum NP  Department of Hospital Medicine   [facility]

## 2019-01-07 NOTE — SUBJECTIVE & OBJECTIVE
Interval History: no new issues. Still with BELLE and peripheral edema. Feeling a little better.  Renal function remains decreased.   Appears comfortable.   K decreased.       Review of Systems   Constitutional: Positive for fatigue. Negative for diaphoresis and fever.   Respiratory: Positive for cough and shortness of breath.    Cardiovascular: Positive for leg swelling. Negative for chest pain.   Gastrointestinal: Negative for abdominal distention, abdominal pain and constipation.   Musculoskeletal: Positive for arthralgias and gait problem.   Skin: Negative for rash.   Neurological: Negative for speech difficulty and numbness.     Objective:     Vital Signs (Most Recent):  Temp: 98.1 °F (36.7 °C) (01/07/19 1141)  Pulse: 74 (01/07/19 1141)  Resp: 20 (01/07/19 1141)  BP: 132/72 (01/07/19 1141)  SpO2: (!) 92 % (01/07/19 1141) Vital Signs (24h Range):  Temp:  [96.9 °F (36.1 °C)-98.9 °F (37.2 °C)] 98.1 °F (36.7 °C)  Pulse:  [68-76] 74  Resp:  [17-20] 20  SpO2:  [92 %-96 %] 92 %  BP: (129-157)/(70-90) 132/72     Weight: 54.9 kg (121 lb 0.5 oz)  Body mass index is 18.96 kg/m².    Intake/Output Summary (Last 24 hours) at 1/7/2019 1410  Last data filed at 1/7/2019 0756  Gross per 24 hour   Intake 740 ml   Output 1150 ml   Net -410 ml      Physical Exam   Constitutional: She is oriented to person, place, and time. She appears well-developed and well-nourished.   Thin female   HENT:   Head: Normocephalic and atraumatic.   Mouth/Throat: Oropharynx is clear and moist.   Eyes: Conjunctivae and EOM are normal. Pupils are equal, round, and reactive to light.   Neck: Normal range of motion. Neck supple.   Cardiovascular: Normal rate, regular rhythm, normal heart sounds and intact distal pulses.   No murmur heard.  +2 pretib edema    Pulmonary/Chest: Effort normal. She has rales.   jeffry basilar    Abdominal: Soft. Bowel sounds are normal. There is no tenderness.   Musculoskeletal: Normal range of motion.   Neurological: She is alert  and oriented to person, place, and time. Coordination normal.   Skin: Skin is warm and dry.   Psychiatric: She has a normal mood and affect. Her behavior is normal. Judgment normal.   Nursing note and vitals reviewed.      Significant Labs:   BMP:   Recent Labs   Lab 01/07/19  0449   *      K 2.9*   *   CO2 16*   BUN 40*   CREATININE 3.6*   CALCIUM 6.3*   MG 1.7     CBC:   Recent Labs   Lab 01/05/19  1905 01/06/19  0538 01/07/19  0449   WBC 8.70 7.50 7.20   HGB 11.5* 10.3* 9.2*   HCT 34.7* 31.4* 27.3*   * 330 287       Significant Imaging: I have reviewed and interpreted all pertinent imaging results/findings within the past 24 hours.

## 2019-01-08 PROBLEM — E11.65 UNCONTROLLED TYPE 2 DIABETES MELLITUS WITH HYPERGLYCEMIA: Status: ACTIVE | Noted: 2019-01-08

## 2019-01-08 PROBLEM — N39.0 UTI (URINARY TRACT INFECTION): Status: ACTIVE | Noted: 2019-01-08

## 2019-01-08 LAB
ALBUMIN SERPL BCP-MCNC: 2.2 G/DL
ANION GAP SERPL CALC-SCNC: 11 MMOL/L
BASOPHILS # BLD AUTO: 0 K/UL
BASOPHILS NFR BLD: 0.4 %
BUN SERPL-MCNC: 39 MG/DL
CALCIUM SERPL-MCNC: 7.2 MG/DL
CHLORIDE SERPL-SCNC: 114 MMOL/L
CO2 SERPL-SCNC: 18 MMOL/L
CREAT SERPL-MCNC: 4 MG/DL
DIFFERENTIAL METHOD: ABNORMAL
EOSINOPHIL # BLD AUTO: 0 K/UL
EOSINOPHIL NFR BLD: 0.3 %
ERYTHROCYTE [DISTWIDTH] IN BLOOD BY AUTOMATED COUNT: 14.3 %
EST. GFR  (AFRICAN AMERICAN): 14 ML/MIN/1.73 M^2
EST. GFR  (NON AFRICAN AMERICAN): 12 ML/MIN/1.73 M^2
GLUCOSE SERPL-MCNC: 155 MG/DL
HCT VFR BLD AUTO: 32.8 %
HGB BLD-MCNC: 10.9 G/DL
LYMPHOCYTES # BLD AUTO: 2.2 K/UL
LYMPHOCYTES NFR BLD: 22.7 %
MAGNESIUM SERPL-MCNC: 2.2 MG/DL
MCH RBC QN AUTO: 31.8 PG
MCHC RBC AUTO-ENTMCNC: 33.1 G/DL
MCV RBC AUTO: 96 FL
MONOCYTES # BLD AUTO: 0.6 K/UL
MONOCYTES NFR BLD: 5.7 %
NEUTROPHILS # BLD AUTO: 6.9 K/UL
NEUTROPHILS NFR BLD: 70.9 %
PHOSPHATE SERPL-MCNC: 4 MG/DL
PHOSPHATE SERPL-MCNC: 4 MG/DL
PLATELET # BLD AUTO: 337 K/UL
PMV BLD AUTO: 8.2 FL
POCT GLUCOSE: 182 MG/DL (ref 70–110)
POCT GLUCOSE: 257 MG/DL (ref 70–110)
POCT GLUCOSE: 297 MG/DL (ref 70–110)
POTASSIUM SERPL-SCNC: 3.2 MMOL/L
RBC # BLD AUTO: 3.42 M/UL
SODIUM SERPL-SCNC: 143 MMOL/L
WBC # BLD AUTO: 9.8 K/UL

## 2019-01-08 PROCEDURE — 12000002 HC ACUTE/MED SURGE SEMI-PRIVATE ROOM

## 2019-01-08 PROCEDURE — 94761 N-INVAS EAR/PLS OXIMETRY MLT: CPT

## 2019-01-08 PROCEDURE — 97161 PT EVAL LOW COMPLEX 20 MIN: CPT

## 2019-01-08 PROCEDURE — 83735 ASSAY OF MAGNESIUM: CPT

## 2019-01-08 PROCEDURE — 85025 COMPLETE CBC W/AUTO DIFF WBC: CPT

## 2019-01-08 PROCEDURE — 25000003 PHARM REV CODE 250: Performed by: INTERNAL MEDICINE

## 2019-01-08 PROCEDURE — 36415 COLL VENOUS BLD VENIPUNCTURE: CPT

## 2019-01-08 PROCEDURE — 97802 MEDICAL NUTRITION INDIV IN: CPT

## 2019-01-08 PROCEDURE — G8978 MOBILITY CURRENT STATUS: HCPCS | Mod: CK

## 2019-01-08 PROCEDURE — G8979 MOBILITY GOAL STATUS: HCPCS | Mod: CJ

## 2019-01-08 PROCEDURE — 80069 RENAL FUNCTION PANEL: CPT

## 2019-01-08 PROCEDURE — S5571 INSULIN DISPOS PEN 3 ML: HCPCS | Performed by: NURSE PRACTITIONER

## 2019-01-08 PROCEDURE — 63600175 PHARM REV CODE 636 W HCPCS: Performed by: NURSE PRACTITIONER

## 2019-01-08 PROCEDURE — 25000003 PHARM REV CODE 250: Performed by: NURSE PRACTITIONER

## 2019-01-08 RX ORDER — POTASSIUM CHLORIDE 20 MEQ/1
40 TABLET, EXTENDED RELEASE ORAL ONCE
Status: COMPLETED | OUTPATIENT
Start: 2019-01-08 | End: 2019-01-08

## 2019-01-08 RX ORDER — ASCORBIC ACID 500 MG
500 TABLET ORAL NIGHTLY
Status: DISCONTINUED | OUTPATIENT
Start: 2019-01-08 | End: 2019-01-19 | Stop reason: HOSPADM

## 2019-01-08 RX ORDER — TORSEMIDE 20 MG/1
20 TABLET ORAL 2 TIMES DAILY
Status: DISCONTINUED | OUTPATIENT
Start: 2019-01-08 | End: 2019-01-19 | Stop reason: HOSPADM

## 2019-01-08 RX ADMIN — QUETIAPINE FUMARATE 100 MG: 100 TABLET ORAL at 08:01

## 2019-01-08 RX ADMIN — PANTOPRAZOLE SODIUM 40 MG: 40 TABLET, DELAYED RELEASE ORAL at 08:01

## 2019-01-08 RX ADMIN — HYDRALAZINE HYDROCHLORIDE 10 MG: 10 TABLET, FILM COATED ORAL at 08:01

## 2019-01-08 RX ADMIN — SENNOSIDES AND DOCUSATE SODIUM 1 TABLET: 8.6; 5 TABLET ORAL at 08:01

## 2019-01-08 RX ADMIN — PANCRELIPASE 1 CAPSULE: 24000; 76000; 120000 CAPSULE, DELAYED RELEASE PELLETS ORAL at 06:01

## 2019-01-08 RX ADMIN — INSULIN ASPART 1 UNITS: 100 INJECTION, SOLUTION INTRAVENOUS; SUBCUTANEOUS at 08:01

## 2019-01-08 RX ADMIN — Medication 1 CAPSULE: at 12:01

## 2019-01-08 RX ADMIN — PANCRELIPASE 1 CAPSULE: 24000; 76000; 120000 CAPSULE, DELAYED RELEASE PELLETS ORAL at 08:01

## 2019-01-08 RX ADMIN — CLOPIDOGREL BISULFATE 75 MG: 75 TABLET ORAL at 08:01

## 2019-01-08 RX ADMIN — TORSEMIDE 20 MG: 20 TABLET ORAL at 08:01

## 2019-01-08 RX ADMIN — POTASSIUM CHLORIDE 40 MEQ: 20 TABLET, EXTENDED RELEASE ORAL at 12:01

## 2019-01-08 RX ADMIN — SODIUM BICARBONATE 650 MG TABLET 650 MG: at 08:01

## 2019-01-08 RX ADMIN — FERROUS SULFATE TAB EC 325 MG (65 MG FE EQUIVALENT) 325 MG: 325 (65 FE) TABLET DELAYED RESPONSE at 08:01

## 2019-01-08 RX ADMIN — ENOXAPARIN SODIUM 30 MG: 100 INJECTION SUBCUTANEOUS at 06:01

## 2019-01-08 RX ADMIN — CEFTRIAXONE 1 G: 1 INJECTION, SOLUTION INTRAVENOUS at 06:01

## 2019-01-08 RX ADMIN — INSULIN DETEMIR 4 UNITS: 100 INJECTION, SOLUTION SUBCUTANEOUS at 08:01

## 2019-01-08 RX ADMIN — INSULIN ASPART 3 UNITS: 100 INJECTION, SOLUTION INTRAVENOUS; SUBCUTANEOUS at 06:01

## 2019-01-08 RX ADMIN — ASPIRIN 81 MG CHEWABLE TABLET 81 MG: 81 TABLET CHEWABLE at 08:01

## 2019-01-08 RX ADMIN — AMLODIPINE BESYLATE 10 MG: 5 TABLET ORAL at 08:01

## 2019-01-08 RX ADMIN — PANCRELIPASE 1 CAPSULE: 24000; 76000; 120000 CAPSULE, DELAYED RELEASE PELLETS ORAL at 12:01

## 2019-01-08 RX ADMIN — OXYCODONE HYDROCHLORIDE AND ACETAMINOPHEN 500 MG: 500 TABLET ORAL at 08:01

## 2019-01-08 RX ADMIN — TORSEMIDE 20 MG: 20 TABLET ORAL at 12:01

## 2019-01-08 RX ADMIN — ATORVASTATIN CALCIUM 80 MG: 40 TABLET, FILM COATED ORAL at 08:01

## 2019-01-08 NOTE — ASSESSMENT & PLAN NOTE
Chronic; poorly controlled historically.    DM diet  Accuchecks with correctional SSI   Blood sugars running 182-321- Add detemir 4 units daily

## 2019-01-08 NOTE — CONSULTS
"Food & Nutrition  Education    Diet Education:Diabetic nutrition education  Time Spent:15 min  Learners:Patient      Nutrition Education provided with handouts: "Carbohydrate Counting for People with Diabetes"    Comments:Patient alert. Stated a list of foods and asked patient which ones were carbs.  Has a limited understanding of foods that are carbs.  Clarified which foods have carbs and discussed how to incorporate foods that are not carbs, but have high calories in her diet to support weight gain.  Difficult task as pt also has renal issues, but some options were identified.      Current diet to be adjusted to discontinue Boost Glucose Control with ice cream 2/2 pt eating ~96% of last 6 documented meals.     Pt stated understanding. Encouraged pt to contact RD if she has any questions.     All questions and concerns answered. Dietitian's contact information provided.       Follow-Up:yes    Please Re-consult as needed        Thanks!      "

## 2019-01-08 NOTE — PLAN OF CARE
Problem: Adult Inpatient Plan of Care  Goal: Plan of Care Review  POC reviewed with pt with verbalized understanding. Free from falls, safety maintained, VSS. Call light in reach, bed locked/lowest position. Srx2. Will continue to monitor.

## 2019-01-08 NOTE — PLAN OF CARE
Patient educated on smoking cessation program patient states she can quit on her own information for program left with patient

## 2019-01-08 NOTE — ASSESSMENT & PLAN NOTE
Initially patient received  gentle IVF hydration that were later discontinued due to hypervolemia  Follow renal panel and electrolytes closely.  Nephrology following   Renal Ultrasound shows n o obstructive uropathy- Sequelae of medical renal disease  Renal dose medications   Avoid NSAIDs, Coyle-II inhibitors, ACE-I, Angiotensin Receptor Blockers, or Aminoglycosides.  Urine analysis showed UTI- Start Iv rocephin  Echocardiogram report pending

## 2019-01-08 NOTE — ASSESSMENT & PLAN NOTE
Prior IVF were discontinued   BNP elevated.   Echocardiogram report is pending   Fluid restriction 1.5 liters/day

## 2019-01-08 NOTE — PT/OT/SLP EVAL
Physical Therapy Evaluation    Patient Name:  Errol Keita   MRN:  [id number]    Recommendations:     Discharge Recommendations:  (home)   Discharge Equipment Recommendations: walker, rolling   Barriers to discharge: None    Assessment:     Errol Keita is a 53 y.o. female admitted with a medical diagnosis of Acute renal failure superimposed on stage 4 chronic kidney disease.  She presents with the following impairments/functional limitations:  weakness, impaired endurance, impaired self care skills, impaired functional mobilty, pain, edema . Pt presents with generalized edema, slow to mobilize with feet pain. Pt requiring encouragement to mobilize. Pt to benefit from continued therapies and RW.    Rehab Prognosis: Fair; patient would benefit from acute skilled PT services to address these deficits and reach maximum level of function.    Recent Surgery: * No surgery found *      Plan:     During this hospitalization, patient to be seen 6 x/week to address the identified rehab impairments via gait training, therapeutic activities, therapeutic exercises and progress toward the following goals:    · Plan of Care Expires:  01/25/19    Subjective   Pt asking for water- on fluid restricttion    Chief Complaint: feet are hurting  Patient/Family Comments/goals: get well  Pain/Comfort:  · Pain Rating 1: 7/10  · Location - Side 1: Bilateral  · Location 1: foot  · Pain Addressed 1: Distraction, Nurse notified    Patients cultural, spiritual, Jain conflicts given the current situation:      Living Environment:  Home with spouse  Prior to admission, patients level of function was ambulatory.  Equipment used at home: none.  DME owned (not currently used): none.  Upon discharge, patient will have assistance from spouse.    Objective:     Communicated with nurse Lily BURKETT prior to session.  Patient found all lines intact telemetry  upon PT entry to room.    General Precautions: Standard, fall   Orthopedic  Precautions:N/A   Braces: N/A     Exams:  · RLE ROM: WFL  · RLE Strength: WFL  · LLE ROM: WFL  · LLE Strength: WFL    Functional Mobility:  · Bed Mobility:     · Rolling Left:  minimum assistance  · Scooting: minimum assistance  · Supine to Sit: minimum assistance  · Sit to Supine: minimum assistance  · Transfers:     · Sit to Stand:  minimum assistance with rolling walker  · Gait: 250ft with several rest stands/ coughing/fatigue/generalized edema      Therapeutic Activities and Exercises:   back to bed post PT  Assist with repositioning  Encouraged ankle pumping exercises    AM-PAC 6 CLICK MOBILITY  Total Score:17     Patient left HOB elevated with all lines intact, call button in reach and nurse Lily BURKETT notified.    GOALS:   Multidisciplinary Problems     Physical Therapy Goals        Problem: Physical Therapy Goal    Goal Priority Disciplines Outcome Goal Variances Interventions   Physical Therapy Goal     PT, PT/OT Ongoing (interventions implemented as appropriate)     Description:  Goals to be met by: 2019     Patient will increase functional independence with mobility by performin. Supine to sit with [place]  2. Sit to stand transfer with Supervision  3. Bed to chair transfer with Contact Guard Assistance using Rolling Walker  4. Gait  x 250x2 feet with Contact Guard Assistance using Rolling Walker.   5. Lower extremity exercise program x20 reps per handout, with assistance as needed                      History:     Past Medical History:   Diagnosis Date    Arthritis     Asthma     CHF (congestive heart failure)     Coronary artery disease     Diabetes mellitus     Hypertension     Pancreatitis     Type 2 diabetes mellitus with hyperglycemia 2015       Past Surgical History:   Procedure Laterality Date    ASPIRATION ABSCESS N/A 10/27/2015    Performed by Yecenia Surgeon at [facility] YECENIA    CARDIAC SURGERY      CABG    CHOLECYSTECTOMY      COLECTOMY-RIGHT N/A 2014     [facility]ado [facility] at [facility] OR    CORONARY ARTERY BYPASS GRAFT      EGD (ESOPHAGOGASTRODUODENOSCOPY) N/A 4/16/2014    Performed by Crow Brantely MD at [facility] ENDO    EGD (ESOPHAGOGASTRODUODENOSCOPY) N/A 4/2/2014    Performed by Crow Brantley MD at [facility] ENDO    EGD (ESOPHAGOGASTRODUODENOSCOPY) N/A 6/6/2013    Performed by Crow Brantley MD at [facility] ENDO    ESOPHAGOGASTRODUODENOSCOPY (EGD) N/A 8/22/2014    Performed by Satnam Nguyen MD at [facility] ENDO    STERNOTOMY N/A 6/6/2013    Performed by Ole Ingram MD at [facility] OR    THORACOTOMY N/A 6/6/2013    Performed by Ole Ingram MD at [facility] OR       Clinical Decision Making:     History  Co-morbidities and personal factors that may impact the plan of care Examination  Body Structures and Functions, activity limitations and participation restrictions that may impact the plan of care Clinical Presentation   Decision Making/ Complexity Score   Co-morbidities:   [] Time since onset of injury / illness / exacerbation  [] Status of current condition  []Patient's cognitive status and safety concerns    [] Multiple Medical Problems (see med hx)  Personal Factors:   [] Patient's age  [] Prior Level of function   [] Patient's home situation (environment and family support)  [] Patient's level of motivation  [] Expected progression of patient      HISTORY:(criteria)    [] 68495 - no personal factors/history    [] 63249 - has 1-2 personal factor/comorbidity     [] 98859 - has >3 personal factor/comorbidity     Body Regions:  [] Objective examination findings  [] Head     []  Neck  [] Trunk   [] Upper Extremity  [] Lower Extremity    Body Systems:  [] For communication ability, affect, cognition, language, and learning style: the assessment of the ability to make needs known, consciousness, orientation (person, place, and time), expected emotional /behavioral responses, and learning preferences (eg, learning barriers, education  needs)  [] For  the neuromuscular system: a general assessment of gross coordinated movement (eg, balance, gait, locomotion, transfers, and transitions) and motor function  (motor control and motor learning)  [] For the musculoskeletal system: the assessment of gross symmetry, gross range of motion, gross strength, height, and weight  [] For the integumentary system: the assessment of pliability(texture), presence of scar formation, skin color, and skin integrity  [] For cardiovascular/pulmonary system: the assessment of heart rate, respiratory rate, blood pressure, and edema     Activity limitations:    [] Patient's cognitive status and saf ety concerns          [] Status of current condition      [] Weight bearing restriction  [] Cardiopulmunary Restriction    Participation Restrictions:   [] Goals and goal agreement with the patient     [] Rehab potential (prognosis) and probable outcome      Examination of Body System: (criteria)    [] 14780 - addressing 1-2 elements    [] 79702 - addressing a total of 3 or more elements     [] 68999 -  Addressing a total of 4 or more elements         Clinical Presentation: (criteria)  Choose one     On examination of body system using standardized tests and measures patient presents with (CHOOSE ONE) elements from any of the following: body structures and functions, activity limitations, and/or participation restrictions.  Leading to a clinical presentation that is considered (CHOOSE ONE)                              Clinical Decision Making  (Eval Complexity):  Choose One     Time Tracking:     PT Received On: 01/08/19  PT Start Time: 1030     PT Stop Time: 1043  PT Total Time (min): 13 min     Billable Minutes: Evaluation 13      Heather Yo, PT  01/08/2019

## 2019-01-08 NOTE — PROGRESS NOTES
01/08/19 0853   Patient Assessment/Suction   Level of Consciousness (AVPU) alert   PRE-TX-O2-ETCO2   O2 Device (Oxygen Therapy) room air   SpO2 97 %   Pulse Oximetry Type Intermittent   $ Pulse Oximetry - Multiple Charge Pulse Oximetry - Multiple

## 2019-01-08 NOTE — PLAN OF CARE
Problem: Physical Therapy Goal  Goal: Physical Therapy Goal  Goals to be met by: 2019     Patient will increase functional independence with mobility by performin. Supine to sit with [place]  2. Sit to stand transfer with Supervision  3. Bed to chair transfer with Contact Guard Assistance using Rolling Walker  4. Gait  x 250x2 feet with Contact Guard Assistance using Rolling Walker.   5. Lower extremity exercise program x20 reps per handout, with assistance as needed    Outcome: Ongoing (interventions implemented as appropriate)  PT eval and treat completed. Gait 250 ft with RW, slow rm, c/o feet pains

## 2019-01-08 NOTE — SUBJECTIVE & OBJECTIVE
Interval History: Creatinine up to 4.0 today. Orders as per Renal. Monitor.    Review of Systems   Constitutional: Positive for activity change, appetite change and fatigue. Negative for chills, diaphoresis and fever.   HENT: Negative for ear discharge, ear pain and facial swelling.    Eyes: Negative for pain and redness.   Respiratory: Positive for cough and shortness of breath.    Cardiovascular: Positive for leg swelling. Negative for chest pain and palpitations.   Gastrointestinal: Negative for abdominal distention, abdominal pain, constipation, diarrhea, nausea and vomiting.   Endocrine: Negative for polydipsia and polyphagia.   Genitourinary: Positive for decreased urine volume.        Patient reports decreased urine output   Musculoskeletal: Positive for arthralgias and gait problem. Negative for neck pain and neck stiffness.   Skin: Negative for color change and rash.   Allergic/Immunologic: Negative for food allergies.   Neurological: Positive for weakness. Negative for tremors, facial asymmetry and speech difficulty.   Hematological: Does not bruise/bleed easily.   Psychiatric/Behavioral: Negative for agitation, behavioral problems, confusion and suicidal ideas. The patient is not nervous/anxious.      Objective:     Vital Signs (Most Recent):  Temp: 98.4 °F (36.9 °C) (01/08/19 1526)  Pulse: 90 (01/08/19 1526)  Resp: 20 (01/08/19 1526)  BP: (!) 143/77 (01/08/19 1526)  SpO2: (!) 92 % (01/08/19 1526) Vital Signs (24h Range):  Temp:  [96.6 °F (35.9 °C)-98.7 °F (37.1 °C)] 98.4 °F (36.9 °C)  Pulse:  [70-90] 90  Resp:  [18-24] 20  SpO2:  [92 %-97 %] 92 %  BP: (111-178)/(65-95) 143/77     Weight: 54.9 kg (121 lb 0.5 oz)  Body mass index is 18.96 kg/m².    Intake/Output Summary (Last 24 hours) at 1/8/2019 1601  Last data filed at 1/8/2019 0600  Gross per 24 hour   Intake 360 ml   Output 1300 ml   Net -940 ml      Physical Exam   Constitutional: She is oriented to person, place, and time. She appears well-developed  and well-nourished. No distress.   Thin female   HENT:   Head: Normocephalic and atraumatic.   Eyes: Conjunctivae and EOM are normal. Pupils are equal, round, and reactive to light. Right eye exhibits no discharge. Left eye exhibits no discharge.   Neck: Normal range of motion. Neck supple. No JVD present.   Cardiovascular: Normal rate, regular rhythm and intact distal pulses.   Murmur heard.  +2 pretib edema    Pulmonary/Chest:   RLL diminished  Crackles noted to LLL  SOB with exertion   Abdominal: Soft. Bowel sounds are normal. She exhibits no distension. There is no tenderness. There is no guarding.   Genitourinary:   Genitourinary Comments: Not examined   Musculoskeletal: Normal range of motion. She exhibits edema.   BLE edema and LUE edema noted   Neurological: She is alert and oriented to person, place, and time. No cranial nerve deficit.   Skin: Skin is warm and dry. Capillary refill takes less than 2 seconds. She is not diaphoretic.   Psychiatric: She has a normal mood and affect. Her behavior is normal. Judgment and thought content normal.   Nursing note and vitals reviewed.     Labs: Reviewed

## 2019-01-08 NOTE — HOSPITAL COURSE
Patient monitored closely during hospitalization. She initially received IV lasix for acute CHF. Telemetry showed  no significant arrhythmias. Her renal function was  trended closely. Nephrology was consulted. An Echo was  obtained. She was found to have iron deficiency anemia and initiated on oral fergon. Her hypokalemia was  replaced with oral supplementation. Dietary was consulted. She was noted to have moderate protein malnutrition and initiated on oral supplementation. Physical therapy was consulted for debility. Patient's creatinine continued to trend upward. Dr. Desouza was consulted for Merced placement and patient was started on dialysis on 1/11/18. She also recieved HD on 1/12/19 and 1/14/19. 24 hour urine ordered which demonstrated at CrCl of 9 indicative of ESRD. Dr. Desouza insert tunneled cath and outpatient HD was arranged. She was noted to have continous oozing from Merced cath site removal despite pressure dressing. HGb dropped 6.4 and she received 2 units of PRBCs on HD. Merced site required sutures. Hgb/Hct improved and she is stable for DC for nephrology standpoint.     PE chest CTA heart RRR  Thin female. merced site with no oozing.

## 2019-01-08 NOTE — PROGRESS NOTES
[facility] Hospital Medicine  Progress Note    Patient Name: Errol Keita  MRN: [id number]  Patient Class: IP- Inpatient   Admission Date: 1/5/2019  Length of Stay: 2 days  Attending Physician: Jaquelin Gunn MD  Primary Care Provider: Bharat Wiggins MD    Subjective:     Principal Problem:Acute renal failure superimposed on stage 4 chronic kidney disease    HPI:  This is a 54 yo  female with a PMHx of T2DM, HTN, pancreatitis, CAD, and CHF who presented to the ED for further evaluation of bilateral leg swelling that has been ongoing for 2 weeks. She states that she is having bilateral leg swelling and leg pain she describes as a tightness. Per ED record, pt endorsed some SOB, but she denies this to me.  Pt denies fever, chest pain, abdominal pain, vomiting, diarrhea, and headache.  ED evaluation reveals TIMOTHY.  She reports that she is a patient of Dr. Tolbert. Patient was evaluated in the ER and noted to have TIMOTHY and admitted for further evaluation and treatment.    Hospital Course:  Patient monitored closely during hospitalization. She initially received IV lasix for acute CHF. Telemetry showed  no significant arrhythmias. Her renal function was  trended closely. Nephrology was consulted. An Echo was  obtained. She was found to have iron deficiency anemia and initiated on oral fergon. Her hypokalemia was  replaced with oral supplementation. Dietary was consulted. She was noted to have moderate protein malnutrition and initiated on oral supplementation. Physical therapy was consulted for debility.     Interval History: Creatinine up to 4.0 today. Orders as per Renal. Monitor.    Review of Systems   Constitutional: Positive for activity change, appetite change and fatigue. Negative for chills, diaphoresis and fever.   HENT: Negative for ear discharge, ear pain and facial swelling.    Eyes: Negative for pain and redness.   Respiratory: Positive for cough and shortness of breath.     Cardiovascular: Positive for leg swelling. Negative for chest pain and palpitations.   Gastrointestinal: Negative for abdominal distention, abdominal pain, constipation, diarrhea, nausea and vomiting.   Endocrine: Negative for polydipsia and polyphagia.   Genitourinary: Positive for decreased urine volume.        Patient reports decreased urine output   Musculoskeletal: Positive for arthralgias and gait problem. Negative for neck pain and neck stiffness.   Skin: Negative for color change and rash.   Allergic/Immunologic: Negative for food allergies.   Neurological: Positive for weakness. Negative for tremors, facial asymmetry and speech difficulty.   Hematological: Does not bruise/bleed easily.   Psychiatric/Behavioral: Negative for agitation, behavioral problems, confusion and suicidal ideas. The patient is not nervous/anxious.      Objective:     Vital Signs (Most Recent):  Temp: 98.4 °F (36.9 °C) (01/08/19 1526)  Pulse: 90 (01/08/19 1526)  Resp: 20 (01/08/19 1526)  BP: (!) 143/77 (01/08/19 1526)  SpO2: (!) 92 % (01/08/19 1526) Vital Signs (24h Range):  Temp:  [96.6 °F (35.9 °C)-98.7 °F (37.1 °C)] 98.4 °F (36.9 °C)  Pulse:  [70-90] 90  Resp:  [18-24] 20  SpO2:  [92 %-97 %] 92 %  BP: (111-178)/(65-95) 143/77     Weight: 54.9 kg (121 lb 0.5 oz)  Body mass index is 18.96 kg/m².    Intake/Output Summary (Last 24 hours) at 1/8/2019 1601  Last data filed at 1/8/2019 0600  Gross per 24 hour   Intake 360 ml   Output 1300 ml   Net -940 ml      Physical Exam   Constitutional: She is oriented to person, place, and time. She appears well-developed and well-nourished. No distress.   Thin female   HENT:   Head: Normocephalic and atraumatic.   Eyes: Conjunctivae and EOM are normal. Pupils are equal, round, and reactive to light. Right eye exhibits no discharge. Left eye exhibits no discharge.   Neck: Normal range of motion. Neck supple. No JVD present.   Cardiovascular: Normal rate, regular rhythm and intact distal pulses.    Murmur heard.  +2 pretib edema    Pulmonary/Chest:   RLL diminished  Crackles noted to LLL  SOB with exertion   Abdominal: Soft. Bowel sounds are normal. She exhibits no distension. There is no tenderness. There is no guarding.   Genitourinary:   Genitourinary Comments: Not examined   Musculoskeletal: Normal range of motion. She exhibits edema.   BLE edema and LUE edema noted   Neurological: She is alert and oriented to person, place, and time. No cranial nerve deficit.   Skin: Skin is warm and dry. Capillary refill takes less than 2 seconds. She is not diaphoretic.   Psychiatric: She has a normal mood and affect. Her behavior is normal. Judgment and thought content normal.   Nursing note and vitals reviewed.     Labs: Reviewed    Assessment/Plan:      * Acute renal failure superimposed on stage 4 chronic kidney disease    Initially patient received  gentle IVF hydration that were later discontinued due to hypervolemia  Follow renal panel and electrolytes closely.  Nephrology following   Renal Ultrasound shows n o obstructive uropathy- Sequelae of medical renal disease  Renal dose medications   Avoid NSAIDs, Coyle-II inhibitors, ACE-I, Angiotensin Receptor Blockers, or Aminoglycosides.  Urine analysis showed UTI- Start Iv rocephin  Echocardiogram report pending      UTI (urinary tract infection)    Add Iv rocephin  Urine culture pending     Uncontrolled type 2 diabetes mellitus with hyperglycemia    HGA1c is 8.1  Consult DM educator and dietician   See above     Hypokalemia    Monitor  Supplement as needed     Hypomagnesemia    Monitor  Supplement as needed         Lower extremity edema    No evidence of DVT per ultrasound.       Acute on chronic congestive heart failure    Prior IVF were discontinued   BNP elevated.   Echocardiogram report is pending   Fluid restriction 1.5 liters/day         Moderate tobacco dependence    Dangers of cigarette smoking were reviewed with patient in detail for 3 minutes and patient  was encouraged to quit. Nicotine replacement options were discussed.    Smoking cessation > 3 minutes         Type 2 diabetes mellitus    Chronic; poorly controlled historically.    DM diet  Accuchecks with correctional SSI   Blood sugars running 182-321- Add detemir 4 units daily       Anemia of chronic renal failure    Chronic problem.  Stable.  Monitor H/H.  Continue iron supplementation.  Transfuse for hemodynamic instability and/or H/H <7/21         Coronary artery disease involving native coronary artery without angina pectoris    Historical diagnosis. No anginal symptoms.    Continue ASA/plavix, monitor on telemetry.       Iron deficiency anemia secondary to inadequate dietary iron intake    Continue  oral fergon  Add MVI and pm vitamin C       Moderate malnutrition    Monitor and encourage po intake  Dietary consulted  Add po supplement  Add Renal MVI     Hypocalcemia    Continue calcium supplementation  Check ionized calcium level in am     Mild recurrent major depression    Chronic problem.  Stable.    Continue Seroquel and monitor.       Hypertension associated with diabetes    Historically poorly controlled.   Continue daily norvasc; hold ACEi 2/2 anjum.    Treat elevated bp prn with clonidine.         VTE Risk Mitigation (From admission, onward)        Ordered     Place PABLO hose  Until discontinued      01/06/19 1008     enoxaparin injection 30 mg  Daily      01/05/19 2121     IP VTE HIGH RISK PATIENT  Once      01/05/19 2121     Place PABLO hose  Until discontinued      01/05/19 2121     Place sequential compression device  Until discontinued      01/05/19 2121        GEORGE Loza  Department of Hospital Medicine   [facility]    Time spent seeing patient( greater than 1/2 spent in direct contact) : 29 minutes

## 2019-01-08 NOTE — PLAN OF CARE
Problem: Adult Inpatient Plan of Care  Goal: Plan of Care Review  Outcome: Ongoing (interventions implemented as appropriate)  POC reviewed with Pt concerning importance of frequent turning and repositioning, importance of 1.5 L fluid rest., ansd importance of participating with PT services. Explained the need for collecting and monitoring blood work and kidney function. Fall risk reviewed with Pt, safety maintained throughout shift. Pt able to make needs known. Bed kept low and locked, SR's up x 2, call light placed within easy reach with frequent instructions and reminders on use for staff assistance.

## 2019-01-08 NOTE — ASSESSMENT & PLAN NOTE
Dangers of cigarette smoking were reviewed with patient in detail for 3 minutes and patient was encouraged to quit. Nicotine replacement options were discussed.    Smoking cessation > 3 minutes

## 2019-01-08 NOTE — CONSULTS
INPATIENT NEPHROLOGY CONSULT   [facility] NEPHROLOGY    Errol Stone Keita  01/07/2019    Reason for consultation:    Acute kidney injury    History of Present Illness:     53F with T2DM, HTN, pancreatitis, CAD, and CHF who presents with bilateral leg swelling, mild SOB. Admit to missing several doses of her lasix. Is scheduled to see a kidney specialist in the coming month. Renal consulted for TIMOTHY on CKD.    1/6  No n,v,chest pain, sob, dysuria or diarrhea.  No new neuro symptoms  1/7 VSS, no new complains.    Plan of Care:    Acute kidney injury  Hypokalemia  Edema/pulmonary crackles  anemia  Hypertension    Plan:    Hold diuretics until k repleted  Gave Mg SO4 2gm.  No nsaids, coxibs  Renal dose medication   Hold lisinopril  Keep MAP > 60, SBP > 100.    Thank you for allowing us to participate in this patient's care. We will continue to follow.    Vital Signs:  Temp Readings from Last 3 Encounters:   01/07/19 98.1 °F (36.7 °C)   07/09/18 98.1 °F (36.7 °C) (Oral)   07/06/18 98.7 °F (37.1 °C) (Oral)       Pulse Readings from Last 3 Encounters:   01/07/19 85   07/09/18 72   07/06/18 68       BP Readings from Last 3 Encounters:   01/07/19 125/72   07/09/18 (!) 148/79   07/06/18 132/62       Weight:  Wt Readings from Last 3 Encounters:   01/07/19 54.9 kg (121 lb 0.5 oz)   07/09/18 48.1 kg (106 lb 0.7 oz)   07/06/18 48.4 kg (106 lb 11.2 oz)       Past Medical & Surgical History:  Past Medical History:   Diagnosis Date    Arthritis     Asthma     CHF (congestive heart failure)     Coronary artery disease     Diabetes mellitus     Hypertension     Pancreatitis     Type 2 diabetes mellitus with hyperglycemia 7/13/2015       Past Surgical History:   Procedure Laterality Date    ASPIRATION ABSCESS N/A 10/27/2015    [facility] YECENIA    CARDIAC SURGERY      CABG    CHOLECYSTECTOMY      COLECTOMY-RIGHT N/A 8/25/2014    [facility] by [facility]ado [facility] at [facility] OR    CORONARY ARTERY BYPASS  GRAFT      EGD (ESOPHAGOGASTRODUODENOSCOPY) N/A 4/16/2014    Performed by [facility]ley [facility] at [facility] ENDO    EGD (ESOPHAGOGASTRODUODENOSCOPY) N/A 4/2/2014    Performed by Crow Brantley MD at [facility] ENDO    EGD (ESOPHAGOGASTRODUODENOSCOPY) N/A 6/6/2013    Performed by Crow [facility]ley MD at [facility] ENDO    ESOPHAGOGASTRODUODENOSCOPY (EGD) N/A 8/22/2014    Performed by Satnam Ngyuen MD at [facility] ENDO    STERNOTOMY N/A 6/6/2013    Performed by Ole Ingram MD at [facility] OR    THORACOTOMY N/A 6/6/2013    Performed by Ole [facility]gram MD at [facility] OR       Past Social History:  Social History     Socioeconomic History    Marital status:      Spouse name: None    Number of children: None    Years of education: None    Highest education level: None   Social Needs    Financial resource strain: None    Food insecurity - worry: None    Food insecurity - inability: None    Transportation needs - medical: None    Transportation needs - non-medical: None   Occupational History    None   Tobacco Use    Smoking status: Current Every Day Smoker     Packs/day: 0.50     Years: 35.00     Pack years: 17.50     Types: Cigarettes    Smokeless tobacco: Never Used   Substance and Sexual Activity    Alcohol use: No     Alcohol/week: 0.0 oz     Comment: questionable per     Drug use: No    Sexual activity: Not Currently     Partners: Male     Birth control/protection: None   Other Topics Concern    None   Social History Narrative    None       Medications:  No current facility-administered medications on file prior to encounter.      Current Outpatient Medications on File Prior to Encounter   Medication Sig Dispense Refill    amLODIPine (NORVASC) 5 MG tablet Take 1 tablet (5 mg total) by mouth once daily. (Patient taking differently: Take 10 mg by mouth once daily. ) 30 tablet 0    aspirin 81 MG chewable tablet Take 81 mg by mouth once daily. Ran out      atorvastatin (LIPITOR) 80 MG tablet Take 1  "tablet (80 mg total) by mouth once daily. 30 tablet 11    blood sugar diagnostic Strp To check BG 4 times daily, to use with insurance preferred meter 400 strip 3    blood-glucose meter kit To check BG 4 times daily, to use with insurance preferred meter 1 each 0    clopidogrel (PLAVIX) 75 mg tablet Take 1 tablet (75 mg total) by mouth once daily. 30 tablet 11    CREON 24,000-76,000 -120,000 unit capsule Take 1 capsule by mouth 4 (four) times daily with meals and nightly.       ferrous sulfate 325 (65 FE) MG EC tablet Take 1 tablet (325 mg total) by mouth 2 (two) times daily. 60 tablet 0    furosemide (LASIX) 40 MG tablet Take 40 mg by mouth once daily.      HUMALOG KWIKPEN INSULIN 100 unit/mL InPn pen Inject 2 Units into the skin 3 (three) times daily before meals. 15 mL 12    insulin detemir U-100 (LEVEMIR FLEXTOUCH) 100 unit/mL (3 mL) SubQ InPn pen Inject 2 Units into the skin 2 (two) times daily. 1 Box 0    lancets Misc To check BG 4 times daily, to use with insurance preferred meter 400 each 3    lisinopril (PRINIVIL,ZESTRIL) 2.5 MG tablet Take 2.5 mg by mouth once daily.       magnesium oxide (MAG-OX) 400 mg tablet Take 1 tablet (400 mg total) by mouth once daily. 30 tablet 0    pantoprazole (PROTONIX) 40 MG tablet Take 40 mg by mouth once daily.      pen needle, diabetic (BD ULTRA-FINE AZIZA PEN NEEDLE) 32 gauge x 5/32" Ndle Uses 4 daily 150 each 12    potassium chloride (KLOR-CON) 20 mEq Pack Take 20 mEq by mouth 2 (two) times daily. 60 packet 0    quetiapine (SEROQUEL) 100 MG Tab 100 mg every evening.        Scheduled Meds:   amLODIPine  10 mg Oral Daily    aspirin  81 mg Oral Daily    atorvastatin  80 mg Oral Daily    clopidogrel  75 mg Oral Daily    enoxaparin  30 mg Subcutaneous Daily    ferrous sulfate  325 mg Oral BID    hydrALAZINE  10 mg Oral Q12H    lipase-protease-amylase 24,000-76,000-120,000 units  1 capsule Oral QID (WM & HS)    pantoprazole  40 mg Oral Daily    " "QUEtiapine  100 mg Oral QHS    senna-docusate 8.6-50 mg  1 tablet Oral BID    sodium bicarbonate  650 mg Oral TID     Continuous Infusions:  PRN Meds:.acetaminophen, cloNIDine, dextrose 50%, dextrose 50%, glucagon (human recombinant), glucose, glucose, insulin aspart U-100, ondansetron, sodium chloride 0.9%    Allergies:  Patient has no known allergies.    Past Family History:  Reviewed; refer to Hospitalist Admission Note    Review of Systems:  Review of Systems - All 14 systems reviewed and negative, except as noted in HPI    Physical Exam:    /72   Pulse 85   Temp 98.1 °F (36.7 °C)   Resp 20   Ht 5' 7" (1.702 m) Comment: Admit 6/20/18  Wt 54.9 kg (121 lb 0.5 oz)   LMP 09/03/2012 (Exact Date)   SpO2 (!) 93%   Breastfeeding? No   BMI 18.96 kg/m²     General Appearance:    Alert, cooperative, no distress, appears stated age   Head:    Normocephalic, without obvious abnormality, atraumatic   Eyes:    PER, conjunctiva/corneas clear, EOM's intact in both eyes        Throat:   Lips, mucosa, and tongue normal; teeth and gums normal   Back:     Symmetric, no curvature, ROM normal, no CVA tenderness   Lungs:     Clear to auscultation bilaterally, respirations unlabored   Chest wall:    No tenderness or deformity   Heart:    Regular rate and rhythm, S1 and S2 normal, no murmur, rub   or gallop   Abdomen:     Soft, non-tender, bowel sounds active all four quadrants,     no masses, no organomegaly   Extremities:   Extremities normal, atraumatic, no cyanosis or edema   Pulses:   2+ and symmetric all extremities   MSK:   No joint or muscle swelling, tenderness or deformity   Skin:   Skin color, texture, turgor normal, no rashes or lesions   Neurologic:   CNII-XII intact, normal strength and sensation       Throughout.  No flap     Results:  Lab Results   Component Value Date     01/07/2019    K 2.9 (L) 01/07/2019     (H) 01/07/2019    CO2 16 (L) 01/07/2019    BUN 40 (H) 01/07/2019    CREATININE 3.6 " (H) 01/07/2019    CALCIUM 6.3 (LL) 01/07/2019    ANIONGAP 12 01/07/2019    ESTGFRAFRICA 16 (A) 01/07/2019    EGFRNONAA 14 (A) 01/07/2019       Lab Results   Component Value Date    CALCIUM 6.3 (LL) 01/07/2019    PHOS 4.3 01/07/2019       Recent Labs   Lab 01/07/19  0449   WBC 7.20   RBC 2.88*   HGB 9.2*   HCT 27.3*      MCV 95   MCH 31.9*   MCHC 33.7       I have personally reviewed pertinent radiological imaging and reports.

## 2019-01-09 LAB
AMORPH CRY URNS QL MICRO: NORMAL
ANION GAP SERPL CALC-SCNC: 13 MMOL/L
BACTERIA #/AREA URNS HPF: NORMAL /HPF
BACTERIA UR CULT: NORMAL
BILIRUB UR QL STRIP: NEGATIVE
BUN SERPL-MCNC: 42 MG/DL
CA-I BLDV-SCNC: 0.88 MMOL/L
CALCIUM SERPL-MCNC: 7.4 MG/DL
CHLORIDE SERPL-SCNC: 113 MMOL/L
CLARITY UR: CLEAR
CO2 SERPL-SCNC: 17 MMOL/L
COLOR UR: YELLOW
CREAT SERPL-MCNC: 4.3 MG/DL
EST. GFR  (AFRICAN AMERICAN): 13 ML/MIN/1.73 M^2
EST. GFR  (NON AFRICAN AMERICAN): 11 ML/MIN/1.73 M^2
GLUCOSE SERPL-MCNC: 70 MG/DL
GLUCOSE UR QL STRIP: NEGATIVE
HGB UR QL STRIP: ABNORMAL
HYALINE CASTS #/AREA URNS LPF: 0 /LPF
KETONES UR QL STRIP: NEGATIVE
LEUKOCYTE ESTERASE UR QL STRIP: ABNORMAL
MAGNESIUM SERPL-MCNC: 2 MG/DL
MICROSCOPIC COMMENT: NORMAL
NITRITE UR QL STRIP: NEGATIVE
PH UR STRIP: 6 [PH] (ref 5–8)
PHOSPHATE SERPL-MCNC: 4.4 MG/DL
POCT GLUCOSE: 177 MG/DL (ref 70–110)
POCT GLUCOSE: 257 MG/DL (ref 70–110)
POCT GLUCOSE: 269 MG/DL (ref 70–110)
POTASSIUM SERPL-SCNC: 3.5 MMOL/L
PROT UR QL STRIP: ABNORMAL
RBC #/AREA URNS HPF: 4 /HPF (ref 0–4)
SODIUM SERPL-SCNC: 143 MMOL/L
SP GR UR STRIP: 1.02 (ref 1–1.03)
SQUAMOUS #/AREA URNS HPF: 1 /HPF
URN SPEC COLLECT METH UR: ABNORMAL
UROBILINOGEN UR STRIP-ACNC: NEGATIVE EU/DL
WBC #/AREA URNS HPF: 2 /HPF (ref 0–5)

## 2019-01-09 PROCEDURE — 63600175 PHARM REV CODE 636 W HCPCS: Performed by: NURSE PRACTITIONER

## 2019-01-09 PROCEDURE — 94761 N-INVAS EAR/PLS OXIMETRY MLT: CPT

## 2019-01-09 PROCEDURE — 81000 URINALYSIS NONAUTO W/SCOPE: CPT

## 2019-01-09 PROCEDURE — 25000003 PHARM REV CODE 250: Performed by: INTERNAL MEDICINE

## 2019-01-09 PROCEDURE — 83735 ASSAY OF MAGNESIUM: CPT

## 2019-01-09 PROCEDURE — 80048 BASIC METABOLIC PNL TOTAL CA: CPT

## 2019-01-09 PROCEDURE — 84100 ASSAY OF PHOSPHORUS: CPT

## 2019-01-09 PROCEDURE — 97116 GAIT TRAINING THERAPY: CPT

## 2019-01-09 PROCEDURE — 25000003 PHARM REV CODE 250: Performed by: NURSE PRACTITIONER

## 2019-01-09 PROCEDURE — 82330 ASSAY OF CALCIUM: CPT

## 2019-01-09 PROCEDURE — 12000002 HC ACUTE/MED SURGE SEMI-PRIVATE ROOM

## 2019-01-09 RX ADMIN — PANCRELIPASE 1 CAPSULE: 24000; 76000; 120000 CAPSULE, DELAYED RELEASE PELLETS ORAL at 05:01

## 2019-01-09 RX ADMIN — SENNOSIDES AND DOCUSATE SODIUM 1 TABLET: 8.6; 5 TABLET ORAL at 08:01

## 2019-01-09 RX ADMIN — OXYCODONE HYDROCHLORIDE AND ACETAMINOPHEN 500 MG: 500 TABLET ORAL at 08:01

## 2019-01-09 RX ADMIN — ASPIRIN 81 MG CHEWABLE TABLET 81 MG: 81 TABLET CHEWABLE at 09:01

## 2019-01-09 RX ADMIN — PANCRELIPASE 1 CAPSULE: 24000; 76000; 120000 CAPSULE, DELAYED RELEASE PELLETS ORAL at 08:01

## 2019-01-09 RX ADMIN — INSULIN ASPART 1 UNITS: 100 INJECTION, SOLUTION INTRAVENOUS; SUBCUTANEOUS at 08:01

## 2019-01-09 RX ADMIN — ATORVASTATIN CALCIUM 80 MG: 40 TABLET, FILM COATED ORAL at 09:01

## 2019-01-09 RX ADMIN — CEFTRIAXONE 1 G: 1 INJECTION, SOLUTION INTRAVENOUS at 05:01

## 2019-01-09 RX ADMIN — FERROUS SULFATE TAB EC 325 MG (65 MG FE EQUIVALENT) 325 MG: 325 (65 FE) TABLET DELAYED RESPONSE at 08:01

## 2019-01-09 RX ADMIN — SENNOSIDES AND DOCUSATE SODIUM 1 TABLET: 8.6; 5 TABLET ORAL at 09:01

## 2019-01-09 RX ADMIN — TORSEMIDE 20 MG: 20 TABLET ORAL at 09:01

## 2019-01-09 RX ADMIN — HYDRALAZINE HYDROCHLORIDE 10 MG: 10 TABLET, FILM COATED ORAL at 08:01

## 2019-01-09 RX ADMIN — HYDRALAZINE HYDROCHLORIDE 10 MG: 10 TABLET, FILM COATED ORAL at 09:01

## 2019-01-09 RX ADMIN — INSULIN ASPART 3 UNITS: 100 INJECTION, SOLUTION INTRAVENOUS; SUBCUTANEOUS at 05:01

## 2019-01-09 RX ADMIN — Medication 1 CAPSULE: at 09:01

## 2019-01-09 RX ADMIN — FERROUS SULFATE TAB EC 325 MG (65 MG FE EQUIVALENT) 325 MG: 325 (65 FE) TABLET DELAYED RESPONSE at 09:01

## 2019-01-09 RX ADMIN — ENOXAPARIN SODIUM 30 MG: 100 INJECTION SUBCUTANEOUS at 05:01

## 2019-01-09 RX ADMIN — QUETIAPINE FUMARATE 100 MG: 100 TABLET ORAL at 08:01

## 2019-01-09 RX ADMIN — AMLODIPINE BESYLATE 10 MG: 5 TABLET ORAL at 09:01

## 2019-01-09 RX ADMIN — PANCRELIPASE 1 CAPSULE: 24000; 76000; 120000 CAPSULE, DELAYED RELEASE PELLETS ORAL at 01:01

## 2019-01-09 RX ADMIN — TORSEMIDE 20 MG: 20 TABLET ORAL at 08:01

## 2019-01-09 RX ADMIN — CLOPIDOGREL BISULFATE 75 MG: 75 TABLET ORAL at 09:01

## 2019-01-09 NOTE — NURSING
Pt has an ordered UA to be obtained, but is incontinent. Notified GEORGE Sanford and was given the okay to straight cath pt to obtain specimen.

## 2019-01-09 NOTE — SUBJECTIVE & OBJECTIVE
Interval History: Creatinine continues to trend upward. Nephrology reports that if creatinine  is not better tomorrow, will ask Dr. Desouza to place a HD cath and start dialysis.     Review of Systems   Constitutional: Positive for activity change, appetite change and fatigue. Negative for chills, diaphoresis and fever.   HENT: Negative for ear discharge, ear pain and facial swelling.    Eyes: Negative for pain and redness.   Respiratory: Positive for cough and shortness of breath.    Cardiovascular: Negative for chest pain, palpitations and leg swelling.        Left arm edema noted   Gastrointestinal: Negative for abdominal distention, abdominal pain, constipation, diarrhea, nausea and vomiting.   Endocrine: Negative for polydipsia and polyphagia.   Genitourinary: Positive for decreased urine volume.        Patient reports decreased urine output   Musculoskeletal: Positive for arthralgias and gait problem. Negative for neck pain and neck stiffness.   Skin: Negative for color change and rash.   Allergic/Immunologic: Negative for food allergies.   Neurological: Positive for weakness. Negative for tremors, facial asymmetry and speech difficulty.   Hematological: Does not bruise/bleed easily.   Psychiatric/Behavioral: Negative for agitation, behavioral problems, confusion and suicidal ideas. The patient is not nervous/anxious.      Objective:     Vital Signs (Most Recent):  Temp: 98.3 °F (36.8 °C) (01/09/19 1153)  Pulse: 81 (01/09/19 1153)  Resp: 17 (01/09/19 0752)  BP: (!) 170/81 (01/09/19 1153)  SpO2: (!) 94 % (01/09/19 1153) Vital Signs (24h Range):  Temp:  [96.4 °F (35.8 °C)-98.4 °F (36.9 °C)] 98.3 °F (36.8 °C)  Pulse:  [76-90] 81  Resp:  [17-24] 17  SpO2:  [90 %-98 %] 94 %  BP: (140-170)/(77-81) 170/81     Weight: 54.9 kg (121 lb 0.5 oz)  Body mass index is 18.96 kg/m².    Intake/Output Summary (Last 24 hours) at 1/9/2019 1239  Last data filed at 1/9/2019 0600  Gross per 24 hour   Intake 530 ml   Output --   Net  530 ml      Physical Exam   Constitutional: She is oriented to person, place, and time. She appears well-developed and well-nourished. No distress.   Thin female   HENT:   Head: Normocephalic and atraumatic.   Eyes: Conjunctivae and EOM are normal. Pupils are equal, round, and reactive to light. Right eye exhibits no discharge. Left eye exhibits no discharge.   Neck: Normal range of motion. Neck supple. No JVD present.   Cardiovascular: Normal rate, regular rhythm and intact distal pulses.   Murmur heard.      Pulmonary/Chest:   RLL diminished  Crackles noted to LLL  SOB with exertion   Abdominal: Soft. Bowel sounds are normal. She exhibits no distension. There is no tenderness. There is no guarding.   Genitourinary:   Genitourinary Comments: Not examined   Musculoskeletal: Normal range of motion. She exhibits edema.   LUE edema noted  BLE edema currently resolved   Neurological: She is alert and oriented to person, place, and time. No cranial nerve deficit.   Skin: Skin is warm and dry. Capillary refill takes less than 2 seconds. She is not diaphoretic.   Psychiatric: She has a normal mood and affect. Her behavior is normal. Judgment and thought content normal.   Nursing note and vitals reviewed.     Labs: Reviewed

## 2019-01-09 NOTE — PROGRESS NOTES
[facility] Hospital Medicine  Progress Note    Patient Name: Errol Keita  MRN: [id number]  Patient Class: IP- Inpatient   Admission Date: 1/5/2019  Length of Stay: 3 days  Attending Physician: Jaquelin Gunn MD  Primary Care Provider: Bharat Wiggins MD    Subjective:     Principal Problem:Acute renal failure superimposed on stage 4 chronic kidney disease    HPI:  This is a 52 yo  female with a PMHx of T2DM, HTN, pancreatitis, CAD, and CHF who presented to the ED for further evaluation of bilateral leg swelling that has been ongoing for 2 weeks. She states that she is having bilateral leg swelling and leg pain she describes as a tightness. Per ED record, pt endorsed some SOB, but she denies this to me.  Pt denies fever, chest pain, abdominal pain, vomiting, diarrhea, and headache.  ED evaluation reveals TIMOTHY.  She reports that she is a patient of Dr. Tolbert. Patient was evaluated in the ER and noted to have TIMOTHY and admitted for further evaluation and treatment.    Hospital Course:  Patient monitored closely during hospitalization. She initially received IV lasix for acute CHF. Telemetry showed  no significant arrhythmias. Her renal function was  trended closely. Nephrology was consulted. An Echo was  obtained. She was found to have iron deficiency anemia and initiated on oral fergon. Her hypokalemia was  replaced with oral supplementation. Dietary was consulted. She was noted to have moderate protein malnutrition and initiated on oral supplementation. Physical therapy was consulted for debility.     Interval History: Creatinine continues to trend upward. Nephrology reports that if creatinine  is not better tomorrow, will ask Dr. Desouza to place a HD cath and start dialysis.     Review of Systems   Constitutional: Positive for activity change, appetite change and fatigue. Negative for chills, diaphoresis and fever.   HENT: Negative for ear discharge, ear pain and facial swelling.     Eyes: Negative for pain and redness.   Respiratory: Positive for cough and shortness of breath.    Cardiovascular: Negative for chest pain, palpitations and leg swelling.        Left arm edema noted   Gastrointestinal: Negative for abdominal distention, abdominal pain, constipation, diarrhea, nausea and vomiting.   Endocrine: Negative for polydipsia and polyphagia.   Genitourinary: Positive for decreased urine volume.        Patient reports decreased urine output   Musculoskeletal: Positive for arthralgias and gait problem. Negative for neck pain and neck stiffness.   Skin: Negative for color change and rash.   Allergic/Immunologic: Negative for food allergies.   Neurological: Positive for weakness. Negative for tremors, facial asymmetry and speech difficulty.   Hematological: Does not bruise/bleed easily.   Psychiatric/Behavioral: Negative for agitation, behavioral problems, confusion and suicidal ideas. The patient is not nervous/anxious.      Objective:     Vital Signs (Most Recent):  Temp: 98.3 °F (36.8 °C) (01/09/19 1153)  Pulse: 81 (01/09/19 1153)  Resp: 17 (01/09/19 0752)  BP: (!) 170/81 (01/09/19 1153)  SpO2: (!) 94 % (01/09/19 1153) Vital Signs (24h Range):  Temp:  [96.4 °F (35.8 °C)-98.4 °F (36.9 °C)] 98.3 °F (36.8 °C)  Pulse:  [76-90] 81  Resp:  [17-24] 17  SpO2:  [90 %-98 %] 94 %  BP: (140-170)/(77-81) 170/81     Weight: 54.9 kg (121 lb 0.5 oz)  Body mass index is 18.96 kg/m².    Intake/Output Summary (Last 24 hours) at 1/9/2019 1239  Last data filed at 1/9/2019 0600  Gross per 24 hour   Intake 530 ml   Output --   Net 530 ml      Physical Exam   Constitutional: She is oriented to person, place, and time. She appears well-developed and well-nourished. No distress.   Thin female   HENT:   Head: Normocephalic and atraumatic.   Eyes: Conjunctivae and EOM are normal. Pupils are equal, round, and reactive to light. Right eye exhibits no discharge. Left eye exhibits no discharge.   Neck: Normal range of  motion. Neck supple. No JVD present.   Cardiovascular: Normal rate, regular rhythm and intact distal pulses.   Murmur heard.      Pulmonary/Chest:   RLL diminished  Crackles noted to LLL  SOB with exertion   Abdominal: Soft. Bowel sounds are normal. She exhibits no distension. There is no tenderness. There is no guarding.   Genitourinary:   Genitourinary Comments: Not examined   Musculoskeletal: Normal range of motion. She exhibits edema.   LUE edema noted  BLE edema currently resolved   Neurological: She is alert and oriented to person, place, and time. No cranial nerve deficit.   Skin: Skin is warm and dry. Capillary refill takes less than 2 seconds. She is not diaphoretic.   Psychiatric: She has a normal mood and affect. Her behavior is normal. Judgment and thought content normal.   Nursing note and vitals reviewed.     Labs: Reviewed    Assessment/Plan:      * Acute renal failure superimposed on stage 4 chronic kidney disease    Initially patient received  gentle IVF hydration that were later discontinued due to hypervolemia  Follow renal panel and electrolytes closely.  Nephrology following   Renal Ultrasound shows n o obstructive uropathy- Sequelae of medical renal disease  Renal dose medications   Avoid NSAIDs, Coyle-II inhibitors, ACE-I, Angiotensin Receptor Blockers, or Aminoglycosides.  Urine analysis showed UTI- Start Iv rocephin  Echocardiogram report still pending     UTI (urinary tract infection)    Continue  Iv rocephin  Urine culture shows > 100,000 colonies of E. Coli sensitive to patient's rocephin       Uncontrolled type 2 diabetes mellitus with hyperglycemia    HGA1c is 8.1  Consult DM educator and dietician   See above       Hypokalemia    Monitor  Supplement as needed         Hypomagnesemia    Monitor  Supplement as needed         Lower extremity edema    No evidence of DVT per ultrasound.       Acute on chronic congestive heart failure    Prior IVF were discontinued   BNP elevated.    Echocardiogram report is pending   Fluid restriction 1.5 liters/day         Moderate tobacco dependence    Dangers of cigarette smoking were reviewed with patient in detail for 3 minutes and patient was encouraged to quit. Nicotine replacement options were discussed.    Smoking cessation > 3 minutes         Type 2 diabetes mellitus    Chronic; poorly controlled historically.    DM diet  Accuchecks with correctional SSI   Blood sugars running 177-257 - increase  detemir to 8  units daily       Anemia of chronic renal failure    Chronic problem.  Stable.  Monitor H/H.  Continue iron supplementation.  Transfuse for hemodynamic instability and/or H/H <7/21         Coronary artery disease involving native coronary artery without angina pectoris    Historical diagnosis. No anginal symptoms.    Continue ASA/plavix, monitor on telemetry.       Iron deficiency anemia secondary to inadequate dietary iron intake    Continue oral fergon,  MVI and pm vitamin C       Moderate malnutrition    Monitor and encourage po intake  Dietary consulted  Continue  po supplement and  Renal MVI     Hypocalcemia    Continue calcium supplementation  Check ionized calcium level in am     Mild recurrent major depression    Chronic problem.  Stable.    Continue Seroquel and monitor.       Hypertension associated with diabetes    Historically poorly controlled.   Continue daily norvasc; hold ACEi 2/2 anjum.    Treat elevated bp prn with clonidine.         VTE Risk Mitigation (From admission, onward)        Ordered     Place PABLO hose  Until discontinued      01/06/19 1008     enoxaparin injection 30 mg  Daily      01/05/19 2121     IP VTE HIGH RISK PATIENT  Once      01/05/19 2121     Place PABLO hose  Until discontinued      01/05/19 2121     Place sequential compression device  Until discontinued      01/05/19 2121          GEORGE Loza  Department of Hospital Medicine   [facility]    Time spent seeing patient( greater  than 1/2 spent in direct contact) : 34 minutes

## 2019-01-09 NOTE — CONSULTS
INPATIENT NEPHROLOGY CONSULT   [facility] NEPHROLOGY    Errol Stone Keita  01/08/2019    Reason for consultation:    Acute kidney injury    History of Present Illness:     53F with T2DM, HTN, pancreatitis, CAD, and CHF who presents with bilateral leg swelling, mild SOB. Admit to missing several doses of her lasix. Is scheduled to see a kidney specialist in the coming month. Renal consulted for TIMOTHY on CKD.    1/6  No n,v,chest pain, sob, dysuria or diarrhea.  No new neuro symptoms  1/7 VSS, no new complains.  1/8 VSS, no new complains.    Plan of Care:    Acute kidney injury  Hypokalemia  Edema/pulmonary crackles  Anemia  Hypertension    Plan:    Resume Torsemide 20 mg BID.  Replete Mg and K as needed.  No nsaids, coxibs. Hold lisinopril.  Renal dose medication .  Keep MAP > 60, SBP > 100.    Thank you for allowing us to participate in this patient's care. We will continue to follow.    Vital Signs:  Temp Readings from Last 3 Encounters:   01/08/19 98.4 °F (36.9 °C) (Oral)   07/09/18 98.1 °F (36.7 °C) (Oral)   07/06/18 98.7 °F (37.1 °C) (Oral)       Pulse Readings from Last 3 Encounters:   01/08/19 89   07/09/18 72   07/06/18 68       BP Readings from Last 3 Encounters:   01/08/19 (!) 140/79   07/09/18 (!) 148/79   07/06/18 132/62       Weight:  Wt Readings from Last 3 Encounters:   01/07/19 54.9 kg (121 lb 0.5 oz)   07/09/18 48.1 kg (106 lb 0.7 oz)   07/06/18 48.4 kg (106 lb 11.2 oz)       Past Medical & Surgical History:  Past Medical History:   Diagnosis Date    Arthritis     Asthma     CHF (congestive heart failure)     Coronary artery disease     Diabetes mellitus     Hypertension     Pancreatitis     Type 2 diabetes mellitus with hyperglycemia 7/13/2015       Past Surgical History:   Procedure Laterality Date    ASPIRATION ABSCESS N/A 10/27/2015    Performed by Yecenia Surgeon at [facility] YECENIA    CARDIAC SURGERY      CABG    CHOLECYSTECTOMY      COLECTOMY-RIGHT N/A 8/25/2014    Performed by Harlan VALENZUELA  [facility] OR    CORONARY ARTERY BYPASS GRAFT      EGD (ESOPHAGOGASTRODUODENOSCOPY) N/A 4/16/2014    Performed by Crow Brantley MD at [facility] ENDO    EGD (ESOPHAGOGASTRODUODENOSCOPY) N/A 4/2/2014    Performed by Crow Br[facility]ley [facility] at [facility] ENDO    EGD (ESOPHAGOGASTRODUODENOSCOPY) N/A 6/6/2013    Performed by Crow Br[facility]ley MD at [facility] ENDO    ESOPHAGOGASTRODUODENOSCOPY (EGD) N/A 8/22/2014    Performed by Satnam Nguyen MD at [facility] ENDO    STERNOTOMY N/A 6/6/2013    Performed by Ole Ingram MD at [facility] OR    THORACOTOMY N/A 6/6/2013    Performed by Ole Ingram MD at [facility] OR       Past Social History:  Social History     Socioeconomic History    Marital status:      Spouse name: None    Number of children: None    Years of education: None    Highest education level: None   Social Needs    Financial resource strain: None    Food insecurity - worry: None    Food insecurity - inability: None    Transportation needs - medical: None    Transportation needs - non-medical: None   Occupational History    None   Tobacco Use    Smoking status: Current Every Day Smoker     Packs/day: 0.50     Years: 35.00     Pack years: 17.50     Types: Cigarettes    Smokeless tobacco: Never Used   Substance and Sexual Activity    Alcohol use: No     Alcohol/week: 0.0 oz     Comment: questionable per     Drug use: No    Sexual activity: Not Currently     Partners: Male     Birth control/protection: None   Other Topics Concern    None   Social History Narrative    None       Medications:  No current facility-administered medications on file prior to encounter.      Current Outpatient Medications on File Prior to Encounter   Medication Sig Dispense Refill    amLODIPine (NORVASC) 5 MG tablet Take 1 tablet (5 mg total) by mouth once daily. (Patient taking differently: Take 10 mg by mouth once daily. ) 30 tablet 0    aspirin 81 MG chewable tablet Take 81 mg by mouth once daily. Ran out   "    atorvastatin (LIPITOR) 80 MG tablet Take 1 tablet (80 mg total) by mouth once daily. 30 tablet 11    blood sugar diagnostic Strp To check BG 4 times daily, to use with insurance preferred meter 400 strip 3    blood-glucose meter kit To check BG 4 times daily, to use with insurance preferred meter 1 each 0    clopidogrel (PLAVIX) 75 mg tablet Take 1 tablet (75 mg total) by mouth once daily. 30 tablet 11    CREON 24,000-76,000 -120,000 unit capsule Take 1 capsule by mouth 4 (four) times daily with meals and nightly.       ferrous sulfate 325 (65 FE) MG EC tablet Take 1 tablet (325 mg total) by mouth 2 (two) times daily. 60 tablet 0    furosemide (LASIX) 40 MG tablet Take 40 mg by mouth once daily.      HUMALOG KWIKPEN INSULIN 100 unit/mL InPn pen Inject 2 Units into the skin 3 (three) times daily before meals. 15 mL 12    insulin detemir U-100 (LEVEMIR FLEXTOUCH) 100 unit/mL (3 mL) SubQ InPn pen Inject 2 Units into the skin 2 (two) times daily. 1 Box 0    lancets Misc To check BG 4 times daily, to use with insurance preferred meter 400 each 3    lisinopril (PRINIVIL,ZESTRIL) 2.5 MG tablet Take 2.5 mg by mouth once daily.       magnesium oxide (MAG-OX) 400 mg tablet Take 1 tablet (400 mg total) by mouth once daily. 30 tablet 0    pantoprazole (PROTONIX) 40 MG tablet Take 40 mg by mouth once daily.      pen needle, diabetic (BD ULTRA-FINE AZIZA PEN NEEDLE) 32 gauge x 5/32" Ndle Uses 4 daily 150 each 12    potassium chloride (KLOR-CON) 20 mEq Pack Take 20 mEq by mouth 2 (two) times daily. 60 packet 0    quetiapine (SEROQUEL) 100 MG Tab 100 mg every evening.        Scheduled Meds:   amLODIPine  10 mg Oral Daily    ascorbic acid (vitamin C)  500 mg Oral QHS    aspirin  81 mg Oral Daily    atorvastatin  80 mg Oral Daily    cefTRIAXone (ROCEPHIN) IVPB  1 g Intravenous Q24H    clopidogrel  75 mg Oral Daily    enoxaparin  30 mg Subcutaneous Daily    ferrous sulfate  325 mg Oral BID    hydrALAZINE  " "10 mg Oral Q12H    insulin detemir U-100  4 Units Subcutaneous QHS    lipase-protease-amylase 24,000-76,000-120,000 units  1 capsule Oral QID (WM & HS)    QUEtiapine  100 mg Oral QHS    senna-docusate 8.6-50 mg  1 tablet Oral BID    torsemide  20 mg Oral BID    vitamin renal formula (B-complex-vitamin c-folic acid)  1 capsule Oral Daily     Continuous Infusions:  PRN Meds:.acetaminophen, cloNIDine, dextrose 50%, dextrose 50%, glucagon (human recombinant), glucose, glucose, insulin aspart U-100, ondansetron, sodium chloride 0.9%    Allergies:  Patient has no known allergies.    Past Family History:  Reviewed; refer to Hospitalist Admission Note    Review of Systems:  Review of Systems - All 14 systems reviewed and negative, except as noted in HPI    Physical Exam:    BP (!) 140/79 (BP Location: Right arm, Patient Position: Lying)   Pulse 89   Temp 98.4 °F (36.9 °C) (Oral)   Resp (!) 24   Ht 5' 7" (1.702 m) Comment: Admit 6/20/18  Wt 54.9 kg (121 lb 0.5 oz)   LMP 09/03/2012 (Exact Date)   SpO2 98%   Breastfeeding? No   BMI 18.96 kg/m²     General Appearance:    Alert, cooperative, no distress, appears stated age   Head:    Normocephalic, without obvious abnormality, atraumatic   Eyes:    PER, conjunctiva/corneas clear, EOM's intact in both eyes        Throat:   Lips, mucosa, and tongue normal; teeth and gums normal   Back:     Symmetric, no curvature, ROM normal, no CVA tenderness   Lungs:     Clear to auscultation bilaterally, respirations unlabored   Chest wall:    No tenderness or deformity   Heart:    Regular rate and rhythm, S1 and S2 normal, no murmur, rub   or gallop   Abdomen:     Soft, non-tender, bowel sounds active all four quadrants,     no masses, no organomegaly   Extremities:   Extremities normal, atraumatic, no cyanosis or edema   Pulses:   2+ and symmetric all extremities   MSK:   No joint or muscle swelling, tenderness or deformity   Skin:   Skin color, texture, turgor normal, no " rashes or lesions   Neurologic:   CNII-XII intact, normal strength and sensation       Throughout.  No flap     Results:  Lab Results   Component Value Date     01/08/2019    K 3.2 (L) 01/08/2019     (H) 01/08/2019    CO2 18 (L) 01/08/2019    BUN 39 (H) 01/08/2019    CREATININE 4.0 (H) 01/08/2019    CALCIUM 7.2 (L) 01/08/2019    ANIONGAP 11 01/08/2019    ESTGFRAFRICA 14 (A) 01/08/2019    EGFRNONAA 12 (A) 01/08/2019       Lab Results   Component Value Date    CALCIUM 7.2 (L) 01/08/2019    PHOS 4.0 01/08/2019    PHOS 4.0 01/08/2019       Recent Labs   Lab 01/08/19  0439   WBC 9.80   RBC 3.42*   HGB 10.9*   HCT 32.8*      MCV 96   MCH 31.8*   MCHC 33.1       I have personally reviewed pertinent radiological imaging and reports.

## 2019-01-09 NOTE — PT/OT/SLP PROGRESS
Physical Therapy Treatment    Patient Name:  Errol Keita   MRN:  [id number]    Recommendations:     Discharge Recommendations:  (home)   Discharge Equipment Recommendations: walker, rolling       Assessment:     Errol Keita is a 53 y.o. female admitted with a medical diagnosis of Acute renal failure superimposed on stage 4 chronic kidney disease.  She presents with the following impairments/functional limitations:  weakness, impaired endurance, impaired self care skills, impaired functional mobilty, gait instability, pain, edema .    Rehab Prognosis: Good and Fair; patient would benefit from acute skilled PT services to address these deficits and reach maximum level of function.    Recent Surgery: * No surgery found *      Plan:     During this hospitalization, patient to be seen 6 x/week to address the identified rehab impairments via gait training, therapeutic activities, therapeutic exercises and progress toward the following goals:    · Plan of Care Expires:  01/25/19    Subjective     Chief Complaint: B leg pain  Patient/Family Comments/goals: agreeable to walk after some encouragement. Reported wanting to go home to her dog  Pain/Comfort:  · Pain Rating 1: (did not rate)  · Location - Side 1: Bilateral  · Location 1: leg(pointing to thigh area)  · Pain Addressed 1: Reposition, Distraction, Cessation of Activity, Nurse notified      Objective:     Communicated with nurse Bautista prior to session.  Patient found supine telemetry  upon PT entry to room.     General Precautions: Standard, fall   Orthopedic Precautions:N/A   Braces: N/A     Functional Mobility:  · Bed Mobility:     · Scooting: contact guard assistance  · Supine to Sit: contact guard assistance  · Sit to Supine: contact guard assistance    · Transfers:     · Sit to Stand:  contact guard assistance with rolling walker    · Gait: 250' with CGA using RW.        Therapeutic Activities and Exercises:   pt noted to be soiled at start of  session. Nursing notified/aware. Pt expressed she did not know she was soiled in urnie.     Patient left supine with all lines intact, call button in reach and nurse sadaf notified.    GOALS:   Multidisciplinary Problems     Physical Therapy Goals        Problem: Physical Therapy Goal    Goal Priority Disciplines Outcome Goal Variances Interventions   Physical Therapy Goal     PT, PT/OT Ongoing (interventions implemented as appropriate)     Description:  Goals to be met by: 2019     Patient will increase functional independence with mobility by performin. Supine to sit with [place]  2. Sit to stand transfer with Supervision  3. Bed to chair transfer with Contact Guard Assistance using Rolling Walker  4. Gait  x 250x2 feet with Contact Guard Assistance using Rolling Walker.   5. Lower extremity exercise program x20 reps per handout, with assistance as needed                      Time Tracking:     PT Received On: 19  PT Start Time: 1353     PT Stop Time: 1408  PT Total Time (min): 15 min     Billable Minutes: Gait Training 10 and Therapeutic Activity 5    Treatment Type: Treatment  PT/PTA: PTA     PTA Visit Number: 1     Adriana Elmore, PTA  2019

## 2019-01-09 NOTE — PLAN OF CARE
01/08/19 2010   Patient Assessment/Suction   Level of Consciousness (AVPU) alert   PRE-TX-O2-ETCO2   O2 Device (Oxygen Therapy) room air   SpO2 96 %   Pulse Oximetry Type Intermittent

## 2019-01-09 NOTE — NURSING
This nurse called to confirm with NP and Nephro MD the okay to administer the potassium and diuretic with Pt kidney function elevated. NP ok with potassium but wanted this nurse to verify the torsemide with Dr. Burks since he ordered. Called MD and reviewed kidney function levels, MD ok with the torsemide and also the potassium. Both given.

## 2019-01-09 NOTE — PLAN OF CARE
Problem: Adult Inpatient Plan of Care  Goal: Plan of Care Review  Outcome: Ongoing (interventions implemented as appropriate)  POC reviewed with pt with verbalized understanding. Free from falls, safety maintained, VSS. Call light in reach, bed locked/lowest position. Srx2. Will continue to monitor.

## 2019-01-09 NOTE — CONSULTS
INPATIENT NEPHROLOGY CONSULT   [facility] NEPHROLOGY    Errol Stone Keita  01/09/2019    Reason for consultation:    Acute kidney injury    History of Present Illness:     53F with T2DM, HTN, pancreatitis, CAD, and CHF who presents with bilateral leg swelling, mild SOB. Admit to missing several doses of her lasix. Is scheduled to see a kidney specialist in the coming month. Renal consulted for TIMOTHY on CKD.    1/6  No n,v,chest pain, sob, dysuria or diarrhea.  No new neuro symptoms  1/7 VSS, no new complains.  1/8 VSS, no new complains.  1/9 VSS, flat affect. If her sCr is not better tomorrow, will ask Dr. Desouza to place a HD cath and start dialysis. This was previously discussed, question were answered.    Plan of Care:    Acute kidney injury  CKD  4, diabetic, proteinuric  Hypokalemia  Edema/pulmonary crackles  Anemia  Hypertension    Plan:    Resumed Torsemide 20 mg BID.  Replete Mg and K as needed.  No nsaids, coxibs. Hold lisinopril.  Renal dose medication .  Keep MAP > 60, SBP > 100.  Plan Howard placement and HD tomorrow if labs don't get better.    Thank you for allowing us to participate in this patient's care. We will continue to follow.    Vital Signs:  Temp Readings from Last 3 Encounters:   01/09/19 96.4 °F (35.8 °C) (Oral)   07/09/18 98.1 °F (36.7 °C) (Oral)   07/06/18 98.7 °F (37.1 °C) (Oral)       Pulse Readings from Last 3 Encounters:   01/09/19 82   07/09/18 72   07/06/18 68       BP Readings from Last 3 Encounters:   01/09/19 (!) 152/78   07/09/18 (!) 148/79   07/06/18 132/62       Weight:  Wt Readings from Last 3 Encounters:   01/07/19 54.9 kg (121 lb 0.5 oz)   07/09/18 48.1 kg (106 lb 0.7 oz)   07/06/18 48.4 kg (106 lb 11.2 oz)       Past Medical & Surgical History:  Past Medical History:   Diagnosis Date    Arthritis     Asthma     CHF (congestive heart failure)     Coronary artery disease     Diabetes mellitus     Hypertension     Pancreatitis     Type 2 diabetes mellitus with  hyperglycemia 7/13/2015       Past Surgical History:   Procedure Laterality Date    ASPIRATION ABSCESS N/A 10/27/2015    Performed by Yecenia Surgeon at [facility] YECENIA    CARDIAC SURGERY      CABG    CHOLECYSTECTOMY      COLECTOMY-RIGHT N/A 8/25/2014    Performed by Harlan Regalado MD at [facility] OR    CORONARY ARTERY BYPASS GRAFT      EGD (ESOPHAGOGASTRODUODENOSCOPY) N/A 4/16/2014    Performed by Crow Brantley MD at [facility] ENDO    EGD (ESOPHAGOGASTRODUODENOSCOPY) N/A 4/2/2014    Performed by Crow Brantley MD at [facility] ENDO    EGD (ESOPHAGOGASTRODUODENOSCOPY) N/A 6/6/2013    Performed by Crow Brantley MD at [facility] ENDO    ESOPHAGOGASTRODUODENOSCOPY (EGD) N/A 8/22/2014    Performed by Satnam Nguyen MD at [facility] ENDO    STERNOTOMY N/A 6/6/2013    Performed by Ole Ingram MD at [facility] OR    THORACOTOMY N/A 6/6/2013    Performed by Ole Ingram MD at [facility] OR       Past Social History:  Social History     Socioeconomic History    Marital status:      Spouse name: None    Number of children: None    Years of education: None    Highest education level: None   Social Needs    Financial resource strain: None    Food insecurity - worry: None    Food insecurity - inability: None    Transportation needs - medical: None    Transportation needs - non-medical: None   Occupational History    None   Tobacco Use    Smoking status: Current Every Day Smoker     Packs/day: 0.50     Years: 35.00     Pack years: 17.50     Types: Cigarettes    Smokeless tobacco: Never Used   Substance and Sexual Activity    Alcohol use: No     Alcohol/week: 0.0 oz     Comment: questionable per     Drug use: No    Sexual activity: Not Currently     Partners: Male     Birth control/protection: None   Other Topics Concern    None   Social History Narrative    None       Medications:  No current facility-administered medications on file prior to encounter.      Current Outpatient Medications on File Prior to  "Encounter   Medication Sig Dispense Refill    amLODIPine (NORVASC) 5 MG tablet Take 1 tablet (5 mg total) by mouth once daily. (Patient taking differently: Take 10 mg by mouth once daily. ) 30 tablet 0    aspirin 81 MG chewable tablet Take 81 mg by mouth once daily. Ran out      atorvastatin (LIPITOR) 80 MG tablet Take 1 tablet (80 mg total) by mouth once daily. 30 tablet 11    blood sugar diagnostic Strp To check BG 4 times daily, to use with insurance preferred meter 400 strip 3    blood-glucose meter kit To check BG 4 times daily, to use with insurance preferred meter 1 each 0    clopidogrel (PLAVIX) 75 mg tablet Take 1 tablet (75 mg total) by mouth once daily. 30 tablet 11    CREON 24,000-76,000 -120,000 unit capsule Take 1 capsule by mouth 4 (four) times daily with meals and nightly.       ferrous sulfate 325 (65 FE) MG EC tablet Take 1 tablet (325 mg total) by mouth 2 (two) times daily. 60 tablet 0    furosemide (LASIX) 40 MG tablet Take 40 mg by mouth once daily.      HUMALOG KWIKPEN INSULIN 100 unit/mL InPn pen Inject 2 Units into the skin 3 (three) times daily before meals. 15 mL 12    insulin detemir U-100 (LEVEMIR FLEXTOUCH) 100 unit/mL (3 mL) SubQ InPn pen Inject 2 Units into the skin 2 (two) times daily. 1 Box 0    lancets Misc To check BG 4 times daily, to use with insurance preferred meter 400 each 3    lisinopril (PRINIVIL,ZESTRIL) 2.5 MG tablet Take 2.5 mg by mouth once daily.       magnesium oxide (MAG-OX) 400 mg tablet Take 1 tablet (400 mg total) by mouth once daily. 30 tablet 0    pantoprazole (PROTONIX) 40 MG tablet Take 40 mg by mouth once daily.      pen needle, diabetic (BD ULTRA-FINE AZIZA PEN NEEDLE) 32 gauge x 5/32" Ndle Uses 4 daily 150 each 12    potassium chloride (KLOR-CON) 20 mEq Pack Take 20 mEq by mouth 2 (two) times daily. 60 packet 0    quetiapine (SEROQUEL) 100 MG Tab 100 mg every evening.        Scheduled Meds:   amLODIPine  10 mg Oral Daily    ascorbic acid " "(vitamin C)  500 mg Oral QHS    aspirin  81 mg Oral Daily    atorvastatin  80 mg Oral Daily    cefTRIAXone (ROCEPHIN) IVPB  1 g Intravenous Q24H    clopidogrel  75 mg Oral Daily    enoxaparin  30 mg Subcutaneous Daily    ferrous sulfate  325 mg Oral BID    hydrALAZINE  10 mg Oral Q12H    insulin detemir U-100  4 Units Subcutaneous QHS    lipase-protease-amylase 24,000-76,000-120,000 units  1 capsule Oral QID (WM & HS)    QUEtiapine  100 mg Oral QHS    senna-docusate 8.6-50 mg  1 tablet Oral BID    torsemide  20 mg Oral BID    vitamin renal formula (B-complex-vitamin c-folic acid)  1 capsule Oral Daily     Continuous Infusions:  PRN Meds:.acetaminophen, cloNIDine, dextrose 50%, dextrose 50%, glucagon (human recombinant), glucose, glucose, insulin aspart U-100, ondansetron, sodium chloride 0.9%    Allergies:  Patient has no known allergies.    Past Family History:  Reviewed; refer to Hospitalist Admission Note    Review of Systems:  Review of Systems - All 14 systems reviewed and negative, except as noted in HPI    Physical Exam:    BP (!) 152/78 (BP Location: Right arm, Patient Position: Lying)   Pulse 82   Temp 96.4 °F (35.8 °C) (Oral)   Resp 17   Ht 5' 7" (1.702 m) Comment: Admit 6/20/18  Wt 54.9 kg (121 lb 0.5 oz)   LMP 09/03/2012 (Exact Date)   SpO2 95%   Breastfeeding? No   BMI 18.96 kg/m²     General Appearance:    Alert, cooperative, no distress, appears stated age   Head:    Normocephalic, without obvious abnormality, atraumatic   Eyes:    PER, conjunctiva/corneas clear, EOM's intact in both eyes        Throat:   Lips, mucosa, and tongue normal; teeth and gums normal   Back:     Symmetric, no curvature, ROM normal, no CVA tenderness   Lungs:     Clear to auscultation bilaterally, respirations unlabored   Chest wall:    No tenderness or deformity   Heart:    Regular rate and rhythm, S1 and S2 normal, no murmur, rub   or gallop   Abdomen:     Soft, non-tender, bowel sounds active all four " quadrants,     no masses, no organomegaly   Extremities:   Extremities normal, atraumatic, no cyanosis or edema   Pulses:   2+ and symmetric all extremities   MSK:   No joint or muscle swelling, tenderness or deformity   Skin:   Skin color, texture, turgor normal, no rashes or lesions   Neurologic:   CNII-XII intact, normal strength and sensation       Throughout.  No flap     Results:  Lab Results   Component Value Date     01/09/2019    K 3.5 01/09/2019     (H) 01/09/2019    CO2 17 (L) 01/09/2019    BUN 42 (H) 01/09/2019    CREATININE 4.3 (H) 01/09/2019    CALCIUM 7.4 (L) 01/09/2019    ANIONGAP 13 01/09/2019    ESTGFRAFRICA 13 (A) 01/09/2019    EGFRNONAA 11 (A) 01/09/2019       Lab Results   Component Value Date    CALCIUM 7.4 (L) 01/09/2019    PHOS 4.4 01/09/2019       No results for input(s): WBC, RBC, HGB, HCT, PLT, MCV, MCH, MCHC in the last 24 hours.    I have personally reviewed pertinent radiological imaging and reports.

## 2019-01-09 NOTE — PLAN OF CARE
Well known to diabetes education from multiple frequent admits. Recommend social service consult for inability to provide self care.

## 2019-01-09 NOTE — PLAN OF CARE
Problem: Physical Therapy Goal  Goal: Physical Therapy Goal  Goals to be met by: 2019     Patient will increase functional independence with mobility by performin. Supine to sit with [place]  2. Sit to stand transfer with Supervision  3. Bed to chair transfer with Contact Guard Assistance using Rolling Walker  4. Gait  x 250x2 feet with Contact Guard Assistance using Rolling Walker.   5. Lower extremity exercise program x20 reps per handout, with assistance as needed     Outcome: Ongoing (interventions implemented as appropriate)  PT for bed mobility, transfer and gait training

## 2019-01-10 LAB
ANION GAP SERPL CALC-SCNC: 11 MMOL/L
BUN SERPL-MCNC: 47 MG/DL
CALCIUM SERPL-MCNC: 7.7 MG/DL
CHLORIDE SERPL-SCNC: 113 MMOL/L
CO2 SERPL-SCNC: 18 MMOL/L
CREAT SERPL-MCNC: 4.5 MG/DL
EST. GFR  (AFRICAN AMERICAN): 12 ML/MIN/1.73 M^2
EST. GFR  (NON AFRICAN AMERICAN): 10 ML/MIN/1.73 M^2
GLUCOSE SERPL-MCNC: 203 MG/DL
MAGNESIUM SERPL-MCNC: 1.9 MG/DL
PHOSPHATE SERPL-MCNC: 5 MG/DL
POCT GLUCOSE: 182 MG/DL (ref 70–110)
POCT GLUCOSE: 207 MG/DL (ref 70–110)
POCT GLUCOSE: 214 MG/DL (ref 70–110)
POCT GLUCOSE: 281 MG/DL (ref 70–110)
POTASSIUM SERPL-SCNC: 3.5 MMOL/L
SODIUM SERPL-SCNC: 142 MMOL/L

## 2019-01-10 PROCEDURE — 94761 N-INVAS EAR/PLS OXIMETRY MLT: CPT

## 2019-01-10 PROCEDURE — 84100 ASSAY OF PHOSPHORUS: CPT

## 2019-01-10 PROCEDURE — 25000003 PHARM REV CODE 250: Performed by: INTERNAL MEDICINE

## 2019-01-10 PROCEDURE — 36415 COLL VENOUS BLD VENIPUNCTURE: CPT

## 2019-01-10 PROCEDURE — 12000002 HC ACUTE/MED SURGE SEMI-PRIVATE ROOM

## 2019-01-10 PROCEDURE — 63600175 PHARM REV CODE 636 W HCPCS: Performed by: NURSE PRACTITIONER

## 2019-01-10 PROCEDURE — 25000003 PHARM REV CODE 250: Performed by: NURSE PRACTITIONER

## 2019-01-10 PROCEDURE — 80048 BASIC METABOLIC PNL TOTAL CA: CPT

## 2019-01-10 PROCEDURE — 83735 ASSAY OF MAGNESIUM: CPT

## 2019-01-10 PROCEDURE — 97803 MED NUTRITION INDIV SUBSEQ: CPT

## 2019-01-10 PROCEDURE — 97116 GAIT TRAINING THERAPY: CPT

## 2019-01-10 PROCEDURE — 63600175 PHARM REV CODE 636 W HCPCS: Performed by: THORACIC SURGERY (CARDIOTHORACIC VASCULAR SURGERY)

## 2019-01-10 RX ORDER — HEPARIN SODIUM 1000 [USP'U]/ML
10000 INJECTION, SOLUTION INTRAVENOUS; SUBCUTANEOUS ONCE
Status: COMPLETED | OUTPATIENT
Start: 2019-01-10 | End: 2019-01-10

## 2019-01-10 RX ADMIN — AMLODIPINE BESYLATE 10 MG: 5 TABLET ORAL at 09:01

## 2019-01-10 RX ADMIN — HEPARIN SODIUM 10000 UNITS: 1000 INJECTION INTRAVENOUS; SUBCUTANEOUS at 07:01

## 2019-01-10 RX ADMIN — TORSEMIDE 20 MG: 20 TABLET ORAL at 08:01

## 2019-01-10 RX ADMIN — HYDRALAZINE HYDROCHLORIDE 10 MG: 10 TABLET, FILM COATED ORAL at 08:01

## 2019-01-10 RX ADMIN — CLOPIDOGREL BISULFATE 75 MG: 75 TABLET ORAL at 09:01

## 2019-01-10 RX ADMIN — ATORVASTATIN CALCIUM 80 MG: 40 TABLET, FILM COATED ORAL at 09:01

## 2019-01-10 RX ADMIN — INSULIN ASPART 2 UNITS: 100 INJECTION, SOLUTION INTRAVENOUS; SUBCUTANEOUS at 07:01

## 2019-01-10 RX ADMIN — SENNOSIDES AND DOCUSATE SODIUM 1 TABLET: 8.6; 5 TABLET ORAL at 09:01

## 2019-01-10 RX ADMIN — QUETIAPINE FUMARATE 100 MG: 100 TABLET ORAL at 08:01

## 2019-01-10 RX ADMIN — CEFTRIAXONE 1 G: 1 INJECTION, SOLUTION INTRAVENOUS at 05:01

## 2019-01-10 RX ADMIN — FERROUS SULFATE TAB EC 325 MG (65 MG FE EQUIVALENT) 325 MG: 325 (65 FE) TABLET DELAYED RESPONSE at 09:01

## 2019-01-10 RX ADMIN — PANCRELIPASE 1 CAPSULE: 24000; 76000; 120000 CAPSULE, DELAYED RELEASE PELLETS ORAL at 12:01

## 2019-01-10 RX ADMIN — ENOXAPARIN SODIUM 30 MG: 100 INJECTION SUBCUTANEOUS at 05:01

## 2019-01-10 RX ADMIN — TORSEMIDE 20 MG: 20 TABLET ORAL at 09:01

## 2019-01-10 RX ADMIN — FERROUS SULFATE TAB EC 325 MG (65 MG FE EQUIVALENT) 325 MG: 325 (65 FE) TABLET DELAYED RESPONSE at 08:01

## 2019-01-10 RX ADMIN — Medication 1 CAPSULE: at 09:01

## 2019-01-10 RX ADMIN — HYDRALAZINE HYDROCHLORIDE 10 MG: 10 TABLET, FILM COATED ORAL at 09:01

## 2019-01-10 RX ADMIN — INSULIN ASPART 1 UNITS: 100 INJECTION, SOLUTION INTRAVENOUS; SUBCUTANEOUS at 08:01

## 2019-01-10 RX ADMIN — PANCRELIPASE 1 CAPSULE: 24000; 76000; 120000 CAPSULE, DELAYED RELEASE PELLETS ORAL at 05:01

## 2019-01-10 RX ADMIN — OXYCODONE HYDROCHLORIDE AND ACETAMINOPHEN 500 MG: 500 TABLET ORAL at 08:01

## 2019-01-10 RX ADMIN — INSULIN ASPART 2 UNITS: 100 INJECTION, SOLUTION INTRAVENOUS; SUBCUTANEOUS at 12:01

## 2019-01-10 RX ADMIN — ASPIRIN 81 MG CHEWABLE TABLET 81 MG: 81 TABLET CHEWABLE at 09:01

## 2019-01-10 RX ADMIN — PANCRELIPASE 1 CAPSULE: 24000; 76000; 120000 CAPSULE, DELAYED RELEASE PELLETS ORAL at 08:01

## 2019-01-10 NOTE — PLAN OF CARE
01/10/19 1032   PRE-TX-O2-ETCO2   O2 Device (Oxygen Therapy) room air   SpO2 98 %   Pulse Oximetry Type Intermittent   $ Pulse Oximetry - Multiple Charge Pulse Oximetry - Multiple

## 2019-01-10 NOTE — PHYSICIAN QUERY
PT Name: Errol Keiat  MR #: [id number]  Physician Query Form - Renal Condition Clarification     CDS/: Mira Snyder RN             Contact information:  [phone]    This form is a permanent document in the medical record.     QueryDate: January 10, 2019    By submitting this query, we are merely seeking further clarification of documentation. Please utilize your independent clinical judgment when addressing the question(s) below.    The Medical record contains the following:   Indicator Supporting Clinical Findings Location in Medical Record    Kidney (Renal) Insufficiency     x Kidney (Renal) Failure / Injury Acute renal failure     Acute renal failure with acute tubular necrosis     Acute kidney injury   1/9 Hosp med note    1/5 ED MD note      1/9 Renal MD note    Nephrotoxic Agents     x BUN/Creatinine GFR Bun/Cr=  34/ 3.6   GFR= 16  Bun/Cr=  37/ 3.4  GFR= 17  Bun/Cr=  40/ 3.6  GFR= 16  Bun/Cr= 39/ 4.0  GFR= 14  Bun/Cr= 42/ 4.3  GFR= 13  Bun/Cr= 47/ 4.5  GFR= 12   1/5 LAb  1/6 Lab  1/7 LAb  1/8 Lab  1/9 Lab  1/10 Lab    Urine: Casts         Eosinophils      Dehydration      Nausea/Vomiting      Dialysis/CRRT     x Treatment: Initially patient received  gentle IVF hydration that were later discontinued due to hypervolemia   Follow renal panel and electrolytes closely.   Nephrology following   Renal Ultrasound shows n o obstructive uropathy- Sequelae of medical renal disease   Renal dose medications   Avoid NSAIDs, Coyle-II inhibitors, ACE-I, Angiotensin Receptor Blockers, or Aminoglycosides.      NS at 100cc/hr  Lasix 60cc/hr    Daily BMP  Renal consult  Urinalysis, NA, Creatitine, protein   1/9 Hosp med note                      1/6 MAR  1/5 MAR    1/5 NSG orders   x Other:  Urine SG= 1.020  Sodium urine Random= 105 1/9 Urinalysis   Acute Kidney Injury / Acute Renal Failure has different defining criteria. A generally accepted guideline  is:   A greater than 100% (2X) rise in serum creatinine  from baseline* occurring during the course of a single hospital stay.   *Baseline as determined by the providers judgment and consideration of previous lab values and other documentation, if available.    A diagnosis of Acute Kidney Injury/ Acute Renal Failure should incorporate abnormal labs and clinical findings that are clinically significant      References: 1. Concepción et al. Acute renal failure-definition, outcome measures, animal models, fluid therapy and information technology needs: the Second International Consensus Conference of the Acute Dialysis Quality Initiative (ADQI) Group. Crit Care 2004; 8:B204; 2. Albin et al. Acute Kidney Injury Network: report of an initiative to improve outcomes in acute kidney injury. Crit Care 2007; 11:R31; 3. Kidney Disease: Improving Global Outcomes (KDIGO). Acute Kidney Injury Work Group. KDIGO clinical practice guidelines for acute kidney injury. Kidney Int Suppl 2012; 2:1.    The clinical guidelines noted below is only a system guideline, it does not replace the providers clinical judgment.    Provider, please specify the diagnosis or diagnoses associated with above clinical findings.    [   ] Acute Kidney Failure/Injury with Tubular Necrosis  Damage to the tubule cells of the kidney. Common triggers: shock, hypotension, IV contrast, rhabdomyolysis, medications   [   ] Other Acute Kidney Failure/Injury (please specify): ____________     [   x] Unspecified Acute Kidney Failure/Injury      [   ] Other (please specify): _________________________________   [   ]  Clinically Undetermined       Please document in your progress notes daily for the duration of treatment until resolved and include in your discharge summary.

## 2019-01-10 NOTE — PT/OT/SLP PROGRESS
Physical Therapy Treatment    Patient Name:  Errol Keita   MRN:  [id number]    Recommendations:     Discharge Recommendations:  (home)   Discharge Equipment Recommendations: walker, rolling     Assessment:     Errol Keita is a 53 y.o. female admitted with a medical diagnosis of Acute renal failure superimposed on stage 4 chronic kidney disease.  She presents with the following impairments/functional limitations:  weakness, impaired endurance, impaired self care skills, impaired functional mobilty, gait instability, pain, edema .    Rehab Prognosis: Good; patient would benefit from acute skilled PT services to address these deficits and reach maximum level of function.    Recent Surgery: * No surgery found *      Plan:     During this hospitalization, patient to be seen 6 x/week to address the identified rehab impairments via gait training, therapeutic activities, therapeutic exercises and progress toward the following goals:    · Plan of Care Expires:  01/25/19    Subjective     Chief Complaint: none expressed  Patient/Family Comments/goals: stated he legs feel better today. Wants to go home  Pain/Comfort:  · Pain Rating 1: (did not rate)  · Location - Side 1: Bilateral  · Location 1: leg  · Pain Addressed 1: Nurse notified      Objective:     Communicated with nurse Romo prior to session.  Patient found seated in chair telemetry  upon PT entry to room.     General Precautions: Standard, fall   Orthopedic Precautions:N/A   Braces: N/A     Functional Mobility:  · Bed Mobility:     · Sit to Supine: contact guard assistance  · Transfers:     · Sit to Stand:  contact guard assistance with rolling walker  · Gait: 250' with CGA using RW. slow pace      Therapeutic Activities and Exercises:   pt BTB post gait    Patient left supine with all lines intact, call button in reach and nurse notified..    GOALS:   Multidisciplinary Problems     Physical Therapy Goals        Problem: Physical Therapy Goal    Goal  Priority Disciplines Outcome Goal Variances Interventions   Physical Therapy Goal     PT, PT/OT Ongoing (interventions implemented as appropriate)     Description:  Goals to be met by: 2019     Patient will increase functional independence with mobility by performin. Supine to sit with [place]  2. Sit to stand transfer with Supervision  3. Bed to chair transfer with Contact Guard Assistance using Rolling Walker  4. Gait  x 250x2 feet with Contact Guard Assistance using Rolling Walker.   5. Lower extremity exercise program x20 reps per handout, with assistance as needed                      Time Tracking:     PT Received On: 01/10/19  PT Start Time: 940     PT Stop Time: 955  PT Total Time (min): 15 min     Billable Minutes: Gait Training 15    Treatment Type: Treatment  PT/PTA: PTA     PTA Visit Number: 2     Adriana Elmore PTA  01/10/2019

## 2019-01-10 NOTE — SUBJECTIVE & OBJECTIVE
Interval History: Creatinine continues to trend upward. Dr. Desouza consulted for Howard catheter placement.     Review of Systems   Constitutional: Positive for activity change, appetite change and fatigue. Negative for chills, diaphoresis and fever.   HENT: Negative for ear discharge, ear pain and facial swelling.    Eyes: Negative for pain and redness.   Respiratory: Positive for cough and shortness of breath.    Cardiovascular: Negative for chest pain and palpitations.        Pedal edema noted   Gastrointestinal: Negative for abdominal distention, abdominal pain, constipation, diarrhea, nausea and vomiting.   Endocrine: Negative for polydipsia and polyphagia.   Genitourinary: Positive for decreased urine volume.        Patient reports decreased urine output   Musculoskeletal: Positive for arthralgias and gait problem. Negative for neck pain and neck stiffness.   Skin: Negative for color change and rash.   Allergic/Immunologic: Negative for food allergies.   Neurological: Positive for weakness. Negative for tremors, facial asymmetry and speech difficulty.   Hematological: Does not bruise/bleed easily.   Psychiatric/Behavioral: Negative for agitation, behavioral problems, confusion and suicidal ideas. The patient is not nervous/anxious.      Objective:     Vital Signs (Most Recent):  Temp: 96.8 °F (36 °C) (01/10/19 1055)  Pulse: 77 (01/10/19 1055)  Resp: 14 (01/10/19 1055)  BP: (!) 165/97 (01/10/19 1055)  SpO2: (!) 94 % (01/10/19 1055) Vital Signs (24h Range):  Temp:  [96.6 °F (35.9 °C)-97.9 °F (36.6 °C)] 96.8 °F (36 °C)  Pulse:  [77-84] 77  Resp:  [14-20] 14  SpO2:  [90 %-98 %] 94 %  BP: (142-172)/(77-97) 165/97     Weight: 54.3 kg (119 lb 11.4 oz)  Body mass index is 18.75 kg/m².    Intake/Output Summary (Last 24 hours) at 1/10/2019 1319  Last data filed at 1/9/2019 1952  Gross per 24 hour   Intake 590 ml   Output --   Net 590 ml      Physical Exam   Constitutional: She is oriented to person, place, and time.  She appears well-developed and well-nourished. No distress.   Thin female   HENT:   Head: Normocephalic and atraumatic.   Eyes: Conjunctivae and EOM are normal. Pupils are equal, round, and reactive to light. Right eye exhibits no discharge. Left eye exhibits no discharge.   Neck: Normal range of motion. Neck supple. No JVD present.   Cardiovascular: Normal rate, regular rhythm and intact distal pulses.   Murmur heard.      Pulmonary/Chest:   RLL diminished  Crackles noted to LLL  SOB with exertion   Abdominal: Soft. Bowel sounds are normal. She exhibits no distension. There is no tenderness. There is no guarding.   Genitourinary:   Genitourinary Comments: Not examined   Musculoskeletal: Normal range of motion. She exhibits edema.   LUE edema noted, bilateral pedal edema noted      Neurological: She is alert and oriented to person, place, and time. No cranial nerve deficit.   Skin: Skin is warm and dry. Capillary refill takes less than 2 seconds. She is not diaphoretic.   Skin dry and flaky   Psychiatric: She has a normal mood and affect. Her behavior is normal. Judgment and thought content normal.   Nursing note and vitals reviewed.     Labs: Reviewed

## 2019-01-10 NOTE — ASSESSMENT & PLAN NOTE
Moderate chronic illness related malnutrition          Related to (etiology):  Decreased Appetite     Signs and Symptoms (as evidenced by):  1) Body Fat Depletion: Moderate-severe depletion noted in upper arms, orbital area, pattellar area, and ribs  2)Muscle Mass Depletion: Moderate-severe depletion noted with prominent protrusion of clavicle, squared shoulders, depleted biceps, calves, and thighs, and prominent depression  In dorsal area of hand  3) Historical wt loss 18.45% x 5 months, 1 week (42 kg, 6/18/18 and 51.5 kg, 1/15/18)  4) moderate fluid accumulation     Interventions/Recommendations (treatment strategy):  Consistent carb 3-4 servings per meal, renal, low sodium. ONS     Nutrition Diagnosis Status:  continues

## 2019-01-10 NOTE — ASSESSMENT & PLAN NOTE
Initially patient received  gentle IVF hydration that were later discontinued due to hypervolemia  Follow renal panel and electrolytes closely.  Nephrology following   Renal Ultrasound shows no obstructive uropathy- Sequelae of medical renal disease  Renal dose medications   Avoid NSAIDs, Coyle-II inhibitors, ACE-I, Angiotensin Receptor Blockers, or Aminoglycosides.  Urine analysis showed UTI- Start Iv rocephin  Echocardiogram report pending  Patient's creatinine continues to trend upward- Dr. Desouza consulted for Howard catheter placement

## 2019-01-10 NOTE — CONSULTS
INPATIENT NEPHROLOGY CONSULT   [facility] NEPHROLOGY    Errol Stone Keita  01/10/2019    Reason for consultation:    Acute kidney injury    History of Present Illness:     53F with T2DM, HTN, pancreatitis, CAD, and CHF who presents with bilateral leg swelling, mild SOB. Admit to missing several doses of her lasix. Is scheduled to see a kidney specialist in the coming month. Renal consulted for TIMOTHY on CKD.    1/6  No n,v,chest pain, sob, dysuria or diarrhea.  No new neuro symptoms  1/7 VSS, no new complains.  1/8 VSS, no new complains.  1/9 VSS, flat affect. If her sCr is not better tomorrow, will ask Dr. Desouza to place a HD cath and start dialysis. This was previously discussed, question were answered.  1/10 VSS, no new complains. Awaiting HD cath placement.    Plan of Care:    Acute kidney injury  CKD  4, diabetic, proteinuric  Hypokalemia  Edema/pulmonary crackles  Anemia  Hypertension    Plan:    Resumed Torsemide 20 mg BID.  Replete Mg and K as needed.  No nsaids, coxibs. Hold lisinopril.  Renal dose medication .  Keep MAP > 60, SBP > 100.  Plan Howard placement and HD tomorrow if labs don't get better.    Thank you for allowing us to participate in this patient's care. We will continue to follow.    Vital Signs:  Temp Readings from Last 3 Encounters:   01/10/19 96.8 °F (36 °C)   07/09/18 98.1 °F (36.7 °C) (Oral)   07/06/18 98.7 °F (37.1 °C) (Oral)       Pulse Readings from Last 3 Encounters:   01/10/19 77   07/09/18 72   07/06/18 68       BP Readings from Last 3 Encounters:   01/10/19 (!) 165/97   07/09/18 (!) 148/79   07/06/18 132/62       Weight:  Wt Readings from Last 3 Encounters:   01/10/19 54.3 kg (119 lb 11.4 oz)   07/09/18 48.1 kg (106 lb 0.7 oz)   07/06/18 48.4 kg (106 lb 11.2 oz)       Past Medical & Surgical History:  Past Medical History:   Diagnosis Date    Arthritis     Asthma     CHF (congestive heart failure)     Coronary artery disease     Diabetes mellitus     Hypertension      Pancreatitis     Type 2 diabetes mellitus with hyperglycemia 7/13/2015       Past Surgical History:   Procedure Laterality Date    ASPIRATION ABSCESS N/A 10/27/2015    Performed by Yecenia Surgeon at [facility] YECENIA    CARDIAC SURGERY      CABG    CHOLECYSTECTOMY      COLECTOMY-RIGHT N/A 8/25/2014    Performed by Harlan Regalado MD at [facility] OR    CORONARY ARTERY BYPASS GRAFT      EGD (ESOPHAGOGASTRODUODENOSCOPY) N/A 4/16/2014    Performed by Crow Brantley MD at [facility] ENDO    EGD (ESOPHAGOGASTRODUODENOSCOPY) N/A 4/2/2014    Performed by Crow Brantley MD at [facility] ENDO    EGD (ESOPHAGOGASTRODUODENOSCOPY) N/A 6/6/2013    Performed by Crow Brantley MD at [facility] ENDO    ESOPHAGOGASTRODUODENOSCOPY (EGD) N/A 8/22/2014    Performed by Satnam Nguyen MD at [facility] ENDO    STERNOTOMY N/A 6/6/2013    Performed by Ole Ingram MD at [facility] OR    THORACOTOMY N/A 6/6/2013    Performed by Ole Ingram MD at [facility] OR       Past Social History:  Social History     Socioeconomic History    Marital status:      Spouse name: None    Number of children: None    Years of education: None    Highest education level: None   Social Needs    Financial resource strain: None    Food insecurity - worry: None    Food insecurity - inability: None    Transportation needs - medical: None    Transportation needs - non-medical: None   Occupational History    None   Tobacco Use    Smoking status: Current Every Day Smoker     Packs/day: 0.50     Years: 35.00     Pack years: 17.50     Types: Cigarettes    Smokeless tobacco: Never Used   Substance and Sexual Activity    Alcohol use: No     Alcohol/week: 0.0 oz     Comment: questionable per     Drug use: No    Sexual activity: Not Currently     Partners: Male     Birth control/protection: None   Other Topics Concern    None   Social History Narrative    None       Medications:  No current facility-administered medications on file prior to encounter.   "    Current Outpatient Medications on File Prior to Encounter   Medication Sig Dispense Refill    amLODIPine (NORVASC) 5 MG tablet Take 1 tablet (5 mg total) by mouth once daily. (Patient taking differently: Take 10 mg by mouth once daily. ) 30 tablet 0    aspirin 81 MG chewable tablet Take 81 mg by mouth once daily. Ran out      atorvastatin (LIPITOR) 80 MG tablet Take 1 tablet (80 mg total) by mouth once daily. 30 tablet 11    blood sugar diagnostic Strp To check BG 4 times daily, to use with insurance preferred meter 400 strip 3    blood-glucose meter kit To check BG 4 times daily, to use with insurance preferred meter 1 each 0    clopidogrel (PLAVIX) 75 mg tablet Take 1 tablet (75 mg total) by mouth once daily. 30 tablet 11    CREON 24,000-76,000 -120,000 unit capsule Take 1 capsule by mouth 4 (four) times daily with meals and nightly.       ferrous sulfate 325 (65 FE) MG EC tablet Take 1 tablet (325 mg total) by mouth 2 (two) times daily. 60 tablet 0    furosemide (LASIX) 40 MG tablet Take 40 mg by mouth once daily.      HUMALOG KWIKPEN INSULIN 100 unit/mL InPn pen Inject 2 Units into the skin 3 (three) times daily before meals. 15 mL 12    insulin detemir U-100 (LEVEMIR FLEXTOUCH) 100 unit/mL (3 mL) SubQ InPn pen Inject 2 Units into the skin 2 (two) times daily. 1 Box 0    lancets Misc To check BG 4 times daily, to use with insurance preferred meter 400 each 3    lisinopril (PRINIVIL,ZESTRIL) 2.5 MG tablet Take 2.5 mg by mouth once daily.       magnesium oxide (MAG-OX) 400 mg tablet Take 1 tablet (400 mg total) by mouth once daily. 30 tablet 0    pantoprazole (PROTONIX) 40 MG tablet Take 40 mg by mouth once daily.      pen needle, diabetic (BD ULTRA-FINE AZIZA PEN NEEDLE) 32 gauge x 5/32" Ndle Uses 4 daily 150 each 12    potassium chloride (KLOR-CON) 20 mEq Pack Take 20 mEq by mouth 2 (two) times daily. 60 packet 0    quetiapine (SEROQUEL) 100 MG Tab 100 mg every evening.        Scheduled " "Meds:   amLODIPine  10 mg Oral Daily    ascorbic acid (vitamin C)  500 mg Oral QHS    aspirin  81 mg Oral Daily    atorvastatin  80 mg Oral Daily    cefTRIAXone (ROCEPHIN) IVPB  1 g Intravenous Q24H    clopidogrel  75 mg Oral Daily    enoxaparin  30 mg Subcutaneous Daily    ferrous sulfate  325 mg Oral BID    hydrALAZINE  10 mg Oral Q12H    insulin detemir U-100  12 Units Subcutaneous QHS    lipase-protease-amylase 24,000-76,000-120,000 units  1 capsule Oral QID (WM & HS)    QUEtiapine  100 mg Oral QHS    senna-docusate 8.6-50 mg  1 tablet Oral BID    torsemide  20 mg Oral BID    vitamin renal formula (B-complex-vitamin c-folic acid)  1 capsule Oral Daily     Continuous Infusions:  PRN Meds:.acetaminophen, cloNIDine, dextrose 50%, dextrose 50%, glucagon (human recombinant), glucose, glucose, insulin aspart U-100, ondansetron, sodium chloride 0.9%    Allergies:  Patient has no known allergies.    Past Family History:  Reviewed; refer to Hospitalist Admission Note    Review of Systems:  Review of Systems - All 14 systems reviewed and negative, except as noted in HPI    Physical Exam:    BP (!) 165/97   Pulse 77   Temp 96.8 °F (36 °C)   Resp 14   Ht 5' 7" (1.702 m) Comment: Admit 6/20/18  Wt 54.3 kg (119 lb 11.4 oz)   LMP 09/03/2012 (Exact Date)   SpO2 (!) 94%   Breastfeeding? No   BMI 18.75 kg/m²     General Appearance:    Alert, cooperative, no distress, appears stated age   Head:    Normocephalic, without obvious abnormality, atraumatic   Eyes:    PER, conjunctiva/corneas clear, EOM's intact in both eyes        Throat:   Lips, mucosa, and tongue normal; teeth and gums normal   Back:     Symmetric, no curvature, ROM normal, no CVA tenderness   Lungs:     Clear to auscultation bilaterally, respirations unlabored   Chest wall:    No tenderness or deformity   Heart:    Regular rate and rhythm, S1 and S2 normal, no murmur, rub   or gallop   Abdomen:     Soft, non-tender, bowel sounds active all " four quadrants,     no masses, no organomegaly   Extremities:   Extremities normal, atraumatic, no cyanosis or edema   Pulses:   2+ and symmetric all extremities   MSK:   No joint or muscle swelling, tenderness or deformity   Skin:   Skin color, texture, turgor normal, no rashes or lesions   Neurologic:   CNII-XII intact, normal strength and sensation       Throughout.  No flap     Results:  Lab Results   Component Value Date     01/10/2019    K 3.5 01/10/2019     (H) 01/10/2019    CO2 18 (L) 01/10/2019    BUN 47 (H) 01/10/2019    CREATININE 4.5 (H) 01/10/2019    CALCIUM 7.7 (L) 01/10/2019    ANIONGAP 11 01/10/2019    ESTGFRAFRICA 12 (A) 01/10/2019    EGFRNONAA 10 (A) 01/10/2019       Lab Results   Component Value Date    CALCIUM 7.7 (L) 01/10/2019    PHOS 5.0 (H) 01/10/2019       No results for input(s): WBC, RBC, HGB, HCT, PLT, MCV, MCH, MCHC in the last 24 hours.    I have personally reviewed pertinent radiological imaging and reports.

## 2019-01-10 NOTE — PLAN OF CARE
Problem: Malnutrition  Goal: Improved Nutritional Intake    Intervention: Promote and Optimize Oral Intake  Intervention: Nutrition Supplement Therpy-Commercial beverage, Nutrition Education, and carbohydrate/sodium/phosphorus/potassium modified diet    Recommendation:   1) Continue consistent carb,  renal diet (already 2 gm Na) 3-4 carb servings/meal- no calorie or fat restriction as pt needs to gain wt   2) Remove K and Phos restrictions if WNL and approved by nephrology ( to improve PO intake)  3) Change supplement to Boost Glucose Control, chocolate 1 x daily (2/2 meal intake consistently at 100%,  POCT elevated and patient doesn't care for [facility] Renal)  4) Weigh pt weekly     Goals: 1) Po intakes > 50% of meals and supplements  @ f/u 2) PO intake >=85% EEN during admit  Nutrition Goal Status: 1) met/discontinued 2) new  Communication of RD Recs: (POC, sticky note, second sign )

## 2019-01-10 NOTE — PLAN OF CARE
Problem: Physical Therapy Goal  Goal: Physical Therapy Goal  Goals to be met by: 2019     Patient will increase functional independence with mobility by performin. Supine to sit with [place]  2. Sit to stand transfer with Supervision  3. Bed to chair transfer with Contact Guard Assistance using Rolling Walker  4. Gait  x 250x2 feet with Contact Guard Assistance using Rolling Walker.   5. Lower extremity exercise program x20 reps per handout, with assistance as needed     Outcome: Ongoing (interventions implemented as appropriate)  PT for gait training

## 2019-01-10 NOTE — ASSESSMENT & PLAN NOTE
Chronic; poorly controlled historically.    DM diet  Accuchecks with correctional SSI   Blood sugars running 207-257 - Increase detemir to 12 units daily

## 2019-01-10 NOTE — PLAN OF CARE
Problem: Adult Inpatient Plan of Care  Goal: Plan of Care Review  Outcome: Ongoing (interventions implemented as appropriate)  POC reviewed with Pt and  concerning importance of collecting and monitoring lab work and kidney function, possible need for dialysis if kidney function does not improve, importance of turning and repositioning frequently, encouragement of self-care, importance of fluid restriction, and proper diet. Fall risk reviewed, safety maintained throughout shift. Pt able to make needs known. Bed kept low and locked, SR's up x 2, call light placed within easy reach with frequent instructions and reminders on use for staff assistance.

## 2019-01-10 NOTE — PROGRESS NOTES
[facility]  Adult Nutrition  Progress Note    SUMMARY   Intervention: Nutrition Supplement Therpy-Commercial beverage, Nutrition Education, and carbohydrate/sodium/phosphorus/potassium modified diet    Recommendation:   1) Continue consistent carb,  renal diet (already 2 gm Na) 3-4 carb servings/meal- no calorie or fat restriction as pt needs to gain wt   2) Remove K and Phos restrictions if WNL and approved by nephrology ( to improve PO intake)  3) Change supplement to Boost Glucose Control, chocolate 1 x daily (2/2 meal intake consistently at 100%,  POCT elevated and patient doesn't care for [employer] Renal)  4) Weigh pt weekly     Goals: 1) Po intakes > 50% of meals and supplements  @ f/u 2) PO intake >=85% EEN during admit  Nutrition Goal Status: 1) met/discontinued 2) new  Communication of RD Recs: (POC, sticky note, second sign )    Note: Boost Glucose Control 1 carton: 260 mg potassium,  200 mg phosphorus,23 gm CHO             [employer] Renal 1 carton      : 225 mg potassium,  195 mg phosphorus, 43.5 gm CHO    Reason for Assessment    Reason For Assessment: RD follow up  Diagnosis: (TIMOTHY on CKD 4)  Relevant Medical History: CHF, CAD, DM2, HTN, pancreatitis, major depression  Interdisciplinary Rounds: attended    General Information Comments: 52 y/o female admitted with acute renal failure on CKD 4, on renal diet. At this time Phos WDL and K depleted. Pt with history of chronic moderate malnutrition, continues to have variable appetite r/t pain inpatient and was only snacking on junk food at home pt states. glucose in the 200's normally at home.  Pt's  does the cooking and shopping., educated pt on diet for CHF and DM2, considerations for wt gain, and left educational materials. Pt dislikes plain Boost/Ensure will take with ice cream (1 dairy choice allowed daily). NFPE reveals moderate-severe muscle/fat loss.  1/10/19: Pt alert. Reports good appetite, getting and using ~1/2 of  "the [employer] Renal drinks. Weight appears stable.  Wt Readings from Last 1 Encounters:   01/10/19 0703 54.3 kg (119 lb 11.4 oz)   19 1302 54.9 kg (121 lb 0.5 oz)   19 0756 54.9 kg (121 lb)   19 2247 55 kg (121 lb 4.1 oz)         Nutrition Discharge Planning: To be determined- Renal, cardiac, 2 gm Na, carbohydrate consistent diet (3-4 carbs/meal) + Boost Glucose control 1 x daily.     Nutrition Risk Screen    Nutrition Risk Screen: no indicators present    Nutrition/Diet History    Patient Reported Diet/Restrictions/Preferences: low salt  Spiritual, Cultural Beliefs, Mormonism Practices, Values that Affect Care: no  Food Allergies: NKFA(or intolerances)  Factors Affecting Nutritional Intake: pain, decreased appetite    Anthropometrics    Temp: 96.8 °F (36 °C)  Height Method: Measured  Height: 5' 7"(Admit 18)  Height (inches): 67 in  Weight Method: Bed Scale  Weight: 54.3 kg (119 lb 11.4 oz)  Weight (lb): 119.71 lb  Ideal Body Weight (IBW), Female: 135 lb  % Ideal Body Weight, Female (lb): 89.65 lb  BMI (Calculated): 19  BMI Grade: 18.5-24.9 - normal  Usual Body Weight (UBW), k.4 kg  % Usual Body Weight: 107.03  % Weight Change From Usual Weight: 6.81 %       Lab/Procedures/Meds    Pertinent Labs Reviewed: reviewed  Lab Results   Component Value Date    ALBUMIN 2.2 (L) 2019     BMP  Lab Results   Component Value Date     01/10/2019    K 3.5 01/10/2019     (H) 01/10/2019    CO2 18 (L) 01/10/2019    BUN 47 (H) 01/10/2019    CREATININE 4.5 (H) 01/10/2019    CALCIUM 7.7 (L) 01/10/2019    ANIONGAP 11 01/10/2019    ESTGFRAFRICA 12 (A) 01/10/2019    EGFRNONAA 10 (A) 01/10/2019     BNP  Recent Labs   Lab 19  0538   BNP 1,217*     POCT Glucose   Date Value Ref Range Status   01/10/2019 214 (H) 70 - 110 mg/dL Final   01/10/2019 207 (H) 70 - 110 mg/dL Final   2019 257 (H) 70 - 110 mg/dL Final   2019 269 (H) 70 - 110 mg/dL Final   2019 177 (H) 70 - 110 mg/dL " Final   01/08/2019 257 (H) 70 - 110 mg/dL Final   01/08/2019 297 (H) 70 - 110 mg/dL Final   01/08/2019 182 (H) 70 - 110 mg/dL Final   01/07/2019 282 (H) 70 - 110 mg/dL Final   01/07/2019 371 (H) 70 - 110 mg/dL Final   01/07/2019 291 (H) 70 - 110 mg/dL Final     Lab Results   Component Value Date    HGBA1C 8.1 (H) 01/06/2019       Pertinent Medications Reviewed: reviewed  Pertinent Medications Comments: insulin, zofran, iron, lipase, mg sulfate, KCl, senna    Estimated/Assessed Needs    Weight Used For Calorie Calculations: 54.9 kg (121 lb 0.5 oz)  Energy Calorie Requirements (kcal): [place] Jeor ( x 1.4-1.5 wt gain)= 1207-9235 kcal  Energy Need Method: [place]-St Jeor  Protein Requirements: 0.9 g protein/kg ( TIMOTHY on CKD 4 vs. muscle loss ) = 49 g protein  Weight Used For Protein Calculations: 54.9 kg (121 lb 0.5 oz)  Fluid Requirements (mL): 1650 ml or restriction per MD  Estimated Fluid Requirement Method: RDA Method  CHO Requirement: 45-50%      Nutrition Prescription Ordered    Current Diet Order: lconsistent carb,  renal diet (already 2 gm Na) 3-4 carb servings/meal- no calorie or fat restriction    Evaluation of Received Nutrient/Fluid Intake    Energy Calories Required:  meeting needs  Protein Required:  meeting needs  Fluid Required: not meeting needs    Intake/Output Summary (Last 24 hours) at 1/10/2019 1513  Last data filed at 1/9/2019 1952  Gross per 24 hour   Intake 590 ml   Output --   Net 590 ml     Total Fluid Intake (mL/kg): 2-3 cups daily PO per pt  Tolerance: tolerating  % Intake of Estimated Energy Needs:   % Meal Intake: %     Nutrition Risk    Level of Risk/Frequency of Follow-up: moderate 2 x weekly    Assessment and Plan    Moderate malnutrition    Moderate chronic illness related malnutrition          Related to (etiology):  Decreased Appetite     Signs and Symptoms (as evidenced by):  1) Body Fat Depletion: Moderate-severe depletion noted in upper arms, orbital area, pattellar area,  and ribs  2)Muscle Mass Depletion: Moderate-severe depletion noted with prominent protrusion of clavicle, squared shoulders, depleted biceps, calves, and thighs, and prominent depression  In dorsal area of hand  3) Historical wt loss 18.45% x 5 months, 1 week (42 kg, 6/18/18 and 51.5 kg, 1/15/18)  4) moderate fluid accumulation     Interventions/Recommendations (treatment strategy):  Consistent carb 3-4 servings per meal, renal, low sodium. ONS     Nutrition Diagnosis Status:  Improving                     Monitor and Evaluation    Food and Nutrient Intake: energy intake, food and beverage intake  Food and Nutrient Adminstration: diet order  Anthropometric Measurements: weight  Biochemical Data, Medical Tests and Procedures: electrolyte and renal panel, glucose/endocrine profile  Nutrition-Focused Physical Findings: overall appearance, extremities, muscles and bones     Malnutrition Assessment  Malnutrition Type: chronic illness  Energy Intake: moderate energy intake  Skin (Micronutrient): (Michael = 19, 2+ arm and 3+ leg edema)  Hair/Scalp (Micronutrient): sparse  Lips/Mucous Membranes (Micronutrient): (dry)  Teeth (Micronutrient): (Missing some)   Micronutrient Evaluation: suspected deficiency  Micronutrient Evaluation Comments: iron on supplement, K on supplement   Weight Loss (Malnutrition): other (see comments)  Energy Intake (Malnutrition): (18% x 5 months historical now regaining)  Subcutaneous Fat (Malnutrition): moderate depletion  Muscle Mass (Malnutrition): severe depletion  Fluid Accumulation (Malnutrition): moderate   Orbital Region (Subcutaneous Fat Loss): moderate depletion  Upper Arm Region (Subcutaneous Fat Loss): severe depletion  Thoracic and Lumbar Region: moderate depletion   Islam Region (Muscle Loss): moderate depletion  Clavicle Bone Region (Muscle Loss): moderate depletion  Clavicle and Acromion Bone Region (Muscle Loss): moderate depletion  Scapular Bone Region (Muscle Loss): moderate  depletion  Dorsal Hand (Muscle Loss): moderate depletion  Patellar Region (Muscle Loss): moderate depletion  Anterior Thigh Region (Muscle Loss): severe depletion  Posterior Calf Region (Muscle Loss): severe depletion   Edema (Fluid Accumulation): 3-->moderate   Subcutaneous Fat Loss (Final Summary): severe protein-calorie malnutrition  Muscle Loss Evaluation (Final Summary): severe protein-calorie malnutrition  Fluid Accumulation Evaluation: moderate    Moderate Weight Loss (Malnutrition): other (see comments)    Nutrition Follow-Up    RD Follow-up?: YesYes

## 2019-01-10 NOTE — PROGRESS NOTES
[facility] Medicine  Progress Note    Patient Name: Errol Keita  MRN: [id number]  Patient Class: IP- Inpatient   Admission Date: 1/5/2019  Length of Stay: 4 days  Attending Physician: Jaquelin Gunn MD  Primary Care Provider: Bharat Wiggins MD      Subjective:     Principal Problem:Acute renal failure superimposed on stage 4 chronic kidney disease    HPI:  This is a 54 yo  female with a PMHx of T2DM, HTN, pancreatitis, CAD, and CHF who presented to the ED for further evaluation of bilateral leg swelling that has been ongoing for 2 weeks. She states that she is having bilateral leg swelling and leg pain she describes as a tightness. Per ED record, pt endorsed some SOB, but she denies this to me.  Pt denies fever, chest pain, abdominal pain, vomiting, diarrhea, and headache.  ED evaluation reveals TIMOTHY.  She reports that she is a patient of Dr. Tolbert. Patient was evaluated in the ER and noted to have TIMOTHY and admitted for further evaluation and treatment.    Hospital Course:  Patient monitored closely during hospitalization. She initially received IV lasix for acute CHF. Telemetry showed  no significant arrhythmias. Her renal function was  trended closely. Nephrology was consulted. An Echo was  obtained. She was found to have iron deficiency anemia and initiated on oral fergon. Her hypokalemia was  replaced with oral supplementation. Dietary was consulted. She was noted to have moderate protein malnutrition and initiated on oral supplementation. Physical therapy was consulted for debility. Patient's creatinine continued to trend upward. Dr. Desouza was consulted for Howard placement.       Interval History: Creatinine continues to trend upward. Dr. Desouza consulted for Howard catheter placement.     Review of Systems   Constitutional: Positive for activity change, appetite change and fatigue. Negative for chills, diaphoresis and fever.   HENT: Negative for ear discharge, ear  pain and facial swelling.    Eyes: Negative for pain and redness.   Respiratory: Positive for cough and shortness of breath.    Cardiovascular: Negative for chest pain and palpitations.        Pedal edema noted   Gastrointestinal: Negative for abdominal distention, abdominal pain, constipation, diarrhea, nausea and vomiting.   Endocrine: Negative for polydipsia and polyphagia.   Genitourinary: Positive for decreased urine volume.        Patient reports decreased urine output   Musculoskeletal: Positive for arthralgias and gait problem. Negative for neck pain and neck stiffness.   Skin: Negative for color change and rash.   Allergic/Immunologic: Negative for food allergies.   Neurological: Positive for weakness. Negative for tremors, facial asymmetry and speech difficulty.   Hematological: Does not bruise/bleed easily.   Psychiatric/Behavioral: Negative for agitation, behavioral problems, confusion and suicidal ideas. The patient is not nervous/anxious.      Objective:     Vital Signs (Most Recent):  Temp: 96.8 °F (36 °C) (01/10/19 1055)  Pulse: 77 (01/10/19 1055)  Resp: 14 (01/10/19 1055)  BP: (!) 165/97 (01/10/19 1055)  SpO2: (!) 94 % (01/10/19 1055) Vital Signs (24h Range):  Temp:  [96.6 °F (35.9 °C)-97.9 °F (36.6 °C)] 96.8 °F (36 °C)  Pulse:  [77-84] 77  Resp:  [14-20] 14  SpO2:  [90 %-98 %] 94 %  BP: (142-172)/(77-97) 165/97     Weight: 54.3 kg (119 lb 11.4 oz)  Body mass index is 18.75 kg/m².    Intake/Output Summary (Last 24 hours) at 1/10/2019 1319  Last data filed at 1/9/2019 1952  Gross per 24 hour   Intake 590 ml   Output --   Net 590 ml      Physical Exam   Constitutional: She is oriented to person, place, and time. She appears well-developed and well-nourished. No distress.   Thin female   HENT:   Head: Normocephalic and atraumatic.   Eyes: Conjunctivae and EOM are normal. Pupils are equal, round, and reactive to light. Right eye exhibits no discharge. Left eye exhibits no discharge.   Neck: Normal range  of motion. Neck supple. No JVD present.   Cardiovascular: Normal rate, regular rhythm and intact distal pulses.   Murmur heard.      Pulmonary/Chest:   RLL diminished  Crackles noted to LLL  SOB with exertion   Abdominal: Soft. Bowel sounds are normal. She exhibits no distension. There is no tenderness. There is no guarding.   Genitourinary:   Genitourinary Comments: Not examined   Musculoskeletal: Normal range of motion. She exhibits edema.   LUE edema noted, bilateral pedal edema noted      Neurological: She is alert and oriented to person, place, and time. No cranial nerve deficit.   Skin: Skin is warm and dry. Capillary refill takes less than 2 seconds. She is not diaphoretic.   Skin dry and flaky   Psychiatric: She has a normal mood and affect. Her behavior is normal. Judgment and thought content normal.   Nursing note and vitals reviewed.     Labs: Reviewed    Assessment/Plan:      * Acute renal failure superimposed on stage 4 chronic kidney disease    Initially patient received  gentle IVF hydration that were later discontinued due to hypervolemia  Follow renal panel and electrolytes closely.  Nephrology following   Renal Ultrasound shows no obstructive uropathy- Sequelae of medical renal disease  Renal dose medications   Avoid NSAIDs, Coyle-II inhibitors, ACE-I, Angiotensin Receptor Blockers, or Aminoglycosides.  Urine analysis showed UTI- Start Iv rocephin  Echocardiogram report pending  Patient's creatinine continues to trend upward- Dr. Desouza consulted for Howard catheter placement                    UTI (urinary tract infection)    Continue Iv rocephin  Urine culture shows E. Coli which is sensitive to the rocephin       Uncontrolled type 2 diabetes mellitus with hyperglycemia    HGA1c is 8.1  Consult DM educator and dietician   See above       Hypokalemia    Monitor  Supplement as needed         Hypomagnesemia    Monitor  Supplement as needed         Lower extremity edema    No evidence of DVT per  ultrasound.       Acute on chronic congestive heart failure    Prior IVF were discontinued   BNP elevated.   Echocardiogram report is pending   Fluid restriction 1.5 liters/day         Moderate tobacco dependence    Dangers of cigarette smoking were reviewed with patient in detail for 3 minutes and patient was encouraged to quit. Nicotine replacement options were discussed.    Smoking cessation > 3 minutes         Type 2 diabetes mellitus    Chronic; poorly controlled historically.    DM diet  Accuchecks with correctional SSI   Blood sugars running 207-257 - Increase detemir to 12 units daily       Anemia of chronic renal failure    Chronic problem.  Stable.  Monitor H/H.  Continue iron supplementation.  Transfuse for hemodynamic instability and/or H/H <7/21         Coronary artery disease involving native coronary artery without angina pectoris    Historical diagnosis. No anginal symptoms.    Continue ASA/plavix, monitor on telemetry.       Iron deficiency anemia secondary to inadequate dietary iron intake    Continue  oral fergon  Continue MVI and pm vitamin C       Moderate malnutrition    Monitor and encourage po intake  Dietary consulted  Continue po supplement and  Renal MVI     Hypocalcemia    Continue calcium supplementation        Mild recurrent major depression    Chronic problem.  Stable.    Continue Seroquel and monitor.       Hypertension associated with diabetes    Historically poorly controlled.   Continue daily norvasc; hold ACEi 2/2 anjum.    Treat elevated bp prn with clonidine.         VTE Risk Mitigation (From admission, onward)        Ordered     Place PABLO hose  Until discontinued      01/06/19 1008     enoxaparin injection 30 mg  Daily      01/05/19 2121     IP VTE HIGH RISK PATIENT  Once      01/05/19 2121     Place PABLO hose  Until discontinued      01/05/19 2121     Place sequential compression device  Until discontinued      01/05/19 2121          GEORGE Loza  Department of [facility]  Medicine   [facility]    Time spent seeing patient( greater than 1/2 spent in direct contact) :  34 minutes

## 2019-01-11 LAB
ANION GAP SERPL CALC-SCNC: 12 MMOL/L
BUN SERPL-MCNC: 49 MG/DL
CALCIUM SERPL-MCNC: 7.7 MG/DL
CHLORIDE SERPL-SCNC: 113 MMOL/L
CO2 SERPL-SCNC: 17 MMOL/L
CREAT SERPL-MCNC: 4.7 MG/DL
EST. GFR  (AFRICAN AMERICAN): 11 ML/MIN/1.73 M^2
EST. GFR  (NON AFRICAN AMERICAN): 10 ML/MIN/1.73 M^2
GLUCOSE SERPL-MCNC: 256 MG/DL
MAGNESIUM SERPL-MCNC: 1.9 MG/DL
PHOSPHATE SERPL-MCNC: 5.3 MG/DL
POCT GLUCOSE: 217 MG/DL (ref 70–110)
POCT GLUCOSE: 229 MG/DL (ref 70–110)
POCT GLUCOSE: 303 MG/DL (ref 70–110)
POTASSIUM SERPL-SCNC: 3.7 MMOL/L
SODIUM SERPL-SCNC: 142 MMOL/L

## 2019-01-11 PROCEDURE — 12000002 HC ACUTE/MED SURGE SEMI-PRIVATE ROOM

## 2019-01-11 PROCEDURE — 25000003 PHARM REV CODE 250: Performed by: INTERNAL MEDICINE

## 2019-01-11 PROCEDURE — 84100 ASSAY OF PHOSPHORUS: CPT

## 2019-01-11 PROCEDURE — 90935 HEMODIALYSIS ONE EVALUATION: CPT

## 2019-01-11 PROCEDURE — 86706 HEP B SURFACE ANTIBODY: CPT

## 2019-01-11 PROCEDURE — 80048 BASIC METABOLIC PNL TOTAL CA: CPT

## 2019-01-11 PROCEDURE — 83735 ASSAY OF MAGNESIUM: CPT

## 2019-01-11 PROCEDURE — 86704 HEP B CORE ANTIBODY TOTAL: CPT

## 2019-01-11 PROCEDURE — 25000003 PHARM REV CODE 250: Performed by: NURSE PRACTITIONER

## 2019-01-11 PROCEDURE — 94761 N-INVAS EAR/PLS OXIMETRY MLT: CPT

## 2019-01-11 PROCEDURE — 63600175 PHARM REV CODE 636 W HCPCS: Performed by: NURSE PRACTITIONER

## 2019-01-11 PROCEDURE — 87340 HEPATITIS B SURFACE AG IA: CPT

## 2019-01-11 PROCEDURE — 36415 COLL VENOUS BLD VENIPUNCTURE: CPT

## 2019-01-11 RX ORDER — SODIUM CHLORIDE 9 MG/ML
INJECTION, SOLUTION INTRAVENOUS
Status: CANCELLED | OUTPATIENT
Start: 2019-01-11

## 2019-01-11 RX ORDER — SODIUM CHLORIDE 9 MG/ML
INJECTION, SOLUTION INTRAVENOUS ONCE
Status: CANCELLED | OUTPATIENT
Start: 2019-01-11 | End: 2019-01-11

## 2019-01-11 RX ADMIN — PANCRELIPASE 1 CAPSULE: 24000; 76000; 120000 CAPSULE, DELAYED RELEASE PELLETS ORAL at 08:01

## 2019-01-11 RX ADMIN — CLONIDINE HYDROCHLORIDE 0.1 MG: 0.1 TABLET ORAL at 04:01

## 2019-01-11 RX ADMIN — AMLODIPINE BESYLATE 10 MG: 5 TABLET ORAL at 08:01

## 2019-01-11 RX ADMIN — CLOPIDOGREL BISULFATE 75 MG: 75 TABLET ORAL at 08:01

## 2019-01-11 RX ADMIN — FERROUS SULFATE TAB EC 325 MG (65 MG FE EQUIVALENT) 325 MG: 325 (65 FE) TABLET DELAYED RESPONSE at 08:01

## 2019-01-11 RX ADMIN — CEFTRIAXONE 1 G: 1 INJECTION, SOLUTION INTRAVENOUS at 05:01

## 2019-01-11 RX ADMIN — ENOXAPARIN SODIUM 30 MG: 100 INJECTION SUBCUTANEOUS at 05:01

## 2019-01-11 RX ADMIN — INSULIN ASPART 4 UNITS: 100 INJECTION, SOLUTION INTRAVENOUS; SUBCUTANEOUS at 12:01

## 2019-01-11 RX ADMIN — PANCRELIPASE 1 CAPSULE: 24000; 76000; 120000 CAPSULE, DELAYED RELEASE PELLETS ORAL at 05:01

## 2019-01-11 RX ADMIN — ATORVASTATIN CALCIUM 80 MG: 40 TABLET, FILM COATED ORAL at 08:01

## 2019-01-11 RX ADMIN — PANCRELIPASE 1 CAPSULE: 24000; 76000; 120000 CAPSULE, DELAYED RELEASE PELLETS ORAL at 12:01

## 2019-01-11 RX ADMIN — INSULIN ASPART 3 UNITS: 100 INJECTION, SOLUTION INTRAVENOUS; SUBCUTANEOUS at 06:01

## 2019-01-11 RX ADMIN — OXYCODONE HYDROCHLORIDE AND ACETAMINOPHEN 500 MG: 500 TABLET ORAL at 08:01

## 2019-01-11 RX ADMIN — ASPIRIN 81 MG CHEWABLE TABLET 81 MG: 81 TABLET CHEWABLE at 08:01

## 2019-01-11 RX ADMIN — HYDRALAZINE HYDROCHLORIDE 10 MG: 10 TABLET, FILM COATED ORAL at 08:01

## 2019-01-11 RX ADMIN — QUETIAPINE FUMARATE 100 MG: 100 TABLET ORAL at 08:01

## 2019-01-11 RX ADMIN — TORSEMIDE 20 MG: 20 TABLET ORAL at 08:01

## 2019-01-11 RX ADMIN — Medication 1 CAPSULE: at 08:01

## 2019-01-11 RX ADMIN — SENNOSIDES AND DOCUSATE SODIUM 1 TABLET: 8.6; 5 TABLET ORAL at 08:01

## 2019-01-11 RX ADMIN — INSULIN ASPART 2 UNITS: 100 INJECTION, SOLUTION INTRAVENOUS; SUBCUTANEOUS at 05:01

## 2019-01-11 RX ADMIN — INSULIN ASPART 1 UNITS: 100 INJECTION, SOLUTION INTRAVENOUS; SUBCUTANEOUS at 09:01

## 2019-01-11 NOTE — PROGRESS NOTES
HD:  Pt stable, tx complete, lines reinfused, catheter capped and clamped, dressing changed.  Pt tolerated first tx well.    NET UF:  1000 mL

## 2019-01-11 NOTE — ASSESSMENT & PLAN NOTE
Historically poorly controlled.   Continue daily norvasc; hold ACEi 2/2 anjum.    Any further orders as per Nephrology

## 2019-01-11 NOTE — PROGRESS NOTES
[facility] Medicine  Progress Note    Patient Name: Errol Keita  MRN: [id number]  Patient Class: IP- Inpatient   Admission Date: 1/5/2019  Length of Stay: 5 days  Attending Physician: Jaquelin Gunn MD  Primary Care Provider: Bharat Wiggins MD      Subjective:     Principal Problem:Acute renal failure superimposed on stage 4 chronic kidney disease    HPI:  This is a 54 yo  female with a PMHx of T2DM, HTN, pancreatitis, CAD, and CHF who presented to the ED for further evaluation of bilateral leg swelling that has been ongoing for 2 weeks. She states that she is having bilateral leg swelling and leg pain she describes as a tightness. Per ED record, pt endorsed some SOB, but she denies this to me.  Pt denies fever, chest pain, abdominal pain, vomiting, diarrhea, and headache.  ED evaluation reveals TIMOTHY.  She reports that she is a patient of Dr. Tolbert. Patient was evaluated in the ER and noted to have TIMOTHY and admitted for further evaluation and treatment.    Hospital Course:  Patient monitored closely during hospitalization. She initially received IV lasix for acute CHF. Telemetry showed  no significant arrhythmias. Her renal function was  trended closely. Nephrology was consulted. An Echo was  obtained. She was found to have iron deficiency anemia and initiated on oral fergon. Her hypokalemia was  replaced with oral supplementation. Dietary was consulted. She was noted to have moderate protein malnutrition and initiated on oral supplementation. Physical therapy was consulted for debility. Patient's creatinine continued to trend upward. Dr. Desouza was consulted for Howard placement and patient was started on dialysis.        Interval History:  Howard catheter was placed and patient to start on HD today.    Review of Systems   Constitutional: Positive for activity change, appetite change and fatigue. Negative for chills, diaphoresis and fever.   HENT: Negative for ear  discharge, ear pain and facial swelling.    Eyes: Negative for pain and redness.   Respiratory: Positive for cough and shortness of breath.    Cardiovascular: Positive for leg swelling. Negative for chest pain and palpitations.            Gastrointestinal: Negative for abdominal distention, abdominal pain, constipation, diarrhea, nausea and vomiting.   Endocrine: Negative for polydipsia and polyphagia.   Genitourinary: Positive for decreased urine volume.        Patient reports decreased urine output   Musculoskeletal: Positive for arthralgias and gait problem. Negative for neck pain and neck stiffness.   Skin: Negative for color change and rash.   Allergic/Immunologic: Negative for food allergies.   Neurological: Positive for weakness. Negative for tremors, facial asymmetry and speech difficulty.   Hematological: Does not bruise/bleed easily.   Psychiatric/Behavioral: Negative for agitation, behavioral problems, confusion and suicidal ideas. The patient is not nervous/anxious.      Objective:     Vital Signs (Most Recent):  Temp: 97.8 °F (36.6 °C) (01/11/19 1445)  Pulse: 83 (01/11/19 1615)  Resp: 16 (01/11/19 1445)  BP: 130/78 (01/11/19 1615)  SpO2: (!) 93 % (01/11/19 1148) Vital Signs (24h Range):  Temp:  [97.2 °F (36.2 °C)-98.7 °F (37.1 °C)] 97.8 °F (36.6 °C)  Pulse:  [75-88] 83  Resp:  [2-20] 16  SpO2:  [92 %-98 %] 93 %  BP: (130-178)/(78-96) 130/78     Weight: 54.2 kg (119 lb 7.8 oz)  Body mass index is 18.71 kg/m².    Intake/Output Summary (Last 24 hours) at 1/11/2019 1622  Last data filed at 1/11/2019 1200  Gross per 24 hour   Intake 390 ml   Output 1150 ml   Net -760 ml      Physical Exam   Constitutional: She is oriented to person, place, and time. She appears well-developed and well-nourished. No distress.   Thin female   HENT:   Head: Normocephalic and atraumatic.   Eyes: Conjunctivae and EOM are normal. Pupils are equal, round, and reactive to light. Right eye exhibits no discharge. Left eye exhibits no  discharge.   Neck: Normal range of motion. Neck supple. No JVD present.   Cardiovascular: Normal rate, regular rhythm and intact distal pulses.   Murmur heard.      Pulmonary/Chest:   RLL diminished  Crackles noted to LLL  SOB with exertion   Abdominal: Soft. Bowel sounds are normal. She exhibits no distension. There is no tenderness. There is no guarding.   Genitourinary:   Genitourinary Comments: Not examined   Musculoskeletal: Normal range of motion. She exhibits edema.   LUE edema noted  BLE edema noted today      Neurological: She is alert and oriented to person, place, and time. No cranial nerve deficit.   Skin: Skin is warm and dry. Capillary refill takes less than 2 seconds. She is not diaphoretic.   Skin dry and flaky   Psychiatric: She has a normal mood and affect. Her behavior is normal. Judgment and thought content normal.   Nursing note and vitals reviewed.     Labs: Reviewed    Assessment/Plan:      * Acute renal failure superimposed on stage 4 chronic kidney disease    Initially patient received  gentle IVF hydration that were later discontinued due to hypervolemia  Follow renal panel and electrolytes closely.  Nephrology following   Renal Ultrasound shows no obstructive uropathy- Sequelae of medical renal disease  Renal dose medications   Avoid NSAIDs, Coyle-II inhibitors, ACE-I, Angiotensin Receptor Blockers, or Aminoglycosides.  Urine analysis showed UTI- Start Iv rocephin  Echocardiogram report pending  Patient's creatinine continues to trend upward- Dr. Desouza consulted for Howard catheter placement  1/11/19- Patient to start HD today                    UTI (urinary tract infection)    Continue Iv rocephin  Urine culture shows E. Coli which is sensitive to the rocephin       Uncontrolled type 2 diabetes mellitus with hyperglycemia    Chronic; poorly controlled historically.    DM diet  Accuchecks with correctional SSI   Blood sugars running 182-303  - Staff reports patient and  have been  refusing detemir insulin- Discussed with patient- Continue order for  detemir to 12 units daily  HGA1c is 8.1  Consult DM educator and dietician          Hypokalemia    Monitor  Supplement as needed         Hypomagnesemia    Monitor  Supplement as needed         Lower extremity edema    No evidence of DVT per ultrasound.       Acute on chronic congestive heart failure    Prior IVF were discontinued   BNP elevated.   Echocardiogram report is pending   Fluid restriction 1.5 liters/day  Patient to start HD today         Moderate tobacco dependence    Dangers of cigarette smoking were reviewed with patient in detail for 3 minutes and patient was encouraged to quit. Nicotine replacement options were discussed.    Smoking cessation > 3 minutes         Anemia of chronic renal failure    Chronic problem.  Stable.  Monitor H/H.  Continue iron supplementation.  Transfuse for hemodynamic instability and/or H/H <7/21         Coronary artery disease involving native coronary artery without angina pectoris    Historical diagnosis. No anginal symptoms.    Continue ASA/plavix, monitor on telemetry.       Iron deficiency anemia secondary to inadequate dietary iron intake    Continue  oral fergon  Continue MVI and pm vitamin C       Moderate malnutrition    Monitor and encourage po intake  Dietary consulted  Continue po supplement and  Renal MVI     Hypocalcemia    Continue calcium supplementation        Mild recurrent major depression    Chronic problem.  Stable.    Continue Seroquel and monitor.       Hypertension associated with diabetes    Historically poorly controlled.   Continue daily norvasc; hold ACEi 2/2 anjum.    Any further orders as per Nephrology         VTE Risk Mitigation (From admission, onward)        Ordered     Place PABLO hose  Until discontinued      01/06/19 1008     enoxaparin injection 30 mg  Daily      01/05/19 2121     IP VTE HIGH RISK PATIENT  Once      01/05/19 2121     Place PABLO hose  Until discontinued       01/05/19 2121     Place sequential compression device  Until discontinued      01/05/19 2121          GEORGE Loza  Department of Hospital Medicine   [facility]    Time spent seeing patient( greater than 1/2 spent in direct contact) : 29 minutes

## 2019-01-11 NOTE — PROGRESS NOTES
After informed consent obtained a hemodialysis catheter was inserted via the R internal jugular vein in the usual sterile fashion without complication  Tolerated well  Follow up CXR : Catheter appeared in good position and no pneumothorax seen

## 2019-01-11 NOTE — SUBJECTIVE & OBJECTIVE
Interval History:  Howard catheter was placed and patient to start on HD today.    Review of Systems   Constitutional: Positive for activity change, appetite change and fatigue. Negative for chills, diaphoresis and fever.   HENT: Negative for ear discharge, ear pain and facial swelling.    Eyes: Negative for pain and redness.   Respiratory: Positive for cough and shortness of breath.    Cardiovascular: Positive for leg swelling. Negative for chest pain and palpitations.            Gastrointestinal: Negative for abdominal distention, abdominal pain, constipation, diarrhea, nausea and vomiting.   Endocrine: Negative for polydipsia and polyphagia.   Genitourinary: Positive for decreased urine volume.        Patient reports decreased urine output   Musculoskeletal: Positive for arthralgias and gait problem. Negative for neck pain and neck stiffness.   Skin: Negative for color change and rash.   Allergic/Immunologic: Negative for food allergies.   Neurological: Positive for weakness. Negative for tremors, facial asymmetry and speech difficulty.   Hematological: Does not bruise/bleed easily.   Psychiatric/Behavioral: Negative for agitation, behavioral problems, confusion and suicidal ideas. The patient is not nervous/anxious.      Objective:     Vital Signs (Most Recent):  Temp: 97.8 °F (36.6 °C) (01/11/19 1445)  Pulse: 83 (01/11/19 1615)  Resp: 16 (01/11/19 1445)  BP: 130/78 (01/11/19 1615)  SpO2: (!) 93 % (01/11/19 1148) Vital Signs (24h Range):  Temp:  [97.2 °F (36.2 °C)-98.7 °F (37.1 °C)] 97.8 °F (36.6 °C)  Pulse:  [75-88] 83  Resp:  [2-20] 16  SpO2:  [92 %-98 %] 93 %  BP: (130-178)/(78-96) 130/78     Weight: 54.2 kg (119 lb 7.8 oz)  Body mass index is 18.71 kg/m².    Intake/Output Summary (Last 24 hours) at 1/11/2019 1622  Last data filed at 1/11/2019 1200  Gross per 24 hour   Intake 390 ml   Output 1150 ml   Net -760 ml      Physical Exam   Constitutional: She is oriented to person, place, and time. She appears  well-developed and well-nourished. No distress.   Thin female   HENT:   Head: Normocephalic and atraumatic.   Eyes: Conjunctivae and EOM are normal. Pupils are equal, round, and reactive to light. Right eye exhibits no discharge. Left eye exhibits no discharge.   Neck: Normal range of motion. Neck supple. No JVD present.   Cardiovascular: Normal rate, regular rhythm and intact distal pulses.   Murmur heard.      Pulmonary/Chest:   RLL diminished  Crackles noted to LLL  SOB with exertion   Abdominal: Soft. Bowel sounds are normal. She exhibits no distension. There is no tenderness. There is no guarding.   Genitourinary:   Genitourinary Comments: Not examined   Musculoskeletal: Normal range of motion. She exhibits edema.   LUE edema noted  BLE edema noted today      Neurological: She is alert and oriented to person, place, and time. No cranial nerve deficit.   Skin: Skin is warm and dry. Capillary refill takes less than 2 seconds. She is not diaphoretic.   Skin dry and flaky   Psychiatric: She has a normal mood and affect. Her behavior is normal. Judgment and thought content normal.   Nursing note and vitals reviewed.     Labs: Reviewed

## 2019-01-11 NOTE — ASSESSMENT & PLAN NOTE
Chronic; poorly controlled historically.    DM diet  Accuchecks with correctional SSI   Blood sugars running 182-303  - Staff reports patient and  have been refusing detemir insulin- Discussed with patient- Continue order for  detemir to 12 units daily  HGA1c is 8.1  Consult DM educator and dietician

## 2019-01-11 NOTE — PT/OT/SLP PROGRESS
"Physical Therapy      Patient Name:  Errol Stone Keita   MRN:  [id number]    Patient not seen today secondary to Patient unwilling to participate, Pain. Pt stating "I'm not going to make it today." pt c/o leg pain. Nurse Clarissa notified/aware. Will follow-up .    Adriana Elmore, PTA    "

## 2019-01-11 NOTE — CONSULTS
INPATIENT NEPHROLOGY CONSULT   [facility] NEPHROLOGY    Errol Stone Keita  01/11/2019    Reason for consultation:    Acute kidney injury    History of Present Illness:     53F with T2DM, HTN, pancreatitis, CAD, and CHF who presents with bilateral leg swelling, mild SOB. Admit to missing several doses of her lasix. Is scheduled to see a kidney specialist in the coming month. Renal consulted for TIMOTHY on CKD.    1/6  No n,v,chest pain, sob, dysuria or diarrhea.  No new neuro symptoms  1/7 VSS, no new complains.  1/8 VSS, no new complains.  1/9 VSS, flat affect. If her sCr is not better tomorrow, will ask Dr. Desouza to place a HD cath and start dialysis. This was previously discussed, question were answered.  1/10 VSS, no new complains. Awaiting HD cath placement.  1/11 VSS, no new complains, had howard placed, will have HD today and in AM.    Plan of Care:    Acute kidney injury  CKD  4, diabetic, proteinuric  Hypokalemia  Edema/pulmonary crackles  Anemia  Hypertension    Plan:    Resumed Torsemide.  Replete Mg and K as needed.  No nsaids, coxibs. Holding lisinopril.  Renal dose medication .  Keep MAP > 60, SBP > 100.  Had Howard placed 1/11 and HD today and tomorrow then PRN.    Thank you for allowing us to participate in this patient's care. We will continue to follow.    Vital Signs:  Temp Readings from Last 3 Encounters:   01/11/19 97.9 °F (36.6 °C) (Oral)   07/09/18 98.1 °F (36.7 °C) (Oral)   07/06/18 98.7 °F (37.1 °C) (Oral)       Pulse Readings from Last 3 Encounters:   01/11/19 75   07/09/18 72   07/06/18 68       BP Readings from Last 3 Encounters:   01/11/19 (!) 161/87   07/09/18 (!) 148/79   07/06/18 132/62       Weight:  Wt Readings from Last 3 Encounters:   01/11/19 54.2 kg (119 lb 7.8 oz)   07/09/18 48.1 kg (106 lb 0.7 oz)   07/06/18 48.4 kg (106 lb 11.2 oz)       Past Medical & Surgical History:  Past Medical History:   Diagnosis Date    Arthritis     Asthma     CHF (congestive heart failure)      Coronary artery disease     Diabetes mellitus     Hypertension     Pancreatitis     Type 2 diabetes mellitus with hyperglycemia 7/13/2015       Past Surgical History:   Procedure Laterality Date    ASPIRATION ABSCESS N/A 10/27/2015    Performed by Yecenia Surgeon at [facility] YECENIA    CARDIAC SURGERY      CABG    CHOLECYSTECTOMY      COLECTOMY-RIGHT N/A 8/25/2014    Performed by Harlan Regalado MD at [facility] OR    CORONARY ARTERY BYPASS GRAFT      EGD (ESOPHAGOGASTRODUODENOSCOPY) N/A 4/16/2014    Performed by Crow Brantley MD at [facility] ENDO    EGD (ESOPHAGOGASTRODUODENOSCOPY) N/A 4/2/2014    Performed by Crow Brantley MD at [facility] ENDO    EGD (ESOPHAGOGASTRODUODENOSCOPY) N/A 6/6/2013    Performed by Crow Brantley MD at [facility] ENDO    ESOPHAGOGASTRODUODENOSCOPY (EGD) N/A 8/22/2014    Performed by Satnam Nguyen MD at [facility] ENDO    STERNOTOMY N/A 6/6/2013    Performed by Ole Ingram MD at [facility] OR    THORACOTOMY N/A 6/6/2013    Performed by Ole Ingram MD at [facility] OR       Past Social History:  Social History     Socioeconomic History    Marital status:      Spouse name: None    Number of children: None    Years of education: None    Highest education level: None   Social Needs    Financial resource strain: None    Food insecurity - worry: None    Food insecurity - inability: None    Transportation needs - medical: None    Transportation needs - non-medical: None   Occupational History    None   Tobacco Use    Smoking status: Current Every Day Smoker     Packs/day: 0.50     Years: 35.00     Pack years: 17.50     Types: Cigarettes    Smokeless tobacco: Never Used   Substance and Sexual Activity    Alcohol use: No     Alcohol/week: 0.0 oz     Comment: questionable per     Drug use: No    Sexual activity: Not Currently     Partners: Male     Birth control/protection: None   Other Topics Concern    None   Social History Narrative    None       Medications:  No  "current facility-administered medications on file prior to encounter.      Current Outpatient Medications on File Prior to Encounter   Medication Sig Dispense Refill    amLODIPine (NORVASC) 5 MG tablet Take 1 tablet (5 mg total) by mouth once daily. (Patient taking differently: Take 10 mg by mouth once daily. ) 30 tablet 0    aspirin 81 MG chewable tablet Take 81 mg by mouth once daily. Ran out      atorvastatin (LIPITOR) 80 MG tablet Take 1 tablet (80 mg total) by mouth once daily. 30 tablet 11    blood sugar diagnostic Strp To check BG 4 times daily, to use with insurance preferred meter 400 strip 3    blood-glucose meter kit To check BG 4 times daily, to use with insurance preferred meter 1 each 0    clopidogrel (PLAVIX) 75 mg tablet Take 1 tablet (75 mg total) by mouth once daily. 30 tablet 11    CREON 24,000-76,000 -120,000 unit capsule Take 1 capsule by mouth 4 (four) times daily with meals and nightly.       ferrous sulfate 325 (65 FE) MG EC tablet Take 1 tablet (325 mg total) by mouth 2 (two) times daily. 60 tablet 0    furosemide (LASIX) 40 MG tablet Take 40 mg by mouth once daily.      HUMALOG KWIKPEN INSULIN 100 unit/mL InPn pen Inject 2 Units into the skin 3 (three) times daily before meals. 15 mL 12    insulin detemir U-100 (LEVEMIR FLEXTOUCH) 100 unit/mL (3 mL) SubQ InPn pen Inject 2 Units into the skin 2 (two) times daily. 1 Box 0    lancets Misc To check BG 4 times daily, to use with insurance preferred meter 400 each 3    lisinopril (PRINIVIL,ZESTRIL) 2.5 MG tablet Take 2.5 mg by mouth once daily.       magnesium oxide (MAG-OX) 400 mg tablet Take 1 tablet (400 mg total) by mouth once daily. 30 tablet 0    pantoprazole (PROTONIX) 40 MG tablet Take 40 mg by mouth once daily.      pen needle, diabetic (BD ULTRA-FINE AZIZA PEN NEEDLE) 32 gauge x 5/32" Ndle Uses 4 daily 150 each 12    potassium chloride (KLOR-CON) 20 mEq Pack Take 20 mEq by mouth 2 (two) times daily. 60 packet 0    " "quetiapine (SEROQUEL) 100 MG Tab 100 mg every evening.        Scheduled Meds:   amLODIPine  10 mg Oral Daily    ascorbic acid (vitamin C)  500 mg Oral QHS    aspirin  81 mg Oral Daily    atorvastatin  80 mg Oral Daily    cefTRIAXone (ROCEPHIN) IVPB  1 g Intravenous Q24H    clopidogrel  75 mg Oral Daily    enoxaparin  30 mg Subcutaneous Daily    ferrous sulfate  325 mg Oral BID    hydrALAZINE  10 mg Oral Q12H    insulin detemir U-100  12 Units Subcutaneous QHS    lipase-protease-amylase 24,000-76,000-120,000 units  1 capsule Oral QID (WM & HS)    QUEtiapine  100 mg Oral QHS    senna-docusate 8.6-50 mg  1 tablet Oral BID    torsemide  20 mg Oral BID    vitamin renal formula (B-complex-vitamin c-folic acid)  1 capsule Oral Daily     Continuous Infusions:  PRN Meds:.acetaminophen, cloNIDine, dextrose 50%, dextrose 50%, glucagon (human recombinant), glucose, glucose, insulin aspart U-100, ondansetron, sodium chloride 0.9%    Allergies:  Patient has no known allergies.    Past Family History:  Reviewed; refer to Hospitalist Admission Note    Review of Systems:  Review of Systems - All 14 systems reviewed and negative, except as noted in HPI    Physical Exam:    BP (!) 161/87 (Patient Position: Sitting)   Pulse 75   Temp 97.9 °F (36.6 °C) (Oral)   Resp 14   Ht 5' 7" (1.702 m) Comment: Admit 6/20/18  Wt 54.2 kg (119 lb 7.8 oz)   LMP 09/03/2012 (Exact Date)   SpO2 (!) 93%   Breastfeeding? No   BMI 18.71 kg/m²     General Appearance:    Alert, cooperative, no distress, appears stated age   Head:    Normocephalic, without obvious abnormality, atraumatic   Eyes:    PER, conjunctiva/corneas clear, EOM's intact in both eyes        Throat:   Lips, mucosa, and tongue normal; teeth and gums normal   Back:     Symmetric, no curvature, ROM normal, no CVA tenderness   Lungs:     Clear to auscultation bilaterally, respirations unlabored   Chest wall:    No tenderness or deformity   Heart:    Regular rate and " rhythm, S1 and S2 normal, no murmur, rub   or gallop   Abdomen:     Soft, non-tender, bowel sounds active all four quadrants,     no masses, no organomegaly   Extremities:   Extremities normal, atraumatic, no cyanosis or edema   Pulses:   2+ and symmetric all extremities   MSK:   No joint or muscle swelling, tenderness or deformity   Skin:   Skin color, texture, turgor normal, no rashes or lesions   Neurologic:   CNII-XII intact, normal strength and sensation       Throughout.  No flap     Results:  Lab Results   Component Value Date     01/11/2019    K 3.7 01/11/2019     (H) 01/11/2019    CO2 17 (L) 01/11/2019    BUN 49 (H) 01/11/2019    CREATININE 4.7 (H) 01/11/2019    CALCIUM 7.7 (L) 01/11/2019    ANIONGAP 12 01/11/2019    ESTGFRAFRICA 11 (A) 01/11/2019    EGFRNONAA 10 (A) 01/11/2019       Lab Results   Component Value Date    CALCIUM 7.7 (L) 01/11/2019    PHOS 5.3 (H) 01/11/2019       No results for input(s): WBC, RBC, HGB, HCT, PLT, MCV, MCH, MCHC in the last 24 hours.    I have personally reviewed pertinent radiological imaging and reports.

## 2019-01-11 NOTE — ASSESSMENT & PLAN NOTE
Initially patient received  gentle IVF hydration that were later discontinued due to hypervolemia  Follow renal panel and electrolytes closely.  Nephrology following   Renal Ultrasound shows no obstructive uropathy- Sequelae of medical renal disease  Renal dose medications   Avoid NSAIDs, Coyle-II inhibitors, ACE-I, Angiotensin Receptor Blockers, or Aminoglycosides.  Urine analysis showed UTI- Start Iv rocephin  Echocardiogram report pending  Patient's creatinine continues to trend upward- Dr. Desouza consulted for Howard catheter placement  1/11/19- Patient to start HD today

## 2019-01-11 NOTE — PLAN OF CARE
Problem: Fall Injury Risk  Goal: Absence of Fall and Fall-Related Injury    Intervention: Promote Injury-Free Environment   01/10/19 1926   Optimize Potter and Functional Mobility   Environmental Safety Modification assistive device/personal items within reach;clutter free environment maintained   Optimize Balance and Safe Activity   Safety Promotion/Fall Prevention bedside commode chair;Fall Risk signage in place;Fall Risk reviewed with patient/family      01/10/19 1926   Optimize Potter and Functional Mobility   Environmental Safety Modification assistive device/personal items within reach;clutter free environment maintained   Optimize Balance and Safe Activity   Safety Promotion/Fall Prevention bedside commode chair;Fall Risk signage in place;Fall Risk reviewed with patient/family

## 2019-01-11 NOTE — PLAN OF CARE
Problem: Adult Inpatient Plan of Care  Goal: Plan of Care Review  Outcome: Ongoing (interventions implemented as appropriate)  Pt on room air with 98% sats.

## 2019-01-11 NOTE — ASSESSMENT & PLAN NOTE
Prior IVF were discontinued   BNP elevated.   Echocardiogram report is pending   Fluid restriction 1.5 liters/day  Patient to start HD today

## 2019-01-12 LAB
ANION GAP SERPL CALC-SCNC: 11 MMOL/L
BUN SERPL-MCNC: 36 MG/DL
CALCIUM SERPL-MCNC: 7.8 MG/DL
CHLORIDE SERPL-SCNC: 106 MMOL/L
CO2 SERPL-SCNC: 22 MMOL/L
CREAT SERPL-MCNC: 3.5 MG/DL
EST. GFR  (AFRICAN AMERICAN): 16 ML/MIN/1.73 M^2
EST. GFR  (NON AFRICAN AMERICAN): 14 ML/MIN/1.73 M^2
GLUCOSE SERPL-MCNC: 238 MG/DL
MAGNESIUM SERPL-MCNC: 1.8 MG/DL
PHOSPHATE SERPL-MCNC: 3.6 MG/DL
POCT GLUCOSE: 211 MG/DL (ref 70–110)
POCT GLUCOSE: 223 MG/DL (ref 70–110)
POTASSIUM SERPL-SCNC: 3.6 MMOL/L
SODIUM SERPL-SCNC: 139 MMOL/L

## 2019-01-12 PROCEDURE — 36415 COLL VENOUS BLD VENIPUNCTURE: CPT

## 2019-01-12 PROCEDURE — 25000003 PHARM REV CODE 250: Performed by: INTERNAL MEDICINE

## 2019-01-12 PROCEDURE — 12000002 HC ACUTE/MED SURGE SEMI-PRIVATE ROOM

## 2019-01-12 PROCEDURE — 83735 ASSAY OF MAGNESIUM: CPT

## 2019-01-12 PROCEDURE — 84100 ASSAY OF PHOSPHORUS: CPT

## 2019-01-12 PROCEDURE — 94761 N-INVAS EAR/PLS OXIMETRY MLT: CPT

## 2019-01-12 PROCEDURE — 25000003 PHARM REV CODE 250: Performed by: NURSE PRACTITIONER

## 2019-01-12 PROCEDURE — 80100014 HC HEMODIALYSIS 1:1

## 2019-01-12 PROCEDURE — 80048 BASIC METABOLIC PNL TOTAL CA: CPT

## 2019-01-12 PROCEDURE — 63600175 PHARM REV CODE 636 W HCPCS: Performed by: NURSE PRACTITIONER

## 2019-01-12 RX ORDER — INSULIN ASPART 100 [IU]/ML
1-10 INJECTION, SOLUTION INTRAVENOUS; SUBCUTANEOUS
Status: DISCONTINUED | OUTPATIENT
Start: 2019-01-12 | End: 2019-01-19 | Stop reason: HOSPADM

## 2019-01-12 RX ORDER — IBUPROFEN 200 MG
16 TABLET ORAL
Status: DISCONTINUED | OUTPATIENT
Start: 2019-01-12 | End: 2019-01-19 | Stop reason: HOSPADM

## 2019-01-12 RX ORDER — HEPARIN SODIUM 1000 [USP'U]/ML
4000 INJECTION, SOLUTION INTRAVENOUS; SUBCUTANEOUS
Status: DISCONTINUED | OUTPATIENT
Start: 2019-01-12 | End: 2019-01-19 | Stop reason: HOSPADM

## 2019-01-12 RX ADMIN — HYDRALAZINE HYDROCHLORIDE 10 MG: 10 TABLET, FILM COATED ORAL at 09:01

## 2019-01-12 RX ADMIN — CEFTRIAXONE 1 G: 1 INJECTION, SOLUTION INTRAVENOUS at 04:01

## 2019-01-12 RX ADMIN — FERROUS SULFATE TAB EC 325 MG (65 MG FE EQUIVALENT) 325 MG: 325 (65 FE) TABLET DELAYED RESPONSE at 09:01

## 2019-01-12 RX ADMIN — AMLODIPINE BESYLATE 10 MG: 5 TABLET ORAL at 09:01

## 2019-01-12 RX ADMIN — QUETIAPINE FUMARATE 100 MG: 100 TABLET ORAL at 09:01

## 2019-01-12 RX ADMIN — ENOXAPARIN SODIUM 30 MG: 100 INJECTION SUBCUTANEOUS at 04:01

## 2019-01-12 RX ADMIN — PANCRELIPASE 1 CAPSULE: 24000; 76000; 120000 CAPSULE, DELAYED RELEASE PELLETS ORAL at 09:01

## 2019-01-12 RX ADMIN — INSULIN ASPART 4 UNITS: 100 INJECTION, SOLUTION INTRAVENOUS; SUBCUTANEOUS at 04:01

## 2019-01-12 RX ADMIN — Medication 1 CAPSULE: at 09:01

## 2019-01-12 RX ADMIN — SENNOSIDES AND DOCUSATE SODIUM 1 TABLET: 8.6; 5 TABLET ORAL at 09:01

## 2019-01-12 RX ADMIN — CLOPIDOGREL BISULFATE 75 MG: 75 TABLET ORAL at 09:01

## 2019-01-12 RX ADMIN — TORSEMIDE 20 MG: 20 TABLET ORAL at 09:01

## 2019-01-12 RX ADMIN — INSULIN ASPART 2 UNITS: 100 INJECTION, SOLUTION INTRAVENOUS; SUBCUTANEOUS at 09:01

## 2019-01-12 RX ADMIN — ASPIRIN 81 MG CHEWABLE TABLET 81 MG: 81 TABLET CHEWABLE at 09:01

## 2019-01-12 RX ADMIN — PANCRELIPASE 1 CAPSULE: 24000; 76000; 120000 CAPSULE, DELAYED RELEASE PELLETS ORAL at 04:01

## 2019-01-12 RX ADMIN — OXYCODONE HYDROCHLORIDE AND ACETAMINOPHEN 500 MG: 500 TABLET ORAL at 09:01

## 2019-01-12 RX ADMIN — ATORVASTATIN CALCIUM 80 MG: 40 TABLET, FILM COATED ORAL at 09:01

## 2019-01-12 RX ADMIN — INSULIN ASPART 2 UNITS: 100 INJECTION, SOLUTION INTRAVENOUS; SUBCUTANEOUS at 06:01

## 2019-01-12 NOTE — PT/OT/SLP PROGRESS
Physical Therapy      Patient Name:  Errol Keita   MRN:  [id number]    Patient not seen today secondary to Unavailable (Comment), Dialysis. Will follow-up .    Adriana Elmore, PTA

## 2019-01-12 NOTE — PROGRESS NOTES
[facility] Hospital Medicine  Progress Note    Patient Name: Errol Keita  MRN: [id number]  Patient Class: IP- Inpatient   Admission Date: 1/5/2019  Length of Stay: 6 days  Attending Physician: Jaquelin Gunn MD  Primary Care Provider: Bharat Wiggins MD      Subjective:     Principal Problem:Acute renal failure superimposed on stage 4 chronic kidney disease    HPI:  This is a 52 yo  female with a PMHx of T2DM, HTN, pancreatitis, CAD, and CHF who presented to the ED for further evaluation of bilateral leg swelling that has been ongoing for 2 weeks. She states that she is having bilateral leg swelling and leg pain she describes as a tightness. Per ED record, pt endorsed some SOB, but she denies this to me.  Pt denies fever, chest pain, abdominal pain, vomiting, diarrhea, and headache.  ED evaluation reveals TIMOTHY.  She reports that she is a patient of Dr. Tolbert. Patient was evaluated in the ER and noted to have TIMOTHY and admitted for further evaluation and treatment.    Hospital Course:  Patient monitored closely during hospitalization. She initially received IV lasix for acute CHF. Telemetry showed  no significant arrhythmias. Her renal function was  trended closely. Nephrology was consulted. An Echo was  obtained. She was found to have iron deficiency anemia and initiated on oral fergon. Her hypokalemia was  replaced with oral supplementation. Dietary was consulted. She was noted to have moderate protein malnutrition and initiated on oral supplementation. Physical therapy was consulted for debility. Patient's creatinine continued to trend upward. Dr. Desouza was consulted for Howard placement and patient was started on dialysis on 1/11/18.      Interval History: Patient recieving HD again today.    Review of Systems   Constitutional: Positive for activity change, appetite change and fatigue. Negative for chills, diaphoresis and fever.   HENT: Negative for ear discharge, ear pain and  facial swelling.    Eyes: Negative for pain and redness.   Respiratory: Positive for cough and shortness of breath.    Cardiovascular: Positive for leg swelling. Negative for chest pain and palpitations.            Gastrointestinal: Negative for abdominal distention, abdominal pain, constipation, diarrhea, nausea and vomiting.   Endocrine: Negative for polydipsia and polyphagia.   Genitourinary: Positive for decreased urine volume.        Patient reports decreased urine output   Musculoskeletal: Positive for arthralgias and gait problem. Negative for neck pain and neck stiffness.   Skin: Negative for color change and rash.   Allergic/Immunologic: Negative for food allergies.   Neurological: Positive for weakness. Negative for tremors, facial asymmetry and speech difficulty.   Hematological: Does not bruise/bleed easily.   Psychiatric/Behavioral: Negative for agitation, behavioral problems, confusion and suicidal ideas. The patient is not nervous/anxious.      Objective:     Vital Signs (Most Recent):  Temp: 98 °F (36.7 °C) (01/12/19 1345)  Pulse: 93 (01/12/19 1345)  Resp: 18 (01/12/19 1345)  BP: (!) 141/84 (01/12/19 1345)  SpO2: 96 % (01/12/19 0830) Vital Signs (24h Range):  Temp:  [97.4 °F (36.3 °C)-98.3 °F (36.8 °C)] 98 °F (36.7 °C)  Pulse:  [73-93] 93  Resp:  [16-20] 18  SpO2:  [92 %-96 %] 96 %  BP: (130-185)/(54-94) 141/84     Weight: 54.2 kg (119 lb 7.8 oz)  Body mass index is 18.71 kg/m².    Intake/Output Summary (Last 24 hours) at 1/12/2019 1441  Last data filed at 1/12/2019 1345  Gross per 24 hour   Intake 1680 ml   Output 3500 ml   Net -1820 ml      Physical Exam   Constitutional: She is oriented to person, place, and time. She appears well-developed and well-nourished. No distress.   Thin female   HENT:   Head: Normocephalic and atraumatic.   Eyes: Conjunctivae and EOM are normal. Pupils are equal, round, and reactive to light. Right eye exhibits no discharge. Left eye exhibits no discharge.   Neck: Normal  range of motion. Neck supple. No JVD present.   Cardiovascular: Normal rate, regular rhythm and intact distal pulses.   Murmur heard.      Pulmonary/Chest:   RLL diminished  Crackles noted to LLL  SOB with exertion   Abdominal: Soft. Bowel sounds are normal. She exhibits no distension. There is no tenderness. There is no guarding.   Genitourinary:   Genitourinary Comments: Not examined   Musculoskeletal: Normal range of motion. She exhibits edema.   LUE edema noted  BLE edema noted today      Neurological: She is alert and oriented to person, place, and time. No cranial nerve deficit.   Skin: Skin is warm and dry. Capillary refill takes less than 2 seconds. She is not diaphoretic.   Skin dry and flaky   Psychiatric: She has a normal mood and affect. Her behavior is normal. Judgment and thought content normal.   Nursing note and vitals reviewed.     Labs: Reviewed    Assessment/Plan:      * Acute renal failure superimposed on stage 4 chronic kidney disease    Initially patient received  gentle IVF hydration that were later discontinued due to hypervolemia  Follow renal panel and electrolytes closely.  Nephrology following   Renal Ultrasound shows no obstructive uropathy- Sequelae of medical renal disease  Renal dose medications   Avoid NSAIDs, Coyle-II inhibitors, ACE-I, Angiotensin Receptor Blockers, or Aminoglycosides.  Urine analysis showed UTI- Start Iv rocephin  Echocardiogram report pending  Patient's creatinine continues to trend upward- Dr. Desouza consulted for Howard catheter placement  1/11/19- Patient to start HD today                    UTI (urinary tract infection)    Continue Iv rocephin  Urine culture shows > 100,000 colonies E. Coli which is sensitive to the rocephin       Uncontrolled type 2 diabetes mellitus with hyperglycemia    Chronic; poorly controlled historically.    DM diet  Accuchecks with correctional SSI   Blood sugars running 217-303  - Patient continues to refuse detemir insulin- Will go  ahead and discontinue detemir since patient refusing past 3 nights and increase SSI coverage to moderate coverage   HGA1c is 8.1  DM educator and dietician were consulted         Hypokalemia    Monitor  Supplement as needed         Hypomagnesemia    Monitor  Supplement as needed         Lower extremity edema    No evidence of DVT per ultrasound.       Acute on chronic congestive heart failure    Prior IVF were discontinued   BNP elevated.   Echocardiogram report is pending   Fluid restriction 1.5 liters/day  Patient to start HD today         Moderate tobacco dependence    Dangers of cigarette smoking were reviewed with patient in detail for 3 minutes and patient was encouraged to quit. Nicotine replacement options were discussed.    Smoking cessation > 3 minutes         Anemia of chronic renal failure    Chronic problem.  Stable.  Monitor H/H.  Continue iron supplementation.  Transfuse for hemodynamic instability and/or H/H <7/21         Coronary artery disease involving native coronary artery without angina pectoris    Historical diagnosis. No anginal symptoms.    Continue ASA/plavix, monitor on telemetry.       Iron deficiency anemia secondary to inadequate dietary iron intake    Continue  oral fergon  Continue MVI and pm vitamin C       Moderate malnutrition    Monitor and encourage po intake  Dietary consulted  Continue po supplement and  Renal MVI     Hypocalcemia    Continue calcium supplementation        Mild recurrent major depression    Chronic problem.  Stable.    Continue Seroquel and monitor.       Hypertension associated with diabetes    Historically poorly controlled.   Continue daily norvasc; hold ACEi 2/2 anjum.    Any further orders as per Nephrology         VTE Risk Mitigation (From admission, onward)        Ordered     heparin (porcine) injection 4,000 Units  As needed (PRN)      01/12/19 1106     Place PABLO hose  Until discontinued      01/06/19 1008     enoxaparin injection 30 mg  Daily       01/05/19 2121     IP VTE HIGH RISK PATIENT  Once      01/05/19 2121     Place PABLO hose  Until discontinued      01/05/19 2121     Place sequential compression device  Until discontinued      01/05/19 2121          GEORGE Loza  Department of Hospital Medicine   [facility]    Time spent seeing patient( greater than 1/2 spent in direct contact) : 28 minutes

## 2019-01-12 NOTE — ASSESSMENT & PLAN NOTE
Chronic; poorly controlled historically.    DM diet  Accuchecks with correctional SSI   Blood sugars running 217-303  - Patient continues to refuse detemir insulin- Will go ahead and discontinue detemir since patient refusing past 3 nights and increase SSI coverage to moderate coverage   HGA1c is 8.1  DM educator and dietician were consulted

## 2019-01-12 NOTE — ASSESSMENT & PLAN NOTE
Continue Iv rocephin  Urine culture shows > 100,000 colonies E. Coli which is sensitive to the rocephin

## 2019-01-12 NOTE — PLAN OF CARE
Pt resting quietly at this time. Able to make needs known. Cont b/b. Denies pain at this time. Bed low and locked. Call light in reach.

## 2019-01-12 NOTE — SUBJECTIVE & OBJECTIVE
Interval History: Patient recieving HD again today.    Review of Systems   Constitutional: Positive for activity change, appetite change and fatigue. Negative for chills, diaphoresis and fever.   HENT: Negative for ear discharge, ear pain and facial swelling.    Eyes: Negative for pain and redness.   Respiratory: Positive for cough and shortness of breath.    Cardiovascular: Positive for leg swelling. Negative for chest pain and palpitations.            Gastrointestinal: Negative for abdominal distention, abdominal pain, constipation, diarrhea, nausea and vomiting.   Endocrine: Negative for polydipsia and polyphagia.   Genitourinary: Positive for decreased urine volume.        Patient reports decreased urine output   Musculoskeletal: Positive for arthralgias and gait problem. Negative for neck pain and neck stiffness.   Skin: Negative for color change and rash.   Allergic/Immunologic: Negative for food allergies.   Neurological: Positive for weakness. Negative for tremors, facial asymmetry and speech difficulty.   Hematological: Does not bruise/bleed easily.   Psychiatric/Behavioral: Negative for agitation, behavioral problems, confusion and suicidal ideas. The patient is not nervous/anxious.      Objective:     Vital Signs (Most Recent):  Temp: 98 °F (36.7 °C) (01/12/19 1345)  Pulse: 93 (01/12/19 1345)  Resp: 18 (01/12/19 1345)  BP: (!) 141/84 (01/12/19 1345)  SpO2: 96 % (01/12/19 0830) Vital Signs (24h Range):  Temp:  [97.4 °F (36.3 °C)-98.3 °F (36.8 °C)] 98 °F (36.7 °C)  Pulse:  [73-93] 93  Resp:  [16-20] 18  SpO2:  [92 %-96 %] 96 %  BP: (130-185)/(54-94) 141/84     Weight: 54.2 kg (119 lb 7.8 oz)  Body mass index is 18.71 kg/m².    Intake/Output Summary (Last 24 hours) at 1/12/2019 1441  Last data filed at 1/12/2019 1345  Gross per 24 hour   Intake 1680 ml   Output 3500 ml   Net -1820 ml      Physical Exam   Constitutional: She is oriented to person, place, and time. She appears well-developed and  well-nourished. No distress.   Thin female   HENT:   Head: Normocephalic and atraumatic.   Eyes: Conjunctivae and EOM are normal. Pupils are equal, round, and reactive to light. Right eye exhibits no discharge. Left eye exhibits no discharge.   Neck: Normal range of motion. Neck supple. No JVD present.   Cardiovascular: Normal rate, regular rhythm and intact distal pulses.   Murmur heard.      Pulmonary/Chest:   RLL diminished  Crackles noted to LLL  SOB with exertion   Abdominal: Soft. Bowel sounds are normal. She exhibits no distension. There is no tenderness. There is no guarding.   Genitourinary:   Genitourinary Comments: Not examined   Musculoskeletal: Normal range of motion. She exhibits edema.   LUE edema noted  BLE edema noted today      Neurological: She is alert and oriented to person, place, and time. No cranial nerve deficit.   Skin: Skin is warm and dry. Capillary refill takes less than 2 seconds. She is not diaphoretic.   Skin dry and flaky   Psychiatric: She has a normal mood and affect. Her behavior is normal. Judgment and thought content normal.   Nursing note and vitals reviewed.     Labs: Reviewed

## 2019-01-12 NOTE — PLAN OF CARE
01/11/19 1936   Patient Assessment/Suction   Level of Consciousness (AVPU) alert   Respiratory Effort Unlabored;Normal   PRE-TX-O2-ETCO2   O2 Device (Oxygen Therapy) room air   SpO2 95 %   Pulse Oximetry Type Intermittent

## 2019-01-12 NOTE — PLAN OF CARE
Problem: Adult Inpatient Plan of Care  Goal: Plan of Care Review  Outcome: Ongoing (interventions implemented as appropriate)  Pt on room air with 96% sats.

## 2019-01-12 NOTE — PROGRESS NOTES
INPATIENT NEPHROLOGY PROGRESS  [facility] NEPHROLOGY    Errol Stone Keita  01/12/2019    Reason for consultation:    Acute kidney injury    History of Present Illness:     53F with T2DM, HTN, pancreatitis, CAD, and CHF who presents with bilateral leg swelling, mild SOB. Admit to missing several doses of her lasix. Is scheduled to see a kidney specialist in the coming month. Renal consulted for TIMOTHY on CKD.    1/6  No n,v,chest pain, sob, dysuria or diarrhea.  No new neuro symptoms  1/7 VSS, no new complains.  1/8 VSS, no new complains.  1/9 VSS, flat affect. If her sCr is not better tomorrow, will ask Dr. Desouza to place a HD cath and start dialysis. This was previously discussed, question were answered.  1/10 VSS, no new complains. Awaiting HD cath placement.  1/11 VSS, no new complains, had howard placed, will have HD today and in AM.   1/12  New start HD, due for treatment today.  States tolerated her first tx yesterday without problems.  Denies SOB and chest pain.  Acidosis improving.    Plan of Care:    Acute kidney injury  CKD  4, diabetic, proteinuric  Hypokalemia  Edema/pulmonary crackles  Anemia  Hypertension    Plan:    Resumed Torsemide.  Replete Mg and K as needed.  No nsaids, coxibs. Holding lisinopril.  Renal dose medication .  Keep MAP > 60, SBP > 100.  Had Howard placed 1/11 and HD today and tomorrow then PRN.    Thank you for allowing us to participate in this patient's care. We will continue to follow.    Vital Signs:  Temp Readings from Last 3 Encounters:   01/12/19 98.3 °F (36.8 °C) (Oral)   07/09/18 98.1 °F (36.7 °C) (Oral)   07/06/18 98.7 °F (37.1 °C) (Oral)       Pulse Readings from Last 3 Encounters:   01/12/19 84   07/09/18 72   07/06/18 68       BP Readings from Last 3 Encounters:   01/12/19 (!) 165/83   07/09/18 (!) 148/79   07/06/18 132/62       Weight:  Wt Readings from Last 3 Encounters:   01/11/19 54.2 kg (119 lb 7.8 oz)   07/09/18 48.1 kg (106 lb 0.7 oz)   07/06/18 48.4 kg (106  lb 11.2 oz)       Past Medical & Surgical History:  Past Medical History:   Diagnosis Date    Arthritis     Asthma     CHF (congestive heart failure)     Coronary artery disease     Diabetes mellitus     Hypertension     Pancreatitis     Type 2 diabetes mellitus with hyperglycemia 7/13/2015       Past Surgical History:   Procedure Laterality Date    ASPIRATION ABSCESS N/A 10/27/2015    Performed by Yecenia Surgeon at [facility] YECENIA    CARDIAC SURGERY      CABG    CHOLECYSTECTOMY      COLECTOMY-RIGHT N/A 8/25/2014    Performed by Harlan Regalado MD at [facility] OR    CORONARY ARTERY BYPASS GRAFT      EGD (ESOPHAGOGASTRODUODENOSCOPY) N/A 4/16/2014    Performed by Crow Brantley MD at [facility] ENDO    EGD (ESOPHAGOGASTRODUODENOSCOPY) N/A 4/2/2014    Performed by Crow Brantley MD at [facility] ENDO    EGD (ESOPHAGOGASTRODUODENOSCOPY) N/A 6/6/2013    Performed by Crow Brantley MD at [facility] ENDO    ESOPHAGOGASTRODUODENOSCOPY (EGD) N/A 8/22/2014    Performed by Satnam Nguyen MD at [facility] ENDO    STERNOTOMY N/A 6/6/2013    Performed by Ole Ingram MD at [facility] OR    THORACOTOMY N/A 6/6/2013    Performed by Ole Ingram MD at [facility] OR       Past Social History:  Social History     Socioeconomic History    Marital status:      Spouse name: None    Number of children: None    Years of education: None    Highest education level: None   Social Needs    Financial resource strain: None    Food insecurity - worry: None    Food insecurity - inability: None    Transportation needs - medical: None    Transportation needs - non-medical: None   Occupational History    None   Tobacco Use    Smoking status: Current Every Day Smoker     Packs/day: 0.50     Years: 35.00     Pack years: 17.50     Types: Cigarettes    Smokeless tobacco: Never Used   Substance and Sexual Activity    Alcohol use: No     Alcohol/week: 0.0 oz     Comment: questionable per     Drug use: No    Sexual activity: Not  Currently     Partners: Male     Birth control/protection: None   Other Topics Concern    None   Social History Narrative    None       Medications:  No current facility-administered medications on file prior to encounter.      Current Outpatient Medications on File Prior to Encounter   Medication Sig Dispense Refill    amLODIPine (NORVASC) 5 MG tablet Take 1 tablet (5 mg total) by mouth once daily. (Patient taking differently: Take 10 mg by mouth once daily. ) 30 tablet 0    aspirin 81 MG chewable tablet Take 81 mg by mouth once daily. Ran out      atorvastatin (LIPITOR) 80 MG tablet Take 1 tablet (80 mg total) by mouth once daily. 30 tablet 11    blood sugar diagnostic Strp To check BG 4 times daily, to use with insurance preferred meter 400 strip 3    blood-glucose meter kit To check BG 4 times daily, to use with insurance preferred meter 1 each 0    clopidogrel (PLAVIX) 75 mg tablet Take 1 tablet (75 mg total) by mouth once daily. 30 tablet 11    CREON 24,000-76,000 -120,000 unit capsule Take 1 capsule by mouth 4 (four) times daily with meals and nightly.       ferrous sulfate 325 (65 FE) MG EC tablet Take 1 tablet (325 mg total) by mouth 2 (two) times daily. 60 tablet 0    furosemide (LASIX) 40 MG tablet Take 40 mg by mouth once daily.      HUMALOG KWIKPEN INSULIN 100 unit/mL InPn pen Inject 2 Units into the skin 3 (three) times daily before meals. 15 mL 12    insulin detemir U-100 (LEVEMIR FLEXTOUCH) 100 unit/mL (3 mL) SubQ InPn pen Inject 2 Units into the skin 2 (two) times daily. 1 Box 0    lancets Misc To check BG 4 times daily, to use with insurance preferred meter 400 each 3    lisinopril (PRINIVIL,ZESTRIL) 2.5 MG tablet Take 2.5 mg by mouth once daily.       magnesium oxide (MAG-OX) 400 mg tablet Take 1 tablet (400 mg total) by mouth once daily. 30 tablet 0    pantoprazole (PROTONIX) 40 MG tablet Take 40 mg by mouth once daily.      pen needle, diabetic (BD ULTRA-FINE AZIZA PEN NEEDLE)  "32 gauge x 5/32" Ndle Uses 4 daily 150 each 12    potassium chloride (KLOR-CON) 20 mEq Pack Take 20 mEq by mouth 2 (two) times daily. 60 packet 0    quetiapine (SEROQUEL) 100 MG Tab 100 mg every evening.        Scheduled Meds:   amLODIPine  10 mg Oral Daily    ascorbic acid (vitamin C)  500 mg Oral QHS    aspirin  81 mg Oral Daily    atorvastatin  80 mg Oral Daily    cefTRIAXone (ROCEPHIN) IVPB  1 g Intravenous Q24H    clopidogrel  75 mg Oral Daily    enoxaparin  30 mg Subcutaneous Daily    ferrous sulfate  325 mg Oral BID    hydrALAZINE  10 mg Oral Q12H    insulin detemir U-100  12 Units Subcutaneous QHS    lipase-protease-amylase 24,000-76,000-120,000 units  1 capsule Oral QID (WM & HS)    QUEtiapine  100 mg Oral QHS    senna-docusate 8.6-50 mg  1 tablet Oral BID    torsemide  20 mg Oral BID    vitamin renal formula (B-complex-vitamin c-folic acid)  1 capsule Oral Daily     Continuous Infusions:  PRN Meds:.acetaminophen, cloNIDine, dextrose 50%, dextrose 50%, glucagon (human recombinant), glucose, glucose, insulin aspart U-100, ondansetron, sodium chloride 0.9%    Allergies:  Patient has no known allergies.    Past Family History:  Reviewed; refer to Hospitalist Admission Note    Review of Systems:  Review of Systems - All 14 systems reviewed and negative, except as noted in HPI    Physical Exam:    BP (!) 165/83 (BP Location: Left arm, Patient Position: Lying)   Pulse 84   Temp 98.3 °F (36.8 °C) (Oral)   Resp 18   Ht 5' 7" (1.702 m) Comment: Admit 6/20/18  Wt 54.2 kg (119 lb 7.8 oz)   LMP 09/03/2012 (Exact Date)   SpO2 96%   Breastfeeding? No   BMI 18.71 kg/m²     General Appearance:    Alert, cooperative, no distress, appears stated age   Head:    Normocephalic, without obvious abnormality, atraumatic   Eyes:    PER, conjunctiva/corneas clear, EOM's intact in both eyes        Throat:   wnl   Back:     Symmetric, no curvature, ROM normal, no CVA tenderness   Lungs:     Clear to " auscultation bilaterally, respirations unlabored   Chest wall:    No tenderness or deformity   Heart:    Regular rate and rhythm, +murmur   Abdomen:     Soft   Extremities:   Extremities normal, atraumatic, no cyanosis or edema   Pulses:   +all exts   MSK:   No joint or muscle swelling, tenderness or deformity   Skin:   w/d   Neurologic:   No flap     Results:  Lab Results   Component Value Date     01/12/2019    K 3.6 01/12/2019     01/12/2019    CO2 22 (L) 01/12/2019    BUN 36 (H) 01/12/2019    CREATININE 3.5 (H) 01/12/2019    CALCIUM 7.8 (L) 01/12/2019    ANIONGAP 11 01/12/2019    ESTGFRAFRICA 16 (A) 01/12/2019    EGFRNONAA 14 (A) 01/12/2019       Lab Results   Component Value Date    CALCIUM 7.8 (L) 01/12/2019    PHOS 3.6 01/12/2019       No results for input(s): WBC, RBC, HGB, HCT, PLT, MCV, MCH, MCHC in the last 24 hours.

## 2019-01-13 LAB
ANION GAP SERPL CALC-SCNC: 11 MMOL/L
BUN SERPL-MCNC: 24 MG/DL
CALCIUM SERPL-MCNC: 8.3 MG/DL
CHLORIDE SERPL-SCNC: 105 MMOL/L
CO2 SERPL-SCNC: 25 MMOL/L
CREAT SERPL-MCNC: 2.7 MG/DL
EST. GFR  (AFRICAN AMERICAN): 22 ML/MIN/1.73 M^2
EST. GFR  (NON AFRICAN AMERICAN): 19 ML/MIN/1.73 M^2
GLUCOSE SERPL-MCNC: 205 MG/DL
MAGNESIUM SERPL-MCNC: 1.6 MG/DL
PHOSPHATE SERPL-MCNC: 3.2 MG/DL
POCT GLUCOSE: 211 MG/DL (ref 70–110)
POCT GLUCOSE: 296 MG/DL (ref 70–110)
POTASSIUM SERPL-SCNC: 3.6 MMOL/L
SODIUM SERPL-SCNC: 141 MMOL/L

## 2019-01-13 PROCEDURE — 25000003 PHARM REV CODE 250: Performed by: NURSE PRACTITIONER

## 2019-01-13 PROCEDURE — 63600175 PHARM REV CODE 636 W HCPCS: Performed by: NURSE PRACTITIONER

## 2019-01-13 PROCEDURE — 25000003 PHARM REV CODE 250: Performed by: INTERNAL MEDICINE

## 2019-01-13 PROCEDURE — 94761 N-INVAS EAR/PLS OXIMETRY MLT: CPT

## 2019-01-13 PROCEDURE — 84100 ASSAY OF PHOSPHORUS: CPT

## 2019-01-13 PROCEDURE — 36415 COLL VENOUS BLD VENIPUNCTURE: CPT

## 2019-01-13 PROCEDURE — 12000002 HC ACUTE/MED SURGE SEMI-PRIVATE ROOM

## 2019-01-13 PROCEDURE — 83735 ASSAY OF MAGNESIUM: CPT

## 2019-01-13 PROCEDURE — 80048 BASIC METABOLIC PNL TOTAL CA: CPT

## 2019-01-13 PROCEDURE — 97116 GAIT TRAINING THERAPY: CPT

## 2019-01-13 RX ORDER — LANOLIN ALCOHOL/MO/W.PET/CERES
400 CREAM (GRAM) TOPICAL DAILY
Status: DISCONTINUED | OUTPATIENT
Start: 2019-01-13 | End: 2019-01-19 | Stop reason: HOSPADM

## 2019-01-13 RX ORDER — POTASSIUM CHLORIDE 20 MEQ/1
20 TABLET, EXTENDED RELEASE ORAL ONCE
Status: COMPLETED | OUTPATIENT
Start: 2019-01-13 | End: 2019-01-13

## 2019-01-13 RX ADMIN — CEFTRIAXONE 1 G: 1 INJECTION, SOLUTION INTRAVENOUS at 04:01

## 2019-01-13 RX ADMIN — OXYCODONE HYDROCHLORIDE AND ACETAMINOPHEN 500 MG: 500 TABLET ORAL at 09:01

## 2019-01-13 RX ADMIN — CLOPIDOGREL BISULFATE 75 MG: 75 TABLET ORAL at 08:01

## 2019-01-13 RX ADMIN — Medication 1 CAPSULE: at 08:01

## 2019-01-13 RX ADMIN — ATORVASTATIN CALCIUM 80 MG: 40 TABLET, FILM COATED ORAL at 08:01

## 2019-01-13 RX ADMIN — FERROUS SULFATE TAB EC 325 MG (65 MG FE EQUIVALENT) 325 MG: 325 (65 FE) TABLET DELAYED RESPONSE at 09:01

## 2019-01-13 RX ADMIN — POTASSIUM CHLORIDE 20 MEQ: 20 TABLET, EXTENDED RELEASE ORAL at 12:01

## 2019-01-13 RX ADMIN — MAGNESIUM OXIDE TAB 400 MG (241.3 MG ELEMENTAL MG) 400 MG: 400 (241.3 MG) TAB at 02:01

## 2019-01-13 RX ADMIN — INSULIN ASPART 4 UNITS: 100 INJECTION, SOLUTION INTRAVENOUS; SUBCUTANEOUS at 04:01

## 2019-01-13 RX ADMIN — QUETIAPINE FUMARATE 100 MG: 100 TABLET ORAL at 09:01

## 2019-01-13 RX ADMIN — FERROUS SULFATE TAB EC 325 MG (65 MG FE EQUIVALENT) 325 MG: 325 (65 FE) TABLET DELAYED RESPONSE at 08:01

## 2019-01-13 RX ADMIN — SENNOSIDES AND DOCUSATE SODIUM 1 TABLET: 8.6; 5 TABLET ORAL at 08:01

## 2019-01-13 RX ADMIN — ASPIRIN 81 MG CHEWABLE TABLET 81 MG: 81 TABLET CHEWABLE at 08:01

## 2019-01-13 RX ADMIN — HYDRALAZINE HYDROCHLORIDE 10 MG: 10 TABLET, FILM COATED ORAL at 08:01

## 2019-01-13 RX ADMIN — TORSEMIDE 20 MG: 20 TABLET ORAL at 09:01

## 2019-01-13 RX ADMIN — INSULIN ASPART 6 UNITS: 100 INJECTION, SOLUTION INTRAVENOUS; SUBCUTANEOUS at 11:01

## 2019-01-13 RX ADMIN — ENOXAPARIN SODIUM 30 MG: 100 INJECTION SUBCUTANEOUS at 04:01

## 2019-01-13 RX ADMIN — PANCRELIPASE 1 CAPSULE: 24000; 76000; 120000 CAPSULE, DELAYED RELEASE PELLETS ORAL at 08:01

## 2019-01-13 RX ADMIN — AMLODIPINE BESYLATE 10 MG: 5 TABLET ORAL at 08:01

## 2019-01-13 RX ADMIN — TORSEMIDE 20 MG: 20 TABLET ORAL at 08:01

## 2019-01-13 RX ADMIN — PANCRELIPASE 1 CAPSULE: 24000; 76000; 120000 CAPSULE, DELAYED RELEASE PELLETS ORAL at 04:01

## 2019-01-13 RX ADMIN — PANCRELIPASE 1 CAPSULE: 24000; 76000; 120000 CAPSULE, DELAYED RELEASE PELLETS ORAL at 09:01

## 2019-01-13 RX ADMIN — HYDRALAZINE HYDROCHLORIDE 10 MG: 10 TABLET, FILM COATED ORAL at 09:01

## 2019-01-13 RX ADMIN — PANCRELIPASE 1 CAPSULE: 24000; 76000; 120000 CAPSULE, DELAYED RELEASE PELLETS ORAL at 11:01

## 2019-01-13 NOTE — SUBJECTIVE & OBJECTIVE
Interval History: No HD today.     Review of Systems   Constitutional: Positive for activity change, appetite change and fatigue. Negative for chills, diaphoresis and fever.   HENT: Negative for ear discharge, ear pain and facial swelling.    Eyes: Negative for pain and redness.   Respiratory: Positive for cough and shortness of breath.    Cardiovascular: Positive for leg swelling. Negative for chest pain and palpitations.            Gastrointestinal: Negative for abdominal distention, abdominal pain, constipation, diarrhea, nausea and vomiting.   Endocrine: Negative for polydipsia and polyphagia.   Genitourinary: Negative for difficulty urinating, dysuria, flank pain and hematuria.        Staff reports patient is incontinent of urine at times   Musculoskeletal: Positive for arthralgias and gait problem. Negative for neck pain and neck stiffness.   Skin: Negative for color change and rash.   Allergic/Immunologic: Negative for food allergies.   Neurological: Positive for weakness. Negative for tremors, facial asymmetry and speech difficulty.   Hematological: Does not bruise/bleed easily.   Psychiatric/Behavioral: Negative for agitation, behavioral problems, confusion and suicidal ideas. The patient is not nervous/anxious.      Objective:     Vital Signs (Most Recent):  Temp: 98.6 °F (37 °C) (01/13/19 1208)  Pulse: 80 (01/13/19 1208)  Resp: 16 (01/13/19 1208)  BP: 139/77 (01/13/19 1208)  SpO2: 98 % (01/13/19 1208) Vital Signs (24h Range):  Temp:  [97.7 °F (36.5 °C)-98.6 °F (37 °C)] 98.6 °F (37 °C)  Pulse:  [76-83] 80  Resp:  [16-18] 16  SpO2:  [92 %-98 %] 98 %  BP: (139-177)/(65-84) 139/77     Weight: 54.2 kg (119 lb 7.8 oz)  Body mass index is 18.71 kg/m².    Intake/Output Summary (Last 24 hours) at 1/13/2019 1607  Last data filed at 1/12/2019 1716  Gross per 24 hour   Intake 460 ml   Output --   Net 460 ml      Physical Exam   Constitutional: She is oriented to person, place, and time. She appears well-developed and  well-nourished. No distress.   Thin female   HENT:   Head: Normocephalic and atraumatic.   Eyes: Conjunctivae and EOM are normal. Pupils are equal, round, and reactive to light. Right eye exhibits no discharge. Left eye exhibits no discharge.   Neck: Normal range of motion. Neck supple. No JVD present.   Cardiovascular: Normal rate, regular rhythm and intact distal pulses.   Murmur heard.      Pulmonary/Chest:   RLL diminished  Crackles noted to LLL  SOB with exertion   Abdominal: Soft. Bowel sounds are normal. She exhibits no distension. There is no tenderness. There is no guarding.   Genitourinary:   Genitourinary Comments: Not examined   Musculoskeletal: Normal range of motion. She exhibits edema.   LUE edema noted  BLE edema noted today      Neurological: She is alert and oriented to person, place, and time. No cranial nerve deficit.   Skin: Skin is warm and dry. Capillary refill takes less than 2 seconds. She is not diaphoretic.   Right neck QC    Psychiatric: She has a normal mood and affect. Her behavior is normal. Judgment and thought content normal.   Nursing note and vitals reviewed.     Labs: Reviewed

## 2019-01-13 NOTE — ASSESSMENT & PLAN NOTE
Chronic; poorly controlled historically.    DM diet  Accuchecks with correctional SSI   Blood sugars running  211-296  - Patient previously continued to  refuse detemir insulin and it was discontinued due to patient refusal  Continue moderate SSI coverage   HGA1c is 8.1  DM educator and dietician were consulted

## 2019-01-13 NOTE — PT/OT/SLP PROGRESS
Physical Therapy Treatment    Patient Name:  Errol Keita   MRN:  [id number]    Recommendations:     Discharge Recommendations:  (home)   Discharge Equipment Recommendations: walker, rolling   Barriers to discharge: None    Assessment:     Errol Keita is a 53 y.o. female admitted with a medical diagnosis of Acute renal failure superimposed on stage 4 chronic kidney disease.  She presents with the following impairments/functional limitations:  weakness, impaired endurance, impaired self care skills, impaired functional mobilty, gait instability .    Rehab Prognosis: Good; patient would benefit from acute skilled PT services to address these deficits and reach maximum level of function.    Recent Surgery: * No surgery found *      Plan:     During this hospitalization, patient to be seen 6 x/week to address the identified rehab impairments via gait training, therapeutic activities, therapeutic exercises and progress toward the following goals:    · Plan of Care Expires:  01/25/19    Subjective     Chief Complaint: none stated  Patient/Family Comments/goals: none stated  Pain/Comfort:  · Pain Rating 1: 0/10      Objective:     Communicated with nurse Jackson prior to session.  Patient found seated in chair with telemetry  upon PT entry to room.     General Precautions: Standard, fall   Orthopedic Precautions:N/A   Braces: N/A     Functional Mobility:  · Transfers:     · Sit to Stand:  contact guard assistance with rolling walker  · Gait: 250' with rw and CGA      AM-PAC 6 CLICK MOBILITY          Therapeutic Activities and Exercises:   Returned to sit in chair with CNA present preparing to take patient to shower.    Patient left up in chair with all lines intact, call button in reach, nurse Renetta notified and CNA and caregiver present..    GOALS:   Multidisciplinary Problems     Physical Therapy Goals        Problem: Physical Therapy Goal    Goal Priority Disciplines Outcome Goal Variances Interventions    Physical Therapy Goal     PT, PT/OT Ongoing (interventions implemented as appropriate)     Description:  Goals to be met by: 2019     Patient will increase functional independence with mobility by performin. Supine to sit with Mora  2. Sit to stand transfer with Supervision  3. Bed to chair transfer with Contact Guard Assistance using Rolling Walker  4. Gait  x 250x2 feet with Contact Guard Assistance using Rolling Walker.   5. Lower extremity exercise program x20 reps per handout, with assistance as needed                      Time Tracking:     PT Received On: 19  PT Start Time: 942     PT Stop Time: 952  PT Total Time (min): 10 min     Billable Minutes: Gait Training 10min    Treatment Type: Treatment  PT/PTA: PTA     PTA Visit Number: 3     Nikki Robison PTA  2019

## 2019-01-13 NOTE — PLAN OF CARE
Problem: Adult Inpatient Plan of Care  Goal: Plan of Care Review  Outcome: Ongoing (interventions implemented as appropriate)  Pt on room air with 93% sats.

## 2019-01-13 NOTE — PLAN OF CARE
Problem: Physical Therapy Goal  Goal: Physical Therapy Goal  Goals to be met by: 2019     Patient will increase functional independence with mobility by performin. Supine to sit with [place]  2. Sit to stand transfer with Supervision  3. Bed to chair transfer with Contact Guard Assistance using Rolling Walker  4. Gait  x 250x2 feet with Contact Guard Assistance using Rolling Walker.   5. Lower extremity exercise program x20 reps per handout, with assistance as needed     Outcome: Ongoing (interventions implemented as appropriate)  Ambulated with rw and A for safety.

## 2019-01-13 NOTE — PLAN OF CARE
01/12/19 2004   Patient Assessment/Suction   Level of Consciousness (AVPU) alert   Respiratory Effort Unlabored;Normal   PRE-TX-O2-ETCO2   O2 Device (Oxygen Therapy) room air   SpO2 96 %   Pulse Oximetry Type Intermittent

## 2019-01-13 NOTE — PROGRESS NOTES
INPATIENT NEPHROLOGY PROGRESS  [facility] NEPHROLOGY    Errol Stone Keita  01/13/2019    Reason for consultation:    Acute kidney injury    History of Present Illness:     53F with T2DM, HTN, pancreatitis, CAD, and CHF who presents with bilateral leg swelling, mild SOB. Admit to missing several doses of her lasix. Is scheduled to see a kidney specialist in the coming month. Renal consulted for TIMOTHY on CKD.    1/6  No n,v,chest pain, sob, dysuria or diarrhea.  No new neuro symptoms  1/7 VSS, no new complains.  1/8 VSS, no new complains.  1/9 VSS, flat affect. If her sCr is not better tomorrow, will ask Dr. Desouza to place a HD cath and start dialysis. This was previously discussed, question were answered.  1/10 VSS, no new complains. Awaiting HD cath placement.  1/11 VSS, no new complains, had howard placed, will have HD today and in AM.   1/12  New start HD, due for treatment today.  States tolerated her first tx yesterday without problems.  Denies SOB and chest pain.  Acidosis improving.  1/13  S/P 2 HD treatments, labs stable today, no acute needs.  Will reassess in am.  Pt reports she has tolerated her treatments without issues, no cramping, no fatigue.  Acidosis improved.    Plan of Care:    Acute kidney injury  CKD  4, diabetic, proteinuric  Hypokalemia  Edema/pulmonary crackles  Anemia  Hypertension    Plan:    Resumed Torsemide.  Replete Mg and K as needed.  No nsaids, coxibs. Holding lisinopril.  Renal dose medication .  Keep MAP > 60, SBP > 100.  Had Howard placed 1/11, s/p HD x 2 treatments, will reassess daily and do HD PRN.    Thank you for allowing us to participate in this patient's care. We will continue to follow.    Vital Signs:  Temp Readings from Last 3 Encounters:   01/13/19 97.9 °F (36.6 °C) (Oral)   07/09/18 98.1 °F (36.7 °C) (Oral)   07/06/18 98.7 °F (37.1 °C) (Oral)       Pulse Readings from Last 3 Encounters:   01/13/19 83   07/09/18 72   07/06/18 68       BP Readings from Last 3  Encounters:   01/13/19 (!) 177/84   07/09/18 (!) 148/79   07/06/18 132/62       Weight:  Wt Readings from Last 3 Encounters:   01/11/19 54.2 kg (119 lb 7.8 oz)   07/09/18 48.1 kg (106 lb 0.7 oz)   07/06/18 48.4 kg (106 lb 11.2 oz)       Past Medical & Surgical History:  Past Medical History:   Diagnosis Date    Arthritis     Asthma     CHF (congestive heart failure)     Coronary artery disease     Diabetes mellitus     Hypertension     Pancreatitis     Type 2 diabetes mellitus with hyperglycemia 7/13/2015       Past Surgical History:   Procedure Laterality Date    ASPIRATION ABSCESS N/A 10/27/2015    Performed by Yecenia Surgeon at [facility] YECENIA    CARDIAC SURGERY      CABG    CHOLECYSTECTOMY      COLECTOMY-RIGHT N/A 8/25/2014    Performed by Harlan Regalado MD at [facility] OR    CORONARY ARTERY BYPASS GRAFT      EGD (ESOPHAGOGASTRODUODENOSCOPY) N/A 4/16/2014    Performed by Crow Brantley MD at [facility] ENDO    EGD (ESOPHAGOGASTRODUODENOSCOPY) N/A 4/2/2014    Performed by Crow Br[facility]ley MD at [facility] ENDO    EGD (ESOPHAGOGASTRODUODENOSCOPY) N/A 6/6/2013    Performed by Crow Br[facility]ley MD at [facility] ENDO    ESOPHAGOGASTRODUODENOSCOPY (EGD) N/A 8/22/2014    Performed by Satnam Nguyen MD at [facility] ENDO    STERNOTOMY N/A 6/6/2013    Performed by Ole Ingram MD at [facility] OR    THORACOTOMY N/A 6/6/2013    Performed by Ole Ingram MD at [facility] OR       Past Social History:  Social History     Socioeconomic History    Marital status:      Spouse name: None    Number of children: None    Years of education: None    Highest education level: None   Social Needs    Financial resource strain: None    Food insecurity - worry: None    Food insecurity - inability: None    Transportation needs - medical: None    Transportation needs - non-medical: None   Occupational History    None   Tobacco Use    Smoking status: Current Every Day Smoker     Packs/day: 0.50     Years: 35.00     Pack years: 17.50      Types: Cigarettes    Smokeless tobacco: Never Used   Substance and Sexual Activity    Alcohol use: No     Alcohol/week: 0.0 oz     Comment: questionable per     Drug use: No    Sexual activity: Not Currently     Partners: Male     Birth control/protection: None   Other Topics Concern    None   Social History Narrative    None       Medications:  No current facility-administered medications on file prior to encounter.      Current Outpatient Medications on File Prior to Encounter   Medication Sig Dispense Refill    amLODIPine (NORVASC) 5 MG tablet Take 1 tablet (5 mg total) by mouth once daily. (Patient taking differently: Take 10 mg by mouth once daily. ) 30 tablet 0    aspirin 81 MG chewable tablet Take 81 mg by mouth once daily. Ran out      atorvastatin (LIPITOR) 80 MG tablet Take 1 tablet (80 mg total) by mouth once daily. 30 tablet 11    blood sugar diagnostic Strp To check BG 4 times daily, to use with insurance preferred meter 400 strip 3    blood-glucose meter kit To check BG 4 times daily, to use with insurance preferred meter 1 each 0    clopidogrel (PLAVIX) 75 mg tablet Take 1 tablet (75 mg total) by mouth once daily. 30 tablet 11    CREON 24,000-76,000 -120,000 unit capsule Take 1 capsule by mouth 4 (four) times daily with meals and nightly.       ferrous sulfate 325 (65 FE) MG EC tablet Take 1 tablet (325 mg total) by mouth 2 (two) times daily. 60 tablet 0    furosemide (LASIX) 40 MG tablet Take 40 mg by mouth once daily.      HUMALOG KWIKPEN INSULIN 100 unit/mL InPn pen Inject 2 Units into the skin 3 (three) times daily before meals. 15 mL 12    insulin detemir U-100 (LEVEMIR FLEXTOUCH) 100 unit/mL (3 mL) SubQ InPn pen Inject 2 Units into the skin 2 (two) times daily. 1 Box 0    lancets Misc To check BG 4 times daily, to use with insurance preferred meter 400 each 3    lisinopril (PRINIVIL,ZESTRIL) 2.5 MG tablet Take 2.5 mg by mouth once daily.       magnesium oxide  "(MAG-OX) 400 mg tablet Take 1 tablet (400 mg total) by mouth once daily. 30 tablet 0    pantoprazole (PROTONIX) 40 MG tablet Take 40 mg by mouth once daily.      pen needle, diabetic (BD ULTRA-FINE AZIZA PEN NEEDLE) 32 gauge x 5/32" Ndle Uses 4 daily 150 each 12    potassium chloride (KLOR-CON) 20 mEq Pack Take 20 mEq by mouth 2 (two) times daily. 60 packet 0    quetiapine (SEROQUEL) 100 MG Tab 100 mg every evening.        Scheduled Meds:   amLODIPine  10 mg Oral Daily    ascorbic acid (vitamin C)  500 mg Oral QHS    aspirin  81 mg Oral Daily    atorvastatin  80 mg Oral Daily    cefTRIAXone (ROCEPHIN) IVPB  1 g Intravenous Q24H    clopidogrel  75 mg Oral Daily    enoxaparin  30 mg Subcutaneous Daily    ferrous sulfate  325 mg Oral BID    hydrALAZINE  10 mg Oral Q12H    lipase-protease-amylase 24,000-76,000-120,000 units  1 capsule Oral QID (WM & HS)    QUEtiapine  100 mg Oral QHS    senna-docusate 8.6-50 mg  1 tablet Oral BID    torsemide  20 mg Oral BID    vitamin renal formula (B-complex-vitamin c-folic acid)  1 capsule Oral Daily     Continuous Infusions:  PRN Meds:.acetaminophen, cloNIDine, dextrose 50%, dextrose 50%, glucagon (human recombinant), glucose, glucose, glucose, heparin (porcine), insulin aspart U-100, ondansetron, sodium chloride 0.9%    Allergies:  Patient has no known allergies.    Past Family History:  Reviewed; refer to Hospitalist Admission Note    Review of Systems:  Review of Systems - All 14 systems reviewed and negative, except as noted in HPI    Physical Exam:    BP (!) 177/84 (BP Location: Left arm, Patient Position: Lying)   Pulse 83   Temp 97.9 °F (36.6 °C) (Oral)   Resp 16   Ht 5' 7" (1.702 m) Comment: Admit 6/20/18  Wt 54.2 kg (119 lb 7.8 oz)   LMP 09/03/2012 (Exact Date)   SpO2 (!) 93%   Breastfeeding? No   BMI 18.71 kg/m²     General Appearance:    Alert, cooperative, no distress, appears stated age   Head:    Normocephalic, without obvious abnormality, " atraumatic   Eyes:    PER, conjunctiva/corneas clear, EOM's intact in both eyes        Throat:   wnl   Back:     Symmetric, no curvature, ROM normal, no CVA tenderness   Lungs:     Clear to auscultation bilaterally, respirations unlabored   Chest wall:    No tenderness or deformity   Heart:    Regular rate and rhythm, +murmur   Abdomen:     Soft   Extremities:   Extremities normal, atraumatic, no cyanosis or edema   Pulses:   +all exts   MSK:   No joint or muscle swelling, tenderness or deformity   Skin:   w/d   Neurologic:   No flap     Results:  Lab Results   Component Value Date     01/13/2019    K 3.6 01/13/2019     01/13/2019    CO2 25 01/13/2019    BUN 24 (H) 01/13/2019    CREATININE 2.7 (H) 01/13/2019    CALCIUM 8.3 (L) 01/13/2019    ANIONGAP 11 01/13/2019    ESTGFRAFRICA 22 (A) 01/13/2019    EGFRNONAA 19 (A) 01/13/2019       Lab Results   Component Value Date    CALCIUM 8.3 (L) 01/13/2019    PHOS 3.2 01/13/2019       No results for input(s): WBC, RBC, HGB, HCT, PLT, MCV, MCH, MCHC in the last 24 hours.

## 2019-01-13 NOTE — ASSESSMENT & PLAN NOTE
Initially patient received  gentle IVF hydration that were later discontinued due to hypervolemia  Follow renal panel and electrolytes closely.  Nephrology following   Renal Ultrasound shows no obstructive uropathy- Sequelae of medical renal disease  Renal dose medications   Avoid NSAIDs, Coyle-II inhibitors, ACE-I, Angiotensin Receptor Blockers, or Aminoglycosides.  Urine analysis showed UTI- Start Iv rocephin  Echocardiogram report pending  Patient's creatinine continues to trend upward- Dr. Desouza consulted for Howard catheter placement 1/10/19  Patient had HD today 1/11/19 and 1/12/19

## 2019-01-14 LAB
ANION GAP SERPL CALC-SCNC: 9 MMOL/L
BUN SERPL-MCNC: 34 MG/DL
CALCIUM SERPL-MCNC: 8.1 MG/DL
CHLORIDE SERPL-SCNC: 105 MMOL/L
CO2 SERPL-SCNC: 26 MMOL/L
CREAT SERPL-MCNC: 3.3 MG/DL
EST. GFR  (AFRICAN AMERICAN): 17 ML/MIN/1.73 M^2
EST. GFR  (NON AFRICAN AMERICAN): 15 ML/MIN/1.73 M^2
GLUCOSE SERPL-MCNC: 248 MG/DL
HBV CORE AB SERPL QL IA: NEGATIVE
HBV SURFACE AB SER-ACNC: POSITIVE M[IU]/ML
HBV SURFACE AG SERPL QL IA: NEGATIVE
MAGNESIUM SERPL-MCNC: 2 MG/DL
PHOSPHATE SERPL-MCNC: 4.2 MG/DL
POCT GLUCOSE: 182 MG/DL (ref 70–110)
POCT GLUCOSE: 317 MG/DL (ref 70–110)
POCT GLUCOSE: 77 MG/DL (ref 70–110)
POTASSIUM SERPL-SCNC: 3.6 MMOL/L
SODIUM SERPL-SCNC: 140 MMOL/L

## 2019-01-14 PROCEDURE — 63600175 PHARM REV CODE 636 W HCPCS: Performed by: NURSE PRACTITIONER

## 2019-01-14 PROCEDURE — 25000003 PHARM REV CODE 250: Performed by: INTERNAL MEDICINE

## 2019-01-14 PROCEDURE — 94761 N-INVAS EAR/PLS OXIMETRY MLT: CPT

## 2019-01-14 PROCEDURE — 80048 BASIC METABOLIC PNL TOTAL CA: CPT

## 2019-01-14 PROCEDURE — 12000002 HC ACUTE/MED SURGE SEMI-PRIVATE ROOM

## 2019-01-14 PROCEDURE — 84100 ASSAY OF PHOSPHORUS: CPT

## 2019-01-14 PROCEDURE — 80100014 HC HEMODIALYSIS 1:1

## 2019-01-14 PROCEDURE — 25000003 PHARM REV CODE 250: Performed by: NURSE PRACTITIONER

## 2019-01-14 PROCEDURE — 97116 GAIT TRAINING THERAPY: CPT

## 2019-01-14 PROCEDURE — 83735 ASSAY OF MAGNESIUM: CPT

## 2019-01-14 PROCEDURE — 36415 COLL VENOUS BLD VENIPUNCTURE: CPT

## 2019-01-14 PROCEDURE — 63600175 PHARM REV CODE 636 W HCPCS: Performed by: INTERNAL MEDICINE

## 2019-01-14 RX ORDER — SODIUM CHLORIDE 9 MG/ML
INJECTION, SOLUTION INTRAVENOUS
Status: CANCELLED | OUTPATIENT
Start: 2019-01-14

## 2019-01-14 RX ORDER — HEPARIN SODIUM 1000 [USP'U]/ML
5000 INJECTION, SOLUTION INTRAVENOUS; SUBCUTANEOUS
Status: DISCONTINUED | OUTPATIENT
Start: 2019-01-14 | End: 2019-01-19 | Stop reason: HOSPADM

## 2019-01-14 RX ORDER — SODIUM CHLORIDE 9 MG/ML
INJECTION, SOLUTION INTRAVENOUS ONCE
Status: CANCELLED | OUTPATIENT
Start: 2019-01-14 | End: 2019-01-14

## 2019-01-14 RX ADMIN — CLOPIDOGREL BISULFATE 75 MG: 75 TABLET ORAL at 08:01

## 2019-01-14 RX ADMIN — HEPARIN SODIUM 5000 UNITS: 1000 INJECTION, SOLUTION INTRAVENOUS; SUBCUTANEOUS at 03:01

## 2019-01-14 RX ADMIN — PANCRELIPASE 1 CAPSULE: 24000; 76000; 120000 CAPSULE, DELAYED RELEASE PELLETS ORAL at 09:01

## 2019-01-14 RX ADMIN — OXYCODONE HYDROCHLORIDE AND ACETAMINOPHEN 500 MG: 500 TABLET ORAL at 09:01

## 2019-01-14 RX ADMIN — ATORVASTATIN CALCIUM 80 MG: 40 TABLET, FILM COATED ORAL at 08:01

## 2019-01-14 RX ADMIN — CEFTRIAXONE 1 G: 1 INJECTION, SOLUTION INTRAVENOUS at 04:01

## 2019-01-14 RX ADMIN — ASPIRIN 81 MG CHEWABLE TABLET 81 MG: 81 TABLET CHEWABLE at 08:01

## 2019-01-14 RX ADMIN — ENOXAPARIN SODIUM 30 MG: 100 INJECTION SUBCUTANEOUS at 04:01

## 2019-01-14 RX ADMIN — HYDRALAZINE HYDROCHLORIDE 10 MG: 10 TABLET, FILM COATED ORAL at 08:01

## 2019-01-14 RX ADMIN — MAGNESIUM OXIDE TAB 400 MG (241.3 MG ELEMENTAL MG) 400 MG: 400 (241.3 MG) TAB at 08:01

## 2019-01-14 RX ADMIN — FERROUS SULFATE TAB EC 325 MG (65 MG FE EQUIVALENT) 325 MG: 325 (65 FE) TABLET DELAYED RESPONSE at 08:01

## 2019-01-14 RX ADMIN — EPOETIN ALFA 5000 UNITS: 20000 SOLUTION INTRAVENOUS; SUBCUTANEOUS at 01:01

## 2019-01-14 RX ADMIN — TORSEMIDE 20 MG: 20 TABLET ORAL at 08:01

## 2019-01-14 RX ADMIN — AMLODIPINE BESYLATE 10 MG: 5 TABLET ORAL at 08:01

## 2019-01-14 RX ADMIN — HYDRALAZINE HYDROCHLORIDE 10 MG: 10 TABLET, FILM COATED ORAL at 09:01

## 2019-01-14 RX ADMIN — FERROUS SULFATE TAB EC 325 MG (65 MG FE EQUIVALENT) 325 MG: 325 (65 FE) TABLET DELAYED RESPONSE at 09:01

## 2019-01-14 RX ADMIN — Medication 1 CAPSULE: at 08:01

## 2019-01-14 RX ADMIN — TORSEMIDE 20 MG: 20 TABLET ORAL at 09:01

## 2019-01-14 RX ADMIN — PANCRELIPASE 1 CAPSULE: 24000; 76000; 120000 CAPSULE, DELAYED RELEASE PELLETS ORAL at 08:01

## 2019-01-14 RX ADMIN — INSULIN ASPART 8 UNITS: 100 INJECTION, SOLUTION INTRAVENOUS; SUBCUTANEOUS at 11:01

## 2019-01-14 RX ADMIN — INSULIN ASPART 1 UNITS: 100 INJECTION, SOLUTION INTRAVENOUS; SUBCUTANEOUS at 09:01

## 2019-01-14 RX ADMIN — PANCRELIPASE 1 CAPSULE: 24000; 76000; 120000 CAPSULE, DELAYED RELEASE PELLETS ORAL at 11:01

## 2019-01-14 RX ADMIN — PANCRELIPASE 1 CAPSULE: 24000; 76000; 120000 CAPSULE, DELAYED RELEASE PELLETS ORAL at 04:01

## 2019-01-14 RX ADMIN — QUETIAPINE FUMARATE 100 MG: 100 TABLET ORAL at 09:01

## 2019-01-14 NOTE — PROGRESS NOTES
[facility] Hospital Medicine  Progress Note    Patient Name: Errol Keita  MRN: [id number]  Patient Class: IP- Inpatient   Admission Date: 1/5/2019  Length of Stay: 8 days  Attending Physician: Foreign Greco MD  Primary Care Provider: Bharat Wiggins MD      Subjective:     Principal Problem:Acute renal failure superimposed on stage 4 chronic kidney disease    HPI:  This is a 54 yo  female with a PMHx of T2DM, HTN, pancreatitis, CAD, and CHF who presented to the ED for further evaluation of bilateral leg swelling that has been ongoing for 2 weeks. She states that she is having bilateral leg swelling and leg pain she describes as a tightness. Per ED record, pt endorsed some SOB, but she denies this to me.  Pt denies fever, chest pain, abdominal pain, vomiting, diarrhea, and headache.  ED evaluation reveals TIMOTHY.  She reports that she is a patient of Dr. Tolbert. Patient was evaluated in the ER and noted to have TIMOTHY and admitted for further evaluation and treatment.    Hospital Course:  Patient monitored closely during hospitalization. She initially received IV lasix for acute CHF. Telemetry showed  no significant arrhythmias. Her renal function was  trended closely. Nephrology was consulted. An Echo was  obtained. She was found to have iron deficiency anemia and initiated on oral fergon. Her hypokalemia was  replaced with oral supplementation. Dietary was consulted. She was noted to have moderate protein malnutrition and initiated on oral supplementation. Physical therapy was consulted for debility. Patient's creatinine continued to trend upward. Dr. Desouza was consulted for Howard placement and patient was started on dialysis on 1/11/18. She also recieved HD on  1/12/19 and 1/14/19.      Interval History: Patient received HD again today.    Review of Systems   Constitutional: Positive for activity change, appetite change and fatigue. Negative for chills, diaphoresis and fever.   HENT:  Negative for ear discharge, ear pain and facial swelling.    Eyes: Negative for pain and redness.   Respiratory: Positive for cough and shortness of breath.    Cardiovascular: Positive for leg swelling. Negative for chest pain and palpitations.            Gastrointestinal: Negative for abdominal distention, abdominal pain, constipation, diarrhea, nausea and vomiting.   Endocrine: Negative for polydipsia and polyphagia.   Genitourinary: Negative for difficulty urinating, dysuria, flank pain and hematuria.        Staff reports patient is incontinent of urine at times   Musculoskeletal: Positive for arthralgias and gait problem. Negative for neck pain and neck stiffness.   Skin: Negative for color change and rash.   Allergic/Immunologic: Negative for food allergies.   Neurological: Positive for weakness. Negative for tremors, facial asymmetry and speech difficulty.   Hematological: Does not bruise/bleed easily.   Psychiatric/Behavioral: Negative for agitation, behavioral problems, confusion and suicidal ideas. The patient is not nervous/anxious.      Objective:     Vital Signs (Most Recent):  Temp: 96 °F (35.6 °C) (01/14/19 1602)  Pulse: 83 (01/14/19 1602)  Resp: 20 (01/14/19 1602)  BP: (!) 169/87 (01/14/19 1602)  SpO2: 98 % (01/14/19 1602) Vital Signs (24h Range):  Temp:  [96 °F (35.6 °C)-99.5 °F (37.5 °C)] 96 °F (35.6 °C)  Pulse:  [69-90] 83  Resp:  [16-20] 20  SpO2:  [92 %-99 %] 98 %  BP: (129-169)/(69-88) 169/87     Weight: 54.2 kg (119 lb 7.8 oz)  Body mass index is 18.71 kg/m².    Intake/Output Summary (Last 24 hours) at 1/14/2019 1637  Last data filed at 1/14/2019 1535  Gross per 24 hour   Intake 890 ml   Output 1500 ml   Net -610 ml      Physical Exam   Constitutional: She is oriented to person, place, and time. She appears well-developed and well-nourished. No distress.   Thin female   HENT:   Head: Normocephalic and atraumatic.   Eyes: Conjunctivae and EOM are normal. Pupils are equal, round, and reactive to  light. Right eye exhibits no discharge. Left eye exhibits no discharge.   Neck: Normal range of motion. Neck supple. No JVD present.   Cardiovascular: Normal rate, regular rhythm and intact distal pulses.   Murmur heard.      Pulmonary/Chest:   RLL diminished  Crackles noted to LLL  SOB with exertion   Abdominal: Soft. Bowel sounds are normal. She exhibits no distension. There is no tenderness. There is no guarding.   Genitourinary:   Genitourinary Comments: Not examined   Musculoskeletal: Normal range of motion. She exhibits edema.   LUE edema noted  BLE edema noted today      Neurological: She is alert and oriented to person, place, and time. No cranial nerve deficit.   Skin: Skin is warm and dry. Capillary refill takes less than 2 seconds. She is not diaphoretic.   Right neck QC    Psychiatric: She has a normal mood and affect. Her behavior is normal. Judgment and thought content normal.   Nursing note and vitals reviewed.    Labs: Reviewed    Assessment/Plan:      * Acute renal failure superimposed on stage 4 chronic kidney disease    Initially patient received  gentle IVF hydration that were later discontinued due to hypervolemia  Follow renal panel and electrolytes closely.  Nephrology following   Renal Ultrasound shows no obstructive uropathy- Sequelae of medical renal disease  Renal dose medications   Avoid NSAIDs, Coyle-II inhibitors, ACE-I, Angiotensin Receptor Blockers, or Aminoglycosides.  Urine analysis showed UTI- Start Iv rocephin  Echocardiogram report pending  Patient's creatinine continues to trend upward- Dr. Desouza consulted for Howard catheter placement 1/10/19  Patient had HD today 1/11/19, 1/12/19, and 1/14/19                    UTI (urinary tract infection)    Continue Iv rocephin  Urine culture shows > 100,000 colonies E. Coli which is sensitive to the rocephin       Uncontrolled type 2 diabetes mellitus with hyperglycemia    Chronic; poorly controlled historically.    DM diet  Accuchecks  with correctional SSI   Blood sugars running   - Patient previously continued to  refuse detemir insulin and it was discontinued due to patient refusal  Continue moderate SSI coverage   HGA1c is 8.1  DM educator and dietician were consulted         Hypokalemia    Monitor  Supplement as needed         Hypomagnesemia    Monitor  Supplement as needed         Lower extremity edema    No evidence of DVT per ultrasound.       Acute on chronic congestive heart failure    Prior IVF were discontinued   BNP elevated.   Echocardiogram report is pending   Fluid restriction 1.5 liters/day  Patient to start HD today         Moderate tobacco dependence    Dangers of cigarette smoking were reviewed with patient in detail for 3 minutes and patient was encouraged to quit. Nicotine replacement options were discussed.    Smoking cessation > 3 minutes         Anemia of chronic renal failure    Chronic problem.  Stable.  Monitor H/H.  Continue iron supplementation.  Transfuse for hemodynamic instability and/or H/H <7/21         Coronary artery disease involving native coronary artery without angina pectoris    Historical diagnosis. No anginal symptoms.    Continue ASA/plavix, monitor on telemetry.       Iron deficiency anemia secondary to inadequate dietary iron intake    Continue  oral fergon  Continue MVI and pm vitamin C       Moderate malnutrition    Monitor and encourage po intake  Dietary consulted  Continue po supplement and  Renal MVI     Hypocalcemia    Continue calcium supplementation        Mild recurrent major depression    Chronic problem.  Stable.    Continue Seroquel and monitor.       Hypertension associated with diabetes    Historically poorly controlled.   Continue daily norvasc; hold ACEi 2/2 anjum.    Any further orders as per Nephrology         VTE Risk Mitigation (From admission, onward)        Ordered     heparin (porcine) injection 5,000 Units  As needed (PRN)      01/14/19 0935     heparin (porcine) injection  4,000 Units  As needed (PRN)      01/12/19 1106     Place PABLO hose  Until discontinued      01/06/19 1008     enoxaparin injection 30 mg  Daily      01/05/19 2121     IP VTE HIGH RISK PATIENT  Once      01/05/19 2121     Place PABLO hose  Until discontinued      01/05/19 2121     Place sequential compression device  Until discontinued      01/05/19 2121          GEORGE Loza  Department of Hospital Medicine   [facility]    Time spent seeing patient( greater than 1/2 spent in direct contact) : 28 minutes

## 2019-01-14 NOTE — SUBJECTIVE & OBJECTIVE
Interval History: Patient received HD again today.    Review of Systems   Constitutional: Positive for activity change, appetite change and fatigue. Negative for chills, diaphoresis and fever.   HENT: Negative for ear discharge, ear pain and facial swelling.    Eyes: Negative for pain and redness.   Respiratory: Positive for cough and shortness of breath.    Cardiovascular: Positive for leg swelling. Negative for chest pain and palpitations.            Gastrointestinal: Negative for abdominal distention, abdominal pain, constipation, diarrhea, nausea and vomiting.   Endocrine: Negative for polydipsia and polyphagia.   Genitourinary: Negative for difficulty urinating, dysuria, flank pain and hematuria.        Staff reports patient is incontinent of urine at times   Musculoskeletal: Positive for arthralgias and gait problem. Negative for neck pain and neck stiffness.   Skin: Negative for color change and rash.   Allergic/Immunologic: Negative for food allergies.   Neurological: Positive for weakness. Negative for tremors, facial asymmetry and speech difficulty.   Hematological: Does not bruise/bleed easily.   Psychiatric/Behavioral: Negative for agitation, behavioral problems, confusion and suicidal ideas. The patient is not nervous/anxious.      Objective:     Vital Signs (Most Recent):  Temp: 96 °F (35.6 °C) (01/14/19 1602)  Pulse: 83 (01/14/19 1602)  Resp: 20 (01/14/19 1602)  BP: (!) 169/87 (01/14/19 1602)  SpO2: 98 % (01/14/19 1602) Vital Signs (24h Range):  Temp:  [96 °F (35.6 °C)-99.5 °F (37.5 °C)] 96 °F (35.6 °C)  Pulse:  [69-90] 83  Resp:  [16-20] 20  SpO2:  [92 %-99 %] 98 %  BP: (129-169)/(69-88) 169/87     Weight: 54.2 kg (119 lb 7.8 oz)  Body mass index is 18.71 kg/m².    Intake/Output Summary (Last 24 hours) at 1/14/2019 1637  Last data filed at 1/14/2019 1535  Gross per 24 hour   Intake 890 ml   Output 1500 ml   Net -610 ml      Physical Exam   Constitutional: She is oriented to person, place, and time.  She appears well-developed and well-nourished. No distress.   Thin female   HENT:   Head: Normocephalic and atraumatic.   Eyes: Conjunctivae and EOM are normal. Pupils are equal, round, and reactive to light. Right eye exhibits no discharge. Left eye exhibits no discharge.   Neck: Normal range of motion. Neck supple. No JVD present.   Cardiovascular: Normal rate, regular rhythm and intact distal pulses.   Murmur heard.      Pulmonary/Chest:   RLL diminished  Crackles noted to LLL  SOB with exertion   Abdominal: Soft. Bowel sounds are normal. She exhibits no distension. There is no tenderness. There is no guarding.   Genitourinary:   Genitourinary Comments: Not examined   Musculoskeletal: Normal range of motion. She exhibits edema.   LUE edema noted  BLE edema noted today      Neurological: She is alert and oriented to person, place, and time. No cranial nerve deficit.   Skin: Skin is warm and dry. Capillary refill takes less than 2 seconds. She is not diaphoretic.   Right neck QC    Psychiatric: She has a normal mood and affect. Her behavior is normal. Judgment and thought content normal.   Nursing note and vitals reviewed.    Labs: Reviewed

## 2019-01-14 NOTE — ASSESSMENT & PLAN NOTE
Initially patient received  gentle IVF hydration that were later discontinued due to hypervolemia  Follow renal panel and electrolytes closely.  Nephrology following   Renal Ultrasound shows no obstructive uropathy- Sequelae of medical renal disease  Renal dose medications   Avoid NSAIDs, Coyle-II inhibitors, ACE-I, Angiotensin Receptor Blockers, or Aminoglycosides.  Urine analysis showed UTI- Start Iv rocephin  Echocardiogram report pending  Patient's creatinine continues to trend upward- Dr. Desouza consulted for Howard catheter placement 1/10/19  Patient had HD today 1/11/19, 1/12/19, and 1/14/19

## 2019-01-14 NOTE — PT/OT/SLP PROGRESS
Physical Therapy Treatment    Patient Name:  Errol Keita   MRN:  [id number]    Recommendations:     Discharge Recommendations:  (home)       Assessment:     Errol Keita is a 53 y.o. female admitted with a medical diagnosis of Acute renal failure superimposed on stage 4 chronic kidney disease.  She presents with the following impairments/functional limitations:  weakness, impaired endurance, impaired self care skills, impaired functional mobilty, gait instability .    Rehab Prognosis: Good; patient would benefit from acute skilled PT services to address these deficits and reach maximum level of function.    Recent Surgery: * No surgery found *      Plan:     During this hospitalization, patient to be seen 6 x/week to address the identified rehab impairments via gait training, therapeutic activities, therapeutic exercises and progress toward the following goals:    · Plan of Care Expires:  01/25/19    Subjective     Chief Complaint: R leg pain  Patient/Family Comments/goals: just wants to go home  Pain/Comfort:  · Pain Rating 1: (did not rate)  · Location - Side 1: Right  · Location 1: leg  · Pain Addressed 1: Nurse notified      Objective:     Communicated with nurse Garaz prior to session.  Patient found supine telemetry  upon PT entry to room.     General Precautions: Standard, fall   Orthopedic Precautions:N/A   Braces: N/A     Functional Mobility:  · Bed Mobility:     · Supine to Sit: stand by assistance  · Sit to Supine: stand by assistance    · Transfers:     · Sit to Stand:  contact guard assistance with rolling walker    · Gait: 250' with CGA using RW      Patient left supine with all lines intact, call button in reach and nurse notified..    GOALS:   Multidisciplinary Problems     Physical Therapy Goals        Problem: Physical Therapy Goal    Goal Priority Disciplines Outcome Goal Variances Interventions   Physical Therapy Goal     PT, PT/OT Ongoing (interventions implemented as  appropriate)     Description:  Goals to be met by: 2019     Patient will increase functional independence with mobility by performin. Supine to sit with [place]  2. Sit to stand transfer with Supervision  3. Bed to chair transfer with Contact Guard Assistance using Rolling Walker  4. Gait  x 250x2 feet with Contact Guard Assistance using Rolling Walker.   5. Lower extremity exercise program x20 reps per handout, with assistance as needed                      Time Tracking:     PT Received On: 19  PT Start Time: 918     PT Stop Time: 933  PT Total Time (min): 15 min     Billable Minutes: Gait Training 15    Treatment Type: Treatment  PT/PTA: PTA     PTA Visit Number: 4     Adriana Elmore, PTA  2019

## 2019-01-14 NOTE — PLAN OF CARE
Problem: Physical Therapy Goal  Goal: Physical Therapy Goal  Goals to be met by: 2019     Patient will increase functional independence with mobility by performin. Supine to sit with [place]  2. Sit to stand transfer with Supervision  3. Bed to chair transfer with Contact Guard Assistance using Rolling Walker  4. Gait  x 250x2 feet with Contact Guard Assistance using Rolling Walker.   5. Lower extremity exercise program x20 reps per handout, with assistance as needed     Outcome: Ongoing (interventions implemented as appropriate)  PT for gait training

## 2019-01-14 NOTE — PLAN OF CARE
01/13/19 1956   Patient Assessment/Suction   Level of Consciousness (AVPU) alert   Respiratory Effort Unlabored   All Lung Fields Breath Sounds coarse   Rhythm/Pattern, Respiratory unlabored   PRE-TX-O2-ETCO2   O2 Device (Oxygen Therapy) room air   SpO2 95 %   Pulse Oximetry Type Intermittent

## 2019-01-14 NOTE — PROGRESS NOTES
INPATIENT NEPHROLOGY PROGRESS  [facility] NEPHROLOGY    Errol Stone Keita  01/14/2019    Reason for consultation:    Acute kidney injury    History of Present Illness:     53F with T2DM, HTN, pancreatitis, CAD, and CHF who presents with bilateral leg swelling, mild SOB. Admit to missing several doses of her lasix. Is scheduled to see a kidney specialist in the coming month. Renal consulted for TIMOTHY on CKD.    1/6  No n,v,chest pain, sob, dysuria or diarrhea.  No new neuro symptoms  1/7 VSS, no new complains.  1/8 VSS, no new complains.  1/9 VSS, flat affect. If her sCr is not better tomorrow, will ask Dr. Desouza to place a HD cath and start dialysis. This was previously discussed, question were answered.  1/10 VSS, no new complains. Awaiting HD cath placement.  1/11 VSS, no new complains, had howard placed, will have HD today and in AM.   1/12  New start HD, due for treatment today.  States tolerated her first tx yesterday without problems.  Denies SOB and chest pain.  Acidosis improving.  1/13  S/P 2 HD treatments, labs stable today, no acute needs.  Will reassess in am.  Pt reports she has tolerated her treatments without issues, no cramping, no fatigue.  Acidosis improved.  1/14  Appetite better.   No chest pain or sob    Plan of Care:    Acute kidney injury  CKD  4, diabetic, proteinuric  Hypokalemia  Edema/pulmonary crackles  Anemia  Hypertension    Plan:    Resumed Torsemide.  Replete Mg and K as needed.  No nsaids, coxibs. Holding lisinopril.  Renal dose medication .  Keep MAP > 60, SBP > 100.  Had Howard placed 1/11, s/p HD x 2 treatments, repeat today  Check clearance    Thank you for allowing us to participate in this patient's care. We will continue to follow.    Vital Signs:  Temp Readings from Last 3 Encounters:   01/14/19 98 °F (36.7 °C) (Oral)   07/09/18 98.1 °F (36.7 °C) (Oral)   07/06/18 98.7 °F (37.1 °C) (Oral)       Pulse Readings from Last 3 Encounters:   01/14/19 72   07/09/18 72   07/06/18  68       BP Readings from Last 3 Encounters:   01/14/19 (!) 144/75   07/09/18 (!) 148/79   07/06/18 132/62       Weight:  Wt Readings from Last 3 Encounters:   01/11/19 54.2 kg (119 lb 7.8 oz)   07/09/18 48.1 kg (106 lb 0.7 oz)   07/06/18 48.4 kg (106 lb 11.2 oz)       Past Medical & Surgical History:  Past Medical History:   Diagnosis Date    Arthritis     Asthma     CHF (congestive heart failure)     Coronary artery disease     Diabetes mellitus     Hypertension     Pancreatitis     Type 2 diabetes mellitus with hyperglycemia 7/13/2015       Past Surgical History:   Procedure Laterality Date    ASPIRATION ABSCESS N/A 10/27/2015    Performed by [facility] Surgeon at [facility] YECENIA    CARDIAC SURGERY      CABG    CHOLECYSTECTOMY      COLECTOMY-RIGHT N/A 8/25/2014    Performed by Harlan Regalado [facility] at [facility] OR    CORONARY ARTERY BYPASS GRAFT      EGD (ESOPHAGOGASTRODUODENOSCOPY) N/A 4/16/2014    Performed by Crow Brantley MD at [facility] ENDO    EGD (ESOPHAGOGASTRODUODENOSCOPY) N/A 4/2/2014    Performed by Crow [facility] at [facility] ENDO    EGD (ESOPHAGOGASTRODUODENOSCOPY) N/A 6/6/2013    Performed by [facility] at [facility] ENDO    ESOPHAGOGASTRODUODENOSCOPY (EGD) N/A 8/22/2014    Performed by Satnam Nguyen MD at [facility] ENDO    STERNOTOMY N/A 6/6/2013    Performed by Ole Ingram MD at [facility] OR    THORACOTOMY N/A 6/6/2013    Performed by Ole Ingram MD at [facility] OR       Past Social History:  Social History     Socioeconomic History    Marital status:      Spouse name: None    Number of children: None    Years of education: None    Highest education level: None   Social Needs    Financial resource strain: None    Food insecurity - worry: None    Food insecurity - inability: None    Transportation needs - medical: None    Transportation needs - non-medical: None   Occupational History    None   Tobacco Use    Smoking status: Current Every Day Smoker     Packs/day: 0.50      Years: 35.00     Pack years: 17.50     Types: Cigarettes    Smokeless tobacco: Never Used   Substance and Sexual Activity    Alcohol use: No     Alcohol/week: 0.0 oz     Comment: questionable per     Drug use: No    Sexual activity: Not Currently     Partners: Male     Birth control/protection: None   Other Topics Concern    None   Social History Narrative    None       Medications:  No current facility-administered medications on file prior to encounter.      Current Outpatient Medications on File Prior to Encounter   Medication Sig Dispense Refill    amLODIPine (NORVASC) 5 MG tablet Take 1 tablet (5 mg total) by mouth once daily. (Patient taking differently: Take 10 mg by mouth once daily. ) 30 tablet 0    aspirin 81 MG chewable tablet Take 81 mg by mouth once daily. Ran out      atorvastatin (LIPITOR) 80 MG tablet Take 1 tablet (80 mg total) by mouth once daily. 30 tablet 11    blood sugar diagnostic Strp To check BG 4 times daily, to use with insurance preferred meter 400 strip 3    blood-glucose meter kit To check BG 4 times daily, to use with insurance preferred meter 1 each 0    clopidogrel (PLAVIX) 75 mg tablet Take 1 tablet (75 mg total) by mouth once daily. 30 tablet 11    CREON 24,000-76,000 -120,000 unit capsule Take 1 capsule by mouth 4 (four) times daily with meals and nightly.       ferrous sulfate 325 (65 FE) MG EC tablet Take 1 tablet (325 mg total) by mouth 2 (two) times daily. 60 tablet 0    furosemide (LASIX) 40 MG tablet Take 40 mg by mouth once daily.      HUMALOG KWIKPEN INSULIN 100 unit/mL InPn pen Inject 2 Units into the skin 3 (three) times daily before meals. 15 mL 12    insulin detemir U-100 (LEVEMIR FLEXTOUCH) 100 unit/mL (3 mL) SubQ InPn pen Inject 2 Units into the skin 2 (two) times daily. 1 Box 0    lancets Misc To check BG 4 times daily, to use with insurance preferred meter 400 each 3    lisinopril (PRINIVIL,ZESTRIL) 2.5 MG tablet Take 2.5 mg by mouth once  "daily.       magnesium oxide (MAG-OX) 400 mg tablet Take 1 tablet (400 mg total) by mouth once daily. 30 tablet 0    pantoprazole (PROTONIX) 40 MG tablet Take 40 mg by mouth once daily.      pen needle, diabetic (BD ULTRA-FINE AZIZA PEN NEEDLE) 32 gauge x 5/32" Ndle Uses 4 daily 150 each 12    potassium chloride (KLOR-CON) 20 mEq Pack Take 20 mEq by mouth 2 (two) times daily. 60 packet 0    quetiapine (SEROQUEL) 100 MG Tab 100 mg every evening.        Scheduled Meds:   amLODIPine  10 mg Oral Daily    ascorbic acid (vitamin C)  500 mg Oral QHS    aspirin  81 mg Oral Daily    atorvastatin  80 mg Oral Daily    cefTRIAXone (ROCEPHIN) IVPB  1 g Intravenous Q24H    clopidogrel  75 mg Oral Daily    enoxaparin  30 mg Subcutaneous Daily    ferrous sulfate  325 mg Oral BID    hydrALAZINE  10 mg Oral Q12H    lipase-protease-amylase 24,000-76,000-120,000 units  1 capsule Oral QID (WM & HS)    magnesium oxide  400 mg Oral Daily    QUEtiapine  100 mg Oral QHS    senna-docusate 8.6-50 mg  1 tablet Oral BID    torsemide  20 mg Oral BID    vitamin renal formula (B-complex-vitamin c-folic acid)  1 capsule Oral Daily     Continuous Infusions:  PRN Meds:.acetaminophen, cloNIDine, dextrose 50%, dextrose 50%, glucagon (human recombinant), glucose, glucose, glucose, heparin (porcine), insulin aspart U-100, ondansetron, sodium chloride 0.9%    Allergies:  Patient has no known allergies.    Past Family History:  Reviewed; refer to Hospitalist Admission Note    Review of Systems:  Review of Systems - All 14 systems reviewed and negative, except as noted in HPI    Physical Exam:    BP (!) 144/75   Pulse 72   Temp 98 °F (36.7 °C) (Oral)   Resp 19   Ht 5' 7" (1.702 m) Comment: Admit 6/20/18  Wt 54.2 kg (119 lb 7.8 oz)   LMP 09/03/2012 (Exact Date)   SpO2 96%   Breastfeeding? No   BMI 18.71 kg/m²     General Appearance:    Alert, cooperative, no distress, appears stated age   Head:    Normocephalic, without obvious " abnormality, atraumatic   Eyes:    PER, conjunctiva/corneas clear, EOM's intact in both eyes        Throat:   wnl   Back:     Symmetric, no curvature, ROM normal, no CVA tenderness   Lungs:     Clear to auscultation bilaterally, respirations unlabored   Chest wall:    No tenderness or deformity   Heart:    Regular rate and rhythm, +murmur   Abdomen:     Soft   Extremities:   Extremities normal, atraumatic, no cyanosis or edema   Pulses:   +all exts   MSK:   No joint or muscle swelling, tenderness or deformity   Skin:   w/d   Neurologic:   No flap     Results:  Lab Results   Component Value Date     01/14/2019    K 3.6 01/14/2019     01/14/2019    CO2 26 01/14/2019    BUN 34 (H) 01/14/2019    CREATININE 3.3 (H) 01/14/2019    CALCIUM 8.1 (L) 01/14/2019    ANIONGAP 9 01/14/2019    ESTGFRAFRICA 17 (A) 01/14/2019    EGFRNONAA 15 (A) 01/14/2019       Lab Results   Component Value Date    CALCIUM 8.1 (L) 01/14/2019    PHOS 4.2 01/14/2019       No results for input(s): WBC, RBC, HGB, HCT, PLT, MCV, MCH, MCHC in the last 24 hours.     Paulie Tolbert MD  Nephrology  [facility]  [phone]

## 2019-01-14 NOTE — ASSESSMENT & PLAN NOTE
Chronic; poorly controlled historically.    DM diet  Accuchecks with correctional SSI   Blood sugars running   - Patient previously continued to  refuse detemir insulin and it was discontinued due to patient refusal  Continue moderate SSI coverage   HGA1c is 8.1  DM educator and dietician were consulted

## 2019-01-14 NOTE — PROGRESS NOTES
HD:  tx complete, lines reinfused, catheter capped and clamped, dressing CDI.  Pt tolerated 3rd tx well.    NET UF:  1000 mL

## 2019-01-14 NOTE — PLAN OF CARE
Problem: Adult Inpatient Plan of Care  Goal: Plan of Care Review  Outcome: Ongoing (interventions implemented as appropriate)  POC reviewed with pt, understanding verbalized. AAOX4. No c/o pain throughout shift. Upwith assist. Room air. Glucose monitoring/Coverage AC/HS As needed.DM/Renal diet with a 1.5L fluid restriction maintained. Dialysis done today. . Tele monitoring maintained. Safety Maintained. Will continue to monitor.

## 2019-01-15 LAB
ANION GAP SERPL CALC-SCNC: 9 MMOL/L
BUN SERPL-MCNC: 19 MG/DL
CALCIUM SERPL-MCNC: 7.7 MG/DL
CHLORIDE SERPL-SCNC: 107 MMOL/L
CO2 SERPL-SCNC: 22 MMOL/L
CREAT SERPL-MCNC: 2.3 MG/DL
EST. GFR  (AFRICAN AMERICAN): 27 ML/MIN/1.73 M^2
EST. GFR  (NON AFRICAN AMERICAN): 23 ML/MIN/1.73 M^2
GLUCOSE SERPL-MCNC: 209 MG/DL
MAGNESIUM SERPL-MCNC: 2.2 MG/DL
PHOSPHATE SERPL-MCNC: 3.2 MG/DL
POCT GLUCOSE: 177 MG/DL (ref 70–110)
POCT GLUCOSE: 180 MG/DL (ref 70–110)
POCT GLUCOSE: 243 MG/DL (ref 70–110)
POCT GLUCOSE: 380 MG/DL (ref 70–110)
POCT GLUCOSE: 426 MG/DL (ref 70–110)
POTASSIUM SERPL-SCNC: 4.8 MMOL/L
SODIUM SERPL-SCNC: 138 MMOL/L

## 2019-01-15 PROCEDURE — 94761 N-INVAS EAR/PLS OXIMETRY MLT: CPT

## 2019-01-15 PROCEDURE — 97116 GAIT TRAINING THERAPY: CPT

## 2019-01-15 PROCEDURE — 63600175 PHARM REV CODE 636 W HCPCS: Performed by: NURSE PRACTITIONER

## 2019-01-15 PROCEDURE — 25000003 PHARM REV CODE 250: Performed by: INTERNAL MEDICINE

## 2019-01-15 PROCEDURE — 97803 MED NUTRITION INDIV SUBSEQ: CPT

## 2019-01-15 PROCEDURE — 25000003 PHARM REV CODE 250: Performed by: NURSE PRACTITIONER

## 2019-01-15 PROCEDURE — 84100 ASSAY OF PHOSPHORUS: CPT

## 2019-01-15 PROCEDURE — 83735 ASSAY OF MAGNESIUM: CPT

## 2019-01-15 PROCEDURE — 12000002 HC ACUTE/MED SURGE SEMI-PRIVATE ROOM

## 2019-01-15 PROCEDURE — 36415 COLL VENOUS BLD VENIPUNCTURE: CPT

## 2019-01-15 PROCEDURE — 80048 BASIC METABOLIC PNL TOTAL CA: CPT

## 2019-01-15 RX ADMIN — ATORVASTATIN CALCIUM 80 MG: 40 TABLET, FILM COATED ORAL at 08:01

## 2019-01-15 RX ADMIN — ASPIRIN 81 MG CHEWABLE TABLET 81 MG: 81 TABLET CHEWABLE at 08:01

## 2019-01-15 RX ADMIN — PANCRELIPASE 1 CAPSULE: 24000; 76000; 120000 CAPSULE, DELAYED RELEASE PELLETS ORAL at 08:01

## 2019-01-15 RX ADMIN — INSULIN ASPART 1 UNITS: 100 INJECTION, SOLUTION INTRAVENOUS; SUBCUTANEOUS at 10:01

## 2019-01-15 RX ADMIN — QUETIAPINE FUMARATE 100 MG: 100 TABLET ORAL at 10:01

## 2019-01-15 RX ADMIN — INSULIN ASPART 2 UNITS: 100 INJECTION, SOLUTION INTRAVENOUS; SUBCUTANEOUS at 05:01

## 2019-01-15 RX ADMIN — Medication 1 CAPSULE: at 08:01

## 2019-01-15 RX ADMIN — ENOXAPARIN SODIUM 30 MG: 100 INJECTION SUBCUTANEOUS at 05:01

## 2019-01-15 RX ADMIN — INSULIN ASPART 10 UNITS: 100 INJECTION, SOLUTION INTRAVENOUS; SUBCUTANEOUS at 11:01

## 2019-01-15 RX ADMIN — PANCRELIPASE 1 CAPSULE: 24000; 76000; 120000 CAPSULE, DELAYED RELEASE PELLETS ORAL at 10:01

## 2019-01-15 RX ADMIN — FERROUS SULFATE TAB EC 325 MG (65 MG FE EQUIVALENT) 325 MG: 325 (65 FE) TABLET DELAYED RESPONSE at 08:01

## 2019-01-15 RX ADMIN — INSULIN ASPART 4 UNITS: 100 INJECTION, SOLUTION INTRAVENOUS; SUBCUTANEOUS at 05:01

## 2019-01-15 RX ADMIN — TORSEMIDE 20 MG: 20 TABLET ORAL at 10:01

## 2019-01-15 RX ADMIN — AMLODIPINE BESYLATE 10 MG: 5 TABLET ORAL at 08:01

## 2019-01-15 RX ADMIN — TORSEMIDE 20 MG: 20 TABLET ORAL at 08:01

## 2019-01-15 RX ADMIN — MAGNESIUM OXIDE TAB 400 MG (241.3 MG ELEMENTAL MG) 400 MG: 400 (241.3 MG) TAB at 08:01

## 2019-01-15 RX ADMIN — OXYCODONE HYDROCHLORIDE AND ACETAMINOPHEN 500 MG: 500 TABLET ORAL at 10:01

## 2019-01-15 RX ADMIN — FERROUS SULFATE TAB EC 325 MG (65 MG FE EQUIVALENT) 325 MG: 325 (65 FE) TABLET DELAYED RESPONSE at 10:01

## 2019-01-15 RX ADMIN — PANCRELIPASE 1 CAPSULE: 24000; 76000; 120000 CAPSULE, DELAYED RELEASE PELLETS ORAL at 05:01

## 2019-01-15 RX ADMIN — CLOPIDOGREL BISULFATE 75 MG: 75 TABLET ORAL at 08:01

## 2019-01-15 RX ADMIN — HYDRALAZINE HYDROCHLORIDE 10 MG: 10 TABLET, FILM COATED ORAL at 08:01

## 2019-01-15 RX ADMIN — HYDRALAZINE HYDROCHLORIDE 10 MG: 10 TABLET, FILM COATED ORAL at 10:01

## 2019-01-15 RX ADMIN — PANCRELIPASE 1 CAPSULE: 24000; 76000; 120000 CAPSULE, DELAYED RELEASE PELLETS ORAL at 11:01

## 2019-01-15 RX ADMIN — SENNOSIDES AND DOCUSATE SODIUM 1 TABLET: 8.6; 5 TABLET ORAL at 10:01

## 2019-01-15 NOTE — PLAN OF CARE
Problem: Malnutrition  Goal: Improved Nutritional Intake    Intervention: Promote and Optimize Oral Intake  Intervention: Nutrition Supplement Therpy-Commercial beverage, Nutrition Education, and carbohydrate/sodium/phosphorus/potassium modified diet    Recommendation:   1) Continue consistent carb,  renal diet (already 2 gm Na) 3-4 carb servings/meal- no calorie or fat restriction as pt needs to gain wt   2) Change supplement to Boost Glucose Control, chocolate 1 x daily (2/2 meal intake consistently at 100%,  POCT elevated and patient doesn't care for Novasource Renal)       Goals: 1) Po intakes > 50% of meals and supplements  @ f/u 2) PO intake >=85% EEN during admit  Nutrition Goal Status: 1) met/discontinued 2) met/ongoing  Communication of RD Recs: (POC, sticky note second sign )    Note: Boost Glucose Control 1 carton: 260 mg potassium,  200 mg phosphorus,23 gm CHO             Novasource Renal 1 carton      : 225 mg potassium,  195 mg phosphorus, 43.5 gm CHO

## 2019-01-15 NOTE — PROGRESS NOTES
INPATIENT NEPHROLOGY PROGRESS  [facility] NEPHROLOGY    Errol Stone Keita  01/15/2019    Reason for consultation:    Acute kidney injury    History of Present Illness:     53F with T2DM, HTN, pancreatitis, CAD, and CHF who presents with bilateral leg swelling, mild SOB. Admit to missing several doses of her lasix. Is scheduled to see a kidney specialist in the coming month. Renal consulted for TIMOTHY on CKD.    1/6  No n,v,chest pain, sob, dysuria or diarrhea.  No new neuro symptoms  1/7 VSS, no new complains.  1/8 VSS, no new complains.  1/9 VSS, flat affect. If her sCr is not better tomorrow, will ask Dr. Desouza to place a HD cath and start dialysis. This was previously discussed, question were answered.  1/10 VSS, no new complains. Awaiting HD cath placement.  1/11 VSS, no new complains, had howard placed, will have HD today and in AM.   1/12  New start HD, due for treatment today.  States tolerated her first tx yesterday without problems.  Denies SOB and chest pain.  Acidosis improving.  1/13  S/P 2 HD treatments, labs stable today, no acute needs.  Will reassess in am.  Pt reports she has tolerated her treatments without issues, no cramping, no fatigue.  Acidosis improved.  1/14  Appetite better.   No chest pain or sob  1/15  No sob, nausea.  She's eating well.  No complaints specified.      Plan of Care:    Acute kidney injury  CKD  4, diabetic, proteinuric  Hypokalemia  Edema/pulmonary crackles  Anemia  Hypertension    Plan:    Check clearance.  Get hemesplit if crcl less than 15  Replete Mg and K as needed.  No nsaids, coxibs. Holding lisinopril.  Renal dose medication .  Keep MAP > 60, SBP > 100.  Had Howard placed 1/11, s/p HD x 2 treatments, repeat today       Thank you for allowing us to participate in this patient's care. We will continue to follow.    Vital Signs:  Temp Readings from Last 3 Encounters:   01/15/19 98.3 °F (36.8 °C) (Oral)   07/09/18 98.1 °F (36.7 °C) (Oral)   07/06/18 98.7 °F (37.1  °C) (Oral)       Pulse Readings from Last 3 Encounters:   01/15/19 80   07/09/18 72   07/06/18 68       BP Readings from Last 3 Encounters:   01/15/19 (!) 156/83   07/09/18 (!) 148/79   07/06/18 132/62       Weight:  Wt Readings from Last 3 Encounters:   01/11/19 54.2 kg (119 lb 7.8 oz)   07/09/18 48.1 kg (106 lb 0.7 oz)   07/06/18 48.4 kg (106 lb 11.2 oz)       Past Medical & Surgical History:  Past Medical History:   Diagnosis Date    Arthritis     Asthma     CHF (congestive heart failure)     Coronary artery disease     Diabetes mellitus     Hypertension     Pancreatitis     Type 2 diabetes mellitus with hyperglycemia 7/13/2015       Past Surgical History:   Procedure Laterality Date    ASPIRATION ABSCESS N/A 10/27/2015    Performed by [facility]enia Surgeon at [facility] YECENIA    CARDIAC SURGERY      CABG    CHOLECYSTECTOMY      COLECTOMY-RIGHT N/A 8/25/2014    Performed by [facility]lan Regalado [facility] at [facility] OR    CORONARY ARTERY BYPASS GRAFT      EGD (ESOPHAGOGASTRODUODENOSCOPY) N/A 4/16/2014    Performed by Crow [facility]ley MD at [facility] ENDO    EGD (ESOPHAGOGASTRODUODENOSCOPY) N/A 4/2/2014    Performed by [facility] at [facility] ENDO    EGD (ESOPHAGOGASTRODUODENOSCOPY) N/A 6/6/2013    Performed by Crow [facility]ley MD at [facility] ENDO    ESOPHAGOGASTRODUODENOSCOPY (EGD) N/A 8/22/2014    Performed by Satnam Nguyen MD at [facility] ENDO    STERNOTOMY N/A 6/6/2013    Performed by Ole nIgram MD at [facility] OR    THORACOTOMY N/A 6/6/2013    Performed by Ole Ingram MD at [facility] OR       Past Social History:  Social History     Socioeconomic History    Marital status:      Spouse name: None    Number of children: None    Years of education: None    Highest education level: None   Social Needs    Financial resource strain: None    Food insecurity - worry: None    Food insecurity - inability: None    Transportation needs - medical: None    Transportation needs - non-medical: None   Occupational  History    None   Tobacco Use    Smoking status: Current Every Day Smoker     Packs/day: 0.50     Years: 35.00     Pack years: 17.50     Types: Cigarettes    Smokeless tobacco: Never Used   Substance and Sexual Activity    Alcohol use: No     Alcohol/week: 0.0 oz     Comment: questionable per     Drug use: No    Sexual activity: Not Currently     Partners: Male     Birth control/protection: None   Other Topics Concern    None   Social History Narrative    None       Medications:  No current facility-administered medications on file prior to encounter.      Current Outpatient Medications on File Prior to Encounter   Medication Sig Dispense Refill    amLODIPine (NORVASC) 5 MG tablet Take 1 tablet (5 mg total) by mouth once daily. (Patient taking differently: Take 10 mg by mouth once daily. ) 30 tablet 0    aspirin 81 MG chewable tablet Take 81 mg by mouth once daily. Ran out      atorvastatin (LIPITOR) 80 MG tablet Take 1 tablet (80 mg total) by mouth once daily. 30 tablet 11    blood sugar diagnostic Strp To check BG 4 times daily, to use with insurance preferred meter 400 strip 3    blood-glucose meter kit To check BG 4 times daily, to use with insurance preferred meter 1 each 0    clopidogrel (PLAVIX) 75 mg tablet Take 1 tablet (75 mg total) by mouth once daily. 30 tablet 11    CREON 24,000-76,000 -120,000 unit capsule Take 1 capsule by mouth 4 (four) times daily with meals and nightly.       ferrous sulfate 325 (65 FE) MG EC tablet Take 1 tablet (325 mg total) by mouth 2 (two) times daily. 60 tablet 0    furosemide (LASIX) 40 MG tablet Take 40 mg by mouth once daily.      HUMALOG KWIKPEN INSULIN 100 unit/mL InPn pen Inject 2 Units into the skin 3 (three) times daily before meals. 15 mL 12    insulin detemir U-100 (LEVEMIR FLEXTOUCH) 100 unit/mL (3 mL) SubQ InPn pen Inject 2 Units into the skin 2 (two) times daily. 1 Box 0    lancets Misc To check BG 4 times daily, to use with insurance  "preferred meter 400 each 3    lisinopril (PRINIVIL,ZESTRIL) 2.5 MG tablet Take 2.5 mg by mouth once daily.       magnesium oxide (MAG-OX) 400 mg tablet Take 1 tablet (400 mg total) by mouth once daily. 30 tablet 0    pantoprazole (PROTONIX) 40 MG tablet Take 40 mg by mouth once daily.      pen needle, diabetic (BD ULTRA-FINE AZIZA PEN NEEDLE) 32 gauge x 5/32" Ndle Uses 4 daily 150 each 12    potassium chloride (KLOR-CON) 20 mEq Pack Take 20 mEq by mouth 2 (two) times daily. 60 packet 0    quetiapine (SEROQUEL) 100 MG Tab 100 mg every evening.        Scheduled Meds:   amLODIPine  10 mg Oral Daily    ascorbic acid (vitamin C)  500 mg Oral QHS    aspirin  81 mg Oral Daily    atorvastatin  80 mg Oral Daily    cefTRIAXone (ROCEPHIN) IVPB  1 g Intravenous Q24H    clopidogrel  75 mg Oral Daily    enoxaparin  30 mg Subcutaneous Daily    ferrous sulfate  325 mg Oral BID    hydrALAZINE  10 mg Oral Q12H    lipase-protease-amylase 24,000-76,000-120,000 units  1 capsule Oral QID (WM & HS)    magnesium oxide  400 mg Oral Daily    QUEtiapine  100 mg Oral QHS    senna-docusate 8.6-50 mg  1 tablet Oral BID    torsemide  20 mg Oral BID    vitamin renal formula (B-complex-vitamin c-folic acid)  1 capsule Oral Daily     Continuous Infusions:  PRN Meds:.acetaminophen, cloNIDine, dextrose 50%, dextrose 50%, glucagon (human recombinant), glucose, glucose, glucose, heparin (porcine), heparin (porcine), insulin aspart U-100, ondansetron, sodium chloride 0.9%    Allergies:  Patient has no known allergies.    Past Family History:  Reviewed; refer to Hospitalist Admission Note    Review of Systems:  Review of Systems - All 14 systems reviewed and negative, except as noted in HPI    Physical Exam:    BP (!) 156/83 (BP Location: Left arm, Patient Position: Lying)   Pulse 80   Temp 98.3 °F (36.8 °C) (Oral)   Resp 20   Ht 5' 7" (1.702 m) Comment: Admit 6/20/18  Wt 54.2 kg (119 lb 7.8 oz)   LMP 09/03/2012 (Exact Date)   " SpO2 97%   Breastfeeding? No   BMI 18.71 kg/m²     General Appearance:    Alert, cooperative, no distress, appears stated age   Head:    Normocephalic, without obvious abnormality, atraumatic   Eyes:    PER, conjunctiva/corneas clear, EOM's intact in both eyes        Throat:   wnl   Back:     Symmetric, no curvature, ROM normal, no CVA tenderness   Lungs:     Clear to auscultation bilaterally, respirations unlabored   Chest wall:    No tenderness or deformity   Heart:    Regular rate and rhythm, +murmur   Abdomen:     Soft   Extremities:   Extremities normal, atraumatic, no cyanosis or edema   Pulses:   +all exts   MSK:   No joint or muscle swelling, tenderness or deformity   Skin:   w/d   Neurologic:   No flap     Results:  Lab Results   Component Value Date     01/15/2019    K 4.8 01/15/2019     01/15/2019    CO2 22 (L) 01/15/2019    BUN 19 01/15/2019    CREATININE 2.3 (H) 01/15/2019    CALCIUM 7.7 (L) 01/15/2019    ANIONGAP 9 01/15/2019    ESTGFRAFRICA 27 (A) 01/15/2019    EGFRNONAA 23 (A) 01/15/2019       Lab Results   Component Value Date    CALCIUM 7.7 (L) 01/15/2019    PHOS 3.2 01/15/2019       No results for input(s): WBC, RBC, HGB, HCT, PLT, MCV, MCH, MCHC in the last 24 hours.     Paulie Tolbert MD  Nephrology  [facility]  [phone]

## 2019-01-15 NOTE — NURSING
24hr creatinine clearance to be checked through urine ordered yesterday. Pt has been educated to about urine needing collection but continues to throw toilet paper in the urine once she has voided X 3. Reeducated pt on the importance of collecting urine. Pt verbalized understanding.   1600- Notified JULIEN Philip of above and that pt has again urinated and discarded the urine, preventing the start of the 24hr urine. JULIEN Philip, ordered to Place a fenton catheter. Updated pt. 16Fr Fenton catheter placed, pt tolerated well. 24hr urine collect has been started today 1/15/2019 at 4:30PM.

## 2019-01-15 NOTE — ASSESSMENT & PLAN NOTE
Continue Iv rocephin  Urine culture shows > 100,000 colonies E. Coli which is sensitive to the rocephin  Patient just completed a round of Iv rocephin 1/15/19

## 2019-01-15 NOTE — PLAN OF CARE
Problem: Adult Inpatient Plan of Care  Goal: Plan of Care Review  Outcome: Ongoing (interventions implemented as appropriate)  Plan of care reviewed with pt, pt verbalized understanding.  PIV clean dry and intact. Hourly/Q2 hourly rounds completed throughout shift. Pt denies need for pain medication. Neuro checks q 4, neuro intact. Telemetry continues to be monitored. Comfort level established. Up with assist to restroom. Repositions self independently.   Pt has remained free from fall/injury. No new skin breakdown noted. Bed in lowest position, brakes locked, call light within reach, SR^x2 for pt safety. Needs attended to, will continue to monitor. significant other at bedside.

## 2019-01-15 NOTE — SUBJECTIVE & OBJECTIVE
Interval History: 24 hour urine in progress    Review of Systems   Constitutional: Positive for activity change and fatigue. Negative for chills, diaphoresis and fever.        Eating better   HENT: Negative for ear discharge, ear pain and facial swelling.    Eyes: Negative for pain and redness.   Respiratory: Negative for cough and shortness of breath.    Cardiovascular: Positive for leg swelling. Negative for chest pain and palpitations.            Gastrointestinal: Negative for abdominal distention, abdominal pain, constipation, diarrhea, nausea and vomiting.   Endocrine: Negative for polydipsia and polyphagia.   Genitourinary: Negative for difficulty urinating, dysuria, flank pain and hematuria.        Staff reports patient is incontinent of urine at times   Musculoskeletal: Positive for arthralgias and gait problem. Negative for neck pain and neck stiffness.   Skin: Negative for color change and rash.   Allergic/Immunologic: Negative for food allergies.   Neurological: Positive for weakness. Negative for tremors, facial asymmetry and speech difficulty.   Hematological: Does not bruise/bleed easily.   Psychiatric/Behavioral: Negative for agitation, behavioral problems, confusion and suicidal ideas. The patient is not nervous/anxious.      Objective:     Vital Signs (Most Recent):  Temp: 98.3 °F (36.8 °C) (01/15/19 1126)  Pulse: 80 (01/15/19 1126)  Resp: 20 (01/15/19 1126)  BP: (!) 156/83 (01/15/19 1126)  SpO2: 97 % (01/15/19 1126) Vital Signs (24h Range):  Temp:  [96 °F (35.6 °C)-98.6 °F (37 °C)] 98.3 °F (36.8 °C)  Pulse:  [78-89] 80  Resp:  [18-20] 20  SpO2:  [91 %-98 %] 97 %  BP: (149-169)/(83-88) 156/83     Weight: 54.2 kg (119 lb 7.8 oz)  Body mass index is 18.71 kg/m².    Intake/Output Summary (Last 24 hours) at 1/15/2019 1537  Last data filed at 1/15/2019 1300  Gross per 24 hour   Intake 740 ml   Output 600 ml   Net 140 ml      Physical Exam   Constitutional: She is oriented to person, place, and time. She  appears well-developed and well-nourished. No distress.   Thin female   HENT:   Head: Normocephalic and atraumatic.   Eyes: Conjunctivae and EOM are normal. Pupils are equal, round, and reactive to light. Right eye exhibits no discharge. Left eye exhibits no discharge.   Neck: Normal range of motion. Neck supple. No JVD present.   Cardiovascular: Normal rate, regular rhythm and intact distal pulses.   Murmur heard.      Pulmonary/Chest:   BBS diminished      Abdominal: Soft. Bowel sounds are normal. She exhibits no distension. There is no tenderness. There is no guarding.   Genitourinary:   Genitourinary Comments: Not examined   Musculoskeletal: Normal range of motion. She exhibits edema.   LUE edema noted  BLE edema noted today      Neurological: She is alert and oriented to person, place, and time. No cranial nerve deficit.   Skin: Skin is warm and dry. Capillary refill takes less than 2 seconds. She is not diaphoretic.   Right neck QC    Psychiatric: She has a normal mood and affect. Her behavior is normal. Judgment and thought content normal.   Nursing note and vitals reviewed.     Labs: Reviewed

## 2019-01-15 NOTE — ASSESSMENT & PLAN NOTE
Initially patient received  gentle IVF hydration that were later discontinued due to hypervolemia  Follow renal panel and electrolytes closely.  Nephrology following   Renal Ultrasound shows no obstructive uropathy- Sequelae of medical renal disease  Renal dose medications, Avoid NSAIDs, Coyle-II inhibitors, ACE-I, Angiotensin Receptor Blockers, or Aminoglycosides.  Echocardiogram report pending  Patient's creatinine continues to trend upward- Dr. Desouza consulted for Howard catheter placement 1/10/19  Patient had HD today 1/11/19, 1/12/19, and 1/14/19  24 hour urine in progress- Patient declined fenton cathter for 24 hour urine

## 2019-01-15 NOTE — NURSING
Pt had echocardiogram done 1/7/2019. Results still not completed. Cariology technician Milli ovalle noted DANY Nunez to interpret. Called Dr. Nunez to read Echo, but he stated he can not read and that I need to call Cardiology to have someone else assigned to read. Called cardiology and spoke with Milli Marie and notified her of above, and she said she will have the Cardiologist that is here today read echo results. Notified JULIEN Philip.

## 2019-01-15 NOTE — PROGRESS NOTES
[facility] Hospital Medicine  Progress Note    Patient Name: Errol Keita  MRN: [id number]  Patient Class: IP- Inpatient   Admission Date: 1/5/2019  Length of Stay: 9 days  Attending Physician: Foreign Greco MD  Primary Care Provider: Bharat Wiggins MD      Subjective:     Principal Problem:Acute renal failure superimposed on stage 4 chronic kidney disease    HPI:  This is a 52 yo  female with a PMHx of T2DM, HTN, pancreatitis, CAD, and CHF who presented to the ED for further evaluation of bilateral leg swelling that has been ongoing for 2 weeks. She states that she is having bilateral leg swelling and leg pain she describes as a tightness. Per ED record, pt endorsed some SOB, but she denies this to me.  Pt denies fever, chest pain, abdominal pain, vomiting, diarrhea, and headache.  ED evaluation reveals TIMOTHY.  She reports that she is a patient of Dr. Tolbert. Patient was evaluated in the ER and noted to have TIMOTHY and admitted for further evaluation and treatment.    Hospital Course:  Patient monitored closely during hospitalization. She initially received IV lasix for acute CHF. Telemetry showed  no significant arrhythmias. Her renal function was  trended closely. Nephrology was consulted. An Echo was  obtained. She was found to have iron deficiency anemia and initiated on oral fergon. Her hypokalemia was  replaced with oral supplementation. Dietary was consulted. She was noted to have moderate protein malnutrition and initiated on oral supplementation. Physical therapy was consulted for debility. Patient's creatinine continued to trend upward. Dr. Desouza was consulted for Howard placement and patient was started on dialysis on 1/11/18. She also recieved HD on 1/12/19 and 1/14/19. 24 hour urine ordered.        Interval History: 24 hour urine in progress    Review of Systems   Constitutional: Positive for activity change and fatigue. Negative for chills, diaphoresis and fever.         Eating better   HENT: Negative for ear discharge, ear pain and facial swelling.    Eyes: Negative for pain and redness.   Respiratory: Negative for cough and shortness of breath.    Cardiovascular: Positive for leg swelling. Negative for chest pain and palpitations.            Gastrointestinal: Negative for abdominal distention, abdominal pain, constipation, diarrhea, nausea and vomiting.   Endocrine: Negative for polydipsia and polyphagia.   Genitourinary: Negative for difficulty urinating, dysuria, flank pain and hematuria.        Staff reports patient is incontinent of urine at times   Musculoskeletal: Positive for arthralgias and gait problem. Negative for neck pain and neck stiffness.   Skin: Negative for color change and rash.   Allergic/Immunologic: Negative for food allergies.   Neurological: Positive for weakness. Negative for tremors, facial asymmetry and speech difficulty.   Hematological: Does not bruise/bleed easily.   Psychiatric/Behavioral: Negative for agitation, behavioral problems, confusion and suicidal ideas. The patient is not nervous/anxious.      Objective:     Vital Signs (Most Recent):  Temp: 98.3 °F (36.8 °C) (01/15/19 1126)  Pulse: 80 (01/15/19 1126)  Resp: 20 (01/15/19 1126)  BP: (!) 156/83 (01/15/19 1126)  SpO2: 97 % (01/15/19 1126) Vital Signs (24h Range):  Temp:  [96 °F (35.6 °C)-98.6 °F (37 °C)] 98.3 °F (36.8 °C)  Pulse:  [78-89] 80  Resp:  [18-20] 20  SpO2:  [91 %-98 %] 97 %  BP: (149-169)/(83-88) 156/83     Weight: 54.2 kg (119 lb 7.8 oz)  Body mass index is 18.71 kg/m².    Intake/Output Summary (Last 24 hours) at 1/15/2019 1537  Last data filed at 1/15/2019 1300  Gross per 24 hour   Intake 740 ml   Output 600 ml   Net 140 ml      Physical Exam   Constitutional: She is oriented to person, place, and time. She appears well-developed and well-nourished. No distress.   Thin female   HENT:   Head: Normocephalic and atraumatic.   Eyes: Conjunctivae and EOM are normal. Pupils are equal,  round, and reactive to light. Right eye exhibits no discharge. Left eye exhibits no discharge.   Neck: Normal range of motion. Neck supple. No JVD present.   Cardiovascular: Normal rate, regular rhythm and intact distal pulses.   Murmur heard.      Pulmonary/Chest:   BBS diminished      Abdominal: Soft. Bowel sounds are normal. She exhibits no distension. There is no tenderness. There is no guarding.   Genitourinary:   Genitourinary Comments: Not examined   Musculoskeletal: Normal range of motion. She exhibits edema.   LUE edema noted  BLE edema noted today      Neurological: She is alert and oriented to person, place, and time. No cranial nerve deficit.   Skin: Skin is warm and dry. Capillary refill takes less than 2 seconds. She is not diaphoretic.   Right neck QC    Psychiatric: She has a normal mood and affect. Her behavior is normal. Judgment and thought content normal.   Nursing note and vitals reviewed.     Labs: Reviewed    Assessment/Plan:      * Acute renal failure superimposed on stage 4 chronic kidney disease    Initially patient received  gentle IVF hydration that were later discontinued due to hypervolemia  Follow renal panel and electrolytes closely.  Nephrology following   Renal Ultrasound shows no obstructive uropathy- Sequelae of medical renal disease  Renal dose medications, Avoid NSAIDs, Coyle-II inhibitors, ACE-I, Angiotensin Receptor Blockers, or Aminoglycosides.  Echocardiogram report pending  Patient's creatinine continues to trend upward- Dr. Desouza consulted for Howard catheter placement 1/10/19  Patient had HD today 1/11/19, 1/12/19, and 1/14/19  24 hour urine in progress- Patient declined fenton cathter for 24 hour urine                    UTI (urinary tract infection)    Continue Iv rocephin  Urine culture shows > 100,000 colonies E. Coli which is sensitive to the rocephin  Patient just completed a round of Iv rocephin 1/15/19       Uncontrolled type 2 diabetes mellitus with hyperglycemia     Chronic; poorly controlled historically.    DM diet  Accuchecks with correctional SSI   Blood sugars running  180-380 - Patient previously continued to  refuse detemir insulin and it was discontinued due to patient refusal  Continue moderate SSI coverage   HGA1c is 8.1  DM educator and dietician were consulted         Hypokalemia    Monitor  Supplement as needed         Hypomagnesemia    Monitor  Supplement as needed         Lower extremity edema    No evidence of DVT per ultrasound.       Acute on chronic congestive heart failure    Prior IVF were discontinued   BNP elevated.   Echocardiogram report is pending   Fluid restriction 1.5 liters/day  Patient to start HD today         Moderate tobacco dependence    Dangers of cigarette smoking were reviewed with patient in detail for 3 minutes and patient was encouraged to quit. Nicotine replacement options were discussed.    Smoking cessation > 3 minutes         Anemia of chronic renal failure    Chronic problem.  Stable.  Monitor H/H.  Continue iron supplementation.  Transfuse for hemodynamic instability and/or H/H <7/21         Coronary artery disease involving native coronary artery without angina pectoris    Historical diagnosis. No anginal symptoms.    Continue ASA/plavix, monitor on telemetry.       Iron deficiency anemia secondary to inadequate dietary iron intake    Continue  oral fergon  Continue MVI and pm vitamin C       Moderate malnutrition    Monitor and encourage po intake  Dietary consulted  Continue po supplement and  Renal MVI     Hypocalcemia    Continue calcium supplementation        Mild recurrent major depression    Chronic problem.  Stable.    Continue Seroquel and monitor.       Hypertension associated with diabetes    Historically poorly controlled.   Continue daily norvasc; hold ACEi 2/2 anjum.    Any further orders as per Nephrology         VTE Risk Mitigation (From admission, onward)        Ordered     heparin (porcine) injection 5,000 Units   As needed (PRN)      01/14/19 0935     heparin (porcine) injection 4,000 Units  As needed (PRN)      01/12/19 1106     Place PABLO hose  Until discontinued      01/06/19 1008     enoxaparin injection 30 mg  Daily      01/05/19 2121     IP VTE HIGH RISK PATIENT  Once      01/05/19 2121     Place PABLO hose  Until discontinued      01/05/19 2121     Place sequential compression device  Until discontinued      01/05/19 2121          GEORGE Loza  Department of Hospital Medicine   [facility]    Time spent seeing patient( greater than 1/2 spent in direct contact) : 26 minutes

## 2019-01-15 NOTE — PT/OT/SLP PROGRESS
Physical Therapy Treatment    Patient Name:  Errol Keita   MRN:  [id number]    Recommendations:     Discharge Recommendations:    Assessment:     Errol Keita is a 53 y.o. female admitted with a medical diagnosis of Acute renal failure superimposed on stage 4 chronic kidney disease.  She presents with the following impairments/functional limitations:  weakness, impaired endurance, impaired self care skills, impaired functional mobilty, gait instability, pain .    Rehab Prognosis: Good and Fair; patient would benefit from acute skilled PT services to address these deficits and reach maximum level of function.    Recent Surgery: * No surgery found *      Plan:     During this hospitalization, patient to be seen 6 x/week to address the identified rehab impairments via gait training, therapeutic activities, therapeutic exercises and progress toward the following goals:    · Plan of Care Expires:  01/25/19    Subjective     Chief Complaint: R leg pain/stiffness  Patient/Family Comments/goals: agreeable to gait  Pain/Comfort:  · Pain Rating 1: (did not rate)  · Location - Side 1: Right  · Location 1: leg  · Pain Addressed 1: Reposition, Distraction, Cessation of Activity      Objective:     Communicated with nurse dejesus prior to session.  Patient found supine telemetry  upon PT entry to room.     General Precautions: Standard, fall   Orthopedic Precautions:N/A   Braces: N/A     Functional Mobility:  · Bed Mobility:     · Supine to Sit: supervision  · Sit to Supine: supervision    · Transfers:     · Sit to Stand:  stand by assistance with rolling walker    · Gait: 250' with CGA-SBA using RW          Therapeutic Activities and Exercises:   pt BTB post gait    Patient left supine with all lines intact, call button in reach and nurse notified..    GOALS:   Multidisciplinary Problems     Physical Therapy Goals        Problem: Physical Therapy Goal    Goal Priority Disciplines Outcome Goal Variances  Interventions   Physical Therapy Goal     PT, PT/OT Ongoing (interventions implemented as appropriate)     Description:  Goals to be met by: 2019     Patient will increase functional independence with mobility by performin. Supine to sit with [place]  2. Sit to stand transfer with Supervision  3. Bed to chair transfer with Contact Guard Assistance using Rolling Walker  4. Gait  x 250x2 feet with Contact Guard Assistance using Rolling Walker.   5. Lower extremity exercise program x20 reps per handout, with assistance as needed                      Time Tracking:     PT Received On: 01/15/19  PT Start Time: 1103     PT Stop Time: 1112  PT Total Time (min): 9 min     Billable Minutes: Gait Training 9    Treatment Type: Treatment  PT/PTA: PTA     PTA Visit Number: 5     Adriana Elmore PTA  01/15/2019

## 2019-01-15 NOTE — PLAN OF CARE
Problem: Adult Inpatient Plan of Care  Goal: Plan of Care Review  Outcome: Ongoing (interventions implemented as appropriate)  POC reviewed with pt, understanding verbalized. AAOX4. No c/o pain throughout shift. Upwith assist. Room air. Glucose monitoring/Coverage AC/HS As needed.DM/Renal diet with a 1.5L fluid restriction maintained. Needing 24 urine for creatinine clearance . Tele monitoring maintained. Safety Maintained. Will continue to monitor.

## 2019-01-15 NOTE — PROGRESS NOTES
[facility]  Adult Nutrition  Progress Note    SUMMARY   Intervention: Nutrition Supplement Therpy-Commercial beverage, Nutrition Education, and carbohydrate/sodium/phosphorus/potassium modified diet    Recommendation:   1) Continue consistent carb,  renal diet (already 2 gm Na) 3-4 carb servings/meal- no calorie or fat restriction as pt needs to gain wt   2) Change supplement to Boost Glucose Control, chocolate 1 x daily (2/2 meal intake consistently at 100%,  POCT elevated and patient doesn't care for Novasource Renal)       Goals: 1) Po intakes > 50% of meals and supplements  @ f/u 2) PO intake >=85% EEN during admit  Nutrition Goal Status: 1) met/discontinued 2) met/ongoing  Communication of RD Recs: (POC, sticky note second sign )    Note: Boost Glucose Control 1 carton: 260 mg potassium,  200 mg phosphorus,23 gm CHO             Novasource Renal 1 carton      : 225 mg potassium,  195 mg phosphorus, 43.5 gm CHO    Reason for Assessment    Reason For Assessment: RD follow up  Diagnosis: (TIMOTHY on CKD 4)  Relevant Medical History: CHF, CAD, DM2, HTN, pancreatitis, major depression  Interdisciplinary Rounds: attended    General Information Comments: 52 y/o female admitted with acute renal failure on CKD 4, on renal diet. At this time Phos WDL and K depleted. Pt with history of chronic moderate malnutrition, continues to have variable appetite r/t pain inpatient and was only snacking on junk food at home pt states. glucose in the 200's normally at home.  Pt's  does the cooking and shopping., educated pt on diet for CHF and DM2, considerations for wt gain, and left educational materials. Pt dislikes plain Boost/Ensure will take with ice cream (1 dairy choice allowed daily). NFPE reveals moderate-severe muscle/fat loss.  1/10/19: Pt alert. Reports good appetite, getting and using ~1/2 of the Novasource Renal drinks. Weight appears stable.  1/15/19: Patient alert. Reports good appetite.  "Getting Novasource Renal.  Blood sugars elevated. Discussed use of Boost Glucose Control and pt is accepting of that option. Educated pt on fluid restriction. Left handout and contact information.   Wt Readings from Last 1 Encounters:   19 0717 54.2 kg (119 lb 7.8 oz)   01/10/19 0703 54.3 kg (119 lb 11.4 oz)   19 1302 54.9 kg (121 lb 0.5 oz)   19 0756 54.9 kg (121 lb)   19 2247 55 kg (121 lb 4.1 oz)         Nutrition Discharge Planning: To be determined- Renal, cardiac, 2 gm Na, carbohydrate consistent diet (3-4 carbs/meal) + Boost Glucose control 1 x daily.     Nutrition Risk Screen    Nutrition Risk Screen: no indicators present    Nutrition/Diet History    Patient Reported Diet/Restrictions/Preferences: low salt  Spiritual, Cultural Beliefs, Anabaptism Practices, Values that Affect Care: no  Food Allergies: NKFA(or intolerances)  Factors Affecting Nutritional Intake: pain, decreased appetite    Anthropometrics    Temp: 98.3 °F (36.8 °C)  Height Method: Measured  Height: 5' 7"(Admit 18)  Height (inches): 67 in  Weight Method: Bed Scale  Weight: 54.2 kg (119 lb 7.8 oz)  Weight (lb): 119.49 lb  Ideal Body Weight (IBW), Female: 135 lb  % Ideal Body Weight, Female (lb): 89.65 lb  BMI (Calculated): 19  BMI Grade: 18.5-24.9 - normal  Usual Body Weight (UBW), k.4 kg  % Usual Body Weight: 107.03  % Weight Change From Usual Weight: 6.81 %       Lab/Procedures/Meds    Pertinent Labs Reviewed: reviewed  Lab Results   Component Value Date    ALBUMIN 2.2 (L) 2019     BMP  Lab Results   Component Value Date     01/15/2019    K 4.8 01/15/2019     01/15/2019    CO2 22 (L) 01/15/2019    BUN 19 01/15/2019    CREATININE 2.3 (H) 01/15/2019    CALCIUM 7.7 (L) 01/15/2019    ANIONGAP 9 01/15/2019    ESTGFRAFRICA 27 (A) 01/15/2019    EGFRNONAA 23 (A) 01/15/2019     BNP  No results for input(s): BNP, BNPTRIAGEBLO in the last 168 hours.  POCT Glucose   Date Value Ref Range Status "   01/15/2019 380 (H) 70 - 110 mg/dL Final   01/15/2019 426 (H) 70 - 110 mg/dL Final   01/15/2019 180 (H) 70 - 110 mg/dL Final   01/14/2019 182 (H) 70 - 110 mg/dL Final   01/14/2019 77 70 - 110 mg/dL Final   01/14/2019 317 (H) 70 - 110 mg/dL Final   01/13/2019 211 (H) 70 - 110 mg/dL Final   01/13/2019 296 (H) 70 - 110 mg/dL Final   01/12/2019 223 (H) 70 - 110 mg/dL Final   01/12/2019 211 (H) 70 - 110 mg/dL Final     Lab Results   Component Value Date    HGBA1C 8.1 (H) 01/06/2019       Pertinent Medications Reviewed: reviewed  Pertinent Medications Comments: insulin, zofran, iron, lipase, mg sulfate, KCl, senna    Estimated/Assessed Needs    Weight Used For Calorie Calculations: 54.9 kg (121 lb 0.5 oz)  Energy Calorie Requirements (kcal): [place] Jeor ( x 1.4-1.5 wt gain)= 1835-3023 kcal  Energy Need Method: [place]St Jeor  Protein Requirements: 0.9 g protein/kg ( TIMOTHY on CKD 4 vs. muscle loss ) = 49 g protein  Weight Used For Protein Calculations: 54.9 kg (121 lb 0.5 oz)  Fluid Requirements (mL): 1650 ml or restriction per MD  Estimated Fluid Requirement Method: RDA Method  CHO Requirement: 45-50%      Nutrition Prescription Ordered    Current Diet Order: lconsistent carb,  renal diet (already 2 gm Na) 3-4 carb servings/meal- no calorie or fat restriction    Evaluation of Received Nutrient/Fluid Intake    Energy Calories Required:  meeting needs  Protein Required:  meeting needs  Fluid Required: per primary team 2/2 CKD on HD    Intake/Output Summary (Last 24 hours) at 1/15/2019 1339  Last data filed at 1/15/2019 0600  Gross per 24 hour   Intake 770 ml   Output 2100 ml   Net -1330 ml     Tolerance: tolerating  % Intake of Estimated Energy Needs: %   % Meal Intake: %     Nutrition Risk    Level of Risk/Frequency of Follow-up: moderate 2 x weekly    Assessment and Plan    Moderate malnutrition    Moderate chronic illness related malnutrition          Related to (etiology):  Decreased Appetite     Signs  and Symptoms (as evidenced by):  1) Body Fat Depletion: Moderate-severe depletion noted in upper arms, orbital area, pattellar area, and ribs  2)Muscle Mass Depletion: Moderate-severe depletion noted with prominent protrusion of clavicle, squared shoulders, depleted biceps, calves, and thighs, and prominent depression  In dorsal area of hand  3) Historical wt loss 18.45% x 5 months, 1 week (42 kg, 6/18/18 and 51.5 kg, 1/15/18)  4) moderate fluid accumulation     Interventions/Recommendations (treatment strategy):  Consistent carb 3-4 servings per meal, renal, low sodium. ONS     Nutrition Diagnosis Status:  continues                     Monitor and Evaluation    Food and Nutrient Intake: energy intake, food and beverage intake  Food and Nutrient Adminstration: diet order  Anthropometric Measurements: weight  Biochemical Data, Medical Tests and Procedures: electrolyte and renal panel, glucose/endocrine profile  Nutrition-Focused Physical Findings: overall appearance, extremities, muscles and bones     Malnutrition Assessment  Malnutrition Type: chronic illness  Energy Intake: moderate energy intake  Skin (Micronutrient): (Michael = 19, 2+ arm and 3+ leg edema)  Hair/Scalp (Micronutrient): sparse  Lips/Mucous Membranes (Micronutrient): (dry)  Teeth (Micronutrient): (Missing some)   Micronutrient Evaluation: suspected deficiency  Micronutrient Evaluation Comments: iron on supplement, K on supplement   Weight Loss (Malnutrition): other (see comments)  Energy Intake (Malnutrition): (18% x 5 months historical now regaining)  Subcutaneous Fat (Malnutrition): moderate depletion  Muscle Mass (Malnutrition): severe depletion  Fluid Accumulation (Malnutrition): moderate   Orbital Region (Subcutaneous Fat Loss): moderate depletion  Upper Arm Region (Subcutaneous Fat Loss): severe depletion  Thoracic and Lumbar Region: moderate depletion   Sabianist Region (Muscle Loss): moderate depletion  Clavicle Bone Region (Muscle Loss):  moderate depletion  Clavicle and Acromion Bone Region (Muscle Loss): moderate depletion  Scapular Bone Region (Muscle Loss): moderate depletion  Dorsal Hand (Muscle Loss): moderate depletion  Patellar Region (Muscle Loss): moderate depletion  Anterior Thigh Region (Muscle Loss): severe depletion  Posterior Calf Region (Muscle Loss): severe depletion   Edema (Fluid Accumulation): 3-->moderate   Subcutaneous Fat Loss (Final Summary): severe protein-calorie malnutrition  Muscle Loss Evaluation (Final Summary): severe protein-calorie malnutrition  Fluid Accumulation Evaluation: moderate    Moderate Weight Loss (Malnutrition): other (see comments)    Nutrition Follow-Up    RD Follow-up?: YesYes

## 2019-01-15 NOTE — PLAN OF CARE
Problem: Physical Therapy Goal  Goal: Physical Therapy Goal  Goals to be met by: 2019     Patient will increase functional independence with mobility by performin. Supine to sit with [place]  2. Sit to stand transfer with Supervision  3. Bed to chair transfer with Contact Guard Assistance using Rolling Walker  4. Gait  x 250x2 feet with Contact Guard Assistance using Rolling Walker.   5. Lower extremity exercise program x20 reps per handout, with assistance as needed     Outcome: Ongoing (interventions implemented as appropriate)  PT for gait training

## 2019-01-15 NOTE — PLAN OF CARE
01/14/19 1954   Patient Assessment/Suction   Level of Consciousness (AVPU) alert   Respiratory Effort Unlabored   Expansion/Accessory Muscles/Retractions no use of accessory muscles;no retractions   All Lung Fields Breath Sounds clear;diminished   PRE-TX-O2-ETCO2   O2 Device (Oxygen Therapy) room air   SpO2 97 %   Pulse Oximetry Type Intermittent

## 2019-01-15 NOTE — ASSESSMENT & PLAN NOTE
Chronic; poorly controlled historically.    DM diet  Accuchecks with correctional SSI   Blood sugars running  180-380 - Patient previously continued to  refuse detemir insulin and it was discontinued due to patient refusal  Continue moderate SSI coverage   HGA1c is 8.1  DM educator and dietician were consulted

## 2019-01-16 LAB
ANION GAP SERPL CALC-SCNC: 10 MMOL/L
AORTIC ROOT ANNULUS: 2.68 CM
AORTIC VALVE CUSP SEPERATION: 1.74 CM
AV INDEX (PROSTH): 0.74
AV MEAN GRADIENT: 4.74 MMHG
AV PEAK GRADIENT: 9.12 MMHG
AV VALVE AREA: 2.39 CM2
BSA FOR ECHO PROCEDURE: 1.61 M2
BUN SERPL-MCNC: 30 MG/DL
CALCIUM SERPL-MCNC: 7.7 MG/DL
CHLORIDE SERPL-SCNC: 107 MMOL/L
CO2 SERPL-SCNC: 24 MMOL/L
CREAT CL/1.73 SQ M 12H UR+SERPL-ARVRAT: 9 ML/MIN
CREAT SERPL-MCNC: 3 MG/DL
CREAT SERPL-MCNC: 3 MG/DL
CREAT UR-MCNC: 31 MG/DL
CREATININE, URINE (MG/SPEC): 372 MG/SPEC
CV ECHO LV RWT: 0.91 CM
DOP CALC AO PEAK VEL: 1.51 M/S
DOP CALC AO VTI: 29.38 CM
DOP CALC LVOT AREA: 3.2 CM2
DOP CALC LVOT DIAMETER: 2.02 CM
DOP CALC LVOT STROKE VOLUME: 70.08 CM3
DOP CALCLVOT PEAK VEL VTI: 21.88 CM
E WAVE DECELERATION TIME: 196.47 MSEC
E/A RATIO: 1.15
E/E' RATIO: 15.64
ECHO LV POSTERIOR WALL: 1.3 CM (ref 0.6–1.1)
EST. GFR  (AFRICAN AMERICAN): 19 ML/MIN/1.73 M^2
EST. GFR  (NON AFRICAN AMERICAN): 17 ML/MIN/1.73 M^2
FRACTIONAL SHORTENING: 39 % (ref 28–44)
GLUCOSE SERPL-MCNC: 171 MG/DL
INTERVENTRICULAR SEPTUM: 1.28 CM (ref 0.6–1.1)
IVRT: 0.12 MSEC
LEFT ATRIUM SIZE: 3.15 CM
LEFT INTERNAL DIMENSION IN SYSTOLE: 1.74 CM (ref 2.1–4)
LEFT VENTRICLE MASS INDEX: 70.5 G/M2
LEFT VENTRICULAR INTERNAL DIMENSION IN DIASTOLE: 2.86 CM (ref 3.5–6)
LEFT VENTRICULAR MASS: 115.08 G
LV LATERAL E/E' RATIO: 17.2
LV SEPTAL E/E' RATIO: 14.33
MAGNESIUM SERPL-MCNC: 1.9 MG/DL
MV PEAK A VEL: 0.75 M/S
MV PEAK E VEL: 0.86 M/S
PHOSPHATE SERPL-MCNC: 4.1 MG/DL
PISA TR MAX VEL: 2.29 M/S
POCT GLUCOSE: 179 MG/DL (ref 70–110)
POCT GLUCOSE: 283 MG/DL (ref 70–110)
POTASSIUM SERPL-SCNC: 3.5 MMOL/L
PULM VEIN A" WAVE DURATION": 97 MSEC
PV PEAK VELOCITY: 0.64 CM/S
RA PRESSURE: 3 MMHG
RIGHT VENTRICULAR END-DIASTOLIC DIMENSION: 2.97 CM
SODIUM SERPL-SCNC: 141 MMOL/L
TDI LATERAL: 0.05
TDI SEPTAL: 0.06
TDI: 0.06
TR MAX PG: 20.98 MMHG
TRICUSPID ANNULAR PLANE SYSTOLIC EXCURSION: 2.51 CM
TV REST PULMONARY ARTERY PRESSURE: 24 MMHG
URINE COLLECTION DURATION: 24 HR
URINE VOLUME: 1200 ML

## 2019-01-16 PROCEDURE — 80048 BASIC METABOLIC PNL TOTAL CA: CPT

## 2019-01-16 PROCEDURE — 63600175 PHARM REV CODE 636 W HCPCS: Performed by: INTERNAL MEDICINE

## 2019-01-16 PROCEDURE — 94761 N-INVAS EAR/PLS OXIMETRY MLT: CPT

## 2019-01-16 PROCEDURE — 25000003 PHARM REV CODE 250: Performed by: INTERNAL MEDICINE

## 2019-01-16 PROCEDURE — 63600175 PHARM REV CODE 636 W HCPCS: Performed by: NURSE PRACTITIONER

## 2019-01-16 PROCEDURE — 25000003 PHARM REV CODE 250: Performed by: NURSE PRACTITIONER

## 2019-01-16 PROCEDURE — 82575 CREATININE CLEARANCE TEST: CPT

## 2019-01-16 PROCEDURE — 84100 ASSAY OF PHOSPHORUS: CPT

## 2019-01-16 PROCEDURE — 12000002 HC ACUTE/MED SURGE SEMI-PRIVATE ROOM

## 2019-01-16 PROCEDURE — 83735 ASSAY OF MAGNESIUM: CPT

## 2019-01-16 PROCEDURE — 80100014 HC HEMODIALYSIS 1:1

## 2019-01-16 PROCEDURE — 36415 COLL VENOUS BLD VENIPUNCTURE: CPT

## 2019-01-16 RX ORDER — HEPARIN SODIUM 5000 [USP'U]/ML
5000 INJECTION, SOLUTION INTRAVENOUS; SUBCUTANEOUS
Status: CANCELLED | OUTPATIENT
Start: 2019-01-16

## 2019-01-16 RX ORDER — SODIUM CHLORIDE 9 MG/ML
INJECTION, SOLUTION INTRAVENOUS
Status: CANCELLED | OUTPATIENT
Start: 2019-01-16

## 2019-01-16 RX ORDER — SODIUM CHLORIDE 9 MG/ML
INJECTION, SOLUTION INTRAVENOUS ONCE
Status: CANCELLED | OUTPATIENT
Start: 2019-01-16 | End: 2019-01-16

## 2019-01-16 RX ADMIN — ATORVASTATIN CALCIUM 80 MG: 40 TABLET, FILM COATED ORAL at 08:01

## 2019-01-16 RX ADMIN — AMLODIPINE BESYLATE 10 MG: 5 TABLET ORAL at 08:01

## 2019-01-16 RX ADMIN — PANCRELIPASE 1 CAPSULE: 24000; 76000; 120000 CAPSULE, DELAYED RELEASE PELLETS ORAL at 11:01

## 2019-01-16 RX ADMIN — INSULIN ASPART 6 UNITS: 100 INJECTION, SOLUTION INTRAVENOUS; SUBCUTANEOUS at 11:01

## 2019-01-16 RX ADMIN — MAGNESIUM OXIDE TAB 400 MG (241.3 MG ELEMENTAL MG) 400 MG: 400 (241.3 MG) TAB at 08:01

## 2019-01-16 RX ADMIN — PANCRELIPASE 1 CAPSULE: 24000; 76000; 120000 CAPSULE, DELAYED RELEASE PELLETS ORAL at 08:01

## 2019-01-16 RX ADMIN — CLOPIDOGREL BISULFATE 75 MG: 75 TABLET ORAL at 08:01

## 2019-01-16 RX ADMIN — ENOXAPARIN SODIUM 30 MG: 100 INJECTION SUBCUTANEOUS at 09:01

## 2019-01-16 RX ADMIN — HYDRALAZINE HYDROCHLORIDE 10 MG: 10 TABLET, FILM COATED ORAL at 08:01

## 2019-01-16 RX ADMIN — HYDRALAZINE HYDROCHLORIDE 10 MG: 10 TABLET, FILM COATED ORAL at 09:01

## 2019-01-16 RX ADMIN — ASPIRIN 81 MG CHEWABLE TABLET 81 MG: 81 TABLET CHEWABLE at 08:01

## 2019-01-16 RX ADMIN — SENNOSIDES AND DOCUSATE SODIUM 1 TABLET: 8.6; 5 TABLET ORAL at 09:01

## 2019-01-16 RX ADMIN — OXYCODONE HYDROCHLORIDE AND ACETAMINOPHEN 500 MG: 500 TABLET ORAL at 09:01

## 2019-01-16 RX ADMIN — QUETIAPINE FUMARATE 100 MG: 100 TABLET ORAL at 09:01

## 2019-01-16 RX ADMIN — FERROUS SULFATE TAB EC 325 MG (65 MG FE EQUIVALENT) 325 MG: 325 (65 FE) TABLET DELAYED RESPONSE at 08:01

## 2019-01-16 RX ADMIN — PANCRELIPASE 1 CAPSULE: 24000; 76000; 120000 CAPSULE, DELAYED RELEASE PELLETS ORAL at 09:01

## 2019-01-16 RX ADMIN — Medication 1 CAPSULE: at 08:01

## 2019-01-16 RX ADMIN — TORSEMIDE 20 MG: 20 TABLET ORAL at 08:01

## 2019-01-16 RX ADMIN — FERROUS SULFATE TAB EC 325 MG (65 MG FE EQUIVALENT) 325 MG: 325 (65 FE) TABLET DELAYED RESPONSE at 09:01

## 2019-01-16 RX ADMIN — HEPARIN SODIUM 5000 UNITS: 1000 INJECTION, SOLUTION INTRAVENOUS; SUBCUTANEOUS at 08:01

## 2019-01-16 RX ADMIN — TORSEMIDE 20 MG: 20 TABLET ORAL at 09:01

## 2019-01-16 NOTE — PROGRESS NOTES
INPATIENT NEPHROLOGY PROGRESS  [facility] NEPHROLOGY    Errol Stone Keita  01/16/2019    Reason for consultation:    Acute kidney injury    History of Present Illness:     53F with T2DM, HTN, pancreatitis, CAD, and CHF who presents with bilateral leg swelling, mild SOB. Admit to missing several doses of her lasix. Is scheduled to see a kidney specialist in the coming month. Renal consulted for TIMOTHY on CKD.    1/6  No n,v,chest pain, sob, dysuria or diarrhea.  No new neuro symptoms  1/7 VSS, no new complains.  1/8 VSS, no new complains.  1/9 VSS, flat affect. If her sCr is not better tomorrow, will ask Dr. Desouza to place a HD cath and start dialysis. This was previously discussed, question were answered.  1/10 VSS, no new complains. Awaiting HD cath placement.  1/11 VSS, no new complains, had howard placed, will have HD today and in AM.   1/12  New start HD, due for treatment today.  States tolerated her first tx yesterday without problems.  Denies SOB and chest pain.  Acidosis improving.  1/13  S/P 2 HD treatments, labs stable today, no acute needs.  Will reassess in am.  Pt reports she has tolerated her treatments without issues, no cramping, no fatigue.  Acidosis improved.  1/14  Appetite better.   No chest pain or sob  1/15  No sob, nausea.  She's eating well.  No complaints specified.  1/16  No complaints.  No sob, nausea, vomiting      Plan of Care:    Acute kidney injury  CKD  4, diabetic, proteinuric  Hypokalemia  Edema/pulmonary crackles  Anemia  Hypertension    Plan:    Checking clearance.  Get hemesplit if crcl less than 15  Replete Mg and K as needed.  No nsaids, coxibs. Holding lisinopril.  Renal dose medication .  Keep MAP > 60, SBP > 100.  Had Howard placed 1/11, s/p HD x 2 treatments, repeat today   risk of need for lifelong dialysis d/w patient    Thank you for allowing us to participate in this patient's care. We will continue to follow.    Vital Signs:  Temp Readings from Last 3 Encounters:    01/16/19 97.6 °F (36.4 °C) (Oral)   07/09/18 98.1 °F (36.7 °C) (Oral)   07/06/18 98.7 °F (37.1 °C) (Oral)       Pulse Readings from Last 3 Encounters:   01/16/19 88   07/09/18 72   07/06/18 68       BP Readings from Last 3 Encounters:   01/16/19 138/66   07/09/18 (!) 148/79   07/06/18 132/62       Weight:  Wt Readings from Last 3 Encounters:   01/16/19 48.6 kg (107 lb 2.3 oz)   07/09/18 48.1 kg (106 lb 0.7 oz)   07/06/18 48.4 kg (106 lb 11.2 oz)       Past Medical & Surgical History:  Past Medical History:   Diagnosis Date    Arthritis     Asthma     CHF (congestive heart failure)     Coronary artery disease     Diabetes mellitus     Hypertension     Pancreatitis     Type 2 diabetes mellitus with hyperglycemia 7/13/2015       Past Surgical History:   Procedure Laterality Date    ASPIRATION ABSCESS N/A 10/27/2015    Performed by Yec[facility] Surgeon at [facility] YECENIA    CARDIAC SURGERY      CABG    CHOLECYSTECTOMY      COLECTOMY-RIGHT N/A 8/25/2014    Performed by Harlan Regalado [facility] at [facility] OR    CORONARY ARTERY BYPASS GRAFT      EGD (ESOPHAGOGASTRODUODENOSCOPY) N/A 4/16/2014    Performed by Crow Brantley MD at [facility] ENDO    EGD (ESOPHAGOGASTRODUODENOSCOPY) N/A 4/2/2014    Performed by Crow [facility]ley MD at [facility] ENDO    EGD (ESOPHAGOGASTRODUODENOSCOPY) N/A 6/6/2013    Performed by [facility]ley [facility] ENDO    ESOPHAGOGASTRODUODENOSCOPY (EGD) N/A 8/22/2014    Performed by Satnam Nguyen MD at [facility] ENDO    STERNOTOMY N/A 6/6/2013    Performed by Ole Ingram MD at [facility] OR    THORACOTOMY N/A 6/6/2013    Performed by Ole Ingram MD at [facility] OR       Past Social History:  Social History     Socioeconomic History    Marital status:      Spouse name: None    Number of children: None    Years of education: None    Highest education level: None   Social Needs    Financial resource strain: None    Food insecurity - worry: None    Food insecurity - inability: None     Transportation needs - medical: None    Transportation needs - non-medical: None   Occupational History    None   Tobacco Use    Smoking status: Current Every Day Smoker     Packs/day: 0.50     Years: 35.00     Pack years: 17.50     Types: Cigarettes    Smokeless tobacco: Never Used   Substance and Sexual Activity    Alcohol use: No     Alcohol/week: 0.0 oz     Comment: questionable per     Drug use: No    Sexual activity: Not Currently     Partners: Male     Birth control/protection: None   Other Topics Concern    None   Social History Narrative    None       Medications:  No current facility-administered medications on file prior to encounter.      Current Outpatient Medications on File Prior to Encounter   Medication Sig Dispense Refill    amLODIPine (NORVASC) 5 MG tablet Take 1 tablet (5 mg total) by mouth once daily. (Patient taking differently: Take 10 mg by mouth once daily. ) 30 tablet 0    aspirin 81 MG chewable tablet Take 81 mg by mouth once daily. Ran out      atorvastatin (LIPITOR) 80 MG tablet Take 1 tablet (80 mg total) by mouth once daily. 30 tablet 11    blood sugar diagnostic Strp To check BG 4 times daily, to use with insurance preferred meter 400 strip 3    blood-glucose meter kit To check BG 4 times daily, to use with insurance preferred meter 1 each 0    clopidogrel (PLAVIX) 75 mg tablet Take 1 tablet (75 mg total) by mouth once daily. 30 tablet 11    CREON 24,000-76,000 -120,000 unit capsule Take 1 capsule by mouth 4 (four) times daily with meals and nightly.       ferrous sulfate 325 (65 FE) MG EC tablet Take 1 tablet (325 mg total) by mouth 2 (two) times daily. 60 tablet 0    furosemide (LASIX) 40 MG tablet Take 40 mg by mouth once daily.      HUMALOG KWIKPEN INSULIN 100 unit/mL InPn pen Inject 2 Units into the skin 3 (three) times daily before meals. 15 mL 12    insulin detemir U-100 (LEVEMIR FLEXTOUCH) 100 unit/mL (3 mL) SubQ InPn pen Inject 2 Units into the skin  "2 (two) times daily. 1 Box 0    lancets Misc To check BG 4 times daily, to use with insurance preferred meter 400 each 3    lisinopril (PRINIVIL,ZESTRIL) 2.5 MG tablet Take 2.5 mg by mouth once daily.       magnesium oxide (MAG-OX) 400 mg tablet Take 1 tablet (400 mg total) by mouth once daily. 30 tablet 0    pantoprazole (PROTONIX) 40 MG tablet Take 40 mg by mouth once daily.      pen needle, diabetic (BD ULTRA-FINE AZIZA PEN NEEDLE) 32 gauge x 5/32" Ndle Uses 4 daily 150 each 12    potassium chloride (KLOR-CON) 20 mEq Pack Take 20 mEq by mouth 2 (two) times daily. 60 packet 0    quetiapine (SEROQUEL) 100 MG Tab 100 mg every evening.        Scheduled Meds:   amLODIPine  10 mg Oral Daily    ascorbic acid (vitamin C)  500 mg Oral QHS    aspirin  81 mg Oral Daily    atorvastatin  80 mg Oral Daily    clopidogrel  75 mg Oral Daily    enoxaparin  30 mg Subcutaneous Daily    ferrous sulfate  325 mg Oral BID    hydrALAZINE  10 mg Oral Q12H    lipase-protease-amylase 24,000-76,000-120,000 units  1 capsule Oral QID (WM & HS)    magnesium oxide  400 mg Oral Daily    QUEtiapine  100 mg Oral QHS    senna-docusate 8.6-50 mg  1 tablet Oral BID    torsemide  20 mg Oral BID    vitamin renal formula (B-complex-vitamin c-folic acid)  1 capsule Oral Daily     Continuous Infusions:  PRN Meds:.acetaminophen, cloNIDine, dextrose 50%, dextrose 50%, glucagon (human recombinant), glucose, glucose, glucose, heparin (porcine), heparin (porcine), insulin aspart U-100, ondansetron, sodium chloride 0.9%    Allergies:  Patient has no known allergies.    Past Family History:  Reviewed; refer to Hospitalist Admission Note    Review of Systems:  Review of Systems - All 14 systems reviewed and negative, except as noted in HPI    Physical Exam:    /66   Pulse 88   Temp 97.6 °F (36.4 °C) (Oral)   Resp 20   Ht 5' 7" (1.702 m) Comment: Admit 6/20/18  Wt 48.6 kg (107 lb 2.3 oz)   LMP 09/03/2012 (Exact Date)   SpO2 95%   " Breastfeeding? No   BMI 16.78 kg/m²     General Appearance:    Alert, cooperative, no distress, appears stated age   Head:    Normocephalic, without obvious abnormality, atraumatic   Eyes:    PER, conjunctiva/corneas clear, EOM's intact in both eyes        Throat:   wnl   Back:     Symmetric, no curvature, ROM normal, no CVA tenderness   Lungs:     Clear to auscultation bilaterally, respirations unlabored   Chest wall:    No tenderness or deformity   Heart:    Regular rate and rhythm, +murmur   Abdomen:     Soft   Extremities:   Extremities normal, atraumatic, no cyanosis or edema   Pulses:   +all exts   MSK:   No joint or muscle swelling, tenderness or deformity   Skin:   w/d   Neurologic:   No flap     Results:  Lab Results   Component Value Date     01/16/2019    K 3.5 01/16/2019     01/16/2019    CO2 24 01/16/2019    BUN 30 (H) 01/16/2019    CREATININE 3.0 (H) 01/16/2019    CALCIUM 7.7 (L) 01/16/2019    ANIONGAP 10 01/16/2019    ESTGFRAFRICA 19 (A) 01/16/2019    EGFRNONAA 17 (A) 01/16/2019       Lab Results   Component Value Date    CALCIUM 7.7 (L) 01/16/2019    PHOS 4.1 01/16/2019       No results for input(s): WBC, RBC, HGB, HCT, PLT, MCV, MCH, MCHC in the last 24 hours.     Paulie Tolbert MD  Nephrology  [facility]  [phone]

## 2019-01-16 NOTE — ASSESSMENT & PLAN NOTE
Initially patient received  gentle IVF hydration that were later discontinued due to hypervolemia  Follow renal panel and electrolytes closely.  Nephrology following   Renal Ultrasound shows no obstructive uropathy- Sequelae of medical renal disease  Renal dose medications, Avoid NSAIDs, Coyle-II inhibitors, ACE-I, Angiotensin Receptor Blockers, or Aminoglycosides.  Echocardiogram report pending  Patient's creatinine continues to trend upward- Dr. Desouza consulted for Howard catheter placement 1/10/19  Patient had HD today 1/11/19, 1/12/19, and 1/14/19  24 hour urine in progress-  Awaiting results.   May need outpatient HD

## 2019-01-16 NOTE — PROGRESS NOTES
[facility] Hospital Medicine  Progress Note    Patient Name: Errol Keita  MRN: [id number]  Patient Class: IP- Inpatient   Admission Date: 1/5/2019  Length of Stay: 10 days  Attending Physician: Foreign Greco MD  Primary Care Provider: Bharat Wiggins MD        Subjective:     Principal Problem:Acute renal failure superimposed on stage 4 chronic kidney disease    HPI:  This is a 54 yo  female with a PMHx of T2DM, HTN, pancreatitis, CAD, and CHF who presented to the ED for further evaluation of bilateral leg swelling that has been ongoing for 2 weeks. She states that she is having bilateral leg swelling and leg pain she describes as a tightness. Per ED record, pt endorsed some SOB, but she denies this to me.  Pt denies fever, chest pain, abdominal pain, vomiting, diarrhea, and headache.  ED evaluation reveals TIMOTHY.  She reports that she is a patient of Dr. Tolbert. Patient was evaluated in the ER and noted to have TIMOTHY and admitted for further evaluation and treatment.    Hospital Course:  Patient monitored closely during hospitalization. She initially received IV lasix for acute CHF. Telemetry showed  no significant arrhythmias. Her renal function was  trended closely. Nephrology was consulted. An Echo was  obtained. She was found to have iron deficiency anemia and initiated on oral fergon. Her hypokalemia was  replaced with oral supplementation. Dietary was consulted. She was noted to have moderate protein malnutrition and initiated on oral supplementation. Physical therapy was consulted for debility. Patient's creatinine continued to trend upward. Dr. Desouza was consulted for Howard placement and patient was started on dialysis on 1/11/18. She also recieved HD on 1/12/19 and 1/14/19. 24 hour urine ordered.        Interval History: no new issues. Sob resolved. Awaiting results of 24 hour urine to determine if outpatient HD is necessary.   Will need howard changed to hemisplit    Review of  Systems   Constitutional: Positive for fatigue. Negative for activity change, chills and fever.        Eating better   HENT: Negative for ear discharge.    Respiratory: Negative for cough and shortness of breath.    Cardiovascular: Positive for leg swelling. Negative for chest pain and palpitations.            Gastrointestinal: Negative for abdominal pain, diarrhea, nausea and vomiting.   Genitourinary: Negative for difficulty urinating, dysuria and flank pain.        Staff reports patient is incontinent of urine at times   Musculoskeletal: Positive for arthralgias and gait problem. Negative for neck pain and neck stiffness.   Neurological: Positive for weakness. Negative for tremors, facial asymmetry and speech difficulty.   Hematological: Does not bruise/bleed easily.   Psychiatric/Behavioral: Negative for agitation.     Objective:     Vital Signs (Most Recent):  Temp: 98.8 °F (37.1 °C) (01/16/19 1546)  Pulse: 80 (01/16/19 1546)  Resp: 18 (01/16/19 1546)  BP: 132/75 (01/16/19 1546)  SpO2: 98 % (01/16/19 1546) Vital Signs (24h Range):  Temp:  [96.3 °F (35.7 °C)-98.8 °F (37.1 °C)] 98.8 °F (37.1 °C)  Pulse:  [80-88] 80  Resp:  [16-20] 18  SpO2:  [94 %-98 %] 98 %  BP: (131-139)/(66-80) 132/75     Weight: 48.6 kg (107 lb 2.3 oz)  Body mass index is 16.78 kg/m².    Intake/Output Summary (Last 24 hours) at 1/16/2019 1557  Last data filed at 1/16/2019 0715  Gross per 24 hour   Intake 320 ml   Output 700 ml   Net -380 ml      Physical Exam   Constitutional: She is oriented to person, place, and time. She appears well-developed and well-nourished. No distress.   Thin female   HENT:   Head: Normocephalic and atraumatic.   Eyes: Conjunctivae and EOM are normal. Pupils are equal, round, and reactive to light. Right eye exhibits no discharge. Left eye exhibits no discharge.   Neck: Normal range of motion. Neck supple. No JVD present.   Cardiovascular: Normal rate, regular rhythm and intact distal pulses.   Murmur heard.       Pulmonary/Chest: Effort normal and breath sounds normal. She has no wheezes.   Abdominal: Soft. Bowel sounds are normal.   Genitourinary:   Genitourinary Comments: Not examined   Musculoskeletal: Normal range of motion. She exhibits no edema.         Neurological: She is alert and oriented to person, place, and time. No cranial nerve deficit.   Skin: Skin is warm and dry. Capillary refill takes less than 2 seconds. She is not diaphoretic.   Right neck QC    Psychiatric: She has a normal mood and affect. Her behavior is normal.   Nursing note and vitals reviewed.     Labs: Reviewed    Assessment/Plan:      * Acute renal failure superimposed on stage 4 chronic kidney disease    Initially patient received  gentle IVF hydration that were later discontinued due to hypervolemia  Follow renal panel and electrolytes closely.  Nephrology following   Renal Ultrasound shows no obstructive uropathy- Sequelae of medical renal disease  Renal dose medications, Avoid NSAIDs, Coyle-II inhibitors, ACE-I, Angiotensin Receptor Blockers, or Aminoglycosides.  Echocardiogram report pending  Patient's creatinine continues to trend upward- Dr. Desouza consulted for Howard catheter placement 1/10/19  Patient had HD today 1/11/19, 1/12/19, and 1/14/19  24 hour urine in progress-  Awaiting results.   May need outpatient HD                    UTI (urinary tract infection)    Continue Iv rocephin  Urine culture shows > 100,000 colonies E. Coli which is sensitive to the rocephin  Patient just completed a round of Iv rocephin 1/15/19       Uncontrolled type 2 diabetes mellitus with hyperglycemia    Chronic; poorly controlled historically.    DM diet  Accuchecks with correctional SSI   Blood sugars running  180-380 - Patient previously continued to  refuse detemir insulin and it was discontinued due to patient refusal  Continue moderate SSI coverage   HGA1c is 8.1  DM educator and dietician were consulted         Hypokalemia    Monitor  Supplement  as needed         Hypomagnesemia    Monitor  Supplement as needed         Lower extremity edema    No evidence of DVT per ultrasound.       Acute on chronic congestive heart failure    Prior IVF were discontinued   BNP elevated.   Echocardiogram report is pending   Fluid restriction 1.5 liters/day  Patient to start HD today         Moderate tobacco dependence    Dangers of cigarette smoking were reviewed with patient in detail for 3 minutes and patient was encouraged to quit. Nicotine replacement options were discussed.    Smoking cessation > 3 minutes         Anemia of chronic renal failure    Chronic problem.  Stable.  Monitor H/H.  Continue iron supplementation.  Transfuse for hemodynamic instability and/or H/H <7/21         Coronary artery disease involving native coronary artery without angina pectoris    Historical diagnosis. No anginal symptoms.    Continue ASA/plavix, monitor on telemetry.       Iron deficiency anemia secondary to inadequate dietary iron intake    Continue  oral fergon  Continue MVI and pm vitamin C       Moderate malnutrition    Monitor and encourage po intake  Dietary consulted  Continue po supplement and  Renal MVI     Hypocalcemia    Continue calcium supplementation        Mild recurrent major depression    Chronic problem.  Stable.    Continue Seroquel and monitor.       Hypertension associated with diabetes    Historically poorly controlled.   Continue daily norvasc; hold ACEi 2/2 anjum.    Any further orders as per Nephrology         VTE Risk Mitigation (From admission, onward)        Ordered     heparin (porcine) injection 5,000 Units  As needed (PRN)      01/14/19 0935     heparin (porcine) injection 4,000 Units  As needed (PRN)      01/12/19 1106     Place PABLO hose  Until discontinued      01/06/19 1008     enoxaparin injection 30 mg  Daily      01/05/19 2121     IP VTE HIGH RISK PATIENT  Once      01/05/19 2121     Place PABLO hose  Until discontinued      01/05/19 2121     Place  sequential compression device  Until discontinued      01/05/19 2121              Desiree Begum NP  Department of Hospital Medicine   [facility]

## 2019-01-16 NOTE — PHYSICIAN QUERY
"PT Name: Errol Keita  MR #: [id number]    Physician Query Form - Heart  Condition Clarification     CDS/: Mira Snyder RN             Contact information: [phone]  This form is a permanent document in the medical record.     Query Date: January 16, 2019    By submitting this query, we are merely seeking further clarification of documentation. Please utilize your independent clinical judgment when addressing the question(s) below.    The medical record contains the following   Indicators     Supporting Clinical Findings Location in Medical Record   x BNP BNP= 242    x EF EF= 72% 1/7 TTE   x Radiology findings Mild bibasilar interstitial disease.  Normal size heart.  CABG.  Aortic atherosclerosis.  No pleural effusion or pneumothorax.  Mild degenerative change of both glenohumeral and acromioclavicular joints. 1/6 CXR   x Echo Results · Moderate concentric left ventricular hypertrophy.  · Increased (hyperdynamic) left ventricular systolic function. The estimated ejection fraction is 72%  · Normal LV diastolic function.  · Normal right ventricular systolic function.  · Mild tricuspid regurgitation.  · Normal central venous pressure (3 mm Hg).  · The estimated PA systolic pressure is 24 mm Hg 1/7 TTE    "Ascites" documented     x "SOB" or "BELLE" documented She does endorse some mild SOB with this leg edema    Positive for SOB 1/5 ED MD note    "Hypoxia" documented     x Heart Failure documented Acute on chronic congestive heart failure 1/15 [facility] note   x "Edema" documented Edema/ Pulmonary crackes 1/15 Renal MD note   x Diuretics/Meds Lasix 60mg IVP  Torosemide 20mg PO   Hydralazine  1/5 MAR  1/8-16 MAR  1/6-16 MAR     x Treatment: Dialysis  BNP  Cardiac monitoring  CXR    Fluid restriction 1.5 liters/day   Patient to start HD today      1/7- 16 MAR          1/15 Hosp med note    Other:      Heart failure (HF) can be acute, chronic or both. It is generally further specificed as systolic, " diastolic, or combined. Lastly, it is important to identify an underlying etiology if known or suspected.     Common clues to acute exacerbation:  Rapidly progressive symptoms (w/in 2 weeks of presentation), using IV diuretics to treat, using supplemental O2, pulmonary edema on Xray, MI w/in 4 weeks, and/or BNP >500    Systolic Heart Failure: is defined as chart documentation of a left ventricular ejection fraction (LVEF) less than 40%     Diastolic Heart Failure: is defined as a left ventricular ejection fraction (LVEF) greater than 40%   +      Evidence of diastolic dysfunction on echocardiography OR    Right heart catheterization wedge pressure above 12 mm Hg OR    Left heart catheterization left ventricular end diastolic pressure 18 mm Hg or above.    References: *American Heart Association    The clinical guidelines noted below are only system guidelines, and do not replace the providers clinical judgment.     Provider, please specify the diagnosis associated with above clinical findings    [   ] Acute on Chronic Systolic Heart Failure- Pre-existing systolic HF diagnosis.  EF < 40%  and acute HF symptoms documented    [ x ] Acute on Chronic Diastolic Heart Failure -    Pre-existing diastoic HF diagnosis.  EF > 40%  and acute HF symptoms documented                                   [   ] Acute on Chronic Combined Systolic and Diastolic Heart Failure                   [   ] Other Type of Heart Failure (please specify type): _________________________    [   ] Heart Failure Ruled Out    [   ] Other (please specify): ___________________________________    [  ] Clinically Undetermined                          Please document in your progress notes daily for the duration of treatment until resolved and include in your discharge summary.

## 2019-01-16 NOTE — PLAN OF CARE
Problem: Adult Inpatient Plan of Care  Goal: Plan of Care Review  Outcome: Ongoing (interventions implemented as appropriate)  POC reviewed with pt, understanding verbalized. AAOX4. No c/o pain throughout shift. Upwith assist. Room air. Glucose monitoring/Coverage AC/HS As needed.DM/Renal diet with a 1.5L fluid restriction maintained. 24 urine for creatinine clearance in progress . Tele monitoring maintained. Safety Maintained. Will continue to monitor.

## 2019-01-16 NOTE — SUBJECTIVE & OBJECTIVE
Interval History: no new issues. Sob resolved. Awaiting results of 24 hour urine to determine if outpatient HD is necessary.   Will need merced changed to hemisplit    Review of Systems   Constitutional: Positive for fatigue. Negative for activity change, chills and fever.        Eating better   HENT: Negative for ear discharge.    Respiratory: Negative for cough and shortness of breath.    Cardiovascular: Positive for leg swelling. Negative for chest pain and palpitations.            Gastrointestinal: Negative for abdominal pain, diarrhea, nausea and vomiting.   Genitourinary: Negative for difficulty urinating, dysuria and flank pain.        Staff reports patient is incontinent of urine at times   Musculoskeletal: Positive for arthralgias and gait problem. Negative for neck pain and neck stiffness.   Neurological: Positive for weakness. Negative for tremors, facial asymmetry and speech difficulty.   Hematological: Does not bruise/bleed easily.   Psychiatric/Behavioral: Negative for agitation.     Objective:     Vital Signs (Most Recent):  Temp: 98.8 °F (37.1 °C) (01/16/19 1546)  Pulse: 80 (01/16/19 1546)  Resp: 18 (01/16/19 1546)  BP: 132/75 (01/16/19 1546)  SpO2: 98 % (01/16/19 1546) Vital Signs (24h Range):  Temp:  [96.3 °F (35.7 °C)-98.8 °F (37.1 °C)] 98.8 °F (37.1 °C)  Pulse:  [80-88] 80  Resp:  [16-20] 18  SpO2:  [94 %-98 %] 98 %  BP: (131-139)/(66-80) 132/75     Weight: 48.6 kg (107 lb 2.3 oz)  Body mass index is 16.78 kg/m².    Intake/Output Summary (Last 24 hours) at 1/16/2019 1557  Last data filed at 1/16/2019 0715  Gross per 24 hour   Intake 320 ml   Output 700 ml   Net -380 ml      Physical Exam   Constitutional: She is oriented to person, place, and time. She appears well-developed and well-nourished. No distress.   Thin female   HENT:   Head: Normocephalic and atraumatic.   Eyes: Conjunctivae and EOM are normal. Pupils are equal, round, and reactive to light. Right eye exhibits no discharge. Left eye  exhibits no discharge.   Neck: Normal range of motion. Neck supple. No JVD present.   Cardiovascular: Normal rate, regular rhythm and intact distal pulses.   Murmur heard.      Pulmonary/Chest: Effort normal and breath sounds normal. She has no wheezes.   Abdominal: Soft. Bowel sounds are normal.   Genitourinary:   Genitourinary Comments: Not examined   Musculoskeletal: Normal range of motion. She exhibits no edema.         Neurological: She is alert and oriented to person, place, and time. No cranial nerve deficit.   Skin: Skin is warm and dry. Capillary refill takes less than 2 seconds. She is not diaphoretic.   Right neck QC    Psychiatric: She has a normal mood and affect. Her behavior is normal.   Nursing note and vitals reviewed.     Labs: Reviewed

## 2019-01-17 ENCOUNTER — ANESTHESIA EVENT (OUTPATIENT)
Dept: SURGERY | Facility: HOSPITAL | Age: 54
DRG: 673 | End: 2019-01-17
Payer: MEDICARE

## 2019-01-17 PROBLEM — N18.6 ESRD (END STAGE RENAL DISEASE): Status: ACTIVE | Noted: 2019-01-17

## 2019-01-17 LAB
ANION GAP SERPL CALC-SCNC: 9 MMOL/L
BUN SERPL-MCNC: 18 MG/DL
CALCIUM SERPL-MCNC: 8.2 MG/DL
CHLORIDE SERPL-SCNC: 102 MMOL/L
CO2 SERPL-SCNC: 26 MMOL/L
CREAT SERPL-MCNC: 2.6 MG/DL
EST. GFR  (AFRICAN AMERICAN): 23 ML/MIN/1.73 M^2
EST. GFR  (NON AFRICAN AMERICAN): 20 ML/MIN/1.73 M^2
GLUCOSE SERPL-MCNC: 356 MG/DL
MAGNESIUM SERPL-MCNC: 2 MG/DL
PHOSPHATE SERPL-MCNC: 2.9 MG/DL
POCT GLUCOSE: 172 MG/DL (ref 70–110)
POCT GLUCOSE: 306 MG/DL (ref 70–110)
POCT GLUCOSE: 331 MG/DL (ref 70–110)
POCT GLUCOSE: 342 MG/DL (ref 70–110)
POTASSIUM SERPL-SCNC: 3.7 MMOL/L
SODIUM SERPL-SCNC: 137 MMOL/L

## 2019-01-17 PROCEDURE — 86580 TB INTRADERMAL TEST: CPT | Performed by: HOSPITALIST

## 2019-01-17 PROCEDURE — 25000003 PHARM REV CODE 250: Performed by: NURSE PRACTITIONER

## 2019-01-17 PROCEDURE — 25000003 PHARM REV CODE 250: Performed by: INTERNAL MEDICINE

## 2019-01-17 PROCEDURE — 80100016 HC MAINTENANCE HEMODIALYSIS

## 2019-01-17 PROCEDURE — 63600175 PHARM REV CODE 636 W HCPCS: Performed by: HOSPITALIST

## 2019-01-17 PROCEDURE — 84100 ASSAY OF PHOSPHORUS: CPT

## 2019-01-17 PROCEDURE — 12000002 HC ACUTE/MED SURGE SEMI-PRIVATE ROOM

## 2019-01-17 PROCEDURE — 63600175 PHARM REV CODE 636 W HCPCS: Performed by: NURSE PRACTITIONER

## 2019-01-17 PROCEDURE — 83735 ASSAY OF MAGNESIUM: CPT

## 2019-01-17 PROCEDURE — 36415 COLL VENOUS BLD VENIPUNCTURE: CPT

## 2019-01-17 PROCEDURE — 80048 BASIC METABOLIC PNL TOTAL CA: CPT

## 2019-01-17 PROCEDURE — 63600175 PHARM REV CODE 636 W HCPCS: Performed by: INTERNAL MEDICINE

## 2019-01-17 PROCEDURE — 94761 N-INVAS EAR/PLS OXIMETRY MLT: CPT

## 2019-01-17 RX ORDER — SODIUM CHLORIDE, SODIUM LACTATE, POTASSIUM CHLORIDE, CALCIUM CHLORIDE 600; 310; 30; 20 MG/100ML; MG/100ML; MG/100ML; MG/100ML
INJECTION, SOLUTION INTRAVENOUS CONTINUOUS
Status: CANCELLED | OUTPATIENT
Start: 2019-01-17

## 2019-01-17 RX ORDER — LIDOCAINE HYDROCHLORIDE 10 MG/ML
0.5 INJECTION, SOLUTION EPIDURAL; INFILTRATION; INTRACAUDAL; PERINEURAL ONCE
Status: CANCELLED | OUTPATIENT
Start: 2019-01-17 | End: 2019-01-17

## 2019-01-17 RX ADMIN — TORSEMIDE 20 MG: 20 TABLET ORAL at 08:01

## 2019-01-17 RX ADMIN — PANCRELIPASE 1 CAPSULE: 24000; 76000; 120000 CAPSULE, DELAYED RELEASE PELLETS ORAL at 08:01

## 2019-01-17 RX ADMIN — PANCRELIPASE 1 CAPSULE: 24000; 76000; 120000 CAPSULE, DELAYED RELEASE PELLETS ORAL at 05:01

## 2019-01-17 RX ADMIN — Medication 1 CAPSULE: at 08:01

## 2019-01-17 RX ADMIN — HYDRALAZINE HYDROCHLORIDE 10 MG: 10 TABLET, FILM COATED ORAL at 09:01

## 2019-01-17 RX ADMIN — HYDRALAZINE HYDROCHLORIDE 10 MG: 10 TABLET, FILM COATED ORAL at 08:01

## 2019-01-17 RX ADMIN — ATORVASTATIN CALCIUM 80 MG: 40 TABLET, FILM COATED ORAL at 08:01

## 2019-01-17 RX ADMIN — INSULIN ASPART 4 UNITS: 100 INJECTION, SOLUTION INTRAVENOUS; SUBCUTANEOUS at 09:01

## 2019-01-17 RX ADMIN — TUBERCULIN PURIFIED PROTEIN DERIVATIVE 5 UNITS: 5 INJECTION, SOLUTION INTRADERMAL at 11:01

## 2019-01-17 RX ADMIN — AMLODIPINE BESYLATE 10 MG: 5 TABLET ORAL at 08:01

## 2019-01-17 RX ADMIN — HEPARIN SODIUM 4000 UNITS: 1000 INJECTION, SOLUTION INTRAVENOUS; SUBCUTANEOUS at 04:01

## 2019-01-17 RX ADMIN — MAGNESIUM OXIDE TAB 400 MG (241.3 MG ELEMENTAL MG) 400 MG: 400 (241.3 MG) TAB at 08:01

## 2019-01-17 RX ADMIN — TORSEMIDE 20 MG: 20 TABLET ORAL at 09:01

## 2019-01-17 RX ADMIN — INSULIN ASPART 2 UNITS: 100 INJECTION, SOLUTION INTRAVENOUS; SUBCUTANEOUS at 05:01

## 2019-01-17 RX ADMIN — CLOPIDOGREL BISULFATE 75 MG: 75 TABLET ORAL at 08:01

## 2019-01-17 RX ADMIN — INSULIN ASPART 8 UNITS: 100 INJECTION, SOLUTION INTRAVENOUS; SUBCUTANEOUS at 05:01

## 2019-01-17 RX ADMIN — FERROUS SULFATE TAB EC 325 MG (65 MG FE EQUIVALENT) 325 MG: 325 (65 FE) TABLET DELAYED RESPONSE at 08:01

## 2019-01-17 RX ADMIN — PANCRELIPASE 1 CAPSULE: 24000; 76000; 120000 CAPSULE, DELAYED RELEASE PELLETS ORAL at 09:01

## 2019-01-17 RX ADMIN — ASPIRIN 81 MG CHEWABLE TABLET 81 MG: 81 TABLET CHEWABLE at 08:01

## 2019-01-17 RX ADMIN — PANCRELIPASE 1 CAPSULE: 24000; 76000; 120000 CAPSULE, DELAYED RELEASE PELLETS ORAL at 01:01

## 2019-01-17 RX ADMIN — OXYCODONE HYDROCHLORIDE AND ACETAMINOPHEN 500 MG: 500 TABLET ORAL at 09:01

## 2019-01-17 RX ADMIN — INSULIN ASPART 8 UNITS: 100 INJECTION, SOLUTION INTRAVENOUS; SUBCUTANEOUS at 01:01

## 2019-01-17 RX ADMIN — SENNOSIDES AND DOCUSATE SODIUM 1 TABLET: 8.6; 5 TABLET ORAL at 08:01

## 2019-01-17 RX ADMIN — QUETIAPINE FUMARATE 100 MG: 100 TABLET ORAL at 09:01

## 2019-01-17 RX ADMIN — ENOXAPARIN SODIUM 30 MG: 100 INJECTION SUBCUTANEOUS at 05:01

## 2019-01-17 RX ADMIN — FERROUS SULFATE TAB EC 325 MG (65 MG FE EQUIVALENT) 325 MG: 325 (65 FE) TABLET DELAYED RESPONSE at 09:01

## 2019-01-17 RX ADMIN — SENNOSIDES AND DOCUSATE SODIUM 1 TABLET: 8.6; 5 TABLET ORAL at 09:01

## 2019-01-17 NOTE — PROGRESS NOTES
HD:  Pt stable, tx complete, lines reinfused, catheter capped and clamped.  Pt tolerated tx well.     NET UF:  2000 mL

## 2019-01-17 NOTE — PROGRESS NOTES
INPATIENT NEPHROLOGY PROGRESS  [facility] NEPHROLOGY    Errol Stone Keita  01/17/2019    Reason for consultation:    Acute kidney injury    History of Present Illness:     53F with T2DM, HTN, pancreatitis, CAD, and CHF who presents with bilateral leg swelling, mild SOB. Admit to missing several doses of her lasix. Is scheduled to see a kidney specialist in the coming month. Renal consulted for TIMOTHY on CKD.    1/6  No n,v,chest pain, sob, dysuria or diarrhea.  No new neuro symptoms  1/7 VSS, no new complains.  1/8 VSS, no new complains.  1/9 VSS, flat affect. If her sCr is not better tomorrow, will ask Dr. Desouza to place a HD cath and start dialysis. This was previously discussed, question were answered.  1/10 VSS, no new complains. Awaiting HD cath placement.  1/11 VSS, no new complains, had howard placed, will have HD today and in AM.   1/12  New start HD, due for treatment today.  States tolerated her first tx yesterday without problems.  Denies SOB and chest pain.  Acidosis improving.  1/13  S/P 2 HD treatments, labs stable today, no acute needs.  Will reassess in am.  Pt reports she has tolerated her treatments without issues, no cramping, no fatigue.  Acidosis improved.  1/14  Appetite better.   No chest pain or sob  1/15  No sob, nausea.  She's eating well.  No complaints specified.  1/16  No complaints.  No sob, nausea, vomiting  1/17 no chest pain, nausea,       Plan of Care:    ESRD  Hypokalemia  Edema/pulmonary crackles  Anemia  Hypertension    Plan:    Low CrCl  -- get hemesplit catheter.  Replete Mg and K as needed.  No nsaids, coxibs. Holding lisinopril.  Renal dose medication .  Keep MAP > 60, SBP > 100.  Had Howard placed 1/11, s/p HD x 2 treatments, repeat today   risk of need for lifelong dialysis d/w patient    Thank you for allowing us to participate in this patient's care. We will continue to follow.    Vital Signs:  Temp Readings from Last 3 Encounters:   01/17/19 98.5 °F (36.9 °C) (Oral)    07/09/18 98.1 °F (36.7 °C) (Oral)   07/06/18 98.7 °F (37.1 °C) (Oral)       Pulse Readings from Last 3 Encounters:   01/17/19 77   07/09/18 72   07/06/18 68       BP Readings from Last 3 Encounters:   01/17/19 (!) 143/79   07/09/18 (!) 148/79   07/06/18 132/62       Weight:  Wt Readings from Last 3 Encounters:   01/17/19 47.1 kg (103 lb 13.4 oz)   07/09/18 48.1 kg (106 lb 0.7 oz)   07/06/18 48.4 kg (106 lb 11.2 oz)       Past Medical & Surgical History:  Past Medical History:   Diagnosis Date    Arthritis     Asthma     CHF (congestive heart failure)     Coronary artery disease     Diabetes mellitus     Hypertension     Pancreatitis     Type 2 diabetes mellitus with hyperglycemia 7/13/2015       Past Surgical History:   Procedure Laterality Date    ASPIRATION ABSCESS N/A 10/27/2015    Performed by Yecenia Surgeon at [facility] YECENIA    CARDIAC SURGERY      CABG    CHOLECYSTECTOMY      COLECTOMY-RIGHT N/A 8/25/2014    Performed by Harlan Regalado MD at [facility] OR    CORONARY ARTERY BYPASS GRAFT      EGD (ESOPHAGOGASTRODUODENOSCOPY) N/A 4/16/2014    Performed by Crow Brantley MD at [facility] ENDO    EGD (ESOPHAGOGASTRODUODENOSCOPY) N/A 4/2/2014    Performed by Crow Brantley MD at [facility] ENDO    EGD (ESOPHAGOGASTRODUODENOSCOPY) N/A 6/6/2013    Performed by Crow Brantley MD at [facility] ENDO    ESOPHAGOGASTRODUODENOSCOPY (EGD) N/A 8/22/2014    Performed by Satnam Nguyen MD at [facility] ENDO    STERNOTOMY N/A 6/6/2013    Performed by Ole Ingram MD at [facility] OR    THORACOTOMY N/A 6/6/2013    Performed by Ole Ingram MD at [facility] OR       Past Social History:  Social History     Socioeconomic History    Marital status:      Spouse name: None    Number of children: None    Years of education: None    Highest education level: None   Social Needs    Financial resource strain: None    Food insecurity - worry: None    Food insecurity - inability: None    Transportation needs - medical: None     Transportation needs - non-medical: None   Occupational History    None   Tobacco Use    Smoking status: Current Every Day Smoker     Packs/day: 0.50     Years: 35.00     Pack years: 17.50     Types: Cigarettes    Smokeless tobacco: Never Used   Substance and Sexual Activity    Alcohol use: No     Alcohol/week: 0.0 oz     Comment: questionable per     Drug use: No    Sexual activity: Not Currently     Partners: Male     Birth control/protection: None   Other Topics Concern    None   Social History Narrative    None       Medications:  No current facility-administered medications on file prior to encounter.      Current Outpatient Medications on File Prior to Encounter   Medication Sig Dispense Refill    amLODIPine (NORVASC) 5 MG tablet Take 1 tablet (5 mg total) by mouth once daily. (Patient taking differently: Take 10 mg by mouth once daily. ) 30 tablet 0    aspirin 81 MG chewable tablet Take 81 mg by mouth once daily. Ran out      atorvastatin (LIPITOR) 80 MG tablet Take 1 tablet (80 mg total) by mouth once daily. 30 tablet 11    blood sugar diagnostic Strp To check BG 4 times daily, to use with insurance preferred meter 400 strip 3    blood-glucose meter kit To check BG 4 times daily, to use with insurance preferred meter 1 each 0    clopidogrel (PLAVIX) 75 mg tablet Take 1 tablet (75 mg total) by mouth once daily. 30 tablet 11    CREON 24,000-76,000 -120,000 unit capsule Take 1 capsule by mouth 4 (four) times daily with meals and nightly.       ferrous sulfate 325 (65 FE) MG EC tablet Take 1 tablet (325 mg total) by mouth 2 (two) times daily. 60 tablet 0    furosemide (LASIX) 40 MG tablet Take 40 mg by mouth once daily.      HUMALOG KWIKPEN INSULIN 100 unit/mL InPn pen Inject 2 Units into the skin 3 (three) times daily before meals. 15 mL 12    insulin detemir U-100 (LEVEMIR FLEXTOUCH) 100 unit/mL (3 mL) SubQ InPn pen Inject 2 Units into the skin 2 (two) times daily. 1 Box 0    lancets  "Misc To check BG 4 times daily, to use with insurance preferred meter 400 each 3    lisinopril (PRINIVIL,ZESTRIL) 2.5 MG tablet Take 2.5 mg by mouth once daily.       magnesium oxide (MAG-OX) 400 mg tablet Take 1 tablet (400 mg total) by mouth once daily. 30 tablet 0    pantoprazole (PROTONIX) 40 MG tablet Take 40 mg by mouth once daily.      pen needle, diabetic (BD ULTRA-FINE AZIZA PEN NEEDLE) 32 gauge x 5/32" Ndle Uses 4 daily 150 each 12    potassium chloride (KLOR-CON) 20 mEq Pack Take 20 mEq by mouth 2 (two) times daily. 60 packet 0    quetiapine (SEROQUEL) 100 MG Tab 100 mg every evening.        Scheduled Meds:   amLODIPine  10 mg Oral Daily    ascorbic acid (vitamin C)  500 mg Oral QHS    aspirin  81 mg Oral Daily    atorvastatin  80 mg Oral Daily    clopidogrel  75 mg Oral Daily    enoxaparin  30 mg Subcutaneous Daily    ferrous sulfate  325 mg Oral BID    hydrALAZINE  10 mg Oral Q12H    lipase-protease-amylase 24,000-76,000-120,000 units  1 capsule Oral QID (WM & HS)    magnesium oxide  400 mg Oral Daily    QUEtiapine  100 mg Oral QHS    senna-docusate 8.6-50 mg  1 tablet Oral BID    torsemide  20 mg Oral BID    tuberculin  5 Units Intradermal Once    vitamin renal formula (B-complex-vitamin c-folic acid)  1 capsule Oral Daily     Continuous Infusions:  PRN Meds:.acetaminophen, cloNIDine, dextrose 50%, dextrose 50%, glucagon (human recombinant), glucose, glucose, glucose, heparin (porcine), heparin (porcine), insulin aspart U-100, ondansetron, sodium chloride 0.9%    Allergies:  Patient has no known allergies.    Past Family History:  Reviewed; refer to Hospitalist Admission Note    Review of Systems:  Review of Systems - All 14 systems reviewed and negative, except as noted in HPI    Physical Exam:    BP (!) 143/79 (BP Location: Left arm, Patient Position: Lying)   Pulse 77   Temp 98.5 °F (36.9 °C) (Oral)   Resp 18   Ht 5' 7" (1.702 m) Comment: Admit 6/20/18  Wt 47.1 kg (103 lb " 13.4 oz)   LMP 09/03/2012 (Exact Date)   SpO2 96%   Breastfeeding? No   BMI 16.26 kg/m²     General Appearance:    Alert, cooperative, no distress, appears stated age   Head:    Normocephalic, without obvious abnormality, atraumatic   Eyes:    PER, conjunctiva/corneas clear, EOM's intact in both eyes        Throat:   wnl   Back:     Symmetric, no curvature, ROM normal, no CVA tenderness   Lungs:     Clear to auscultation bilaterally, respirations unlabored   Chest wall:    No tenderness or deformity   Heart:    Regular rate and rhythm, +murmur   Abdomen:     Soft   Extremities:   Extremities normal, atraumatic, no cyanosis or edema   Pulses:   +all exts   MSK:   No joint or muscle swelling, tenderness or deformity   Skin:   w/d   Neurologic:   No flap     Results:  Lab Results   Component Value Date     01/17/2019    K 3.7 01/17/2019     01/17/2019    CO2 26 01/17/2019    BUN 18 01/17/2019    CREATININE 2.6 (H) 01/17/2019    CALCIUM 8.2 (L) 01/17/2019    ANIONGAP 9 01/17/2019    ESTGFRAFRICA 23 (A) 01/17/2019    EGFRNONAA 20 (A) 01/17/2019       Lab Results   Component Value Date    CALCIUM 8.2 (L) 01/17/2019    PHOS 2.9 01/17/2019       No results for input(s): WBC, RBC, HGB, HCT, PLT, MCV, MCH, MCHC in the last 24 hours.     Paulie Tolbert MD  Nephrology  [facility] [place]  [phone]

## 2019-01-17 NOTE — PROGRESS NOTES
[facility] Hospital Medicine  Progress Note    Patient Name: Errol Keita  MRN: [id number]  Patient Class: IP- Inpatient   Admission Date: 1/5/2019  Length of Stay: 11 days  Attending Physician: Foreign Greco MD  Primary Care Provider: Bharat Wiggins MD        Subjective:     Principal Problem:Acute renal failure superimposed on stage 4 chronic kidney disease    HPI:  This is a 52 yo  female with a PMHx of T2DM, HTN, pancreatitis, CAD, and CHF who presented to the ED for further evaluation of bilateral leg swelling that has been ongoing for 2 weeks. She states that she is having bilateral leg swelling and leg pain she describes as a tightness. Per ED record, pt endorsed some SOB, but she denies this to me.  Pt denies fever, chest pain, abdominal pain, vomiting, diarrhea, and headache.  ED evaluation reveals TIMOTHY.  She reports that she is a patient of Dr. Tolbert. Patient was evaluated in the ER and noted to have TIMOTHY and admitted for further evaluation and treatment.    Hospital Course:  Patient monitored closely during hospitalization. She initially received IV lasix for acute CHF. Telemetry showed  no significant arrhythmias. Her renal function was  trended closely. Nephrology was consulted. An Echo was  obtained. She was found to have iron deficiency anemia and initiated on oral fergon. Her hypokalemia was  replaced with oral supplementation. Dietary was consulted. She was noted to have moderate protein malnutrition and initiated on oral supplementation. Physical therapy was consulted for debility. Patient's creatinine continued to trend upward. Dr. Desouza was consulted for Howard placement and patient was started on dialysis on 1/11/18. She also recieved HD on 1/12/19 and 1/14/19. 24 hour urine ordered.        Interval History: no new issues. Sob resolved.  24 hour urine with 9 Crcl consistent with ESRD. Needs tunneled cath- plan for tomorrow.     Review of Systems   Constitutional:  Positive for fatigue. Negative for activity change, chills and fever.        Eating better   HENT: Negative for ear discharge.    Respiratory: Negative for cough and shortness of breath.    Cardiovascular: Positive for leg swelling. Negative for chest pain and palpitations.            Gastrointestinal: Negative for abdominal pain and diarrhea.   Genitourinary: Negative for difficulty urinating and flank pain.        Staff reports patient is incontinent of urine at times   Musculoskeletal: Positive for arthralgias and gait problem.   Neurological: Positive for weakness. Negative for tremors, facial asymmetry and speech difficulty.   Psychiatric/Behavioral: Negative for agitation.     Objective:     Vital Signs (Most Recent):  Temp: 98.3 °F (36.8 °C) (01/17/19 1110)  Pulse: 79 (01/17/19 1110)  Resp: 18 (01/17/19 1110)  BP: 123/73 (01/17/19 1110)  SpO2: 100 % (01/17/19 1110) Vital Signs (24h Range):  Temp:  [96.8 °F (36 °C)-98.9 °F (37.2 °C)] 98.3 °F (36.8 °C)  Pulse:  [76-81] 79  Resp:  [16-18] 18  SpO2:  [96 %-100 %] 100 %  BP: (123-171)/(73-96) 123/73     Weight: 47.1 kg (103 lb 13.4 oz)  Body mass index is 16.26 kg/m².    Intake/Output Summary (Last 24 hours) at 1/17/2019 1440  Last data filed at 1/17/2019 1150  Gross per 24 hour   Intake 860 ml   Output 4600 ml   Net -3740 ml      Physical Exam   Constitutional: She is oriented to person, place, and time. She appears well-developed and well-nourished. No distress.   Thin female   HENT:   Head: Normocephalic and atraumatic.   Eyes: Conjunctivae and EOM are normal. Pupils are equal, round, and reactive to light. Right eye exhibits no discharge. Left eye exhibits no discharge.   Neck: Normal range of motion. Neck supple. No JVD present.   Cardiovascular: Normal rate, regular rhythm and intact distal pulses.   Murmur heard.      Pulmonary/Chest: Effort normal and breath sounds normal. She has no wheezes.   Abdominal: Soft. Bowel sounds are normal.   Genitourinary:    Genitourinary Comments: Not examined   Musculoskeletal: Normal range of motion. She exhibits no edema.         Neurological: She is alert and oriented to person, place, and time. No cranial nerve deficit.   Skin: Skin is warm and dry. Capillary refill takes less than 2 seconds. She is not diaphoretic.   Right neck QC    Psychiatric: She has a normal mood and affect. Her behavior is normal.   Nursing note and vitals reviewed.     Labs: Reviewed    Assessment/Plan:      * Acute renal failure superimposed on stage 4 chronic kidney disease    Initially patient received  gentle IVF hydration that were later discontinued due to hypervolemia  Follow renal panel and electrolytes closely.  Nephrology following   Renal Ultrasound shows no obstructive uropathy- Sequelae of medical renal disease  Renal dose medications, Avoid NSAIDs, Coyle-II inhibitors, ACE-I, Angiotensin Receptor Blockers, or Aminoglycosides.  Echocardiogram report pending  Patient's creatinine continues to trend upward- Dr. Ocasio consulted for Howard catheter placement 1/10/19  Patient had HD today 1/11/19, 1/12/19, and 1/14/19  24 hour urine in progress-  Awaiting results.   May need outpatient HD                    ESRD (end stage renal disease)    CrCl 9.   Consult Dr. ocasio for tunneled cath. NPO after MN  CM To arrange outpatient HD       UTI (urinary tract infection)    Continue Iv rocephin  Urine culture shows > 100,000 colonies E. Coli which is sensitive to the rocephin  Patient just completed a round of Iv rocephin 1/15/19       Uncontrolled type 2 diabetes mellitus with hyperglycemia    Chronic; poorly controlled historically.    DM diet  Accuchecks with correctional SSI   Blood sugars running  180-380 - Patient previously continued to  refuse detemir insulin and it was discontinued due to patient refusal  Continue moderate SSI coverage   HGA1c is 8.1  DM educator and dietician were consulted         Hypokalemia    Monitor  Supplement as  needed         Hypomagnesemia    Monitor  Supplement as needed         Lower extremity edema    No evidence of DVT per ultrasound.       Acute on chronic congestive heart failure    Prior IVF were discontinued   BNP elevated.   Echocardiogram report is pending   Fluid restriction 1.5 liters/day  Patient to start HD today         Moderate tobacco dependence    Dangers of cigarette smoking were reviewed with patient in detail for 3 minutes and patient was encouraged to quit. Nicotine replacement options were discussed.    Smoking cessation > 3 minutes         Anemia of chronic renal failure    Chronic problem.  Stable.  Monitor H/H.  Continue iron supplementation.  Transfuse for hemodynamic instability and/or H/H <7/21         Coronary artery disease involving native coronary artery without angina pectoris    Historical diagnosis. No anginal symptoms.    Continue ASA/plavix, monitor on telemetry.       Iron deficiency anemia secondary to inadequate dietary iron intake    Continue  oral fergon  Continue MVI and pm vitamin C       Moderate malnutrition    Monitor and encourage po intake  Dietary consulted  Continue po supplement and  Renal MVI     Hypocalcemia    Continue calcium supplementation        Mild recurrent major depression    Chronic problem.  Stable.    Continue Seroquel and monitor.       Hypertension associated with diabetes    Historically poorly controlled.   Continue daily norvasc; hold ACEi 2/2 anjum.    Any further orders as per Nephrology         VTE Risk Mitigation (From admission, onward)        Ordered     heparin (porcine) injection 5,000 Units  As needed (PRN)      01/14/19 0935     heparin (porcine) injection 4,000 Units  As needed (PRN)      01/12/19 1106     Place PABLO hose  Until discontinued      01/06/19 1008     enoxaparin injection 30 mg  Daily      01/05/19 2121     IP VTE HIGH RISK PATIENT  Once      01/05/19 2121     Place PABLO hose  Until discontinued      01/05/19 2121     Place  sequential compression device  Until discontinued      01/05/19 2121              Desiree Begum NP  Department of Hospital Medicine   [facility]

## 2019-01-17 NOTE — SUBJECTIVE & OBJECTIVE
Past Medical History:   Diagnosis Date    Arthritis     Asthma     CHF (congestive heart failure)     Coronary artery disease     Diabetes mellitus     Hypertension     Pancreatitis     Type 2 diabetes mellitus with hyperglycemia 7/13/2015     Interval History: no new issues. Sob resolved. Awaiting results of 24 hour urine to determine if outpatient HD is necessary.   Will need merced changed to hemisplit    Review of Systems   Constitutional: Positive for fatigue. Negative for activity change, chills and fever.        Eating better   HENT: Negative for ear discharge.    Respiratory: Negative for cough and shortness of breath.    Cardiovascular: Positive for leg swelling. Negative for chest pain and palpitations.            Gastrointestinal: Negative for abdominal pain, diarrhea, nausea and vomiting.   Genitourinary: Negative for difficulty urinating, dysuria and flank pain.        Staff reports patient is incontinent of urine at times   Musculoskeletal: Positive for arthralgias and gait problem. Negative for neck pain and neck stiffness.   Neurological: Positive for weakness. Negative for tremors, facial asymmetry and speech difficulty.   Hematological: Does not bruise/bleed easily.   Psychiatric/Behavioral: Negative for agitation.     Objective:     Vital Signs (Most Recent):  Temp: 98.3 °F (36.8 °C) (01/17/19 1110)  Pulse: 79 (01/17/19 1110)  Resp: 18 (01/17/19 1110)  BP: 123/73 (01/17/19 1110)  SpO2: 100 % (01/17/19 1110) Vital Signs (24h Range):  Temp:  [96.8 °F (36 °C)-98.9 °F (37.2 °C)] 98.3 °F (36.8 °C)  Pulse:  [76-81] 79  Resp:  [16-18] 18  SpO2:  [96 %-100 %] 100 %  BP: (123-171)/(73-96) 123/73     Weight: 47.1 kg (103 lb 13.4 oz)  Body mass index is 16.26 kg/m².    Intake/Output Summary (Last 24 hours) at 1/17/2019 1407  Last data filed at 1/17/2019 1150  Gross per 24 hour   Intake 860 ml   Output 4600 ml   Net -3740 ml      Physical Exam   Constitutional: She is oriented to person, place, and  time. She appears well-developed and well-nourished. No distress.   Thin female   HENT:   Head: Normocephalic and atraumatic.   Eyes: Conjunctivae and EOM are normal. Pupils are equal, round, and reactive to light. Right eye exhibits no discharge. Left eye exhibits no discharge.   Neck: Normal range of motion. Neck supple. No JVD present.   Cardiovascular: Normal rate, regular rhythm and intact distal pulses.   Murmur heard.      Pulmonary/Chest: Effort normal and breath sounds normal. She has no wheezes.   Abdominal: Soft. Bowel sounds are normal.   Genitourinary:   Genitourinary Comments: Not examined   Musculoskeletal: Normal range of motion. She exhibits no edema.         Neurological: She is alert and oriented to person, place, and time. No cranial nerve deficit.   Skin: Skin is warm and dry. Capillary refill takes less than 2 seconds. She is not diaphoretic.   Right neck QC    Psychiatric: She has a normal mood and affect. Her behavior is normal.   Nursing note and vitals reviewed.     Labs: Reviewed    Past Surgical History:   Procedure Laterality Date    ASPIRATION ABSCESS N/A 10/27/2015    Performed by Yecenia Surgeon at [facility] YECENIA    CARDIAC SURGERY      CABG    CHOLECYSTECTOMY      COLECTOMY-RIGHT N/A 8/25/2014    Performed by Harlan Regalado MD at [facility] OR    CORONARY ARTERY BYPASS GRAFT      EGD (ESOPHAGOGASTRODUODENOSCOPY) N/A 4/16/2014    Performed by Crow Brantley MD at [facility] ENDO    EGD (ESOPHAGOGASTRODUODENOSCOPY) N/A 4/2/2014    Performed by Crow Brantley MD at [facility] ENDO    EGD (ESOPHAGOGASTRODUODENOSCOPY) N/A 6/6/2013    Performed by Crow Brantley MD at [facility] ENDO    ESOPHAGOGASTRODUODENOSCOPY (EGD) N/A 8/22/2014    Performed by Satnam Nguyen MD at [facility] ENDO    STERNOTOMY N/A 6/6/2013    Performed by Ole Ingram MD at [facility] OR    THORACOTOMY N/A 6/6/2013    Performed by Ole Ingram MD at [facility] OR       Review of patient's allergies indicates:  No Known  "Allergies    No current facility-administered medications on file prior to encounter.      Current Outpatient Medications on File Prior to Encounter   Medication Sig    amLODIPine (NORVASC) 5 MG tablet Take 1 tablet (5 mg total) by mouth once daily. (Patient taking differently: Take 10 mg by mouth once daily. )    aspirin 81 MG chewable tablet Take 81 mg by mouth once daily. Ran out    atorvastatin (LIPITOR) 80 MG tablet Take 1 tablet (80 mg total) by mouth once daily.    blood sugar diagnostic Strp To check BG 4 times daily, to use with insurance preferred meter    blood-glucose meter kit To check BG 4 times daily, to use with insurance preferred meter    clopidogrel (PLAVIX) 75 mg tablet Take 1 tablet (75 mg total) by mouth once daily.    CREON 24,000-76,000 -120,000 unit capsule Take 1 capsule by mouth 4 (four) times daily with meals and nightly.     ferrous sulfate 325 (65 FE) MG EC tablet Take 1 tablet (325 mg total) by mouth 2 (two) times daily.    furosemide (LASIX) 40 MG tablet Take 40 mg by mouth once daily.    HUMALOG KWIKPEN INSULIN 100 unit/mL InPn pen Inject 2 Units into the skin 3 (three) times daily before meals.    insulin detemir U-100 (LEVEMIR FLEXTOUCH) 100 unit/mL (3 mL) SubQ InPn pen Inject 2 Units into the skin 2 (two) times daily.    lancets Misc To check BG 4 times daily, to use with insurance preferred meter    lisinopril (PRINIVIL,ZESTRIL) 2.5 MG tablet Take 2.5 mg by mouth once daily.     magnesium oxide (MAG-OX) 400 mg tablet Take 1 tablet (400 mg total) by mouth once daily.    pantoprazole (PROTONIX) 40 MG tablet Take 40 mg by mouth once daily.    pen needle, diabetic (BD ULTRA-FINE AZIZA PEN NEEDLE) 32 gauge x 5/32" Ndle Uses 4 daily    potassium chloride (KLOR-CON) 20 mEq Pack Take 20 mEq by mouth 2 (two) times daily.    quetiapine (SEROQUEL) 100 MG Tab 100 mg every evening.      Family History     Problem Relation (Age of Onset)    Asthma Brother    Cancer Maternal " Grandmother    Depression Brother    Diabetes Mother, Father, Sister    Hearing loss Sister    Heart disease Sister    Hypertension Sister    Learning disabilities Sister    Vision loss Sister        Tobacco Use    Smoking status: Current Every Day Smoker     Packs/day: 0.50     Years: 35.00     Pack years: 17.50     Types: Cigarettes    Smokeless tobacco: Never Used   Substance and Sexual Activity    Alcohol use: No     Alcohol/week: 0.0 oz     Comment: questionable per     Drug use: No    Sexual activity: Not Currently     Partners: Male     Birth control/protection: None     Review of Systems  Objective:     Vital Signs (Most Recent):  Temp: 97 °F (36.1 °C) (01/17/19 0426)  Pulse: 76 (01/17/19 0426)  Resp: 16 (01/16/19 2119)  BP: (!) 141/80 (01/17/19 0426)  SpO2: 98 % (01/17/19 0426) Vital Signs (24h Range):  Temp:  [96.8 °F (36 °C)-98.9 °F (37.2 °C)] 97 °F (36.1 °C)  Pulse:  [76-81] 76  Resp:  [16-18] 16  SpO2:  [95 %-98 %] 98 %  BP: (132-171)/(75-96) 141/80     Weight: 47.1 kg (103 lb 13.4 oz)  Body mass index is 16.26 kg/m².    Physical Exam        Significant Labs: {Results:22490}    Significant Imaging: {Imaging Review:25316}

## 2019-01-17 NOTE — PT/OT/SLP PROGRESS
Physical Therapy      Patient Name:  Errol Stone Keita   MRN:  [id number]    Patient not seen today due to dialysis.    Heather Yo, PT

## 2019-01-17 NOTE — PLAN OF CARE
Spoke with Emmy at [facility]; they could not verify the pt's insurance information. I faxed her a printout of her [employer] information and faxed.....GENEVA Lora       01/17/19 0103   Post-Acute Status   Post-Acute Authorization Dialysis   Diaylsis Status Pending Payor Review

## 2019-01-17 NOTE — PLAN OF CARE
Faxed admissions packet to [facility].       01/17/19 7683   Post-Acute Status   Post-Acute Authorization Dialysis   Diaylsis Status Referrals Sent

## 2019-01-17 NOTE — PLAN OF CARE
Problem: Adult Inpatient Plan of Care  Goal: Plan of Care Review  Outcome: Ongoing (interventions implemented as appropriate)  POC reviewed with pt, verbalized understanding. Pt sleeping through the night, in NAD. Fall and safety precautions maintained. Family at bedside throughout the night. Lopez to gravity. Evening medications tolerated well. Pt in dialysis at start of shift. Returned in NAD. Bed in lowest position, call light in reach. Will continue to monitor.

## 2019-01-17 NOTE — SUBJECTIVE & OBJECTIVE
Interval History: no new issues. Sob resolved.  24 hour urine with 9 Crcl consistent with ESRD. Needs tunneled cath- plan for tomorrow.     Review of Systems   Constitutional: Positive for fatigue. Negative for activity change, chills and fever.        Eating better   HENT: Negative for ear discharge.    Respiratory: Negative for cough and shortness of breath.    Cardiovascular: Positive for leg swelling. Negative for chest pain and palpitations.            Gastrointestinal: Negative for abdominal pain and diarrhea.   Genitourinary: Negative for difficulty urinating and flank pain.        Staff reports patient is incontinent of urine at times   Musculoskeletal: Positive for arthralgias and gait problem.   Neurological: Positive for weakness. Negative for tremors, facial asymmetry and speech difficulty.   Psychiatric/Behavioral: Negative for agitation.     Objective:     Vital Signs (Most Recent):  Temp: 98.3 °F (36.8 °C) (01/17/19 1110)  Pulse: 79 (01/17/19 1110)  Resp: 18 (01/17/19 1110)  BP: 123/73 (01/17/19 1110)  SpO2: 100 % (01/17/19 1110) Vital Signs (24h Range):  Temp:  [96.8 °F (36 °C)-98.9 °F (37.2 °C)] 98.3 °F (36.8 °C)  Pulse:  [76-81] 79  Resp:  [16-18] 18  SpO2:  [96 %-100 %] 100 %  BP: (123-171)/(73-96) 123/73     Weight: 47.1 kg (103 lb 13.4 oz)  Body mass index is 16.26 kg/m².    Intake/Output Summary (Last 24 hours) at 1/17/2019 1440  Last data filed at 1/17/2019 1150  Gross per 24 hour   Intake 860 ml   Output 4600 ml   Net -3740 ml      Physical Exam   Constitutional: She is oriented to person, place, and time. She appears well-developed and well-nourished. No distress.   Thin female   HENT:   Head: Normocephalic and atraumatic.   Eyes: Conjunctivae and EOM are normal. Pupils are equal, round, and reactive to light. Right eye exhibits no discharge. Left eye exhibits no discharge.   Neck: Normal range of motion. Neck supple. No JVD present.   Cardiovascular: Normal rate, regular rhythm and intact  distal pulses.   Murmur heard.      Pulmonary/Chest: Effort normal and breath sounds normal. She has no wheezes.   Abdominal: Soft. Bowel sounds are normal.   Genitourinary:   Genitourinary Comments: Not examined   Musculoskeletal: Normal range of motion. She exhibits no edema.         Neurological: She is alert and oriented to person, place, and time. No cranial nerve deficit.   Skin: Skin is warm and dry. Capillary refill takes less than 2 seconds. She is not diaphoretic.   Right neck QC    Psychiatric: She has a normal mood and affect. Her behavior is normal.   Nursing note and vitals reviewed.     Labs: Reviewed

## 2019-01-18 ENCOUNTER — ANESTHESIA (OUTPATIENT)
Dept: SURGERY | Facility: HOSPITAL | Age: 54
DRG: 673 | End: 2019-01-18
Payer: MEDICARE

## 2019-01-18 PROBLEM — R60.0 LOWER EXTREMITY EDEMA: Status: RESOLVED | Noted: 2019-01-05 | Resolved: 2019-01-18

## 2019-01-18 LAB
ANION GAP SERPL CALC-SCNC: 8 MMOL/L
BUN SERPL-MCNC: 19 MG/DL
CALCIUM SERPL-MCNC: 8.1 MG/DL
CHLORIDE SERPL-SCNC: 108 MMOL/L
CO2 SERPL-SCNC: 23 MMOL/L
CREAT SERPL-MCNC: 2.4 MG/DL
EST. GFR  (AFRICAN AMERICAN): 25 ML/MIN/1.73 M^2
EST. GFR  (NON AFRICAN AMERICAN): 22 ML/MIN/1.73 M^2
GLUCOSE SERPL-MCNC: 226 MG/DL
HCT VFR BLD AUTO: 31 %
HGB BLD-MCNC: 10 G/DL
MAGNESIUM SERPL-MCNC: 2 MG/DL
PHOSPHATE SERPL-MCNC: 3 MG/DL
POCT GLUCOSE: 188 MG/DL (ref 70–110)
POCT GLUCOSE: 247 MG/DL (ref 70–110)
POCT GLUCOSE: 345 MG/DL (ref 70–110)
POCT GLUCOSE: 470 MG/DL (ref 70–110)
POTASSIUM SERPL-SCNC: 4.1 MMOL/L
SODIUM SERPL-SCNC: 139 MMOL/L

## 2019-01-18 PROCEDURE — 71000033 HC RECOVERY, INTIAL HOUR: Performed by: THORACIC SURGERY (CARDIOTHORACIC VASCULAR SURGERY)

## 2019-01-18 PROCEDURE — 99900104 DSU ONLY-NO CHARGE-EA ADD'L HR (STAT): Performed by: THORACIC SURGERY (CARDIOTHORACIC VASCULAR SURGERY)

## 2019-01-18 PROCEDURE — 37000008 HC ANESTHESIA 1ST 15 MINUTES: Performed by: THORACIC SURGERY (CARDIOTHORACIC VASCULAR SURGERY)

## 2019-01-18 PROCEDURE — 25000003 PHARM REV CODE 250: Performed by: NURSE PRACTITIONER

## 2019-01-18 PROCEDURE — 63600175 PHARM REV CODE 636 W HCPCS: Performed by: THORACIC SURGERY (CARDIOTHORACIC VASCULAR SURGERY)

## 2019-01-18 PROCEDURE — D9220A PRA ANESTHESIA: Mod: CRNA,,, | Performed by: NURSE ANESTHETIST, CERTIFIED REGISTERED

## 2019-01-18 PROCEDURE — 25000003 PHARM REV CODE 250: Performed by: THORACIC SURGERY (CARDIOTHORACIC VASCULAR SURGERY)

## 2019-01-18 PROCEDURE — 36000707: Performed by: THORACIC SURGERY (CARDIOTHORACIC VASCULAR SURGERY)

## 2019-01-18 PROCEDURE — 63600175 PHARM REV CODE 636 W HCPCS: Performed by: NURSE ANESTHETIST, CERTIFIED REGISTERED

## 2019-01-18 PROCEDURE — 12000002 HC ACUTE/MED SURGE SEMI-PRIVATE ROOM

## 2019-01-18 PROCEDURE — 25000003 PHARM REV CODE 250: Performed by: INTERNAL MEDICINE

## 2019-01-18 PROCEDURE — 84100 ASSAY OF PHOSPHORUS: CPT

## 2019-01-18 PROCEDURE — 85018 HEMOGLOBIN: CPT

## 2019-01-18 PROCEDURE — 36000706: Performed by: THORACIC SURGERY (CARDIOTHORACIC VASCULAR SURGERY)

## 2019-01-18 PROCEDURE — 83735 ASSAY OF MAGNESIUM: CPT

## 2019-01-18 PROCEDURE — C1750 CATH, HEMODIALYSIS,LONG-TERM: HCPCS | Performed by: THORACIC SURGERY (CARDIOTHORACIC VASCULAR SURGERY)

## 2019-01-18 PROCEDURE — 25000003 PHARM REV CODE 250: Performed by: NURSE ANESTHETIST, CERTIFIED REGISTERED

## 2019-01-18 PROCEDURE — 25000003 PHARM REV CODE 250: Performed by: ANESTHESIOLOGY

## 2019-01-18 PROCEDURE — 99900103 DSU ONLY-NO CHARGE-INITIAL HR (STAT): Performed by: THORACIC SURGERY (CARDIOTHORACIC VASCULAR SURGERY)

## 2019-01-18 PROCEDURE — 80048 BASIC METABOLIC PNL TOTAL CA: CPT

## 2019-01-18 PROCEDURE — D9220A PRA ANESTHESIA: ICD-10-PCS | Mod: CRNA,,, | Performed by: NURSE ANESTHETIST, CERTIFIED REGISTERED

## 2019-01-18 PROCEDURE — 85014 HEMATOCRIT: CPT

## 2019-01-18 PROCEDURE — D9220A PRA ANESTHESIA: ICD-10-PCS | Mod: ANES,,, | Performed by: ANESTHESIOLOGY

## 2019-01-18 PROCEDURE — 94761 N-INVAS EAR/PLS OXIMETRY MLT: CPT

## 2019-01-18 PROCEDURE — 37000009 HC ANESTHESIA EA ADD 15 MINS: Performed by: THORACIC SURGERY (CARDIOTHORACIC VASCULAR SURGERY)

## 2019-01-18 PROCEDURE — D9220A PRA ANESTHESIA: Mod: ANES,,, | Performed by: ANESTHESIOLOGY

## 2019-01-18 PROCEDURE — 71000039 HC RECOVERY, EACH ADD'L HOUR: Performed by: THORACIC SURGERY (CARDIOTHORACIC VASCULAR SURGERY)

## 2019-01-18 PROCEDURE — 36415 COLL VENOUS BLD VENIPUNCTURE: CPT

## 2019-01-18 DEVICE — CATH HEMOSPLIT DL 14FR 19CM
Type: IMPLANTABLE DEVICE | Site: NECK | Status: NON-FUNCTIONAL
Removed: 2020-05-11

## 2019-01-18 RX ORDER — SODIUM CHLORIDE 9 MG/ML
INJECTION, SOLUTION INTRAVENOUS CONTINUOUS
Status: DISCONTINUED | OUTPATIENT
Start: 2019-01-18 | End: 2019-01-18

## 2019-01-18 RX ORDER — ONDANSETRON 2 MG/ML
4 INJECTION INTRAMUSCULAR; INTRAVENOUS ONCE AS NEEDED
Status: DISCONTINUED | OUTPATIENT
Start: 2019-01-18 | End: 2019-01-18 | Stop reason: HOSPADM

## 2019-01-18 RX ORDER — KETAMINE HYDROCHLORIDE 100 MG/ML
INJECTION, SOLUTION INTRAMUSCULAR; INTRAVENOUS
Status: DISCONTINUED | OUTPATIENT
Start: 2019-01-18 | End: 2019-01-18

## 2019-01-18 RX ORDER — LIDOCAINE HCL/PF 100 MG/5ML
SYRINGE (ML) INTRAVENOUS
Status: DISCONTINUED | OUTPATIENT
Start: 2019-01-18 | End: 2019-01-18

## 2019-01-18 RX ORDER — CEFAZOLIN SODIUM 1 G/50ML
1 SOLUTION INTRAVENOUS
Status: COMPLETED | OUTPATIENT
Start: 2019-01-18 | End: 2019-01-18

## 2019-01-18 RX ORDER — OXYCODONE HYDROCHLORIDE 5 MG/1
5 TABLET ORAL ONCE AS NEEDED
Status: DISCONTINUED | OUTPATIENT
Start: 2019-01-18 | End: 2019-01-18 | Stop reason: HOSPADM

## 2019-01-18 RX ORDER — PROPOFOL 10 MG/ML
VIAL (ML) INTRAVENOUS
Status: DISCONTINUED | OUTPATIENT
Start: 2019-01-18 | End: 2019-01-18

## 2019-01-18 RX ORDER — ONDANSETRON 2 MG/ML
INJECTION INTRAMUSCULAR; INTRAVENOUS
Status: DISCONTINUED | OUTPATIENT
Start: 2019-01-18 | End: 2019-01-18

## 2019-01-18 RX ORDER — HEPARIN SODIUM 1000 [USP'U]/ML
INJECTION, SOLUTION INTRAVENOUS; SUBCUTANEOUS
Status: DISCONTINUED | OUTPATIENT
Start: 2019-01-18 | End: 2019-01-18 | Stop reason: HOSPADM

## 2019-01-18 RX ORDER — MIDAZOLAM HYDROCHLORIDE 1 MG/ML
INJECTION, SOLUTION INTRAMUSCULAR; INTRAVENOUS
Status: DISCONTINUED | OUTPATIENT
Start: 2019-01-18 | End: 2019-01-18

## 2019-01-18 RX ORDER — SODIUM CHLORIDE, SODIUM LACTATE, POTASSIUM CHLORIDE, CALCIUM CHLORIDE 600; 310; 30; 20 MG/100ML; MG/100ML; MG/100ML; MG/100ML
125 INJECTION, SOLUTION INTRAVENOUS CONTINUOUS
Status: CANCELLED | OUTPATIENT
Start: 2019-01-18

## 2019-01-18 RX ORDER — ASCORBIC ACID 500 MG
500 TABLET ORAL NIGHTLY
COMMUNITY
Start: 2019-01-18

## 2019-01-18 RX ORDER — FENTANYL CITRATE 50 UG/ML
25 INJECTION, SOLUTION INTRAMUSCULAR; INTRAVENOUS EVERY 5 MIN PRN
Status: DISCONTINUED | OUTPATIENT
Start: 2019-01-18 | End: 2019-01-18 | Stop reason: HOSPADM

## 2019-01-18 RX ORDER — LIDOCAINE HYDROCHLORIDE 10 MG/ML
INJECTION, SOLUTION EPIDURAL; INFILTRATION; INTRACAUDAL; PERINEURAL
Status: DISCONTINUED | OUTPATIENT
Start: 2019-01-18 | End: 2019-01-18 | Stop reason: HOSPADM

## 2019-01-18 RX ADMIN — PANCRELIPASE 1 CAPSULE: 24000; 76000; 120000 CAPSULE, DELAYED RELEASE PELLETS ORAL at 09:01

## 2019-01-18 RX ADMIN — SENNOSIDES AND DOCUSATE SODIUM 1 TABLET: 8.6; 5 TABLET ORAL at 03:01

## 2019-01-18 RX ADMIN — QUETIAPINE FUMARATE 100 MG: 100 TABLET ORAL at 09:01

## 2019-01-18 RX ADMIN — INSULIN ASPART 8 UNITS: 100 INJECTION, SOLUTION INTRAVENOUS; SUBCUTANEOUS at 06:01

## 2019-01-18 RX ADMIN — KETAMINE HYDROCHLORIDE 30 MG: 100 INJECTION, SOLUTION, CONCENTRATE INTRAMUSCULAR; INTRAVENOUS at 09:01

## 2019-01-18 RX ADMIN — SENNOSIDES AND DOCUSATE SODIUM 1 TABLET: 8.6; 5 TABLET ORAL at 09:01

## 2019-01-18 RX ADMIN — AMLODIPINE BESYLATE 10 MG: 5 TABLET ORAL at 03:01

## 2019-01-18 RX ADMIN — TORSEMIDE 20 MG: 20 TABLET ORAL at 03:01

## 2019-01-18 RX ADMIN — MIDAZOLAM 1 MG: 1 INJECTION INTRAMUSCULAR; INTRAVENOUS at 09:01

## 2019-01-18 RX ADMIN — Medication 1 CAPSULE: at 03:01

## 2019-01-18 RX ADMIN — HYDRALAZINE HYDROCHLORIDE 10 MG: 10 TABLET, FILM COATED ORAL at 03:01

## 2019-01-18 RX ADMIN — INSULIN ASPART 4 UNITS: 100 INJECTION, SOLUTION INTRAVENOUS; SUBCUTANEOUS at 06:01

## 2019-01-18 RX ADMIN — TORSEMIDE 20 MG: 20 TABLET ORAL at 09:01

## 2019-01-18 RX ADMIN — FERROUS SULFATE TAB EC 325 MG (65 MG FE EQUIVALENT) 325 MG: 325 (65 FE) TABLET DELAYED RESPONSE at 09:01

## 2019-01-18 RX ADMIN — ONDANSETRON 4 MG: 2 INJECTION, SOLUTION INTRAMUSCULAR; INTRAVENOUS at 09:01

## 2019-01-18 RX ADMIN — PROPOFOL 20 MG: 10 INJECTION, EMULSION INTRAVENOUS at 09:01

## 2019-01-18 RX ADMIN — LIDOCAINE HYDROCHLORIDE 50 MG: 20 INJECTION, SOLUTION INTRAVENOUS at 09:01

## 2019-01-18 RX ADMIN — HYDRALAZINE HYDROCHLORIDE 10 MG: 10 TABLET, FILM COATED ORAL at 09:01

## 2019-01-18 RX ADMIN — PANCRELIPASE 1 CAPSULE: 24000; 76000; 120000 CAPSULE, DELAYED RELEASE PELLETS ORAL at 03:01

## 2019-01-18 RX ADMIN — SODIUM CHLORIDE: 0.9 INJECTION, SOLUTION INTRAVENOUS at 09:01

## 2019-01-18 RX ADMIN — CEFAZOLIN SODIUM 1 G: 1 SOLUTION INTRAVENOUS at 09:01

## 2019-01-18 RX ADMIN — OXYCODONE HYDROCHLORIDE AND ACETAMINOPHEN 500 MG: 500 TABLET ORAL at 09:01

## 2019-01-18 RX ADMIN — CLOPIDOGREL BISULFATE 75 MG: 75 TABLET ORAL at 03:01

## 2019-01-18 RX ADMIN — ATORVASTATIN CALCIUM 80 MG: 40 TABLET, FILM COATED ORAL at 03:01

## 2019-01-18 RX ADMIN — MAGNESIUM OXIDE TAB 400 MG (241.3 MG ELEMENTAL MG) 400 MG: 400 (241.3 MG) TAB at 03:01

## 2019-01-18 RX ADMIN — FERROUS SULFATE TAB EC 325 MG (65 MG FE EQUIVALENT) 325 MG: 325 (65 FE) TABLET DELAYED RESPONSE at 03:01

## 2019-01-18 RX ADMIN — PROPOFOL 30 MG: 10 INJECTION, EMULSION INTRAVENOUS at 09:01

## 2019-01-18 NOTE — PLAN OF CARE
Report to Lidya. Patient in no pain or discomfort, no nausea noted, vs stable, dressing with some drainage, reinforced, No family with patient.

## 2019-01-18 NOTE — TRANSFER OF CARE
"Anesthesia Transfer of Care Note    Patient: Errol Stone Keita    Procedure(s) Performed: Procedure(s) (LRB):  INSERTION, CATHETER, TUNNELED (Right)  REMOVAL, CATHETER, HEMODIALYSIS (Right)    Patient location: PACU    Anesthesia Type: MAC    Transport from OR: Transported from OR on 2-3 L/min O2 by NC with adequate spontaneous ventilation    Post pain: adequate analgesia    Post assessment: no apparent anesthetic complications    Post vital signs: stable    Level of consciousness: awake    Nausea/Vomiting: no nausea/vomiting    Complications: none    Transfer of care protocol was followed      Last vitals:   Visit Vitals  /69 (BP Location: Left arm, Patient Position: Lying)   Pulse 78   Temp (P) 36.7 °C (98 °F)   Resp 16   Ht 5' 7" (1.702 m)   Wt 47.3 kg (104 lb 4.4 oz)   LMP 09/03/2012 (Exact Date)   SpO2 99%   Breastfeeding? No   BMI 16.33 kg/m²     "

## 2019-01-18 NOTE — PROGRESS NOTES
[facility] Hospital Medicine  Progress Note    Patient Name: Errol Keita  MRN: [id number]  Patient Class: IP- Inpatient   Admission Date: 1/5/2019  Length of Stay: 12 days  Attending Physician: Foreign Greco MD  Primary Care Provider: Bharat Wiggins MD        Subjective:     Principal Problem:ESRD (end stage renal disease)    HPI:  This is a 54 yo  female with a PMHx of T2DM, HTN, pancreatitis, CAD, and CHF who presented to the ED for further evaluation of bilateral leg swelling that has been ongoing for 2 weeks. She states that she is having bilateral leg swelling and leg pain she describes as a tightness. Per ED record, pt endorsed some SOB, but she denies this to me.  Pt denies fever, chest pain, abdominal pain, vomiting, diarrhea, and headache.  ED evaluation reveals TIMOTHY.  She reports that she is a patient of Dr. Tolbert. Patient was evaluated in the ER and noted to have TIMOTHY and admitted for further evaluation and treatment.    Hospital Course:  Patient monitored closely during hospitalization. She initially received IV lasix for acute CHF. Telemetry showed  no significant arrhythmias. Her renal function was  trended closely. Nephrology was consulted. An Echo was  obtained. She was found to have iron deficiency anemia and initiated on oral fergon. Her hypokalemia was  replaced with oral supplementation. Dietary was consulted. She was noted to have moderate protein malnutrition and initiated on oral supplementation. Physical therapy was consulted for debility. Patient's creatinine continued to trend upward. Dr. Desouza was consulted for Howard placement and patient was started on dialysis on 1/11/18. She also recieved HD on 1/12/19 and 1/14/19. 24 hour urine ordered.        Interval History: she underwent tunneled cath this am. Site continues to ooze blood despite pressure dressings. Requiring frequent changes.   Will Dc home in am. Monitor for bleeding overnight.     Review of  Systems   Constitutional: Positive for fatigue. Negative for activity change, chills and fever.        Eating better   HENT: Negative for ear discharge.    Respiratory: Negative for cough and shortness of breath.    Cardiovascular: Positive for leg swelling. Negative for chest pain and palpitations.            Gastrointestinal: Negative for abdominal pain and diarrhea.   Genitourinary: Negative for difficulty urinating and flank pain.        Staff reports patient is incontinent of urine at times   Musculoskeletal: Positive for arthralgias and gait problem.   Neurological: Positive for weakness. Negative for tremors, facial asymmetry and speech difficulty.   Psychiatric/Behavioral: Negative for agitation.     Objective:     Vital Signs (Most Recent):  Temp: 97.1 °F (36.2 °C) (01/18/19 1537)  Pulse: 81 (01/18/19 1537)  Resp: 20 (01/18/19 1537)  BP: (!) 156/80 (01/18/19 1537)  SpO2: 100 % (01/18/19 1537) Vital Signs (24h Range):  Temp:  [96.2 °F (35.7 °C)-98 °F (36.7 °C)] 97.1 °F (36.2 °C)  Pulse:  [64-81] 81  Resp:  [0-20] 20  SpO2:  [95 %-100 %] 100 %  BP: (111-159)/(66-82) 156/80     Weight: 47.3 kg (104 lb 4.4 oz)  Body mass index is 16.33 kg/m².    Intake/Output Summary (Last 24 hours) at 1/18/2019 1736  Last data filed at 1/18/2019 0941  Gross per 24 hour   Intake 290 ml   Output --   Net 290 ml      Physical Exam   Constitutional: She is oriented to person, place, and time. She appears well-developed. No distress.   Thin female   HENT:   Head: Normocephalic and atraumatic.   Eyes: Conjunctivae and EOM are normal. Pupils are equal, round, and reactive to light. Right eye exhibits no discharge. Left eye exhibits no discharge.   Neck: Normal range of motion. Neck supple. No JVD present.   Cardiovascular: Normal rate, regular rhythm and intact distal pulses.   Murmur heard.      Pulmonary/Chest: Effort normal and breath sounds normal. She has no wheezes.   Abdominal: Soft. Bowel sounds are normal.   Genitourinary:    Genitourinary Comments: Not examined   Musculoskeletal: Normal range of motion. She exhibits no edema.         Neurological: She is alert and oriented to person, place, and time. No cranial nerve deficit.   Skin: Skin is warm and dry. Capillary refill takes less than 2 seconds. She is not diaphoretic.   Right neck Howard site oozing blood at removal site.   Tunneled cath site oozing blood. Pressure dressing and sand bag in place   Psychiatric: She has a normal mood and affect. Her behavior is normal.   Flat affect   Nursing note and vitals reviewed.     Labs: Reviewed    Assessment/Plan:      * ESRD (end stage renal disease)    CrCl 9.   Consult Dr. ocasio for tunneled cath.- oozing at site. May need stitch to site  CM To arrange outpatient HD- arranged at [facility]        UTI (urinary tract infection)    Continue Iv rocephin  Urine culture shows > 100,000 colonies E. Coli which is sensitive to the rocephin  Patient just completed a round of Iv rocephin 1/15/19  improved       Uncontrolled type 2 diabetes mellitus with hyperglycemia    Chronic; poorly controlled historically.    DM diet  Accuchecks with correctional SSI   Blood sugars running  180-380 - Patient previously continued to  refuse detemir insulin and it was discontinued due to patient refusal  Continue moderate SSI coverage   HGA1c is 8.1  DM educator and dietician were consulted         Hypokalemia    Monitor  Supplement as needed         Hypomagnesemia    Monitor  Supplement as needed         Acute renal failure superimposed on stage 4 chronic kidney disease    Initially patient received  gentle IVF hydration that were later discontinued due to hypervolemia  Follow renal panel and electrolytes closely.  Nephrology following   Renal Ultrasound shows no obstructive uropathy- Sequelae of medical renal disease  Renal dose medications, Avoid NSAIDs, Coyle-II inhibitors, ACE-I, Angiotensin Receptor Blockers, or Aminoglycosides.  Echocardiogram report  pending  Patient's creatinine continues to trend upward- Dr. Desouza consulted for Howard catheter placement 1/10/19  Patient had HD today 1/11/19, 1/12/19, and 1/14/19  24 hour urine in progress-  Awaiting results.   May need outpatient HD                    Acute on chronic congestive heart failure    Prior IVF were discontinued   BNP elevated.   Echocardiogram report is pending   Fluid restriction 1.5 liters/day  Patient to start HD today         Moderate tobacco dependence    Dangers of cigarette smoking were reviewed with patient in detail for 3 minutes and patient was encouraged to quit. Nicotine replacement options were discussed.    Smoking cessation > 3 minutes         Anemia of chronic renal failure    Chronic problem.  Stable.  Monitor H/H.  Continue iron supplementation.  Transfuse for hemodynamic instability and/or H/H <7/21         Coronary artery disease involving native coronary artery without angina pectoris    Historical diagnosis. No anginal symptoms.    Continue ASA/plavix, monitor on telemetry.       Iron deficiency anemia secondary to inadequate dietary iron intake    Continue  oral fergon  Continue MVI and pm vitamin C       Moderate malnutrition    Monitor and encourage po intake  Dietary consulted  Continue po supplement and  Renal MVI     Hypocalcemia    Continue calcium supplementation        Mild recurrent major depression    Chronic problem.  Stable.    Continue Seroquel and monitor.       Hypertension associated with diabetes    Historically poorly controlled.   Continue daily norvasc; hold ACEi 2/2 anjum.    Any further orders as per Nephrology         VTE Risk Mitigation (From admission, onward)        Ordered     heparin (porcine) injection 5,000 Units  As needed (PRN)      01/14/19 0935     heparin (porcine) injection 4,000 Units  As needed (PRN)      01/12/19 1106     Place PABLO hose  Until discontinued      01/06/19 1008     enoxaparin injection 30 mg  Daily      01/05/19 6281      IP VTE HIGH RISK PATIENT  Once      01/05/19 2121     Place PABLO hose  Until discontinued      01/05/19 2121     Place sequential compression device  Until discontinued      01/05/19 2121        Dc lovenox due to bleeding  Hold dc due to bleeding cath site.    Desiree Begum NP  Department of Hospital Medicine   [facility]

## 2019-01-18 NOTE — ASSESSMENT & PLAN NOTE
Continue Iv rocephin  Urine culture shows > 100,000 colonies E. Coli which is sensitive to the rocephin  Patient just completed a round of Iv rocephin 1/15/19  improved

## 2019-01-18 NOTE — ASSESSMENT & PLAN NOTE
CrCl 9.   Consult Dr. ocasio for tunneled cath.- oozing at site. May need stitch to site  CM To arrange outpatient HD- arranged at [facility]

## 2019-01-18 NOTE — SUBJECTIVE & OBJECTIVE
Interval History: she underwent tunneled cath this am. Site continues to ooze blood despite pressure dressings. Requiring frequent changes.   Will Dc home in am. Monitor for bleeding overnight.     Review of Systems   Constitutional: Positive for fatigue. Negative for activity change, chills and fever.        Eating better   HENT: Negative for ear discharge.    Respiratory: Negative for cough and shortness of breath.    Cardiovascular: Positive for leg swelling. Negative for chest pain and palpitations.            Gastrointestinal: Negative for abdominal pain and diarrhea.   Genitourinary: Negative for difficulty urinating and flank pain.        Staff reports patient is incontinent of urine at times   Musculoskeletal: Positive for arthralgias and gait problem.   Neurological: Positive for weakness. Negative for tremors, facial asymmetry and speech difficulty.   Psychiatric/Behavioral: Negative for agitation.     Objective:     Vital Signs (Most Recent):  Temp: 97.1 °F (36.2 °C) (01/18/19 1537)  Pulse: 81 (01/18/19 1537)  Resp: 20 (01/18/19 1537)  BP: (!) 156/80 (01/18/19 1537)  SpO2: 100 % (01/18/19 1537) Vital Signs (24h Range):  Temp:  [96.2 °F (35.7 °C)-98 °F (36.7 °C)] 97.1 °F (36.2 °C)  Pulse:  [64-81] 81  Resp:  [0-20] 20  SpO2:  [95 %-100 %] 100 %  BP: (111-159)/(66-82) 156/80     Weight: 47.3 kg (104 lb 4.4 oz)  Body mass index is 16.33 kg/m².    Intake/Output Summary (Last 24 hours) at 1/18/2019 7769  Last data filed at 1/18/2019 0941  Gross per 24 hour   Intake 290 ml   Output --   Net 290 ml      Physical Exam   Constitutional: She is oriented to person, place, and time. She appears well-developed. No distress.   Thin female   HENT:   Head: Normocephalic and atraumatic.   Eyes: Conjunctivae and EOM are normal. Pupils are equal, round, and reactive to light. Right eye exhibits no discharge. Left eye exhibits no discharge.   Neck: Normal range of motion. Neck supple. No JVD present.   Cardiovascular: Normal  rate, regular rhythm and intact distal pulses.   Murmur heard.      Pulmonary/Chest: Effort normal and breath sounds normal. She has no wheezes.   Abdominal: Soft. Bowel sounds are normal.   Genitourinary:   Genitourinary Comments: Not examined   Musculoskeletal: Normal range of motion. She exhibits no edema.         Neurological: She is alert and oriented to person, place, and time. No cranial nerve deficit.   Skin: Skin is warm and dry. Capillary refill takes less than 2 seconds. She is not diaphoretic.   Right neck Howard site oozing blood at removal site.   Tunneled cath site oozing blood. Pressure dressing and sand bag in place   Psychiatric: She has a normal mood and affect. Her behavior is normal.   Flat affect   Nursing note and vitals reviewed.     Labs: Reviewed

## 2019-01-18 NOTE — PLAN OF CARE
01/18/19 0910   PRE-TX-O2-ETCO2   O2 Device (Oxygen Therapy) room air   SpO2 97 %   Pulse Oximetry Type Intermittent   $ Pulse Oximetry - Multiple Charge Pulse Oximetry - Multiple

## 2019-01-18 NOTE — PLAN OF CARE
01/17/19 2023   Patient Assessment/Suction   Level of Consciousness (AVPU) alert   PRE-TX-O2-ETCO2   O2 Device (Oxygen Therapy) room air   SpO2 97 %   Pulse Oximetry Type Intermittent

## 2019-01-18 NOTE — PT/OT/SLP PROGRESS
Physical Therapy      Patient Name:  Errol Stone Keita   MRN:  [id number]    Patient had hemesplit this am. Pt with uncontrolled bleeding per nurse Matias.  No PT today    Heather Yo, PT

## 2019-01-18 NOTE — PROGRESS NOTES
Returned from PACU to floor, VS stable, alert and oriented. Dressing to right Chest tunneled catheter moist with red discharge. Ice pack in place. Will continue to monitor

## 2019-01-18 NOTE — ANESTHESIA PREPROCEDURE EVALUATION
01/18/2019  Errol Keita is a 53 y.o., female.    Pre-op Assessment    I have reviewed the Patient Summary Reports.     I have reviewed the Nursing Notes.   I have reviewed the Medications.     Review of Systems  Anesthesia Hx:  No problems with previous Anesthesia Denies Hx of Anesthetic complications    Social:  Smoker    Hematology/Oncology:  Hematology Normal        Cardiovascular:   Hypertension, well controlled Past MI CAD  CABG/stent  Angina, with exertion CHF ECG has been reviewed.    Pulmonary:   Asthma moderate    Renal/:   Chronic Renal Disease, CRI, ESRD, Dialysis    Musculoskeletal:   Arthritis     Endocrine:   Diabetes, poorly controlled, type 2, using insulin    Psych:   Psychiatric History          Physical Exam  General:  Cachexia, Malnutrition    Airway/Jaw/Neck:  Airway Findings: Mouth Opening: Normal Tongue: Normal  General Airway Assessment: Adult  Mallampati: I  TM Distance: 4 - 6 cm  Jaw/Neck Findings:  Neck ROM: Normal ROM      Dental:  Dental Findings: Periodontal disease, Severe   Chest/Lungs:  Chest/Lungs Findings: Clear to auscultation     Heart/Vascular:  Heart Findings: Rate: Tachycardia  Rhythm: Regular Rhythm  Heart Murmur  Systolic  Systolic Heart Murmur Description: L Upper Sternal Border  Systolic Heart Murmur Grade: Grade III             Anesthesia Plan  Type of Anesthesia, risks & benefits discussed:  Anesthesia Type:  MAC  Patient's Preference:   Intra-op Monitoring Plan: standard ASA monitors  Intra-op Monitoring Plan Comments:   Post Op Pain Control Plan: IV/PO Opioids PRN and multimodal analgesia  Post Op Pain Control Plan Comments:   Induction:   IV  Beta Blocker:  Patient is on a Beta-Blocker and has received one dose within the past 24 hours (No further documentation required).       Informed Consent: Patient understands risks and agrees with Anesthesia  plan.  Questions answered. Anesthesia consent signed with patient.  ASA Score: 3     Day of Surgery Review of History & Physical: I have interviewed and examined the patient. I have reviewed the patient's H&P dated:  There are no significant changes.  H&P update referred to the surgeon.         Ready For Surgery From Anesthesia Perspective.

## 2019-01-18 NOTE — PLAN OF CARE
Problem: Adult Inpatient Plan of Care  Goal: Plan of Care Review  Outcome: Ongoing (interventions implemented as appropriate)  POC reviewed with pt, verbalized understanding. Pt sleeping through the night, in NAD. Fall and safety precautions maintained. Family at bedside throughout the night. Lopez to gravity. Evening medications tolerated well. Bed in lowest position, call light in reach. Will continue to monitor.

## 2019-01-18 NOTE — PROGRESS NOTES
INPATIENT NEPHROLOGY PROGRESS  [facility] NEPHROLOGY    Errol Stone Keita  01/18/2019    Reason for consultation:    Acute kidney injury    History of Present Illness:     53F with T2DM, HTN, pancreatitis, CAD, and CHF who presents with bilateral leg swelling, mild SOB. Admit to missing several doses of her lasix. Is scheduled to see a kidney specialist in the coming month. Renal consulted for TIMOTHY on CKD.    1/6  No n,v,chest pain, sob, dysuria or diarrhea.  No new neuro symptoms  1/7 VSS, no new complains.  1/8 VSS, no new complains.  1/9 VSS, flat affect. If her sCr is not better tomorrow, will ask Dr. Desouza to place a HD cath and start dialysis. This was previously discussed, question were answered.  1/10 VSS, no new complains. Awaiting HD cath placement.  1/11 VSS, no new complains, had howard placed, will have HD today and in AM.   1/12  New start HD, due for treatment today.  States tolerated her first tx yesterday without problems.  Denies SOB and chest pain.  Acidosis improving.  1/13  S/P 2 HD treatments, labs stable today, no acute needs.  Will reassess in am.  Pt reports she has tolerated her treatments without issues, no cramping, no fatigue.  Acidosis improved.  1/14  Appetite better.   No chest pain or sob  1/15  No sob, nausea.  She's eating well.  No complaints specified.  1/16  No complaints.  No sob, nausea, vomiting  1/17 no chest pain, nausea,   1/18  Not on floor.  Getting hemesplit    Plan of Care:    ESRD  Hypokalemia  Edema/pulmonary crackles  Anemia  Hypertension    Plan:    Low CrCl  -- get hemesplit catheter.  Replete Mg and K as needed.  No nsaids, coxibs. Holding lisinopril.  Renal dose medication .  Keep MAP > 60, SBP > 100.  Had Howard placed 1/11, s/p HD x 2 treatments, repeat today   risk of need for lifelong dialysis d/w patient    Thank you for allowing us to participate in this patient's care. We will continue to follow.    Vital Signs:  Temp Readings from Last 3  Encounters:   01/18/19 98 °F (36.7 °C) (Temporal)   07/09/18 98.1 °F (36.7 °C) (Oral)   07/06/18 98.7 °F (37.1 °C) (Oral)       Pulse Readings from Last 3 Encounters:   01/18/19 78   07/09/18 72   07/06/18 68       BP Readings from Last 3 Encounters:   01/18/19 135/69   07/09/18 (!) 148/79   07/06/18 132/62       Weight:  Wt Readings from Last 3 Encounters:   01/18/19 47.3 kg (104 lb 4.4 oz)   07/09/18 48.1 kg (106 lb 0.7 oz)   07/06/18 48.4 kg (106 lb 11.2 oz)       Past Medical & Surgical History:  Past Medical History:   Diagnosis Date    Arthritis     Asthma     CHF (congestive heart failure)     Coronary artery disease     Diabetes mellitus     Hypertension     Pancreatitis     Type 2 diabetes mellitus with hyperglycemia 7/13/2015       Past Surgical History:   Procedure Laterality Date    ASPIRATION ABSCESS N/A 10/27/2015    Performed by Yecenia Surgeon at [facility] YECENIA    CARDIAC SURGERY      CABG    CHOLECYSTECTOMY      COLECTOMY-RIGHT N/A 8/25/2014    Performed by Harlan Regalado MD at [facility] OR    CORONARY ARTERY BYPASS GRAFT      EGD (ESOPHAGOGASTRODUODENOSCOPY) N/A 4/16/2014    Performed by Crow Brantley MD at [facility] ENDO    EGD (ESOPHAGOGASTRODUODENOSCOPY) N/A 4/2/2014    Performed by Crow Brantley MD at [facility] ENDO    EGD (ESOPHAGOGASTRODUODENOSCOPY) N/A 6/6/2013    Performed by Crow Brantley MD at [facility] ENDO    ESOPHAGOGASTRODUODENOSCOPY (EGD) N/A 8/22/2014    Performed by Satnam Nguyen MD at [facility] ENDO    HYSTERECTOMY      STERNOTOMY N/A 6/6/2013    Performed by Ole Ingram MD at [facility] OR    THORACOTOMY N/A 6/6/2013    Performed by Ole Ingram MD at [facility] OR       Past Social History:  Social History     Socioeconomic History    Marital status:      Spouse name: None    Number of children: None    Years of education: None    Highest education level: None   Social Needs    Financial resource strain: None    Food insecurity - worry: None    Food insecurity  - inability: None    Transportation needs - medical: None    Transportation needs - non-medical: None   Occupational History    None   Tobacco Use    Smoking status: Current Every Day Smoker     Packs/day: 0.50     Years: 35.00     Pack years: 17.50     Types: Cigarettes    Smokeless tobacco: Never Used   Substance and Sexual Activity    Alcohol use: No     Alcohol/week: 0.0 oz     Comment: questionable per     Drug use: No    Sexual activity: Not Currently     Partners: Male     Birth control/protection: None   Other Topics Concern    None   Social History Narrative    None       Medications:  No current facility-administered medications on file prior to encounter.      Current Outpatient Medications on File Prior to Encounter   Medication Sig Dispense Refill    amLODIPine (NORVASC) 5 MG tablet Take 1 tablet (5 mg total) by mouth once daily. (Patient taking differently: Take 10 mg by mouth once daily. ) 30 tablet 0    aspirin 81 MG chewable tablet Take 81 mg by mouth once daily. Ran out      atorvastatin (LIPITOR) 80 MG tablet Take 1 tablet (80 mg total) by mouth once daily. 30 tablet 11    blood sugar diagnostic Strp To check BG 4 times daily, to use with insurance preferred meter 400 strip 3    blood-glucose meter kit To check BG 4 times daily, to use with insurance preferred meter 1 each 0    clopidogrel (PLAVIX) 75 mg tablet Take 1 tablet (75 mg total) by mouth once daily. 30 tablet 11    CREON 24,000-76,000 -120,000 unit capsule Take 1 capsule by mouth 4 (four) times daily with meals and nightly.       ferrous sulfate 325 (65 FE) MG EC tablet Take 1 tablet (325 mg total) by mouth 2 (two) times daily. 60 tablet 0    furosemide (LASIX) 40 MG tablet Take 40 mg by mouth once daily.      HUMALOG KWIKPEN INSULIN 100 unit/mL InPn pen Inject 2 Units into the skin 3 (three) times daily before meals. 15 mL 12    insulin detemir U-100 (LEVEMIR FLEXTOUCH) 100 unit/mL (3 mL) SubQ InPn pen Inject  "2 Units into the skin 2 (two) times daily. 1 Box 0    lancets Misc To check BG 4 times daily, to use with insurance preferred meter 400 each 3    lisinopril (PRINIVIL,ZESTRIL) 2.5 MG tablet Take 2.5 mg by mouth once daily.       magnesium oxide (MAG-OX) 400 mg tablet Take 1 tablet (400 mg total) by mouth once daily. 30 tablet 0    pantoprazole (PROTONIX) 40 MG tablet Take 40 mg by mouth once daily.      pen needle, diabetic (BD ULTRA-FINE AZIZA PEN NEEDLE) 32 gauge x 5/32" Ndle Uses 4 daily 150 each 12    potassium chloride (KLOR-CON) 20 mEq Pack Take 20 mEq by mouth 2 (two) times daily. 60 packet 0    quetiapine (SEROQUEL) 100 MG Tab 100 mg every evening.        Scheduled Meds:   amLODIPine  10 mg Oral Daily    ascorbic acid (vitamin C)  500 mg Oral QHS    aspirin  81 mg Oral Daily    atorvastatin  80 mg Oral Daily    clopidogrel  75 mg Oral Daily    enoxaparin  30 mg Subcutaneous Daily    ferrous sulfate  325 mg Oral BID    hydrALAZINE  10 mg Oral Q12H    lipase-protease-amylase 24,000-76,000-120,000 units  1 capsule Oral QID (WM & HS)    magnesium oxide  400 mg Oral Daily    QUEtiapine  100 mg Oral QHS    senna-docusate 8.6-50 mg  1 tablet Oral BID    torsemide  20 mg Oral BID    vitamin renal formula (B-complex-vitamin c-folic acid)  1 capsule Oral Daily     Continuous Infusions:   sodium chloride 0.9%       PRN Meds:.acetaminophen, cloNIDine, dextrose 50%, dextrose 50%, fentaNYL, glucagon (human recombinant), glucose, glucose, glucose, heparin (porcine), heparin (porcine), insulin aspart U-100, ondansetron, ondansetron, oxyCODONE, sodium chloride 0.9%    Allergies:  Patient has no known allergies.    Past Family History:  Reviewed; refer to Hospitalist Admission Note    Review of Systems:  Review of Systems - All 14 systems reviewed and negative, except as noted in HPI    Physical Exam:   (from yesterday)  /69 (BP Location: Left arm, Patient Position: Lying)   Pulse 78   Temp 98 °F " "(36.7 °C) (Temporal)   Resp 16   Ht 5' 7" (1.702 m) Comment: Admit 6/20/18  Wt 47.3 kg (104 lb 4.4 oz)   LMP 09/03/2012 (Exact Date)   SpO2 99%   Breastfeeding? No   BMI 16.33 kg/m²     General Appearance:    Alert, cooperative, no distress, appears stated age   Head:    Normocephalic, without obvious abnormality, atraumatic   Eyes:    PER, conjunctiva/corneas clear, EOM's intact in both eyes        Throat:   wnl   Back:     Symmetric, no curvature, ROM normal, no CVA tenderness   Lungs:     Clear to auscultation bilaterally, respirations unlabored   Chest wall:    No tenderness or deformity   Heart:    Regular rate and rhythm, +murmur   Abdomen:     Soft   Extremities:   Extremities normal, atraumatic, no cyanosis or edema   Pulses:   +all exts   MSK:   No joint or muscle swelling, tenderness or deformity   Skin:   w/d   Neurologic:   No flap     Results:  Lab Results   Component Value Date     01/18/2019    K 4.1 01/18/2019     01/18/2019    CO2 23 01/18/2019    BUN 19 01/18/2019    CREATININE 2.4 (H) 01/18/2019    CALCIUM 8.1 (L) 01/18/2019    ANIONGAP 8 01/18/2019    ESTGFRAFRICA 25 (A) 01/18/2019    EGFRNONAA 22 (A) 01/18/2019       Lab Results   Component Value Date    CALCIUM 8.1 (L) 01/18/2019    PHOS 3.0 01/18/2019       No results for input(s): WBC, RBC, HGB, HCT, PLT, MCV, MCH, MCHC in the last 24 hours.     Paulie Tolbert MD  Nephrology  [facility] [place]  [phone]         "

## 2019-01-18 NOTE — PLAN OF CARE
01/18/19 0953   Post-Acute Status   Post-Acute Authorization Dialysis   Diaylsis Status (waiting on chair time)   KENYON spoke with Koki @ [facility] admissions, she verified some information with CM and will call back with exact chair time. She also verified that insurance verification has been obtained.     400pm CM called [facility] admissions and was told that they would not be able to give exact chair time until Monday am. CM notified Edith Guzman and Dr. Greco. Patient can go once bleeding from tunnel cath site is under control. [facility] will call patient on Monday am with exact chair time. CM instructed patient to call for any questions. Patient able to verbalize understanding. Floor nurse notified. CM added information to AVS.

## 2019-01-18 NOTE — BRIEF OP NOTE
[facility]  Brief Operative Note    SUMMARY     Surgery Date: 1/18/2019     Surgeon(s) and Role:     * Amos Desouza MD - Primary    Assisting Surgeon: None    Pre-op Diagnosis:  Renal failure [N19]    Post-op Diagnosis:  Post-Op Diagnosis Codes:     * Renal failure [N19]    Procedure(s) (LRB):  INSERTION, CATHETER, TUNNELED (Right)    Anesthesia: General    Description of Procedure:   R internal jugular  19 catheter    Estimated Blood Loss: 2cc         Specimens:   Specimen (12h ago, onward)    None

## 2019-01-19 VITALS
HEART RATE: 80 BPM | RESPIRATION RATE: 16 BRPM | WEIGHT: 104.25 LBS | SYSTOLIC BLOOD PRESSURE: 147 MMHG | DIASTOLIC BLOOD PRESSURE: 88 MMHG | BODY MASS INDEX: 16.36 KG/M2 | HEIGHT: 67 IN | TEMPERATURE: 97 F | OXYGEN SATURATION: 98 %

## 2019-01-19 LAB
ABO + RH BLD: NORMAL
ALBUMIN SERPL BCP-MCNC: 2.3 G/DL
ALP SERPL-CCNC: 124 U/L
ALT SERPL W/O P-5'-P-CCNC: 19 U/L
ANION GAP SERPL CALC-SCNC: 8 MMOL/L
AST SERPL-CCNC: 29 U/L
BASOPHILS # BLD AUTO: 0 K/UL
BASOPHILS # BLD AUTO: 0 K/UL
BASOPHILS NFR BLD: 0.3 %
BASOPHILS NFR BLD: 0.3 %
BILIRUB SERPL-MCNC: 0.3 MG/DL
BLD GP AB SCN CELLS X3 SERPL QL: NORMAL
BLD PROD TYP BPU: NORMAL
BLD PROD TYP BPU: NORMAL
BLOOD UNIT EXPIRATION DATE: NORMAL
BLOOD UNIT EXPIRATION DATE: NORMAL
BLOOD UNIT TYPE CODE: 6200
BLOOD UNIT TYPE CODE: 6200
BLOOD UNIT TYPE: NORMAL
BLOOD UNIT TYPE: NORMAL
BUN SERPL-MCNC: 37 MG/DL
CALCIUM SERPL-MCNC: 7.6 MG/DL
CHLORIDE SERPL-SCNC: 103 MMOL/L
CO2 SERPL-SCNC: 25 MMOL/L
CODING SYSTEM: NORMAL
CODING SYSTEM: NORMAL
CREAT SERPL-MCNC: 3.2 MG/DL
DIFFERENTIAL METHOD: ABNORMAL
DIFFERENTIAL METHOD: ABNORMAL
DISPENSE STATUS: NORMAL
DISPENSE STATUS: NORMAL
EOSINOPHIL # BLD AUTO: 0 K/UL
EOSINOPHIL # BLD AUTO: 0.1 K/UL
EOSINOPHIL NFR BLD: 0.5 %
EOSINOPHIL NFR BLD: 0.9 %
ERYTHROCYTE [DISTWIDTH] IN BLOOD BY AUTOMATED COUNT: 14.4 %
ERYTHROCYTE [DISTWIDTH] IN BLOOD BY AUTOMATED COUNT: 16.6 %
EST. GFR  (AFRICAN AMERICAN): 18 ML/MIN/1.73 M^2
EST. GFR  (NON AFRICAN AMERICAN): 16 ML/MIN/1.73 M^2
GLUCOSE SERPL-MCNC: 377 MG/DL
GLUCOSE SERPL-MCNC: 452 MG/DL
HCT VFR BLD AUTO: 19.6 %
HCT VFR BLD AUTO: 22.7 %
HGB BLD-MCNC: 6.4 G/DL
HGB BLD-MCNC: 7.4 G/DL
LYMPHOCYTES # BLD AUTO: 1.6 K/UL
LYMPHOCYTES # BLD AUTO: 1.6 K/UL
LYMPHOCYTES NFR BLD: 17.2 %
LYMPHOCYTES NFR BLD: 17.3 %
MAGNESIUM SERPL-MCNC: 1.9 MG/DL
MCH RBC QN AUTO: 30.5 PG
MCH RBC QN AUTO: 31.4 PG
MCHC RBC AUTO-ENTMCNC: 32.3 G/DL
MCHC RBC AUTO-ENTMCNC: 32.6 G/DL
MCV RBC AUTO: 94 FL
MCV RBC AUTO: 97 FL
MONOCYTES # BLD AUTO: 0.4 K/UL
MONOCYTES # BLD AUTO: 0.5 K/UL
MONOCYTES NFR BLD: 3.8 %
MONOCYTES NFR BLD: 5.4 %
NEUTROPHILS # BLD AUTO: 7 K/UL
NEUTROPHILS # BLD AUTO: 7.4 K/UL
NEUTROPHILS NFR BLD: 76.6 %
NEUTROPHILS NFR BLD: 77.7 %
NUM UNITS TRANS PACKED RBC: NORMAL
NUM UNITS TRANS PACKED RBC: NORMAL
PHOSPHATE SERPL-MCNC: 3.9 MG/DL
PLATELET # BLD AUTO: 365 K/UL
PLATELET # BLD AUTO: 391 K/UL
PMV BLD AUTO: 7.3 FL
PMV BLD AUTO: 8 FL
POCT GLUCOSE: 176 MG/DL (ref 70–110)
POCT GLUCOSE: 346 MG/DL (ref 70–110)
POTASSIUM SERPL-SCNC: 4.6 MMOL/L
PROT SERPL-MCNC: 5.4 G/DL
RBC # BLD AUTO: 2.02 M/UL
RBC # BLD AUTO: 2.43 M/UL
SODIUM SERPL-SCNC: 136 MMOL/L
WBC # BLD AUTO: 9.1 K/UL
WBC # BLD AUTO: 9.5 K/UL

## 2019-01-19 PROCEDURE — 63600175 PHARM REV CODE 636 W HCPCS: Performed by: INTERNAL MEDICINE

## 2019-01-19 PROCEDURE — G0008 ADMIN INFLUENZA VIRUS VAC: HCPCS | Performed by: INTERNAL MEDICINE

## 2019-01-19 PROCEDURE — 86901 BLOOD TYPING SEROLOGIC RH(D): CPT

## 2019-01-19 PROCEDURE — 63600175 PHARM REV CODE 636 W HCPCS: Performed by: NURSE PRACTITIONER

## 2019-01-19 PROCEDURE — 36415 COLL VENOUS BLD VENIPUNCTURE: CPT

## 2019-01-19 PROCEDURE — 94761 N-INVAS EAR/PLS OXIMETRY MLT: CPT

## 2019-01-19 PROCEDURE — 82947 ASSAY GLUCOSE BLOOD QUANT: CPT

## 2019-01-19 PROCEDURE — 90471 IMMUNIZATION ADMIN: CPT | Performed by: INTERNAL MEDICINE

## 2019-01-19 PROCEDURE — 86920 COMPATIBILITY TEST SPIN: CPT

## 2019-01-19 PROCEDURE — 80053 COMPREHEN METABOLIC PANEL: CPT

## 2019-01-19 PROCEDURE — 90686 IIV4 VACC NO PRSV 0.5 ML IM: CPT | Performed by: INTERNAL MEDICINE

## 2019-01-19 PROCEDURE — 25000003 PHARM REV CODE 250: Performed by: NURSE PRACTITIONER

## 2019-01-19 PROCEDURE — 85025 COMPLETE CBC W/AUTO DIFF WBC: CPT | Mod: 91

## 2019-01-19 PROCEDURE — 25000003 PHARM REV CODE 250: Performed by: INTERNAL MEDICINE

## 2019-01-19 PROCEDURE — 83735 ASSAY OF MAGNESIUM: CPT

## 2019-01-19 PROCEDURE — 80100016 HC MAINTENANCE HEMODIALYSIS

## 2019-01-19 PROCEDURE — 84100 ASSAY OF PHOSPHORUS: CPT

## 2019-01-19 PROCEDURE — 25000003 PHARM REV CODE 250: Performed by: THORACIC SURGERY (CARDIOTHORACIC VASCULAR SURGERY)

## 2019-01-19 PROCEDURE — P9016 RBC LEUKOCYTES REDUCED: HCPCS

## 2019-01-19 RX ORDER — HYDROCODONE BITARTRATE AND ACETAMINOPHEN 500; 5 MG/1; MG/1
TABLET ORAL
Status: DISCONTINUED | OUTPATIENT
Start: 2019-01-19 | End: 2019-01-19 | Stop reason: HOSPADM

## 2019-01-19 RX ORDER — LIDOCAINE HYDROCHLORIDE AND EPINEPHRINE 10; 10 MG/ML; UG/ML
1 INJECTION, SOLUTION INFILTRATION; PERINEURAL ONCE
Status: COMPLETED | OUTPATIENT
Start: 2019-01-19 | End: 2019-01-19

## 2019-01-19 RX ADMIN — LIDOCAINE HYDROCHLORIDE,EPINEPHRINE BITARTRATE 1 ML: 10; .01 INJECTION, SOLUTION INFILTRATION; PERINEURAL at 01:01

## 2019-01-19 RX ADMIN — MAGNESIUM OXIDE TAB 400 MG (241.3 MG ELEMENTAL MG) 400 MG: 400 (241.3 MG) TAB at 09:01

## 2019-01-19 RX ADMIN — TORSEMIDE 20 MG: 20 TABLET ORAL at 09:01

## 2019-01-19 RX ADMIN — Medication 1 CAPSULE: at 09:01

## 2019-01-19 RX ADMIN — HEPARIN SODIUM 4000 UNITS: 1000 INJECTION, SOLUTION INTRAVENOUS; SUBCUTANEOUS at 04:01

## 2019-01-19 RX ADMIN — PANCRELIPASE 1 CAPSULE: 24000; 76000; 120000 CAPSULE, DELAYED RELEASE PELLETS ORAL at 12:01

## 2019-01-19 RX ADMIN — INSULIN ASPART 8 UNITS: 100 INJECTION, SOLUTION INTRAVENOUS; SUBCUTANEOUS at 01:01

## 2019-01-19 RX ADMIN — HYDRALAZINE HYDROCHLORIDE 10 MG: 10 TABLET, FILM COATED ORAL at 09:01

## 2019-01-19 RX ADMIN — ATORVASTATIN CALCIUM 80 MG: 40 TABLET, FILM COATED ORAL at 09:01

## 2019-01-19 RX ADMIN — FERROUS SULFATE TAB EC 325 MG (65 MG FE EQUIVALENT) 325 MG: 325 (65 FE) TABLET DELAYED RESPONSE at 09:01

## 2019-01-19 RX ADMIN — AMLODIPINE BESYLATE 10 MG: 5 TABLET ORAL at 09:01

## 2019-01-19 RX ADMIN — CLOPIDOGREL BISULFATE 75 MG: 75 TABLET ORAL at 09:01

## 2019-01-19 RX ADMIN — PANCRELIPASE 1 CAPSULE: 24000; 76000; 120000 CAPSULE, DELAYED RELEASE PELLETS ORAL at 08:01

## 2019-01-19 RX ADMIN — SENNOSIDES AND DOCUSATE SODIUM 1 TABLET: 8.6; 5 TABLET ORAL at 09:01

## 2019-01-19 RX ADMIN — INFLUENZA A VIRUS A/MICHIGAN/45/2015 X-275 (H1N1) ANTIGEN (FORMALDEHYDE INACTIVATED), INFLUENZA A VIRUS A/SINGAPORE/INFIMH-16-0019/2016 IVR-186 (H3N2) ANTIGEN (FORMALDEHYDE INACTIVATED), INFLUENZA B VIRUS B/PHUKET/3073/2013 ANTIGEN (FORMALDEHYDE INACTIVATED), AND INFLUENZA B VIRUS B/MARYLAND/15/2016 BX-69A ANTIGEN (FORMALDEHYDE INACTIVATED) 0.5 ML: 15; 15; 15; 15 INJECTION, SUSPENSION INTRAMUSCULAR at 05:01

## 2019-01-19 RX ADMIN — INSULIN ASPART 5 UNITS: 100 INJECTION, SOLUTION INTRAVENOUS; SUBCUTANEOUS at 12:01

## 2019-01-19 NOTE — PLAN OF CARE
Pt resting quietly at this time. Able to make needs known. Dressing changed to tunneled cath site during this shift. Sandbag applied and instructed pt to remain in bed and flat. Expressed understanding. Less bleeding noted to dressing during this shift than previous shift.

## 2019-01-19 NOTE — PLAN OF CARE
01/19/19 1112   Final Note   Assessment Type Final Discharge Note   Anticipated Discharge Disposition Home-Health  ([place])   What phone number can be called within the next 1-3 days to see how you are doing after discharge? ([phone])   Hospital Follow Up  Appt(s) scheduled? No  (weekend)

## 2019-01-19 NOTE — PLAN OF CARE
Met with pt to obtain signature for the disclosure form.  Pt is okay with any agency.  Previous CM note states pt has a hx with [facility].  Faxed pt's face sheet, home health order, H & P, AVS to [facility] through [facility].  Called [facility] [phone], spoke to on call who stated they would have on call nurse Rosa call me.      10:45 a.m. - still have not heard from [facility] on call nurse.  Recalled and updated the on call nurse who stated that Rosa has my message and stated she will be calling me shortly. .      11:10 a.m. - Rosa at [facility] verified that they can admit pt and will try to get the nurse to see he tomorrow.  Updated pt's nurse Polly.       01/19/19 1014   Post-Acute Status   Post-Acute Authorization Home Health/Hospice   Post-Acute Placement Status Referrals Sent

## 2019-01-19 NOTE — NURSING
HD complete.  Total UF 1.7 Liters.   Pt received 1 unit prbc on hd.   Gave report to GENEVA Matias.

## 2019-01-19 NOTE — PT/OT/SLP PROGRESS
Physical Therapy      Patient Name:  Errol Stone Keita   MRN:  [id number]    Patient not seen today secondary to Other (Comment)(Pt's H & H: 6.4/19.6 and she is currently being transfused with PRBC's.  ). Pt states she is supposed to go home today.  If she does d/c home, she will need a RW and HHPT, otherwise, PT will follow-up 1/20/2019.    Connie Conti, PT

## 2019-01-19 NOTE — OP NOTE
DATE OF PROCEDURE:  01/18/2019    PREOPERATIVE DIAGNOSIS:  Acute renal failure.    POSTOPERATIVE DIAGNOSIS:  Acute renal failure.    TITLE OF OPERATION:  Placement of tunneled hemodialysis catheter.    SURGEON:  Amos Desouza M.D.    PROCEDURE IN DETAIL:  The patient was taken to the operating room, placed in   supine position, and prepped and draped in usual sterile fashion.  Percutaneous   access was achieved to the vein in usual fashion.  Guidewire was inserted and   position confirmed on fluoroscopy.  A #15 blade was used to enlarge the   percutaneous access site and then using a blade, a second incision was made   inferior to this and the chest wall.  Using the tunneling device, the tunneled   catheter was brought from the second incision out through the percutaneous   access site.  With the catheter now in position, the graduated dilators were   placed over the guidewire until the dilator sheath assembly was inserted, at   which point the dilator and guidewire were removed and the catheter was inserted   through the sheath in its entirety, at which point the sheath was split and   removed in the usual fashion.  Using fluoroscopic guidance, the catheter was   then pulled back until the tip was in the appropriate position, at which point   the catheter was secured in position.  Both ports were flushed and nehemias back   easily.  The ports were then flushed with saline and then subsequently flushed   with heparin per protocol.  At this point, the site was cleaned.  Sterile   dressing was applied.  The patient tolerated the procedure well and was   transported to the recovery room in stable condition.    Unique to this procedure right internal jugular vein was accessed and a 19   catheter was placed.      GLG/ZEN  dd: 01/18/2019 10:05:49 (CST)  td: 01/19/2019 02:01:54 (CST)  Doc ID   #[id number]  Job ID #[id number]    CC:

## 2019-01-19 NOTE — PROGRESS NOTES
Bleeding from right neck site and right dialysis site. Stat H and H ordered. . Dressing changed and pressure dressing applied.

## 2019-01-19 NOTE — PROGRESS NOTES
Right neck and right dialysis site bleeding , informed Desiree Maurer of continued bleeding at sites. H&H von. Dr. Desouza called by Desiree. Dressing changed by Desiree. Surgiseal put on right neck site, and dressing to right chest changed and pressure dressing applied to both by Desiree Maurer. Will continue to monitor.

## 2019-01-19 NOTE — PLAN OF CARE
01/18/19 2043   Patient Assessment/Suction   Level of Consciousness (AVPU) alert   PRE-TX-O2-ETCO2   O2 Device (Oxygen Therapy) room air   SpO2 97 %   Pulse Oximetry Type Intermittent

## 2019-01-19 NOTE — PROGRESS NOTES
INPATIENT NEPHROLOGY PROGRESS  [facility] NEPHROLOGY    Errol Stone Keita  01/19/2019    Reason for consultation:    Acute kidney injury    History of Present Illness:     53F with T2DM, HTN, pancreatitis, CAD, and CHF who presents with bilateral leg swelling, mild SOB. Admit to missing several doses of her lasix. Is scheduled to see a kidney specialist in the coming month. Renal consulted for TIMOTHY on CKD.    1/6  No n,v,chest pain, sob, dysuria or diarrhea.  No new neuro symptoms  1/7 VSS, no new complains.  1/8 VSS, no new complains.  1/9 VSS, flat affect. If her sCr is not better tomorrow, will ask Dr. Desouza to place a HD cath and start dialysis. This was previously discussed, question were answered.  1/10 VSS, no new complains. Awaiting HD cath placement.  1/11 VSS, no new complains, had merced placed, will have HD today and in AM.   1/12  New start HD, due for treatment today.  States tolerated her first tx yesterday without problems.  Denies SOB and chest pain.  Acidosis improving.  1/13  S/P 2 HD treatments, labs stable today, no acute needs.  Will reassess in am.  Pt reports she has tolerated her treatments without issues, no cramping, no fatigue.  Acidosis improved.  1/14  Appetite better.   No chest pain or sob  1/15  No sob, nausea.  She's eating well.  No complaints specified.  1/16  No complaints.  No sob, nausea, vomiting  1/17 no chest pain, nausea,   1/18  Not on floor.  Getting hemesplit  1/19 VSS, s/p hemesplit 1/18, bleeding post-procedure, got 1 PRBC bnefore, getting another one with HD, apprecaite Dr. Desouza stiching line to stop bleeding. Seen and examined on HD, tolerating well otherwise.    Plan of Care:    ESRD  Hypokalemia  Edema/pulmonary crackles  Anemia  Hypertension    Plan:    Low CrCl  -- got hemesplit catheter 1/18, bleeding psot-procedure, getting HD today + blood.  Replete Mg and K as needed.  No nsaids, coxibs. Holding lisinopril.  Renal dose medication .  Keep MAP > 60, SBP  > 100.  Had Howard placed 1/11, got hemesplit catheter 1/18.  risk of need for lifelong dialysis d/w patient by Dr. Tolbert and me.    Thank you for allowing us to participate in this patient's care. We will continue to follow.    Vital Signs:  Temp Readings from Last 3 Encounters:   01/19/19 99 °F (37.2 °C)   07/09/18 98.1 °F (36.7 °C) (Oral)   07/06/18 98.7 °F (37.1 °C) (Oral)       Pulse Readings from Last 3 Encounters:   01/19/19 77   07/09/18 72   07/06/18 68       BP Readings from Last 3 Encounters:   01/19/19 124/74   07/09/18 (!) 148/79   07/06/18 132/62       Weight:  Wt Readings from Last 3 Encounters:   01/18/19 47.3 kg (104 lb 4.4 oz)   07/09/18 48.1 kg (106 lb 0.7 oz)   07/06/18 48.4 kg (106 lb 11.2 oz)       Past Medical & Surgical History:  Past Medical History:   Diagnosis Date    Arthritis     Asthma     CHF (congestive heart failure)     Coronary artery disease     Diabetes mellitus     Hypertension     Pancreatitis     Type 2 diabetes mellitus with hyperglycemia 7/13/2015       Past Surgical History:   Procedure Laterality Date    ASPIRATION ABSCESS N/A 10/27/2015    [facility] YECENIA    CARDIAC SURGERY      CABG    CHOLECYSTECTOMY      COLECTOMY-RIGHT N/A 8/25/2014    [facility] OR    CORONARY ARTERY BYPASS GRAFT      EGD (ESOPHAGOGASTRODUODENOSCOPY) N/A 4/16/2014    [facility] ENDO    EGD (ESOPHAGOGASTRODUODENOSCOPY) N/A 4/2/2014    [facility] ENDO    EGD (ESOPHAGOGASTRODUODENOSCOPY) N/A 6/6/2013    [facility] ENDO    ESOPHAGOGASTRODUODENOSCOPY (EGD) N/A 8/22/2014    [facility] by Satnam Nguyen [facility] ENDO    HYSTERECTOMY      STERNOTOMY N/A 6/6/2013    [facility] OR    THORACOTOMY N/A 6/6/2013    [facility] OR       Past Social History:  Social History      Socioeconomic History    Marital status:      Spouse name: None    Number of children: None    Years of education: None    Highest education level: None   Social Needs    Financial resource strain: None    Food insecurity - worry: None    Food insecurity - inability: None    Transportation needs - medical: None    Transportation needs - non-medical: None   Occupational History    None   Tobacco Use    Smoking status: Current Every Day Smoker     Packs/day: 0.50     Years: 35.00     Pack years: 17.50     Types: Cigarettes    Smokeless tobacco: Never Used   Substance and Sexual Activity    Alcohol use: No     Alcohol/week: 0.0 oz     Comment: questionable per     Drug use: No    Sexual activity: Not Currently     Partners: Male     Birth control/protection: None   Other Topics Concern    None   Social History Narrative    None       Medications:  No current facility-administered medications on file prior to encounter.      Current Outpatient Medications on File Prior to Encounter   Medication Sig Dispense Refill    amLODIPine (NORVASC) 5 MG tablet Take 1 tablet (5 mg total) by mouth once daily. (Patient taking differently: Take 10 mg by mouth once daily. ) 30 tablet 0    aspirin 81 MG chewable tablet Take 81 mg by mouth once daily. Ran out      atorvastatin (LIPITOR) 80 MG tablet Take 1 tablet (80 mg total) by mouth once daily. 30 tablet 11    blood sugar diagnostic Strp To check BG 4 times daily, to use with insurance preferred meter 400 strip 3    blood-glucose meter kit To check BG 4 times daily, to use with insurance preferred meter 1 each 0    clopidogrel (PLAVIX) 75 mg tablet Take 1 tablet (75 mg total) by mouth once daily. 30 tablet 11    CREON 24,000-76,000 -120,000 unit capsule Take 1 capsule by mouth 4 (four) times daily with meals and nightly.       ferrous sulfate 325 (65 FE) MG EC tablet Take 1 tablet (325 mg total) by mouth 2 (two) times daily. 60 tablet 0     "furosemide (LASIX) 40 MG tablet Take 40 mg by mouth once daily.      HUMALOG KWIKPEN INSULIN 100 unit/mL InPn pen Inject 2 Units into the skin 3 (three) times daily before meals. 15 mL 12    insulin detemir U-100 (LEVEMIR FLEXTOUCH) 100 unit/mL (3 mL) SubQ InPn pen Inject 2 Units into the skin 2 (two) times daily. 1 Box 0    lancets Misc To check BG 4 times daily, to use with insurance preferred meter 400 each 3    lisinopril (PRINIVIL,ZESTRIL) 2.5 MG tablet Take 2.5 mg by mouth once daily.       magnesium oxide (MAG-OX) 400 mg tablet Take 1 tablet (400 mg total) by mouth once daily. 30 tablet 0    pantoprazole (PROTONIX) 40 MG tablet Take 40 mg by mouth once daily.      pen needle, diabetic (BD ULTRA-FINE AZIZA PEN NEEDLE) 32 gauge x 5/32" Ndle Uses 4 daily 150 each 12    potassium chloride (KLOR-CON) 20 mEq Pack Take 20 mEq by mouth 2 (two) times daily. 60 packet 0    quetiapine (SEROQUEL) 100 MG Tab 100 mg every evening.        Scheduled Meds:   amLODIPine  10 mg Oral Daily    ascorbic acid (vitamin C)  500 mg Oral QHS    atorvastatin  80 mg Oral Daily    clopidogrel  75 mg Oral Daily    ferrous sulfate  325 mg Oral BID    hydrALAZINE  10 mg Oral Q12H    lidocaine-EPINEPHrine 1%-1:100,000  1 mL Intradermal Once    lipase-protease-amylase 24,000-76,000-120,000 units  1 capsule Oral QID (WM & HS)    magnesium oxide  400 mg Oral Daily    QUEtiapine  100 mg Oral QHS    senna-docusate 8.6-50 mg  1 tablet Oral BID    torsemide  20 mg Oral BID    vitamin renal formula (B-complex-vitamin c-folic acid)  1 capsule Oral Daily     Continuous Infusions:    PRN Meds:.sodium chloride, acetaminophen, cloNIDine, dextrose 50%, dextrose 50%, glucagon (human recombinant), glucose, glucose, glucose, heparin (porcine), heparin (porcine), insulin aspart U-100, ondansetron, sodium chloride 0.9%    Allergies:  Patient has no known allergies.    Past Family History:  Reviewed; refer to Hospitalist Admission " "Note    Review of Systems:  Review of Systems - All 14 systems reviewed and negative, except as noted in HPI    Physical Exam:   (from yesterday)  /74   Pulse 77   Temp 99 °F (37.2 °C)   Resp 20   Ht 5' 7" (1.702 m) Comment: Admit 6/20/18  Wt 47.3 kg (104 lb 4.4 oz)   LMP 09/03/2012 (Exact Date)   SpO2 98%   Breastfeeding? No   BMI 16.33 kg/m²     General Appearance:    Alert, cooperative, no distress, appears stated age   Head:    Normocephalic, without obvious abnormality, atraumatic   Eyes:    PER, conjunctiva/corneas clear, EOM's intact in both eyes        Throat:   wnl   Back:     Symmetric, no curvature, ROM normal, no CVA tenderness   Lungs:     Clear to auscultation bilaterally, respirations unlabored   Chest wall:    No tenderness or deformity   Heart:    Regular rate and rhythm, +murmur   Abdomen:     Soft   Extremities:   Extremities normal, atraumatic, no cyanosis or edema   Pulses:   +all exts   MSK:   No joint or muscle swelling, tenderness or deformity   Skin:   w/d   Neurologic:   No flap     Results:  Lab Results   Component Value Date     01/18/2019    K 4.1 01/18/2019     01/18/2019    CO2 23 01/18/2019    BUN 19 01/18/2019    CREATININE 2.4 (H) 01/18/2019    CALCIUM 8.1 (L) 01/18/2019    ANIONGAP 8 01/18/2019    ESTGFRAFRICA 25 (A) 01/18/2019    EGFRNONAA 22 (A) 01/18/2019       Lab Results   Component Value Date    CALCIUM 8.1 (L) 01/18/2019    PHOS 3.0 01/18/2019       Recent Labs   Lab 01/19/19  0039   WBC 9.10   RBC 2.02*   HGB 6.4*   HCT 19.6*   *   MCV 97   MCH 31.4*   MCHC 32.3        Dk Burks MD  Nephrology  [facility] [place]  [phone]         "

## 2019-01-19 NOTE — PROGRESS NOTES
Dressing to right neck and tunneled cath moist with active red bleeding. Called Dr. Desouza and informed of bleeding. Orders to reinforce dressing and apply sandbag for pressure and monitor. He will check on patient at later time.

## 2019-01-19 NOTE — PROGRESS NOTES
Patient in dialysis,, glucose lab result called from 1400 bloodwork. Patient received 8 units aspart at 1330 with lunch. Will recheck accucheck when she returns from dialysis.

## 2019-01-20 ENCOUNTER — HOSPITAL ENCOUNTER (EMERGENCY)
Facility: HOSPITAL | Age: 54
Discharge: HOME | End: 2019-01-20
Attending: EMERGENCY MEDICINE
Payer: MEDICARE

## 2019-01-20 VITALS
SYSTOLIC BLOOD PRESSURE: 129 MMHG | BODY MASS INDEX: 16.32 KG/M2 | OXYGEN SATURATION: 100 % | WEIGHT: 104 LBS | DIASTOLIC BLOOD PRESSURE: 92 MMHG | TEMPERATURE: 98 F | HEIGHT: 67 IN | RESPIRATION RATE: 16 BRPM | HEART RATE: 78 BPM

## 2019-01-20 DIAGNOSIS — R73.9 HYPERGLYCEMIA: Primary | ICD-10-CM

## 2019-01-20 DIAGNOSIS — R58 BLEEDING: ICD-10-CM

## 2019-01-20 LAB
ALBUMIN SERPL BCP-MCNC: 3 G/DL
ALP SERPL-CCNC: 157 U/L
ALT SERPL W/O P-5'-P-CCNC: 21 U/L
ANION GAP SERPL CALC-SCNC: 8 MMOL/L
APTT BLDCRRT: 29 SEC
AST SERPL-CCNC: 39 U/L
BASOPHILS # BLD AUTO: 0 K/UL
BASOPHILS NFR BLD: 0.3 %
BILIRUB SERPL-MCNC: 0.3 MG/DL
BUN SERPL-MCNC: 31 MG/DL
CALCIUM SERPL-MCNC: 8.5 MG/DL
CHLORIDE SERPL-SCNC: 102 MMOL/L
CO2 SERPL-SCNC: 26 MMOL/L
CREAT SERPL-MCNC: 3.3 MG/DL
DIFFERENTIAL METHOD: ABNORMAL
EOSINOPHIL # BLD AUTO: 0.1 K/UL
EOSINOPHIL NFR BLD: 1.2 %
ERYTHROCYTE [DISTWIDTH] IN BLOOD BY AUTOMATED COUNT: 16.2 %
EST. GFR  (AFRICAN AMERICAN): 17 ML/MIN/1.73 M^2
EST. GFR  (NON AFRICAN AMERICAN): 15 ML/MIN/1.73 M^2
GLUCOSE SERPL-MCNC: 556 MG/DL
HCT VFR BLD AUTO: 33.7 %
HGB BLD-MCNC: 11.2 G/DL
INR PPP: 0.9
LYMPHOCYTES # BLD AUTO: 1.2 K/UL
LYMPHOCYTES NFR BLD: 11.2 %
MCH RBC QN AUTO: 30.8 PG
MCHC RBC AUTO-ENTMCNC: 33.4 G/DL
MCV RBC AUTO: 92 FL
MONOCYTES # BLD AUTO: 0.5 K/UL
MONOCYTES NFR BLD: 4.6 %
NEUTROPHILS # BLD AUTO: 8.6 K/UL
NEUTROPHILS NFR BLD: 82.7 %
PLATELET # BLD AUTO: 429 K/UL
PMV BLD AUTO: 7.9 FL
POCT GLUCOSE: 338 MG/DL (ref 70–110)
POCT GLUCOSE: 424 MG/DL (ref 70–110)
POTASSIUM SERPL-SCNC: 4.8 MMOL/L
PROT SERPL-MCNC: 7.3 G/DL
PROTHROMBIN TIME: 9.5 SEC
RBC # BLD AUTO: 3.65 M/UL
SODIUM SERPL-SCNC: 136 MMOL/L
WBC # BLD AUTO: 10.4 K/UL

## 2019-01-20 PROCEDURE — 25000003 PHARM REV CODE 250: Performed by: EMERGENCY MEDICINE

## 2019-01-20 PROCEDURE — 96361 HYDRATE IV INFUSION ADD-ON: CPT | Mod: 59

## 2019-01-20 PROCEDURE — 96374 THER/PROPH/DIAG INJ IV PUSH: CPT

## 2019-01-20 PROCEDURE — 82962 GLUCOSE BLOOD TEST: CPT

## 2019-01-20 PROCEDURE — 85730 THROMBOPLASTIN TIME PARTIAL: CPT

## 2019-01-20 PROCEDURE — 99284 EMERGENCY DEPT VISIT MOD MDM: CPT | Mod: 25

## 2019-01-20 PROCEDURE — 12001 RPR S/N/AX/GEN/TRNK 2.5CM/<: CPT

## 2019-01-20 PROCEDURE — 85025 COMPLETE CBC W/AUTO DIFF WBC: CPT

## 2019-01-20 PROCEDURE — 85610 PROTHROMBIN TIME: CPT

## 2019-01-20 PROCEDURE — 63600175 PHARM REV CODE 636 W HCPCS: Performed by: EMERGENCY MEDICINE

## 2019-01-20 PROCEDURE — 36415 COLL VENOUS BLD VENIPUNCTURE: CPT

## 2019-01-20 PROCEDURE — 80053 COMPREHEN METABOLIC PANEL: CPT

## 2019-01-20 RX ORDER — SODIUM CHLORIDE 9 MG/ML
1000 INJECTION, SOLUTION INTRAVENOUS
Status: COMPLETED | OUTPATIENT
Start: 2019-01-20 | End: 2019-01-20

## 2019-01-20 RX ORDER — LIDOCAINE HCL/EPINEPHRINE/PF 2%-1:200K
10 VIAL (ML) INJECTION ONCE
Status: COMPLETED | OUTPATIENT
Start: 2019-01-20 | End: 2019-01-20

## 2019-01-20 RX ADMIN — LIDOCAINE HYDROCHLORIDE,EPINEPHRINE BITARTRATE 10 ML: 20; .005 INJECTION, SOLUTION EPIDURAL; INFILTRATION; INTRACAUDAL; PERINEURAL at 06:01

## 2019-01-20 RX ADMIN — INSULIN HUMAN 9 UNITS: 100 INJECTION, SOLUTION PARENTERAL at 05:01

## 2019-01-20 RX ADMIN — SODIUM CHLORIDE 1000 ML: 0.9 INJECTION, SOLUTION INTRAVENOUS at 05:01

## 2019-01-20 NOTE — PROGRESS NOTES
Returned from dialysis, VS stable, no bleeding from incision site on right neck or from dialysis access catheter RCW. IV LH catheter removed, dressing applied. Influenza vaccine administered to Left deltoid. Discharge orders and appt. Discussed. CHF core measure completed and education handout given in AVS packet. Home health to follow patient and will contact tomorrow. Patient accucheck completed 176, requires no coverage.  at bedside. Verbalize understanding of all orders. Patient to follow up with [facility] Monday for dialysis

## 2019-01-20 NOTE — ED PROVIDER NOTES
Encounter Date: 1/20/2019    SCRIBE #1 NOTE: INemo , lona scribing for, and in the presence of, Dr. Lauren .       History     Chief Complaint   Patient presents with    bleeding from site of tube removal // chest       Time seen by provider: 2:43 PM on 01/20/2019    Errol Keita is a 53 y.o. female who presents to the ED via EMS with complaints of bleeding suture with an onset x this AM. EMS reports that they were called because the patient was bleeding from a suture in her chest. When they arrived at the patient's house, she had no idea when the suture was placed or why it was placed. EMS claims that there was about 20 cc's of blood on a 4 x 4 piece of gauze on the patient's chest. The suture was just above her dialysis port. She is not on blood thinners. Patient last had dialysis x 2 days ago. She has no other complaints at this time. The patient denies chest pain or any other symptoms at this time. PMHx includes CHF, CAD, HTN, Pancreatitis, and DM. SHx includes CABG, cholecystectomy, Coronary artery bypass graft, and hysterectomy.       The history is provided by the EMS personnel.     Review of patient's allergies indicates:  No Known Allergies  Past Medical History:   Diagnosis Date    Arthritis     Asthma     CHF (congestive heart failure)     Coronary artery disease     Diabetes mellitus     Hypertension     Pancreatitis     Type 2 diabetes mellitus with hyperglycemia 7/13/2015     Past Surgical History:   Procedure Laterality Date    ASPIRATION ABSCESS N/A 10/27/2015    [facility] YECENIA    CARDIAC SURGERY      CABG    CHOLECYSTECTOMY      COLECTOMY-RIGHT N/A 8/25/2014    [facility] OR    CORONARY ARTERY BYPASS GRAFT      EGD (ESOPHAGOGASTRODUODENOSCOPY) N/A 4/16/2014    [facility]y [facility] ENDO    EGD (ESOPHAGOGASTRODUODENOSCOPY) N/A 4/2/2014    [facility] ENDO     EGD (ESOPHAGOGASTRODUODENOSCOPY) N/A 6/6/2013    [facility]    ESOPHAGOGASTRODUODENOSCOPY (EGD) N/A 8/22/2014    [facility] by [facility]nam Nguyen [facility] at [facility] ENDO    HYSTERECTOMY      STERNOTOMY N/A 6/6/2013    [facility] OR    THORACOTOMY N/A 6/6/2013    [facility] OR     Family History   Problem Relation Age of Onset    Diabetes Mother     Diabetes Father     Diabetes Sister     Hearing loss Sister     Heart disease Sister     Hypertension Sister     Learning disabilities Sister     Vision loss Sister     Asthma Brother     Depression Brother     Cancer Maternal Grandmother      Social History     Tobacco Use    Smoking status: Current Every Day Smoker     Packs/day: 0.50     Years: 35.00     Pack years: 17.50     Types: Cigarettes    Smokeless tobacco: Never Used   Substance Use Topics    Alcohol use: No     Alcohol/week: 0.0 oz     Comment: questionable per     Drug use: No     Review of Systems   Constitutional: Negative for activity change, appetite change, chills, fatigue and fever.   Eyes: Negative for visual disturbance.   Respiratory: Negative for apnea and shortness of breath.    Cardiovascular: Negative for chest pain and palpitations.   Gastrointestinal: Negative for abdominal distention, abdominal pain, diarrhea, nausea and vomiting.   Genitourinary: Negative for difficulty urinating.   Musculoskeletal: Negative for neck pain.   Skin: Positive for wound (bleeding suture). Negative for pallor and rash.   Neurological: Negative for headaches.   Hematological: Does not bruise/bleed easily.   Psychiatric/Behavioral: Negative for agitation.       Physical Exam     Initial Vitals [01/20/19 1446]   BP Pulse Resp Temp SpO2   129/71 78 16 98.1 °F (36.7 °C) 96 %      MAP       --         Physical Exam    Nursing note and vitals reviewed.  Constitutional: She appears well-developed and  well-nourished.   HENT:   Head: Normocephalic and atraumatic.   Eyes: Conjunctivae are normal.   Neck: Normal range of motion. Neck supple.   Cardiovascular: Normal rate, regular rhythm and normal heart sounds. Exam reveals no gallop and no friction rub.    No murmur heard.  Pulmonary/Chest: Effort normal and breath sounds normal. No respiratory distress. She has no wheezes. She has no rhonchi. She has no rales.   Abdominal: Soft. She exhibits no distension. There is no tenderness.   Musculoskeletal: Normal range of motion.   Neurological: She is alert and oriented to person, place, and time.   Skin: Skin is warm and dry. No erythema.   1 cm suture incision oozing blood over the infravlavular     Psychiatric: She has a normal mood and affect.         ED Course   Procedures  Labs Reviewed   APTT   PROTIME-INR   COMPREHENSIVE METABOLIC PANEL   CBC W/ AUTO DIFFERENTIAL          Imaging Results    None          Medical Decision Making:   History:   Old Medical Records: I decided to obtain old medical records.  Independently Interpreted Test(s):   I have ordered and independently interpreted X-rays - see summary below.       <> Summary of X-Ray Reading(s): Chest x-ray interpreted by me reveals no infiltrates, effusions or mediastinal widening  ED Management:  53-year-old female presents for with persistent bleeding from an anterior chest wall puncture wound site.  Quick clot is applied with a continual lose.  Sutures are removed and a figure-eight 5 0 Prolene suture is placed with overlying Dermabond with cessation of bleeding.  She is hemodynamically stable with no acute change in her hemoglobin.  She is found to have significant hyperglycemia without evidence of ketosis.  She is hydrated with 500 mL of normal saline and given insulin with a substantial glucose reduction.  She is to undergo dialysis tomorrow and is encouraged to strictly adhere to a diabetic diet.  She is encouraged to return for continued bleeding.        APC / Resident Notes:   I, Dr. Roscoe Lauren III, personally performed the services described in this documentation. All medical record entries made by the scribe were at my direction and in my presence.  I have reviewed the chart and agree that the record reflects my personal performance and is accurate and complete       Scribe Attestation:   Scribe #1: I performed the above scribed service and the documentation accurately describes the services I performed. I attest to the accuracy of the note.               Clinical Impression:   There were no encounter diagnoses.                             Roscoe Lauren III, MD  01/20/19 [id number]

## 2019-01-21 ENCOUNTER — TELEPHONE (OUTPATIENT)
Dept: MEDSURG UNIT | Facility: HOSPITAL | Age: 54
End: 2019-01-21

## 2019-01-21 LAB — POCT GLUCOSE: >500 MG/DL (ref 70–110)

## 2019-01-21 NOTE — ANESTHESIA POSTPROCEDURE EVALUATION
"Anesthesia Post Evaluation    Patient: Errol Stone Keita    Procedure(s) Performed: Procedure(s) (LRB):  INSERTION, CATHETER, TUNNELED (Right)  REMOVAL, CATHETER, HEMODIALYSIS (Right)    Final Anesthesia Type: MAC  Patient location during evaluation: PACU  Patient participation: Yes- Able to Participate  Level of consciousness: awake and alert  Post-procedure vital signs: reviewed and stable  Pain management: adequate  Airway patency: patent  PONV status at discharge: No PONV  Anesthetic complications: no      Cardiovascular status: hemodynamically stable  Respiratory status: unassisted and room air  Hydration status: euvolemic  Follow-up not needed.        Visit Vitals  BP (!) 147/88   Pulse 80   Temp 36.2 °C (97.2 °F)   Resp 16   Ht 5' 7" (1.702 m)   Wt 47.3 kg (104 lb 4.4 oz)   LMP 09/03/2012 (Exact Date)   SpO2 98%   Breastfeeding? No   BMI 16.33 kg/m²       Pain/Jessenia Score: No Data Recorded      "

## 2019-01-21 NOTE — DISCHARGE SUMMARY
[facility] Hospital Medicine  Discharge Summary      Patient Name: Errol Keita  MRN: [id number]  Admission Date: 1/5/2019   Hospital Length of Stay: 13 days  Discharge Date and Time: 1/19/2019  6:51 PM  Attending Physician: No att. providers found   Discharging Provider: Desiree Begum NP  Primary Care Provider: Bharat Wiggins MD      HPI:   This is a 52 yo  female with a PMHx of T2DM, HTN, pancreatitis, CAD, and CHF who presented to the ED for further evaluation of bilateral leg swelling that has been ongoing for 2 weeks. She states that she is having bilateral leg swelling and leg pain she describes as a tightness. Per ED record, pt endorsed some SOB, but she denies this to me.  Pt denies fever, chest pain, abdominal pain, vomiting, diarrhea, and headache.  ED evaluation reveals TIMOTHY.  She reports that she is a patient of Dr. Tolbert. Patient was evaluated in the ER and noted to have TIMOTHY and admitted for further evaluation and treatment.    Procedure(s) (LRB):  INSERTION, CATHETER, TUNNELED (Right)  REMOVAL, CATHETER, HEMODIALYSIS (Right)      Hospital Course:   Patient monitored closely during hospitalization. She initially received IV lasix for acute CHF. Telemetry showed  no significant arrhythmias. Her renal function was  trended closely. Nephrology was consulted. An Echo was  obtained. She was found to have iron deficiency anemia and initiated on oral fergon. Her hypokalemia was  replaced with oral supplementation. Dietary was consulted. She was noted to have moderate protein malnutrition and initiated on oral supplementation. Physical therapy was consulted for debility. Patient's creatinine continued to trend upward. Dr. Desouza was consulted for Howard placement and patient was started on dialysis on 1/11/18. She also recieved HD on 1/12/19 and 1/14/19. 24 hour urine ordered which demonstrated at CrCl of 9 indicative of ESRD. Dr. Desouza insert tunneled cath and outpatient HD  was arranged. She was noted to have continous oozing from Howard cath site removal despite pressure dressing. HGb dropped 6.4 and she received 2 units of PRBCs on HD. Howard site required sutures. Hgb/Hct improved and she is stable for DC for nephrology standpoint.     PE chest CTA heart RRR  Thin female. howard site with no oozing.        Consults:   Consults (From admission, onward)        Status Ordering Provider     Inpatient consult to Diabetes educator  Once     Provider:  (Not yet assigned)    Completed ASHA VILLAVICENCIO     Inpatient consult to Nephrology  Once     Provider:  Paulie Tolbert MD    Completed PETER GRIFFITHS     Inpatient consult to Registered Dietitian/Nutritionist  Once     Provider:  (Not yet assigned)    Completed SEAN FRAZIER     Inpatient consult to Registered Dietitian/Nutritionist  Once     Provider:  (Not yet assigned)    Completed ASHA VILLAVICENCIO     Inpatient consult to Social Work/Case Management  Once     Provider:  (Not yet assigned)    Completed SEAN FRAZIER          No new Assessment & Plan notes have been filed under this hospital service since the last note was generated.  Service: Hospital Medicine    Final Active Diagnoses:    Diagnosis Date Noted POA    PRINCIPAL PROBLEM:  ESRD (end stage renal disease) [N18.6] 01/17/2019 Yes    Uncontrolled type 2 diabetes mellitus with hyperglycemia [E11.65] 01/08/2019 Yes    UTI (urinary tract infection) [N39.0] 01/08/2019 Yes    Acute renal failure superimposed on stage 4 chronic kidney disease [N17.9, N18.4] 01/06/2019 Yes    Hypomagnesemia [E83.42] 01/06/2019 Yes    Hypokalemia [E87.6] 01/06/2019 Yes    Acute on chronic congestive heart failure [I50.9] 01/12/2018 Yes    Moderate tobacco dependence [F17.200] 10/20/2016 Yes    Anemia of chronic renal failure [N18.9, D63.1] 05/09/2016 Yes    Coronary artery disease involving native coronary artery without angina pectoris [I25.10] 07/20/2015 Yes     "Moderate malnutrition [E44.0] 07/13/2015 Yes    Iron deficiency anemia secondary to inadequate dietary iron intake [D50.8] 07/13/2015 Yes    Hypocalcemia [E83.51] 06/28/2015 Yes    Mild recurrent major depression [F33.0] 06/05/2013 Yes    Hypertension associated with diabetes [E11.59, I10] 11/25/2012 Yes      Problems Resolved During this Admission:    Diagnosis Date Noted Date Resolved POA    Lower extremity edema [R60.0] 01/05/2019 01/18/2019 Yes       Discharged Condition: stable    Disposition: Home-Health Care [facility] [place] [facility]    Follow Up:  Follow-up Information     Bharat Wiggins MD In 1 week.    Specialty:  Internal Medicine  Why:  hospital follow up  Contact information:  [address] [place] [address] [address]  [place] [address]  [phone]             [facility].    Why:  [facility] 3rd shift- [facility] admissions will call patient Monday am with exact chair time.   Contact information:  [address] [place] [address]  [phone]           Bharat Wiggins MD In 1 week.    Specialty:  Internal Medicine  Why:  hospital follow up  Contact information:  [address] [place] [address] [address]  [place] [address]  [phone]             Paulie Tolbert MD In 1 week.    Specialty:  Nephrology  Why:  at dialysis unit  Contact information:  [address] [facility] [address]  [phone]             [facility].    Specialties:  DME Provider, Home Health Services  Contact information:  [address] [address]  [phone]                 Patient Instructions:      WALKER FOR HOME USE     Order Specific Question Answer Comments   Type of Walker: Adult (5'4"-6'6")    With wheels? Yes    Height: 5' 7" (1.702 m) Admit 6/20/18   Weight: 54.9 kg (121 lb 0.5 oz)    Length of need (1-99 months): 33    Does patient have medical equipment at home? none    Please check all that apply: Patient's condition impairs ambulation.    Please check all that apply: Patient is " unable to safely ambulate without equipment.    Please check all that apply: Patient needs help to get in and out of chair.      Ambulatory referral to Home Health   Referral Priority: Routine Referral Type: Home Health   Referral Reason: Specialty Services Required   Requested Specialty: Home Health Services   Number of Visits Requested: 1     Diet renal     Notify your health care provider if you experience any of the following:  persistent dizziness, light-headedness, or visual disturbances     Notify your health care provider if you experience any of the following:  worsening rash     Notify your health care provider if you experience any of the following:  severe persistent headache     Notify your health care provider if you experience any of the following:  difficulty breathing or increased cough     Notify your health care provider if you experience any of the following:  redness, tenderness, or signs of infection (pain, swelling, redness, odor or green/yellow discharge around incision site)     Notify your health care provider if you experience any of the following:  severe uncontrolled pain     Notify your health care provider if you experience any of the following:  persistent nausea and vomiting or diarrhea     Notify your health care provider if you experience any of the following:  temperature >100.4     Activity as tolerated       Significant Diagnostic Studies: Labs:   BMP:   Recent Labs   Lab 01/19/19  1408 01/20/19  1559   * 556*    136   K 4.6 4.8    102   CO2 25 26   BUN 37* 31*   CREATININE 3.2* 3.3*   CALCIUM 7.6* 8.5*   MG 1.9  --     and CMP   Recent Labs   Lab 01/19/19  1408 01/20/19  1559    136   K 4.6 4.8    102   CO2 25 26   * 556*   BUN 37* 31*   CREATININE 3.2* 3.3*   CALCIUM 7.6* 8.5*   PROT 5.4* 7.3   ALBUMIN 2.3* 3.0*   BILITOT 0.3 0.3   ALKPHOS 124 157*   AST 29 39   ALT 19 21   ANIONGAP 8 8   ESTGFRAFRICA 18* 17*   EGFRNONAA 16* 15*      Radiology:   X-Ray Chest AP Portable [[id number]] Resulted: 01/18/19 1033   Order Status: Completed Updated: 01/18/19 1036   Narrative:     EXAMINATION:  XR CHEST AP PORTABLE    CLINICAL HISTORY:  s/p tunneled hemodialysis catheter;    TECHNIQUE:  Single frontal view of the chest was performed.    COMPARISON:  Chest of January 10, 2019.    FINDINGS:  A double lumen central line has been placed entering on the right and ending in the superior vena cava in good position.  The patient has had a prior sternotomy.  Calcification is noted within the aorta.  There is mild cardiomegaly in a left ventricular predominant pattern.  Faint infiltrates are identified at both lung bases mildly improved since the prior study.  No pneumothorax is seen.   Impression:       Double lumen central line placed in good position without pneumothorax.  Prior sternotomy.  Atherosclerosis.  Mild cardiomegaly.  Improvement in faint bibasilar infiltrates.      Electronically signed by: John Bryan MD  Date: 01/18/2019  Time: 10:33   SURG FL Surgery Fluoro Usage [[id number]] Resulted: 01/18/19 1001   Order Status: Completed Updated: 01/18/19 1001   Narrative:     See OP Notes for results.    Impression:     See OP Notes for results.              Cardiac Graphics: Echocardiogram:   Transthoracic echo (TTE) complete (Cupid Only):   Results for orders placed or performed during the hospital encounter of 01/05/19   Transthoracic echo (TTE) complete (Cupid Only)   Result Value Ref Range    BSA 1.61 m2    TDI SEPTAL 0.06     LV LATERAL E/E' RATIO 17.20     LV SEPTAL E/E' RATIO 14.33     AORTIC VALVE CUSP SEPERATION 1.74 cm    TDI LATERAL 0.05     PV PEAK VELOCITY 0.64 cm/s    LVIDD 2.86 (A) 3.5 - 6.0 cm    IVS 1.28 (A) 0.6 - 1.1 cm    PW 1.30 (A) 0.6 - 1.1 cm    Ao root annulus 2.68 cm    LVIDS 1.74 (A) 2.1 - 4.0 cm    FS 39 28 - 44 %    LV mass 115.08 g    LA size 3.15 cm    RVDD 2.97 cm    TAPSE 2.51 cm    Left Ventricle Relative Wall  "Thickness 0.91 cm    AV mean gradient 4.74 mmHg    AV valve area 2.39 cm2    AV index (prosthetic) 0.74     E/A ratio 1.15     Mean e' 0.06     E wave decelartion time 196.47 msec    IVRT 0.12 msec    Pulm vein "A" wave 97.00 msec    LVOT diameter 2.02 cm    LVOT area 3.20 cm2    LVOT peak VTI 21.88 cm    Ao peak abdifatah 1.51 m/s    Ao VTI 29.38 cm    LVOT stroke volume 70.08 cm3    AV peak gradient 9.12 mmHg    E/E' ratio 15.64     MV Peak E Abdifatah 0.86 m/s    TR Max Abdifatah 2.29 m/s    MV Peak A Abdifatah 0.75 m/s    LV Mass Index 70.5 g/m2    Triscuspid Valve Regurgitation Peak Gradient 20.98 mmHg    Right Atrial Pressure (from IVC) 3 mmHg    TV rest pulmonary artery pressure 24 mmHg       Pending Diagnostic Studies:     None         Medications:  Reconciled Home Medications:      Medication List      START taking these medications    ascorbic acid (vitamin C) 500 MG tablet  Commonly known as:  VITAMIN C  Take 1 tablet (500 mg total) by mouth every evening.        CHANGE how you take these medications    amLODIPine 5 MG tablet  Commonly known as:  NORVASC  Take 1 tablet (5 mg total) by mouth once daily.  What changed:  how much to take        CONTINUE taking these medications    aspirin 81 MG Chew  Take 81 mg by mouth once daily. Ran out     atorvastatin 80 MG tablet  Commonly known as:  LIPITOR  Take 1 tablet (80 mg total) by mouth once daily.     blood sugar diagnostic Strp  To check BG 4 times daily, to use with insurance preferred meter     blood-glucose meter kit  To check BG 4 times daily, to use with insurance preferred meter     clopidogrel 75 mg tablet  Commonly known as:  PLAVIX  Take 1 tablet (75 mg total) by mouth once daily.     CREON 24,000-76,000 -120,000 unit capsule  Generic drug:  lipase-protease-amylase 24,000-76,000-120,000 units  Take 1 capsule by mouth 4 (four) times daily with meals and nightly.     ferrous sulfate 325 (65 FE) MG EC tablet  Take 1 tablet (325 mg total) by mouth 2 (two) times daily.   " "  furosemide 40 MG tablet  Commonly known as:  LASIX  Take 40 mg by mouth once daily.     HumaLOG KwikPen Insulin 100 unit/mL pen  Generic drug:  insulin lispro  Inject 2 Units into the skin 3 (three) times daily before meals.     insulin detemir U-100 100 unit/mL (3 mL) Inpn pen  Commonly known as:  LEVEMIR FLEXTOUCH  Inject 2 Units into the skin 2 (two) times daily.     lancets Misc  To check BG 4 times daily, to use with insurance preferred meter     lisinopril 2.5 MG tablet  Commonly known as:  PRINIVIL,ZESTRIL  Take 2.5 mg by mouth once daily.     magnesium oxide 400 mg (241.3 mg magnesium) tablet  Commonly known as:  MAG-OX  Take 1 tablet (400 mg total) by mouth once daily.     pantoprazole 40 MG tablet  Commonly known as:  PROTONIX  Take 40 mg by mouth once daily.     pen needle, diabetic 32 gauge x 5/32" Ndle  Commonly known as:  BD ULTRA-FINE AZIZA PEN NEEDLE  Uses 4 daily     potassium chloride 20 mEq Pack  Commonly known as:  KLOR-CON  Take 20 mEq by mouth 2 (two) times daily.     QUEtiapine 100 MG Tab  Commonly known as:  SEROQUEL  100 mg every evening.            Indwelling Lines/Drains at time of discharge:   Lines/Drains/Airways     Central Venous Catheter Line                 Hemodialysis Catheter 01/18/19 0942 right internal jugular 3 days                Time spent on the discharge of patient: 45 minutes  Patient was seen and examined on the date of discharge and determined to be suitable for discharge.         Desiree Begum NP  Department of Hospital Medicine  [facility]  "

## 2019-01-22 NOTE — PHYSICIAN QUERY
PT Name: Errol Keita  MR #: [id number]     Physician Query Form - Documentation Clarification      CDS/: Mira Snyder RN              Contact information: [phone]    This form is a permanent document in the medical record.     Query Date: January 22, 2019    By submitting this query, we are merely seeking further clarification of documentation. Please utilize your independent clinical judgment when addressing the question(s) below.    The Medical record reflects the following:    Supporting Clinical Findings Location in Medical Record     Patient's creatinine continued to trend upward. Dr. Desouza was consulted for Howard placement and patient was started on dialysis on 1/11/18. She also recieved HD on 1/12/19 and 1/14/19. 24 hour urine ordered which demonstrated at CrCl of 9 indicative of ESRD. Dr. Desouza insert tunneled cath and outpatient HD was arranged.    ESRD  Acute renal failure superimposed on ckd 4     1/19 DC summary     Had Howard placed 1/11, got hemesplit catheter 1/18.   risk of need for lifelong dialysis d/w patient by Dr. Tolbert and me.        1/19 Renal MD note                                                                            Doctor, Please specify diagnosis or diagnoses associated with above clinical findings.    [  x ] ESRD - dialysis dependent    [   ] ESRD- possible temporary need of HD    [   ] Other- please explain___________________________________                                                                                                        [  ] Clinically Undetermined

## 2019-01-28 NOTE — ED PROVIDER NOTES
----- Message from Marbella Montoya, RTR sent at 1/28/2019  9:23 AM CST -----  Regarding: callback mammogram  Contact: [phone]  Please send orders through [facility] on the above patient for a :  Left breast Ultrasound Limited 1-3 quadrants & Diagnostic Left Mammogram    Diagnosis: Abnormal Mammogram      Also, please put in comment section \"CALLBACK\"    The Mammography Department  [facility]   Encounter Date: 3/23/2018    SCRIBE #1 NOTE: I, Jordi Cook, am scribing for, and in the presence of, Dr. Calderon.       History     Chief Complaint   Patient presents with    Fatigue     pt is not eating much and is an diabettic. Pt has lost more that 10 punds in a week according to family members. PT is not checkinghe Bg as she should. PT was 102lbs a week ago . She is 92lbs today bg in [facility] is greater than 500       03/23/2018 10:42 PM     Chief complaint: Fatigue      Errol Keita is a 52 y.o. female with a history of Dm, HTN, and CHF who presents to the ED with an onset of fatigue with associated confusion. The patient states that she was hyperglycemic earlier today and that she wasn't feeling well. She denies any nausea, abdominal pain, vomiting, diarrhea, or any urinary symptoms. No pertinent Shx noted. She presents with no other acute complaints.         The history is provided by the patient.     Review of patient's allergies indicates:  No Known Allergies  Past Medical History:   Diagnosis Date    Arthritis     Asthma     Blood transfusion     CHF (congestive heart failure)     Coronary artery disease     Diabetes mellitus     Hypertension     Pancreatitis     Type 2 diabetes mellitus with hyperglycemia 7/13/2015     Past Surgical History:   Procedure Laterality Date    CARDIAC SURGERY      CABG    CHOLECYSTECTOMY      CORONARY ARTERY BYPASS GRAFT       Family History   Problem Relation Age of Onset    Diabetes Mother     Diabetes Father     Diabetes Sister     Hearing loss Sister     Heart disease Sister     Hypertension Sister     Learning disabilities Sister     Vision loss Sister     Asthma Brother     Depression Brother     Cancer Maternal Grandmother      Social History   Substance Use Topics    Smoking status: Current Every Day Smoker     Packs/day: 0.50     Years: 35.00     Types: Cigarettes    Smokeless tobacco: Never Used    Alcohol use No      Comment:  questionable per      Review of Systems   Constitutional: Positive for fatigue. Negative for fever.   HENT: Negative for congestion.    Eyes: Negative for visual disturbance.   Respiratory: Negative for wheezing.    Cardiovascular: Negative for chest pain.   Gastrointestinal: Negative for abdominal pain, diarrhea, nausea and vomiting.   Genitourinary: Negative for dysuria and hematuria.   Musculoskeletal: Negative for joint swelling.   Skin: Negative for rash.   Neurological: Negative for syncope.        Feels confused.   Hematological: Does not bruise/bleed easily.   Psychiatric/Behavioral: Negative for confusion.       Physical Exam     Initial Vitals [03/23/18 2105]   BP Pulse Resp Temp SpO2   110/76 105 18 97.8 °F (36.6 °C) 99 %      MAP       87.33         Physical Exam    Nursing note and vitals reviewed.  Constitutional: She appears well-nourished.   HENT:   Head: Normocephalic and atraumatic.   Mouth/Throat: Mucous membranes are dry.   Poor dentition.   Eyes: Conjunctivae and EOM are normal.   Neck: Normal range of motion. Neck supple. No thyroid mass present.   Cardiovascular: Normal rate, regular rhythm and normal heart sounds. Exam reveals no gallop and no friction rub.    No murmur heard.  Pulmonary/Chest: Breath sounds normal. She has no wheezes. She has no rhonchi. She has no rales.   Abdominal: Soft. Normal appearance and bowel sounds are normal. There is no tenderness.   Neurological: She is alert. She has normal strength. No cranial nerve deficit or sensory deficit.   Oriented only to self. No gross cranial deficits. She has baseline sensation and strength in her extremities. Negative for slurred speech. She is alert. No focal/neurological deficits.    Skin: Skin is warm and dry. No rash noted. No erythema.   Psychiatric: She has a normal mood and affect. Her speech is normal. Cognition and memory are normal.         ED Course   Procedures  Labs Reviewed   CBC W/ AUTO DIFFERENTIAL    COMPREHENSIVE METABOLIC PANEL   B-TYPE NATRIURETIC PEPTIDE   TROPONIN I   LACTIC ACID, PLASMA   LIPASE        Imaging Results          X-Ray Chest PA And Lateral (In process)                    Medical Decision Making:   History:   Old Medical Records: I decided to obtain old medical records.  Initial Assessment:   Patient's workup is significant for evidence of a lipase elevation without significant nausea or abdominal pain.  The patient has BNP and troponin elevation is within normal limits for her.  Additional workup indicates a glucose value almost 800 without ketosis or acidosis.  Patient's history, workup and presentation was consistent with hyperosmolar hyperglycemia without ketosis.  At this time no inciting event, including infection or medical noncompliance, can elicited as the causative event.  The patient was provided IV push insulin and bolus hydration in the emergency department.  She does warrant admission for further monitoring of her neurologic status as well as for gradual decline in blood glucose.  Clinical Tests:   Lab Tests: Reviewed and Ordered  Radiological Study: Reviewed and Ordered  Medical Tests: Reviewed and Ordered  ED Management:  Case was discussed with Mrs. sanchez who agreed to admit the patient.  She was transferred to telemetry bed in guarded condition.       APC / Resident Notes:   PIT:  Fatigue, poor appetite for the last few days.  Hx DM and pancreatitis.  Will check CBC, CMP, lipase, Troponin, BNP, urinalysis, EKG and chest xray.        Scribe Attestation:   Scribe #1: I performed the above scribed service and the documentation accurately describes the services I performed. I attest to the accuracy of the note.    Attending Attestation:         Attending Critical Care:   Critical Care Times:   Direct Patient Care (initial evaluation, reassessments, and time considering the case)................................................................10 minutes.   Additional History from  reviewing old medical records or taking additional history from the family, EMS, PCP, etc.......................5 minutes.   Ordering, Reviewing, and Interpreting Diagnostic Studies...............................................................................................................5 minutes.   Documentation..................................................................................................................................................................................5 minutes.   Consultation with other Physicians. .................................................................................................................................................0 minutes.   Consultation with the patient's family directly relating to the patient's condition, care, and DNR status (when patient unable)......5 minutes.   Other..................................................................................................................................................................................................5 minutes.   ==============================================================  · Total Critical Care Time - exclusive of procedural time: 35 minutes.  ==============================================================  Critical care was necessary to treat or prevent imminent or life-threatening deterioration of the following conditions: metabolic crisis.   The following critical care procedures were done by me (see procedure notes): pulse oximetry.   Critical care was time spent personally by me on the following activities: obtaining history from patient or relative, examination of patient, review of old charts, ordering lab, x-rays, and/or EKG, development of treatment plan with patient or relative, ordering and performing treatments and interventions, evaluation of patient's response to treatment and re-evaluation of patient's conition.   Critical Care Condition: potentially life-threatening        I, Dr. Leonardo Calderon, personally performed the services described in this documentation. All medical record entries made by the scribe were at my direction and in my presence.  I have reviewed the chart and agree that the record reflects my personal performance and is accurate and complete. Leonardo Calderon MD.  6:16 AM 03/24/2018             Clinical Impression:     1. Acute renal failure superimposed on chronic kidney disease, unspecified CKD stage, unspecified acute renal failure type    2. Fatigue    3. Type 2 diabetes mellitus with hyperosmolar nonketotic hyperglycemia          Disposition:   Disposition: Admitted  Condition: Serious                        Leonardo Calderon MD  03/24/18 0616

## 2019-03-12 ENCOUNTER — HOSPITAL ENCOUNTER (EMERGENCY)
Facility: HOSPITAL | Age: 54
Discharge: HOME | End: 2019-03-12
Attending: EMERGENCY MEDICINE
Payer: MEDICARE

## 2019-03-12 VITALS
HEIGHT: 67 IN | HEART RATE: 72 BPM | SYSTOLIC BLOOD PRESSURE: 109 MMHG | BODY MASS INDEX: 16.29 KG/M2 | DIASTOLIC BLOOD PRESSURE: 60 MMHG | OXYGEN SATURATION: 98 % | TEMPERATURE: 98 F | RESPIRATION RATE: 16 BRPM

## 2019-03-12 DIAGNOSIS — R19.7 ACUTE DIARRHEA: Primary | ICD-10-CM

## 2019-03-12 LAB
ANION GAP SERPL CALC-SCNC: 18 MMOL/L
BUN SERPL-MCNC: 34 MG/DL
CALCIUM SERPL-MCNC: 9.7 MG/DL
CHLORIDE SERPL-SCNC: 101 MMOL/L
CO2 SERPL-SCNC: 23 MMOL/L
CREAT SERPL-MCNC: 3.3 MG/DL
EST. GFR  (AFRICAN AMERICAN): 17 ML/MIN/1.73 M^2
EST. GFR  (NON AFRICAN AMERICAN): 15 ML/MIN/1.73 M^2
GLUCOSE SERPL-MCNC: 121 MG/DL
MAGNESIUM SERPL-MCNC: 2.4 MG/DL
PHOSPHATE SERPL-MCNC: 5.8 MG/DL
POTASSIUM SERPL-SCNC: 3.7 MMOL/L
SODIUM SERPL-SCNC: 142 MMOL/L

## 2019-03-12 PROCEDURE — 84100 ASSAY OF PHOSPHORUS: CPT

## 2019-03-12 PROCEDURE — 25000003 PHARM REV CODE 250: Performed by: EMERGENCY MEDICINE

## 2019-03-12 PROCEDURE — 96360 HYDRATION IV INFUSION INIT: CPT

## 2019-03-12 PROCEDURE — 99284 EMERGENCY DEPT VISIT MOD MDM: CPT | Mod: 25

## 2019-03-12 PROCEDURE — 80048 BASIC METABOLIC PNL TOTAL CA: CPT

## 2019-03-12 PROCEDURE — 36415 COLL VENOUS BLD VENIPUNCTURE: CPT

## 2019-03-12 PROCEDURE — 83735 ASSAY OF MAGNESIUM: CPT

## 2019-03-12 RX ORDER — LOPERAMIDE HYDROCHLORIDE 2 MG/1
2 CAPSULE ORAL
Status: COMPLETED | OUTPATIENT
Start: 2019-03-12 | End: 2019-03-12

## 2019-03-12 RX ADMIN — SODIUM CHLORIDE 500 ML: 0.9 INJECTION, SOLUTION INTRAVENOUS at 09:03

## 2019-03-12 RX ADMIN — LOPERAMIDE HYDROCHLORIDE 2 MG: 2 CAPSULE ORAL at 09:03

## 2019-03-13 NOTE — ED PROVIDER NOTES
Encounter Date: 3/12/2019    SCRIBE #1 NOTE: I, Nargis Madden, am scribing for, and in the presence of, Dr. Calderon.       History     Chief Complaint   Patient presents with    Diarrhea     Began yesterday before dialysis.     03/12/2019  8:23 PM     Errol Keita is a 53 y.o. female who has a past medical history of Arthritis, Asthma, CHF, Coronary artery disease, Diabetes mellitus, Hypertension, Pancreatitis is presenting to ED for evaluation of diarrhea since yesterday. Pt is on dialysis and was last dialyzed yesterday. She reports one episode of diarrhea yesterday and one episode of diarrhea today. Denies nausea, vomiting, or abd pain. No other complaints noted at this time. Pt has a past surgical history that includes Cholecystectomy; Cardiac surgery; Coronary artery bypass graft; Hysterectomy; and Removal of hemodialysis catheter.       The history is provided by the patient and the spouse.     Review of patient's allergies indicates:  No Known Allergies  Past Medical History:   Diagnosis Date    Arthritis     Asthma     CHF (congestive heart failure)     Coronary artery disease     Diabetes mellitus     Hypertension     Pancreatitis     Type 2 diabetes mellitus with hyperglycemia 7/13/2015     Past Surgical History:   Procedure Laterality Date    ASPIRATION ABSCESS N/A 10/27/2015    Performed by Yecenia Surgeon at [facility] YECENIA    CARDIAC SURGERY      CABG    CHOLECYSTECTOMY      COLECTOMY-RIGHT N/A 8/25/2014    Performed by Harlan Regalado MD at [facility] OR    CORONARY ARTERY BYPASS GRAFT      EGD (ESOPHAGOGASTRODUODENOSCOPY) N/A 4/16/2014    Performed by Crow Brantley MD at [facility] ENDO    EGD (ESOPHAGOGASTRODUODENOSCOPY) N/A 4/2/2014    Performed by Crow Brantley MD at [facility] ENDO    EGD (ESOPHAGOGASTRODUODENOSCOPY) N/A 6/6/2013    Performed by Crow Brantley MD at [facility] ENDO    ESOPHAGOGASTRODUODENOSCOPY (EGD) N/A 8/22/2014    Performed by Satnam Nguyen MD at [facility] ENDO     HYSTERECTOMY      INSERTION, CATHETER, TUNNELED Right 1/18/2019    Performed by [facility]os [facility]ouza [facility] at [facility] OR    REMOVAL, CATHETER, HEMODIALYSIS Right 1/18/2019    Performed by [facility]os [facility]ouza MD at [facility] OR    STERNOTOMY N/A 6/6/2013    Performed by Ole Ingram MD at [facility] OR    THORACOTOMY N/A 6/6/2013    Performed by Ole Ingram MD at [facility] OR     Family History   Problem Relation Age of Onset    Diabetes Mother     Diabetes Father     Diabetes Sister     Hearing loss Sister     Heart disease Sister     Hypertension Sister     Learning disabilities Sister     Vision loss Sister     Asthma Brother     Depression Brother     Cancer Maternal Grandmother      Social History     Tobacco Use    Smoking status: Current Every Day Smoker     Packs/day: 0.50     Years: 35.00     Pack years: 17.50     Types: Cigarettes    Smokeless tobacco: Never Used   Substance Use Topics    Alcohol use: No     Alcohol/week: 0.0 oz     Comment: questionable per     Drug use: No     Review of Systems   Constitutional: Negative for chills and fever.   HENT: Negative for facial swelling and trouble swallowing.    Eyes: Negative for discharge.   Respiratory: Negative for cough, chest tightness, shortness of breath and wheezing.    Cardiovascular: Negative for chest pain and palpitations.   Gastrointestinal: Positive for diarrhea. Negative for abdominal pain, nausea and vomiting.   Genitourinary: Negative for dysuria and hematuria.   Musculoskeletal: Negative for arthralgias, back pain, joint swelling, myalgias, neck pain and neck stiffness.   Skin: Negative for color change, pallor, rash and wound.   Neurological: Negative for dizziness, syncope, weakness, light-headedness, numbness and headaches.   Hematological: Does not bruise/bleed easily.   Psychiatric/Behavioral: The patient is not nervous/anxious.        Physical Exam     Initial Vitals [03/12/19 1753]   BP Pulse Resp Temp SpO2   (!) 82/53 72 16  97.9 °F (36.6 °C) 98 %      MAP       --         Physical Exam    Nursing note and vitals reviewed.  Constitutional: She appears well-nourished.   HENT:   Head: Normocephalic and atraumatic.   Mouth/Throat: Mucous membranes are dry.   Eyes: Conjunctivae and EOM are normal.   Neck: Normal range of motion. Neck supple. No thyroid mass present.   Cardiovascular: Normal rate, regular rhythm and normal heart sounds. Exam reveals no gallop and no friction rub.    No murmur heard.  Pulmonary/Chest: Breath sounds normal. She has no wheezes. She has no rhonchi. She has no rales.   Abdominal: Soft. Normal appearance and bowel sounds are normal. There is no tenderness.   Neurological: She is alert and oriented to person, place, and time. She has normal strength. No cranial nerve deficit or sensory deficit.   Skin: Skin is warm and dry. No rash noted. No erythema.   Right upper chest dialysis catheter.    Psychiatric: She has a normal mood and affect. Her speech is normal. Cognition and memory are normal.         ED Course   Procedures  Labs Reviewed   BASIC METABOLIC PANEL   MAGNESIUM   PHOSPHORUS          Imaging Results    None          Medical Decision Making:   History:   Old Medical Records: I decided to obtain old medical records.  ED Management:  This patient was interviewed and examined emergently.  Vital signs are stable. She only reports few episodes of diarrhea yesterday and today.  She had improvement here with gentle fluid resuscitation and loperamide.  Labs are consistent ESRD.  Patient is scheduled for dialysis tomorrow.  She is asked to go to this appointment.  She is asked to maintain a bland diet and to gently increase hydration at home.  She is asked to follow up with her primary care doctor as soon as possible regarding diarrhea improvement.  Currently no concerns for infectious diarrhea, including C diff colitis.  Patient has no abdominal pain.  He is asked to return to the ER for any new, concerning, or  worsening symptoms. Patient and  are agreeable with this plan for follow-up and she was discharged in stable condition.            Scribe Attestation:   Scribe #1: I performed the above scribed service and the documentation accurately describes the services I performed. I attest to the accuracy of the note.    I, Dr. Leonardo Calderon, personally performed the services described in this documentation. All medical record entries made by the scribe were at my direction and in my presence.  I have reviewed the chart and agree that the record reflects my personal performance and is accurate and complete. Leonardo Calderon MD.  5:27 AM 03/13/2019             Clinical Impression:     1. Acute diarrhea            Disposition:   Disposition: Discharged  Condition: Stable                        Leonardo Calderon MD  03/13/19 0527

## 2019-03-18 ENCOUNTER — ANESTHESIA EVENT (OUTPATIENT)
Dept: SURGERY | Facility: HOSPITAL | Age: 54
End: 2019-03-18
Payer: MEDICARE

## 2019-03-19 ENCOUNTER — ANESTHESIA (OUTPATIENT)
Dept: SURGERY | Facility: HOSPITAL | Age: 54
End: 2019-03-19
Payer: MEDICARE

## 2019-03-19 ENCOUNTER — HOSPITAL ENCOUNTER (OUTPATIENT)
Facility: HOSPITAL | Age: 54
Discharge: HOME | End: 2019-03-19
Attending: THORACIC SURGERY (CARDIOTHORACIC VASCULAR SURGERY) | Admitting: THORACIC SURGERY (CARDIOTHORACIC VASCULAR SURGERY)
Payer: MEDICARE

## 2019-03-19 VITALS
OXYGEN SATURATION: 98 % | SYSTOLIC BLOOD PRESSURE: 170 MMHG | BODY MASS INDEX: 13.97 KG/M2 | DIASTOLIC BLOOD PRESSURE: 80 MMHG | WEIGHT: 89 LBS | HEIGHT: 67 IN | TEMPERATURE: 98 F | RESPIRATION RATE: 18 BRPM | HEART RATE: 80 BPM

## 2019-03-19 DIAGNOSIS — N18.6 ESRD (END STAGE RENAL DISEASE): Primary | ICD-10-CM

## 2019-03-19 DIAGNOSIS — N18.6 END STAGE RENAL DISEASE: ICD-10-CM

## 2019-03-19 LAB
BACTERIA #/AREA URNS HPF: ABNORMAL /HPF
BASOPHILS # BLD AUTO: 0.1 K/UL
BASOPHILS NFR BLD: 0.6 %
BILIRUB UR QL STRIP: ABNORMAL
CLARITY UR: ABNORMAL
COLOR UR: YELLOW
DIFFERENTIAL METHOD: ABNORMAL
EOSINOPHIL # BLD AUTO: 0.1 K/UL
EOSINOPHIL NFR BLD: 0.5 %
ERYTHROCYTE [DISTWIDTH] IN BLOOD BY AUTOMATED COUNT: 15.3 %
GLUCOSE UR QL STRIP: NEGATIVE
HCT VFR BLD AUTO: 39.4 %
HGB BLD-MCNC: 12.9 G/DL
HGB UR QL STRIP: ABNORMAL
HYALINE CASTS #/AREA URNS LPF: 0 /LPF
KETONES UR QL STRIP: ABNORMAL
LEUKOCYTE ESTERASE UR QL STRIP: ABNORMAL
LYMPHOCYTES # BLD AUTO: 2.8 K/UL
LYMPHOCYTES NFR BLD: 26.8 %
MCH RBC QN AUTO: 31.9 PG
MCHC RBC AUTO-ENTMCNC: 32.6 G/DL
MCV RBC AUTO: 98 FL
MICROSCOPIC COMMENT: ABNORMAL
MONOCYTES # BLD AUTO: 0.3 K/UL
MONOCYTES NFR BLD: 2.8 %
NEUTROPHILS # BLD AUTO: 7.3 K/UL
NEUTROPHILS NFR BLD: 69.3 %
NITRITE UR QL STRIP: NEGATIVE
PH UR STRIP: 5 [PH] (ref 5–8)
PLATELET # BLD AUTO: 386 K/UL
PMV BLD AUTO: 7.8 FL
POCT GLUCOSE: 203 MG/DL (ref 70–110)
PROT UR QL STRIP: ABNORMAL
RBC # BLD AUTO: 4.03 M/UL
RBC #/AREA URNS HPF: 2 /HPF (ref 0–4)
SP GR UR STRIP: >=1.03 (ref 1–1.03)
SQUAMOUS #/AREA URNS HPF: 9 /HPF
URN SPEC COLLECT METH UR: ABNORMAL
UROBILINOGEN UR STRIP-ACNC: NEGATIVE EU/DL
WBC # BLD AUTO: 10.5 K/UL
WBC #/AREA URNS HPF: 12 /HPF (ref 0–5)

## 2019-03-19 PROCEDURE — 63600175 PHARM REV CODE 636 W HCPCS: Performed by: NURSE ANESTHETIST, CERTIFIED REGISTERED

## 2019-03-19 PROCEDURE — D9220A PRA ANESTHESIA: ICD-10-PCS | Mod: ANES,,, | Performed by: ANESTHESIOLOGY

## 2019-03-19 PROCEDURE — 25000003 PHARM REV CODE 250: Performed by: NURSE ANESTHETIST, CERTIFIED REGISTERED

## 2019-03-19 PROCEDURE — 63600175 PHARM REV CODE 636 W HCPCS: Performed by: THORACIC SURGERY (CARDIOTHORACIC VASCULAR SURGERY)

## 2019-03-19 PROCEDURE — 36000706: Performed by: THORACIC SURGERY (CARDIOTHORACIC VASCULAR SURGERY)

## 2019-03-19 PROCEDURE — D9220A PRA ANESTHESIA: Mod: ANES,,, | Performed by: ANESTHESIOLOGY

## 2019-03-19 PROCEDURE — 27201423 OPTIME MED/SURG SUP & DEVICES STERILE SUPPLY: Performed by: THORACIC SURGERY (CARDIOTHORACIC VASCULAR SURGERY)

## 2019-03-19 PROCEDURE — 25000003 PHARM REV CODE 250: Performed by: ANESTHESIOLOGY

## 2019-03-19 PROCEDURE — 82962 GLUCOSE BLOOD TEST: CPT | Performed by: THORACIC SURGERY (CARDIOTHORACIC VASCULAR SURGERY)

## 2019-03-19 PROCEDURE — D9220A PRA ANESTHESIA: Mod: CRNA,,, | Performed by: NURSE ANESTHETIST, CERTIFIED REGISTERED

## 2019-03-19 PROCEDURE — 85025 COMPLETE CBC W/AUTO DIFF WBC: CPT

## 2019-03-19 PROCEDURE — 01844 ANES VASC SHUNT/SHUNT REVJ: CPT | Performed by: THORACIC SURGERY (CARDIOTHORACIC VASCULAR SURGERY)

## 2019-03-19 PROCEDURE — D9220A PRA ANESTHESIA: ICD-10-PCS | Mod: CRNA,,, | Performed by: NURSE ANESTHETIST, CERTIFIED REGISTERED

## 2019-03-19 PROCEDURE — 87086 URINE CULTURE/COLONY COUNT: CPT

## 2019-03-19 PROCEDURE — 71000039 HC RECOVERY, EACH ADD'L HOUR: Performed by: THORACIC SURGERY (CARDIOTHORACIC VASCULAR SURGERY)

## 2019-03-19 PROCEDURE — 36415 COLL VENOUS BLD VENIPUNCTURE: CPT

## 2019-03-19 PROCEDURE — 99900104 DSU ONLY-NO CHARGE-EA ADD'L HR (STAT): Performed by: THORACIC SURGERY (CARDIOTHORACIC VASCULAR SURGERY)

## 2019-03-19 PROCEDURE — 25000003 PHARM REV CODE 250: Performed by: THORACIC SURGERY (CARDIOTHORACIC VASCULAR SURGERY)

## 2019-03-19 PROCEDURE — 37000008 HC ANESTHESIA 1ST 15 MINUTES: Performed by: THORACIC SURGERY (CARDIOTHORACIC VASCULAR SURGERY)

## 2019-03-19 PROCEDURE — 36000707: Performed by: THORACIC SURGERY (CARDIOTHORACIC VASCULAR SURGERY)

## 2019-03-19 PROCEDURE — 37000009 HC ANESTHESIA EA ADD 15 MINS: Performed by: THORACIC SURGERY (CARDIOTHORACIC VASCULAR SURGERY)

## 2019-03-19 PROCEDURE — 71000015 HC POSTOP RECOV 1ST HR: Performed by: THORACIC SURGERY (CARDIOTHORACIC VASCULAR SURGERY)

## 2019-03-19 PROCEDURE — 71000033 HC RECOVERY, INTIAL HOUR: Performed by: THORACIC SURGERY (CARDIOTHORACIC VASCULAR SURGERY)

## 2019-03-19 PROCEDURE — 99900103 DSU ONLY-NO CHARGE-INITIAL HR (STAT): Performed by: THORACIC SURGERY (CARDIOTHORACIC VASCULAR SURGERY)

## 2019-03-19 PROCEDURE — 81000 URINALYSIS NONAUTO W/SCOPE: CPT

## 2019-03-19 RX ORDER — SODIUM CHLORIDE, SODIUM LACTATE, POTASSIUM CHLORIDE, CALCIUM CHLORIDE 600; 310; 30; 20 MG/100ML; MG/100ML; MG/100ML; MG/100ML
INJECTION, SOLUTION INTRAVENOUS CONTINUOUS
Status: DISCONTINUED | OUTPATIENT
Start: 2019-03-19 | End: 2019-03-19

## 2019-03-19 RX ORDER — LIDOCAINE HYDROCHLORIDE 10 MG/ML
1 INJECTION, SOLUTION EPIDURAL; INFILTRATION; INTRACAUDAL; PERINEURAL ONCE
Status: DISCONTINUED | OUTPATIENT
Start: 2019-03-19 | End: 2019-03-19 | Stop reason: HOSPADM

## 2019-03-19 RX ORDER — FENTANYL CITRATE 50 UG/ML
INJECTION, SOLUTION INTRAMUSCULAR; INTRAVENOUS
Status: DISCONTINUED | OUTPATIENT
Start: 2019-03-19 | End: 2019-03-19

## 2019-03-19 RX ORDER — PROTAMINE SULFATE 10 MG/ML
INJECTION, SOLUTION INTRAVENOUS
Status: DISCONTINUED | OUTPATIENT
Start: 2019-03-19 | End: 2019-03-19

## 2019-03-19 RX ORDER — MIDAZOLAM HYDROCHLORIDE 1 MG/ML
INJECTION, SOLUTION INTRAMUSCULAR; INTRAVENOUS
Status: DISCONTINUED | OUTPATIENT
Start: 2019-03-19 | End: 2019-03-19

## 2019-03-19 RX ORDER — ONDANSETRON HYDROCHLORIDE 2 MG/ML
INJECTION, SOLUTION INTRAMUSCULAR; INTRAVENOUS
Status: DISCONTINUED | OUTPATIENT
Start: 2019-03-19 | End: 2019-03-19

## 2019-03-19 RX ORDER — SODIUM CHLORIDE, SODIUM LACTATE, POTASSIUM CHLORIDE, CALCIUM CHLORIDE 600; 310; 30; 20 MG/100ML; MG/100ML; MG/100ML; MG/100ML
125 INJECTION, SOLUTION INTRAVENOUS CONTINUOUS
Status: DISCONTINUED | OUTPATIENT
Start: 2019-03-19 | End: 2019-03-19 | Stop reason: HOSPADM

## 2019-03-19 RX ORDER — EPHEDRINE SULFATE 50 MG/ML
INJECTION, SOLUTION INTRAVENOUS
Status: DISCONTINUED | OUTPATIENT
Start: 2019-03-19 | End: 2019-03-19

## 2019-03-19 RX ORDER — LIDOCAINE HCL/PF 100 MG/5ML
SYRINGE (ML) INTRAVENOUS
Status: DISCONTINUED | OUTPATIENT
Start: 2019-03-19 | End: 2019-03-19

## 2019-03-19 RX ORDER — CEFAZOLIN SODIUM 1 G/50ML
1 SOLUTION INTRAVENOUS ONCE
Status: COMPLETED | OUTPATIENT
Start: 2019-03-19 | End: 2019-03-19

## 2019-03-19 RX ORDER — PHENYLEPHRINE HYDROCHLORIDE 10 MG/ML
INJECTION INTRAVENOUS
Status: DISCONTINUED | OUTPATIENT
Start: 2019-03-19 | End: 2019-03-19

## 2019-03-19 RX ORDER — HEPARIN SODIUM 5000 [USP'U]/ML
INJECTION, SOLUTION INTRAVENOUS; SUBCUTANEOUS
Status: DISCONTINUED | OUTPATIENT
Start: 2019-03-19 | End: 2019-03-19 | Stop reason: HOSPADM

## 2019-03-19 RX ORDER — SODIUM CHLORIDE 9 MG/ML
INJECTION, SOLUTION INTRAVENOUS CONTINUOUS
Status: DISCONTINUED | OUTPATIENT
Start: 2019-03-19 | End: 2019-03-19 | Stop reason: HOSPADM

## 2019-03-19 RX ORDER — BACITRACIN 50000 [IU]/1
INJECTION, POWDER, FOR SOLUTION INTRAMUSCULAR
Status: DISCONTINUED | OUTPATIENT
Start: 2019-03-19 | End: 2019-03-19 | Stop reason: HOSPADM

## 2019-03-19 RX ORDER — PROPOFOL 10 MG/ML
VIAL (ML) INTRAVENOUS
Status: DISCONTINUED | OUTPATIENT
Start: 2019-03-19 | End: 2019-03-19

## 2019-03-19 RX ADMIN — CEFAZOLIN SODIUM 1 G: 1 SOLUTION INTRAVENOUS at 09:03

## 2019-03-19 RX ADMIN — FENTANYL CITRATE 50 MCG: 50 INJECTION, SOLUTION INTRAMUSCULAR; INTRAVENOUS at 09:03

## 2019-03-19 RX ADMIN — EPHEDRINE SULFATE 15 MG: 50 INJECTION, SOLUTION INTRAMUSCULAR; INTRAVENOUS; SUBCUTANEOUS at 10:03

## 2019-03-19 RX ADMIN — PHENYLEPHRINE HYDROCHLORIDE 200 MCG: 10 INJECTION INTRAVENOUS at 09:03

## 2019-03-19 RX ADMIN — HEPARIN SODIUM 7500 UNITS: 5000 INJECTION, SOLUTION INTRAVENOUS; SUBCUTANEOUS at 09:03

## 2019-03-19 RX ADMIN — ONDANSETRON 4 MG: 2 INJECTION, SOLUTION INTRAMUSCULAR; INTRAVENOUS at 08:03

## 2019-03-19 RX ADMIN — PROPOFOL 120 MG: 10 INJECTION, EMULSION INTRAVENOUS at 09:03

## 2019-03-19 RX ADMIN — SODIUM CHLORIDE: 0.9 INJECTION, SOLUTION INTRAVENOUS at 08:03

## 2019-03-19 RX ADMIN — EPHEDRINE SULFATE 10 MG: 50 INJECTION, SOLUTION INTRAMUSCULAR; INTRAVENOUS; SUBCUTANEOUS at 09:03

## 2019-03-19 RX ADMIN — PHENYLEPHRINE HYDROCHLORIDE 100 MCG: 10 INJECTION INTRAVENOUS at 09:03

## 2019-03-19 RX ADMIN — EPHEDRINE SULFATE 25 MG: 50 INJECTION, SOLUTION INTRAMUSCULAR; INTRAVENOUS; SUBCUTANEOUS at 09:03

## 2019-03-19 RX ADMIN — LIDOCAINE HYDROCHLORIDE 50 MG: 20 INJECTION, SOLUTION INTRAVENOUS at 09:03

## 2019-03-19 RX ADMIN — PROTAMINE SULFATE 25 MG: 10 INJECTION, SOLUTION INTRAVENOUS at 10:03

## 2019-03-19 RX ADMIN — MIDAZOLAM 2 MG: 1 INJECTION INTRAMUSCULAR; INTRAVENOUS at 08:03

## 2019-03-19 NOTE — PLAN OF CARE
No further drainage from dressing.  Pt instructed to call Dr. Desouza for issues.  Tolerating po and denies pain.  States that she is ready for discharge.  Waiting for ride home.

## 2019-03-19 NOTE — ANESTHESIA POSTPROCEDURE EVALUATION
"Anesthesia Post Evaluation    Patient: Errol Stone Keita    Procedure(s) Performed: Procedure(s) (LRB):  CREATION, AV FISTULA (Left)    Final Anesthesia Type: general  Patient location during evaluation: PACU  Patient participation: Yes- Able to Participate  Level of consciousness: awake and alert  Post-procedure vital signs: reviewed and stable  Pain management: adequate  Airway patency: patent  PONV status at discharge: No PONV  Anesthetic complications: no      Cardiovascular status: blood pressure returned to baseline  Respiratory status: unassisted  Hydration status: euvolemic  Follow-up not needed.        Visit Vitals  BP (!) 173/83   Pulse 82   Temp 36.6 °C (97.9 °F)   Resp 18   Ht 5' 7" (1.702 m)   Wt 40.4 kg (89 lb)   LMP 09/03/2012 (Exact Date)   SpO2 98%   Breastfeeding? No   BMI 13.94 kg/m²       Pain/Jessenia Score: Jessenia Score: 10 (3/19/2019 11:45 AM)        "

## 2019-03-19 NOTE — BRIEF OP NOTE
[facility]  Brief Operative Note     SUMMARY     Surgery Date: 3/19/2019     Surgeon(s) and Role:     * Amos Desouza MD - Primary    Assisting Surgeon: None    Pre-op Diagnosis:  End stage renal disease [N18.6]    Post-op Diagnosis:  Post-Op Diagnosis Codes:     * End stage renal disease [N18.6]    Procedure(s) (LRB):  CREATION, AV FISTULA (Left)    Anesthesia: General    Description of the findings of the procedure:   Vein good quality but small diameter  Excellent thrill post procedure    Estimated Blood Loss: 20 mL         Specimens:   Specimen (12h ago, onward)    None          Discharge Note    SUMMARY     Admit Date: 3/19/2019    Discharge Date and Time: 3/19/2019    Hospital Course Creation AV fistula without complication and post op observation without issue    Final Diagnosis: Post-Op Diagnosis Codes:     * End stage renal disease [N18.6]    Disposition: Home    Follow Up/Patient Instructions: Keep incision site clean and dry                                                         Avoid strenuous activity with L arm                                                          Keep dressing 72 hours                                                          No BP/venipuncture L arm    Medications:  Resume usual pre op home meds

## 2019-03-19 NOTE — DISCHARGE INSTRUCTIONS
"    Discharge Instructions: After Your Surgery/Procedure  Youve just had surgery. During surgery you were given medicine called anesthesia to keep you relaxed and free of pain. After surgery you may have some pain or nausea. This is common. Here are some tips for feeling better and getting well after surgery.     Stay on schedule with your medication.   Going home  Your doctor or nurse will show you how to take care of yourself when you go home. He or she will also answer your questions. Have an adult family member or friend drive you home.      For your safety we recommend these precaution for the first 24 hours after your procedure:  · Do not drive or use heavy equipment.  · Do not make important decisions or sign legal papers.  · Do not drink alcohol.  · Have someone stay with you, if needed. He or she can watch for problems and help keep you safe.  · Your concentration, balance, coordination, and judgement may be impaired for many hours after anesthesia.  Use caution when ambulating or standing up.     · You may feel weak and "washed out" after anesthesia and surgery.      Subtle residual effects of general anesthesia or sedation with regional / local anesthesia can last more than 24 hours.  Rest for the remainder of the day or longer if your Doctor/Surgeon has advised you to do so.  Although you may feel normal within the first 24 hours, your reflexes and mental ability may be impaired without you realizing it.  You may feel dizzy, lightheaded or sleepy for 24 hours or longer.      Be sure to go to all follow-up visits with your doctor. And rest after your surgery for as long as your doctor tells you to.  Coping with pain  If you have pain after surgery, pain medicine will help you feel better. Take it as told, before pain becomes severe. Also, ask your doctor or pharmacist about other ways to control pain. This might be with heat, ice, or relaxation. And follow any other instructions your surgeon or nurse " gives you.  Tips for taking pain medicine  To get the best relief possible, remember these points:  · Pain medicines can upset your stomach. Taking them with a little food may help.  · Most pain relievers taken by mouth need at least 20 to 30 minutes to start to work.  · Taking medicine on a schedule can help you remember to take it. Try to time your medicine so that you can take it before starting an activity. This might be before you get dressed, go for a walk, or sit down for dinner.  · Constipation is a common side effect of pain medicines. Call your doctor before taking any medicines such as laxatives or stool softeners to help ease constipation. Also ask if you should skip any foods. Drinking lots of fluids and eating foods such as fruits and vegetables that are high in fiber can also help. Remember, do not take laxatives unless your surgeon has prescribed them.  · Drinking alcohol and taking pain medicine can cause dizziness and slow your breathing. It can even be deadly. Do not drink alcohol while taking pain medicine.  · Pain medicine can make you react more slowly to things. Do not drive or run machinery while taking pain medicine.  Your health care provider may tell you to take acetaminophen to help ease your pain. Ask him or her how much you are supposed to take each day. Acetaminophen or other pain relievers may interact with your prescription medicines or other over-the-counter (OTC) drugs. Some prescription medicines have acetaminophen and other ingredients. Using both prescription and OTC acetaminophen for pain can cause you to overdose. Read the labels on your OTC medicines with care. This will help you to clearly know the list of ingredients, how much to take, and any warnings. It may also help you not take too much acetaminophen. If you have questions or do not understand the information, ask your pharmacist or health care provider to explain it to you before you take the OTC medicine.  Managing  nausea  Some people have an upset stomach after surgery. This is often because of anesthesia, pain, or pain medicine, or the stress of surgery. These tips will help you handle nausea and eat healthy foods as you get better. If you were on a special food plan before surgery, ask your doctor if you should follow it while you get better. These tips may help:  · Do not push yourself to eat. Your body will tell you when to eat and how much.  · Start off with clear liquids and soup. They are easier to digest.  · Next try semi-solid foods, such as mashed potatoes, applesauce, and gelatin, as you feel ready.  · Slowly move to solid foods. Dont eat fatty, rich, or spicy foods at first.  · Do not force yourself to have 3 large meals a day. Instead eat smaller amounts more often.  · Take pain medicines with a small amount of solid food, such as crackers or toast, to avoid nausea.     Call your surgeon if  · You still have pain an hour after taking medicine. The medicine may not be strong enough.  · You feel too sleepy, dizzy, or groggy. The medicine may be too strong.  · You have side effects like nausea, vomiting, or skin changes, such as rash, itching, or hives.       If you have obstructive sleep apnea  You were given anesthesia medicine during surgery to keep you comfortable and free of pain. After surgery, you may have more apnea spells because of this medicine and other medicines you were given. The spells may last longer than usual.   At home:  · Keep using the continuous positive airway pressure (CPAP) device when you sleep. Unless your health care provider tells you not to, use it when you sleep, day or night. CPAP is a common device used to treat obstructive sleep apnea.  · Talk with your provider before taking any pain medicine, muscle relaxants, or sedatives. Your provider will tell you about the possible dangers of taking these medicines.  © 5781-1876 [employer]. [address] [place] [address]  [place] [address]. All rights reserved. This information is not intended as a substitute for professional medical care. Always follow your healthcare professional's instructions.    Post op instructions for prevention of DVT  What is deep vein thrombosis?  Deep vein thrombosis (DVT) is the medical term for blood clots in the deep veins of the leg.  These blood clots can be dangerous.  A DVT can block a blood vessel and keep blood from getting where it needs to go.  Another problem is that the clot can travel to other parts of the body such as the lungs.  A clot that travels to the lungs is called a pulmonary embolus (PE) and can cause serious problems with breathing which can lead to death.  Am I at risk for DVT/PE?  If you are not very active, you are at risk of DVT.  Anyone confined to bed, sitting for long periods of time, recovering from surgery, etc. increases the risk of DVT.  Other risk factors are cancer diagnosis, certain medications, estrogen replacement in any form,older age, obesity, pregnancy, smoking, history of clotting disorders, and dehydration.  How will I know if I have a DVT?   Swelling in the lower leg   Pain   Warmth, redness, hardness or bulging of the vein  If you have any of these symptoms, call your doctors office right away.  Some people will not have any symptoms until the clot moves to the lungs.  What are the symptoms of a PE?   Panting, shortness of breath, or trouble breathing   Sharp, knife-like chest pain when you breathe   Coughing or coughing up blood   Rapid heartbeat  If you have any of these symptoms or get worse quickly, call 911 for emergency treatment.  How can I prevent a DVT?   Avoid long periods of inactivity and dont cross your legs--get up and walk around every hour or so.   Stay active--walking after surgery is highly encouraged.  This means you should get out of the house and walk in the neighborhood.  Going up and down stairs will not impair healing (unless advised  against such activity by your doctor).     Drink plenty of noncaffeinated, nonalcoholic fluids each day to prevent dehydration.   Wear special support stockings, if they have been advised by your doctor.   If you travel, stop at least once an hour and walk around.   Avoid smoking (assistance with stopping is available through your healthcare provider)  Always notify your doctor if you are not able to follow the post operative instructions that are given to you at the time of discharge.  It may be necessary to prescribe one of the medications available to prevent DVT.    We hope your stay was comfortable as you heal now, mend and rest.    For we have enjoyed taking care of you by giving your our best.    And as you get better, by regaining your health and strength;   We count it as a privilege to have served you and hope your time at [facility] was well spent.      Thank  You!!!

## 2019-03-19 NOTE — PROGRESS NOTES
Hard IV stick.  Attempted x 1 per M GENEVA Dia.  jelco intact.  Pressure dressing applied.  Pat Leonel CRNA at bedside attempting.

## 2019-03-19 NOTE — ANESTHESIA PREPROCEDURE EVALUATION
03/19/2019  Errol Keita is a 53 y.o., female.    Pre-op Assessment    I have reviewed the Patient Summary Reports.     I have reviewed the Nursing Notes.   I have reviewed the Medications.     Review of Systems  Anesthesia Hx:  No problems with previous Anesthesia Denies Hx of Anesthetic complications  Denies Family Hx of Anesthesia complications.   Denies Personal Hx of Anesthesia complications.   Social:  Smoker    Hematology/Oncology:  Hematology Normal        Cardiovascular:   Hypertension, well controlled Past MI CAD  CABG/stent  Angina, with exertion CHF ECG has been reviewed.    Pulmonary:   Asthma moderate    Renal/:   Chronic Renal Disease, CRI, ESRD, Dialysis    Musculoskeletal:   Arthritis     Endocrine:   Diabetes, poorly controlled, type 2, using insulin    Psych:   Psychiatric History          Physical Exam  General:  Cachexia, Malnutrition    Airway/Jaw/Neck:  Airway Findings: Mouth Opening: Normal Tongue: Normal  General Airway Assessment: Adult  Mallampati: I  TM Distance: 4 - 6 cm  Jaw/Neck Findings:  Neck ROM: Normal ROM      Dental:  Dental Findings: Periodontal disease, Severe   Chest/Lungs:  Chest/Lungs Findings: Clear to auscultation     Heart/Vascular:  Heart Findings: Rate: Tachycardia  Rhythm: Regular Rhythm  Heart Murmur  Systolic  Systolic Heart Murmur Description: L Upper Sternal Border  Systolic Heart Murmur Grade: Grade III             Anesthesia Plan  Type of Anesthesia, risks & benefits discussed:  Anesthesia Type:  general, MAC  Patient's Preference:   Intra-op Monitoring Plan: standard ASA monitors  Intra-op Monitoring Plan Comments:   Post Op Pain Control Plan: IV/PO Opioids PRN and multimodal analgesia  Post Op Pain Control Plan Comments:   Induction:   IV  Beta Blocker:  Patient is on a Beta-Blocker and has received one dose within the past 24 hours (No further  documentation required).       Informed Consent: Patient understands risks and agrees with Anesthesia plan.  Questions answered. Anesthesia consent signed with patient.  ASA Score: 3     Day of Surgery Review of History & Physical:    H&P update referred to the surgeon.         Ready For Surgery From Anesthesia Perspective.

## 2019-03-19 NOTE — PROGRESS NOTES
Attempted x 1 per Pat Leonel CRNA # 22 gauge right wrist. jelco intact.  Pressure dressing applied. Dr. pollard at bedside to attempt IV start.

## 2019-03-19 NOTE — TRANSFER OF CARE
"Anesthesia Transfer of Care Note    Patient: Errol Stone Keita    Procedure(s) Performed: Procedure(s) (LRB):  CREATION, AV FISTULA (N/A)    Patient location: PACU    Anesthesia Type: general    Transport from OR: Transported from OR on 2-3 L/min O2 by NC with adequate spontaneous ventilation    Post pain: adequate analgesia    Post assessment: no apparent anesthetic complications and tolerated procedure well    Post vital signs: stable    Level of consciousness: awake, alert and oriented    Nausea/Vomiting: no nausea/vomiting    Complications: none    Transfer of care protocol was followed      Last vitals:   Visit Vitals  BP (!) 161/78 (BP Location: Left arm, Patient Position: Lying)   Pulse (!) 58   Temp 36.3 °C (97.3 °F) (Skin)   Resp 20   Ht 5' 7" (1.702 m)   Wt 40.4 kg (89 lb)   LMP 09/03/2012 (Exact Date)   SpO2 100%   Breastfeeding? No   BMI 13.94 kg/m²     "

## 2019-03-19 NOTE — PLAN OF CARE
Patient transferred to Postop at this time. Dressing to left arm fistula oozing.  Dr Desouza notified and patient seen by Dr Desouza.  Ordered to inform patient's family to watch it and call the office if they have any problems.  Post op nurse aware and will inform patient's family member.

## 2019-03-20 LAB
BACTERIA UR CULT: NORMAL
BACTERIA UR CULT: NORMAL

## 2019-03-29 PROCEDURE — 99285 EMERGENCY DEPT VISIT HI MDM: CPT | Mod: 25

## 2019-03-30 ENCOUNTER — HOSPITAL ENCOUNTER (INPATIENT)
Facility: HOSPITAL | Age: 54
LOS: 3 days | Discharge: HOME | DRG: 314 | End: 2019-04-02
Attending: EMERGENCY MEDICINE | Admitting: INTERNAL MEDICINE
Payer: MEDICARE

## 2019-03-30 DIAGNOSIS — Z79.4 TYPE 2 DIABETES MELLITUS WITH STAGE 4 CHRONIC KIDNEY DISEASE, WITH LONG-TERM CURRENT USE OF INSULIN: ICD-10-CM

## 2019-03-30 DIAGNOSIS — M79.622 LEFT UPPER ARM PAIN: ICD-10-CM

## 2019-03-30 DIAGNOSIS — N18.4 TYPE 2 DIABETES MELLITUS WITH STAGE 4 CHRONIC KIDNEY DISEASE, WITH LONG-TERM CURRENT USE OF INSULIN: ICD-10-CM

## 2019-03-30 DIAGNOSIS — L03.114 CELLULITIS OF LEFT UPPER ARM: Primary | ICD-10-CM

## 2019-03-30 DIAGNOSIS — Z45.2 ENCOUNTER FOR CENTRAL LINE PLACEMENT: ICD-10-CM

## 2019-03-30 DIAGNOSIS — E11.22 TYPE 2 DIABETES MELLITUS WITH STAGE 4 CHRONIC KIDNEY DISEASE, WITH LONG-TERM CURRENT USE OF INSULIN: ICD-10-CM

## 2019-03-30 LAB
ALBUMIN SERPL BCP-MCNC: 3 G/DL (ref 3.5–5.2)
ALP SERPL-CCNC: 170 U/L (ref 55–135)
ALT SERPL W/O P-5'-P-CCNC: 40 U/L (ref 10–44)
ANION GAP SERPL CALC-SCNC: 11 MMOL/L (ref 8–16)
AST SERPL-CCNC: 33 U/L (ref 10–40)
BASOPHILS # BLD AUTO: 0 K/UL (ref 0–0.2)
BASOPHILS NFR BLD: 0.4 % (ref 0–1.9)
BILIRUB SERPL-MCNC: 0.3 MG/DL (ref 0.1–1)
BUN SERPL-MCNC: 32 MG/DL (ref 6–20)
CALCIUM SERPL-MCNC: 9.2 MG/DL (ref 8.7–10.5)
CHLORIDE SERPL-SCNC: 105 MMOL/L (ref 95–110)
CO2 SERPL-SCNC: 25 MMOL/L (ref 23–29)
CREAT SERPL-MCNC: 2.8 MG/DL (ref 0.5–1.4)
DIFFERENTIAL METHOD: ABNORMAL
EOSINOPHIL # BLD AUTO: 0.1 K/UL (ref 0–0.5)
EOSINOPHIL NFR BLD: 0.8 % (ref 0–8)
ERYTHROCYTE [DISTWIDTH] IN BLOOD BY AUTOMATED COUNT: 14.9 % (ref 11.5–14.5)
EST. GFR  (AFRICAN AMERICAN): 21 ML/MIN/1.73 M^2
EST. GFR  (NON AFRICAN AMERICAN): 18 ML/MIN/1.73 M^2
GLUCOSE SERPL-MCNC: 263 MG/DL (ref 70–110)
HCT VFR BLD AUTO: 30.3 % (ref 37–48.5)
HGB BLD-MCNC: 9.8 G/DL (ref 12–16)
LACTATE SERPL-SCNC: 1 MMOL/L (ref 0.5–2.2)
LACTATE SERPL-SCNC: 1.2 MMOL/L (ref 0.5–2.2)
LYMPHOCYTES # BLD AUTO: 2.3 K/UL (ref 1–4.8)
LYMPHOCYTES NFR BLD: 24.1 % (ref 18–48)
MCH RBC QN AUTO: 31.8 PG (ref 27–31)
MCHC RBC AUTO-ENTMCNC: 32.3 G/DL (ref 32–36)
MCV RBC AUTO: 98 FL (ref 82–98)
MONOCYTES # BLD AUTO: 0.4 K/UL (ref 0.3–1)
MONOCYTES NFR BLD: 4.4 % (ref 4–15)
NEUTROPHILS # BLD AUTO: 6.7 K/UL (ref 1.8–7.7)
NEUTROPHILS NFR BLD: 70.3 % (ref 38–73)
PLATELET # BLD AUTO: 425 K/UL (ref 150–350)
PMV BLD AUTO: 7.6 FL (ref 9.2–12.9)
POCT GLUCOSE: 211 MG/DL (ref 70–110)
POCT GLUCOSE: 244 MG/DL (ref 70–110)
POTASSIUM SERPL-SCNC: 3.6 MMOL/L (ref 3.5–5.1)
PROT SERPL-MCNC: 6.6 G/DL (ref 6–8.4)
RBC # BLD AUTO: 3.09 M/UL (ref 4–5.4)
SODIUM SERPL-SCNC: 141 MMOL/L (ref 136–145)
WBC # BLD AUTO: 9.6 K/UL (ref 3.9–12.7)

## 2019-03-30 PROCEDURE — 25000003 PHARM REV CODE 250: Performed by: EMERGENCY MEDICINE

## 2019-03-30 PROCEDURE — 63600175 PHARM REV CODE 636 W HCPCS: Performed by: EMERGENCY MEDICINE

## 2019-03-30 PROCEDURE — 83605 ASSAY OF LACTIC ACID: CPT

## 2019-03-30 PROCEDURE — 36415 COLL VENOUS BLD VENIPUNCTURE: CPT

## 2019-03-30 PROCEDURE — 63600175 PHARM REV CODE 636 W HCPCS

## 2019-03-30 PROCEDURE — 36556 INSERT NON-TUNNEL CV CATH: CPT | Mod: 59

## 2019-03-30 PROCEDURE — 63600175 PHARM REV CODE 636 W HCPCS: Performed by: INTERNAL MEDICINE

## 2019-03-30 PROCEDURE — 63600175 PHARM REV CODE 636 W HCPCS: Performed by: FAMILY MEDICINE

## 2019-03-30 PROCEDURE — 87040 BLOOD CULTURE FOR BACTERIA: CPT

## 2019-03-30 PROCEDURE — 96374 THER/PROPH/DIAG INJ IV PUSH: CPT

## 2019-03-30 PROCEDURE — 12000002 HC ACUTE/MED SURGE SEMI-PRIVATE ROOM

## 2019-03-30 PROCEDURE — 25000003 PHARM REV CODE 250: Performed by: INTERNAL MEDICINE

## 2019-03-30 PROCEDURE — 85025 COMPLETE CBC W/AUTO DIFF WBC: CPT

## 2019-03-30 PROCEDURE — 80053 COMPREHEN METABOLIC PANEL: CPT

## 2019-03-30 PROCEDURE — S5571 INSULIN DISPOS PEN 3 ML: HCPCS | Performed by: INTERNAL MEDICINE

## 2019-03-30 PROCEDURE — 94761 N-INVAS EAR/PLS OXIMETRY MLT: CPT

## 2019-03-30 RX ORDER — VANCOMYCIN HCL IN 5 % DEXTROSE 1G/250ML
1000 PLASTIC BAG, INJECTION (ML) INTRAVENOUS
Status: COMPLETED | OUTPATIENT
Start: 2019-03-30 | End: 2019-03-30

## 2019-03-30 RX ORDER — MORPHINE SULFATE 4 MG/ML
4 INJECTION, SOLUTION INTRAMUSCULAR; INTRAVENOUS
Status: ACTIVE | OUTPATIENT
Start: 2019-03-30 | End: 2019-03-30

## 2019-03-30 RX ORDER — ATORVASTATIN CALCIUM 40 MG/1
80 TABLET, FILM COATED ORAL DAILY
Status: DISCONTINUED | OUTPATIENT
Start: 2019-03-30 | End: 2019-04-02 | Stop reason: HOSPADM

## 2019-03-30 RX ORDER — SODIUM CHLORIDE 0.9 % (FLUSH) 0.9 %
10 SYRINGE (ML) INJECTION
Status: DISCONTINUED | OUTPATIENT
Start: 2019-03-30 | End: 2019-04-02 | Stop reason: HOSPADM

## 2019-03-30 RX ORDER — HEPARIN SODIUM 5000 [USP'U]/ML
5000 INJECTION, SOLUTION INTRAVENOUS; SUBCUTANEOUS EVERY 8 HOURS
Status: DISCONTINUED | OUTPATIENT
Start: 2019-03-30 | End: 2019-04-02 | Stop reason: HOSPADM

## 2019-03-30 RX ORDER — HYDROCODONE BITARTRATE AND ACETAMINOPHEN 5; 325 MG/1; MG/1
1 TABLET ORAL EVERY 4 HOURS PRN
Status: DISCONTINUED | OUTPATIENT
Start: 2019-03-30 | End: 2019-04-02 | Stop reason: HOSPADM

## 2019-03-30 RX ORDER — ACETAMINOPHEN 325 MG/1
650 TABLET ORAL EVERY 8 HOURS PRN
Status: DISCONTINUED | OUTPATIENT
Start: 2019-03-30 | End: 2019-04-02 | Stop reason: HOSPADM

## 2019-03-30 RX ORDER — MORPHINE SULFATE 2 MG/ML
INJECTION, SOLUTION INTRAMUSCULAR; INTRAVENOUS
Status: COMPLETED
Start: 2019-03-30 | End: 2019-03-30

## 2019-03-30 RX ORDER — MORPHINE SULFATE 2 MG/ML
2 INJECTION, SOLUTION INTRAMUSCULAR; INTRAVENOUS EVERY 4 HOURS PRN
Status: DISCONTINUED | OUTPATIENT
Start: 2019-03-30 | End: 2019-03-31

## 2019-03-30 RX ORDER — GLUCAGON 1 MG
1 KIT INJECTION
Status: DISCONTINUED | OUTPATIENT
Start: 2019-03-30 | End: 2019-04-02 | Stop reason: HOSPADM

## 2019-03-30 RX ORDER — INSULIN ASPART 100 [IU]/ML
0-5 INJECTION, SOLUTION INTRAVENOUS; SUBCUTANEOUS
Status: DISCONTINUED | OUTPATIENT
Start: 2019-03-30 | End: 2019-04-02 | Stop reason: HOSPADM

## 2019-03-30 RX ORDER — VANCOMYCIN HYDROCHLORIDE 500 MG/100ML
15 INJECTION, SOLUTION INTRAVENOUS
Status: DISCONTINUED | OUTPATIENT
Start: 2019-03-30 | End: 2019-03-30 | Stop reason: DRUGHIGH

## 2019-03-30 RX ORDER — FERROUS SULFATE 325(65) MG
325 TABLET, DELAYED RELEASE (ENTERIC COATED) ORAL 2 TIMES DAILY
Status: DISCONTINUED | OUTPATIENT
Start: 2019-03-30 | End: 2019-03-31

## 2019-03-30 RX ORDER — IBUPROFEN 200 MG
24 TABLET ORAL
Status: DISCONTINUED | OUTPATIENT
Start: 2019-03-30 | End: 2019-04-02 | Stop reason: HOSPADM

## 2019-03-30 RX ORDER — IBUPROFEN 200 MG
16 TABLET ORAL
Status: DISCONTINUED | OUTPATIENT
Start: 2019-03-30 | End: 2019-04-02 | Stop reason: HOSPADM

## 2019-03-30 RX ORDER — NAPROXEN SODIUM 220 MG/1
81 TABLET, FILM COATED ORAL DAILY
Status: DISCONTINUED | OUTPATIENT
Start: 2019-03-30 | End: 2019-04-02 | Stop reason: HOSPADM

## 2019-03-30 RX ORDER — AMLODIPINE BESYLATE 5 MG/1
5 TABLET ORAL DAILY
Status: DISCONTINUED | OUTPATIENT
Start: 2019-03-30 | End: 2019-04-02 | Stop reason: HOSPADM

## 2019-03-30 RX ADMIN — VANCOMYCIN HYDROCHLORIDE 1000 MG: 1 INJECTION, POWDER, LYOPHILIZED, FOR SOLUTION INTRAVENOUS at 04:03

## 2019-03-30 RX ADMIN — HEPARIN SODIUM 5000 UNITS: 5000 INJECTION, SOLUTION INTRAVENOUS; SUBCUTANEOUS at 08:03

## 2019-03-30 RX ADMIN — INSULIN ASPART 2 UNITS: 100 INJECTION, SOLUTION INTRAVENOUS; SUBCUTANEOUS at 05:03

## 2019-03-30 RX ADMIN — INSULIN DETEMIR 15 UNITS: 100 INJECTION, SOLUTION SUBCUTANEOUS at 10:03

## 2019-03-30 RX ADMIN — MORPHINE SULFATE 4 MG: 2 INJECTION, SOLUTION INTRAMUSCULAR; INTRAVENOUS at 02:03

## 2019-03-30 RX ADMIN — ASPIRIN 81 MG CHEWABLE TABLET 81 MG: 81 TABLET CHEWABLE at 08:03

## 2019-03-30 RX ADMIN — ATORVASTATIN CALCIUM 80 MG: 40 TABLET, FILM COATED ORAL at 08:03

## 2019-03-30 RX ADMIN — FERROUS SULFATE TAB EC 325 MG (65 MG FE EQUIVALENT) 325 MG: 325 (65 FE) TABLET DELAYED RESPONSE at 08:03

## 2019-03-30 RX ADMIN — PIPERACILLIN AND TAZOBACTAM 3.38 G: 3; .375 INJECTION, POWDER, LYOPHILIZED, FOR SOLUTION INTRAVENOUS; PARENTERAL at 08:03

## 2019-03-30 RX ADMIN — INSULIN ASPART 1 UNITS: 100 INJECTION, SOLUTION INTRAVENOUS; SUBCUTANEOUS at 10:03

## 2019-03-30 RX ADMIN — HEPARIN SODIUM 5000 UNITS: 5000 INJECTION, SOLUTION INTRAVENOUS; SUBCUTANEOUS at 04:03

## 2019-03-30 RX ADMIN — SODIUM CHLORIDE 500 ML: 0.9 INJECTION, SOLUTION INTRAVENOUS at 04:03

## 2019-03-30 RX ADMIN — HEPARIN SODIUM 5000 UNITS: 5000 INJECTION, SOLUTION INTRAVENOUS; SUBCUTANEOUS at 10:03

## 2019-03-30 RX ADMIN — AMLODIPINE BESYLATE 5 MG: 5 TABLET ORAL at 08:03

## 2019-03-30 RX ADMIN — FERROUS SULFATE TAB EC 325 MG (65 MG FE EQUIVALENT) 325 MG: 325 (65 FE) TABLET DELAYED RESPONSE at 10:03

## 2019-03-30 RX ADMIN — PIPERACILLIN AND TAZOBACTAM 3.38 G: 3; .375 INJECTION, POWDER, LYOPHILIZED, FOR SOLUTION INTRAVENOUS; PARENTERAL at 04:03

## 2019-03-30 NOTE — ASSESSMENT & PLAN NOTE
Last dialysis session yesterday, does not appear to be volume overloaded at this time. Nephrology consulted for management of inpatient diaylsis

## 2019-03-30 NOTE — ASSESSMENT & PLAN NOTE
At site of recent surgery  Acute per patient report  Will start vanc/zosyn, considering dialysis patient and may have healthcare associated bacterial infection  F/u blood cultures  F/u ultrasound to ensure no DVT and patency of fistula

## 2019-03-30 NOTE — CONSULTS
Date: 3/30/2019   Errol Keita [id number] is a 53 y.o. female who has been consulted for vancomycin dosing.    The patient has the following labs:     Creatinine (mg/dl)    WBC Count   Serum creatinine: 2.8 mg/dL (H) 03/30/19 0241  Estimated creatinine clearance: 14.5 mL/min (A) Lab Results   Component Value Date    WBC 9.60 03/30/2019            Current weight is 40.4 kg (89 lb)    Vancomycin being given for cellulitis of upper arm.    Pt is on HD.  Pharmacy will dose by level.  Vanc 1000 mg given initially 03/30 at 0416. Patient had dialysis yesterday. Nephrology consulted.  Target goal is 10-15 mg/dL.    Vancomycin level ordered with AM lab before next HD, will monitor for scheduling.      Pharmacy will follow Vanc daily.  Vanc post HD doses will be adjusted if needed.   Patient will be followed by pharmacy for changes in renal function, toxicity, and efficacy.    Thank you for allowing us to participate in this patient's care.     Belem Nickerson, PharmD

## 2019-03-30 NOTE — NURSING
Message left regarding consult for Magalys,  I talked to Dr. Desouza,  I took pictures of the arm at his request.

## 2019-03-30 NOTE — HPI
Ms. Keita is a 52 yo with hx of DM, HTN, Asthma, CHF, and CAD who is admitted to Medicine service with complaint of left arm pain associated with redness and swelling for last 1 day. She states she dialyzed today. She had AV fistula creation on 3/19/19 by Dr. Butler.  She has noticed generalized weakness, but no fever, chills, n/v, abd pain, syncope, chest pain or shortness of breath. She is poor historian at this time due to receiving IV morphine in ED.     Vancomycin given in ED as well as 6mg morphine.  500cc bolus given.  WBC and lactic acid normal.  Read on cxr and arm xray pending. No obvious fx noted.  U/s of upper extremities pending at time of consultation.

## 2019-03-30 NOTE — CONSULTS
INPATIENT NEPHROLOGY CONSULT   [facility] NEPHROLOGY    Patient Name: Errol Keita  Date: 03/30/2019    Reason for consultation: ESRD    Chief Complaint:   Chief Complaint   Patient presents with    Post-op Problem     Graft to Lt arm pain and swelling.  Placement x 1 week.  Pt also endorses pain to chest port that has been placed for several months        History of Present Illness:  Ms. Keita is a 54 yo with hx of DM, HTN, Asthma, CHF, CAD and ESRD on HD MWF who is admitted to Medicine service with complaint of left arm pain associated with redness and swelling for last 1 day. She had AV fistula creation on 3/19/19 by Dr. Butler.  She has noticed generalized weakness, but no fever, chills, n/v, abd pain, syncope, chest pain or shortness of breath. Vancomycin given in ED as well as 6mg morphine. There are no exac/reliev factors. She states she dialyzed on Friday via [facility]. We are consulted for dialysis.    CXR clear  UE u/s neg for thrombus, + hematoma    Plan of Care:    Assessment:  ? AVF infection  ESRD  HTN  Hypokalemia  SHPT  Anemia of CKD    Plan:    - Imaging is negative. She is on empiric antbx. Area is warm, erythematous- may have cellulitis. Dr. Desouza has been consulted.  - Continue HD MWF.  - BP/VS are stable- continue home meds.  - Will use 3K bath.   - Check phos.  - Hb just under goal- ordered EPO with HD.    Thank you for allowing us to participate in this patient's care. We will continue to follow.    Vital Signs:  Temp Readings from Last 3 Encounters:   03/30/19 97.4 °F (36.3 °C)   03/19/19 98.2 °F (36.8 °C)   03/12/19 97.9 °F (36.6 °C) (Oral)       Pulse Readings from Last 3 Encounters:   03/30/19 76   03/19/19 80   03/12/19 72       BP Readings from Last 3 Encounters:   03/30/19 (!) 161/75   03/19/19 (!) 170/80   03/12/19 109/60       Weight:  Wt Readings from Last 3 Encounters:   03/30/19 43.2 kg (95 lb 3 oz)   03/19/19 40.4 kg (89 lb)   01/20/19 47.2 kg (104 lb)       Past Medical &  Surgical History:  Past Medical History:   Diagnosis Date    Arthritis     Asthma     CHF (congestive heart failure)     Coronary artery disease     Diabetes mellitus     Hypertension     Pancreatitis     Type 2 diabetes mellitus with hyperglycemia 7/13/2015       Past Surgical History:   Procedure Laterality Date    ASPIRATION ABSCESS N/A 10/27/2015    Performed by [facility]enia Surgeon at [facility] YECENIA    CARDIAC SURGERY      CABG    CHOLECYSTECTOMY      COLECTOMY-RIGHT N/A 8/25/2014    Performed by Harlan Regalado [facility] at [facility] OR    CORONARY ARTERY BYPASS GRAFT      CREATION, AV FISTULA Left 3/19/2019    Performed by [facility]os [facility]za [facility] at [facility] OR    EGD (ESOPHAGOGASTRODUODENOSCOPY) N/A 4/16/2014    Performed by Crow Brantley MD at [facility] ENDO    EGD (ESOPHAGOGASTRODUODENOSCOPY) N/A 4/2/2014    Performed by Crow [facility]ley MD at [facility] ENDO    EGD (ESOPHAGOGASTRODUODENOSCOPY) N/A 6/6/2013    Performed by Crow Brantley MD at [facility] ENDO    ESOPHAGOGASTRODUODENOSCOPY (EGD) N/A 8/22/2014    Performed by Satnam Nguyen MD at [facility] ENDO    HYSTERECTOMY      pt states no she did not(?)    INSERTION, CATHETER, TUNNELED Right 1/18/2019    [facility] at [facility] OR    REMOVAL, CATHETER, HEMODIALYSIS Right 1/18/2019    Performed by [facility] at [facility] OR    STERNOTOMY N/A 6/6/2013    Performed by Ole Ingram MD at [facility] OR    THORACOTOMY N/A 6/6/2013    Performed by Ole Ingram MD at [facility] OR       Past Social History:  Social History     Socioeconomic History    Marital status:      Spouse name: None    Number of children: None    Years of education: None    Highest education level: None   Occupational History    None   Social Needs    Financial resource strain: None    Food insecurity:     Worry: None     Inability: None    Transportation needs:     Medical: None     Non-medical: None   Tobacco Use    Smoking status: Current Every Day Smoker      Packs/day: 0.50     Years: 35.00     Pack years: 17.50     Types: Cigarettes    Smokeless tobacco: Never Used   Substance and Sexual Activity    Alcohol use: No     Alcohol/week: 0.0 oz     Comment: questionable per     Drug use: No    Sexual activity: Not Currently     Partners: Male     Birth control/protection: None   Lifestyle    Physical activity:     Days per week: None     Minutes per session: None    Stress: None   Relationships    Social connections:     Talks on phone: None     Gets together: None     Attends Restorationist service: None     Active member of club or organization: None     Attends meetings of clubs or organizations: None     Relationship status: None    Intimate partner violence:     Fear of current or ex partner: None     Emotionally abused: None     Physically abused: None     Forced sexual activity: None   Other Topics Concern    None   Social History Narrative    None       Medications:  No current facility-administered medications on file prior to encounter.      Current Outpatient Medications on File Prior to Encounter   Medication Sig Dispense Refill    amLODIPine (NORVASC) 5 MG tablet Take 1 tablet (5 mg total) by mouth once daily. 30 tablet 0    ascorbic acid, vitamin C, (VITAMIN C) 500 MG tablet Take 1 tablet (500 mg total) by mouth every evening.      aspirin 81 MG chewable tablet Take 81 mg by mouth once daily. Ran out      atorvastatin (LIPITOR) 80 MG tablet Take 1 tablet (80 mg total) by mouth once daily. 30 tablet 11    clopidogrel (PLAVIX) 75 mg tablet Take 1 tablet (75 mg total) by mouth once daily. 30 tablet 11    ferrous sulfate 325 (65 FE) MG EC tablet Take 1 tablet (325 mg total) by mouth 2 (two) times daily. 60 tablet 0    HUMALOG KWIKPEN INSULIN 100 unit/mL InPn pen Inject 2 Units into the skin 3 (three) times daily before meals. 15 mL 12    insulin detemir U-100 (LEVEMIR FLEXTOUCH) 100 unit/mL (3 mL) SubQ InPn pen Inject 2 Units into the skin 2  (two) times daily. (Patient taking differently: Inject 15 Units into the skin every evening. ) 1 Box 0    lisinopril (PRINIVIL,ZESTRIL) 2.5 MG tablet Take 2.5 mg by mouth once daily.       magnesium oxide (MAG-OX) 400 mg tablet Take 1 tablet (400 mg total) by mouth once daily. 30 tablet 0    quetiapine (SEROQUEL) 100 MG Tab Take 100 mg by mouth every evening.        Scheduled Meds:   amLODIPine  5 mg Oral Daily    aspirin  81 mg Oral Daily    atorvastatin  80 mg Oral Daily    ferrous sulfate  325 mg Oral BID    heparin (porcine)  5,000 Units Subcutaneous Q8H    insulin detemir U-100  15 Units Subcutaneous QHS    piperacillin-tazobactam (ZOSYN) IVPB  3.375 g Intravenous Q8H     Continuous Infusions:  PRN Meds:.acetaminophen, dextrose 50%, dextrose 50%, glucagon (human recombinant), glucose, glucose, HYDROcodone-acetaminophen, insulin aspart U-100, morphine, sodium chloride 0.9%    Allergies:  Patient has no known allergies.    Past Family History:  Reviewed; refer to Hospitalist Admission Note    Review of Systems:  Review of Systems - All 14 systems reviewed and negative, except as noted in HPI    Physical Exam:  General Appearance:    NAD, AAO x 3, cooperative, appears stated age   Head:    Normocephalic, atraumatic   Eyes:    PER, EOMI, and conjunctiva/sclera clear bilaterally       Throat:   Moist mucus membranes, no thrush or oral lesions, normal      dentition   Back:     No CVA tenderness   Lungs:     Clear to auscultation bilaterally, no wheezes, crackles, rales    or rhonchi, symmetric air movement, respirations unlabored   Chest wall:    No tenderness or deformity   Heart:    Regular rate and rhythm, S1 and S2 normal, no murmur, rub   or gallop   Abdomen:     Soft, non-tender, non-distended, bowel sounds active all four   quadrants, no RT or guarding, no masses, no organomegaly   Extremities:   Warm and well perfused, distal pulses are intact, no             cyanosis or peripheral edema   MSK:    No joint or muscle swelling, tenderness or deformity   Skin:   LUE erythema, tense area, staples c/d/i, + thrill/bruit   Neurologic/Psychiatric:   CNII-XII intact, normal strength and sensation       throughout, no asterixis; normal affect, memory, judgement     and insight      Results:  Lab Results   Component Value Date     03/30/2019    K 3.6 03/30/2019     03/30/2019    CO2 25 03/30/2019    BUN 32 (H) 03/30/2019    CREATININE 2.8 (H) 03/30/2019    CALCIUM 9.2 03/30/2019    ANIONGAP 11 03/30/2019    ESTGFRAFRICA 21 (A) 03/30/2019    EGFRNONAA 18 (A) 03/30/2019       Lab Results   Component Value Date    CALCIUM 9.2 03/30/2019    PHOS 5.8 (H) 03/12/2019       Recent Labs   Lab 03/30/19  0241   WBC 9.60   RBC 3.09*   HGB 9.8*   HCT 30.3*   *   MCV 98   MCH 31.8*   MCHC 32.3       I have personally reviewed pertinent radiological imaging and reports.    Logan Dowell MD MPH  [facility]  [address] [place] [address]  [place] [id number]  [phone] (p)  [phone] (f)

## 2019-03-30 NOTE — ASSESSMENT & PLAN NOTE
At site of recent surgery  vanc/zosyn, considering dialysis patient and may have healthcare associated bacterial infection  F/u blood cultures  F/u ultrasound to ensure no DVT and patency of fistula

## 2019-03-30 NOTE — ASSESSMENT & PLAN NOTE
Continue home iron supplementation  Daily cbc, will monitor closely  Epogen per nephrology  Nephrology consulted

## 2019-03-30 NOTE — ASSESSMENT & PLAN NOTE
Chronic, controlled.  Latest blood pressure and vitals reviewed-   Temp:  [98.2 °F (36.8 °C)]   Pulse:  [78-83]   Resp:  [18]   BP: (138-160)/(64-84)   SpO2:  [99 %-100 %] .   Current meds for hypertension include amlodipine, holding lisinopril.  Will continue BP medications as needed for sustained BP control.

## 2019-03-30 NOTE — PLAN OF CARE
Problem: Adult Inpatient Plan of Care  Goal: Plan of Care Review  Outcome: Ongoing (interventions implemented as appropriate)  Pt alert and oriented. No new symptoms. Denies pain. Denies nausea. Arm swollen, warm. VSS. Tolerating diet. Plan of care reviewed. Education given. Will continue to monitor.

## 2019-03-30 NOTE — ED NOTES
Pt resting well in bed.  Appears to be sleeping.  No acute distress noted.  Vital signs stable at this time.

## 2019-03-30 NOTE — H&P
[facility] Medicine  History & Physical    Patient Name: Errol Keita  MRN: [id number]  Admission Date: 3/30/2019  Attending Physician: Silvio Celestin MD  Primary Care Provider: Bharat Wiggins MD         Patient information was obtained from patient and ER records.     Subjective:     Principal Problem:Cellulitis of left upper arm    Chief Complaint:   Chief Complaint   Patient presents with    Post-op Problem     Graft to Lt arm pain and swelling.  Placement x 1 week.  Pt also endorses pain to chest port that has been placed for several months         HPI: Ms. Keita is a 54 yo with hx of DM, HTN, Asthma, CHF, and CAD who is admitted to Medicine service with complaint of left arm pain associated with redness and swelling for last 1 day. She states she dialyzed today. She had AV fistula creation on 3/19/19 by Dr. Butler.  She has noticed generalized weakness, but no fever, chills, n/v, abd pain, syncope, chest pain or shortness of breath. She is poor historian at this time due to receiving IV morphine in ED.     Vancomycin given in ED as well as 6mg morphine.  500cc bolus given.  WBC and lactic acid normal.  Read on cxr and arm xray pending. No obvious fx noted.  U/s of upper extremities pending at time of consultation.         Past Medical History:   Diagnosis Date    Arthritis     Asthma     CHF (congestive heart failure)     Coronary artery disease     Diabetes mellitus     Hypertension     Pancreatitis     Type 2 diabetes mellitus with hyperglycemia 7/13/2015       Past Surgical History:   Procedure Laterality Date    ASPIRATION ABSCESS N/A 10/27/2015    [facility] at [facility] YECENIA    CARDIAC SURGERY      CABG    CHOLECYSTECTOMY      COLECTOMY-RIGHT N/A 8/25/2014    [facility] by [facility]ado [facility] at [facility] OR    CORONARY ARTERY BYPASS GRAFT      CREATION, AV FISTULA Left 3/19/2019    [facility] at [facility] OR    EGD  (ESOPHAGOGASTRODUODENOSCOPY) N/A 4/16/2014    [facility]formed by [facility] ENDO    EGD (ESOPHAGOGASTRODUODENOSCOPY) N/A 4/2/2014    Performed by Crow [facility]ley MD at [facility] ENDO    EGD (ESOPHAGOGASTRODUODENOSCOPY) N/A 6/6/2013    Performed by Crow [facility]ley MD at [facility] ENDO    ESOPHAGOGASTRODUODENOSCOPY (EGD) N/A 8/22/2014    Performed by Satnam Nguyen MD at [facility] ENDO    HYSTERECTOMY      INSERTION, CATHETER, TUNNELED Right 1/18/2019    [facility]formed by [facility] at [facility] OR    REMOVAL, CATHETER, HEMODIALYSIS Right 1/18/2019    [facility]formed by [facility] MD at [facility] OR    STERNOTOMY N/A 6/6/2013    Performed by Ole Ingram MD at [facility] OR    THORACOTOMY N/A 6/6/2013    Performed by Ole Ingram MD at [facility] OR       Review of patient's allergies indicates:  No Known Allergies    No current facility-administered medications on file prior to encounter.      Current Outpatient Medications on File Prior to Encounter   Medication Sig    amLODIPine (NORVASC) 5 MG tablet Take 1 tablet (5 mg total) by mouth once daily.    ascorbic acid, vitamin C, (VITAMIN C) 500 MG tablet Take 1 tablet (500 mg total) by mouth every evening.    aspirin 81 MG chewable tablet Take 81 mg by mouth once daily. Ran out    atorvastatin (LIPITOR) 80 MG tablet Take 1 tablet (80 mg total) by mouth once daily.    clopidogrel (PLAVIX) 75 mg tablet Take 1 tablet (75 mg total) by mouth once daily.    ferrous sulfate 325 (65 FE) MG EC tablet Take 1 tablet (325 mg total) by mouth 2 (two) times daily.    HUMALOG KWIKPEN INSULIN 100 unit/mL InPn pen Inject 2 Units into the skin 3 (three) times daily before meals.    insulin detemir U-100 (LEVEMIR FLEXTOUCH) 100 unit/mL (3 mL) SubQ InPn pen Inject 2 Units into the skin 2 (two) times daily. (Patient taking differently: Inject 15 Units into the skin every evening. )    lisinopril (PRINIVIL,ZESTRIL) 2.5 MG tablet Take 2.5 mg by mouth once daily.     magnesium  oxide (MAG-OX) 400 mg tablet Take 1 tablet (400 mg total) by mouth once daily.    quetiapine (SEROQUEL) 100 MG Tab Take 100 mg by mouth every evening.      Family History     Problem Relation (Age of Onset)    Asthma Brother    Cancer Maternal Grandmother    Depression Brother    Diabetes Mother, Father, Sister    Hearing loss Sister    Heart disease Sister    Hypertension Sister    Learning disabilities Sister    Vision loss Sister        Tobacco Use    Smoking status: Current Every Day Smoker     Packs/day: 0.50     Years: 35.00     Pack years: 17.50     Types: Cigarettes    Smokeless tobacco: Never Used   Substance and Sexual Activity    Alcohol use: No     Alcohol/week: 0.0 oz     Comment: questionable per     Drug use: No    Sexual activity: Not Currently     Partners: Male     Birth control/protection: None     Review of Systems   Unable to perform ROS: Mental status change     Objective:     Vital Signs (Most Recent):  Temp: 98.2 °F (36.8 °C) (03/29/19 2351)  Pulse: 83 (03/30/19 0425)  Resp: 18 (03/29/19 2351)  BP: (!) 146/78 (03/30/19 0401)  SpO2: 100 % (03/30/19 0425) Vital Signs (24h Range):  Temp:  [98.2 °F (36.8 °C)] 98.2 °F (36.8 °C)  Pulse:  [78-83] 83  Resp:  [18] 18  SpO2:  [99 %-100 %] 100 %  BP: (138-160)/(64-84) 146/78     Weight: 40.4 kg (89 lb)  Body mass index is 13.94 kg/m².    Physical Exam   Constitutional: No distress.   Chronically ill appearing   HENT:   Head: Normocephalic and atraumatic.   Nose: Nose normal.   Mouth/Throat: No oropharyngeal exudate.   Eyes: Conjunctivae and EOM are normal. Right eye exhibits no discharge. Left eye exhibits no discharge. No scleral icterus.   Neck: Normal range of motion. Neck supple. No JVD present. No tracheal deviation present. No thyromegaly present.   Cardiovascular: Normal rate, regular rhythm, normal heart sounds and intact distal pulses. Exam reveals no gallop and no friction rub.   No murmur heard.  Pulmonary/Chest: Effort normal  and breath sounds normal. No respiratory distress. She has no wheezes. She has no rales.   Abdominal: Soft. Bowel sounds are normal. There is no tenderness. There is no rebound.   Musculoskeletal: She exhibits no edema or tenderness.   Neurological: No cranial nerve deficit or sensory deficit. She exhibits normal muscle tone.   Lethargic   Skin: Skin is warm and dry. Capillary refill takes less than 2 seconds. She is not diaphoretic. There is erythema.   Left upper extremity with surgical wound with staples. Darkening of skin and erythema noted encompassing underarm and over surgical wound, excluding axilla         CRANIAL NERVES     CN III, IV, VI   Extraocular motions are normal.        Significant Labs: All pertinent labs within the past 24 hours have been reviewed.    Significant Imaging: I have reviewed and interpreted all pertinent imaging results/findings within the past 24 hours.    Assessment/Plan:     * Cellulitis of left upper arm  At site of recent surgery  Acute per patient report  Will start vanc/zosyn, considering dialysis patient and may have healthcare associated bacterial infection  F/u blood cultures  F/u ultrasound to ensure no DVT and patency of fistula    ESRD (end stage renal disease)    Last dialysis session yesterday, does not appear to be volume overloaded at this time. Nephrology consulted for management of inpatient diaylsis    Anemia of chronic renal failure  Continue home iron supplementation  Daily cbc, will monitor closely  Epogen per nephrology    Hypertension associated with diabetes  Chronic, controlled.  Latest blood pressure and vitals reviewed-   Temp:  [98.2 °F (36.8 °C)]   Pulse:  [78-83]   Resp:  [18]   BP: (138-160)/(64-84)   SpO2:  [99 %-100 %] .   Current meds for hypertension include amlodipine, holding lisinopril.  Will continue BP medications as needed for sustained BP control.        H/O: CVA (cerebrovascular accident)  Continue asa, hold plavix in case potential  surgical management is needed, if no surgery indicated can restart tomorrow. Will monitor for potential complications of worsening cerebral vascular disease      VTE Risk Mitigation (From admission, onward)    Heparin Sq TID             Silvio Celestin MD  Department of Hospital Medicine   [facility]

## 2019-03-30 NOTE — SUBJECTIVE & OBJECTIVE
Past Medical History:   Diagnosis Date    Arthritis     Asthma     CHF (congestive heart failure)     Coronary artery disease     Diabetes mellitus     Hypertension     Pancreatitis     Type 2 diabetes mellitus with hyperglycemia 7/13/2015       Past Surgical History:   Procedure Laterality Date    ASPIRATION ABSCESS N/A 10/27/2015    Performed by Y[facility]enia Surgeon at [facility] YECENIA    CARDIAC SURGERY      CABG    CHOLECYSTECTOMY      COLECTOMY-RIGHT N/A 8/25/2014    [facility]formed by Harlan Regalado MD at [facility] OR    CORONARY ARTERY BYPASS GRAFT      CREATION, AV FISTULA Left 3/19/2019    Performed by [facility]os [facility]ouza [facility] at [facility] OR    EGD (ESOPHAGOGASTRODUODENOSCOPY) N/A 4/16/2014    Performed by Crow Brantley MD at [facility] ENDO    EGD (ESOPHAGOGASTRODUODENOSCOPY) N/A 4/2/2014    Performed by Crow [facility]ley MD at [facility] ENDO    EGD (ESOPHAGOGASTRODUODENOSCOPY) N/A 6/6/2013    [facility]formed by Crow [facility] at [facility] ENDO    ESOPHAGOGASTRODUODENOSCOPY (EGD) N/A 8/22/2014    Performed by Satnam Nguyen MD at [facility] ENDO    HYSTERECTOMY      INSERTION, CATHETER, TUNNELED Right 1/18/2019    [facility] OR    REMOVAL, CATHETER, HEMODIALYSIS Right 1/18/2019    [facility] OR    STERNOTOMY N/A 6/6/2013    Performed by Ole Ingram MD at [facility] OR    THORACOTOMY N/A 6/6/2013    Performed by Ole [facility]gram [facility] at [facility] OR       Review of patient's allergies indicates:  No Known Allergies    No current facility-administered medications on file prior to encounter.      Current Outpatient Medications on File Prior to Encounter   Medication Sig    amLODIPine (NORVASC) 5 MG tablet Take 1 tablet (5 mg total) by mouth once daily.    ascorbic acid, vitamin C, (VITAMIN C) 500 MG tablet Take 1 tablet (500 mg total) by mouth every evening.    aspirin 81 MG chewable tablet Take 81 mg by mouth once daily. Ran out    atorvastatin (LIPITOR) 80 MG tablet Take 1  tablet (80 mg total) by mouth once daily.    clopidogrel (PLAVIX) 75 mg tablet Take 1 tablet (75 mg total) by mouth once daily.    ferrous sulfate 325 (65 FE) MG EC tablet Take 1 tablet (325 mg total) by mouth 2 (two) times daily.    HUMALOG KWIKPEN INSULIN 100 unit/mL InPn pen Inject 2 Units into the skin 3 (three) times daily before meals.    insulin detemir U-100 (LEVEMIR FLEXTOUCH) 100 unit/mL (3 mL) SubQ InPn pen Inject 2 Units into the skin 2 (two) times daily. (Patient taking differently: Inject 15 Units into the skin every evening. )    lisinopril (PRINIVIL,ZESTRIL) 2.5 MG tablet Take 2.5 mg by mouth once daily.     magnesium oxide (MAG-OX) 400 mg tablet Take 1 tablet (400 mg total) by mouth once daily.    quetiapine (SEROQUEL) 100 MG Tab Take 100 mg by mouth every evening.      Family History     Problem Relation (Age of Onset)    Asthma Brother    Cancer Maternal Grandmother    Depression Brother    Diabetes Mother, Father, Sister    Hearing loss Sister    Heart disease Sister    Hypertension Sister    Learning disabilities Sister    Vision loss Sister        Tobacco Use    Smoking status: Current Every Day Smoker     Packs/day: 0.50     Years: 35.00     Pack years: 17.50     Types: Cigarettes    Smokeless tobacco: Never Used   Substance and Sexual Activity    Alcohol use: No     Alcohol/week: 0.0 oz     Comment: questionable per     Drug use: No    Sexual activity: Not Currently     Partners: Male     Birth control/protection: None     Review of Systems   Unable to perform ROS: Mental status change     Objective:     Vital Signs (Most Recent):  Temp: 98.2 °F (36.8 °C) (03/29/19 2351)  Pulse: 83 (03/30/19 0425)  Resp: 18 (03/29/19 2351)  BP: (!) 146/78 (03/30/19 0401)  SpO2: 100 % (03/30/19 0425) Vital Signs (24h Range):  Temp:  [98.2 °F (36.8 °C)] 98.2 °F (36.8 °C)  Pulse:  [78-83] 83  Resp:  [18] 18  SpO2:  [99 %-100 %] 100 %  BP: (138-160)/(64-84) 146/78     Weight: 40.4 kg (89  lb)  Body mass index is 13.94 kg/m².    Physical Exam   Constitutional: No distress.   Chronically ill appearing   HENT:   Head: Normocephalic and atraumatic.   Nose: Nose normal.   Mouth/Throat: No oropharyngeal exudate.   Eyes: Conjunctivae and EOM are normal. Right eye exhibits no discharge. Left eye exhibits no discharge. No scleral icterus.   Neck: Normal range of motion. Neck supple. No JVD present. No tracheal deviation present. No thyromegaly present.   Cardiovascular: Normal rate, regular rhythm, normal heart sounds and intact distal pulses. Exam reveals no gallop and no friction rub.   No murmur heard.  Pulmonary/Chest: Effort normal and breath sounds normal. No respiratory distress. She has no wheezes. She has no rales.   Abdominal: Soft. Bowel sounds are normal. There is no tenderness. There is no rebound.   Musculoskeletal: She exhibits no edema or tenderness.   Neurological: No cranial nerve deficit or sensory deficit. She exhibits normal muscle tone.   Lethargic   Skin: Skin is warm and dry. Capillary refill takes less than 2 seconds. She is not diaphoretic. There is erythema.   Left upper extremity with surgical wound with staples. Darkening of skin and erythema noted encompassing underarm and over surgical wound, excluding axilla         CRANIAL NERVES     CN III, IV, VI   Extraocular motions are normal.        Significant Labs: All pertinent labs within the past 24 hours have been reviewed.    Significant Imaging: I have reviewed and interpreted all pertinent imaging results/findings within the past 24 hours.

## 2019-03-30 NOTE — ASSESSMENT & PLAN NOTE
Continue asa  Plavix held until seen by vascular surgery in case she needs surgery.   Will monitor for potential complications of worsening cerebral vascular disease

## 2019-03-30 NOTE — ED PROVIDER NOTES
Encounter Date: 3/29/2019    SCRIBE #1 NOTE: Vito RIVAS am scribing for, and in the presence of, Dr. Calderon .       History     Chief Complaint   Patient presents with    Post-op Problem     Graft to Lt arm pain and swelling.  Placement x 1 week.  Pt also endorses pain to chest port that has been placed for several months        Time seen by provider: 12:23 AM on 03/30/2019    Errol Keita is a 53 y.o. female with a hx of DM, HTN, pancreatitis, Asthma, CHF, and CAD who presents to the ED with c/o left arm pain associated with redness and swelling. Graft was placed in the left arm 1 week ago. She also endorse pain to the chest port that has been placed for several months. The left arm fistula dressing was changed a few hours ago. She has not taken any pain medication today. Fever denied. NKDA noted.     The history is provided by the patient.     Review of patient's allergies indicates:  No Known Allergies  Past Medical History:   Diagnosis Date    Arthritis     Asthma     CHF (congestive heart failure)     Coronary artery disease     Diabetes mellitus     Hypertension     Pancreatitis     Type 2 diabetes mellitus with hyperglycemia 7/13/2015     Past Surgical History:   Procedure Laterality Date    ASPIRATION ABSCESS N/A 10/27/2015    Performed by Yecenia Surgeon at [facility] YECENIA    CARDIAC SURGERY      CABG    CHOLECYSTECTOMY      COLECTOMY-RIGHT N/A 8/25/2014    [facility]formed by [facility]alado MD at [facility] OR    CORONARY ARTERY BYPASS GRAFT      CREATION, AV FISTULA Left 3/19/2019    Performed by [facility] at [facility] OR    EGD (ESOPHAGOGASTRODUODENOSCOPY) N/A 4/16/2014    Performed by Crow Brantley MD at [facility] ENDO    EGD (ESOPHAGOGASTRODUODENOSCOPY) N/A 4/2/2014    Performed by Crow [facility]ley MD at [facility] ENDO    EGD (ESOPHAGOGASTRODUODENOSCOPY) N/A 6/6/2013    Performed by Crow [facility]ley MD at [facility] ENDO    ESOPHAGOGASTRODUODENOSCOPY (EGD) N/A 8/22/2014    Performed by  "Satnam Nguyen MD at [facility] ENDO    HYSTERECTOMY      INSERTION, CATHETER, TUNNELED Right 1/18/2019    [facility]formed by [facility] MD at [facility] OR    REMOVAL, CATHETER, HEMODIALYSIS Right 1/18/2019    Performed by [facility] at [facility] OR    STERNOTOMY N/A 6/6/2013    Performed by Ole Ingram MD at [facility] OR    THORACOTOMY N/A 6/6/2013    Performed by Ole Ingram MD at [facility] OR     Family History   Problem Relation Age of Onset    Diabetes Mother     Diabetes Father     Diabetes Sister     Hearing loss Sister     Heart disease Sister     Hypertension Sister     Learning disabilities Sister     Vision loss Sister     Asthma Brother     Depression Brother     Cancer Maternal Grandmother      Social History     Tobacco Use    Smoking status: Current Every Day Smoker     Packs/day: 0.50     Years: 35.00     Pack years: 17.50     Types: Cigarettes    Smokeless tobacco: Never Used   Substance Use Topics    Alcohol use: No     Alcohol/week: 0.0 oz     Comment: questionable per     Drug use: No     Review of Systems   Constitutional: Negative for fever.   HENT: Negative for congestion.    Eyes: Negative for visual disturbance.   Respiratory: Negative for wheezing.    Cardiovascular: Negative for chest pain.   Gastrointestinal: Negative for abdominal pain.   Genitourinary: Negative for dysuria.   Musculoskeletal: Positive for myalgias (Lt arm). Negative for joint swelling.        + for "arm swelling"   Skin: Positive for color change (area of redness). Negative for rash.   Neurological: Negative for syncope.   Hematological: Does not bruise/bleed easily.   Psychiatric/Behavioral: Negative for confusion.       Physical Exam     Initial Vitals [03/29/19 2351]   BP Pulse Resp Temp SpO2   (!) 160/84 79 18 98.2 °F (36.8 °C) 99 %      MAP       --         Physical Exam    Nursing note and vitals reviewed.  Constitutional: She appears well-nourished. She appears cachectic.   HENT:   Head: " "Normocephalic and atraumatic.   Poor dentition.    Eyes: Conjunctivae and EOM are normal.   Neck: Normal range of motion. Neck supple. No thyroid mass present.   Cardiovascular: Normal rate, regular rhythm and normal heart sounds. Exam reveals no gallop and no friction rub.    No murmur heard.  Pulmonary/Chest: Breath sounds normal. She has no wheezes. She has no rhonchi. She has no rales.   Tenderness around right upper chest dialysis catheter. Scant surrounding erythema. Tenderness with manipulation of the graft.    Abdominal: Soft. Normal appearance and bowel sounds are normal. There is no tenderness.   Neurological: She is alert and oriented to person, place, and time. She has normal strength. No cranial nerve deficit or sensory deficit.   Skin: Skin is warm and dry. No rash noted. There is erythema.   Surgical incision with staples in place. Left upper arm palpable thrill from underline AV fistula. Pain with manipulation. Erythema and warmth to left upper arm, circumferential.    Psychiatric: She has a normal mood and affect. Her speech is normal. Cognition and memory are normal.         ED Course   Central Line  Date/Time: 3/30/2019 5:22 AM  Location procedure was performed: [facility] EMERGENCY DEPARTMENT  Performed by: Leonardo Calderon MD  Assisting provider: Usama Rocha RN  Pre-operative Diagnosis: Left arm cellulitis  Post-operative diagnosis: Left arm cellulitis  Consent Done: Emergent Situation  Time out: Immediately prior to procedure a "time out" was called to verify the correct patient, procedure, equipment, support staff and site/side marked as required.  Indications: vascular access  Anesthesia: local infiltration    Anesthesia:  Local Anesthetic: lidocaine 1% without epinephrine  Anesthetic total: 3 mL  Description of findings: Normal external neck   Preparation: skin prepped with ChloraPrep  Skin prep agent dried: skin prep agent completely dried prior to procedure  Sterile barriers: all five " maximum sterile barriers used - cap, mask, sterile gown, sterile gloves, and large sterile sheet  Hand hygiene: hand hygiene performed prior to central venous catheter insertion  Location details: right internal jugular  Catheter Type: quad lumen.  Catheter Length: 11cm    Ultrasound guidance: yes  Vessel Caliber: medium, patent, compressibility normal  Vascular Doppler: not done  Needle advanced into vessel with real time Ultrasound guidance.  Guidewire confirmed in vessel.  Sterile sheath used.  Manometry: Yes  Number of attempts: 1  Assessment: no pneumothorax on x-ray and placement verified by x-ray  Technical procedures used: Ultrasound-guided central placement  Significant surgical tasks conducted by the assistant(s): Patient positioning  Complications: none  Estimated blood loss (mL): 5  Specimens: No  Implants: No  Post-procedure: chlorhexidine patch,  sterile dressing applied,  blood return through all ports and line sutured  Complications: No        Labs Reviewed   CULTURE, BLOOD   CULTURE, BLOOD   CBC W/ AUTO DIFFERENTIAL   COMPREHENSIVE METABOLIC PANEL   LACTIC ACID, PLASMA   URINALYSIS, REFLEX TO URINE CULTURE          Imaging Results    None          Medical Decision Making:   History:   Old Medical Records: I decided to obtain old medical records.  Clinical Tests:   Lab Tests: Ordered and Reviewed  Radiological Study: Ordered and Reviewed  ED Management:  This patient was received assessed emergently.  Initial vital signs stable. Physical exam indicates an area of left upper extremity cellulitis overlying a newly placed left upper arm AV fistula.  Vascular access could not be obtained and the patient did require central line placement for antibiotic administration.  Labs found to be unremarkable for evidence of leukocytosis or lactate elevation.  There is no evidence of end-organ dysfunction.  Patient does warrant admission for further IV antibiotic administration, potential vascular surgery  consultation.  Case was discussed with the on-call admitting physician, Dr. Celestin, who accepted the patient.  Patient updated on this disposition and in agreement and she was transferred to a medical bed in guarded condition.            Scribe Attestation:   Scribe #1: I performed the above scribed service and the documentation accurately describes the services I performed. I attest to the accuracy of the note.    I, Dr. Leonardo Calderon, personally performed the services described in this documentation. All medical record entries made by the scribe were at my direction and in my presence.  I have reviewed the chart and agree that the record reflects my personal performance and is accurate and complete. Leonardo Calderon MD.  5:25 AM 03/30/2019             Clinical Impression:     1. Left upper arm pain    2. Encounter for central line placement    3. Cellulitis of left upper arm          Disposition:   Disposition: Admitted  Condition: Fair                        Leonardo Calderon MD  03/30/19 0525

## 2019-03-30 NOTE — NURSING
AAOx3, disoriented to time, reoriented as needed. Plan of care reviewed with patient. Safety maintained throughout shift. Bed locked and low, SRx2, call light within reach. IV abx infusing per orders. Telemetry monitoring. Hourly/q2 rounding completed this shift for pt safety. Will continue to monitor.

## 2019-03-30 NOTE — SUBJECTIVE & OBJECTIVE
Interval History: Patient currently has no complaints. States that norco is helping patient with pian.     Review of Systems   Constitutional: Negative for chills and fever.   Respiratory: Negative for shortness of breath.    Cardiovascular: Negative for chest pain.   Gastrointestinal: Negative for abdominal pain.   Skin: Positive for rash.     Objective:     Vital Signs (Most Recent):  Temp: 97.6 °F (36.4 °C) (03/30/19 0602)  Pulse: 76 (03/30/19 0602)  Resp: 14 (03/30/19 0602)  BP: (!) 149/70 (03/30/19 0602)  SpO2: 99 % (03/30/19 0900) Vital Signs (24h Range):  Temp:  [97.6 °F (36.4 °C)-98.2 °F (36.8 °C)] 97.6 °F (36.4 °C)  Pulse:  [76-83] 76  Resp:  [14-18] 14  SpO2:  [98 %-100 %] 99 %  BP: (138-160)/(64-84) 149/70     Weight: 43.2 kg (95 lb 3 oz)  Body mass index is 14.91 kg/m².  No intake or output data in the 24 hours ending 03/30/19 0949   Physical Exam   HENT:   Head: Normocephalic and atraumatic.   Cardiovascular: Normal rate and regular rhythm.   Pulmonary/Chest: Effort normal and breath sounds normal.   Abdominal: Soft. Bowel sounds are normal.   Neurological: She is alert.   Skin:            Significant Labs:   Recent Lab Results       03/30/19  0415   03/30/19  0241        Albumin   3.0     Alkaline Phosphatase   170     ALT   40     Anion Gap   11     AST   33     Baso #   0.00     Basophil%   0.4     Total Bilirubin   0.3  Comment:  For infants and newborns, interpretation of results should be based  on gestational age, weight and in agreement with clinical  observations.  Premature Infant recommended reference ranges:  Up to 24 hours.............<8.0 mg/dL  Up to 48 hours............<12.0 mg/dL  3-5 days..................<15.0 mg/dL  6-29 days.................<15.0 mg/dL       BUN, Bld   32     Calcium   9.2     Chloride   105     CO2   25     Creatinine   2.8     Differential Method   Automated     eGFR if    21     eGFR if non    18  Comment:  Calculation used to  obtain the estimated glomerular filtration  rate (eGFR) is the CKD-EPI equation.        Eos #   0.1     Eosinophil%   0.8     Glucose   263     Gran # (ANC)   6.7     Gran%   70.3     Hematocrit   30.3     Hemoglobin   9.8     Lactate, Philip 1.2  Comment:  Falsely low lactic acid results can be found in samples   containing >=13.0 mg/dL total bilirubin and/or >=3.5 mg/dL   direct bilirubin.   1.0  Comment:  Falsely low lactic acid results can be found in samples   containing >=13.0 mg/dL total bilirubin and/or >=3.5 mg/dL   direct bilirubin.       Lymph #   2.3     Lymph%   24.1     MCH   31.8     MCHC   32.3     MCV   98     Mono #   0.4     Mono%   4.4     MPV   7.6     Platelets   425     Potassium   3.6     Total Protein   6.6     RBC   3.09     RDW   14.9     Sodium   141     WBC   9.60           Significant Imaging: CXR: I have reviewed all pertinent results/findings within the past 24 hours

## 2019-03-30 NOTE — ASSESSMENT & PLAN NOTE
Continue asa, hold plavix in case potential surgical management is needed, if no surgery indicated can restart tomorrow. Will monitor for potential complications of worsening cerebral vascular disease

## 2019-03-30 NOTE — PROGRESS NOTES
[facility] Hospital Medicine  Progress Note    Patient Name: Errol Keita  MRN: [id number]  Patient Class: IP- Inpatient   Admission Date: 3/30/2019  Length of Stay: 0 days  Attending Physician: Gloria Frank MD  Primary Care Provider: Bharat Wiggins MD        Subjective:     Principal Problem:Cellulitis of left upper arm    HPI:  Ms. Keita is a 54 yo with hx of DM, HTN, Asthma, CHF, and CAD who is admitted to Medicine service with complaint of left arm pain associated with redness and swelling for last 1 day. She states she dialyzed today. She had AV fistula creation on 3/19/19 by Dr. Butler.  She has noticed generalized weakness, but no fever, chills, n/v, abd pain, syncope, chest pain or shortness of breath. She is poor historian at this time due to receiving IV morphine in ED.     Vancomycin given in ED as well as 6mg morphine.  500cc bolus given.  WBC and lactic acid normal.  Read on cxr and arm xray pending. No obvious fx noted.  U/s of upper extremities pending at time of consultation.         Hospital Course:  No notes on file    Interval History: Patient currently has no complaints. States that norco is helping patient with pian.     Review of Systems   Constitutional: Negative for chills and fever.   Respiratory: Negative for shortness of breath.    Cardiovascular: Negative for chest pain.   Gastrointestinal: Negative for abdominal pain.   Skin: Positive for rash.     Objective:     Vital Signs (Most Recent):  Temp: 97.6 °F (36.4 °C) (03/30/19 0602)  Pulse: 76 (03/30/19 0602)  Resp: 14 (03/30/19 0602)  BP: (!) 149/70 (03/30/19 0602)  SpO2: 99 % (03/30/19 0900) Vital Signs (24h Range):  Temp:  [97.6 °F (36.4 °C)-98.2 °F (36.8 °C)] 97.6 °F (36.4 °C)  Pulse:  [76-83] 76  Resp:  [14-18] 14  SpO2:  [98 %-100 %] 99 %  BP: (138-160)/(64-84) 149/70     Weight: 43.2 kg (95 lb 3 oz)  Body mass index is 14.91 kg/m².  No intake or output data in the 24 hours ending 03/30/19 0929    Physical Exam   HENT:   Head: Normocephalic and atraumatic.   Cardiovascular: Normal rate and regular rhythm.   Pulmonary/Chest: Effort normal and breath sounds normal.   Abdominal: Soft. Bowel sounds are normal.   Neurological: She is alert.   Skin:            Significant Labs:   Recent Lab Results       03/30/19  0415   03/30/19  0241        Albumin   3.0     Alkaline Phosphatase   170     ALT   40     Anion Gap   11     AST   33     Baso #   0.00     Basophil%   0.4     Total Bilirubin   0.3  Comment:  For infants and newborns, interpretation of results should be based  on gestational age, weight and in agreement with clinical  observations.  Premature Infant recommended reference ranges:  Up to 24 hours.............<8.0 mg/dL  Up to 48 hours............<12.0 mg/dL  3-5 days..................<15.0 mg/dL  6-29 days.................<15.0 mg/dL       BUN, Bld   32     Calcium   9.2     Chloride   105     CO2   25     Creatinine   2.8     Differential Method   Automated     eGFR if    21     eGFR if non    18  Comment:  Calculation used to obtain the estimated glomerular filtration  rate (eGFR) is the CKD-EPI equation.        Eos #   0.1     Eosinophil%   0.8     Glucose   263     Gran # (ANC)   6.7     Gran%   70.3     Hematocrit   30.3     Hemoglobin   9.8     Lactate, Philip 1.2  Comment:  Falsely low lactic acid results can be found in samples   containing >=13.0 mg/dL total bilirubin and/or >=3.5 mg/dL   direct bilirubin.   1.0  Comment:  Falsely low lactic acid results can be found in samples   containing >=13.0 mg/dL total bilirubin and/or >=3.5 mg/dL   direct bilirubin.       Lymph #   2.3     Lymph%   24.1     MCH   31.8     MCHC   32.3     MCV   98     Mono #   0.4     Mono%   4.4     MPV   7.6     Platelets   425     Potassium   3.6     Total Protein   6.6     RBC   3.09     RDW   14.9     Sodium   141     WBC   9.60           Significant Imaging: CXR: I have reviewed all  pertinent results/findings within the past 24 hours    Assessment/Plan:      * Cellulitis of left upper arm  At site of recent surgery  vanc/zosyn, considering dialysis patient and may have healthcare associated bacterial infection  F/u blood cultures  F/u ultrasound to ensure no DVT and patency of fistula    ESRD (end stage renal disease)    Last dialysis session yesterday, does not appear to be volume overloaded at this time. Nephrology consulted for management of inpatient diaylsis    Type 1 diabetes mellitus with stage 4 chronic kidney disease  Levemir 15 units qhs  Low dose sliding scale insulin  Diabetic diet    Anemia of chronic renal failure  Continue home iron supplementation  Daily cbc, will monitor closely  Epogen per nephrology  Nephrology consulted    Hypertension associated with diabetes  Chronic, .  Latest blood pressure and vitals reviewed-   BP: (138-160)/(64-84)   Current meds for hypertension include amlodipine, holding lisinopril.  Will continue BP medications as needed for sustained BP control.        Hypercholesteremia  Continue lipitor      H/O: CVA (cerebrovascular accident)  Continue asa  Plavix held until seen by vascular surgery in case she needs surgery.   Will monitor for potential complications of worsening cerebral vascular disease        VTE Risk Mitigation (From admission, onward)        Ordered     heparin (porcine) injection 5,000 Units  Every 8 hours      03/30/19 0531     IP VTE HIGH RISK PATIENT  Once      03/30/19 0531              Ana Patel MD  Department of Hospital Medicine   [facility]

## 2019-03-30 NOTE — ASSESSMENT & PLAN NOTE
Chronic, .  Latest blood pressure and vitals reviewed-   BP: (138-160)/(64-84)   Current meds for hypertension include amlodipine, holding lisinopril.  Will continue BP medications as needed for sustained BP control.

## 2019-03-31 LAB
ANION GAP SERPL CALC-SCNC: 13 MMOL/L (ref 8–16)
BASOPHILS # BLD AUTO: 0.1 K/UL (ref 0–0.2)
BASOPHILS NFR BLD: 0.8 % (ref 0–1.9)
BUN SERPL-MCNC: 41 MG/DL (ref 6–20)
CALCIUM SERPL-MCNC: 9.3 MG/DL (ref 8.7–10.5)
CHLORIDE SERPL-SCNC: 107 MMOL/L (ref 95–110)
CO2 SERPL-SCNC: 22 MMOL/L (ref 23–29)
CREAT SERPL-MCNC: 3.2 MG/DL (ref 0.5–1.4)
DIFFERENTIAL METHOD: ABNORMAL
EOSINOPHIL # BLD AUTO: 0 K/UL (ref 0–0.5)
EOSINOPHIL NFR BLD: 0.2 % (ref 0–8)
ERYTHROCYTE [DISTWIDTH] IN BLOOD BY AUTOMATED COUNT: 14.6 % (ref 11.5–14.5)
EST. GFR  (AFRICAN AMERICAN): 18 ML/MIN/1.73 M^2
EST. GFR  (NON AFRICAN AMERICAN): 16 ML/MIN/1.73 M^2
GLUCOSE SERPL-MCNC: 58 MG/DL (ref 70–110)
GLUCOSE SERPL-MCNC: 59 MG/DL (ref 70–110)
HCT VFR BLD AUTO: 32.9 % (ref 37–48.5)
HGB BLD-MCNC: 10.7 G/DL (ref 12–16)
LYMPHOCYTES # BLD AUTO: 0.7 K/UL (ref 1–4.8)
LYMPHOCYTES NFR BLD: 5.7 % (ref 18–48)
MCH RBC QN AUTO: 32 PG (ref 27–31)
MCHC RBC AUTO-ENTMCNC: 32.6 G/DL (ref 32–36)
MCV RBC AUTO: 98 FL (ref 82–98)
MONOCYTES # BLD AUTO: 0.2 K/UL (ref 0.3–1)
MONOCYTES NFR BLD: 1.8 % (ref 4–15)
NEUTROPHILS # BLD AUTO: 11.7 K/UL (ref 1.8–7.7)
NEUTROPHILS NFR BLD: 91.5 % (ref 38–73)
PLATELET # BLD AUTO: 456 K/UL (ref 150–350)
PMV BLD AUTO: 8 FL (ref 9.2–12.9)
POCT GLUCOSE: 132 MG/DL (ref 70–110)
POCT GLUCOSE: 159 MG/DL (ref 70–110)
POCT GLUCOSE: 176 MG/DL (ref 70–110)
POCT GLUCOSE: 23 MG/DL (ref 70–110)
POCT GLUCOSE: 24 MG/DL (ref 70–110)
POCT GLUCOSE: 34 MG/DL (ref 70–110)
POCT GLUCOSE: 41 MG/DL (ref 70–110)
POCT GLUCOSE: 90 MG/DL (ref 70–110)
POTASSIUM SERPL-SCNC: 3.7 MMOL/L (ref 3.5–5.1)
RBC # BLD AUTO: 3.36 M/UL (ref 4–5.4)
SODIUM SERPL-SCNC: 142 MMOL/L (ref 136–145)
WBC # BLD AUTO: 12.8 K/UL (ref 3.9–12.7)

## 2019-03-31 PROCEDURE — 12000002 HC ACUTE/MED SURGE SEMI-PRIVATE ROOM

## 2019-03-31 PROCEDURE — 25000003 PHARM REV CODE 250: Performed by: INTERNAL MEDICINE

## 2019-03-31 PROCEDURE — 87040 BLOOD CULTURE FOR BACTERIA: CPT

## 2019-03-31 PROCEDURE — 25000003 PHARM REV CODE 250: Performed by: FAMILY MEDICINE

## 2019-03-31 PROCEDURE — 36415 COLL VENOUS BLD VENIPUNCTURE: CPT

## 2019-03-31 PROCEDURE — 85025 COMPLETE CBC W/AUTO DIFF WBC: CPT

## 2019-03-31 PROCEDURE — 80048 BASIC METABOLIC PNL TOTAL CA: CPT

## 2019-03-31 PROCEDURE — 63600175 PHARM REV CODE 636 W HCPCS: Performed by: INTERNAL MEDICINE

## 2019-03-31 PROCEDURE — 94761 N-INVAS EAR/PLS OXIMETRY MLT: CPT

## 2019-03-31 RX ORDER — CLOPIDOGREL BISULFATE 75 MG/1
75 TABLET ORAL DAILY
Status: DISCONTINUED | OUTPATIENT
Start: 2019-03-31 | End: 2019-04-02 | Stop reason: HOSPADM

## 2019-03-31 RX ADMIN — DEXTROSE MONOHYDRATE 25 G: 25 INJECTION, SOLUTION INTRAVENOUS at 06:03

## 2019-03-31 RX ADMIN — HEPARIN SODIUM 5000 UNITS: 5000 INJECTION, SOLUTION INTRAVENOUS; SUBCUTANEOUS at 09:03

## 2019-03-31 RX ADMIN — AMLODIPINE BESYLATE 5 MG: 5 TABLET ORAL at 08:03

## 2019-03-31 RX ADMIN — PIPERACILLIN AND TAZOBACTAM 3.38 G: 3; .375 INJECTION, POWDER, LYOPHILIZED, FOR SOLUTION INTRAVENOUS; PARENTERAL at 12:03

## 2019-03-31 RX ADMIN — ASPIRIN 81 MG CHEWABLE TABLET 81 MG: 81 TABLET CHEWABLE at 08:03

## 2019-03-31 RX ADMIN — FERROUS SULFATE TAB EC 325 MG (65 MG FE EQUIVALENT) 325 MG: 325 (65 FE) TABLET DELAYED RESPONSE at 08:03

## 2019-03-31 RX ADMIN — HEPARIN SODIUM 5000 UNITS: 5000 INJECTION, SOLUTION INTRAVENOUS; SUBCUTANEOUS at 07:03

## 2019-03-31 RX ADMIN — HYDROCODONE BITARTRATE AND ACETAMINOPHEN 1 TABLET: 5; 325 TABLET ORAL at 12:03

## 2019-03-31 RX ADMIN — ATORVASTATIN CALCIUM 80 MG: 40 TABLET, FILM COATED ORAL at 08:03

## 2019-03-31 RX ADMIN — CLOPIDOGREL BISULFATE 75 MG: 75 TABLET ORAL at 02:03

## 2019-03-31 RX ADMIN — Medication 24 G: at 11:03

## 2019-03-31 RX ADMIN — HEPARIN SODIUM 5000 UNITS: 5000 INJECTION, SOLUTION INTRAVENOUS; SUBCUTANEOUS at 02:03

## 2019-03-31 RX ADMIN — PIPERACILLIN AND TAZOBACTAM 3.38 G: 3; .375 INJECTION, POWDER, LYOPHILIZED, FOR SOLUTION INTRAVENOUS; PARENTERAL at 03:03

## 2019-03-31 RX ADMIN — PIPERACILLIN AND TAZOBACTAM 3.38 G: 3; .375 INJECTION, POWDER, LYOPHILIZED, FOR SOLUTION INTRAVENOUS; PARENTERAL at 08:03

## 2019-03-31 NOTE — PROGRESS NOTES
INPATIENT NEPHROLOGY PROGRESS NOTE  [facility] NEPHROLOGY    Patient Name: Errol Keita  Date: 03/31/2019    Reason for consultation: ESRD    Chief Complaint:   Chief Complaint   Patient presents with    Post-op Problem     Graft to Lt arm pain and swelling.  Placement x 1 week.  Pt also endorses pain to chest port that has been placed for several months        History of Present Illness:  Ms. Keita is a 52 yo with hx of DM, HTN, Asthma, CHF, CAD and ESRD on HD MWF who is admitted to Medicine service with complaint of left arm pain associated with redness and swelling for last 1 day. She had AV fistula creation on 3/19/19 by Dr. Butler. We are consulted for dialysis.    · Interval History/Subjective:    - afebrile, VSS, on RA  - 3/30 blood cx with GPCs   - feels tired today; no cp, dyspnea, abd pain, edema    · Review of Systems: All 14 systems reviewed and negative, except as noted in HPI    Plan of Care:    Assessment:  GPC bacteremia either from cellulitis overlying new AVF or TDC  ESRD  HTN  Hypokalemia  SHPT  Anemia of CKD    Plan:    - She is bacteremic- awaiting full speciation. Getting daily blood cx until negative. Dr. Desouza has been consulted. May need to exchange TDC if cx don't clear.   - Continue HD MWF.  - BP/VS are stable- continue home meds.  - K is stable. Will use 3K bath.   - Check phos.  - Hb just under goal- ordered EPO with HD for tomorrow. D/C oral iron- poor absorption in HD patients.     Thank you for allowing us to participate in this patient's care. We will continue to follow.    Medications:  No current facility-administered medications on file prior to encounter.      Current Outpatient Medications on File Prior to Encounter   Medication Sig Dispense Refill    amLODIPine (NORVASC) 5 MG tablet Take 1 tablet (5 mg total) by mouth once daily. 30 tablet 0    ascorbic acid, vitamin C, (VITAMIN C) 500 MG tablet Take 1 tablet (500 mg total) by mouth every evening.      aspirin 81  MG chewable tablet Take 81 mg by mouth once daily. Ran out      atorvastatin (LIPITOR) 80 MG tablet Take 1 tablet (80 mg total) by mouth once daily. 30 tablet 11    clopidogrel (PLAVIX) 75 mg tablet Take 1 tablet (75 mg total) by mouth once daily. 30 tablet 11    ferrous sulfate 325 (65 FE) MG EC tablet Take 1 tablet (325 mg total) by mouth 2 (two) times daily. 60 tablet 0    HUMALOG KWIKPEN INSULIN 100 unit/mL InPn pen Inject 2 Units into the skin 3 (three) times daily before meals. 15 mL 12    insulin detemir U-100 (LEVEMIR FLEXTOUCH) 100 unit/mL (3 mL) SubQ InPn pen Inject 2 Units into the skin 2 (two) times daily. (Patient taking differently: Inject 15 Units into the skin every evening. ) 1 Box 0    lisinopril (PRINIVIL,ZESTRIL) 2.5 MG tablet Take 2.5 mg by mouth once daily.       magnesium oxide (MAG-OX) 400 mg tablet Take 1 tablet (400 mg total) by mouth once daily. 30 tablet 0    quetiapine (SEROQUEL) 100 MG Tab Take 100 mg by mouth every evening.        Scheduled Meds:   amLODIPine  5 mg Oral Daily    aspirin  81 mg Oral Daily    atorvastatin  80 mg Oral Daily    clopidogrel  75 mg Oral Daily    [START ON 4/1/2019] epoetin jessica (PROCRIT) injection  50 Units/kg Intravenous Every Mon, Wed, Fri    ferrous sulfate  325 mg Oral BID    heparin (porcine)  5,000 Units Subcutaneous Q8H    insulin detemir U-100  4 Units Subcutaneous QHS    piperacillin-tazobactam (ZOSYN) IVPB  3.375 g Intravenous Q8H     Continuous Infusions:  PRN Meds:.acetaminophen, dextrose 50%, dextrose 50%, glucagon (human recombinant), glucose, glucose, HYDROcodone-acetaminophen, insulin aspart U-100, sodium chloride 0.9%    Allergies:  Patient has no known allergies.    Vital Signs:  Temp Readings from Last 3 Encounters:   03/31/19 96.8 °F (36 °C)   03/19/19 98.2 °F (36.8 °C)   03/12/19 97.9 °F (36.6 °C) (Oral)       Pulse Readings from Last 3 Encounters:   03/31/19 73   03/19/19 80   03/12/19 72       BP Readings from Last 3  Encounters:   03/31/19 (!) 130/93   03/19/19 (!) 170/80   03/12/19 109/60       Weight:  Wt Readings from Last 3 Encounters:   03/30/19 43.2 kg (95 lb 3 oz)   03/19/19 40.4 kg (89 lb)   01/20/19 47.2 kg (104 lb)       Physical Exam:  Constitutional: nad, aao x 3  Heart: rrr, no m/r/g, wwp, no edema  Lungs: ant ausc clear, no w/r/r/c, no lb  Abdomen: s/nt/nd, +BS    Results:  Lab Results   Component Value Date     03/31/2019    K 3.7 03/31/2019     03/31/2019    CO2 22 (L) 03/31/2019    BUN 41 (H) 03/31/2019    CREATININE 3.2 (H) 03/31/2019    CALCIUM 9.3 03/31/2019    ANIONGAP 13 03/31/2019    ESTGFRAFRICA 18 (A) 03/31/2019    EGFRNONAA 16 (A) 03/31/2019       Lab Results   Component Value Date    CALCIUM 9.3 03/31/2019    PHOS 5.8 (H) 03/12/2019       Recent Labs   Lab 03/31/19  0823   WBC 12.80*   RBC 3.36*   HGB 10.7*   HCT 32.9*   *   MCV 98   MCH 32.0*   MCHC 32.6       I have personally reviewed pertinent radiological imaging and reports.    Logan Dowell MD MPH  [facility] [address] [address]  [phone] (p)  [phone] (f)

## 2019-03-31 NOTE — ASSESSMENT & PLAN NOTE
Continue asa  Will restart plavix  Will monitor for potential complications of worsening cerebral vascular disease

## 2019-03-31 NOTE — CONSULTS
Consult Note  Cardiothoracic Surgery    Consults  SUBJECTIVE:     History of Present Illness:  Patient is a 53 y.o. female presents with ESRD recently s/p creation upper extremity AV fistula. Admitted with arm swelling and discomfort. Asked to evaluate site    Scheduled Meds:   amLODIPine  5 mg Oral Daily    aspirin  81 mg Oral Daily    atorvastatin  80 mg Oral Daily    [START ON 4/1/2019] epoetin jessica (PROCRIT) injection  50 Units/kg Intravenous Every Mon, Wed, Fri    ferrous sulfate  325 mg Oral BID    heparin (porcine)  5,000 Units Subcutaneous Q8H    insulin detemir U-100  15 Units Subcutaneous QHS    piperacillin-tazobactam (ZOSYN) IVPB  3.375 g Intravenous Q8H     Infusions/Drips:  PRN Meds:acetaminophen, dextrose 50%, dextrose 50%, glucagon (human recombinant), glucose, glucose, HYDROcodone-acetaminophen, insulin aspart U-100, morphine, sodium chloride 0.9%    Review of patient's allergies indicates:  No Known Allergies    Past Medical History:   Diagnosis Date    Arthritis     Asthma     CHF (congestive heart failure)     Coronary artery disease     Diabetes mellitus     Hypertension     Pancreatitis     Type 2 diabetes mellitus with hyperglycemia 7/13/2015     Past Surgical History:   Procedure Laterality Date    ASPIRATION ABSCESS N/A 10/27/2015    Performed by Yecenia Surgeon at [facility] YECENIA    CARDIAC SURGERY      CABG    CHOLECYSTECTOMY      COLECTOMY-RIGHT N/A 8/25/2014    Performed by Harlan Regalado [facility] at [facility] OR    CORONARY ARTERY BYPASS GRAFT      CREATION, AV FISTULA Left 3/19/2019    Performed by [facility]os [facility]ouza [facility] at [facility] OR    EGD (ESOPHAGOGASTRODUODENOSCOPY) N/A 4/16/2014    Performed by Crow Brantley MD at [facility] ENDO    EGD (ESOPHAGOGASTRODUODENOSCOPY) N/A 4/2/2014    Performed by Crow Brantley MD at [facility] ENDO    EGD (ESOPHAGOGASTRODUODENOSCOPY) N/A 6/6/2013    Performed by Crow Brantley MD at [facility] ENDO    ESOPHAGOGASTRODUODENOSCOPY (EGD) N/A 8/22/2014     Performed by Satnam Nguyen MD at [facility] ENDO    HYSTERECTOMY      pt states no she did not(?)    INSERTION, CATHETER, TUNNELED Right 1/18/2019    [facility] at [facility] OR    REMOVAL, CATHETER, HEMODIALYSIS Right 1/18/2019    Performed by [facility] at [facility] OR    STERNOTOMY N/A 6/6/2013    Performed by Ole Ingram MD at [facility] OR    THORACOTOMY N/A 6/6/2013    Performed by Ole Ingram MD at [facility] OR     Family History   Problem Relation Age of Onset    Diabetes Mother     Diabetes Father     Diabetes Sister     Hearing loss Sister     Heart disease Sister     Hypertension Sister     Learning disabilities Sister     Vision loss Sister     Asthma Brother     Depression Brother     Cancer Maternal Grandmother      Social History     Tobacco Use    Smoking status: Current Every Day Smoker     Packs/day: 0.50     Years: 35.00     Pack years: 17.50     Types: Cigarettes    Smokeless tobacco: Never Used   Substance Use Topics    Alcohol use: No     Alcohol/week: 0.0 oz     Comment: questionable per     Drug use: No        Review of Systems:    OBJECTIVE:     Vital Signs (Most Recent)      Admission Weight: 40.4 kg (89 lb) (03/29/19 2351)   Most Recent Weight: 43.2 kg (95 lb 3 oz) (03/30/19 0545)    Vital Signs Range (Last 24H):  Temp:  [97.4 °F (36.3 °C)-98.4 °F (36.9 °C)]   Pulse:  [71-78]   Resp:  [16-20]   BP: (119-161)/(58-79)   SpO2:  [96 %-100 %]     Physical Exam:  NAD  BS clear  CV RRR  Ext L upper extremity with mild swelling. Incisions appear to be healing well         + excellent bruit over fistula      Laboratory:  CBC:   Recent Labs   Lab 03/31/19  0823   WBC 12.80*   RBC 3.36*   HGB 10.7*   HCT 32.9*   *   MCV 98   MCH 32.0*   MCHC 32.6     BMP:   Recent Labs   Lab 03/31/19  0823   GLU 58*  59*      K 3.7      CO2 22*   BUN 41*   CREATININE 3.2*   CALCIUM 9.3       ASSESSMENT/PLAN:     Impression : ESRD                         S/p creation LUE AV fistula - fistula is patent and appears to be maturing as anticipated. ??slight cellulitis but primarily edema/induration typical at this point    Recommend : No surgery issue                            Continue as per primary service                           Can follow up as outpatient                           Typically staples are removed at 3 weeks

## 2019-03-31 NOTE — SUBJECTIVE & OBJECTIVE
Interval History:   Patient states that her left arm hurts. Recently had a episode of hypoglycemia (23) which was corrected with  Dextrose.    Review of Systems   Constitutional: Negative for chills and fever.   Respiratory: Negative for shortness of breath.    Cardiovascular: Negative for chest pain.   Gastrointestinal: Negative for abdominal pain.   Skin: Positive for rash.     Objective:     Vital Signs (Most Recent):  Temp: 98.4 °F (36.9 °C) (03/31/19 0421)  Pulse: 71 (03/31/19 0802)  Resp: 18 (03/31/19 0802)  BP: (!) 154/79 (03/31/19 0802)  SpO2: 97 % (03/31/19 0802) Vital Signs (24h Range):  Temp:  [97.4 °F (36.3 °C)-98.4 °F (36.9 °C)] 98.4 °F (36.9 °C)  Pulse:  [71-78] 71  Resp:  [16-20] 18  SpO2:  [96 %-100 %] 97 %  BP: (119-161)/(58-79) 154/79     Weight: 43.2 kg (95 lb 3 oz)  Body mass index is 14.91 kg/m².    Intake/Output Summary (Last 24 hours) at 3/31/2019 1035  Last data filed at 3/31/2019 0600  Gross per 24 hour   Intake 900 ml   Output --   Net 900 ml      Physical Exam   Constitutional: She appears well-developed.   HENT:   Head: Normocephalic and atraumatic.   Cardiovascular: Normal rate and regular rhythm.   Pulmonary/Chest: Effort normal and breath sounds normal.   Abdominal: Soft. Bowel sounds are normal.   Neurological: She is alert.   Skin: Skin is warm.   Psychiatric: She has a normal mood and affect.     Left Arm:          Significant Labs:   Recent Lab Results       03/31/19  0823   03/31/19  0726   03/31/19  0642   03/31/19  0639   03/30/19  2223        Anion Gap 13             Baso # 0.10             Basophil% 0.8             BUN, Bld 41             Calcium 9.3             Chloride 107             CO2 22             Creatinine 3.2             Differential Method Automated             eGFR if  18             eGFR if non  16  Comment:  Calculation used to obtain the estimated glomerular filtration  rate (eGFR) is the CKD-EPI equation.                Eos #  0.0             Eosinophil% 0.2             Glucose 58              59             Gran # (ANC) 11.7             Gran% 91.5             Hematocrit 32.9             Hemoglobin 10.7             Lymph # 0.7             Lymph% 5.7             MCH 32.0             MCHC 32.6             MCV 98             Mono # 0.2             Mono% 1.8             MPV 8.0             Platelets 456             POCT Glucose   132 24 23 244     Potassium 3.7             RBC 3.36             RDW 14.6             Sodium 142             WBC 12.80                              03/30/19  1650        Anion Gap       Baso #       Basophil%       BUN, Bld       Calcium       Chloride       CO2       Creatinine       Differential Method       eGFR if        eGFR if non        Eos #       Eosinophil%       Glucose       Gran # (ANC)       Gran%       Hematocrit       Hemoglobin       Lymph #       Lymph%       MCH       MCHC       MCV       Mono #       Mono%       MPV       Platelets       POCT Glucose 211     Potassium       RBC       RDW       Sodium       WBC             Significant Imaging:     Imaging Results          X-Ray Chest AP Portable (Final result)  Result time 03/30/19 09:29:14    Final result by Isabella Casanova MD (03/30/19 09:29:14)                 Impression:      Right internal jugular venous catheter inserted.  No pneumothorax.  Mild interstitial prominence less so today than on the prior exam.      Electronically signed by: Isabella Casanova MD  Date:    03/30/2019  Time:    09:29             Narrative:    EXAMINATION:  XR CHEST AP PORTABLE    CLINICAL HISTORY:  Encounter for adjustment and management of vascular access device    TECHNIQUE:  Single frontal view of the chest was performed.    COMPARISON:  1/20/2019    FINDINGS:  Interval insertion of right internal jugular venous catheter with the tip at the level of the SVC.  Dual tips catheter remains in place.  Cardiomediastinal silhouette  stable.  There is mild interstitial prominence with no consolidation or pleural effusion.  The postoperative changes left upper arm.                               US Upper Extremity Veins Left (Final result)  Result time 03/30/19 12:34:57    Final result by Isabella Casanova MD (03/30/19 12:34:57)                 Impression:      No thrombus in central veins of the left upper extremity.    Hypoechoic complex structure consist suggesting hematoma left arm.  Thyroid nodule.    Final read      Electronically signed by: Isabella Casanova MD  Date:    03/30/2019  Time:    12:34             Narrative:    EXAMINATION:  US UPPER EXTREMITY VEINS LEFT    CLINICAL HISTORY:  left arm swelling;    TECHNIQUE:  Duplex and color flow Doppler evaluation and dynamic compression was performed of the left upper extremity veins.    COMPARISON:  None    FINDINGS:  Central veins: The internal jugular, subclavian, and axillary veins are patent and free of thrombus.    Arm veins: The brachial, basilic, and cephalic veins are patent and compressible.    Contralateral subclavian/internal jugular veins: The right subclavian vein is patent and free of thrombus.    Other findings:    is 2.1 x 1.1 x 1.6 cm nodule left lobe of the thyroid gland that could be further evaluate with follow-up ultrasound of the thyroid gland    There is 7.5 x 1.6 x 2.5 cm complex hypoechoic structure in the left arm near the antecubital fossa most likely hematoma                               X-Ray Humerus 2 View Left (Final result)  Result time 03/30/19 12:35:38    Final result by Isabella Casanova MD (03/30/19 12:35:38)                 Impression:      Findings suggesting postoperative change left arm.      Electronically signed by: Isabella Casanova MD  Date:    03/30/2019  Time:    12:35             Narrative:    EXAMINATION:  XR HUMERUS 2 VIEW LEFT    CLINICAL HISTORY:  Pain in left upper arm    TECHNIQUE:  AP and lateral views left  humerus    COMPARISON:  9/12/2014    FINDINGS:  Interval postoperative changes appearing recent with multiple surgical clips and was skin staples.  There is small amount of air in the soft tissues likely on a postoperative basis.

## 2019-03-31 NOTE — ASSESSMENT & PLAN NOTE
Does not appear to be volume overloaded at this time. Nephrology consulted for management of inpatient diaylsis

## 2019-03-31 NOTE — ASSESSMENT & PLAN NOTE
After discussing events with nursing and their past experiences with  Keita it seems like her sugars are very labile. Will decrease levemir to 10 units qhs  Low dose sliding scale insulin  Diabetic diet

## 2019-03-31 NOTE — ASSESSMENT & PLAN NOTE
At site of recent surgery  vanc/zosyn, dialysis patient and may have healthcare associated bacterial infection  Blood cultures showed one bottle positive cocci in clusters resembling Staph. Will reorder another blood culture.   U/S showed hematoma of left arm but no thrombus  Seems like redness is improving  F/u vasc surgery recs  Patient complains of arm pain but it seems like she hasn't gotten or asked for norco.

## 2019-03-31 NOTE — NURSING
Situation-           I am Lauren E Abercrombie calling from [facility] in regards to Errol Stone Keita who is a 53 y.o. year old female admitted with Cellulitis of left upper arm who...   Background- Has a past medical history of   Past Medical History:   Diagnosis Date    Arthritis     Asthma     CHF (congestive heart failure)     Coronary artery disease     Diabetes mellitus     Hypertension     Pancreatitis     Type 2 diabetes mellitus with hyperglycemia 7/13/2015   . Most recent vitals include  Temp:  [97.4 °F (36.3 °C)-98.4 °F (36.9 °C)]   Pulse:  [73-78]   Resp:  [14-20]   BP: (119-161)/(58-75)   SpO2:  [96 %-100 %]  and labs          Assessment-     Who has positive blood cultures for gram positive cocci resembling staph. I wanted to make you aware. She is on Zosyn but wasn't sure if there was anything else you wanted me to do.    Recommendation- Any new orders?   Plan- New blood cultures. Notify day team.

## 2019-03-31 NOTE — NURSING
Buddy from Lab called with results from a gram stain aer bottle. It showed Gram positive cocci in clusters resembling Staph notified primary nurse Lauren Abercrombie, RN of results.

## 2019-03-31 NOTE — PROGRESS NOTES
[facility] Medicine  Progress Note    Patient Name: Errol Keita  MRN: [id number]  Patient Class: IP- Inpatient   Admission Date: 3/30/2019  Length of Stay: 1 days  Attending Physician: Gloria Frank MD  Primary Care Provider: Bharat Wiggins MD        Subjective:     Principal Problem:Cellulitis of left upper arm    HPI:  Ms. Keita is a 54 yo with hx of DM, HTN, Asthma, CHF, and CAD who is admitted to Medicine service with complaint of left arm pain associated with redness and swelling for last 1 day. She states she dialyzed today. She had AV fistula creation on 3/19/19 by Dr. Butler.  She has noticed generalized weakness, but no fever, chills, n/v, abd pain, syncope, chest pain or shortness of breath. She is poor historian at this time due to receiving IV morphine in ED.     Vancomycin given in ED as well as 6mg morphine.  500cc bolus given.  WBC and lactic acid normal.  Read on cxr and arm xray pending. No obvious fx noted.  U/s of upper extremities pending at time of consultation.         Hospital Course:  No notes on file    Interval History:   Patient states that her left arm hurts. Recently had a episode of hypoglycemia (23) which was corrected with  Dextrose.    Review of Systems   Constitutional: Negative for chills and fever.   Respiratory: Negative for shortness of breath.    Cardiovascular: Negative for chest pain.   Gastrointestinal: Negative for abdominal pain.   Skin: Positive for rash.     Objective:     Vital Signs (Most Recent):  Temp: 98.4 °F (36.9 °C) (03/31/19 0421)  Pulse: 71 (03/31/19 0802)  Resp: 18 (03/31/19 0802)  BP: (!) 154/79 (03/31/19 0802)  SpO2: 97 % (03/31/19 0802) Vital Signs (24h Range):  Temp:  [97.4 °F (36.3 °C)-98.4 °F (36.9 °C)] 98.4 °F (36.9 °C)  Pulse:  [71-78] 71  Resp:  [16-20] 18  SpO2:  [96 %-100 %] 97 %  BP: (119-161)/(58-79) 154/79     Weight: 43.2 kg (95 lb 3 oz)  Body mass index is 14.91 kg/m².    Intake/Output Summary (Last 24  hours) at 3/31/2019 1035  Last data filed at 3/31/2019 0600  Gross per 24 hour   Intake 900 ml   Output --   Net 900 ml      Physical Exam   Constitutional: She appears well-developed.   HENT:   Head: Normocephalic and atraumatic.   Cardiovascular: Normal rate and regular rhythm.   Pulmonary/Chest: Effort normal and breath sounds normal.   Abdominal: Soft. Bowel sounds are normal.   Neurological: She is alert.   Skin: Skin is warm.   Psychiatric: She has a normal mood and affect.     Left Arm:          Significant Labs:   Recent Lab Results       03/31/19  0823   03/31/19  0726   03/31/19  0642   03/31/19  0639   03/30/19  2223        Anion Gap 13             Baso # 0.10             Basophil% 0.8             BUN, Bld 41             Calcium 9.3             Chloride 107             CO2 22             Creatinine 3.2             Differential Method Automated             eGFR if  18             eGFR if non  16  Comment:  Calculation used to obtain the estimated glomerular filtration  rate (eGFR) is the CKD-EPI equation.                Eos # 0.0             Eosinophil% 0.2             Glucose 58              59             Gran # (ANC) 11.7             Gran% 91.5             Hematocrit 32.9             Hemoglobin 10.7             Lymph # 0.7             Lymph% 5.7             MCH 32.0             MCHC 32.6             MCV 98             Mono # 0.2             Mono% 1.8             MPV 8.0             Platelets 456             POCT Glucose   132 24 23 244     Potassium 3.7             RBC 3.36             RDW 14.6             Sodium 142             WBC 12.80                              03/30/19  1650        Anion Gap       Baso #       Basophil%       BUN, Bld       Calcium       Chloride       CO2       Creatinine       Differential Method       eGFR if        eGFR if non        Eos #       Eosinophil%       Glucose       Gran # (ANC)       Gran%        Hematocrit       Hemoglobin       Lymph #       Lymph%       MCH       MCHC       MCV       Mono #       Mono%       MPV       Platelets       POCT Glucose 211     Potassium       RBC       RDW       Sodium       WBC             Significant Imaging:     Imaging Results          X-Ray Chest AP Portable (Final result)  Result time 03/30/19 09:29:14    Final result by Isabella Casanova MD (03/30/19 09:29:14)                 Impression:      Right internal jugular venous catheter inserted.  No pneumothorax.  Mild interstitial prominence less so today than on the prior exam.      Electronically signed by: Isabella Casanova MD  Date:    03/30/2019  Time:    09:29             Narrative:    EXAMINATION:  XR CHEST AP PORTABLE    CLINICAL HISTORY:  Encounter for adjustment and management of vascular access device    TECHNIQUE:  Single frontal view of the chest was performed.    COMPARISON:  1/20/2019    FINDINGS:  Interval insertion of right internal jugular venous catheter with the tip at the level of the SVC.  Dual tips catheter remains in place.  Cardiomediastinal silhouette stable.  There is mild interstitial prominence with no consolidation or pleural effusion.  The postoperative changes left upper arm.                               US Upper Extremity Veins Left (Final result)  Result time 03/30/19 12:34:57    Final result by Isabella Casanova MD (03/30/19 12:34:57)                 Impression:      No thrombus in central veins of the left upper extremity.    Hypoechoic complex structure consist suggesting hematoma left arm.  Thyroid nodule.    Final read      Electronically signed by: Isabella Casanova MD  Date:    03/30/2019  Time:    12:34             Narrative:    EXAMINATION:  US UPPER EXTREMITY VEINS LEFT    CLINICAL HISTORY:  left arm swelling;    TECHNIQUE:  Duplex and color flow Doppler evaluation and dynamic compression was performed of the left upper extremity veins.    COMPARISON:  None    FINDINGS:  Central  veins: The internal jugular, subclavian, and axillary veins are patent and free of thrombus.    Arm veins: The brachial, basilic, and cephalic veins are patent and compressible.    Contralateral subclavian/internal jugular veins: The right subclavian vein is patent and free of thrombus.    Other findings:    is 2.1 x 1.1 x 1.6 cm nodule left lobe of the thyroid gland that could be further evaluate with follow-up ultrasound of the thyroid gland    There is 7.5 x 1.6 x 2.5 cm complex hypoechoic structure in the left arm near the antecubital fossa most likely hematoma                               X-Ray Humerus 2 View Left (Final result)  Result time 03/30/19 12:35:38    Final result by Isabella Casanova MD (03/30/19 12:35:38)                 Impression:      Findings suggesting postoperative change left arm.      Electronically signed by: Isabella aCsanova MD  Date:    03/30/2019  Time:    12:35             Narrative:    EXAMINATION:  XR HUMERUS 2 VIEW LEFT    CLINICAL HISTORY:  Pain in left upper arm    TECHNIQUE:  AP and lateral views left humerus    COMPARISON:  9/12/2014    FINDINGS:  Interval postoperative changes appearing recent with multiple surgical clips and was skin staples.  There is small amount of air in the soft tissues likely on a postoperative basis.                                  Assessment/Plan:      * Cellulitis of left upper arm  At site of recent surgery  vanc/zosyn, dialysis patient and may have healthcare associated bacterial infection  Blood cultures showed one bottle positive cocci in clusters resembling Staph. Will reorder another blood culture.   U/S showed hematoma of left arm but no thrombus  Seems like redness is improving  F/u vasc surgery recs  Patient complains of arm pain but it seems like she hasn't gotten or asked for norco.     ESRD (end stage renal disease)  Does not appear to be volume overloaded at this time. Nephrology consulted for management of inpatient diaylsis    Type 1  diabetes mellitus with stage 4 chronic kidney disease  After discussing events with nursing and their past experiences with Mrs. Keita it seems like her sugars are very labile. Will decrease levemir to 10 units qhs  Low dose sliding scale insulin  Diabetic diet    Anemia of chronic renal failure  Continue home iron supplementation  Daily cbc, will monitor closely  Epogen per nephrology  Nephrology consulted    Hypertension associated with diabetes  Chronic, .  Latest blood pressure and vitals reviewed-   BP: (138-160)/(64-84)   Current meds for hypertension include amlodipine, holding lisinopril.  Will continue BP medications as needed for sustained BP control.        Hypercholesteremia  Continue lipitor      H/O: CVA (cerebrovascular accident)  Continue asa  Will restart plavix  Will monitor for potential complications of worsening cerebral vascular disease        VTE Risk Mitigation (From admission, onward)        Ordered     heparin (porcine) injection 5,000 Units  Every 8 hours      03/30/19 0531     IP VTE HIGH RISK PATIENT  Once      03/30/19 0531              Ana Patel MD  Department of Hospital Medicine   [facility]

## 2019-03-31 NOTE — PLAN OF CARE
SW met w/pt for assessment, reviewed medical records.  Pt last d/c on 1/19/19.  Pt recently d/c from [facility] (no longer active). Pt has M w F dialysis @ [facility].  Pt lives w/ spouse, reports her spouse is able to provide supportive care.       03/31/19 1325   Discharge Assessment   Assessment Type Discharge Planning Assessment   Confirmed/corrected address and phone number on facesheet? Yes   Assessment information obtained from? Patient;Medical Record   Prior to hospitilization cognitive status: Alert/Oriented   Prior to hospitalization functional status: Independent   Current cognitive status: Alert/Oriented   Current Functional Status: Independent   Facility Arrived From: home   Lives With spouse   Able to Return to Prior Arrangements yes   Is patient able to care for self after discharge? Yes   Who are your caregiver(s) and their phone number(s)? spouse:  Ti Green  [phone]      sister: Damaris Cook [phone]   Patient's perception of discharge disposition home or selfcare   Readmission Within the Last 30 Days no previous admission in last 30 days   Patient currently being followed by outpatient case management? No   Patient currently receives any other outside agency services? No   Equipment Currently Used at Home none   Do you have any problems affording any of your prescribed medications? No   Is the patient taking medications as prescribed? yes   Does the patient have transportation home? Yes   Transportation Anticipated family or friend will provide   Dialysis Name and Scheduled days [facility] @ 2:00pm   Does the patient receive services at the [facility]? No   Discharge Plan A Home with family   Discharge Plan B Home with family   DME Needed Upon Discharge  none

## 2019-03-31 NOTE — PLAN OF CARE
Problem: Adult Inpatient Plan of Care  Goal: Plan of Care Review  Outcome: Ongoing (interventions implemented as appropriate)  Pt has remained free from injury this shift and is able to ambulate with minimal stand by assist only.  She has been treated for low blood sugar today and the MD is aware of her dropping and has made adjustments.  She is eating meals well and is drinking well during the day.  No signs of low blood sugar she is alert and denies feeling like her sugar is low.  She was given a added snack between lunch and dinner this evening.  No elevated temp today and lab is assessed daily for changes.  She has been medicated one times this shift.  Positive bruit and thrill to left arm noted, staples intact wound is open to air, no bleeding or drainage noted.  accuchecks assessed per MD order.  She is AAO and was encouraged to call for assistance or needs with return demonstration on use of call light.  Her  states her sugar drops at home really easily.

## 2019-04-01 LAB
ANION GAP SERPL CALC-SCNC: 9 MMOL/L (ref 8–16)
BASOPHILS # BLD AUTO: 0 K/UL (ref 0–0.2)
BASOPHILS NFR BLD: 0.2 % (ref 0–1.9)
BUN SERPL-MCNC: 47 MG/DL (ref 6–20)
CALCIUM SERPL-MCNC: 8.4 MG/DL (ref 8.7–10.5)
CHLORIDE SERPL-SCNC: 105 MMOL/L (ref 95–110)
CO2 SERPL-SCNC: 23 MMOL/L (ref 23–29)
CREAT SERPL-MCNC: 3.5 MG/DL (ref 0.5–1.4)
DIFFERENTIAL METHOD: ABNORMAL
EOSINOPHIL # BLD AUTO: 0.2 K/UL (ref 0–0.5)
EOSINOPHIL NFR BLD: 1.9 % (ref 0–8)
ERYTHROCYTE [DISTWIDTH] IN BLOOD BY AUTOMATED COUNT: 15.4 % (ref 11.5–14.5)
EST. GFR  (AFRICAN AMERICAN): 16 ML/MIN/1.73 M^2
EST. GFR  (NON AFRICAN AMERICAN): 14 ML/MIN/1.73 M^2
GLUCOSE SERPL-MCNC: 185 MG/DL (ref 70–110)
HCT VFR BLD AUTO: 26.9 % (ref 37–48.5)
HGB BLD-MCNC: 8.5 G/DL (ref 12–16)
LYMPHOCYTES # BLD AUTO: 1.1 K/UL (ref 1–4.8)
LYMPHOCYTES NFR BLD: 11.6 % (ref 18–48)
MCH RBC QN AUTO: 31.2 PG (ref 27–31)
MCHC RBC AUTO-ENTMCNC: 31.6 G/DL (ref 32–36)
MCV RBC AUTO: 99 FL (ref 82–98)
MONOCYTES # BLD AUTO: 0.3 K/UL (ref 0.3–1)
MONOCYTES NFR BLD: 3.7 % (ref 4–15)
NEUTROPHILS # BLD AUTO: 7.5 K/UL (ref 1.8–7.7)
NEUTROPHILS NFR BLD: 82.6 % (ref 38–73)
PLATELET # BLD AUTO: 387 K/UL (ref 150–350)
PMV BLD AUTO: 7.8 FL (ref 9.2–12.9)
POCT GLUCOSE: 135 MG/DL (ref 70–110)
POCT GLUCOSE: 242 MG/DL (ref 70–110)
POTASSIUM SERPL-SCNC: 4.5 MMOL/L (ref 3.5–5.1)
RBC # BLD AUTO: 2.73 M/UL (ref 4–5.4)
SODIUM SERPL-SCNC: 137 MMOL/L (ref 136–145)
VANCOMYCIN SERPL-MCNC: 12.3 UG/ML
WBC # BLD AUTO: 9.1 K/UL (ref 3.9–12.7)

## 2019-04-01 PROCEDURE — 80100016 HC MAINTENANCE HEMODIALYSIS

## 2019-04-01 PROCEDURE — 80202 ASSAY OF VANCOMYCIN: CPT

## 2019-04-01 PROCEDURE — 63600175 PHARM REV CODE 636 W HCPCS: Performed by: INTERNAL MEDICINE

## 2019-04-01 PROCEDURE — 94761 N-INVAS EAR/PLS OXIMETRY MLT: CPT

## 2019-04-01 PROCEDURE — 12000002 HC ACUTE/MED SURGE SEMI-PRIVATE ROOM

## 2019-04-01 PROCEDURE — 63600175 PHARM REV CODE 636 W HCPCS: Performed by: HOSPITALIST

## 2019-04-01 PROCEDURE — 25000003 PHARM REV CODE 250: Performed by: INTERNAL MEDICINE

## 2019-04-01 PROCEDURE — 36415 COLL VENOUS BLD VENIPUNCTURE: CPT

## 2019-04-01 PROCEDURE — 25000003 PHARM REV CODE 250: Performed by: FAMILY MEDICINE

## 2019-04-01 PROCEDURE — 80048 BASIC METABOLIC PNL TOTAL CA: CPT

## 2019-04-01 PROCEDURE — 85025 COMPLETE CBC W/AUTO DIFF WBC: CPT

## 2019-04-01 PROCEDURE — 25000003 PHARM REV CODE 250: Performed by: HOSPITALIST

## 2019-04-01 RX ORDER — VANCOMYCIN HCL IN 5 % DEXTROSE 1G/250ML
1000 PLASTIC BAG, INJECTION (ML) INTRAVENOUS ONCE
Status: COMPLETED | OUTPATIENT
Start: 2019-04-01 | End: 2019-04-01

## 2019-04-01 RX ADMIN — HEPARIN SODIUM 5000 UNITS: 5000 INJECTION, SOLUTION INTRAVENOUS; SUBCUTANEOUS at 01:04

## 2019-04-01 RX ADMIN — INSULIN ASPART 2 UNITS: 100 INJECTION, SOLUTION INTRAVENOUS; SUBCUTANEOUS at 05:04

## 2019-04-01 RX ADMIN — ASPIRIN 81 MG CHEWABLE TABLET 81 MG: 81 TABLET CHEWABLE at 12:04

## 2019-04-01 RX ADMIN — PIPERACILLIN AND TAZOBACTAM 3.38 G: 3; .375 INJECTION, POWDER, LYOPHILIZED, FOR SOLUTION INTRAVENOUS; PARENTERAL at 01:04

## 2019-04-01 RX ADMIN — HEPARIN SODIUM 5000 UNITS: 5000 INJECTION, SOLUTION INTRAVENOUS; SUBCUTANEOUS at 05:04

## 2019-04-01 RX ADMIN — AMLODIPINE BESYLATE 5 MG: 5 TABLET ORAL at 12:04

## 2019-04-01 RX ADMIN — VANCOMYCIN HYDROCHLORIDE 1000 MG: 1 INJECTION, POWDER, LYOPHILIZED, FOR SOLUTION INTRAVENOUS at 05:04

## 2019-04-01 RX ADMIN — ATORVASTATIN CALCIUM 80 MG: 40 TABLET, FILM COATED ORAL at 12:04

## 2019-04-01 RX ADMIN — HEPARIN SODIUM 5000 UNITS: 5000 INJECTION, SOLUTION INTRAVENOUS; SUBCUTANEOUS at 09:04

## 2019-04-01 RX ADMIN — EPOETIN ALFA 2200 UNITS: 20000 SOLUTION INTRAVENOUS; SUBCUTANEOUS at 10:04

## 2019-04-01 RX ADMIN — CLOPIDOGREL BISULFATE 75 MG: 75 TABLET ORAL at 12:04

## 2019-04-01 RX ADMIN — INSULIN ASPART 2 UNITS: 100 INJECTION, SOLUTION INTRAVENOUS; SUBCUTANEOUS at 09:04

## 2019-04-01 RX ADMIN — PIPERACILLIN AND TAZOBACTAM 3.38 G: 3; .375 INJECTION, POWDER, LYOPHILIZED, FOR SOLUTION INTRAVENOUS; PARENTERAL at 12:04

## 2019-04-01 NOTE — SUBJECTIVE & OBJECTIVE
Interval History:  Patient seen and examined.  Per nurse Dr. Desouza examined the patient this morning and reports arm looks as expected.  Cultures with coag-negative staph.  Repeat cultures no growth to date.  Plan of care discussed with patient and nurse at bedside.  Will consult with Infectious Disease today.    Review of Systems   Constitutional: Negative for chills and fever.   Respiratory: Negative for shortness of breath.    Cardiovascular: Negative for chest pain.   Gastrointestinal: Negative for abdominal pain.   Skin: Negative for rash.        + erythema     Objective:     Vital Signs (Most Recent):  Temp: 98 °F (36.7 °C) (04/01/19 0915)  Pulse: 66 (04/01/19 1227)  Resp: 16 (04/01/19 0915)  BP: (!) 145/67 (04/01/19 1227)  SpO2: 96 % (04/01/19 1227) Vital Signs (24h Range):  Temp:  [97.1 °F (36.2 °C)-98.3 °F (36.8 °C)] 98 °F (36.7 °C)  Pulse:  [66-75] 66  Resp:  [16-20] 16  SpO2:  [96 %-99 %] 96 %  BP: (106-166)/(56-88) 145/67     Weight: 43.2 kg (95 lb 3 oz)  Body mass index is 14.91 kg/m².    Intake/Output Summary (Last 24 hours) at 4/1/2019 1344  Last data filed at 3/31/2019 1730  Gross per 24 hour   Intake 340 ml   Output --   Net 340 ml      Physical Exam   Constitutional: No distress.   Chronically ill appearing   HENT:   Head: Normocephalic and atraumatic.   Nose: Nose normal.   Mouth/Throat: No oropharyngeal exudate.   Eyes: Conjunctivae and EOM are normal. Right eye exhibits no discharge. Left eye exhibits no discharge. No scleral icterus.   Neck: Normal range of motion. Neck supple. No JVD present. No tracheal deviation present. No thyromegaly present.   Cardiovascular: Normal rate, regular rhythm, normal heart sounds and intact distal pulses. Exam reveals no gallop and no friction rub.   No murmur heard.  Pulmonary/Chest: Effort normal and breath sounds normal. No respiratory distress. She has no wheezes. She has no rales.   Abdominal: Soft. Bowel sounds are normal. There is no tenderness. There  is no rebound.   Musculoskeletal: She exhibits no edema or tenderness.   Neurological: No cranial nerve deficit or sensory deficit. She exhibits normal muscle tone.   Lethargic   Skin: Skin is warm and dry. Capillary refill takes less than 2 seconds. She is not diaphoretic. There is erythema.   Surgical incision with staples in place. Left upper arm palpable thrill from AV fistula. Pain with manipulation. Erythema and warmth to left upper arm.       Significant Labs: All pertinent labs within the past 24 hours have been reviewed.    Significant Imaging: I have reviewed and interpreted all pertinent imaging results/findings within the past 24 hours.

## 2019-04-01 NOTE — PLAN OF CARE
Problem: Adult Inpatient Plan of Care  Goal: Plan of Care Review  Outcome: Ongoing (interventions implemented as appropriate)  AAOx2, disoriented to time, reoriented as needed. Plan of care reviewed with patient. Safety maintained throughout shift. Bed locked and low, SRx2, call light within reach. IV abx infusing per orders. Blood sugar monitoring, SSI and matthieu'd insulin given with bedtime snack. LUE + for bruit/thrill, warm to touch, staples in place. Telemetry monitoring. Family at bedside. Hourly/q2 rounding completed this shift for pt safety. Will continue to monitor

## 2019-04-01 NOTE — ASSESSMENT & PLAN NOTE
Continue home iron supplementation  Daily cbc, will monitor closely  Epogen per nephrology  Nephrology consulted for dialysis

## 2019-04-01 NOTE — ASSESSMENT & PLAN NOTE
At site of recent surgery  vanc/zosyn, dialysis patient and may have healthcare associated bacterial infection.  Will consult with Infectious Disease physician today as this can likely be de-escalated.  Blood cultures with coag-negative staph in 1 bottle, likely contaminant.  U/S showed hematoma of left arm but no thrombus  F/u vasc surgery recs  Patient complains of arm pain but it seems like she hasn't gotten or asked for norco.

## 2019-04-01 NOTE — HOSPITAL COURSE
Ms. Keita is a 54 yo with hx of DM, HTN, Asthma, CHF, CVA and CAD and ESRD on HD MWF who is admitted to Medicine service with complaint of left arm pain associated with redness and swelling for last 1 day-at site of AVFistula -She had AV fistula creation on 3/19/19 by Dr. Desouza.  During her hospital stay 1 of her initial blood cultures grew coag-negative Staph.  Repeat blood cultures were negative.  She was initially on Vanc/zosyn.  Infectious Disease was consulted and recommended 1 more week of post HD vanc.  This was arranged by case management.  Dr Desouza evaluated patient while she was admitted.  Patient's arm redness and swelling improved throughout stay.  U/S showed hematoma of left arm but no thrombus.    Patient with very labile blood sugars throughout her stay.  Per patient and family she is a brittle diabetic.  Home Levemir dosing was decreased on discharge.    Discharge exam  Awake and alert  Regular rate and rhythm no murmur  Lungs clear to auscultation bilaterally  Left upper extremity fistula with some redness, no warmth

## 2019-04-01 NOTE — PROGRESS NOTES
[facility] Medicine  Progress Note    Patient Name: Errol Keita  MRN: [id number]  Patient Class: IP- Inpatient   Admission Date: 3/30/2019  Length of Stay: 2 days  Attending Physician: Tamica Anaya MD  Primary Care Provider: Bharat Wiggins MD        Subjective:     Principal Problem:Cellulitis of left upper arm    HPI:  Ms. Keita is a 52 yo with hx of DM, HTN, Asthma, CHF, and CAD who is admitted to Medicine service with complaint of left arm pain associated with redness and swelling for last 1 day. She states she dialyzed today. She had AV fistula creation on 3/19/19 by Dr. Butler.  She has noticed generalized weakness, but no fever, chills, n/v, abd pain, syncope, chest pain or shortness of breath. She is poor historian at this time due to receiving IV morphine in ED.     Vancomycin given in ED as well as 6mg morphine.  500cc bolus given.  WBC and lactic acid normal.  Read on cxr and arm xray pending. No obvious fx noted.  U/s of upper extremities pending at time of consultation.         Hospital Course:  Ms. Keita is a 52 yo with hx of DM, HTN, Asthma, CHF, CVA and CAD and ESRD on HD MWF who is admitted to Medicine service with complaint of left arm pain associated with redness and swelling for last 1 day-at site of AVFistula -She had AV fistula creation on 3/19/19 by Dr. Desouza.  ---Blood cultures showed one bottle positive cocci in clusters resembling Staph. So repeat  blood culture done. On Vanc/zosyn. Dr Desouza following.   U/S showed hematoma of left arm but no thrombus.    Has labile DM on insulin and hypoglycemic episode 3/31 so levemir decreased to 4 units x 1 night then increased to 10 units as eating well and am blood sugar increased to 176.         Interval History:  Patient seen and examined.  Per nurse Dr. Desouza examined the patient this morning and reports arm looks as expected.  Cultures with coag-negative staph.  Repeat cultures no growth to date.   Plan of care discussed with patient and nurse at bedside.  Will consult with Infectious Disease today.    Review of Systems   Constitutional: Negative for chills and fever.   Respiratory: Negative for shortness of breath.    Cardiovascular: Negative for chest pain.   Gastrointestinal: Negative for abdominal pain.   Skin: Negative for rash.        + erythema     Objective:     Vital Signs (Most Recent):  Temp: 98 °F (36.7 °C) (04/01/19 0915)  Pulse: 66 (04/01/19 1227)  Resp: 16 (04/01/19 0915)  BP: (!) 145/67 (04/01/19 1227)  SpO2: 96 % (04/01/19 1227) Vital Signs (24h Range):  Temp:  [97.1 °F (36.2 °C)-98.3 °F (36.8 °C)] 98 °F (36.7 °C)  Pulse:  [66-75] 66  Resp:  [16-20] 16  SpO2:  [96 %-99 %] 96 %  BP: (106-166)/(56-88) 145/67     Weight: 43.2 kg (95 lb 3 oz)  Body mass index is 14.91 kg/m².    Intake/Output Summary (Last 24 hours) at 4/1/2019 1344  Last data filed at 3/31/2019 1730  Gross per 24 hour   Intake 340 ml   Output --   Net 340 ml      Physical Exam   Constitutional: No distress.   Chronically ill appearing   HENT:   Head: Normocephalic and atraumatic.   Nose: Nose normal.   Mouth/Throat: No oropharyngeal exudate.   Eyes: Conjunctivae and EOM are normal. Right eye exhibits no discharge. Left eye exhibits no discharge. No scleral icterus.   Neck: Normal range of motion. Neck supple. No JVD present. No tracheal deviation present. No thyromegaly present.   Cardiovascular: Normal rate, regular rhythm, normal heart sounds and intact distal pulses. Exam reveals no gallop and no friction rub.   No murmur heard.  Pulmonary/Chest: Effort normal and breath sounds normal. No respiratory distress. She has no wheezes. She has no rales.   Abdominal: Soft. Bowel sounds are normal. There is no tenderness. There is no rebound.   Musculoskeletal: She exhibits no edema or tenderness.   Neurological: No cranial nerve deficit or sensory deficit. She exhibits normal muscle tone.   Lethargic   Skin: Skin is warm and dry.  Capillary refill takes less than 2 seconds. She is not diaphoretic. There is erythema.   Surgical incision with staples in place. Left upper arm palpable thrill from AV fistula. Pain with manipulation. Erythema and warmth to left upper arm.       Significant Labs: All pertinent labs within the past 24 hours have been reviewed.    Significant Imaging: I have reviewed and interpreted all pertinent imaging results/findings within the past 24 hours.    Assessment/Plan:      * Cellulitis of left upper arm  At site of recent surgery  vanc/zosyn, dialysis patient and may have healthcare associated bacterial infection.  Will consult with Infectious Disease physician today as this can likely be de-escalated.  Blood cultures with coag-negative staph in 1 bottle, likely contaminant.  U/S showed hematoma of left arm but no thrombus  F/u vasc surgery recs  Patient complains of arm pain but it seems like she hasn't gotten or asked for norco.     ESRD (end stage renal disease)  Does not appear to be volume overloaded at this time. Nephrology consulted for management of inpatient diaylsis    Type 1 diabetes mellitus with stage 4 chronic kidney disease  Patient's FSGs are controlled on current hypoglycemics.   Last A1c reviewed-   Lab Results   Component Value Date    HGBA1C 8.1 (H) 01/06/2019     Most recent fingerstick glucose reviewed-   Recent Labs   Lab 03/31/19  1639 03/31/19  2137 04/01/19  1207   POCTGLUCOSE 159* 176* 135*     Current correctional scale  Low  Maintain anti-hyperglycemic dose as follows-   Antihyperglycemics (From admission, onward)    Start     Stop Route Frequency Ordered    04/01/19 2100  insulin detemir U-100 pen 10 Units      -- SubQ Nightly 04/01/19 0604    03/30/19 1037  insulin aspart U-100 pen 0-5 Units      -- SubQ Before meals & nightly PRN 03/30/19 0938            Anemia of chronic renal failure  Continue home iron supplementation  Daily cbc, will monitor closely  Epogen per nephrology  Nephrology  consulted for dialysis    Hypoglycemia associated with diabetes        Hypertension associated with diabetes  Chronic, .  Latest blood pressure and vitals reviewed-   BP: (138-160)/(64-84)   Current meds for hypertension include amlodipine, holding lisinopril.  Will continue BP medications as needed for sustained BP control.        Hypercholesteremia  Continue lipitor      H/O: CVA (cerebrovascular accident)  Continue asa and Plavix          VTE Risk Mitigation (From admission, onward)        Ordered     heparin (porcine) injection 5,000 Units  Every 8 hours      03/30/19 0531     IP VTE HIGH RISK PATIENT  Once      03/30/19 0531              Tamica Anaya MD  Department of Hospital Medicine   [facility]

## 2019-04-01 NOTE — PLAN OF CARE
Problem: Adult Inpatient Plan of Care  Goal: Optimal Comfort and Wellbeing    Intervention: Monitor Pain and Promote Comfort  Pt denies pain.

## 2019-04-01 NOTE — PLAN OF CARE
Problem: Diabetes Comorbidity  Goal: Blood Glucose Level Within Desired Range  Outcome: Ongoing (interventions implemented as appropriate)  Elevated blood glucose covered with sliding scale insulin.

## 2019-04-01 NOTE — CONSULTS
Date: 4/1/2019   Errol Keita [id number] is a 53 y.o. female who has been consulted for vancomycin dosing.    The patient has the following labs:     Creatinine (mg/dl)    WBC Count   Serum creatinine: 3.5 mg/dL (H) 04/01/19 0430  Estimated creatinine clearance: 12.4 mL/min (A) Lab Results   Component Value Date    WBC 9.10 04/01/2019            Current weight is 43.2 kg (95 lb 3 oz)      Pt's renal function is not stable.  Pharmacy will dose by level. Target goal is 10-15 mg/dL.   Vanc level 4/1/2019 at 0430 was 12.4 mg/dL.  Pharmacy will dose vancomycin 1000 mg x1.  A vancomycin level will be ordered on 4/03 before HD with AM labs.      Patient will be followed by pharmacy for changes in renal function, toxicity, and efficacy.    Thank you for allowing us to participate in this patient's care.     Bubba Bui, PharmD

## 2019-04-01 NOTE — PLAN OF CARE
Problem: Skin Injury Risk Increased  Goal: Skin Health and Integrity  Outcome: Ongoing (interventions implemented as appropriate)  Maintaining clean dry dressings for lines and incisions. Pt voices importance of keeping them clean and intact.

## 2019-04-01 NOTE — PLAN OF CARE
03/31/19 2041   Patient Assessment/Suction   Level of Consciousness (AVPU) alert   PRE-TX-O2   O2 Device (Oxygen Therapy) room air   SpO2 98 %   Pulse Oximetry Type Intermittent

## 2019-04-01 NOTE — PLAN OF CARE
Problem: Adult Inpatient Plan of Care  Goal: Plan of Care Review  Outcome: Ongoing (interventions implemented as appropriate)  AAOx2, disoriented to time/situation, reoriented as needed. Plan of care reviewed with patient. Safety maintained throughout shift. Bed locked and low, SRx2, call light within reach. IV abx infusing per orders. Blood sugar monitoring, SSI and matthieu'd insulin given with bedtime snack. Patient hypoglycemic in AM, IVP medication given, rechecked, BS stable. LUE + for bruit/thrill, warm to touch, staples in place. Telemetry monitoring. Hourly/q2 rounding completed this shift for pt safety. Will continue to monitor.

## 2019-04-01 NOTE — PROGRESS NOTES
INPATIENT NEPHROLOGY PROGRESS NOTE  [facility]    Patient Name: Errol Keita  Date: 04/01/2019    Reason for consultation: ESRD    Chief Complaint:   Chief Complaint   Patient presents with    Post-op Problem     Graft to Lt arm pain and swelling.  Placement x 1 week.  Pt also endorses pain to chest port that has been placed for several months        History of Present Illness:  Ms. Keita is a 54 yo with hx of DM, HTN, Asthma, CHF, CAD and ESRD on HD MWF who is admitted to Medicine service with complaint of left arm pain associated with redness and swelling for last 1 day. She had AV fistula creation on 3/19/19 by Dr. Butler. We are consulted for dialysis.    4/1 VSS. Seen and examined on machine today, tolerating UF well. 3/30 blood cx with GPCs     Plan of Care:    Assessment:  GPC bacteremia either from cellulitis overlying new AVF or TDC  ESRD on HD MWF  HTN  Hypokalemia  SHPT  Anemia of CKD    Plan:    - She is bacteremic - awaiting full speciation. Getting daily blood cx until negative. Dr. Desouza has been consulted. May need to exchange TDC if cx don't clear.   - Continue HD MWF.  - BP/VS are stable- continue home meds.  - K is stable. Will use 3K bath.   - Monitor phos.  - Hb just under goal - will be getting EPO with HD.     Thank you for allowing us to participate in this patient's care. We will continue to follow.    Medications:  No current facility-administered medications on file prior to encounter.      Current Outpatient Medications on File Prior to Encounter   Medication Sig Dispense Refill    amLODIPine (NORVASC) 5 MG tablet Take 1 tablet (5 mg total) by mouth once daily. 30 tablet 0    ascorbic acid, vitamin C, (VITAMIN C) 500 MG tablet Take 1 tablet (500 mg total) by mouth every evening.      aspirin 81 MG chewable tablet Take 81 mg by mouth once daily. Ran out      atorvastatin (LIPITOR) 80 MG tablet Take 1 tablet (80 mg total) by mouth once daily. 30 tablet 11     clopidogrel (PLAVIX) 75 mg tablet Take 1 tablet (75 mg total) by mouth once daily. 30 tablet 11    ferrous sulfate 325 (65 FE) MG EC tablet Take 1 tablet (325 mg total) by mouth 2 (two) times daily. 60 tablet 0    HUMALOG KWIKPEN INSULIN 100 unit/mL InPn pen Inject 2 Units into the skin 3 (three) times daily before meals. 15 mL 12    insulin detemir U-100 (LEVEMIR FLEXTOUCH) 100 unit/mL (3 mL) SubQ InPn pen Inject 2 Units into the skin 2 (two) times daily. (Patient taking differently: Inject 15 Units into the skin every evening. ) 1 Box 0    lisinopril (PRINIVIL,ZESTRIL) 2.5 MG tablet Take 2.5 mg by mouth once daily.       magnesium oxide (MAG-OX) 400 mg tablet Take 1 tablet (400 mg total) by mouth once daily. 30 tablet 0    quetiapine (SEROQUEL) 100 MG Tab Take 100 mg by mouth every evening.        Scheduled Meds:   amLODIPine  5 mg Oral Daily    aspirin  81 mg Oral Daily    atorvastatin  80 mg Oral Daily    clopidogrel  75 mg Oral Daily    epoetin jessica (PROCRIT) injection  50 Units/kg Intravenous Every Mon, Wed, Fri    heparin (porcine)  5,000 Units Subcutaneous Q8H    insulin detemir U-100  10 Units Subcutaneous QHS    piperacillin-tazobactam (ZOSYN) IVPB  3.375 g Intravenous Q8H    vancomycin (VANCOCIN) IVPB  1,000 mg Intravenous Once     Continuous Infusions:  PRN Meds:.acetaminophen, dextrose 50%, dextrose 50%, glucagon (human recombinant), glucose, glucose, HYDROcodone-acetaminophen, insulin aspart U-100, sodium chloride 0.9%    Allergies:  Patient has no known allergies.    Vital Signs:  Temp Readings from Last 3 Encounters:   04/01/19 98 °F (36.7 °C)   03/19/19 98.2 °F (36.8 °C)   03/12/19 97.9 °F (36.6 °C) (Oral)       Pulse Readings from Last 3 Encounters:   04/01/19 73   03/19/19 80   03/12/19 72       BP Readings from Last 3 Encounters:   04/01/19 139/83   03/19/19 (!) 170/80   03/12/19 109/60       Weight:  Wt Readings from Last 3 Encounters:   03/30/19 43.2 kg (95 lb 3 oz)   03/19/19  40.4 kg (89 lb)   01/20/19 47.2 kg (104 lb)       Physical Exam:  Constitutional: nad, aao x 3  Heart: rrr, no m/r/g, wwp, no edema  Lungs: ant ausc clear, no w/r/r/c, no lb  Abdomen: s/nt/nd, +BS    Results:  Lab Results   Component Value Date     04/01/2019    K 4.5 04/01/2019     04/01/2019    CO2 23 04/01/2019    BUN 47 (H) 04/01/2019    CREATININE 3.5 (H) 04/01/2019    CALCIUM 8.4 (L) 04/01/2019    ANIONGAP 9 04/01/2019    ESTGFRAFRICA 16 (A) 04/01/2019    EGFRNONAA 14 (A) 04/01/2019       Lab Results   Component Value Date    CALCIUM 8.4 (L) 04/01/2019    PHOS 5.8 (H) 03/12/2019       Recent Labs   Lab 04/01/19  0430   WBC 9.10   RBC 2.73*   HGB 8.5*   HCT 26.9*   *   MCV 99*   MCH 31.2*   MCHC 31.6*       I have personally reviewed pertinent radiological imaging and reports.    Dk Burks MD  [facility]  [address] [place] [id number]  [phone] (p)  [phone] (f)

## 2019-04-01 NOTE — CONSULTS
"Consult Note  Infectious Disease    Reason for Consult:  Positive blood cultures    HPI: Errol Keita is a cachectic appearing 53 y.o. female who has been on hemodialysis for a "couple of months" presented to the emergency room with left upper extremity swelling and redness, associated with her left upper arm AV fistula surgical site. Images from the emergency room are consistent with cellulitis of the arm. She was cultured and placed on vancomycin and Zosyn and admitted to the hospital medicine service. The photographs taken on the second hospital day are much improved as is the clinical appearance now. One out of several blood cultures, drawn peripherally, has grown coagulase-negative staph area follow-up blood cultures are negative. She has no history of a staph infection but has had skin boils before. She also appreciates improvement in her arm. The tunneled dialysis catheter insertion site is a little bit sensitive which she indicates is new. She has had fever here and white blood cells have been under 15,000.    Review of patient's allergies indicates:  No Known Allergies  Past Medical History:   Diagnosis Date    Arthritis     Asthma     CHF (congestive heart failure)     Coronary artery disease     Diabetes mellitus     Hypertension     Pancreatitis     Type 2 diabetes mellitus with hyperglycemia 7/13/2015   Cachexia  Smoker  Past Surgical History:   Procedure Laterality Date    ASPIRATION ABSCESS N/A 10/27/2015    Performed by [facility] Surgeon at [facility] YECENIA    CARDIAC SURGERY      CABG    CHOLECYSTECTOMY      COLECTOMY-RIGHT N/A 8/25/2014    Performed by Harlan Regalado [facility] at [facility] OR    CORONARY ARTERY BYPASS GRAFT      CREATION, AV FISTULA Left 3/19/2019    Per[facility] by [facility]os [facility]za [facility] at [facility] OR    EGD (ESOPHAGOGASTRODUODENOSCOPY) N/A 4/16/2014    Performed by Crow Brantley MD at [facility] ENDO    EGD (ESOPHAGOGASTRODUODENOSCOPY) N/A 4/2/2014    Performed by Crow Brantley MD " at [facility] ENDO    EGD (ESOPHAGOGASTRODUODENOSCOPY) N/A 6/6/2013    Performed by Crow [facility]ley MD at [facility] ENDO    ESOPHAGOGASTRODUODENOSCOPY (EGD) N/A 8/22/2014    Performed by Satnam Nguyen MD at [facility] ENDO    HYSTERECTOMY      pt states no she did not(?)    INSERTION, CATHETER, TUNNELED Right 1/18/2019    [facility] at [facility] OR    REMOVAL, CATHETER, HEMODIALYSIS Right 1/18/2019    Performed by [facility] at [facility] OR    STERNOTOMY N/A 6/6/2013    Performed by Ole Ingram MD at [facility] OR    THORACOTOMY N/A 6/6/2013    Performed by Ole Ingram MD at [facility] OR     Social History     Socioeconomic History    Marital status:      Spouse name: None    Number of children: None    Years of education: None    Highest education level: None   Occupational History    None   Social Needs    Financial resource strain: None    Food insecurity:     Worry: None     Inability: None    Transportation needs:     Medical: None     Non-medical: None   Tobacco Use    Smoking status: Current Every Day Smoker     Packs/day: 0.50     Years: 35.00     Pack years: 17.50     Types: Cigarettes    Smokeless tobacco: Never Used   Substance and Sexual Activity    Alcohol use: No     Alcohol/week: 0.0 oz     Comment: questionable per     Drug use: No    Sexual activity: Not Currently     Partners: Male     Birth control/protection: None   Lifestyle    Physical activity:     Days per week: None     Minutes per session: None    Stress: None   Relationships    Social connections:     Talks on phone: None     Gets together: None     Attends [employer] service: None     Active member of club or organization: None     Attends meetings of clubs or organizations: None     Relationship status: None    Intimate partner violence:     Fear of current or ex partner: None     Emotionally abused: None     Physically abused: None     Forced sexual activity: None   Other Topics Concern     None   Social History Narrative    None     Family History   Problem Relation Age of Onset    Diabetes Mother     Diabetes Father     Diabetes Sister     Hearing loss Sister     Heart disease Sister     Hypertension Sister     Learning disabilities Sister     Vision loss Sister     Asthma Brother     Depression Brother     Cancer Maternal Grandmother        Pertinent medications noted:     Review of Systems:   No chills, fever, sweats  No change in vision, loss of vision  No sinus congestion, purulent nasal discharge, post nasal drip or facial pain  No pain in mouth or throat. No problems with teeth, gums  No chest pain,  No cough, sputum production, shortness of breath,No nausea, vomiting, diarrhea,  or focal abd pain  No dysuria, hematuria, strangury, retention,    No swelling of joints, redness of joints, injuries, or new focal pain  No unusual headaches,   No anxiety, depression, substance abuse, sleep disturbance  Diabetes, insulin-requiring for an unclear number of years  No bleeding, lymphadenopathy,  malignancy, unusual bruising  No other indwelling foreign objects besides the dialysis catheter    EXAM & DIAGNOSTICS REVIEWED:   Vitals:     Temp:  [97.8 °F (36.6 °C)-98.1 °F (36.7 °C)]   Temp: 97.9 °F (36.6 °C) (04/01/19 1537)  Pulse: 69 (04/01/19 1537)  Resp: 18 (04/01/19 1537)  BP: (!) 116/56 (04/01/19 1537)  SpO2: 95 % (04/01/19 1537)    Intake/Output Summary (Last 24 hours) at 4/1/2019 2113  Last data filed at 4/1/2019 1358  Gross per 24 hour   Intake 600 ml   Output 2500 ml   Net -1900 ml       General:  In NAD. Looks frail and chronically ill. Alert and attentive, cooperative  Eyes:  Anicteric, PERRL, EOMI  ENT:  Mouth w/ pink MMM, no lesions/exudate,  poor dentition  Neck:  Trachea midline, supple, no adenopathy appreciated  Lungs: Clear  Heart:  RRR, no gallop/murmur noted  Abd:  soft, NT, ND, normal BS, no masses/organomegaly appreciated.  :  Voids  Musc:  Joints without effusion,  swelling,  erythema, synovitis.  Cachectic with extremely poor muscle bulk. Clubbing is present  Skin:  Generally warm, dry, normal for color. No rashes. No palmar or plantar    lesions. No subungual petechiae  Wound: Left upper extremity incisions are clean, dry and intact withminimal bloody drainage    on admission       after initiation of antibiotics      Neuro: AAOx3, speech clear, moves all extrems equally  Extrem: Localized Woody edema of the left upper arm surgical site, resolved erythema, cellulitis,   VAD:    Lines/Tubes/Drains:    General Labs reviewed:  Recent Labs   Lab 04/01/19  0430   WBC 9.10   RBC 2.73*   HGB 8.5*   HCT 26.9*   *   MCV 99*   MCH 31.2*   MCHC 31.6*     Recent Labs   Lab 04/01/19  0430   CALCIUM 8.4*      K 4.5   CO2 23      BUN 47*   CREATININE 3.5*           Micro:  Microbiology Results (last 7 days)     Procedure Component Value Units Date/Time    Blood culture [[id number]] Collected:  03/31/19 0640    Order Status:  Completed Specimen:  Blood Updated:  04/01/19 1412     Blood Culture, Routine No Growth to date     Blood Culture, Routine No Growth to date    Blood culture x two cultures. Draw prior to antibiotics. [[id number]] Collected:  03/30/19 0415    Order Status:  Completed Specimen:  Blood from Antecubital, Right Updated:  04/01/19 1236     Blood Culture, Routine Gram stain aer bottle: Gram positive cocci in clusters resembling Staph      Blood Culture, Routine Results called to and read back by: Robert Chinchilla RN 03/31/2019       Blood Culture, Routine 04:29     Blood Culture, Routine Gram stain emery bottle: Gram positive cocci in clusters resembling Staph      Blood Culture, Routine 03/31/2019  08:45     Blood Culture, Routine --     COAGULASE-NEGATIVE STAPHYLOCOCCUS SPECIES  Organism is a probable contaminant      Narrative:       Aerobic and anaerobic    Blood culture x two cultures. Draw prior to antibiotics. [[id number]] Collected:  03/30/19 0241     Order Status:  Completed Specimen:  Blood Updated:  04/01/19 1212     Blood Culture, Routine No Growth to date     Blood Culture, Routine No Growth to date     Blood Culture, Routine No Growth to date    Narrative:       Aerobic and anaerobic    Blood culture [[id number]] Collected:  03/31/19 0804    Order Status:  Completed Specimen:  Blood Updated:  04/01/19 0915     Blood Culture, Routine No Growth to date        Imaging Reviewed:   CXR and ultrasound of left upper extremity      IMPRESSION & PLAN     Imp: left arm cellulitis associated with surgical site, present on admission, improved           Positive blood culture is likely a contaminant, drawn from peripheral vein. Dialysis catheter insertion site isn't red or draining that was it is a little bit sensitive          ESRD, insulin requiring diabetes, smoker, cachectic    Rec: vanc for one more week at dialysis           May discharge at your discretion    Thank you

## 2019-04-01 NOTE — PLAN OF CARE
Problem: Adult Inpatient Plan of Care  Goal: Plan of Care Review  Outcome: Ongoing (interventions implemented as appropriate)  Reviewed POC with pt. Pt verbalized understanding. Informed pt that she is on antibiotics for infection in her blood. Infectious disease has been cons ulted. Pt verbalizes understanding. Pt states that she will not touch incision sites without first washing hands or wearing gloves. Hemasplit is secured with sutures to right chest wall and covered with dry clean dressing. IJ to right neck with quad lumen with dressing dry clean and intact. Pt states no pain upon use. Flushes without resistance. Incisions to left arm are clean and dry, no drainage, staples present and open to air. Thrill and bruit present on left upper arm dialysis shunt. Pt ambulates to restroom without assistance. Pt is wearing non skid socks and voiced understanding for keeping them on to prevent falls.

## 2019-04-02 VITALS
WEIGHT: 95.19 LBS | SYSTOLIC BLOOD PRESSURE: 163 MMHG | BODY MASS INDEX: 14.94 KG/M2 | HEIGHT: 67 IN | TEMPERATURE: 98 F | OXYGEN SATURATION: 100 % | HEART RATE: 68 BPM | RESPIRATION RATE: 15 BRPM | DIASTOLIC BLOOD PRESSURE: 79 MMHG

## 2019-04-02 LAB
ANION GAP SERPL CALC-SCNC: 10 MMOL/L (ref 8–16)
BACTERIA BLD CULT: NORMAL
BASOPHILS # BLD AUTO: 0 K/UL (ref 0–0.2)
BASOPHILS NFR BLD: 0.4 % (ref 0–1.9)
BUN SERPL-MCNC: 25 MG/DL (ref 6–20)
CALCIUM SERPL-MCNC: 8.7 MG/DL (ref 8.7–10.5)
CHLORIDE SERPL-SCNC: 105 MMOL/L (ref 95–110)
CO2 SERPL-SCNC: 26 MMOL/L (ref 23–29)
CREAT SERPL-MCNC: 2.5 MG/DL (ref 0.5–1.4)
DIFFERENTIAL METHOD: ABNORMAL
EOSINOPHIL # BLD AUTO: 0.2 K/UL (ref 0–0.5)
EOSINOPHIL NFR BLD: 1.6 % (ref 0–8)
ERYTHROCYTE [DISTWIDTH] IN BLOOD BY AUTOMATED COUNT: 14.9 % (ref 11.5–14.5)
EST. GFR  (AFRICAN AMERICAN): 24 ML/MIN/1.73 M^2
EST. GFR  (NON AFRICAN AMERICAN): 21 ML/MIN/1.73 M^2
GLUCOSE SERPL-MCNC: 31 MG/DL (ref 70–110)
HCT VFR BLD AUTO: 29 % (ref 37–48.5)
HGB BLD-MCNC: 9.5 G/DL (ref 12–16)
LYMPHOCYTES # BLD AUTO: 2 K/UL (ref 1–4.8)
LYMPHOCYTES NFR BLD: 17.8 % (ref 18–48)
MCH RBC QN AUTO: 32 PG (ref 27–31)
MCHC RBC AUTO-ENTMCNC: 32.7 G/DL (ref 32–36)
MCV RBC AUTO: 98 FL (ref 82–98)
MONOCYTES # BLD AUTO: 0.5 K/UL (ref 0.3–1)
MONOCYTES NFR BLD: 4.8 % (ref 4–15)
NEUTROPHILS # BLD AUTO: 8.6 K/UL (ref 1.8–7.7)
NEUTROPHILS NFR BLD: 75.4 % (ref 38–73)
PLATELET # BLD AUTO: 537 K/UL (ref 150–350)
PLATELET BLD QL SMEAR: ABNORMAL
PMV BLD AUTO: 7.5 FL (ref 9.2–12.9)
POCT GLUCOSE: 134 MG/DL (ref 70–110)
POCT GLUCOSE: 137 MG/DL (ref 70–110)
POCT GLUCOSE: 27 MG/DL (ref 70–110)
POCT GLUCOSE: 31 MG/DL (ref 70–110)
POCT GLUCOSE: 314 MG/DL (ref 70–110)
POCT GLUCOSE: 68 MG/DL (ref 70–110)
POTASSIUM SERPL-SCNC: 3.3 MMOL/L (ref 3.5–5.1)
RBC # BLD AUTO: 2.97 M/UL (ref 4–5.4)
SODIUM SERPL-SCNC: 141 MMOL/L (ref 136–145)
WBC # BLD AUTO: 11.4 K/UL (ref 3.9–12.7)

## 2019-04-02 PROCEDURE — 63600175 PHARM REV CODE 636 W HCPCS: Performed by: INTERNAL MEDICINE

## 2019-04-02 PROCEDURE — 25000003 PHARM REV CODE 250: Performed by: INTERNAL MEDICINE

## 2019-04-02 PROCEDURE — 25000003 PHARM REV CODE 250: Performed by: FAMILY MEDICINE

## 2019-04-02 PROCEDURE — 80048 BASIC METABOLIC PNL TOTAL CA: CPT

## 2019-04-02 PROCEDURE — 94761 N-INVAS EAR/PLS OXIMETRY MLT: CPT

## 2019-04-02 PROCEDURE — 36415 COLL VENOUS BLD VENIPUNCTURE: CPT

## 2019-04-02 PROCEDURE — 85025 COMPLETE CBC W/AUTO DIFF WBC: CPT

## 2019-04-02 RX ADMIN — DEXTROSE MONOHYDRATE 25 G: 25 INJECTION, SOLUTION INTRAVENOUS at 05:04

## 2019-04-02 RX ADMIN — ATORVASTATIN CALCIUM 80 MG: 40 TABLET, FILM COATED ORAL at 08:04

## 2019-04-02 RX ADMIN — HEPARIN SODIUM 5000 UNITS: 5000 INJECTION, SOLUTION INTRAVENOUS; SUBCUTANEOUS at 02:04

## 2019-04-02 RX ADMIN — CLOPIDOGREL BISULFATE 75 MG: 75 TABLET ORAL at 08:04

## 2019-04-02 RX ADMIN — Medication 16 G: at 11:04

## 2019-04-02 RX ADMIN — HEPARIN SODIUM 5000 UNITS: 5000 INJECTION, SOLUTION INTRAVENOUS; SUBCUTANEOUS at 05:04

## 2019-04-02 RX ADMIN — AMLODIPINE BESYLATE 5 MG: 5 TABLET ORAL at 08:04

## 2019-04-02 RX ADMIN — ASPIRIN 81 MG CHEWABLE TABLET 81 MG: 81 TABLET CHEWABLE at 08:04

## 2019-04-02 NOTE — NURSING
Pt prepared for discharge. Discharge instructions reviewed with pt and spouse, both voiced understanding. Reinforced education regarding questions. Spouse stated he will call the hospital or the Dr. With any questions. Follow up appointments reviewed. Pt and caregiver voiced understanding. IJ removed from pt right neck, pressure held for 10 minutes, covered with gauze, silk tape and tegaderm dressing. No bleeding upon discharge. Pt transported in  by hospital transport.

## 2019-04-02 NOTE — PLAN OF CARE
04/02/19 0730   PRE-TX-O2   O2 Device (Oxygen Therapy) room air   SpO2 100 %   Pulse Oximetry Type Intermittent   $ Pulse Oximetry - Multiple Charge Pulse Oximetry - Multiple   Pulse 69   Resp 16

## 2019-04-02 NOTE — PLAN OF CARE
04/02/19 1506   Final Note   Assessment Type Final Discharge Note   Anticipated Discharge Disposition IV Therapy   Hospital Follow Up  Appt(s) scheduled? Yes

## 2019-04-02 NOTE — DISCHARGE SUMMARY
[facility] Medicine  Discharge Summary      Patient Name: Errol Keita  MRN: [id number]  Admission Date: 3/30/2019  Hospital Length of Stay: 3 days  Discharge Date and Time: No discharge date for patient encounter.  Attending Physician: Tamica Anaya MD   Discharging Provider: Tamica Anaya MD  Primary Care Provider: Bharat Wiggins MD      HPI:   Ms. Keita is a 52 yo with hx of DM, HTN, Asthma, CHF, and CAD who is admitted to Medicine service with complaint of left arm pain associated with redness and swelling for last 1 day. She states she dialyzed today. She had AV fistula creation on 3/19/19 by Dr. Butler.  She has noticed generalized weakness, but no fever, chills, n/v, abd pain, syncope, chest pain or shortness of breath. She is poor historian at this time due to receiving IV morphine in ED.     Vancomycin given in ED as well as 6mg morphine.  500cc bolus given.  WBC and lactic acid normal.  Read on cxr and arm xray pending. No obvious fx noted.  U/s of upper extremities pending at time of consultation.         * No surgery found *      Hospital Course:   Ms. Keita is a 52 yo with hx of DM, HTN, Asthma, CHF, CVA and CAD and ESRD on HD MWF who is admitted to Medicine service with complaint of left arm pain associated with redness and swelling for last 1 day-at site of AVFistula -She had AV fistula creation on 3/19/19 by Dr. Desouza.  During her hospital stay 1 of her initial blood cultures grew coag-negative Staph.  Repeat blood cultures were negative.  She was initially on Vanc/zosyn.  Infectious Disease was consulted and recommended 1 more week of post HD vanc.  This was arranged by case management.  Dr Desouza evaluated patient while she was admitted.  Patient's arm redness and swelling improved throughout stay.  U/S showed hematoma of left arm but no thrombus.    Patient with very labile blood sugars throughout her stay.  Per patient and family she is a brittle  diabetic.  Home Levemir dosing was decreased on discharge.    Discharge exam  Awake and alert  Regular rate and rhythm no murmur  Lungs clear to auscultation bilaterally  Left upper extremity fistula with some redness, no warmth       Consults:   Consults (From admission, onward)        Status Ordering Provider     Inpatient consult to Infectious Diseases  Once     Provider:  Betzy Mchugh MD    Completed KERRY KENT     Inpatient consult to Nephrology  Once     Provider:  Logan Dowell MD    Completed TREMAINE AYON     Inpatient consult to Social Work/Case Management  Once     Provider:  (Not yet assigned)    Acknowledged KERRY KENT     Inpatient consult to Social Work/Case Management  Once     Provider:  (Not yet assigned)    Acknowledged KERRY KENT     Inpatient consult to Vascular Surgery  Once     Provider:  Amos Desouza MD    Acknowledged TREMAINE AYON     Pharmacy to dose Vancomycin consult  Once     Provider:  (Not yet assigned)    Acknowledged TREMAINE AYON            Final Active Diagnoses:    Diagnosis Date Noted POA    PRINCIPAL PROBLEM:  Cellulitis of left upper arm [L03.114] 03/30/2019 Yes    ESRD (end stage renal disease) [N18.6] 01/17/2019 Yes    Type 1 diabetes mellitus with stage 4 chronic kidney disease [E10.22, N18.4] 07/06/2018 Yes    Anemia of chronic renal failure [N18.9, D63.1] 05/09/2016 Yes    Hypoglycemia associated with diabetes [E11.649] 11/05/2014 Yes    Hypertension associated with diabetes [E11.59, I10] 11/25/2012 Yes    H/O: CVA (cerebrovascular accident) [Z86.73] 05/14/2012 Not Applicable    Hypercholesteremia [E78.00] 05/14/2012 Yes      Problems Resolved During this Admission:       Discharged Condition: good    Disposition: Home or Self Care    Follow Up:  Follow-up Information     Bharat Wiggins MD In 2 weeks.    Specialty:  Internal Medicine  Why:  hospital f/u on Tuesday, 4/16/19@ 9:30am  Contact information:  [address] [place] [address]  [address]  [facility] [address]  [phone]             Standardized Safety.    Specialty:  Home Health Services  Contact information:  [address] [facility] [address] [phone]                 Patient Instructions:      Diet diabetic     Diet renal     Diet Cardiac     Activity as tolerated       Significant Diagnostic Studies: Labs:   BMP:   Recent Labs   Lab 04/01/19  0430 04/02/19  0543   * 31*    141   K 4.5 3.3*    105   CO2 23 26   BUN 47* 25*   CREATININE 3.5* 2.5*   CALCIUM 8.4* 8.7   , CMP   Recent Labs   Lab 04/01/19  0430 04/02/19  0543    141   K 4.5 3.3*    105   CO2 23 26   * 31*   BUN 47* 25*   CREATININE 3.5* 2.5*   CALCIUM 8.4* 8.7   ANIONGAP 9 10   ESTGFRAFRICA 16* 24*   EGFRNONAA 14* 21*    and CBC   Recent Labs   Lab 04/01/19  0430 04/02/19  0543   WBC 9.10 11.40   HGB 8.5* 9.5*   HCT 26.9* 29.0*   * 537*     Microbiology Results (Last 90 Days)     Procedure Component Value Units Date/Time   Blood culture [[id number]] Collected: 03/31/19 0804   Order Status: Completed Specimen: Blood Updated: 04/02/19 0612    Blood Culture, Routine No Growth to date    Blood Culture, Routine No Growth to date   Blood culture [[id number]] Collected: 03/31/19 0640   Order Status: Completed Specimen: Blood Updated: 04/02/19 1412    Blood Culture, Routine No Growth to date    Blood Culture, Routine No Growth to date    Blood Culture, Routine No Growth to date   Blood culture x two cultures. Draw prior to antibiotics. [[id number]] Collected: 03/30/19 0415   Order Status: Completed Specimen: Blood from Antecubital, Right Updated: 04/02/19 0905    Blood Culture, Routine Gram stain aer bottle: Gram positive cocci in clusters resembling Staph     Blood Culture, Routine Results called to and read back by: Robert Chinchilla RN 03/31/2019      Blood Culture, Routine 04:29    Blood Culture, Routine Gram stain emery bottle: Gram positive cocci in clusters  resembling Staph     Blood Culture, Routine 03/31/2019  08:45    Blood Culture, Routine --    COAGULASE-NEGATIVE STAPHYLOCOCCUS SPECIES   Organism is a probable contaminant    Narrative:     Aerobic and anaerobic   Blood culture x two cultures. Draw prior to antibiotics. [[id number]] Collected: 03/30/19 0241   Order Status: Completed Specimen: Blood Updated: 04/02/19 1212    Blood Culture, Routine No Growth to date    Blood Culture, Routine No Growth to date    Blood Culture, Routine No Growth to date    Blood Culture, Routine No Growth to date   Narrative:     Aerobic and anaerobic     Radiology Results (last 7 days)     Procedure Component Value Units Date/Time   X-Ray Humerus 2 View Left [[id number]] Resulted: 03/30/19 1235   Order Status: Completed Updated: 03/30/19 1238   Narrative:     EXAMINATION:  XR HUMERUS 2 VIEW LEFT    CLINICAL HISTORY:  Pain in left upper arm    TECHNIQUE:  AP and lateral views left humerus    COMPARISON:  9/12/2014    FINDINGS:  Interval postoperative changes appearing recent with multiple surgical clips and was skin staples.  There is small amount of air in the soft tissues likely on a postoperative basis.   Impression:       Findings suggesting postoperative change left arm.      Electronically signed by: Isabella Casanova MD  Date: 03/30/2019  Time: 12:35   US Upper Extremity Veins Left [[id number]] Resulted: 03/30/19 1234   Order Status: Completed Updated: 03/30/19 1237   Narrative:     EXAMINATION:  US UPPER EXTREMITY VEINS LEFT    CLINICAL HISTORY:  left arm swelling;    TECHNIQUE:  Duplex and color flow Doppler evaluation and dynamic compression was performed of the left upper extremity veins.    COMPARISON:  None    FINDINGS:  Central veins: The internal jugular, subclavian, and axillary veins are patent and free of thrombus.    Arm veins: The brachial, basilic, and cephalic veins are patent and compressible.    Contralateral subclavian/internal jugular veins: The right subclavian  vein is patent and free of thrombus.    Other findings:    is 2.1 x 1.1 x 1.6 cm nodule left lobe of the thyroid gland that could be further evaluate with follow-up ultrasound of the thyroid gland    There is 7.5 x 1.6 x 2.5 cm complex hypoechoic structure in the left arm near the antecubital fossa most likely hematoma   Impression:       No thrombus in central veins of the left upper extremity.    Hypoechoic complex structure consist suggesting hematoma left arm.  Thyroid nodule.    Final read      Electronically signed by: Isabella Casanova MD  Date: 03/30/2019  Time: 12:34   X-Ray Chest AP Portable [[id number]] Resulted: 03/30/19 0929   Order Status: Completed Updated: 03/30/19 0931   Narrative:     EXAMINATION:  XR CHEST AP PORTABLE    CLINICAL HISTORY:  Encounter for adjustment and management of vascular access device    TECHNIQUE:  Single frontal view of the chest was performed.    COMPARISON:  1/20/2019    FINDINGS:  Interval insertion of right internal jugular venous catheter with the tip at the level of the SVC.  Dual tips catheter remains in place.  Cardiomediastinal silhouette stable.  There is mild interstitial prominence with no consolidation or pleural effusion.  The postoperative changes left upper arm.   Impression:       Right internal jugular venous catheter inserted.  No pneumothorax.  Mild interstitial prominence less so today than on the prior exam.      Electronically signed by: Isabella Casanova MD  Date: 03/30/2019  Time: 09:29       Pending Diagnostic Studies:     None         Medications:  Reconciled Home Medications:      Medication List      START taking these medications    VANCOMYCIN 500 MG/100 ML D5W (READY TO MIX SYSTEM)  Inject 100 mLs (500 mg total) into the vein every Mon, Wed, Fri. for 3 doses  Start taking on:  4/3/2019        CHANGE how you take these medications    insulin detemir U-100 100 unit/mL (3 mL) Inpn pen  Commonly known as:  LEVEMIR FLEXTOUCH  Inject 2 Units into the skin 2  (two) times daily.  What changed:    · how much to take  · when to take this        CONTINUE taking these medications    amLODIPine 5 MG tablet  Commonly known as:  NORVASC  Take 1 tablet (5 mg total) by mouth once daily.     ascorbic acid (vitamin C) 500 MG tablet  Commonly known as:  VITAMIN C  Take 1 tablet (500 mg total) by mouth every evening.     aspirin 81 MG Chew  Take 81 mg by mouth once daily. Ran out     atorvastatin 80 MG tablet  Commonly known as:  LIPITOR  Take 1 tablet (80 mg total) by mouth once daily.     clopidogrel 75 mg tablet  Commonly known as:  PLAVIX  Take 1 tablet (75 mg total) by mouth once daily.     ferrous sulfate 325 (65 FE) MG EC tablet  Take 1 tablet (325 mg total) by mouth 2 (two) times daily.     HumaLOG KwikPen Insulin 100 unit/mL pen  Generic drug:  insulin lispro  Inject 2 Units into the skin 3 (three) times daily before meals.     lisinopril 2.5 MG tablet  Commonly known as:  PRINIVIL,ZESTRIL  Take 2.5 mg by mouth once daily.     magnesium oxide 400 mg (241.3 mg magnesium) tablet  Commonly known as:  MAG-OX  Take 1 tablet (400 mg total) by mouth once daily.     QUEtiapine 100 MG Tab  Commonly known as:  SEROQUEL  Take 100 mg by mouth every evening.            Indwelling Lines/Drains at time of discharge:   Lines/Drains/Airways     Central Venous Catheter Line                 Hemodialysis Catheter 01/18/19 0942 right internal jugular 74 days         Percutaneous Central Line Insertion/Assessment - Quad lumen  03/30/19 0353 right internal jugular 3 days          Drain                 Hemodialysis AV Fistula 03/19/19 1036 Left upper arm 14 days                Time spent on the discharge of patient: 35 minutes  Patient was seen and examined on the date of discharge and determined to be suitable for discharge.         Tamica Anaya MD  Department of Hospital Medicine  [facility]

## 2019-04-02 NOTE — PLAN OF CARE
Updated Prisca at [employer] ([facility]) [phone] that I had faxed pt's order for vanc through [facility] to the [place] office.  Plan for them to deliver the meds to pt's home. Awaiting call back when order is finalized.     12:35 p.m. - per Dr. Anaya pt's dosage has been changed to 500 mg.  Received message from Prisca at [employer] that they had not received the order through [facility].  I provided her with pt's info for her to pull it off Epic and updated her on the dosage change.     2:05 p.m. - Prisca at [employer] reports the med order was filled and they will call pt tonight prior to delivering the meds to her home.  Met with pt to update her.  Pt stated that her  would be back shortly to transport her home.  Updated pt's nurse Junie.        04/02/19 8137   Discharge Reassessment   Assessment Type Discharge Planning Reassessment

## 2019-04-02 NOTE — PROGRESS NOTES
INPATIENT NEPHROLOGY PROGRESS NOTE  [facility] NEPHROLOGY    Patient Name: Errol Keita  Date: 04/02/2019    Reason for consultation: ESRD    Chief Complaint:   Chief Complaint   Patient presents with    Post-op Problem     Graft to Lt arm pain and swelling.  Placement x 1 week.  Pt also endorses pain to chest port that has been placed for several months        History of Present Illness:  Ms. Keita is a 54 yo with hx of DM, HTN, Asthma, CHF, CAD and ESRD on HD MWF who is admitted to Medicine service with complaint of left arm pain associated with redness and swelling for last 1 day. She had AV fistula creation on 3/19/19 by Dr. Butler. We are consulted for dialysis.    4/1 VSS. Seen and examined on machine today, tolerating UF well. 3/30 blood cx with GPCs.  4/2 VSS, can be dc from renal stand point. HD tomorrow.    Plan of Care:    Assessment:  GPC bacteremia either from cellulitis overlying new AVF or TDC  ESRD on HD MWF  HTN  Hypokalemia  SHPT  Anemia of CKD    Plan:    - Apprecaite ID input - vanco with HD for 1 more week.  - Continue HD MWF.  - BP/VS are stable- continue home meds.  - K is stable. Will use 3K bath.   - Monitor phos.  - Hb just under goal - will be getting EPO with HD.     Thank you for allowing us to participate in this patient's care. We will continue to follow.    Medications:  No current facility-administered medications on file prior to encounter.      Current Outpatient Medications on File Prior to Encounter   Medication Sig Dispense Refill    amLODIPine (NORVASC) 5 MG tablet Take 1 tablet (5 mg total) by mouth once daily. 30 tablet 0    ascorbic acid, vitamin C, (VITAMIN C) 500 MG tablet Take 1 tablet (500 mg total) by mouth every evening.      aspirin 81 MG chewable tablet Take 81 mg by mouth once daily. Ran out      atorvastatin (LIPITOR) 80 MG tablet Take 1 tablet (80 mg total) by mouth once daily. 30 tablet 11    clopidogrel (PLAVIX) 75 mg tablet Take 1 tablet (75 mg  total) by mouth once daily. 30 tablet 11    ferrous sulfate 325 (65 FE) MG EC tablet Take 1 tablet (325 mg total) by mouth 2 (two) times daily. 60 tablet 0    HUMALOG KWIKPEN INSULIN 100 unit/mL InPn pen Inject 2 Units into the skin 3 (three) times daily before meals. 15 mL 12    insulin detemir U-100 (LEVEMIR FLEXTOUCH) 100 unit/mL (3 mL) SubQ InPn pen Inject 2 Units into the skin 2 (two) times daily. (Patient taking differently: Inject 15 Units into the skin every evening. ) 1 Box 0    lisinopril (PRINIVIL,ZESTRIL) 2.5 MG tablet Take 2.5 mg by mouth once daily.       magnesium oxide (MAG-OX) 400 mg tablet Take 1 tablet (400 mg total) by mouth once daily. 30 tablet 0    quetiapine (SEROQUEL) 100 MG Tab Take 100 mg by mouth every evening.        Scheduled Meds:   amLODIPine  5 mg Oral Daily    aspirin  81 mg Oral Daily    atorvastatin  80 mg Oral Daily    clopidogrel  75 mg Oral Daily    epoetin jessica (PROCRIT) injection  50 Units/kg Intravenous Every Mon, Wed, Fri    heparin (porcine)  5,000 Units Subcutaneous Q8H    insulin detemir U-100  5 Units Subcutaneous QHS    [START ON 4/3/2019] vancomycin (VANCOCIN) IVPB  500 mg Intravenous Every Mon, Wed, Fri     Continuous Infusions:  PRN Meds:.acetaminophen, dextrose 50%, dextrose 50%, glucagon (human recombinant), glucose, glucose, HYDROcodone-acetaminophen, insulin aspart U-100, sodium chloride 0.9%    Allergies:  Patient has no known allergies.    Vital Signs:  Temp Readings from Last 3 Encounters:   04/02/19 98.3 °F (36.8 °C)   03/19/19 98.2 °F (36.8 °C)   03/12/19 97.9 °F (36.6 °C) (Oral)       Pulse Readings from Last 3 Encounters:   04/02/19 68   03/19/19 80   03/12/19 72       BP Readings from Last 3 Encounters:   04/02/19 (!) 163/79   03/19/19 (!) 170/80   03/12/19 109/60       Weight:  Wt Readings from Last 3 Encounters:   03/30/19 43.2 kg (95 lb 3 oz)   03/19/19 40.4 kg (89 lb)   01/20/19 47.2 kg (104 lb)       Physical Exam:  Constitutional:  nad, aao x 3  Heart: rrr, no m/r/g, wwp, no edema  Lungs: ant ausc clear, no w/r/r/c, no lb  Abdomen: s/nt/nd, +BS    Results:  Lab Results   Component Value Date     04/02/2019    K 3.3 (L) 04/02/2019     04/02/2019    CO2 26 04/02/2019    BUN 25 (H) 04/02/2019    CREATININE 2.5 (H) 04/02/2019    CALCIUM 8.7 04/02/2019    ANIONGAP 10 04/02/2019    ESTGFRAFRICA 24 (A) 04/02/2019    EGFRNONAA 21 (A) 04/02/2019       Lab Results   Component Value Date    CALCIUM 8.7 04/02/2019    PHOS 5.8 (H) 03/12/2019       Recent Labs   Lab 04/02/19  0543   WBC 11.40   RBC 2.97*   HGB 9.5*   HCT 29.0*   *   MCV 98   MCH 32.0*   MCHC 32.7       I have personally reviewed pertinent radiological imaging and reports.    Dk Burks MD  [facility]  [address] [place] [address]  [place] [address]  [phone] (p)  [phone] (f)

## 2019-04-02 NOTE — PLAN OF CARE
04/02/19 1410   Medicare Message   Important Message from Medicare regarding Discharge Appeal Rights Given to patient/caregiver   Date IMM was signed 04/02/19   Time IMM was signed 1410

## 2019-04-02 NOTE — PLAN OF CARE
04/01/19 2020   Patient Assessment/Suction   Level of Consciousness (AVPU) alert   PRE-TX-O2   O2 Device (Oxygen Therapy) room air   SpO2 97 %   Pulse Oximetry Type Intermittent

## 2019-04-02 NOTE — PLAN OF CARE
Problem: Adult Inpatient Plan of Care  Goal: Plan of Care Review  Outcome: Ongoing (interventions implemented as appropriate)  Plan of care reviewed with patient. Pt verbalized understanding. Safety maintained throughout the shift. Bed locked, in lowest position, and call light within reach. Pt remained free of falls throughout shift. VS stable. Pt afebrile. POC glucose monitored. Sliding scale insulin administered per order. Dressing's changed to hemodialysis catheter and central line. Pt rounded on frequently. Will continue to monitor.

## 2019-04-03 ENCOUNTER — TELEPHONE (OUTPATIENT)
Dept: MEDSURG UNIT | Facility: HOSPITAL | Age: 54
End: 2019-04-03

## 2019-04-04 LAB — BACTERIA BLD CULT: NORMAL

## 2019-04-05 LAB — BACTERIA BLD CULT: NORMAL

## 2019-04-06 LAB — BACTERIA BLD CULT: NORMAL

## 2019-04-08 NOTE — PHYSICIAN QUERY
PT Name: Errol Keita  MR #: [id number]     Physician Query Form - Documentation Clarification      CDS/: Kamini Hercules               Contact information:[email or web address]    This form is a permanent document in the medical record.     Query Date: April 8, 2019    By submitting this query, we are merely seeking further clarification of documentation. Please utilize your independent clinical judgment when addressing the question(s) below.    The Medical record reflects the following:    Supporting Clinical Findings Location in Medical Record   Cellulitis of left upper arm   At site of recent surgery   Acute per patient report   Will start vanc/zosyn, considering dialysis patient and may have healthcare associated bacterial infection   F/u blood cultures   F/u ultrasound to ensure no DVT and patency of fistula       ESRD (end stage renal disease)        Hypertension associated with diabetes   Chronic, controlled.Current meds for hypertension include amlodipine, holding lisinopril.  Will continue BP medications as needed for sustained BP control.      H&P                                                                                Doctor, Please specify diagnosis or diagnoses associated with above clinical findings.    Please further specify the meaning of Hypertension associated with diabetes.    Provider Use Only          [  x  ]  Hypertension is a manifestation of diabetes      [    ]  Hypertension is not a manifestation of diabetes      [    ]  Other:Please specify____________________________                                                                                                                     [  ] Clinically Undetermined

## 2019-04-08 NOTE — OP NOTE
DATE OF PROCEDURE:  03/19/2019    PREOPERATIVE DIAGNOSIS:  End-stage renal disease.    POSTOPERATIVE DIAGNOSIS:  End-stage renal disease.    TITLE OF OPERATION:  Creation of left upper arm AV fistula.    SURGEON:  Amos Desouza M.D.    PROCEDURE IN DETAILS:  The patient was taken to the Operating Room, placed in   supine position.  After adequate induction of general anesthesia, was prepped   and draped in usual sterile fashion.  Incision was made in the upper arm, taken   down to the subcutaneous tissue.  Hemostasis maintained with electrocautery.    The vein was identified, mobilized and evaluated further.  It was detached as   far distal as possible, attached to a heparin needle, and gently distended.  The   vein was felt to be good quality, but fairly small diameter, but still deemed   adequate for use to create a fistula.  With this, we proceeded to mobilize the   vein as far as we could.  A second incision was made more proximally and once   the vein was mobilized completely, a subcutaneous tunnel was then created using   the tunneling device.  Before pulling the vein through the tunnel, all the side   branches on the vein were doubly ligated by applying a second Hemoclip.  Once   this was done, the vein was carefully brought through the tunnel, making sure to   avoid any kinking or twisting along the way.  Once the vein was in place, it   was flushed and flowed quite easily with no resistance.  With this, the vein was   brought into approximation with the artery, which was identified, mobilized,   and with all the dissection now completed, we proceeded to systemically   anticoagulate.  After the heparin was allowed to circulate for several minutes,   we proceeded with the anastomosis.  The artery was occluded, proximal and distal   to the site chosen for anastomosis.  The end of the vein was spatulated in the   usual fashion and then the anastomosis was accomplished using a 7-0 Prolene   running suture.   Prior to completing the suture line, back bleeding and flushing   was done after the suture was tied and cut and occlusion released.  Excellent   thrill was palpated over the newly created fistula.  Heparin was now reversed.    We proceeded to achieve good hemostasis on both sites.  At which point, we   copiously irrigated with antibiotic solution and then proceeded to close first   the subcutaneous tissue using 3-0 Vicryl running suture and then the skin edges   were reapproximated using a 4-0 Vicryl running subcuticular stitch.  Wound was   cleaned.  Sterile dressing was applied.  The patient tolerated the procedure   well and was transported to the Recovery Room in stable condition.      ROBBIN  dd: 04/08/2019 00:37:07 (CDT)  td: 04/08/2019 03:41:16 (CDT)  Doc ID   #[id number]  Job ID #[id number]    CC:

## 2019-05-15 ENCOUNTER — TELEPHONE (OUTPATIENT)
Dept: PODIATRY | Facility: CLINIC | Age: 54
End: 2019-05-15

## 2019-05-15 NOTE — TELEPHONE ENCOUNTER
Received ref from Dr. Wiggins's office, not sure what patient needs to be seen for. Fax ref to ref center. Called patient and LVM to call us back to [facility]'d appt.

## 2019-06-18 ENCOUNTER — OFFICE VISIT (OUTPATIENT)
Dept: ENDOCRINOLOGY | Facility: CLINIC | Age: 54
End: 2019-06-18
Payer: MEDICARE

## 2019-06-18 VITALS
WEIGHT: 87.94 LBS | HEIGHT: 67 IN | SYSTOLIC BLOOD PRESSURE: 86 MMHG | DIASTOLIC BLOOD PRESSURE: 64 MMHG | HEART RATE: 81 BPM | TEMPERATURE: 98 F | BODY MASS INDEX: 13.8 KG/M2

## 2019-06-18 DIAGNOSIS — I50.9 ACUTE ON CHRONIC CONGESTIVE HEART FAILURE, UNSPECIFIED HEART FAILURE TYPE: ICD-10-CM

## 2019-06-18 DIAGNOSIS — Z86.73 H/O: CVA (CEREBROVASCULAR ACCIDENT): ICD-10-CM

## 2019-06-18 DIAGNOSIS — N18.4 TYPE 1 DIABETES MELLITUS WITH STAGE 4 CHRONIC KIDNEY DISEASE: Primary | ICD-10-CM

## 2019-06-18 DIAGNOSIS — Z72.0 TOBACCO ABUSE: ICD-10-CM

## 2019-06-18 DIAGNOSIS — E10.22 TYPE 1 DIABETES MELLITUS WITH STAGE 4 CHRONIC KIDNEY DISEASE: Primary | ICD-10-CM

## 2019-06-18 DIAGNOSIS — I25.10 CORONARY ARTERY DISEASE INVOLVING NATIVE CORONARY ARTERY WITHOUT ANGINA PECTORIS, UNSPECIFIED WHETHER NATIVE OR TRANSPLANTED HEART: ICD-10-CM

## 2019-06-18 DIAGNOSIS — E46 MALNUTRITION, UNSPECIFIED TYPE: ICD-10-CM

## 2019-06-18 DIAGNOSIS — E78.00 HYPERCHOLESTEREMIA: ICD-10-CM

## 2019-06-18 DIAGNOSIS — E11.59 HYPERTENSION ASSOCIATED WITH DIABETES: ICD-10-CM

## 2019-06-18 DIAGNOSIS — I15.2 HYPERTENSION ASSOCIATED WITH DIABETES: ICD-10-CM

## 2019-06-18 PROCEDURE — 99214 PR OFFICE/OUTPT VISIT, EST, LEVL IV, 30-39 MIN: ICD-10-PCS | Mod: S$GLB,,, | Performed by: PHYSICIAN ASSISTANT

## 2019-06-18 PROCEDURE — 3008F PR BODY MASS INDEX (BMI) DOCUMENTED: ICD-10-PCS | Mod: CPTII,S$GLB,, | Performed by: PHYSICIAN ASSISTANT

## 2019-06-18 PROCEDURE — 3045F PR MOST RECENT HEMOGLOBIN A1C LEVEL 7.0-9.0%: CPT | Mod: CPTII,S$GLB,, | Performed by: PHYSICIAN ASSISTANT

## 2019-06-18 PROCEDURE — 3045F PR MOST RECENT HEMOGLOBIN A1C LEVEL 7.0-9.0%: ICD-10-PCS | Mod: CPTII,S$GLB,, | Performed by: PHYSICIAN ASSISTANT

## 2019-06-18 PROCEDURE — 99999 PR PBB SHADOW E&M-EST. PATIENT-LVL IV: CPT | Mod: PBBFAC,,, | Performed by: PHYSICIAN ASSISTANT

## 2019-06-18 PROCEDURE — 3008F BODY MASS INDEX DOCD: CPT | Mod: CPTII,S$GLB,, | Performed by: PHYSICIAN ASSISTANT

## 2019-06-18 PROCEDURE — 99999 PR PBB SHADOW E&M-EST. PATIENT-LVL IV: ICD-10-PCS | Mod: PBBFAC,,, | Performed by: PHYSICIAN ASSISTANT

## 2019-06-18 PROCEDURE — 99214 OFFICE O/P EST MOD 30 MIN: CPT | Mod: S$GLB,,, | Performed by: PHYSICIAN ASSISTANT

## 2019-06-18 RX ORDER — FERROUS SULFATE 15 MG/ML
DROPS ORAL
COMMUNITY
End: 2019-06-18

## 2019-06-18 RX ORDER — LISINOPRIL 20 MG/1
TABLET ORAL
COMMUNITY
End: 2019-06-18

## 2019-06-18 RX ORDER — QUETIAPINE 150 MG/1
TABLET, FILM COATED, EXTENDED RELEASE ORAL
COMMUNITY
End: 2019-06-18

## 2019-06-18 RX ORDER — INSULIN GLARGINE 100 [IU]/ML
INJECTION, SOLUTION SUBCUTANEOUS
COMMUNITY
End: 2019-10-22

## 2019-06-18 RX ORDER — ATORVASTATIN CALCIUM 80 MG/1
TABLET, FILM COATED ORAL
COMMUNITY
End: 2019-06-18

## 2019-06-18 RX ORDER — INSULIN LISPRO 100 [IU]/ML
2 INJECTION, SOLUTION INTRAVENOUS; SUBCUTANEOUS
Qty: 15 ML | Refills: 12 | Status: SHIPPED | OUTPATIENT
Start: 2019-06-18 | End: 2019-10-22 | Stop reason: SDUPTHER

## 2019-06-18 RX ORDER — MEGESTROL ACETATE 40 MG/1
40 TABLET ORAL DAILY
Qty: 90 TABLET | Refills: 3 | Status: SHIPPED | OUTPATIENT
Start: 2019-06-18 | End: 2020-09-29

## 2019-06-18 RX ORDER — PRAVASTATIN SODIUM 40 MG/1
TABLET ORAL
COMMUNITY
End: 2019-06-18

## 2019-06-18 NOTE — PROGRESS NOTES
CC: This 53 y.o. female presents for management of diabetes  and chronic conditions pending review including HTN, HLP, CAD s/p CABG, pancreatitis, CHF, dementia     HPI: She was diagnosed with T1DM in . Insulin started ~ . However, based on low c-peptide of 0.2 on 2016, patient is insulinopenic and is treated as type 1 diabetes. CJ negative.    Family hx of DM: parents, sister, aunt  Arrives with her , who contributes heavily to interview. He is very involved in her care. He makes her insulin dosing decisions and he injects insulin sometimes as well.      hypoglycemia at home- he reports she had a 35 the other day after taking insulin without eating.    She had an AV fistula placed on 3/19. She goes to dialysis on MWF. Pt has not been eating lately.    monitoring BG at home:  Fastins    Diet: Eats 2  Meals a days, She eats grits, eggs, fried chicken, burger. Drinks water, root beer and pepsi.  Exercise: none  CURRENT DM MEDS: Levemir 14-18 u qd -bid- not giving Humalog because pt will have hypoglycemia    Timing prandial insulin 5-15 minutes before meals: no  Vial/pen:  Uses  pens  Glucometer type: Doesn't remember    Standards of Care:  Eye exam: she had an apt in  but couldn't pay the co-pay    Social Hx: she smokes 0.5 ppd for 35 years. She wants to quit smoking.     ROS:   Gen: Appetite fair,denies fatigue and weakness.  Skin: Skin is intact and heals well, no rashes, no hair changes  Eyes: Denies visual disturbances  Resp: no SOB or BELLE, no cough  Cardiac: No palpitations, chest pain, no edema   GI: No nausea or vomiting, diarrhea, constipation, or abdominal pain.  /GYN: No nocturia, burning or pain.   MS/Neuro: Denies numbness/ tingling in BLE; Gait steady, speech clear  Psych: Denies drug/ETOH abuse, + hx of depression.  Other systems: negative.    BP (!) 86/64 (BP Location: Right arm, Patient Position: Sitting, BP Method: Pediatric (Automatic))   Pulse 81   Temp 98.2 °F (36.8  "°C) (Oral)   Ht 5' 7" (1.702 m)   Wt 39.9 kg (87 lb 15.4 oz)   LMP 09/03/2012 (Exact Date) Comment: pt states no hysterectomy  BMI 13.78 kg/m²      PE:  GENERAL: elderly female, very thin, smells of urine, well developed, well nourished.  PSYCH: AAOx3, appropriate mood and affect, pleasant expression, conversant, appears relaxed, well groomed.   EYES: Conjunctiva, corneas clear  NECK: Supple, trachea midline,no thyromegaly or nodules  CHEST: Resp even and unlabored, CTA bilateral.  CARDIAC: RRR, S1, S2 heard, + murmurs  ABDOMEN: Soft, non-tender, non-distended   VASCULAR: DP pulses +2/4 bilaterally, no edema.  NEURO: Gait steady  SKIN: Skin warm and dry no acanthosis nigracans.  FOOT EXAMINATION: 7/6/18  No foot deformity, corns or callus formation,  nails in good condition and well trimmed, no interspace maceration or ulceration noted.  Decreased hair growth present over toes/feet.   Feet are dirty, Protective sensation intact with 10 gram monofilament.  +2 dorsalis pedis and posterior pulses noted.    No results found for: MICALBCREAT  Personally reviewed labs below:  Labs under media tab  5/15/19  A1c 9.9%  Cbc wnl  GFR 24  Cholesterol 238  Trigs 154  hdl 76     MAC 3039.1    Hemoglobin A1C   Date Value Ref Range Status   01/06/2019 8.1 (H) 4.0 - 5.6 % Final     Comment:     ADA Screening Guidelines:  5.7-6.4%  Consistent with prediabetes  >or=6.5%  Consistent with diabetes  High levels of fetal hemoglobin interfere with the HbA1C  assay. Heterozygous hemoglobin variants (HbS, HgC, etc)do  not significantly interfere with this assay.   However, presence of multiple variants may affect accuracy.     03/24/2018 13.5 (H) 4.0 - 5.6 % Final     Comment:     According to ADA guidelines, hemoglobin A1c <7.0% represents  optimal control in non-pregnant diabetic patients. Different  metrics may apply to specific patient populations.   Standards of Medical Care in Diabetes-2016.  For the purpose of screening for " the presence of diabetes:  <5.7%     Consistent with the absence of diabetes  5.7-6.4%  Consistent with increasing risk for diabetes   (prediabetes)  >or=6.5%  Consistent with diabetes  Currently, no consensus exists for use of hemoglobin A1c  for diagnosis of diabetes for children.  This Hemoglobin A1c assay has significant interference with fetal   hemoglobin   (HbF). The results are invalid for patients with abnormal amounts of   HbF,   including those with known Hereditary Persistence   of Fetal Hemoglobin. Heterozygous hemoglobin variants (HbAS, HbAC,   HbAD, HbAE, HbA2) do not significantly interfere with this assay;   however, presence of multiple variants in a sample may impact the %   interference.     01/12/2018 9.7 (H) 4.0 - 5.6 % Final     Comment:     According to ADA guidelines, hemoglobin A1c <7.0% represents  optimal control in non-pregnant diabetic patients. Different  metrics may apply to specific patient populations.   Standards of Medical Care in Diabetes-2016.  For the purpose of screening for the presence of diabetes:  <5.7%     Consistent with the absence of diabetes  5.7-6.4%  Consistent with increasing risk for diabetes   (prediabetes)  >or=6.5%  Consistent with diabetes  Currently, no consensus exists for use of hemoglobin A1c  for diagnosis of diabetes for children.  This Hemoglobin A1c assay has significant interference with fetal   hemoglobin   (HbF). The results are invalid for patients with abnormal amounts of   HbF,   including those with known Hereditary Persistence   of Fetal Hemoglobin. Heterozygous hemoglobin variants (HbAS, HbAC,   HbAD, HbAE, HbA2) do not significantly interfere with this assay;   however, presence of multiple variants in a sample may impact the %   interference.     08/07/2013 8.5 (H) 4.8 - 5.6 % Final     Comment:              Increased risk for diabetes: 5.7 - 6.4           Diabetes: >6.4           Glycemic control for adults with diabetes: <7.0  **In order  to standardize %HbA1c results worldwide, as of October 11, 2010,  the %HbA1c is being calculated using the master equation recommended in the  consensus statement adopted by the ADA (American Diabetes Assoc), EASD  (European Assoc for the Study of Diabetes), IFCC (International Federation  of Clinical Chemistry and Laboratory Medicine) and IDF (International  Diabetes Federation). Result units: %HgbA1c (DCCT/NGSP).  In common with other methods, Hb A1C values may not accurately reflect mean  blood glucose in patients with hemoglobin variants (HgbF, HgbS and HgbC).  Any cause of shortened erythrocyte survival will reduce exposure of  erythrocytes to glucose with a consequent decrease in HbA1c (%) values, even  though the time-averaged blood glucose level may be elevated. Causes of  shortened erythrocyte lifetime might be hemolytic anemia or other hemolytic  diseases, homozygous sickle cell trait, pregnancy, recent significant or  chronic blood loss, etc. Caution should be used when interpreting the HbA1c  results from patients with these conditions.   07/16/2013 7.6 (H) 4.8 - 5.6 % Final     Comment:              Increased risk for diabetes: 5.7 - 6.4           Diabetes: >6.4           Glycemic control for adults with diabetes: <7.0  **In order to standardize %HbA1c results worldwide, as of October 11, 2010,  the %HbA1c is being calculated using the master equation recommended in the  consensus statement adopted by the ADA (American Diabetes Assoc), EASD  (European Assoc for the Study of Diabetes), IFCC (International Federation  of Clinical Chemistry and Laboratory Medicine) and IDF (International  Diabetes Federation). Result units: %HgbA1c (DCCT/NGSP).  In common with other methods, Hb A1C values may not accurately reflect mean  blood glucose in patients with hemoglobin variants (HgbF, HgbS and HgbC).  Any cause of shortened erythrocyte survival will reduce exposure of  erythrocytes to glucose with a consequent  decrease in HbA1c (%) values, even  though the time-averaged blood glucose level may be elevated. Causes of  shortened erythrocyte lifetime might be hemolytic anemia or other hemolytic  diseases, homozygous sickle cell trait, pregnancy, recent significant or  chronic blood loss, etc. Caution should be used when interpreting the HbA1c  results from patients with these conditions.   07/15/2013 7.5 (H) 4.8 - 5.6 % Final     Comment:              Increased risk for diabetes: 5.7 - 6.4           Diabetes: >6.4           Glycemic control for adults with diabetes: <7.0  **In order to standardize %HbA1c results worldwide, as of October 11, 2010,  the %HbA1c is being calculated using the master equation recommended in the  consensus statement adopted by the ADA (American Diabetes Assoc), EASD  (European Assoc for the Study of Diabetes), IFCC (International Federation  of Clinical Chemistry and Laboratory Medicine) and IDF (International  Diabetes Federation). Result units: %HgbA1c (DCCT/NGSP).  In common with other methods, Hb A1C values may not accurately reflect mean  blood glucose in patients with hemoglobin variants (HgbF, HgbS and HgbC).  Any cause of shortened erythrocyte survival will reduce exposure of  erythrocytes to glucose with a consequent decrease in HbA1c (%) values, even  though the time-averaged blood glucose level may be elevated. Causes of  shortened erythrocyte lifetime might be hemolytic anemia or other hemolytic  diseases, homozygous sickle cell trait, pregnancy, recent significant or  chronic blood loss, etc. Caution should be used when interpreting the HbA1c  results from patients with these conditions.        ASSESSMENT and PLAN:    1. Type 1 diabetes mellitus with stage 4 chronic kidney disease  HUMALOG KWIKPEN INSULIN 100 unit/mL pen   2. Hypertension associated with diabetes     3. Hypercholesteremia     4. H/O: CVA (cerebrovascular accident)     5. Coronary artery disease involving native  coronary artery without angina pectoris, unspecified whether native or transplanted heart     6. Acute on chronic congestive heart failure, unspecified heart failure type     7. Malnutrition, unspecified type  megestrol (MEGACE) 40 MG Tab   8. Tobacco abuse  Ambulatory referral to Smoking Cessation Program      1. T1DM with hyperglycemia, CKD 4, DM PN, hypoglycemia  Change levemir 2 u bid, Humalog 2 u AC. Levemir once daily on MWF.     Check bg ac/hs- Rx for meter sent to [facility]   Fax facesheet to dexcom for coverage       5/2/2016 09:29   Estimated Avg Glucose 223 (H)   C-Peptide 0.2 (L)   Glutamic Acid Decarb Ab 0.00   Islet Cell Ab <5      Discussed A1c and BG goals.   Reviewed  hypoglycemia, s/s and appropriate tx.   Instructed to monitor BG and bring meter/ log to every clinic visit.   - takes ASA, ACEi, statin    2. HTN - controlled, continue meds as previously prescribed and monitor.     3. HLP -  on statin therapy, LFTs WNL    4. CKD 4- continue to follow w Dr Goldsmith     5, CAD, h/o CVA, CHF- optimize bg control   6. Malnutrition-start megace 40 mg daily  7. Tobacco abuse-referral to tobacco cessation program     Follow-up: in 3 months with lab prior

## 2019-06-18 NOTE — PATIENT INSTRUCTIONS
Start Levemir 2 units twice daily. Give once daily on dialysis days. Start Humalog 2 units with meals.

## 2019-07-01 DIAGNOSIS — Z00.00 ANNUAL PHYSICAL EXAM: Primary | ICD-10-CM

## 2019-07-02 ENCOUNTER — OFFICE VISIT (OUTPATIENT)
Dept: CARDIOLOGY | Facility: CLINIC | Age: 54
End: 2019-07-02
Payer: MEDICARE

## 2019-07-02 VITALS
DIASTOLIC BLOOD PRESSURE: 60 MMHG | WEIGHT: 88.19 LBS | HEIGHT: 67 IN | SYSTOLIC BLOOD PRESSURE: 96 MMHG | BODY MASS INDEX: 13.84 KG/M2 | OXYGEN SATURATION: 98 % | HEART RATE: 80 BPM

## 2019-07-02 DIAGNOSIS — Z91.199 HISTORY OF NONCOMPLIANCE WITH MEDICAL TREATMENT: ICD-10-CM

## 2019-07-02 DIAGNOSIS — R54 FRAILTY: ICD-10-CM

## 2019-07-02 DIAGNOSIS — I51.7 LVH (LEFT VENTRICULAR HYPERTROPHY): ICD-10-CM

## 2019-07-02 DIAGNOSIS — Z86.73 H/O: CVA (CEREBROVASCULAR ACCIDENT): ICD-10-CM

## 2019-07-02 DIAGNOSIS — R41.3 MEMORY DIFFICULTIES: ICD-10-CM

## 2019-07-02 DIAGNOSIS — E87.6 HYPOKALEMIA: ICD-10-CM

## 2019-07-02 DIAGNOSIS — Z01.810 PREOP CARDIOVASCULAR EXAM: Primary | ICD-10-CM

## 2019-07-02 DIAGNOSIS — F17.200 MODERATE TOBACCO DEPENDENCE: ICD-10-CM

## 2019-07-02 DIAGNOSIS — Z95.5 S/P CORONARY ARTERY STENT PLACEMENT: ICD-10-CM

## 2019-07-02 DIAGNOSIS — F33.0 MILD RECURRENT MAJOR DEPRESSION: ICD-10-CM

## 2019-07-02 DIAGNOSIS — E46 MALNUTRITION, UNSPECIFIED TYPE: ICD-10-CM

## 2019-07-02 DIAGNOSIS — E78.00 HYPERCHOLESTEREMIA: ICD-10-CM

## 2019-07-02 DIAGNOSIS — I21.01 ST ELEVATION MYOCARDIAL INFARCTION INVOLVING LEFT MAIN CORONARY ARTERY: ICD-10-CM

## 2019-07-02 DIAGNOSIS — Z91.89 SEDENTARY LIFESTYLE: ICD-10-CM

## 2019-07-02 DIAGNOSIS — N18.5 ANEMIA OF CHRONIC RENAL FAILURE, STAGE 5: ICD-10-CM

## 2019-07-02 DIAGNOSIS — Z00.00 ANNUAL PHYSICAL EXAM: ICD-10-CM

## 2019-07-02 DIAGNOSIS — D63.1 ANEMIA OF CHRONIC RENAL FAILURE, STAGE 5: ICD-10-CM

## 2019-07-02 DIAGNOSIS — G47.19 EXCESSIVE DAYTIME SLEEPINESS: ICD-10-CM

## 2019-07-02 PROCEDURE — 99215 PR OFFICE/OUTPT VISIT, EST, LEVL V, 40-54 MIN: ICD-10-PCS | Mod: S$GLB,,, | Performed by: INTERNAL MEDICINE

## 2019-07-02 PROCEDURE — 3008F BODY MASS INDEX DOCD: CPT | Mod: CPTII,S$GLB,, | Performed by: INTERNAL MEDICINE

## 2019-07-02 PROCEDURE — 99999 PR PBB SHADOW E&M-EST. PATIENT-LVL III: ICD-10-PCS | Mod: PBBFAC,,, | Performed by: INTERNAL MEDICINE

## 2019-07-02 PROCEDURE — 99999 PR PBB SHADOW E&M-EST. PATIENT-LVL III: CPT | Mod: PBBFAC,,, | Performed by: INTERNAL MEDICINE

## 2019-07-02 PROCEDURE — 93000 EKG 12-LEAD: ICD-10-PCS | Mod: S$GLB,,, | Performed by: INTERNAL MEDICINE

## 2019-07-02 PROCEDURE — 99215 OFFICE O/P EST HI 40 MIN: CPT | Mod: S$GLB,,, | Performed by: INTERNAL MEDICINE

## 2019-07-02 PROCEDURE — 3008F PR BODY MASS INDEX (BMI) DOCUMENTED: ICD-10-PCS | Mod: CPTII,S$GLB,, | Performed by: INTERNAL MEDICINE

## 2019-07-02 PROCEDURE — 93000 ELECTROCARDIOGRAM COMPLETE: CPT | Mod: S$GLB,,, | Performed by: INTERNAL MEDICINE

## 2019-07-02 NOTE — PROGRESS NOTES
"Subjective:    Patient ID:  Errol Keita is a 53 y.o. female who presents for evaluation of Annual Exam (sx clearance)  For CAD with prior stent, tobacco addiction, dementia, need AV fistula repair  PCP: Dr. Wiggins, see monthly  Vascular surgeon and referred by Dr. Desouza  Nephrologist: Dr. Villegas  Endo: no doctor  Cardiologist: Dr. Villanueva, last seen 8/18/2015  Lives with , Ti, the caretaker, here with patient, non-smoker  HD MWF for 4 hours    Difficult historian with memory difficulties.    Health literacy: medium  Activities: sedentary and sleeping all day for 19 years  Nicotine: half ppd, started age 15  Alcohol: none  Illicit drugs: none  Cardiac symptoms: hypotension  Home BP: checked at HD  Medication compliance: misses often  Diet: diabetic  Caffeine: none  Labs: no LDL on Rx, 4/2019 CMP (K+ 3.3, Cr 2.5), CBC (Hgb 9.5), 1/2019 normal TSH, no A1C  Last Echo: 1/2019  Last stress test: none  Cardiovascular angiogram: 7/2015 with PCI  ECG: NSR, 74, LVH, prior IMI  Fundoscopic exam: none for 2 years    In 1/2016:  DCS - "Patient is a 50 y.o. female admitted to Hospitalist Service from [facility] Emergency Room with complaint of worsening leg swellings for 2-3 days. Patient has PMH significant for DM-2, hypertension, CAD, arthritis, asthma, CHF and pancreatitis. Patient also reported chronic SOB which is slwo but progressive. Patient reported compliance with medication. No chest pain, cough or fever reported. No definite calf tenderness reported. Left leg appears bigger than right leg. Patient was admitted to Hospitalist medicine service. Patient had serial cardiac enzymes. Patient was given IV lasix and intake and output closely monitored. Patient was evaluated by cardiologist and underwent ECHO. Patient's renal panel and electrolytes closely monitored. Patient had daily weights. Patient was educated on monitoring daily weight, following low salt diet and in case if gain more " "than 3 pounds in 24 hours, to call their doctor for further advice. Left lower extremity US was negative. Patient's symptoms resolved. Patient to monitor daily weight, follow low salt diet and in case if gain more than 3 pounds in 24 hours, please call your doctor for further advice. Patient was discharged home in stable condition with following discharge plan of care."    Repeat ECHO CONCLUSIONS     1 - Concentric remodeling. Wall thickness is increased, with the septum and the posterior wall each measuring 1.4 cm across.    2 - Hyperdynamic left ventricular systolic function (EF 65-70%).     3 - Left ventricular diastolic dysfunction. E/e'(lat) is 13    4 - Mildly depressed right ventricular systolic function .    5 - The estimated PA systolic pressure is 28 mmHg.     6 - Regional wall motion abnormalities consistent with ischemic heart disease involving the inferoseptal region.     7 - No significant change from Echo on 6/28/2015.    Active problems:  Peripheral edema due to dietary non-compliance  Elevated troponin I and BNP, negative CK-MB  CAD post IMI and stents 7/2015, not on BB  RV dysfunction with history of RV infarction  Poorly controlled DM, A1C 7.7%  CKD stage III, with anemia  Active smoker  Dementia    Old records:  [facility] and PCI 7/27/2015 - HEMODYNAMIC RESULTS:    LVEDP (Pre): 18 mmHg  LVEDP (Post): 18 mmHg  Ejection Fraction: 55%    C. ANGIOGRAPHIC RESULTS:    DIAGNOSTIC:       Patient has a right dominant coronary artery.        - Left Main Coronary Artery:             The LM has luminal irregularities. There is YAKOV 3 flow.       - Left Anterior Descending Artery:             The LAD has luminal irregularities. There is YAKOV 3 flow.       - Left Circumflex Artery:             The LCX has a 95% stenosis. There is YAKOV 3 flow.       - Right Coronary Artery:             The proximal RCA is occluded. There is YAKOV 0 flow.                     Lesion Details:  This is a Type C lesion.                The " "mid RCA is occluded. There is YAKOV 0 flow.               The distal RCA is occluded. There is YAKOV 0 flow.       - Common Femoral Artery:             The right CFA is normal.    INTERVENTION:         Proximal RCA:              The lesion was successfully intervened. Post-stenosis of 0% and post-YAKOV 3 flow. The vessel was accessed natively.  The following items were used: Stent Resolute Rx 3.00x18 (NESTOR).       Mid RCA:              The lesion was successfully intervened. Post-stenosis of 0% and post-YAKOV 3 flow. The vessel was accessed natively.  The following items were used: Blln Sprinter Legend Rx 2.0 X 20 and Stent Resolute Rx 3.0x38 (NESTOR).       Distal RCA:              The lesion was successfully intervened. Post-stenosis of 0% and post-YAKOV 3 flow. The vessel was accessed natively.  The following items were used: Promus Premier Mr 3.0 X 38 (NESTOR).    D. SUMMARY/POST-OPERATIVE DIAGNOSIS:    1. Two vessel coronary artery disease.  2. Normal LVEF.  3. Occluded RCA.  4. High-grade stenosis in the LCX  5. Successful PCI/NESTOR of an occluded RCA during STEMI with restoration of YAKOV 3 coronary flow and no remaining stenosis    E. RECOMMENDATIONS:    1. Maximize medical management.  2. Risk factor reductions.  3. ASA 81mg.  4. Clopidogrel (Plavix) for one year.    Since home, using less salt, swelling much improve. Offers no complaints of CP nor SOB. Still smoking about half ppd    In 4/2018 return to reestablish care. Last seen in 1/2016 and advised for 6 months follow up. Had 2 hospitalization this year, in 3/2018.  DCS - "Hospital Course:   Patient is a 51-year-old -American female with a past medical history significant for very brittle diabetes diabetes and pancreatitis who presents to the hospital for hyperosmolar nonketotic state and acute kidney injury.  Patient has had multiple similar presentation to the hospital for these symptoms and has had complications related to her diabetes dating back several " "years.  She was found to have a markedly elevated A1c of 13.5 was started initially on insulin drip and then transitioned to basal bolus insulin.  She remains very brittle in the hospital which is her baseline but was back to her baseline level symptomatology.  Patient was encouraged greatly to get outpatient follow-up for her diabetes and better management related to her brittle sugars.  She was seen by diabetic education and nutrition while in the hospital.  Her medications were not adjusted and she was stable on those medicines at home for a while and she does know how to adjust them on her own according to her blood sugars.  Patient was discharged home with follow-up with her primary care provider in approximately 7-10 days."     In 1/2017 in [facility] for Diastolic CHF, acute on chronic  reviewed by Dr. Sadler - active problems:  1. CAD  2. Pedal edema  3. Hypertensive urgency.  4. Cough  5. Mild troponin elevation.  6. Tobacco abuse     Recommendations:   Continue careful diuresis.  No indication for cardiac catheterization now.  Await echo. Would recommend increaing dose of lisinopril to 5 mg bid. Monitor renal function. Possible nephrology input as well.  Treat possible RTI.  Smoking cessation counseling provided.    At home, doing alright per . Smokes a ppd, active but sits a lot. Cook occasionally, 2-3 times a month.  makes sure she takes the oral medications, some missing of insulin due to concern of hypoglycemia due to poor diet habits. Low BS at least once a month, no endocrinologist. ECG in 3/2018, NSR, rate 97, inferior-posterior and anterior MI, STT suggestive of ischemia. Denies any CP nor SOB.     HPI comments: in 7/2019, here for pre-op clearance for AV fistula repair, placed about 2-3 months ago,  recall no complication. Currently no complain of cardiac symptoms. Able to continue HD. Remains very frail with weight loss and sedentary lifestyle and smoking for over the last 19 years. "     Echo 1/2019  Moderate concentric left ventricular hypertrophy.  Increased (hyperdynamic) left ventricular systolic function. The estimated ejection fraction is 72%  Normal LV diastolic function.  Normal right ventricular systolic function.  Mild tricuspid regurgitation.  Normal central venous pressure (3 mm Hg).  The estimated PA systolic pressure is 24 mm Hg      Review of Systems   Constitution: Positive for weight loss (12 lbs since 4/2018). Negative for diaphoresis, fever, malaise/fatigue, night sweats and weight gain.   HENT: Negative for nosebleeds and tinnitus.    Eyes: Negative for visual disturbance.   Cardiovascular: Negative for chest pain, claudication, cyanosis, dyspnea on exertion, irregular heartbeat, leg swelling, near-syncope, orthopnea, palpitations and paroxysmal nocturnal dyspnea.   Respiratory: Positive for cough. Negative for shortness of breath, sleep disturbances due to breathing, snoring and wheezing.         Hubbardston score 21   Endocrine: Positive for cold intolerance. Negative for polydipsia and polyuria.   Hematologic/Lymphatic: Does not bruise/bleed easily.   Skin: Negative for color change, flushing, nail changes, poor wound healing and suspicious lesions.   Musculoskeletal: Negative for arthritis, falls, gout, joint pain, joint swelling, muscle cramps, muscle weakness and myalgias.   Gastrointestinal: Negative for heartburn, hematemesis, hematochezia, melena and nausea.   Neurological: Negative for disturbances in coordination, excessive daytime sleepiness, dizziness, focal weakness, headaches, light-headedness, loss of balance, numbness, vertigo and weakness.   Psychiatric/Behavioral: Positive for memory loss. Negative for depression and substance abuse. The patient has insomnia and is nervous/anxious.         Sleep problem, day and night is off.          Objective:    Physical Exam   Constitutional: She is oriented to person, place, and time. She appears well-developed and  "well-nourished.   Smell of urine, appears much older than stated age.   HENT:   Head: Normocephalic.   Eyes: Pupils are equal, round, and reactive to light. Conjunctivae and EOM are normal.   Neck: Normal range of motion. Neck supple. No JVD present. No thyromegaly present.   Circumference 14.5" down to 12"  Howard cath in right IJ    Cardiovascular: Normal rate, regular rhythm and intact distal pulses. Exam reveals distant heart sounds. Exam reveals no gallop and no friction rub.   Murmur heard.   Medium-pitched machinery early systolic murmur is present with a grade of 2/6 at the upper right sternal border and upper left sternal border.  Pulses:       Carotid pulses are 1+ on the right side, and 1+ on the left side.       Radial pulses are 1+ on the right side, and 1+ on the left side.        Femoral pulses are 1+ on the right side, and 1+ on the left side.       Popliteal pulses are 1+ on the right side, and 1+ on the left side.        Dorsalis pedis pulses are 1+ on the right side, and 1+ on the left side.        Posterior tibial pulses are 1+ on the right side, and 1+ on the left side.   AV fistula in left upper arm   Pulmonary/Chest: Effort normal and breath sounds normal. She has no rales. She exhibits no tenderness.   Diminished breath sounds and prolong expiration.   Abdominal: Soft. Bowel sounds are normal. There is no tenderness.   Waist 32" down to 24", hip 37"   Musculoskeletal: Normal range of motion. She exhibits no edema.   Lymphadenopathy:     She has no cervical adenopathy.   Neurological: She is alert and oriented to person, place, and time.   Skin: Skin is warm and dry. No rash noted.         Assessment:       1. Preop cardiovascular exam    2. Annual physical exam    3. Frailty    4. Anemia of chronic renal failure, stage 5    5. H/O: CVA (cerebrovascular accident)    6. History of noncompliance with medical treatment    7. Hypercholesteremia    8. LVH (left ventricular hypertrophy)    9. " Memory difficulties    10. Malnutrition, unspecified type    11. Mild recurrent major depression    12. Moderate tobacco dependence    13. S/P coronary artery stent placement, 2 NESTOR 7/27/2015    14. ST elevation myocardial infarction involving left main coronary artery    15. Sedentary lifestyle, onset 2000    16. Hypokalemia    17. Excessive daytime sleepiness         Plan:         Errol was seen today for annual exam.    Diagnoses and all orders for this visit:    Preop cardiovascular exam    Annual physical exam  -     EKG 12-lead    Frailty    Anemia of chronic renal failure, stage 5    H/O: CVA (cerebrovascular accident)    History of noncompliance with medical treatment    Hypercholesteremia    LVH (left ventricular hypertrophy)    Memory difficulties    Malnutrition, unspecified type    Mild recurrent major depression    Moderate tobacco dependence    S/P coronary artery stent placement, 2 NESTOR 7/27/2015    ST elevation myocardial infarction involving left main coronary artery    Sedentary lifestyle, onset 2000    Hypokalemia    Excessive daytime sleepiness    - All medical issues reviewed, continue current Rx.  - Clear for Surgery and anesthesia with acceptable risk from the cardiac standpoint. Will be at increase risk for complications secondary to patient's co-morbidities.  - CV status stable, continue current Rx, all medications reviewed, patient acknowledge good understanding.  - Instruction for Mediterranean diet and heart healthy exercise given.  - Highly recommend 30 minutes of exercise daily, can have Sunday off, with 2-3 sessions of muscle strengthening weekly. A  would be very helpful.  - Recommend at least annual cardiovascular evaluation in view of her significant risk factors.    Peripheral edema - improved    Vascular dementia without behavioral disturbance    Patient Active Problem List   Diagnosis    H/O: CVA (cerebrovascular accident)    Hypercholesteremia    Hypertension  associated with diabetes    Tobacco abuse, >100 pack years    History of noncompliance with medical treatment    Abnormal EKG    Elevated troponin    Mild recurrent major depression    LVH (left ventricular hypertrophy)    Disturbance in affect    Cardiomegaly    Heart murmur    Severe malnutrition    Hypoglycemia associated with diabetes    STEMI (ST elevation myocardial infarction)    Hypocalcemia    Hemodialysis-associated hypotension    Elevated brain natriuretic peptide (BNP) level    Transfusion-dependent anemia    Moderate malnutrition    Iron deficiency anemia secondary to inadequate dietary iron intake    Coronary artery disease involving native coronary artery without angina pectoris    S/P coronary artery stent placement, 2 NESTOR 7/27/2015    ST elevation myocardial infarction involving left main coronary artery    Leg swelling    Acute on chronic diastolic congestive heart failure    Memory difficulties    Dementia associated with other underlying disease without behavioral disturbance    Peripheral edema    Vascular dementia without behavioral disturbance    Diabetic nephropathy with proteinuria    Anemia of chronic renal failure    Moderate tobacco dependence, half ppd    Pancreatic insufficiency    Acute on chronic pancreatitis    Acute on chronic congestive heart failure    Encephalopathy, metabolic    Frailty    Diarrhea    Type 2 diabetes mellitus with stage 4 chronic kidney disease, with long-term current use of insulin    Type 1 diabetes mellitus with stage 4 chronic kidney disease    Acute renal failure superimposed on stage 4 chronic kidney disease    Hypomagnesemia    Hypokalemia    Uncontrolled type 2 diabetes mellitus with hyperglycemia    UTI (urinary tract infection)    ESRD (end stage renal disease)    End stage renal disease    Cellulitis of left upper arm    Malnutrition    Sedentary lifestyle, onset 2000    Excessive daytime sleepiness      Total face-to-face time with the patient was 35 minutes and greater than 50% was spent in counseling and coordination of care. The above assessment and plan have been discussed at length. Labs and procedure over the last 6 months reviewed. Problem List updated. Asked to bring in all active medications / pills bottles with next visit.

## 2019-08-20 ENCOUNTER — OFFICE VISIT (OUTPATIENT)
Dept: OPHTHALMOLOGY | Facility: CLINIC | Age: 54
End: 2019-08-20
Payer: MEDICARE

## 2019-08-20 DIAGNOSIS — E11.3293 MILD NONPROLIFERATIVE DIABETIC RETINOPATHY OF BOTH EYES WITHOUT MACULAR EDEMA ASSOCIATED WITH TYPE 2 DIABETES MELLITUS: ICD-10-CM

## 2019-08-20 DIAGNOSIS — H25.13 NUCLEAR SCLEROSIS, BILATERAL: ICD-10-CM

## 2019-08-20 DIAGNOSIS — H52.7 REFRACTIVE ERROR: ICD-10-CM

## 2019-08-20 DIAGNOSIS — E11.21 DIABETIC NEPHROPATHY WITH PROTEINURIA: Primary | ICD-10-CM

## 2019-08-20 PROCEDURE — 99999 PR PBB SHADOW E&M-EST. PATIENT-LVL III: ICD-10-PCS | Mod: PBBFAC,,, | Performed by: OPHTHALMOLOGY

## 2019-08-20 PROCEDURE — 92004 PR EYE EXAM, NEW PATIENT,COMPREHESV: ICD-10-PCS | Mod: S$GLB,,, | Performed by: OPHTHALMOLOGY

## 2019-08-20 PROCEDURE — 99999 PR PBB SHADOW E&M-EST. PATIENT-LVL III: CPT | Mod: PBBFAC,,, | Performed by: OPHTHALMOLOGY

## 2019-08-20 PROCEDURE — 92004 COMPRE OPH EXAM NEW PT 1/>: CPT | Mod: S$GLB,,, | Performed by: OPHTHALMOLOGY

## 2019-08-20 NOTE — LETTER
August 20, 2019      Bharat Wiggins MD  [address] [place] [address] [address]  [facility] [address]           [facility] - Ophthalmology  [address] [place] [address] Drive [address]  [facility] [address]  Phone: [phone]          Patient: Errol Keita   MR Number: [id number]   YOB: 1965   Date of Visit: 8/20/2019       Dear Dr. Bharat Wiggins:    Thank you for referring Erorl Keita to me for evaluation. Attached you will find relevant portions of my assessment and plan of care.    If you have questions, please do not hesitate to call me. I look forward to following Errol Keita along with you.    Sincerely,    Jennifer Lake MD    Enclosure  CC:  No Recipients    If you would like to receive this communication electronically, please contact [email or web address] or [phone] to request more information on [facility] access.    For providers and/or their staff who would like to refer a patient to [facility], please contact us through our one-stop-shop provider referral line, [facility] , at [phone].    If you feel you have received this communication in error or would no longer like to receive these types of communications, please e-mail [email or web address]

## 2019-08-20 NOTE — PATIENT INSTRUCTIONS
What Is Diabetic Retinopathy?  Diabetic retinopathy is the leading cause of blindness in adults. It happens when diabetes harms blood vessels inside the eye. These weak vessels leak fluid into an area of the eye called the retina. Weak new vessels can break and bleed into the retina. Old vessels can leak and cause swelling. These vessels can damage areas of the retina, causing blurry, distorted vision.    What causes diabetic retinopathy?  Diabetes is the cause of this eye disease. Over time, diabetes makes blood vessels weaken all over the body, including in the eyes. Poor blood sugar control can make retinopathy worse. So can smoking, high cholesterol, or poorly controlled high blood pressure. Pregnancy can also cause retinopathy to worsen. Diabetic retinopathy happens more often in Hispanics and in  Americans.   What are the symptoms?  You can have diabetic retinopathy without knowing it. Usually, there is no pain and no outward sign. Over time, you may notice gradual blurring or some vision loss. Some people have trouble seeing at night. or see spots or floaters. Symptoms may come and go. Early treatment and good control of risk factors may help prevent vision loss or blindness.  What you can do  Have your eyes examined regularly by an eye specialist. Your healthcare provider will tell you when and how often you need these exams. You can also help control your diabetes through exercise, diet, and medicine, as instructed by your healthcare provider. These same steps may also help control diabetic retinopathy.  Date Last Reviewed: 6/1/2016  © 9772-8193 [employer]. [address] [place] [address], [place] [address]. All rights reserved. This information is not intended as a substitute for professional medical care. Always follow your healthcare professional's instructions.        Diabetic Retinopathy: Controlling Your Risk Factors     As often as possible, keep your blood sugar in the target range.      Diabetic retinopathy happens when diabetes harms blood vessels in the rear of the eye. This can lead to vision loss. You can help reduce your risk of vision loss by taking care of your health. Managing your diabetes and other health problems can make diabetic retinopathy less likely.  Manage your diabetes  You can help protect your vision. To do this, keep your blood sugar level in a healthy range, check your blood sugar often, follow your diabetes management plan, and work closely with your healthcare providers. This includes your endocrinologist (a healthcare provider who specializes in diabetes), primary care provider, or any other provider who helps to manage your diabetes. He or she can help if you are having trouble keeping your blood sugar in range.  Control your risk factors  There are other factors that damage blood vessels. These can make diabetic retinopathy worse. These factors include:  · High blood pressure  · Smoking  · High cholesterol  Work with your healthcare team to control these problems. This can help lower your risk of developing diabetic retinopathy. A diabetes educator can help you control blood pressure and high cholesterol. He or she can also talk to you about programs to help you quit smoking. Diabetic patients should also see an eye healthcare provider regularly for an eye exam (at least every 1 to 2 years).     To learn more  The resources below can help you learn more:  · American Diabetes Association   353.398.2452  www.diabetes.org  · [employer]   445.181.6620  www.lighthouse.org  · [employer] [place]   334.926.3153  www.nei.nih.gov   · [employer]   758.487.2762 www.hormone.org  Date Last Reviewed: 6/1/2016  © 3833-0940 [employer]. [address] [place] [address], [place] [address]. All rights reserved. This information is not intended as a substitute for professional medical care. Always follow your healthcare professional's  instructions.

## 2019-08-20 NOTE — PROGRESS NOTES
HPI     55 YO female presents today for an annual eye exam. She states she is   doing well, no problems or complaints.     Lab Results       Component                Value               Date                       HGBA1C                   8.1 (H)             01/06/2019                 HGBA1C                   13.5 (H)            03/24/2018                 HGBA1C                   9.7 (H)             01/12/2018            No results found for: LABA1C    Agree with above. Last eye exam few years ago at 20/20.     Last edited by Jennifer Lake MD on 8/20/2019  3:13 PM.   (History)        ROS     Positive for: Neurological (Hx CVA), Genitourinary (Renal failure on   dialysis), Endocrine (DM diagnosed in her teens - question when started   insulin?), Cardiovascular (Hx MI; HTN - struggling with control),   Respiratory (cough; denies asthma/COPD), Heme/Lymph (anemia)    Negative for: Eyes (denies surgery/laser/trauma)    Last edited by Jennifer Lake MD on 8/20/2019  3:19 PM.   (History)        Assessment /Plan     For exam results, see Encounter Report.    Diabetic nephropathy with proteinuria    Mild nonproliferative diabetic retinopathy of both eyes without macular edema associated with type 2 diabetes mellitus    Nuclear sclerosis, bilateral    Refractive error        F/u 1 year DFE.    No tx needed at this time other than continue to work on glucose and BP control. F/u 1 year.    Not visually significant.  Observe.    Doing fine with distance vision for now, does not drive. MRx finalized but ok to not fill.

## 2019-10-11 ENCOUNTER — HOSPITAL ENCOUNTER (OUTPATIENT)
Dept: PREADMISSION TESTING | Facility: HOSPITAL | Age: 54
Discharge: HOME | End: 2019-10-11
Attending: THORACIC SURGERY (CARDIOTHORACIC VASCULAR SURGERY)
Payer: MEDICARE

## 2019-10-11 ENCOUNTER — HOSPITAL ENCOUNTER (OUTPATIENT)
Dept: RADIOLOGY | Facility: HOSPITAL | Age: 54
Discharge: HOME | End: 2019-10-11
Attending: THORACIC SURGERY (CARDIOTHORACIC VASCULAR SURGERY)
Payer: MEDICARE

## 2019-10-11 VITALS
OXYGEN SATURATION: 100 % | DIASTOLIC BLOOD PRESSURE: 66 MMHG | SYSTOLIC BLOOD PRESSURE: 94 MMHG | TEMPERATURE: 98 F | HEART RATE: 74 BPM | BODY MASS INDEX: 13.84 KG/M2 | RESPIRATION RATE: 18 BRPM | HEIGHT: 67 IN | WEIGHT: 88.19 LBS

## 2019-10-11 DIAGNOSIS — N18.6 END STAGE RENAL DISEASE: Primary | ICD-10-CM

## 2019-10-11 DIAGNOSIS — N18.6 ESRD (END STAGE RENAL DISEASE): Primary | ICD-10-CM

## 2019-10-11 DIAGNOSIS — Z00.00 ANNUAL PHYSICAL EXAM: ICD-10-CM

## 2019-10-11 DIAGNOSIS — N18.6 END STAGE RENAL DISEASE: ICD-10-CM

## 2019-10-11 PROCEDURE — 71046 X-RAY EXAM CHEST 2 VIEWS: CPT | Mod: TC

## 2019-10-11 PROCEDURE — 93005 ELECTROCARDIOGRAM TRACING: CPT

## 2019-10-11 RX ORDER — CEFAZOLIN SODIUM 1 G/50ML
1 SOLUTION INTRAVENOUS ONCE
Status: CANCELLED | OUTPATIENT
Start: 2019-10-14

## 2019-10-11 NOTE — CARE UPDATE
Per Dr Castaneda, today's EKG reviewed and patient is ok for surgery Monday with Dr Desouza for fistula.

## 2019-10-11 NOTE — DISCHARGE INSTRUCTIONS
To confirm, Your doctor has instructed you that surgery is scheduled for: October 14, 2019 Monday    Pre-Op will call the afternoon prior to surgery between 4:00 and 6:00 PM with the final arrival time.      Please report to [facility] [place] on [address] the morning of surgery.     Do not eat or drink anything after midnight the night before your surgery - THIS INCLUDES  WATER, GUM, MINTS AND CANDY.  YOU MAY BRUSH YOUR TEETH BUT DO NOT SWALLOW         ONLY if you are diabetic, check your sugar in the morning before your procedure.       Do not take any diabetic medicines or insulin the morning of surgery .     PLEASE NOTE:  The surgery schedule has many variables which may affect the time of your surgery case.  Family members should be available if your surgery time changes.  Plan to be here the day of your procedure between 4-6 hours.      DO NOT TAKE THESE MEDICATIONS 5-7 DAYS PRIOR to your procedure or per your surgeon's request: ASPIRIN, ALEVE, ADVIL, IBUPROFEN,  LAURIE SELTZER, BC , FISH OIL , VITAMIN E, HERBALS  (May take Tylenol)      ONLY if you are prescribed any types of blood thinners such as:  Aspirin, Coumadin, Plavix, Pradaxa, Xarelto, Aggrenox, Effient, Eliquis, Savasya, Brilinta, or any other, ask your surgeon whether you should stop taking them and how long before surgery you should stop.  You may also need to verify with the prescribing physician if it is ok to stop your medication.                                                        IMPORTANT INSTRUCTIONS      · Do not smoke, vape or drink alcoholic beverages 24 hours prior to your procedure.  · Shower the night before AND the morning of your procedure with a Chlorhexidine wash such as Hibiclens or Dial antibacterial soap from the neck down.   ·  Do not get it on your face or in your eyes.  You may use your own shampoo and face wash. This helps your skin to be as bacteria free as possible.   ·  DO NOT remove hair from the surgery site.  Do not  shave the incision site unless you are given specific instructions to do so.    · Sleep in a bed with clean sheets.  Do not sleep with a pet in the bed.   · If you wear contact lenses, dentures, hearing aids or glasses, bring a container to put them in during surgery and give to a family member for safe keeping.    · Please leave all jewelry, piercing's and valuables at home.     · Make arrangements in advance for transportation home by a responsible adult.      · You must make arrangements for transportation, TAXI'S, UBER'S OR LYFTS ARE NOT ALLOWED.        If you have any questions about these instructions, call Pre-Op Admit  Nursing at [phone] or the Pre-Op Day Surgery Unit at [phone].

## 2019-10-14 ENCOUNTER — ANESTHESIA (OUTPATIENT)
Dept: SURGERY | Facility: HOSPITAL | Age: 54
End: 2019-10-14
Payer: MEDICARE

## 2019-10-14 ENCOUNTER — HOSPITAL ENCOUNTER (OUTPATIENT)
Facility: HOSPITAL | Age: 54
Discharge: HOME | End: 2019-10-14
Attending: THORACIC SURGERY (CARDIOTHORACIC VASCULAR SURGERY) | Admitting: THORACIC SURGERY (CARDIOTHORACIC VASCULAR SURGERY)
Payer: MEDICARE

## 2019-10-14 ENCOUNTER — ANESTHESIA EVENT (OUTPATIENT)
Dept: SURGERY | Facility: HOSPITAL | Age: 54
End: 2019-10-14
Payer: MEDICARE

## 2019-10-14 VITALS
TEMPERATURE: 98 F | HEIGHT: 67 IN | BODY MASS INDEX: 13.34 KG/M2 | OXYGEN SATURATION: 97 % | DIASTOLIC BLOOD PRESSURE: 87 MMHG | SYSTOLIC BLOOD PRESSURE: 158 MMHG | HEART RATE: 80 BPM | RESPIRATION RATE: 12 BRPM | WEIGHT: 85 LBS

## 2019-10-14 DIAGNOSIS — N18.6 END STAGE RENAL DISEASE: Primary | ICD-10-CM

## 2019-10-14 DIAGNOSIS — N18.6 ESRD (END STAGE RENAL DISEASE): ICD-10-CM

## 2019-10-14 LAB
ANION GAP SERPL CALC-SCNC: 19 MMOL/L (ref 8–16)
BUN SERPL-MCNC: 78 MG/DL (ref 6–20)
CALCIUM SERPL-MCNC: 9.1 MG/DL (ref 8.7–10.5)
CHLORIDE SERPL-SCNC: 98 MMOL/L (ref 95–110)
CO2 SERPL-SCNC: 24 MMOL/L (ref 23–29)
CREAT SERPL-MCNC: 4.7 MG/DL (ref 0.5–1.4)
EST. GFR  (AFRICAN AMERICAN): 11.2 ML/MIN/1.73 M^2
EST. GFR  (NON AFRICAN AMERICAN): 9.7 ML/MIN/1.73 M^2
GLUCOSE SERPL-MCNC: 187 MG/DL (ref 70–110)
GLUCOSE SERPL-MCNC: 83 MG/DL (ref 70–110)
POTASSIUM SERPL-SCNC: 3.7 MMOL/L (ref 3.5–5.1)
SODIUM SERPL-SCNC: 141 MMOL/L (ref 136–145)

## 2019-10-14 PROCEDURE — 01844 ANES VASC SHUNT/SHUNT REVJ: CPT | Performed by: THORACIC SURGERY (CARDIOTHORACIC VASCULAR SURGERY)

## 2019-10-14 PROCEDURE — 82962 GLUCOSE BLOOD TEST: CPT | Performed by: THORACIC SURGERY (CARDIOTHORACIC VASCULAR SURGERY)

## 2019-10-14 PROCEDURE — 27000671 HC TUBING MICROBORE EXT: Performed by: ANESTHESIOLOGY

## 2019-10-14 PROCEDURE — 27202107 HC XP QUATRO SENSOR: Performed by: ANESTHESIOLOGY

## 2019-10-14 PROCEDURE — 27000675 HC TUBING MICRODRIP: Performed by: ANESTHESIOLOGY

## 2019-10-14 PROCEDURE — C1768 GRAFT, VASCULAR: HCPCS | Performed by: THORACIC SURGERY (CARDIOTHORACIC VASCULAR SURGERY)

## 2019-10-14 PROCEDURE — 63600175 PHARM REV CODE 636 W HCPCS: Performed by: THORACIC SURGERY (CARDIOTHORACIC VASCULAR SURGERY)

## 2019-10-14 PROCEDURE — 71000015 HC POSTOP RECOV 1ST HR: Performed by: THORACIC SURGERY (CARDIOTHORACIC VASCULAR SURGERY)

## 2019-10-14 PROCEDURE — 25000003 PHARM REV CODE 250: Performed by: NURSE ANESTHETIST, CERTIFIED REGISTERED

## 2019-10-14 PROCEDURE — 36000706: Performed by: THORACIC SURGERY (CARDIOTHORACIC VASCULAR SURGERY)

## 2019-10-14 PROCEDURE — 27201107 HC STYLET, STANDARD: Performed by: ANESTHESIOLOGY

## 2019-10-14 PROCEDURE — 37000008 HC ANESTHESIA 1ST 15 MINUTES: Performed by: THORACIC SURGERY (CARDIOTHORACIC VASCULAR SURGERY)

## 2019-10-14 PROCEDURE — 37000009 HC ANESTHESIA EA ADD 15 MINS: Performed by: THORACIC SURGERY (CARDIOTHORACIC VASCULAR SURGERY)

## 2019-10-14 PROCEDURE — 71000033 HC RECOVERY, INTIAL HOUR: Performed by: THORACIC SURGERY (CARDIOTHORACIC VASCULAR SURGERY)

## 2019-10-14 PROCEDURE — 27201423 OPTIME MED/SURG SUP & DEVICES STERILE SUPPLY: Performed by: THORACIC SURGERY (CARDIOTHORACIC VASCULAR SURGERY)

## 2019-10-14 PROCEDURE — 25000003 PHARM REV CODE 250: Performed by: THORACIC SURGERY (CARDIOTHORACIC VASCULAR SURGERY)

## 2019-10-14 PROCEDURE — 63600175 PHARM REV CODE 636 W HCPCS: Performed by: NURSE ANESTHETIST, CERTIFIED REGISTERED

## 2019-10-14 PROCEDURE — 80048 BASIC METABOLIC PNL TOTAL CA: CPT

## 2019-10-14 PROCEDURE — 36000707: Performed by: THORACIC SURGERY (CARDIOTHORACIC VASCULAR SURGERY)

## 2019-10-14 DEVICE — IMPLANTABLE DEVICE: Type: IMPLANTABLE DEVICE | Site: ARM | Status: FUNCTIONAL

## 2019-10-14 RX ORDER — SODIUM CHLORIDE 0.9 % (FLUSH) 0.9 %
10 SYRINGE (ML) INJECTION
Status: DISCONTINUED | OUTPATIENT
Start: 2019-10-14 | End: 2019-10-14 | Stop reason: HOSPADM

## 2019-10-14 RX ORDER — SODIUM CHLORIDE 9 MG/ML
INJECTION, SOLUTION INTRAVENOUS CONTINUOUS
Status: DISCONTINUED | OUTPATIENT
Start: 2019-10-14 | End: 2019-10-14 | Stop reason: HOSPADM

## 2019-10-14 RX ORDER — PROPOFOL 10 MG/ML
VIAL (ML) INTRAVENOUS
Status: DISCONTINUED | OUTPATIENT
Start: 2019-10-14 | End: 2019-10-14

## 2019-10-14 RX ORDER — HEPARIN SODIUM 5000 [USP'U]/ML
INJECTION, SOLUTION INTRAVENOUS; SUBCUTANEOUS
Status: DISCONTINUED | OUTPATIENT
Start: 2019-10-14 | End: 2019-10-14 | Stop reason: HOSPADM

## 2019-10-14 RX ORDER — FENTANYL CITRATE 50 UG/ML
INJECTION, SOLUTION INTRAMUSCULAR; INTRAVENOUS
Status: DISCONTINUED | OUTPATIENT
Start: 2019-10-14 | End: 2019-10-14

## 2019-10-14 RX ORDER — ATRACURIUM BESYLATE 10 MG/ML
INJECTION, SOLUTION INTRAVENOUS
Status: DISCONTINUED | OUTPATIENT
Start: 2019-10-14 | End: 2019-10-14

## 2019-10-14 RX ORDER — EPHEDRINE SULFATE 50 MG/ML
INJECTION, SOLUTION INTRAVENOUS
Status: DISCONTINUED | OUTPATIENT
Start: 2019-10-14 | End: 2019-10-14

## 2019-10-14 RX ORDER — OXYCODONE HYDROCHLORIDE 5 MG/1
5 TABLET ORAL
Status: DISCONTINUED | OUTPATIENT
Start: 2019-10-14 | End: 2019-10-14 | Stop reason: HOSPADM

## 2019-10-14 RX ORDER — LIDOCAINE HYDROCHLORIDE 20 MG/ML
INJECTION, SOLUTION EPIDURAL; INFILTRATION; INTRACAUDAL; PERINEURAL
Status: DISCONTINUED | OUTPATIENT
Start: 2019-10-14 | End: 2019-10-14

## 2019-10-14 RX ORDER — PROTAMINE SULFATE 10 MG/ML
INJECTION, SOLUTION INTRAVENOUS
Status: DISCONTINUED | OUTPATIENT
Start: 2019-10-14 | End: 2019-10-14

## 2019-10-14 RX ORDER — GLYCOPYRROLATE 0.2 MG/ML
INJECTION INTRAMUSCULAR; INTRAVENOUS
Status: DISCONTINUED | OUTPATIENT
Start: 2019-10-14 | End: 2019-10-14

## 2019-10-14 RX ORDER — NEOSTIGMINE METHYLSULFATE 1 MG/ML
INJECTION, SOLUTION INTRAVENOUS
Status: DISCONTINUED | OUTPATIENT
Start: 2019-10-14 | End: 2019-10-14

## 2019-10-14 RX ORDER — SODIUM CHLORIDE 9 MG/ML
INJECTION, SOLUTION INTRAVENOUS CONTINUOUS PRN
Status: DISCONTINUED | OUTPATIENT
Start: 2019-10-14 | End: 2019-10-14

## 2019-10-14 RX ORDER — CEFAZOLIN SODIUM 1 G/50ML
1 SOLUTION INTRAVENOUS ONCE
Status: COMPLETED | OUTPATIENT
Start: 2019-10-14 | End: 2019-10-14

## 2019-10-14 RX ORDER — FENTANYL CITRATE 50 UG/ML
25 INJECTION, SOLUTION INTRAMUSCULAR; INTRAVENOUS EVERY 5 MIN PRN
Status: DISCONTINUED | OUTPATIENT
Start: 2019-10-14 | End: 2019-10-14 | Stop reason: HOSPADM

## 2019-10-14 RX ORDER — ACETAMINOPHEN 10 MG/ML
INJECTION, SOLUTION INTRAVENOUS
Status: DISCONTINUED | OUTPATIENT
Start: 2019-10-14 | End: 2019-10-14

## 2019-10-14 RX ORDER — HEPARIN SODIUM 10000 [USP'U]/ML
INJECTION, SOLUTION INTRAVENOUS; SUBCUTANEOUS
Status: DISCONTINUED | OUTPATIENT
Start: 2019-10-14 | End: 2019-10-14

## 2019-10-14 RX ORDER — BACITRACIN 50000 [IU]/1
INJECTION, POWDER, FOR SOLUTION INTRAMUSCULAR
Status: DISCONTINUED | OUTPATIENT
Start: 2019-10-14 | End: 2019-10-14 | Stop reason: HOSPADM

## 2019-10-14 RX ADMIN — ACETAMINOPHEN 500 MG: 10 INJECTION, SOLUTION INTRAVENOUS at 07:10

## 2019-10-14 RX ADMIN — EPHEDRINE SULFATE 10 MG: 50 INJECTION, SOLUTION INTRAVENOUS at 08:10

## 2019-10-14 RX ADMIN — EPHEDRINE SULFATE 10 MG: 50 INJECTION, SOLUTION INTRAVENOUS at 09:10

## 2019-10-14 RX ADMIN — LIDOCAINE HYDROCHLORIDE 60 MG: 20 INJECTION, SOLUTION EPIDURAL; INFILTRATION; INTRACAUDAL; PERINEURAL at 08:10

## 2019-10-14 RX ADMIN — GLYCOPYRROLATE 0.8 MG: 0.2 INJECTION INTRAMUSCULAR; INTRAVENOUS at 09:10

## 2019-10-14 RX ADMIN — PROPOFOL 90 MG: 10 INJECTION, EMULSION INTRAVENOUS at 08:10

## 2019-10-14 RX ADMIN — SODIUM CHLORIDE: 9 INJECTION, SOLUTION INTRAVENOUS at 07:10

## 2019-10-14 RX ADMIN — CEFAZOLIN SODIUM 1 G: 1 SOLUTION INTRAVENOUS at 08:10

## 2019-10-14 RX ADMIN — PROPOFOL 30 MG: 10 INJECTION, EMULSION INTRAVENOUS at 08:10

## 2019-10-14 RX ADMIN — ATRACURIUM BESYLATE 25 MG: 50 INJECTION, SOLUTION INTRAVENOUS at 08:10

## 2019-10-14 RX ADMIN — NEOSTIGMINE METHYLSULFATE 5 MG: 1 INJECTION INTRAVENOUS at 09:10

## 2019-10-14 RX ADMIN — HEPARIN SODIUM 7500 UNITS: 10000 INJECTION, SOLUTION INTRAVENOUS; SUBCUTANEOUS at 09:10

## 2019-10-14 RX ADMIN — FENTANYL CITRATE 50 MCG: 50 INJECTION INTRAMUSCULAR; INTRAVENOUS at 09:10

## 2019-10-14 RX ADMIN — EPHEDRINE SULFATE 5 MG: 50 INJECTION, SOLUTION INTRAVENOUS at 09:10

## 2019-10-14 RX ADMIN — PROTAMINE SULFATE 25 MG: 10 INJECTION, SOLUTION INTRAVENOUS at 09:10

## 2019-10-14 RX ADMIN — FENTANYL CITRATE 25 MCG: 50 INJECTION INTRAMUSCULAR; INTRAVENOUS at 08:10

## 2019-10-14 NOTE — BRIEF OP NOTE
[facility]  Brief Operative Note     SUMMARY     Surgery Date: 10/14/2019     Surgeon(s) and Role:     * Amos Desouza MD - Primary    Assisting Surgeon: None    Pre-op Diagnosis:  End stage renal disease [N18.6]    Post-op Diagnosis:  Post-Op Diagnosis Codes:     * End stage renal disease [N18.6]    Procedure(s) (LRB):  REVISION, AV FISTULA (Left)    Anesthesia: General    Description of the findings of the procedure: Vein and artery small caliber but good quality  Post procedure graft had excellent bruit    Estimated Blood Loss: 10 mL         Specimens:   Specimen (12h ago, onward)    None          Discharge Note    SUMMARY     Admit Date: 10/14/2019    Discharge Date and Time: 10/14/2019    Hospital Course Uncomplicated procedure and uneventfult post operative course    Final Diagnosis: Post-Op Diagnosis Codes:     * End stage renal disease [N18.6]    Disposition: Home or Self Care    Follow Up/Patient Instructions:   Keep dressing on for 48 hours  Keep incision clean and dry  No BP/venipuncture L arm  Avoid any strenuous activity till seen in clinic    Medications:  Reconciled Home Medications:      Medication List      ASK your doctor about these medications    amLODIPine 5 MG tablet  Commonly known as:  NORVASC  Take 1 tablet (5 mg total) by mouth once daily.     ascorbic acid (vitamin C) 500 MG tablet  Commonly known as:  VITAMIN C  Take 1 tablet (500 mg total) by mouth every evening.     aspirin 81 MG Chew  Take 81 mg by mouth once daily. Ran out     atorvastatin 80 MG tablet  Commonly known as:  LIPITOR  Take 1 tablet (80 mg total) by mouth once daily.     BD ALCOHOL SWABS TOP  BD Alcohol Swabs     clopidogrel 75 mg tablet  Commonly known as:  PLAVIX  Take 1 tablet (75 mg total) by mouth once daily.     ferrous sulfate 325 (65 FE) MG EC tablet  Take 1 tablet (325 mg total) by mouth 2 (two) times daily.     HumaLOG KwikPen Insulin 100 unit/mL pen  Generic drug:  insulin lispro  Inject 2  Units into the skin 3 (three) times daily before meals.     insulin detemir U-100 100 unit/mL (3 mL) Inpn pen  Commonly known as:  LEVEMIR FLEXTOUCH  Inject 2 Units into the skin 2 (two) times daily.     LANTUS U-100 INSULIN 100 unit/mL injection  Generic drug:  insulin glargine  Lantus U-100 Insulin   20-25 qd     lisinopril 2.5 MG tablet  Commonly known as:  PRINIVIL,ZESTRIL  Take 2.5 mg by mouth once daily.     megestrol 40 MG Tab  Commonly known as:  MEGACE  Take 1 tablet (40 mg total) by mouth once daily.     QUEtiapine 100 MG Tab  Commonly known as:  SEROQUEL  Take 100 mg by mouth every evening.          Home when meets discharge criteria

## 2019-10-14 NOTE — DISCHARGE INSTRUCTIONS
DISCHARGE INSTRUCTIONS    No driving, operating heavy machinery or signing legal documents x 24 hours  No drinking alcohol x 24 hours or while taking pain medication  You should not be driving while taking pain medication  May remove dressing in 48 hours and shower. Leave wound open to air after.  Do Not submerge wound in a tub, any pools of water or swimming pools until wound is completely healed  Keep an eye on wound- edges should be pink and well approximated-  the wound should not be red and inflamed.  Wound edges should not be .   Your wound should not be draining anything green, yellow or foul smelling- for these call the MD  You should not be running a temp greater than 101     CONTINUE WITH NORMAL DIALYSIS SCHEDULE

## 2019-10-14 NOTE — H&P
Critical access hospital  Vascular Surgery  History & Physical    Patient Name: Errol Keita  MRN: [id number]  Admission Date: 10/14/2019  Attending Physician OZIEL Desouza MD  Primary Care Provider: Bharat Wiggins MD    Subjective: Comfortable     Chief Complaint/Reason for Admission: ESRD    History of Present Illness: 53 yo with ESRD s/p L upper arm AV fistula. After accessed the fistula thrombosed and attempt to declot unsuccessful  Returns now for replacement of fistula with a graft    Medications Prior to Admission   Medication Sig Dispense Refill Last Dose    alcohol antiseptic pads (BD ALCOHOL SWABS TOP) BD Alcohol Swabs   Unknown at Unknown time    amLODIPine (NORVASC) 5 MG tablet Take 1 tablet (5 mg total) by mouth once daily. 30 tablet 0 Unknown at Unknown time    ascorbic acid, vitamin C, (VITAMIN C) 500 MG tablet Take 1 tablet (500 mg total) by mouth every evening.   Unknown at Unknown time    aspirin 81 MG chewable tablet Take 81 mg by mouth once daily. Ran out   Unknown at Unknown time    atorvastatin (LIPITOR) 80 MG tablet Take 1 tablet (80 mg total) by mouth once daily. 30 tablet 11 Unknown at Unknown time    clopidogrel (PLAVIX) 75 mg tablet Take 1 tablet (75 mg total) by mouth once daily. 30 tablet 11 Unknown at Unknown time    ferrous sulfate 325 (65 FE) MG EC tablet Take 1 tablet (325 mg total) by mouth 2 (two) times daily. 60 tablet 0 Unknown at Unknown time    HUMALOG KWIKPEN INSULIN 100 unit/mL pen Inject 2 Units into the skin 3 (three) times daily before meals. 15 mL 12 Unknown at Unknown time    insulin detemir U-100 (LEVEMIR FLEXTOUCH) 100 unit/mL (3 mL) SubQ InPn pen Inject 2 Units into the skin 2 (two) times daily. 1 Box 0 Unknown at Unknown time    insulin glargine (LANTUS U-100 INSULIN) 100 unit/mL injection Lantus U-100 Insulin   20-25 qd   Unknown at Unknown time    lisinopril (PRINIVIL,ZESTRIL) 2.5 MG tablet Take 2.5 mg by mouth once daily.    Unknown at Unknown time     megestrol (MEGACE) 40 MG Tab Take 1 tablet (40 mg total) by mouth once daily. 90 tablet 3 Unknown at Unknown time    quetiapine (SEROQUEL) 100 MG Tab Take 100 mg by mouth every evening.    Unknown at Unknown time       Review of patient's allergies indicates:  No Known Allergies    Past Medical History:   Diagnosis Date    Arthritis     Asthma     CHF (congestive heart failure)     Coronary artery disease     Diabetes mellitus     Hypertension     Pancreatitis     Type 2 diabetes mellitus with hyperglycemia 7/13/2015     Past Surgical History:   Procedure Laterality Date    AV FISTULA PLACEMENT Left 3/19/2019    Procedure: CREATION, AV FISTULA;  Surgeon: Amos Desouza MD;  Location: [facility] OR;  Service: Vascular;  Laterality: Left;    CARDIAC SURGERY      CABG    CHOLECYSTECTOMY      CORONARY ARTERY BYPASS GRAFT      HYSTERECTOMY      pt states no she did not(?)    REMOVAL OF HEMODIALYSIS CATHETER Right 1/18/2019    Procedure: REMOVAL, CATHETER, HEMODIALYSIS;  Surgeon: Amos Desouza MD;  Location: [facility] OR;  Service: Vascular;  Laterality: Right;     Family History     Problem Relation (Age of Onset)    Asthma Brother    Cancer Maternal Grandmother    Depression Brother    Diabetes Mother, Father, Sister    Hearing loss Sister    Heart disease Sister    Hypertension Sister    Learning disabilities Sister    Vision loss Sister        Tobacco Use    Smoking status: Current Every Day Smoker     Packs/day: 0.50     Years: 35.00     Pack years: 17.50     Types: Cigarettes    Smokeless tobacco: Never Used    Tobacco comment: Do Not smoke day of procedure.   Substance and Sexual Activity    Alcohol use: No     Alcohol/week: 0.0 standard drinks     Comment: questionable per     Drug use: No    Sexual activity: Not Currently     Partners: Male     Birth control/protection: None     Review of Systems  Objective:     Vital Signs (Most Recent):  Temp: 98.1 °F (36.7 °C) (10/14/19 0567)  Pulse:  74 (10/14/19 0554)  Resp: 18 (10/14/19 0554)  BP: 129/79 (10/14/19 0554)  SpO2: 100 % (10/14/19 0554) Vital Signs (24h Range):  Temp:  [98.1 °F (36.7 °C)] 98.1 °F (36.7 °C)  Pulse:  [74] 74  Resp:  [18] 18  SpO2:  [100 %] 100 %  BP: (129)/(79) 129/79     Weight: 38.6 kg (85 lb)  Body mass index is 13.31 kg/m².    Physical Exam     NAD  BS clear  CV RRR  GI abd soft  Ext No edema  Neuro grossly intact    Significant Labs:  BMP:   Recent Labs   Lab 10/14/19  0617   GLU 83      K 3.7   CL 98   CO2 24   BUN 78*   CREATININE 4.7*   CALCIUM 9.1     CBC: No results for input(s): WBC, RBC, HGB, HCT, PLT, MCV, MCH, MCHC in the last 48 hours.        Assessment/Plan:     Impression : ESRD    Plan  Replace fistula with graft    Amos Desouza MD  Vascular Surgery  [facility]

## 2019-10-14 NOTE — ANESTHESIA PREPROCEDURE EVALUATION
10/14/2019  Errol Keita is a 54 y.o., female.    Anesthesia Evaluation    I have reviewed the Patient Summary Reports.    I have reviewed the Nursing Notes.   I have reviewed the Medications.     Review of Systems  Anesthesia Hx:  Neg history of prior surgery. Denies Family Hx of Anesthesia complications.   Denies Personal Hx of Anesthesia complications.   Social:  Non-Smoker, No Alcohol Use    Cardiovascular:   Hypertension Past MI CAD asymptomatic  CHF ECG has been reviewed.    Pulmonary:   Asthma    Renal/:   Chronic Renal Disease, ESRD, Dialysis    Neurological:   CVA, residual symptoms Left sided weakness   Endocrine:   Diabetes, poorly controlled, type 2    Psych:   depression        Patient Active Problem List   Diagnosis    H/O: CVA (cerebrovascular accident)    Hypercholesteremia    Hypertension associated with diabetes    Tobacco abuse, >100 pack years    History of noncompliance with medical treatment    Abnormal EKG    Elevated troponin    Mild recurrent major depression    LVH (left ventricular hypertrophy)    Disturbance in affect    Cardiomegaly    Heart murmur    Severe malnutrition    Hypoglycemia associated with diabetes    STEMI (ST elevation myocardial infarction)    Hypocalcemia    Hemodialysis-associated hypotension    Elevated brain natriuretic peptide (BNP) level    Transfusion-dependent anemia    Moderate malnutrition    Iron deficiency anemia secondary to inadequate dietary iron intake    Coronary artery disease involving native coronary artery without angina pectoris    S/P coronary artery stent placement, 2 NESTOR 7/27/2015    ST elevation myocardial infarction involving left main coronary artery    Leg swelling    Acute on chronic diastolic congestive heart failure    Memory difficulties    Dementia associated with other underlying disease  without behavioral disturbance    Peripheral edema    Vascular dementia without behavioral disturbance    Diabetic nephropathy with proteinuria    Anemia of chronic renal failure    Moderate tobacco dependence, half ppd    Pancreatic insufficiency    Acute on chronic pancreatitis    Acute on chronic congestive heart failure    Encephalopathy, metabolic    Frailty    Diarrhea    Type 2 diabetes mellitus with stage 4 chronic kidney disease, with long-term current use of insulin    Type 1 diabetes mellitus with stage 4 chronic kidney disease    Acute renal failure superimposed on stage 4 chronic kidney disease    Hypomagnesemia    Hypokalemia    Uncontrolled type 2 diabetes mellitus with hyperglycemia    UTI (urinary tract infection)    ESRD (end stage renal disease)    End stage renal disease    Cellulitis of left upper arm    Malnutrition    Sedentary lifestyle, onset 2000    Excessive daytime sleepiness       Past Surgical History:   Procedure Laterality Date    AV FISTULA PLACEMENT Left 3/19/2019    Procedure: CREATION, AV FISTULA;  Surgeon: Amos Desouza MD;  Location: [facility] OR;  Service: Vascular;  Laterality: Left;    CARDIAC SURGERY      CABG    CHOLECYSTECTOMY      CORONARY ARTERY BYPASS GRAFT      HYSTERECTOMY      pt states no she did not(?)    REMOVAL OF HEMODIALYSIS CATHETER Right 1/18/2019    Procedure: REMOVAL, CATHETER, HEMODIALYSIS;  Surgeon: Amos Desouza MD;  Location: [facility] OR;  Service: Vascular;  Laterality: Right;        Tobacco Use:  The patient  reports that she has been smoking cigarettes. She has a 17.50 pack-year smoking history. She has never used smokeless tobacco.     Results for orders placed or performed during the hospital encounter of 10/11/19   EKG 12-lead    Collection Time: 10/11/19  1:19 PM    Narrative    Test Reason : Z00.00,    Vent. Rate : 071 BPM     Atrial Rate : 071 BPM     P-R Int : 162 ms          QRS Dur : 096 ms      QT Int : 462 ms        P-R-T Axes : 082 052 142 degrees     QTc Int : 502 ms    Normal sinus rhythm  Right atrial enlargement  Possible Inferior infarct (cited on or before 02-JUL-2019)  T wave abnormality, consider anterolateral ischemia  Prolonged QT  Abnormal ECG  When compared with ECG of 02-JUL-2019 15:17,  Inverted T waves have replaced nonspecific T wave abnormality in Inferior  leads    Referred By:  CARLOS           Confirmed By:              Lab Results   Component Value Date    WBC 9.51 10/11/2019    HGB 11.8 (L) 10/11/2019    HCT 35.9 (L) 10/11/2019    MCV 98 10/11/2019     (H) 10/11/2019     BMP  Lab Results   Component Value Date     (L) 10/11/2019    K 3.3 (L) 10/11/2019    CL 92 (L) 10/11/2019    CO2 25 10/11/2019    BUN 60 (H) 10/11/2019    CREATININE 4.4 (H) 10/11/2019    CALCIUM 9.1 10/11/2019    ANIONGAP 17 (H) 10/11/2019    ESTGFRAFRICA 12.1 (A) 10/11/2019    EGFRNONAA 10.5 (A) 10/11/2019     2018  CONCLUSIONS     1 - Hyperdynamic left ventricular systolic function (EF >70%).     2 - Impaired LV relaxation, elevated LAP (grade 2 diastolic dysfunction).     3 - The estimated PA systolic pressure is greater than 21 mmHg.     4 - Mild to moderate tricuspid regurgitation.     5 - Moderate-severe LVH.       Physical Exam  General:  Well nourished    Airway/Jaw/Neck:  Airway Findings: Mouth Opening: Normal Tongue: Normal  General Airway Assessment: Adult  Mallampati: II  Improves to II with phonation.  TM Distance: Normal, at least 6 cm  Jaw/Neck Findings:  Neck ROM: Normal ROM      Dental:  Dental Findings:    Chest/Lungs:  Chest/Lungs Findings: Clear to auscultation, Normal Respiratory Rate     Heart/Vascular:  Heart Findings: Rate: Normal  Rhythm: Regular Rhythm  Sounds: Normal  Heart Murmur  Systolic  Systolic Heart Murmur Grade: Grade II        Mental Status:  Mental Status Findings:  Cooperative, Alert and Oriented         Anesthesia Plan  Type of Anesthesia, risks & benefits discussed:  Anesthesia  Type:  general  Patient's Preference:   Intra-op Monitoring Plan: standard ASA monitors  Intra-op Monitoring Plan Comments:   Post Op Pain Control Plan: multimodal analgesia  Post Op Pain Control Plan Comments:   Induction:   IV  Beta Blocker:  Patient is not currently on a Beta-Blocker (No further documentation required).       Informed Consent: Patient understands risks and agrees with Anesthesia plan.  Questions answered. Anesthesia consent signed with patient.  ASA Score: 4     Day of Surgery Review of History & Physical: I have interviewed and examined the patient. I have reviewed the patient's H&P dated:  There are no significant changes.      Anesthesia Plan Notes: No Zofran  Ofirmev 500 mg            Ready For Surgery From Anesthesia Perspective.

## 2019-10-14 NOTE — ANESTHESIA POSTPROCEDURE EVALUATION
Anesthesia Post Evaluation    Patient: Errol Stone Keita    Procedure(s) Performed: Procedure(s) (LRB):  REVISION, AV FISTULA (Left)    Final Anesthesia Type: general  Patient location during evaluation: PACU  Patient participation: Yes- Able to Participate  Level of consciousness: awake and alert and oriented  Post-procedure vital signs: reviewed and stable  Pain management: adequate  Airway patency: patent  PONV status at discharge: No PONV  Anesthetic complications: no      Cardiovascular status: blood pressure returned to baseline, hemodynamically stable and stable  Respiratory status: unassisted, spontaneous ventilation and room air  Hydration status: euvolemic  Follow-up not needed.          Vitals Value Taken Time   /84 10/14/2019 10:45 AM   Temp 36.3 °C (97.4 °F) 10/14/2019 10:45 AM   Pulse 89 10/14/2019 10:58 AM   Resp 16 10/14/2019 10:58 AM   SpO2 100 % 10/14/2019 10:57 AM   Vitals shown include unvalidated device data.      Event Time     Out of Recovery 10:59:00          Pain/Jessenia Score: Jessenia Score: 9 (10/14/2019 10:45 AM)

## 2019-10-14 NOTE — PLAN OF CARE
Family at bedside=-  Instructed to continue dialysis as normal - pt due for dialysis today.  Escorted to car in wheelchair and discharged to home with family.

## 2019-10-14 NOTE — TRANSFER OF CARE
"Anesthesia Transfer of Care Note    Patient: Errol Stone Keita    Procedure(s) Performed: Procedure(s) (LRB):  REVISION, AV FISTULA (Left)    Patient location: PACU    Anesthesia Type: general    Transport from OR: Transported from OR on room air with adequate spontaneous ventilation    Post pain: adequate analgesia    Post assessment: no apparent anesthetic complications    Post vital signs: stable    Level of consciousness: awake and alert    Nausea/Vomiting: no nausea/vomiting    Complications: none    Transfer of care protocol was followed      Last vitals:   Visit Vitals  /79 (BP Location: Left arm, Patient Position: Sitting)   Pulse 74   Temp 36.7 °C (98.1 °F) (Oral)   Resp 18   Ht 5' 7" (1.702 m)   Wt 38.6 kg (85 lb)   LMP 09/03/2012 (Exact Date)   SpO2 100%   Breastfeeding? No   BMI 13.31 kg/m²     "

## 2019-10-22 ENCOUNTER — OFFICE VISIT (OUTPATIENT)
Dept: ENDOCRINOLOGY | Facility: CLINIC | Age: 54
End: 2019-10-22
Payer: MEDICARE

## 2019-10-22 VITALS
HEIGHT: 67 IN | WEIGHT: 87.06 LBS | TEMPERATURE: 99 F | BODY MASS INDEX: 13.66 KG/M2 | SYSTOLIC BLOOD PRESSURE: 110 MMHG | DIASTOLIC BLOOD PRESSURE: 70 MMHG | HEART RATE: 60 BPM

## 2019-10-22 DIAGNOSIS — E10.22 TYPE 1 DIABETES MELLITUS WITH STAGE 4 CHRONIC KIDNEY DISEASE: Primary | ICD-10-CM

## 2019-10-22 DIAGNOSIS — N18.4 TYPE 1 DIABETES MELLITUS WITH STAGE 4 CHRONIC KIDNEY DISEASE: Primary | ICD-10-CM

## 2019-10-22 DIAGNOSIS — I15.2 HYPERTENSION ASSOCIATED WITH DIABETES: ICD-10-CM

## 2019-10-22 DIAGNOSIS — E78.00 HYPERCHOLESTEREMIA: ICD-10-CM

## 2019-10-22 DIAGNOSIS — N18.4 TYPE 2 DIABETES MELLITUS WITH STAGE 4 CHRONIC KIDNEY DISEASE, WITH LONG-TERM CURRENT USE OF INSULIN: ICD-10-CM

## 2019-10-22 DIAGNOSIS — Z72.0 TOBACCO ABUSE: ICD-10-CM

## 2019-10-22 DIAGNOSIS — E11.22 TYPE 2 DIABETES MELLITUS WITH STAGE 4 CHRONIC KIDNEY DISEASE, WITH LONG-TERM CURRENT USE OF INSULIN: ICD-10-CM

## 2019-10-22 DIAGNOSIS — E11.59 HYPERTENSION ASSOCIATED WITH DIABETES: ICD-10-CM

## 2019-10-22 DIAGNOSIS — I25.10 CORONARY ARTERY DISEASE INVOLVING NATIVE CORONARY ARTERY WITHOUT ANGINA PECTORIS, UNSPECIFIED WHETHER NATIVE OR TRANSPLANTED HEART: ICD-10-CM

## 2019-10-22 DIAGNOSIS — E46 MALNUTRITION, UNSPECIFIED TYPE: ICD-10-CM

## 2019-10-22 DIAGNOSIS — Z79.4 TYPE 2 DIABETES MELLITUS WITH STAGE 4 CHRONIC KIDNEY DISEASE, WITH LONG-TERM CURRENT USE OF INSULIN: ICD-10-CM

## 2019-10-22 DIAGNOSIS — E55.9 HYPOVITAMINOSIS D: ICD-10-CM

## 2019-10-22 PROCEDURE — 3008F PR BODY MASS INDEX (BMI) DOCUMENTED: ICD-10-PCS | Mod: CPTII,S$GLB,, | Performed by: PHYSICIAN ASSISTANT

## 2019-10-22 PROCEDURE — 99214 PR OFFICE/OUTPT VISIT, EST, LEVL IV, 30-39 MIN: ICD-10-PCS | Mod: S$GLB,,, | Performed by: PHYSICIAN ASSISTANT

## 2019-10-22 PROCEDURE — 3008F BODY MASS INDEX DOCD: CPT | Mod: CPTII,S$GLB,, | Performed by: PHYSICIAN ASSISTANT

## 2019-10-22 PROCEDURE — 99999 PR PBB SHADOW E&M-EST. PATIENT-LVL IV: CPT | Mod: PBBFAC,,, | Performed by: PHYSICIAN ASSISTANT

## 2019-10-22 PROCEDURE — 99999 PR PBB SHADOW E&M-EST. PATIENT-LVL IV: ICD-10-PCS | Mod: PBBFAC,,, | Performed by: PHYSICIAN ASSISTANT

## 2019-10-22 PROCEDURE — 99214 OFFICE O/P EST MOD 30 MIN: CPT | Mod: S$GLB,,, | Performed by: PHYSICIAN ASSISTANT

## 2019-10-22 RX ORDER — LANCETS 33 GAUGE
EACH MISCELLANEOUS
Qty: 450 EACH | Refills: 3 | Status: SHIPPED | OUTPATIENT
Start: 2019-10-22 | End: 2021-06-23 | Stop reason: CLARIF

## 2019-10-22 RX ORDER — SEVELAMER CARBONATE 800 MG/1
800 TABLET, FILM COATED ORAL 2 TIMES DAILY
Status: ON HOLD | COMMUNITY
End: 2021-06-28 | Stop reason: HOSPADM

## 2019-10-22 RX ORDER — INSULIN LISPRO 100 [IU]/ML
2 INJECTION, SOLUTION INTRAVENOUS; SUBCUTANEOUS
Qty: 15 ML | Refills: 12 | Status: SHIPPED | OUTPATIENT
Start: 2019-10-22 | End: 2019-10-23

## 2019-10-22 NOTE — PROGRESS NOTES
"CC: This 54 y.o. female presents for management of diabetes  and chronic conditions pending review including HTN, HLP, CAD s/p CABG, pancreatitis, CHF, dementia.    HPI: She was diagnosed with T1DM in . Insulin started ~ . However, based on low c-peptide of 0.2 on 2016, patient is insulinopenic and is treated as type 1 diabetes. CJ negative.    Family hx of DM: parents, sister, aunt    Arrives with her , who contributes heavily to interview. He is very involved in her care. He makes her insulin dosing decisions and he injects insulin too.      Hypoglycemia at home- none    She had an AV fistula placed on 3/19. She goes to dialysis on MWF. Pt has not had humalog in the past few days.    monitoring BG at home 4x daily:  Fastins    Diet: Eats 2  Meals a days. Drinks ensure.  Exercise: none  CURRENT DM MEDS: Levemir 2 u bid, Humalog 2 units with meals  Timing prandial insulin 5-15 minutes before meals: no  Vial/pen:  Uses  pens  Glucometer type: Doesn't remember    Standards of Care:  Eye exam: --Jaya CLINTON    Social Hx: she smokes 0.5 ppd for 35 years. She wants to quit smoking.     ROS:   Gen: Appetite fair,denies fatigue and weakness.  Skin: Skin is intact and heals well, no rashes, no hair changes  Eyes: Denies visual disturbances  Resp: no SOB or BELLE, no cough  Cardiac: No palpitations, chest pain, no edema   GI: No nausea or vomiting, diarrhea, constipation, or abdominal pain.  /GYN: No nocturia, burning or pain.   MS/Neuro: Denies numbness/ tingling in BLE; Gait steady, speech clear  Psych: Denies drug/ETOH abuse, + hx of depression.  Other systems: negative.    /70 (BP Location: Left arm, Patient Position: Sitting, BP Method: Medium (Manual))   Pulse 60   Temp 98.6 °F (37 °C) (Oral)   Ht 5' 7" (1.702 m)   Wt 39.5 kg (87 lb 1.3 oz)   LMP 2012 (Exact Date) Comment: pt states no hysterectomy  BMI 13.64 kg/m²      PE:  GENERAL: elderly female, very thin, well " developed, well nourished.  PSYCH: AAOx3, appropriate mood and affect, pleasant expression, conversant, appears relaxed, well groomed.   EYES: Conjunctiva, corneas clear  NECK: Supple, trachea midline,no thyromegaly or nodules  CHEST: Resp even and unlabored, CTA bilateral.  CARDIAC: RRR, S1, S2 heard, + murmurs  ABDOMEN: Soft, non-tender, non-distended   VASCULAR: DP pulses +2/4 bilaterally, no edema.  NEURO: Gait steady  SKIN: Skin warm and dry no acanthosis nigracans.  FOOT EXAMINATION: appropriate footwear.    Personally reviewed labs below:  Labs under media tab  5/15/19  A1c 9.9%  Cbc wnl  GFR 24  Cholesterol 238  Trigs 154  hdl 76     MAC 3039.1    Hemoglobin A1C   Date Value Ref Range Status   01/06/2019 8.1 (H) 4.0 - 5.6 % Final     Comment:     ADA Screening Guidelines:  5.7-6.4%  Consistent with prediabetes  >or=6.5%  Consistent with diabetes  High levels of fetal hemoglobin interfere with the HbA1C  assay. Heterozygous hemoglobin variants (HbS, HgC, etc)do  not significantly interfere with this assay.   However, presence of multiple variants may affect accuracy.     03/24/2018 13.5 (H) 4.0 - 5.6 % Final     Comment:     According to ADA guidelines, hemoglobin A1c <7.0% represents  optimal control in non-pregnant diabetic patients. Different  metrics may apply to specific patient populations.   Standards of Medical Care in Diabetes-2016.  For the purpose of screening for the presence of diabetes:  <5.7%     Consistent with the absence of diabetes  5.7-6.4%  Consistent with increasing risk for diabetes   (prediabetes)  >or=6.5%  Consistent with diabetes  Currently, no consensus exists for use of hemoglobin A1c  for diagnosis of diabetes for children.  This Hemoglobin A1c assay has significant interference with fetal   hemoglobin   (HbF). The results are invalid for patients with abnormal amounts of   HbF,   including those with known Hereditary Persistence   of Fetal Hemoglobin. Heterozygous  hemoglobin variants (HbAS, HbAC,   HbAD, HbAE, HbA2) do not significantly interfere with this assay;   however, presence of multiple variants in a sample may impact the %   interference.     01/12/2018 9.7 (H) 4.0 - 5.6 % Final     Comment:     According to ADA guidelines, hemoglobin A1c <7.0% represents  optimal control in non-pregnant diabetic patients. Different  metrics may apply to specific patient populations.   Standards of Medical Care in Diabetes-2016.  For the purpose of screening for the presence of diabetes:  <5.7%     Consistent with the absence of diabetes  5.7-6.4%  Consistent with increasing risk for diabetes   (prediabetes)  >or=6.5%  Consistent with diabetes  Currently, no consensus exists for use of hemoglobin A1c  for diagnosis of diabetes for children.  This Hemoglobin A1c assay has significant interference with fetal   hemoglobin   (HbF). The results are invalid for patients with abnormal amounts of   HbF,   including those with known Hereditary Persistence   of Fetal Hemoglobin. Heterozygous hemoglobin variants (HbAS, HbAC,   HbAD, HbAE, HbA2) do not significantly interfere with this assay;   however, presence of multiple variants in a sample may impact the %   interference.     08/07/2013 8.5 (H) 4.8 - 5.6 % Final     Comment:              Increased risk for diabetes: 5.7 - 6.4           Diabetes: >6.4           Glycemic control for adults with diabetes: <7.0  **In order to standardize %HbA1c results worldwide, as of October 11, 2010,  the %HbA1c is being calculated using the master equation recommended in the  consensus statement adopted by the ADA (American Diabetes Assoc), EASD  (European Assoc for the Study of Diabetes), IFCC (International Federation  of Clinical Chemistry and Laboratory Medicine) and IDF (International  Diabetes Federation). Result units: %HgbA1c (DCCT/NGSP).  In common with other methods, Hb A1C values may not accurately reflect mean  blood glucose in patients with  hemoglobin variants (HgbF, HgbS and HgbC).  Any cause of shortened erythrocyte survival will reduce exposure of  erythrocytes to glucose with a consequent decrease in HbA1c (%) values, even  though the time-averaged blood glucose level may be elevated. Causes of  shortened erythrocyte lifetime might be hemolytic anemia or other hemolytic  diseases, homozygous sickle cell trait, pregnancy, recent significant or  chronic blood loss, etc. Caution should be used when interpreting the HbA1c  results from patients with these conditions.   07/16/2013 7.6 (H) 4.8 - 5.6 % Final     Comment:              Increased risk for diabetes: 5.7 - 6.4           Diabetes: >6.4           Glycemic control for adults with diabetes: <7.0  **In order to standardize %HbA1c results worldwide, as of October 11, 2010,  the %HbA1c is being calculated using the master equation recommended in the  consensus statement adopted by the ADA (American Diabetes Assoc), EASD  (European Assoc for the Study of Diabetes), IFCC (International Federation  of Clinical Chemistry and Laboratory Medicine) and IDF (International  Diabetes Federation). Result units: %HgbA1c (DCCT/NGSP).  In common with other methods, Hb A1C values may not accurately reflect mean  blood glucose in patients with hemoglobin variants (HgbF, HgbS and HgbC).  Any cause of shortened erythrocyte survival will reduce exposure of  erythrocytes to glucose with a consequent decrease in HbA1c (%) values, even  though the time-averaged blood glucose level may be elevated. Causes of  shortened erythrocyte lifetime might be hemolytic anemia or other hemolytic  diseases, homozygous sickle cell trait, pregnancy, recent significant or  chronic blood loss, etc. Caution should be used when interpreting the HbA1c  results from patients with these conditions.   07/15/2013 7.5 (H) 4.8 - 5.6 % Final     Comment:              Increased risk for diabetes: 5.7 - 6.4           Diabetes: >6.4           Glycemic  control for adults with diabetes: <7.0  **In order to standardize %HbA1c results worldwide, as of October 11, 2010,  the %HbA1c is being calculated using the master equation recommended in the  consensus statement adopted by the ADA (American Diabetes Assoc), EASD  (European Assoc for the Study of Diabetes), IFCC (International Federation  of Clinical Chemistry and Laboratory Medicine) and IDF (International  Diabetes Federation). Result units: %HgbA1c (DCCT/NGSP).  In common with other methods, Hb A1C values may not accurately reflect mean  blood glucose in patients with hemoglobin variants (HgbF, HgbS and HgbC).  Any cause of shortened erythrocyte survival will reduce exposure of  erythrocytes to glucose with a consequent decrease in HbA1c (%) values, even  though the time-averaged blood glucose level may be elevated. Causes of  shortened erythrocyte lifetime might be hemolytic anemia or other hemolytic  diseases, homozygous sickle cell trait, pregnancy, recent significant or  chronic blood loss, etc. Caution should be used when interpreting the HbA1c  results from patients with these conditions.        ASSESSMENT and PLAN:    1. Type 1 diabetes mellitus with stage 4 chronic kidney disease  Hemoglobin A1c    Microalbumin/creatinine urine ratio    Lipid panel    Comprehensive metabolic panel    Fructosamine    GlycoMark (TM)    insulin detemir U-100 (LEVEMIR FLEXTOUCH) 100 unit/mL (3 mL) SubQ InPn pen    HUMALOG KWIKPEN INSULIN 100 unit/mL pen    lancets 33 gauge Misc   2. Hypovitaminosis D  Vitamin D      1. T1DM with hyperglycemia, CKD 4, DM PN, hypoglycemia-A1c today. Continue current regimen.  Change levemir 2 u bid, Humalog 2 u AC. Levemir once daily on MWF. Pt would benefit from a Dexcom. Foot exam next visit.    Check bg ac/hs      5/2/2016 09:29   Estimated Avg Glucose 223 (H)   C-Peptide 0.2 (L)   Glutamic Acid Decarb Ab 0.00   Islet Cell Ab <5      Discussed A1c and BG goals.   Reviewed  hypoglycemia, s/s  and appropriate tx.   Instructed to monitor BG and bring meter/ log to every clinic visit.   - takes ASA, ACEi, statin    2. HTN - controlled, continue meds as previously prescribed and monitor.     3. HLP -  on statin therapy, LFTs WNL    4. CKD 4- continue to follow w Dr Goldsmith     5, CAD, h/o CVA, CHF- optimize bg control   6. Malnutrition-start megace 40 mg daily  7. Tobacco abuse-referral to tobacco cessation program  8. Hypovitaminosis d-check vitamin d     Follow-up: in 3 months with lab prior

## 2019-10-23 DIAGNOSIS — E11.22 TYPE 2 DIABETES MELLITUS WITH STAGE 4 CHRONIC KIDNEY DISEASE, WITH LONG-TERM CURRENT USE OF INSULIN: ICD-10-CM

## 2019-10-23 DIAGNOSIS — Z79.4 TYPE 2 DIABETES MELLITUS WITH STAGE 4 CHRONIC KIDNEY DISEASE, WITH LONG-TERM CURRENT USE OF INSULIN: ICD-10-CM

## 2019-10-23 DIAGNOSIS — N18.4 TYPE 2 DIABETES MELLITUS WITH STAGE 4 CHRONIC KIDNEY DISEASE, WITH LONG-TERM CURRENT USE OF INSULIN: ICD-10-CM

## 2019-10-23 RX ORDER — INSULIN ASPART 100 [IU]/ML
2 INJECTION, SOLUTION INTRAVENOUS; SUBCUTANEOUS
Qty: 15 ML | Refills: 3 | Status: SHIPPED | OUTPATIENT
Start: 2019-10-23 | End: 2020-09-29 | Stop reason: SDUPTHER

## 2019-10-23 NOTE — TELEPHONE ENCOUNTER
----- Message from Ciro Kelley sent at 10/23/2019 11:47 AM CDT -----  Contact: pt's  Ti   Type: Needs Medical Advice    Who Called:  Ti Babcock Call Back Number: [phone]   Additional Information: States that the insurance company will not cover the HUMALOG KWIKPEN INSULIN 100 unit/mL pen. Please call to advise.

## 2019-10-24 RX ORDER — PEN NEEDLE, DIABETIC 30 GX3/16"
NEEDLE, DISPOSABLE MISCELLANEOUS
Qty: 200 EACH | Refills: 12 | Status: SHIPPED | OUTPATIENT
Start: 2019-10-24 | End: 2021-06-23 | Stop reason: CLARIF

## 2019-10-27 PROBLEM — E55.9 HYPOVITAMINOSIS D: Status: ACTIVE | Noted: 2019-10-27

## 2019-10-27 RX ORDER — BLOOD-GLUCOSE TRANSMITTER
EACH MISCELLANEOUS
Qty: 1 DEVICE | Refills: 3 | Status: ON HOLD | OUTPATIENT
Start: 2019-10-27 | End: 2020-05-11 | Stop reason: SDUPTHER

## 2019-10-27 RX ORDER — BLOOD-GLUCOSE,RECEIVER,CONT
EACH MISCELLANEOUS
Qty: 1 EACH | Refills: 0 | Status: SHIPPED | OUTPATIENT
Start: 2019-10-27 | End: 2021-06-23 | Stop reason: CLARIF

## 2019-10-27 RX ORDER — BLOOD-GLUCOSE SENSOR
EACH MISCELLANEOUS
Qty: 6 DEVICE | Refills: 3 | Status: ON HOLD | OUTPATIENT
Start: 2019-10-27 | End: 2020-05-11 | Stop reason: SDUPTHER

## 2020-01-09 NOTE — OP NOTE
[facility]  Surgery Department  Operative Note    SUMMARY     Date of Procedure: 10/14/2019     Procedure: Procedure(s) (LRB):  REVISION, AV FISTULA (Left)     Surgeon(s) and Role:     * Amos Desouza MD - Primary    Assisting Surgeon: None    Pre-Operative Diagnosis: End stage renal disease [N18.6]    Post-Operative Diagnosis: Post-Op Diagnosis Codes:     * End stage renal disease [N18.6]    Anesthesia: General    Description of the Procedure:  Patient taken operating room placed in supine position after adequate general anesthesia prepped and draped in usual sterile fashion. Incision was made in the upper arm to the previous incision 2nd up subcutaneous tissue hemostasis maintained electrocautery. Dissection continued down to level the anastomoses the arterial through the distal site and the venous through the proximal site. The fistula as well as the vein on either side of the anastomosis and the artery on either side of the arterial anastomosis were mobilized and encircled with the vessel loop.  With all the dissection completed we proceeded to administer heparin once the heparin was allowed to circulate for several minutes we 1st addressed the venous anastomosis the vein was occluded on either side anastomosis the anastomosis was taken down the PTFE graft that had been brought up onto the field and placed through a new subcutaneous tunnel outside the curve of the prior fistula was now fashioned accordingly and the anastomosis from the end graft to side vein was accomplished using a 5 0 Prolene running suture. Once occlusion was released as good backbleeding from the graft which was then flushed with heparinized saline and occluded graft was then measured back to the artery cut to the appropriate length and the end fashioned appropriately the artery was occluded proximal distal to the site of the prior anastomosis the anastomosis was taken down the edges of the artery were freshened and then  the new arterial anastomosis to the graft was accomplished using a 5 0 Prolene running suture. Prior to completing the suture line backbleeding and flushing was done after the suture was tied cut occlusion released good thrill was palpated over the newly created AV access.  At this point we proceeded to T assure good hemostasis at which point we irrigated the site with antibiotic solution and closed using 2 0 Vicryl running suture and subcutaneous layer followed by 4 0 Vicryl running subcuticular stitch to reapproximate the skin edge.  Site was clean sterile dressing applied patient tolerated procedure well was transported to recovery room stable condition    Complications: No    Estimated Blood Loss (EBL): 10 mL           Implants:   Implant Name Type Inv. Item Serial No.  Lot No. LRB No. Used   GRAFT HEPARIN [id number] [id number] - [id number]  GRAFT HEPARIN [id number] [id number]  NA Left 1       Specimens:   Specimen (12h ago, onward)    None                  Condition: Good    Disposition: ICU - extubated and stable.

## 2020-01-21 ENCOUNTER — HOSPITAL ENCOUNTER (OUTPATIENT)
Dept: PREADMISSION TESTING | Facility: HOSPITAL | Age: 55
Discharge: HOME | End: 2020-01-21
Attending: THORACIC SURGERY (CARDIOTHORACIC VASCULAR SURGERY)
Payer: MEDICARE

## 2020-01-22 ENCOUNTER — HOSPITAL ENCOUNTER (OUTPATIENT)
Dept: PREADMISSION TESTING | Facility: HOSPITAL | Age: 55
Discharge: HOME | End: 2020-01-22
Attending: THORACIC SURGERY (CARDIOTHORACIC VASCULAR SURGERY)
Payer: MEDICARE

## 2020-01-22 VITALS
BODY MASS INDEX: 14.93 KG/M2 | DIASTOLIC BLOOD PRESSURE: 77 MMHG | RESPIRATION RATE: 16 BRPM | SYSTOLIC BLOOD PRESSURE: 109 MMHG | WEIGHT: 95.13 LBS | HEIGHT: 67 IN | HEART RATE: 80 BPM | OXYGEN SATURATION: 98 % | TEMPERATURE: 97 F

## 2020-01-22 DIAGNOSIS — Z01.818 PREOP TESTING: Primary | ICD-10-CM

## 2020-01-22 DIAGNOSIS — Z01.818 PREOPERATIVE TESTING: ICD-10-CM

## 2020-01-22 PROCEDURE — 93005 ELECTROCARDIOGRAM TRACING: CPT

## 2020-01-22 NOTE — DISCHARGE INSTRUCTIONS
Preventing Falls: In the Hospital     Make sure you know how to call for help while in the hospital.     At some point, you may need care in a hospital or other facility. People may ask how well you can move around. Answer this question honestly. If you have a high risk of falling, the staff will take extra steps to help keep you safe. Remember, always ask for help when you need it. Here are some tips to keep you safe in the hospital.  Keep things within reach  · Keep the things you use often within easy reach, like tissues, water, remote control, light cord, and call bell.  · With the nurse present, practice using the call button before you really need it. Keep it within reach. And don't be afraid to use it when you need to!  · Know how to turn the light on and off from your bed. Also, know how to use the bed control.  Get help to move around  · Don't get up on your own, even to use the bathroom. Call someone to help.  · Sit up slowly and with help.  · Don't try to move IV poles or other equipment on your own.  · Use your walking aid as instructed by the staff. Be sure to use handrails in bathrooms or in hallways.  · The staff may use a gait belt to keep you safe as you move around. This fits snugly around your waist. It allows another person to support you as you walk together.  A note to family and friends  When someone is ill or in the hospital, falling is more likely. You can help your loved one reduce the risk:  · Keep personal items in the same place. Stick with a routine.  · Learn about the guidelines the staff has in place to prevent falls. Follow them.  · Get guidance on using safety equipment and moving your loved one.  · When directing your loved one, keep it simple. Go one step at a time.  · Notify staff about any mental or physical changes you notice in your loved one.   Date Last Reviewed: 6/13/2015  © 4912-3384 [employer]. [address] [place] [address], [place] [address]. All rights  reserved. This information is not intended as a substitute for professional medical care. Always follow your healthcare professional's instructions.        How to Quit Smoking  Smoking is one of the hardest habits to break. About half of all people who have ever smoked have been able to quit. Most people who still smoke want to quit. Here are some of the best ways to stop smoking.    Keep trying  Most smokers make many attempts at quitting before they are successful. Its important not to give up.  Go cold turkey  Most former smokers quit cold turkey (all at once). Trying to cut back gradually doesn't seem to work as well, perhaps because it continues the smoking habit. Also, it is possible to inhale more while smoking fewer cigarettes. This results in the same amount of nicotine in your body.  Get support  Support programs can be a big help, especially for heavy smokers. These groups offer lectures, ways to change behavior, and peer support. Here are some ways to find a support program:  · Free national quitline: 800-QUIT-NOW (050-135-3219).  · Hospital quit-smoking programs.  · American Lung Association: (496.663.7133).  · American Cancer Society (891-177-6090).  Support at home is important too. Nonsmokers can offer praise and encouragement. If the smoker in your life finds it hard to quit, encourage them to keep trying.  Over-the-counter medicines  Nicotine replacement therapy may make quitting easier. Certain aids, such as the nicotine patch, gum, and lozenges, are available without a prescription. It is best to use these under a doctors care, though. The skin patch provides a steady supply of nicotine. Nicotine gum and lozenges give temporary bursts of low levels of nicotine. Both methods reduce the craving for cigarettes. Warning: If you have nausea, vomiting, dizziness, weakness, or a fast heartbeat, stop using these products and see your doctor.  Prescription medicines  After reviewing your smoking  "patterns and past attempts to quit, your doctor may offer a prescription medicine such as bupropion, varenicline, a nicotine inhaler, or nasal spray. Each has advantages and side effects. Your doctor can review these with you.  Health benefits of quitting  The benefits of quitting start right away and keep improving the longer you go without smoking. These benefits occur at any age.  So whether you are 17 or 70, quitting is a good decision. Some of the benefits include:  · 20 minutes: Blood pressure and pulse return to normal.  · 8 hours: Oxygen levels return to normal.  · 2 days: Ability to smell and taste begin to improve as damaged nerves regrow.  · 2 to 3 weeks: Circulation and lung function improve.  · 1 to 9 months: Coughing, congestion, and shortness of breath decrease; tiredness decreases.  · 1 year: Risk of heart attack decreases by half.  · 5 years: Risk of lung cancer decreases by half; risk of stroke becomes the same as a nonsmokers.  For more on how to quit smoking, try these online resources:   · Smokefree.gov  · "Clearing the Air" booklet from the National [employer]: smokefree.gov/sites/default/files/pdf/clearing-the-air-accessible.pdf  Date Last Reviewed: 3/1/2017  © 9447-7326 [employer]. [address] [place] [address], [place] [address]. All rights reserved. This information is not intended as a substitute for professional medical care. Always follow your healthcare professional's instructions.        "

## 2020-01-22 NOTE — PRE-PROCEDURE INSTRUCTIONS
Faxed preop labs to dr ocasio's office- spoke with zulema who informed dr ocasio- states he will contact dr de dios- she will call me back

## 2020-01-22 NOTE — PRE-PROCEDURE INSTRUCTIONS
Spoke with zulema @ dr ocasio's office- she will check with dr ocasio & call pt & gerald (or scheduling) re procedure

## 2020-02-15 NOTE — ASSESSMENT & PLAN NOTE
[facility]  Report of Consultation    Sedrick Castro Patient Status:  Inpatient    1940 MRN [id number]   [facility] 4NW-A Attending Salvatore Harada, *   Hosp Day # 15 PCP Thomas Benavides MD     Reason for Consultation:  [place] Patient with known CAD s/p CABG, which is controlled Will continue Aspirin and Statin and monitor for S/Sx of angina/ACS. Continue to monitor on telemetry.        Personal history of antineoplastic chemotherapy    • Personal history of colonic polyps    • Pilonidal cyst    • Type 2 diabetes mellitus with hypoglycemia without coma ([facility] 4/8/2016   • Type 2 diabetes mellitus with polyneuropathy ([facility] 4/8/2016   • Type 1/23/17    cystoscopy - Dr. Darylene Ear   • OTHER SURGICAL HISTORY  10/09/2107    bts cysto dr Leticia Montague    • [address]  01/08/2017    CystoGerardo - Dr Darylene Ear   • OTHER SURGICAL HISTORY  08/13/2018    cysto dr Leticia Montague   • [address]  07/31/2 mg, 40 mg, Oral, Daily  •  metoprolol Tartrate (LOPRESSOR) tab 25 mg, 25 mg, Oral, 2x Daily(Beta Blocker)  •  morphINE sulfate (PF) 4 MG/ML injection 2 mg, 2 mg, Intravenous, Q4H PRN  •  Insulin Aspart Pen (NOVOLOG) 100 UNIT/ML flexpen 1-68 Units, 1-68 Uni some ecchymosis. No cellulitis/induration. No focal tenderness  CVA: no CVA tenderness  BLADDER: nontender, does not feel distended  : has a urinal in place in anticipation. Just voided not too long ago and volume in the urinal is just under 150cc.   Hallie Hess 02/23/2017    URINECUL 50,000 CFU/ML Klebsiella pneumoniae (A) 02/23/2017    URINECUL See Result 08/15/2016    URINECUL 50,000 CFU/ML Enterococcus species not VRE (A) 07/13/2016    URINECUL <10,000 CFU/ML Gram positive juan m 07/13/2016    URINECUL 60,000 C disease of lower extremity ([address]     Persistent atrial fibrillation ([address]     Type 2 diabetes mellitus with stage 3 chronic kidney disease, with long-term current use of insulin (HCC)     Calculus of gallbladder without cholecystitis without obstruction

## 2020-03-05 NOTE — PLAN OF CARE
Problem: Adult Inpatient Plan of Care  Goal: Plan of Care Review  Outcome: Ongoing (interventions implemented as appropriate)  AAOx2, disoriented to time/situation, reoriented as needed. Plan of care reviewed with patient. Safety maintained throughout shift. Bed locked and low, SRx2, call light within reach. IV abx infusing per orders. Blood sugar monitoring, SSI and matthieu'd insulin given with bedtime snack. LUE + for bruit/thrill, warm to touch, staples in place. Telemetry monitoring. Family at bedside. Hourly/q2 rounding completed this shift for pt safety. Will continue to monitor       Pt discharged to home. Parents verbalized understanding. Pt is smiling and happy. No further issues with muscle spasms.

## 2020-05-08 ENCOUNTER — HOSPITAL ENCOUNTER (OUTPATIENT)
Dept: RADIOLOGY | Facility: HOSPITAL | Age: 55
Discharge: HOME | End: 2020-05-08
Attending: THORACIC SURGERY (CARDIOTHORACIC VASCULAR SURGERY)
Payer: MEDICARE

## 2020-05-08 ENCOUNTER — HOSPITAL ENCOUNTER (OUTPATIENT)
Dept: PREADMISSION TESTING | Facility: HOSPITAL | Age: 55
Discharge: HOME | End: 2020-05-08
Attending: THORACIC SURGERY (CARDIOTHORACIC VASCULAR SURGERY)
Payer: MEDICARE

## 2020-05-08 VITALS
BODY MASS INDEX: 14.53 KG/M2 | DIASTOLIC BLOOD PRESSURE: 80 MMHG | TEMPERATURE: 99 F | SYSTOLIC BLOOD PRESSURE: 117 MMHG | WEIGHT: 92.56 LBS | OXYGEN SATURATION: 99 % | HEART RATE: 82 BPM | HEIGHT: 67 IN | RESPIRATION RATE: 18 BRPM

## 2020-05-08 DIAGNOSIS — Z41.9 SURGERY, ELECTIVE: ICD-10-CM

## 2020-05-08 DIAGNOSIS — Z01.818 PRE-OP TESTING: Primary | ICD-10-CM

## 2020-05-08 DIAGNOSIS — N18.6 END STAGE RENAL DISEASE: Primary | ICD-10-CM

## 2020-05-08 DIAGNOSIS — N18.6 END STAGE RENAL DISEASE: ICD-10-CM

## 2020-05-08 PROCEDURE — U0002 COVID-19 LAB TEST NON-CDC: HCPCS

## 2020-05-08 PROCEDURE — 71046 X-RAY EXAM CHEST 2 VIEWS: CPT | Mod: TC

## 2020-05-08 RX ORDER — CEFAZOLIN SODIUM 1 G/50ML
1 SOLUTION INTRAVENOUS ONCE
Status: CANCELLED | OUTPATIENT
Start: 2020-05-11

## 2020-05-08 NOTE — CARE UPDATE
states unable to draw blood, patient states right arm is painful and with limited ROM.  requests blood not be drawn today. States pt. Going to dialysis after appointment and can blood be drawn there.  Pre admit nurse called Andrew spoke to nurse García (Andrew [phone]).    States blood can be drawn today and will be resulted Monday 5/11/20 fax number provided for [facility] [phone] He will fax to [facility] when resulted.

## 2020-05-08 NOTE — DISCHARGE INSTRUCTIONS
To confirm, Your doctor has instructed you that surgery is scheduled for: Monday May 11, 2020    Pre-Op will call the afternoon prior to surgery between 4:00 and 6:00 PM with the final arrival time.      Please report to [facility] [place] on [address] the morning of surgery.     Do not eat or drink anything after midnight the night before your surgery - THIS INCLUDES  WATER, GUM, MINTS AND CANDY.  YOU MAY BRUSH YOUR TEETH BUT DO NOT SWALLOW     TAKE ONLY THESE MEDICATIONS WITH A SMALL SIP OF WATER THE MORNING OF YOUR PROCEDURE:No  Bring name and dose of medication currently taking    ONLY if you are diabetic, check your sugar in the morning before your procedure.       Do not take any diabetic medicines or insulin the morning of surgery .     PLEASE NOTE:  The surgery schedule has many variables which may affect the time of your surgery case.  Family members should be available if your surgery time changes.  Plan to be here the day of your procedure between 4-6 hours.      DO NOT TAKE THESE MEDICATIONS 5-7 DAYS PRIOR to your procedure or per your surgeon's request: ASPIRIN, ALEVE, ADVIL, IBUPROFEN,  LAURIE SELTZER, BC , FISH OIL , VITAMIN E, HERBALS  (May take Tylenol)      ONLY if you are prescribed any types of blood thinners such as:  Aspirin, Coumadin, Plavix, Pradaxa, Xarelto, Aggrenox, Effient, Eliquis, Savasya, Brilinta, or any other, ask your surgeon whether you should stop taking them and how long before surgery you should stop.  You may also need to verify with the prescribing physician if it is ok to stop your medication.                                                        IMPORTANT INSTRUCTIONS      · Do not smoke, vape or drink alcoholic beverages 24 hours prior to your procedure.  · Shower the night before AND the morning of your procedure with a Chlorhexidine wash such as Hibiclens or Dial antibacterial soap from the neck down.   ·  Do not get it on your face or in your eyes.  You may use your own  shampoo and face wash. This helps your skin to be as bacteria free as possible.   ·  DO NOT remove hair from the surgery site.  Do not shave the incision site unless you are given specific instructions to do so.    · Sleep in a bed with clean sheets.  Do not sleep with a pet in the bed.   · If you wear contact lenses, dentures, hearing aids or glasses, bring a container to put them in during surgery and give to a family member for safe keeping.    · Please leave all jewelry, piercing's and valuables at home.   ·   · If your doctor has scheduled you for an overnight stay, bring a small overnight bag with any personal items you need.    · Make arrangements in advance for transportation home by a responsible adult.      · You must make arrangements for transportation, TAXI'S, UBER'S OR LYFTS ARE NOT ALLOWED.        If you have any questions about these instructions, call (Monday - Friday) Pre-Op Admit  Nursing  at [phone] or the Pre-Op Day Surgery Unit at [phone].    To confirm, Your doctor has instructed you that surgery is scheduled for:     Pre-Op will call the afternoon prior to surgery between 4:00 and 6:00 PM with the final arrival time.      Please report to [facility] [place] on [address] the morning of surgery.     Do not eat or drink anything after midnight the night before your surgery - THIS INCLUDES  WATER, GUM, MINTS AND CANDY.  YOU MAY BRUSH YOUR TEETH BUT DO NOT SWALLOW     TAKE ONLY THESE MEDICATIONS WITH A SMALL SIP OF WATER THE MORNING OF YOUR PROCEDURE:      ONLY if you are diabetic, check your sugar in the morning before your procedure.       Do not take any diabetic medicines or insulin the morning of surgery .     PLEASE NOTE:  The surgery schedule has many variables which may affect the time of your surgery case.  Family members should be available if your surgery time changes.  Plan to be here the day of your procedure between 4-6 hours.      DO NOT TAKE THESE MEDICATIONS 5-7 DAYS  PRIOR to your procedure or per your surgeon's request: ASPIRIN, ALEVE, ADVIL, IBUPROFEN,  LAURIE SELTZER, BC , FISH OIL , VITAMIN E, HERBALS  (May take Tylenol)      ONLY if you are prescribed any types of blood thinners such as:  Aspirin, Coumadin, Plavix, Pradaxa, Xarelto, Aggrenox, Effient, Eliquis, Savasya, Brilinta, or any other, ask your surgeon whether you should stop taking them and how long before surgery you should stop.  You may also need to verify with the prescribing physician if it is ok to stop your medication.                                                        IMPORTANT INSTRUCTIONS      · Do not smoke, vape or drink alcoholic beverages 24 hours prior to your procedure.  · Shower the night before AND the morning of your procedure with a Chlorhexidine wash such as Hibiclens or Dial antibacterial soap from the neck down.   ·  Do not get it on your face or in your eyes.  You may use your own shampoo and face wash. This helps your skin to be as bacteria free as possible.   ·  DO NOT remove hair from the surgery site.  Do not shave the incision site unless you are given specific instructions to do so.    · Sleep in a bed with clean sheets.  Do not sleep with a pet in the bed.   · If you wear contact lenses, dentures, hearing aids or glasses, bring a container to put them in during surgery and give to a family member for safe keeping.    · Please leave all jewelry, piercing's and valuables at home.   · ONLY if you have been diagnosed with sleep apnea please bring your C-PAP machine.  · ONLY if you wear home oxygen please bring your portable oxygen tank the day of your procedure.   · ONLY for patients requiring bowel prep, written instructions will be given by your doctor's office.  · ONLY if you have a neuro stimulator, please bring the controller with you the morning of surgery  · ONLY if a type and screen test is needed before surgery, please return:  · If your doctor has scheduled you for an overnight  stay, bring a small overnight bag with any personal items you need.    · Make arrangements in advance for transportation home by a responsible adult.      · You must make arrangements for transportation, TAXI'S, UBER'S OR LYFTS ARE NOT ALLOWED.        If you have any questions about these instructions, call (Monday - Friday) Pre-Op Admit  Nursing  at [phone] or the Pre-Op Day Surgery Unit at [phone].

## 2020-05-09 LAB — SARS-COV-2 RNA RESP QL NAA+PROBE: NOT DETECTED

## 2020-05-11 ENCOUNTER — ANESTHESIA (OUTPATIENT)
Dept: SURGERY | Facility: HOSPITAL | Age: 55
End: 2020-05-11
Payer: MEDICARE

## 2020-05-11 ENCOUNTER — HOSPITAL ENCOUNTER (OUTPATIENT)
Dept: RADIOLOGY | Facility: HOSPITAL | Age: 55
Discharge: HOME | End: 2020-05-11
Attending: THORACIC SURGERY (CARDIOTHORACIC VASCULAR SURGERY) | Admitting: THORACIC SURGERY (CARDIOTHORACIC VASCULAR SURGERY)
Payer: MEDICARE

## 2020-05-11 ENCOUNTER — ANESTHESIA EVENT (OUTPATIENT)
Dept: SURGERY | Facility: HOSPITAL | Age: 55
End: 2020-05-11
Payer: MEDICARE

## 2020-05-11 ENCOUNTER — HOSPITAL ENCOUNTER (OUTPATIENT)
Facility: HOSPITAL | Age: 55
Discharge: HOME | End: 2020-05-11
Attending: THORACIC SURGERY (CARDIOTHORACIC VASCULAR SURGERY) | Admitting: THORACIC SURGERY (CARDIOTHORACIC VASCULAR SURGERY)
Payer: MEDICARE

## 2020-05-11 VITALS
TEMPERATURE: 97 F | HEART RATE: 75 BPM | SYSTOLIC BLOOD PRESSURE: 172 MMHG | HEIGHT: 67 IN | DIASTOLIC BLOOD PRESSURE: 93 MMHG | OXYGEN SATURATION: 100 % | WEIGHT: 92.56 LBS | RESPIRATION RATE: 19 BRPM | BODY MASS INDEX: 14.53 KG/M2

## 2020-05-11 DIAGNOSIS — Z41.9 SURGERY, ELECTIVE: ICD-10-CM

## 2020-05-11 DIAGNOSIS — Z95.828: ICD-10-CM

## 2020-05-11 DIAGNOSIS — N18.6 ESRD (END STAGE RENAL DISEASE): Primary | ICD-10-CM

## 2020-05-11 LAB
ANION GAP SERPL CALC-SCNC: 13 MMOL/L (ref 8–16)
BUN SERPL-MCNC: 62 MG/DL (ref 6–20)
CALCIUM SERPL-MCNC: 8.9 MG/DL (ref 8.7–10.5)
CHLORIDE SERPL-SCNC: 106 MMOL/L (ref 95–110)
CO2 SERPL-SCNC: 19 MMOL/L (ref 23–29)
CREAT SERPL-MCNC: 4.7 MG/DL (ref 0.5–1.4)
EST. GFR  (AFRICAN AMERICAN): 11.2 ML/MIN/1.73 M^2
EST. GFR  (NON AFRICAN AMERICAN): 9.7 ML/MIN/1.73 M^2
GLUCOSE SERPL-MCNC: 123 MG/DL (ref 70–110)
GLUCOSE SERPL-MCNC: 208 MG/DL (ref 70–110)
GLUCOSE SERPL-MCNC: 217 MG/DL (ref 70–110)
GLUCOSE SERPL-MCNC: 228 MG/DL (ref 70–110)
GLUCOSE SERPL-MCNC: 234 MG/DL (ref 70–110)
POTASSIUM SERPL-SCNC: 3.8 MMOL/L (ref 3.5–5.1)
SODIUM SERPL-SCNC: 138 MMOL/L (ref 136–145)

## 2020-05-11 PROCEDURE — 63600175 PHARM REV CODE 636 W HCPCS: Performed by: THORACIC SURGERY (CARDIOTHORACIC VASCULAR SURGERY)

## 2020-05-11 PROCEDURE — 27000675 HC TUBING MICRODRIP: Performed by: ANESTHESIOLOGY

## 2020-05-11 PROCEDURE — 82962 GLUCOSE BLOOD TEST: CPT | Performed by: THORACIC SURGERY (CARDIOTHORACIC VASCULAR SURGERY)

## 2020-05-11 PROCEDURE — S0028 INJECTION, FAMOTIDINE, 20 MG: HCPCS | Performed by: NURSE ANESTHETIST, CERTIFIED REGISTERED

## 2020-05-11 PROCEDURE — 71000033 HC RECOVERY, INTIAL HOUR: Performed by: THORACIC SURGERY (CARDIOTHORACIC VASCULAR SURGERY)

## 2020-05-11 PROCEDURE — 71000015 HC POSTOP RECOV 1ST HR: Performed by: THORACIC SURGERY (CARDIOTHORACIC VASCULAR SURGERY)

## 2020-05-11 PROCEDURE — 36000706: Performed by: THORACIC SURGERY (CARDIOTHORACIC VASCULAR SURGERY)

## 2020-05-11 PROCEDURE — 25000003 PHARM REV CODE 250: Performed by: NURSE ANESTHETIST, CERTIFIED REGISTERED

## 2020-05-11 PROCEDURE — 80048 BASIC METABOLIC PNL TOTAL CA: CPT

## 2020-05-11 PROCEDURE — 63600175 PHARM REV CODE 636 W HCPCS: Performed by: NURSE ANESTHETIST, CERTIFIED REGISTERED

## 2020-05-11 PROCEDURE — 37000009 HC ANESTHESIA EA ADD 15 MINS: Performed by: THORACIC SURGERY (CARDIOTHORACIC VASCULAR SURGERY)

## 2020-05-11 PROCEDURE — 25000003 PHARM REV CODE 250: Performed by: THORACIC SURGERY (CARDIOTHORACIC VASCULAR SURGERY)

## 2020-05-11 PROCEDURE — 71000016 HC POSTOP RECOV ADDL HR: Performed by: THORACIC SURGERY (CARDIOTHORACIC VASCULAR SURGERY)

## 2020-05-11 PROCEDURE — C1750 CATH, HEMODIALYSIS,LONG-TERM: HCPCS | Performed by: THORACIC SURGERY (CARDIOTHORACIC VASCULAR SURGERY)

## 2020-05-11 PROCEDURE — 77001 FLUOROGUIDE FOR VEIN DEVICE: CPT | Mod: TC

## 2020-05-11 PROCEDURE — 00532 ANES ACCESS CTR VENOUS CRCJ: CPT | Performed by: THORACIC SURGERY (CARDIOTHORACIC VASCULAR SURGERY)

## 2020-05-11 PROCEDURE — 37000008 HC ANESTHESIA 1ST 15 MINUTES: Performed by: THORACIC SURGERY (CARDIOTHORACIC VASCULAR SURGERY)

## 2020-05-11 PROCEDURE — 63600175 PHARM REV CODE 636 W HCPCS: Mod: GZ | Performed by: ANESTHESIOLOGY

## 2020-05-11 PROCEDURE — 36000707: Performed by: THORACIC SURGERY (CARDIOTHORACIC VASCULAR SURGERY)

## 2020-05-11 PROCEDURE — 27000284 HC CANNULA NASAL: Performed by: ANESTHESIOLOGY

## 2020-05-11 PROCEDURE — 71000039 HC RECOVERY, EACH ADD'L HOUR: Performed by: THORACIC SURGERY (CARDIOTHORACIC VASCULAR SURGERY)

## 2020-05-11 PROCEDURE — 27000671 HC TUBING MICROBORE EXT: Performed by: ANESTHESIOLOGY

## 2020-05-11 DEVICE — CATHETER HEMOSPLIT 19CM 5733690: Type: IMPLANTABLE DEVICE | Site: NECK | Status: FUNCTIONAL

## 2020-05-11 RX ORDER — FENTANYL CITRATE 50 UG/ML
25 INJECTION, SOLUTION INTRAMUSCULAR; INTRAVENOUS
Status: DISCONTINUED | OUTPATIENT
Start: 2020-05-11 | End: 2020-05-11 | Stop reason: HOSPADM

## 2020-05-11 RX ORDER — SODIUM CHLORIDE 9 MG/ML
INJECTION, SOLUTION INTRAVENOUS CONTINUOUS
Status: DISCONTINUED | OUTPATIENT
Start: 2020-05-11 | End: 2020-05-11 | Stop reason: HOSPADM

## 2020-05-11 RX ORDER — SODIUM CHLORIDE 9 MG/ML
INJECTION, SOLUTION INTRAVENOUS CONTINUOUS PRN
Status: DISCONTINUED | OUTPATIENT
Start: 2020-05-11 | End: 2020-05-11

## 2020-05-11 RX ORDER — SODIUM CHLORIDE 0.9 % (FLUSH) 0.9 %
10 SYRINGE (ML) INJECTION
Status: DISCONTINUED | OUTPATIENT
Start: 2020-05-11 | End: 2020-05-11 | Stop reason: HOSPADM

## 2020-05-11 RX ORDER — DIPHENHYDRAMINE HYDROCHLORIDE 50 MG/ML
12.5 INJECTION INTRAMUSCULAR; INTRAVENOUS
Status: DISCONTINUED | OUTPATIENT
Start: 2020-05-11 | End: 2020-05-11 | Stop reason: HOSPADM

## 2020-05-11 RX ORDER — PROPOFOL 10 MG/ML
VIAL (ML) INTRAVENOUS
Status: DISCONTINUED | OUTPATIENT
Start: 2020-05-11 | End: 2020-05-11

## 2020-05-11 RX ORDER — HEPARIN SODIUM 1000 [USP'U]/ML
INJECTION, SOLUTION INTRAVENOUS; SUBCUTANEOUS
Status: DISCONTINUED | OUTPATIENT
Start: 2020-05-11 | End: 2020-05-11 | Stop reason: HOSPADM

## 2020-05-11 RX ORDER — LIDOCAINE HYDROCHLORIDE 10 MG/ML
INJECTION INFILTRATION; PERINEURAL
Status: DISCONTINUED | OUTPATIENT
Start: 2020-05-11 | End: 2020-05-11 | Stop reason: HOSPADM

## 2020-05-11 RX ORDER — CEFAZOLIN SODIUM 1 G/50ML
1 SOLUTION INTRAVENOUS ONCE
Status: COMPLETED | OUTPATIENT
Start: 2020-05-11 | End: 2020-05-11

## 2020-05-11 RX ORDER — MEPERIDINE HYDROCHLORIDE 50 MG/ML
12.5 INJECTION INTRAMUSCULAR; INTRAVENOUS; SUBCUTANEOUS EVERY 10 MIN PRN
Status: DISCONTINUED | OUTPATIENT
Start: 2020-05-11 | End: 2020-05-11 | Stop reason: HOSPADM

## 2020-05-11 RX ORDER — FAMOTIDINE 10 MG/ML
INJECTION INTRAVENOUS
Status: DISCONTINUED | OUTPATIENT
Start: 2020-05-11 | End: 2020-05-11

## 2020-05-11 RX ORDER — ONDANSETRON 2 MG/ML
4 INJECTION INTRAMUSCULAR; INTRAVENOUS DAILY PRN
Status: DISCONTINUED | OUTPATIENT
Start: 2020-05-11 | End: 2020-05-11 | Stop reason: HOSPADM

## 2020-05-11 RX ORDER — ACETAMINOPHEN 10 MG/ML
INJECTION, SOLUTION INTRAVENOUS
Status: DISCONTINUED | OUTPATIENT
Start: 2020-05-11 | End: 2020-05-11

## 2020-05-11 RX ORDER — OXYCODONE HYDROCHLORIDE 5 MG/1
5 TABLET ORAL
Status: DISCONTINUED | OUTPATIENT
Start: 2020-05-11 | End: 2020-05-11 | Stop reason: HOSPADM

## 2020-05-11 RX ADMIN — PROPOFOL 20 MG: 10 INJECTION, EMULSION INTRAVENOUS at 09:05

## 2020-05-11 RX ADMIN — ACETAMINOPHEN 500 MG: 10 INJECTION, SOLUTION INTRAVENOUS at 09:05

## 2020-05-11 RX ADMIN — PROPOFOL 30 MG: 10 INJECTION, EMULSION INTRAVENOUS at 09:05

## 2020-05-11 RX ADMIN — PROPOFOL 20 MG: 10 INJECTION, EMULSION INTRAVENOUS at 10:05

## 2020-05-11 RX ADMIN — HUMAN INSULIN 4 UNITS: 100 INJECTION, SOLUTION SUBCUTANEOUS at 12:05

## 2020-05-11 RX ADMIN — FAMOTIDINE 20 MG: 10 INJECTION, SOLUTION INTRAVENOUS at 09:05

## 2020-05-11 RX ADMIN — CEFAZOLIN SODIUM 2 G: 1 SOLUTION INTRAVENOUS at 09:05

## 2020-05-11 RX ADMIN — SODIUM CHLORIDE: 9 INJECTION, SOLUTION INTRAVENOUS at 09:05

## 2020-05-11 NOTE — ANESTHESIA POSTPROCEDURE EVALUATION
Anesthesia Post Evaluation    Patient: Errol Stone Keita    Procedure(s) Performed: Procedure(s) (LRB):  REPLACEMENT TUNNELED CATH RIGHT SUBCLAVIAN (Right)    Final Anesthesia Type: general    Patient location during evaluation: PACU  Patient participation: Yes- Able to Participate  Level of consciousness: awake and alert  Post-procedure vital signs: reviewed and stable  Pain management: adequate  Airway patency: patent    PONV status at discharge: No PONV  Anesthetic complications: no      Cardiovascular status: stable  Respiratory status: unassisted and spontaneous ventilation  Hydration status: euvolemic  Follow-up not needed.          Vitals Value Taken Time   /94 5/11/2020 11:45 AM   Temp 36.1 °C (97 °F) 5/11/2020 11:15 AM   Pulse 66 5/11/2020 11:52 AM   Resp 14 5/11/2020 11:52 AM   SpO2 100 % 5/11/2020 11:52 AM   Vitals shown include unvalidated device data.      No case tracking events are documented in the log.      Pain/Jessenia Score: No data recorded

## 2020-05-11 NOTE — ANESTHESIA PREPROCEDURE EVALUATION
05/11/2020  Errol Keita is a 54 y.o., female.  Patient Active Problem List   Diagnosis    H/O: CVA (cerebrovascular accident)    Hypercholesteremia    Hypertension associated with diabetes    Tobacco abuse, >100 pack years    History of noncompliance with medical treatment    Abnormal EKG    Elevated troponin    Mild recurrent major depression    LVH (left ventricular hypertrophy)    Disturbance in affect    Cardiomegaly    Heart murmur    Severe malnutrition    Hypoglycemia associated with diabetes    STEMI (ST elevation myocardial infarction)    Hypocalcemia    Hemodialysis-associated hypotension    Elevated brain natriuretic peptide (BNP) level    Transfusion-dependent anemia    Moderate malnutrition    Iron deficiency anemia secondary to inadequate dietary iron intake    Coronary artery disease involving native coronary artery without angina pectoris    S/P coronary artery stent placement, 2 NESTOR 7/27/2015    ST elevation myocardial infarction involving left main coronary artery    Leg swelling    Acute on chronic diastolic congestive heart failure    Memory difficulties    Dementia associated with other underlying disease without behavioral disturbance    Peripheral edema    Vascular dementia without behavioral disturbance    Diabetic nephropathy with proteinuria    Anemia of chronic renal failure    Moderate tobacco dependence, half ppd    Pancreatic insufficiency    Acute on chronic pancreatitis    Acute on chronic congestive heart failure    Encephalopathy, metabolic    Frailty    Diarrhea    Type 2 diabetes mellitus with stage 4 chronic kidney disease, with long-term current use of insulin    Type 1 diabetes mellitus with stage 4 chronic kidney disease    Acute renal failure superimposed on stage 4 chronic kidney disease    Hypomagnesemia     Hypokalemia    Uncontrolled type 2 diabetes mellitus with hyperglycemia    UTI (urinary tract infection)    ESRD (end stage renal disease)    End stage renal disease    Cellulitis of left upper arm    Malnutrition    Sedentary lifestyle, onset 2000    Excessive daytime sleepiness    Hypovitaminosis D       Past Surgical History:   Procedure Laterality Date    AV FISTULA PLACEMENT Left 3/19/2019    Procedure: CREATION, AV FISTULA;  Surgeon: Amos Desouza MD;  Location: [facility] OR;  Service: Vascular;  Laterality: Left;    CARDIAC SURGERY      CABG    CHOLECYSTECTOMY      CORONARY ARTERY BYPASS GRAFT      HYSTERECTOMY      pt states no she did not(?)    REMOVAL OF HEMODIALYSIS CATHETER Right 1/18/2019    Procedure: REMOVAL, CATHETER, HEMODIALYSIS;  Surgeon: Amos Desouza MD;  Location: [facility] OR;  Service: Vascular;  Laterality: Right;    REVISION OF ARTERIOVENOUS FISTULA Left 10/14/2019    Procedure: REVISION, AV FISTULA;  Surgeon: Amos Desouza MD;  Location: [facility] OR;  Service: Vascular;  Laterality: Left;        Tobacco Use:  The patient  reports that she has been smoking cigarettes. She has a 17.50 pack-year smoking history. She has never used smokeless tobacco.     Results for orders placed or performed during the hospital encounter of 01/22/20   EKG 12-lead    Collection Time: 01/22/20  9:05 AM    Narrative    Test Reason : Z01.818,    Vent. Rate : 081 BPM     Atrial Rate : 081 BPM     P-R Int : 152 ms          QRS Dur : 096 ms      QT Int : 426 ms       P-R-T Axes : 074 018 140 degrees     QTc Int : 494 ms    Normal sinus rhythm  Right atrial enlargement  LVH with repolarization abnormality  Inferior infarct (cited on or before 02-JUL-2019)  Abnormal ECG  When compared with ECG of 11-OCT-2019 13:19,  No significant change was found  Confirmed by Cheyenne GUTIERREZ, Jonathan ([id number]) on 1/23/2020 9:03:04 AM    Referred By: KANDI DESOUZA           Confirmed By:Jonathan Quinn MD             Lab Results    Component Value Date    WBC 10.74 01/22/2020    HGB 14.3 01/22/2020    HCT 43.7 01/22/2020     (H) 01/22/2020     (H) 01/22/2020     BMP  Lab Results   Component Value Date     01/22/2020    K 3.4 (L) 01/22/2020    CL 96 01/22/2020    CO2 24 01/22/2020    BUN 69 (H) 01/22/2020    CREATININE 4.5 (H) 01/22/2020    CALCIUM 9.3 01/22/2020    ANIONGAP 20 (H) 01/22/2020    ESTGFRAFRICA 11.8 (A) 01/22/2020    EGFRNONAA 10.2 (A) 01/22/2020     Reason for Exam   Priority: Routine   Dx: CHF (congestive heart failure) [I50.9 (ICD-10-CM)]   Comments:    Staff     Performing Sonographer   Milli Marie    Vitals     Height Weight BMI (Calculated) BSA (Calculated - sq m) BP             Conclusion     · Moderate concentric left ventricular hypertrophy.  · Increased (hyperdynamic) left ventricular systolic function. The estimated ejection fraction is 72%  · Normal LV diastolic function.  · Normal right ventricular systolic function.  · Mild tricuspid regurgitation.  · Normal central venous pressure (3 mm Hg).  · The estimated PA systolic pressure is 24 mm Hg            Pre-op Assessment    I have reviewed the Patient Summary Reports.     I have reviewed the Nursing Notes.   I have reviewed the Medications.     Review of Systems  Anesthesia Hx:  History of prior surgery of interest to airway management or planning: heart surgery. Denies Family Hx of Anesthesia complications.   Denies Personal Hx of Anesthesia complications.   Social:  Non-Smoker, No Alcohol Use    Cardiovascular:   Exercise tolerance: good Hypertension Past MI CAD asymptomatic  CHF ECG has been reviewed.    Pulmonary:   Asthma    Renal/:   Chronic Renal Disease, ESRD, Dialysis    Neurological:   CVA, residual symptoms Left sided weakness   Endocrine:   Diabetes, poorly controlled, type 2    Psych:   depression        Patient Active Problem List   Diagnosis    H/O: CVA (cerebrovascular accident)    Hypercholesteremia    Hypertension  associated with diabetes    Tobacco abuse, >100 pack years    History of noncompliance with medical treatment    Abnormal EKG    Elevated troponin    Mild recurrent major depression    LVH (left ventricular hypertrophy)    Disturbance in affect    Cardiomegaly    Heart murmur    Severe malnutrition    Hypoglycemia associated with diabetes    STEMI (ST elevation myocardial infarction)    Hypocalcemia    Hemodialysis-associated hypotension    Elevated brain natriuretic peptide (BNP) level    Transfusion-dependent anemia    Moderate malnutrition    Iron deficiency anemia secondary to inadequate dietary iron intake    Coronary artery disease involving native coronary artery without angina pectoris    S/P coronary artery stent placement, 2 NESTOR 7/27/2015    ST elevation myocardial infarction involving left main coronary artery    Leg swelling    Acute on chronic diastolic congestive heart failure    Memory difficulties    Dementia associated with other underlying disease without behavioral disturbance    Peripheral edema    Vascular dementia without behavioral disturbance    Diabetic nephropathy with proteinuria    Anemia of chronic renal failure    Moderate tobacco dependence, half ppd    Pancreatic insufficiency    Acute on chronic pancreatitis    Acute on chronic congestive heart failure    Encephalopathy, metabolic    Frailty    Diarrhea    Type 2 diabetes mellitus with stage 4 chronic kidney disease, with long-term current use of insulin    Type 1 diabetes mellitus with stage 4 chronic kidney disease    Acute renal failure superimposed on stage 4 chronic kidney disease    Hypomagnesemia    Hypokalemia    Uncontrolled type 2 diabetes mellitus with hyperglycemia    UTI (urinary tract infection)    ESRD (end stage renal disease)    End stage renal disease    Cellulitis of left upper arm    Malnutrition    Sedentary lifestyle, onset 2000    Excessive daytime sleepiness     Hypovitaminosis D       Past Surgical History:   Procedure Laterality Date    AV FISTULA PLACEMENT Left 3/19/2019    Procedure: CREATION, AV FISTULA;  Surgeon: Amos Desouza MD;  Location: [facility] OR;  Service: Vascular;  Laterality: Left;    CARDIAC SURGERY      CABG    CHOLECYSTECTOMY      CORONARY ARTERY BYPASS GRAFT      HYSTERECTOMY      pt states no she did not(?)    REMOVAL OF HEMODIALYSIS CATHETER Right 1/18/2019    Procedure: REMOVAL, CATHETER, HEMODIALYSIS;  Surgeon: Amos Desouza MD;  Location: [facility] OR;  Service: Vascular;  Laterality: Right;    REVISION OF ARTERIOVENOUS FISTULA Left 10/14/2019    Procedure: REVISION, AV FISTULA;  Surgeon: Amos Desouza MD;  Location: [facility] OR;  Service: Vascular;  Laterality: Left;        Tobacco Use:  The patient  reports that she has been smoking cigarettes. She has a 17.50 pack-year smoking history. She has never used smokeless tobacco.     Results for orders placed or performed during the hospital encounter of 01/22/20   EKG 12-lead    Collection Time: 01/22/20  9:05 AM    Narrative    Test Reason : Z01.818,    Vent. Rate : 081 BPM     Atrial Rate : 081 BPM     P-R Int : 152 ms          QRS Dur : 096 ms      QT Int : 426 ms       P-R-T Axes : 074 018 140 degrees     QTc Int : 494 ms    Normal sinus rhythm  Right atrial enlargement  LVH with repolarization abnormality  Inferior infarct (cited on or before 02-JUL-2019)  Abnormal ECG  When compared with ECG of 11-OCT-2019 13:19,  No significant change was found  Confirmed by Cheyenne GUTIERREZ, Jonathan ([id number]) on 1/23/2020 9:03:04 AM    Referred By: KANDI DESOUZA           Confirmed By:Jonathan Quinn MD             Lab Results   Component Value Date    WBC 10.74 01/22/2020    HGB 14.3 01/22/2020    HCT 43.7 01/22/2020     (H) 01/22/2020     (H) 01/22/2020     BMP  Lab Results   Component Value Date     01/22/2020    K 3.4 (L) 01/22/2020    CL 96 01/22/2020    CO2 24 01/22/2020    BUN 69 (H) 01/22/2020     CREATININE 4.5 (H) 01/22/2020    CALCIUM 9.3 01/22/2020    ANIONGAP 20 (H) 01/22/2020    ESTGFRAFRICA 11.8 (A) 01/22/2020    EGFRNONAA 10.2 (A) 01/22/2020     2018  CONCLUSIONS     1 - Hyperdynamic left ventricular systolic function (EF >70%).     2 - Impaired LV relaxation, elevated LAP (grade 2 diastolic dysfunction).     3 - The estimated PA systolic pressure is greater than 21 mmHg.     4 - Mild to moderate tricuspid regurgitation.     5 - Moderate-severe LVH.       Physical Exam  General:  Well nourished    Airway/Jaw/Neck:  Airway Findings: Mouth Opening: Normal Tongue: Normal  General Airway Assessment: Adult  Mallampati: III  Improves to II with phonation.  TM Distance: Normal, at least 6 cm  Jaw/Neck Findings:  Neck ROM: Normal ROM      Dental:  Dental Findings: (missing multiple)    Chest/Lungs:  Chest/Lungs Findings: Clear to auscultation, Normal Respiratory Rate     Heart/Vascular:  Heart Findings: Rate: Normal  Rhythm: Regular Rhythm  Sounds: Normal  Heart Murmur  Systolic  Systolic Heart Murmur Grade: Grade II        Mental Status:  Mental Status Findings:  Cooperative, Alert and Oriented         Anesthesia Plan  Type of Anesthesia, risks & benefits discussed:  Anesthesia Type:  MAC  Patient's Preference: MAC  Intra-op Monitoring Plan: standard ASA monitors  Intra-op Monitoring Plan Comments:   Post Op Pain Control Plan: multimodal analgesia  Post Op Pain Control Plan Comments:   Induction:   IV  Beta Blocker:         Informed Consent: Patient understands risks and agrees with Anesthesia plan.  Questions answered. Anesthesia consent signed with patient.  ASA Score: 4     Day of Surgery Review of History & Physical: I have interviewed and examined the patient. I have reviewed the patient's H&P dated:  There are no significant changes.      Anesthesia Plan Notes: MAC  Pepcid  Ofirmev 500 mg            Ready For Surgery From Anesthesia Perspective.

## 2020-05-11 NOTE — TRANSFER OF CARE
"Anesthesia Transfer of Care Note    Patient: Errol Stone Keita    Procedure(s) Performed: Procedure(s) (LRB):  REPLACEMENT TUNNELED CATH (N/A)    Patient location: PACU    Anesthesia Type: MAC    Post pain: adequate analgesia    Post assessment: no apparent anesthetic complications    Post vital signs: stable    Level of consciousness: awake and alert    Nausea/Vomiting: no nausea/vomiting    Complications: none    Transfer of care protocol was followed      Last vitals:   Visit Vitals  BP (!) 160/78 (BP Location: Right arm, Patient Position: Lying)   Pulse 75   Temp 36.5 °C (97.7 °F) (Oral)   Resp 18   Ht 5' 7" (1.702 m)   Wt 42 kg (92 lb 9.5 oz)   LMP 09/03/2012 (Exact Date)   SpO2 99%   Breastfeeding? No   BMI 14.50 kg/m²     "

## 2020-05-11 NOTE — PLAN OF CARE
1245  Upon sitting pt up in bed to get dressed egg size hematoma noted to right side of hemasplit dressing, pressure held [id number] per barbi snyder. Dr. Desouza notified, no new orders.  1300 ice pack placed over site, will continue to monitor  1315 No further swelling noted, pt discharged home with , discharge instructions given, pt/ verbalized understanding

## 2020-05-11 NOTE — OP NOTE
[facility]  Surgery Department  Operative Note    SUMMARY     Date of Procedure: 5/11/2020     Procedure: Procedure(s) (LRB):  REPLACEMENT TUNNELED CATH RIGHT SUBCLAVIAN (Right)     Surgeon(s) and Role:     * Amos Desouza MD - Primary    Assisting Surgeon: None    Pre-Operative Diagnosis: End stage renal disease [N18.6]    Post-Operative Diagnosis: Post-Op Diagnosis Codes:     * End stage renal disease [N18.6]    Anesthesia: General    Description of the Procedure: Access to the vein accomplished and guide wire inserted                                                       Stab incision made in upper chest inferior and lateral to access site                                                        Catheter pulled from stab incision out through access site using tunneling device                                                        Graduated diilators passed over guide wire till finally the dilator/sheath assembly placed                                                        Dilator/guide wire removed and catheter inserted through the sheath and sheath then peeled away without difficulty                                                         Catheter positioned using flouroscopic guidance                                                         Catheter assessed and once found to be functioning well the lumens were flushed with heparin per protocol                                                          Catheter secured using 3-0 nylon                                                          Access site closed using single 4-0 vicryl subcuticular stitch                                                           Tolerated well                                                           Dressing applied after site cleaned    Complications: No    Estimated Blood Loss (EBL): 5cc           Implants:   Implant Name Type Inv. Item Serial No.  Lot No. LRB No. Used   CATH HEMOSPLIT DL 14FR 19CM -  [id number] Dialysis Catheter CATH HEMOSPLIT DL 14FR 19CM  [employer] [id number] Right 1   CATHETER HEMOSPLIT 19CM [id number] - [id number]  CATHETER HEMOSPLIT 19CM [id number]   [id number] Right 1       Specimens:   Specimen (12h ago, onward)    None                  Condition: Good    Disposition: PACU - hemodynamically stable.

## 2020-05-11 NOTE — H&P
[facility]  Vascular Surgery  History & Physical    Patient Name: Errol Keita  MRN: [id number]  Admission Date: 5/11/2020  Attending Physician: OZIEL Desouza MD  Primary Care Provider: Bharat Wiggins MD    Subjective:     Chief Complaint/Reason for Admission: Dialysis catheter damaged    History of Present Illness: 55 yo with history ESRD who has hemodialysis 3X per weeks via R side tunnel catheter. One of the catheter ports is broken and referred to replace catheter    Medications Prior to Admission   Medication Sig Dispense Refill Last Dose    insulin aspart U-100 (NOVOLOG FLEXPEN U-100 INSULIN) 100 unit/mL (3 mL) InPn pen Inject 2 Units into the skin 3 (three) times daily with meals. 15 mL 3 5/10/2020 at Unknown time    insulin detemir U-100 (LEVEMIR FLEXTOUCH) 100 unit/mL (3 mL) SubQ InPn pen Inject 2 Units into the skin once daily. 1 Box 1 5/10/2020 at Unknown time    sevelamer carbonate (RENVELA) 800 mg Tab Take 800 mg by mouth 2 (two) times a day.    5/10/2020 at Unknown time    alcohol antiseptic pads (BD ALCOHOL SWABS TOP) BD Alcohol Swabs   Unknown at Unknown time    amLODIPine (NORVASC) 5 MG tablet Take 1 tablet (5 mg total) by mouth once daily. (Patient taking differently: Take 5 mg by mouth once daily. ) 30 tablet 0 Taking    ascorbic acid, vitamin C, (VITAMIN C) 500 MG tablet Take 1 tablet (500 mg total) by mouth every evening.   More than a month at Unknown time    aspirin 81 MG chewable tablet Take 81 mg by mouth once daily. Ran out   More than a month at Unknown time    atorvastatin (LIPITOR) 80 MG tablet Take 1 tablet (80 mg total) by mouth once daily. 30 tablet 11 More than a month at Unknown time    blood sugar diagnostic (TRUE METRIX GLUCOSE TEST STRIP) Strp Test glucose 5x daily due to hypoglycemia 450 strip 3     DEXCOM G6  Misc Use with Dexcom sensor. 1 each 0 More than a month at Unknown time    ferrous sulfate 325 (65 FE) MG EC tablet Take 1 tablet (325  "mg total) by mouth 2 (two) times daily. 60 tablet 0 More than a month at Unknown time    lancets 33 gauge Misc Use to check glucose 5x daily. True Metrix 450 each 3 Unknown at Unknown time    lisinopril (PRINIVIL,ZESTRIL) 2.5 MG tablet Take 2.5 mg by mouth once daily.    More than a month at Unknown time    megestrol (MEGACE) 40 MG Tab Take 1 tablet (40 mg total) by mouth once daily. 90 tablet 3 More than a month at Unknown time    pen needle, diabetic (BD ULTRA-FINE AZIZA PEN NEEDLE) 32 gauge x 5/32" Ndle USE FOUR TIMES DAILY 200 each 12 Unknown at Unknown time    quetiapine (SEROQUEL) 100 MG Tab Take 100 mg by mouth every evening.    More than a month at Unknown time       Review of patient's allergies indicates:  No Known Allergies    Past Medical History:   Diagnosis Date    AV fistula occlusion 01/2020    unable to use av fistula (left upper arm) for dialysis    CHF (congestive heart failure)     Complication of vascular dialysis catheter 05/2020    Coronary artery disease     Diabetes mellitus     ESRD on dialysis 01/2020    using double lumen cath ( right upper chest) for dialysis m,w,f-[address]    Hypertension     x yrs    Pancreatitis     Stroke     occ weakness in legs & speech problems (slow to respond)    Type 2 diabetes mellitus with hyperglycemia 7/13/2015    Unsteady gait      Past Surgical History:   Procedure Laterality Date    AV FISTULA PLACEMENT Left 3/19/2019    Procedure: CREATION, AV FISTULA;  Surgeon: Amos Desouza MD;  Location: [facility] OR;  Service: Vascular;  Laterality: Left;    CARDIAC SURGERY      CABG    CHOLECYSTECTOMY      CORONARY ARTERY BYPASS GRAFT      HYSTERECTOMY      pt states no she did not(?)    REMOVAL OF HEMODIALYSIS CATHETER Right 1/18/2019    Procedure: REMOVAL, CATHETER, HEMODIALYSIS;  Surgeon: Amos Desouza MD;  Location: [facility] OR;  Service: Vascular;  Laterality: Right;    REVISION OF ARTERIOVENOUS FISTULA Left 10/14/2019    Procedure: " REVISION, AV FISTULA;  Surgeon: Amos Desouza MD;  Location: [facility];  Service: Vascular;  Laterality: Left;     Family History     Problem Relation (Age of Onset)    Asthma Brother    Cancer Maternal Grandmother    Depression Brother    Diabetes Mother, Father, Sister    Hearing loss Sister    Heart disease Sister    Hypertension Sister    Learning disabilities Sister    Vision loss Sister        Tobacco Use    Smoking status: Current Every Day Smoker     Packs/day: 0.50     Years: 35.00     Pack years: 17.50     Types: Cigarettes    Smokeless tobacco: Never Used    Tobacco comment: Do Not smoke day of procedure.   Substance and Sexual Activity    Alcohol use: No     Alcohol/week: 0.0 standard drinks     Comment: questionable per     Drug use: No    Sexual activity: Not Currently     Partners: Male     Birth control/protection: None     Review of Systems  Objective:     Vital Signs (Most Recent):  Temp: 97.7 °F (36.5 °C) (05/11/20 0742)  Pulse: 75 (05/11/20 0742)  Resp: 18 (05/11/20 0742)  BP: (!) 160/78 (05/11/20 0742)  SpO2: 99 % (05/11/20 0742) Vital Signs (24h Range):  Temp:  [97.7 °F (36.5 °C)] 97.7 °F (36.5 °C)  Pulse:  [75] 75  Resp:  [18] 18  SpO2:  [99 %] 99 %  BP: (160)/(78) 160/78     Weight: 42 kg (92 lb 9.5 oz)  Body mass index is 14.5 kg/m².    Physical Exam   NAD  BS clear  CV RRR  Abd soft  Ext no edema      Significant Labs:  BMP:   Recent Labs   Lab 05/11/20  0755   *      K 3.8      CO2 19*   BUN 62*   CREATININE 4.7*   CALCIUM 8.9     CBC: No results for input(s): WBC, RBC, HGB, HCT, PLT, MCV, MCH, MCHC in the last 48 hours.  Coagulation: No results for input(s): LABPROT, INR, APTT in the last 48 hours.    Significant Diagnostics:  CXR: No results found in the last 24 hours.    Assessment/Plan:     Impression : ESRD    Plan : Remove and replace tunneled hemodialysis catheter    Amos Desouza MD  Vascular Surgery  [facility]

## 2020-09-29 ENCOUNTER — OFFICE VISIT (OUTPATIENT)
Dept: ENDOCRINOLOGY | Facility: CLINIC | Age: 55
End: 2020-09-29
Payer: MEDICARE

## 2020-09-29 VITALS
SYSTOLIC BLOOD PRESSURE: 138 MMHG | DIASTOLIC BLOOD PRESSURE: 80 MMHG | WEIGHT: 87.75 LBS | BODY MASS INDEX: 13.77 KG/M2 | TEMPERATURE: 98 F | HEIGHT: 67 IN | HEART RATE: 79 BPM

## 2020-09-29 DIAGNOSIS — E78.00 HYPERCHOLESTEREMIA: ICD-10-CM

## 2020-09-29 DIAGNOSIS — E11.59 HYPERTENSION ASSOCIATED WITH DIABETES: ICD-10-CM

## 2020-09-29 DIAGNOSIS — E46 MALNUTRITION, UNSPECIFIED TYPE: ICD-10-CM

## 2020-09-29 DIAGNOSIS — Z72.0 TOBACCO ABUSE: ICD-10-CM

## 2020-09-29 DIAGNOSIS — E10.22 TYPE 1 DIABETES MELLITUS WITH STAGE 4 CHRONIC KIDNEY DISEASE: Primary | ICD-10-CM

## 2020-09-29 DIAGNOSIS — I25.10 CORONARY ARTERY DISEASE INVOLVING NATIVE CORONARY ARTERY WITHOUT ANGINA PECTORIS, UNSPECIFIED WHETHER NATIVE OR TRANSPLANTED HEART: ICD-10-CM

## 2020-09-29 DIAGNOSIS — I15.2 HYPERTENSION ASSOCIATED WITH DIABETES: ICD-10-CM

## 2020-09-29 DIAGNOSIS — E55.9 HYPOVITAMINOSIS D: ICD-10-CM

## 2020-09-29 DIAGNOSIS — N18.4 TYPE 1 DIABETES MELLITUS WITH STAGE 4 CHRONIC KIDNEY DISEASE: Primary | ICD-10-CM

## 2020-09-29 PROCEDURE — 99999 PR PBB SHADOW E&M-EST. PATIENT-LVL IV: CPT | Mod: PBBFAC,,, | Performed by: PHYSICIAN ASSISTANT

## 2020-09-29 PROCEDURE — 99999 PR PBB SHADOW E&M-EST. PATIENT-LVL IV: ICD-10-PCS | Mod: PBBFAC,,, | Performed by: PHYSICIAN ASSISTANT

## 2020-09-29 PROCEDURE — 3008F PR BODY MASS INDEX (BMI) DOCUMENTED: ICD-10-PCS | Mod: CPTII,S$GLB,, | Performed by: PHYSICIAN ASSISTANT

## 2020-09-29 PROCEDURE — 3008F BODY MASS INDEX DOCD: CPT | Mod: CPTII,S$GLB,, | Performed by: PHYSICIAN ASSISTANT

## 2020-09-29 PROCEDURE — 99214 OFFICE O/P EST MOD 30 MIN: CPT | Mod: S$GLB,,, | Performed by: PHYSICIAN ASSISTANT

## 2020-09-29 PROCEDURE — 99214 PR OFFICE/OUTPT VISIT, EST, LEVL IV, 30-39 MIN: ICD-10-PCS | Mod: S$GLB,,, | Performed by: PHYSICIAN ASSISTANT

## 2020-09-29 RX ORDER — MEGESTROL ACETATE 40 MG/1
40 TABLET ORAL DAILY
Qty: 90 TABLET | Refills: 3 | Status: SHIPPED | OUTPATIENT
Start: 2020-09-29 | End: 2021-09-29

## 2020-09-29 RX ORDER — INSULIN LISPRO 100 [IU]/ML
INJECTION, SOLUTION INTRAVENOUS; SUBCUTANEOUS
Qty: 1 SYRINGE | Refills: 0 | Status: ON HOLD | COMMUNITY
Start: 2020-09-29 | End: 2021-06-28 | Stop reason: HOSPADM

## 2020-09-29 RX ORDER — INSULIN DEGLUDEC 200 U/ML
INJECTION, SOLUTION SUBCUTANEOUS
Qty: 1 SYRINGE | Refills: 0 | COMMUNITY
Start: 2020-09-29

## 2020-09-29 RX ORDER — INSULIN LISPRO 100 [IU]/ML
INJECTION, SOLUTION INTRAVENOUS; SUBCUTANEOUS
Qty: 1 SYRINGE | Refills: 0 | COMMUNITY
Start: 2020-09-29 | End: 2020-09-29 | Stop reason: SDUPTHER

## 2020-09-29 RX ORDER — INSULIN ASPART 100 [IU]/ML
2 INJECTION, SOLUTION INTRAVENOUS; SUBCUTANEOUS
Qty: 15 ML | Refills: 3 | Status: ON HOLD | OUTPATIENT
Start: 2020-09-29 | End: 2021-06-03 | Stop reason: HOSPADM

## 2020-09-29 NOTE — PROGRESS NOTES
"CC: This 55 y.o. female presents for management of diabetes  and chronic conditions pending review including HTN, HLP, CAD s/p CABG, pancreatitis, CHF, dementia.    HPI: She was diagnosed with T1DM in . Insulin started ~ . However, based on low c-peptide of 0.2 on 2016, patient is insulinopenic and is treated as type 1 diabetes. CJ negative.    Family hx of DM: parents, sister, aunt    Arrives with her , who contributes heavily to interview. He is very involved in her care. He makes her insulin dosing decisions and he injects insulin too. States pt has not had humalog for a while.     Hypoglycemia at home- none    She had an AV fistula placed on 3/19. She goes to dialysis on MWF. Pt has not had humalog in the past few days.    monitoring BG at home 4x daily:  Fastins    Diet: Eats 2  Meals a days. Drinks ensure.  Exercise: none  CURRENT DM MEDS: Levemir 12 units qd, Humalog 2 units with meals (not taking)  Timing prandial insulin 5-15 minutes before meals: no  Vial/pen:  Uses  pens  Glucometer type: Doesn't remember    Standards of Care:  Eye exam: --Jaya CLINTON-Going next week to Dr. Argueta.     Social Hx: she smokes 0.5 ppd for 35 years.     ROS:   Gen: Appetite fair,denies fatigue and weakness.  Skin: Skin is intact and heals well, no rashes, no hair changes  Eyes: Denies visual disturbances  Resp: no SOB or BELLE, no cough  Cardiac: No palpitations, chest pain, no edema   GI: No nausea or vomiting, diarrhea, constipation, or abdominal pain.  /GYN: No nocturia, burning or pain.   MS/Neuro: Denies numbness/ tingling in BLE; Gait steady, speech clear  Psych: Denies drug/ETOH abuse, + hx of depression.  Other systems: negative.    /80 (BP Location: Left arm, Patient Position: Sitting, BP Method: Medium (Manual))   Pulse 79   Temp 97.9 °F (36.6 °C) (Oral)   Ht 5' 7" (1.702 m)   Wt 39.8 kg (87 lb 11.9 oz)   LMP 2012 (Exact Date) Comment: pt states no hysterectomy  BMI " 13.74 kg/m²      PE:  GENERAL: elderly female, very thin, well developed, well nourished.  PSYCH: AAOx3, appropriate mood and affect, pleasant expression, conversant, appears relaxed, well groomed.   EYES: Conjunctiva, corneas clear  NECK: Supple, trachea midline,no thyromegaly or nodules  CHEST: Resp even and unlabored, CTA bilateral.  CARDIAC: RRR, S1, S2 heard, + murmurs  ABDOMEN: Soft, non-tender, non-distended   VASCULAR: DP pulses +2/4 bilaterally, no edema.  NEURO: Gait steady, CN ll-Xll grossly intact  SKIN: Skin warm and dry no acanthosis nigracans.  Foot Exam: no sores or macerations noted.     Protective Sensation (w/ 10 gram monofilament):  Right: Intact  Left: Intact    Visual Inspection:  Normal -  Bilateral and Nails Intact - without Evidence of Foot Deformity- Bilateral    Pedal Pulses:   Right: Present  Left: Present    Posterior tibialis:   Right:Present  Left: Present     Vibratory Sensation  Right:Decreased  Left:Decreased    Personally reviewed labs below:  Labs under media tab  5/15/19  A1c 9.9%  Cbc wnl  GFR 24  Cholesterol 238  Trigs 154  hdl 76     MAC 3039.1    Hemoglobin A1C   Date Value Ref Range Status   01/06/2019 8.1 (H) 4.0 - 5.6 % Final     Comment:     ADA Screening Guidelines:  5.7-6.4%  Consistent with prediabetes  >or=6.5%  Consistent with diabetes  High levels of fetal hemoglobin interfere with the HbA1C  assay. Heterozygous hemoglobin variants (HbS, HgC, etc)do  not significantly interfere with this assay.   However, presence of multiple variants may affect accuracy.     03/24/2018 13.5 (H) 4.0 - 5.6 % Final     Comment:     According to ADA guidelines, hemoglobin A1c <7.0% represents  optimal control in non-pregnant diabetic patients. Different  metrics may apply to specific patient populations.   Standards of Medical Care in Diabetes-2016.  For the purpose of screening for the presence of diabetes:  <5.7%     Consistent with the absence of diabetes  5.7-6.4%   Consistent with increasing risk for diabetes   (prediabetes)  >or=6.5%  Consistent with diabetes  Currently, no consensus exists for use of hemoglobin A1c  for diagnosis of diabetes for children.  This Hemoglobin A1c assay has significant interference with fetal   hemoglobin   (HbF). The results are invalid for patients with abnormal amounts of   HbF,   including those with known Hereditary Persistence   of Fetal Hemoglobin. Heterozygous hemoglobin variants (HbAS, HbAC,   HbAD, HbAE, HbA2) do not significantly interfere with this assay;   however, presence of multiple variants in a sample may impact the %   interference.     01/12/2018 9.7 (H) 4.0 - 5.6 % Final     Comment:     According to ADA guidelines, hemoglobin A1c <7.0% represents  optimal control in non-pregnant diabetic patients. Different  metrics may apply to specific patient populations.   Standards of Medical Care in Diabetes-2016.  For the purpose of screening for the presence of diabetes:  <5.7%     Consistent with the absence of diabetes  5.7-6.4%  Consistent with increasing risk for diabetes   (prediabetes)  >or=6.5%  Consistent with diabetes  Currently, no consensus exists for use of hemoglobin A1c  for diagnosis of diabetes for children.  This Hemoglobin A1c assay has significant interference with fetal   hemoglobin   (HbF). The results are invalid for patients with abnormal amounts of   HbF,   including those with known Hereditary Persistence   of Fetal Hemoglobin. Heterozygous hemoglobin variants (HbAS, HbAC,   HbAD, HbAE, HbA2) do not significantly interfere with this assay;   however, presence of multiple variants in a sample may impact the %   interference.     08/07/2013 8.5 (H) 4.8 - 5.6 % Final     Comment:              Increased risk for diabetes: 5.7 - 6.4           Diabetes: >6.4           Glycemic control for adults with diabetes: <7.0  **In order to standardize %HbA1c results worldwide, as of October 11, 2010,  the %HbA1c is being  calculated using the master equation recommended in the  consensus statement adopted by the ADA (American Diabetes Assoc), EASD  (European Assoc for the Study of Diabetes), IFCC (International Federation  of Clinical Chemistry and Laboratory Medicine) and IDF (International  Diabetes Federation). Result units: %HgbA1c (DCCT/NGSP).  In common with other methods, Hb A1C values may not accurately reflect mean  blood glucose in patients with hemoglobin variants (HgbF, HgbS and HgbC).  Any cause of shortened erythrocyte survival will reduce exposure of  erythrocytes to glucose with a consequent decrease in HbA1c (%) values, even  though the time-averaged blood glucose level may be elevated. Causes of  shortened erythrocyte lifetime might be hemolytic anemia or other hemolytic  diseases, homozygous sickle cell trait, pregnancy, recent significant or  chronic blood loss, etc. Caution should be used when interpreting the HbA1c  results from patients with these conditions.   07/16/2013 7.6 (H) 4.8 - 5.6 % Final     Comment:              Increased risk for diabetes: 5.7 - 6.4           Diabetes: >6.4           Glycemic control for adults with diabetes: <7.0  **In order to standardize %HbA1c results worldwide, as of October 11, 2010,  the %HbA1c is being calculated using the master equation recommended in the  consensus statement adopted by the ADA (American Diabetes Assoc), EASD  (European Assoc for the Study of Diabetes), IFCC (International Federation  of Clinical Chemistry and Laboratory Medicine) and IDF (International  Diabetes Federation). Result units: %HgbA1c (DCCT/NGSP).  In common with other methods, Hb A1C values may not accurately reflect mean  blood glucose in patients with hemoglobin variants (HgbF, HgbS and HgbC).  Any cause of shortened erythrocyte survival will reduce exposure of  erythrocytes to glucose with a consequent decrease in HbA1c (%) values, even  though the time-averaged blood glucose level may be  elevated. Causes of  shortened erythrocyte lifetime might be hemolytic anemia or other hemolytic  diseases, homozygous sickle cell trait, pregnancy, recent significant or  chronic blood loss, etc. Caution should be used when interpreting the HbA1c  results from patients with these conditions.   07/15/2013 7.5 (H) 4.8 - 5.6 % Final     Comment:              Increased risk for diabetes: 5.7 - 6.4           Diabetes: >6.4           Glycemic control for adults with diabetes: <7.0  **In order to standardize %HbA1c results worldwide, as of October 11, 2010,  the %HbA1c is being calculated using the master equation recommended in the  consensus statement adopted by the ADA (American Diabetes Assoc), EASD  (European Assoc for the Study of Diabetes), IFCC (International Federation  of Clinical Chemistry and Laboratory Medicine) and IDF (International  Diabetes Federation). Result units: %HgbA1c (DCCT/NGSP).  In common with other methods, Hb A1C values may not accurately reflect mean  blood glucose in patients with hemoglobin variants (HgbF, HgbS and HgbC).  Any cause of shortened erythrocyte survival will reduce exposure of  erythrocytes to glucose with a consequent decrease in HbA1c (%) values, even  though the time-averaged blood glucose level may be elevated. Causes of  shortened erythrocyte lifetime might be hemolytic anemia or other hemolytic  diseases, homozygous sickle cell trait, pregnancy, recent significant or  chronic blood loss, etc. Caution should be used when interpreting the HbA1c  results from patients with these conditions.        ASSESSMENT and PLAN:    1. Type 1 diabetes mellitus with stage 4 chronic kidney disease     2. Hypertension associated with diabetes     3. Hypercholesteremia     4. Coronary artery disease involving native coronary artery without angina pectoris, unspecified whether native or transplanted heart     5. Malnutrition, unspecified type     6. Tobacco abuse     7. Hypovitaminosis D         1. T1DM with hyperglycemia, CKD 4, DM PN, hypoglycemia-A1c today. Continue current regimen.  Change levemir 2 u bid, Humalog 2 u AC. This was her previous dose before running out of Humalog. Levemir once daily on MWF. Pt would benefit from a Dexcom.     Check bg ac/hs      5/2/2016 09:29   Estimated Avg Glucose 223 (H)   C-Peptide 0.2 (L)   Glutamic Acid Decarb Ab 0.00   Islet Cell Ab <5      Discussed A1c and BG goals.   Reviewed  hypoglycemia, s/s and appropriate tx.   Instructed to monitor BG and bring meter/ log to every clinic visit.   - takes ASA, ACEi, statin    2. HTN - controlled, continue meds as previously prescribed and monitor.     3. HLP -  on statin therapy, LFTs WNL    4. CKD 4- continue to follow w Dr Goldsmith     5, CAD, h/o CVA, CHF- optimize bg control   6. Malnutrition-start megace 40 mg daily  7. Tobacco abuse-referral to tobacco cessation program  8. Hypovitaminosis d-check vitamin d     Follow-up: in 3 months with lab prior

## 2020-10-05 ENCOUNTER — OFFICE VISIT (OUTPATIENT)
Dept: OPHTHALMOLOGY | Facility: CLINIC | Age: 55
End: 2020-10-05
Payer: MEDICARE

## 2020-10-05 DIAGNOSIS — E11.36 DIABETIC CATARACT OF BOTH EYES: ICD-10-CM

## 2020-10-05 DIAGNOSIS — E11.9 DIABETES MELLITUS TYPE 2 WITHOUT RETINOPATHY: Primary | ICD-10-CM

## 2020-10-05 DIAGNOSIS — H52.7 REFRACTIVE ERROR: ICD-10-CM

## 2020-10-05 DIAGNOSIS — H35.033 HYPERTENSIVE RETINOPATHY OF BOTH EYES, GRADE 2: ICD-10-CM

## 2020-10-05 DIAGNOSIS — N18.4 TYPE 2 DIABETES MELLITUS WITH STAGE 4 CHRONIC KIDNEY DISEASE, WITH LONG-TERM CURRENT USE OF INSULIN: ICD-10-CM

## 2020-10-05 DIAGNOSIS — F02.80 DEMENTIA ASSOCIATED WITH OTHER UNDERLYING DISEASE WITHOUT BEHAVIORAL DISTURBANCE: ICD-10-CM

## 2020-10-05 DIAGNOSIS — Z79.4 TYPE 2 DIABETES MELLITUS WITH STAGE 4 CHRONIC KIDNEY DISEASE, WITH LONG-TERM CURRENT USE OF INSULIN: ICD-10-CM

## 2020-10-05 DIAGNOSIS — E11.22 TYPE 2 DIABETES MELLITUS WITH STAGE 4 CHRONIC KIDNEY DISEASE, WITH LONG-TERM CURRENT USE OF INSULIN: ICD-10-CM

## 2020-10-05 PROCEDURE — 92014 PR EYE EXAM, EST PATIENT,COMPREHESV: ICD-10-PCS | Mod: S$GLB,,, | Performed by: OPHTHALMOLOGY

## 2020-10-05 PROCEDURE — 92014 COMPRE OPH EXAM EST PT 1/>: CPT | Mod: S$GLB,,, | Performed by: OPHTHALMOLOGY

## 2020-10-05 PROCEDURE — 99999 PR PBB SHADOW E&M-EST. PATIENT-LVL IV: CPT | Mod: PBBFAC,,, | Performed by: OPHTHALMOLOGY

## 2020-10-05 PROCEDURE — 99999 PR PBB SHADOW E&M-EST. PATIENT-LVL IV: ICD-10-PCS | Mod: PBBFAC,,, | Performed by: OPHTHALMOLOGY

## 2020-10-05 NOTE — PROGRESS NOTES
HPI     Here for Diabetic Eye exam. BG has been running high. Denies eye pain   today. Does not use any eye drops or eye glasses currently.     Lab Results       Component                Value               Date                       HGBA1C                   8.1 (H)             01/06/2019                 HGBA1C                   13.5 (H)            03/24/2018                 HGBA1C                   9.7 (H)             01/12/2018            No results found for: LABA1C    Last edited by Malissa Frost on 10/5/2020  8:27 AM. (History)        ROS     Negative for: Constitutional, Gastrointestinal, Neurological, Skin,   Genitourinary, Musculoskeletal, HENT, Endocrine, Cardiovascular, Eyes,   Respiratory, Psychiatric, Allergic/Imm, Heme/Lymph    Last edited by Duong Weathers Jr., MD on 10/5/2020  9:07 AM. (History)        Assessment /Plan     For exam results, see Encounter Report.    Diabetes mellitus type 2 without retinopathy    Refractive error    Diabetic cataract of both eyes    Type 2 diabetes mellitus with stage 4 chronic kidney disease, with long-term current use of insulin    Dementia associated with other underlying disease without behavioral disturbance    Hypertensive retinopathy of both eyes, grade 2      -no retinopathy seen on DFE today  -continue good blood pressure and glucose control  -no decrease in ADLS, no significant glare, and functionality is satisfactory  -counseled on what to expect if the cataracts worsening and how it can effect the vision  -continue to monitor and if vision changes patient can call for another appointment  Follow up in about 1 year (around 10/5/2021) for Dilated Diabetic Fundus Exam, Dilation and refraction.

## 2020-10-05 NOTE — PATIENT INSTRUCTIONS
Diabetic Retinopathy: Evaluating Your Eyes     Your eye healthcare provider examines your eyes with a slit lamp.   Diabetic retinopathy is a condition that happens when diabetes damages blood vessels in the rear of the eye. It can lead to vision loss. To help catch it early, have a complete dilated eye exam at least once a year. During the exam, the eye healthcare provider will review your medical history, examine your eyes, and check your vision.  Women who are pregnant and have pre-existing type 1 or type 2 diabetes have an increased risk of retinopathy. Women with diabetes should have an eye exam before pregnancy or in the first trimester. They should continue to be monitored every trimester and for 1 year after delivery, depending on the severity of the retinopathy.  Your medical history  Your eye healthcare provider may ask about the following:  · Your diabetes type, history, treatments (such as insulin), and how you monitor your blood sugar level  · Your familys health, including whether any relative has had diabetes or diabetic retinopathy  · Any diseases, surgeries, or other medical procedures youve had  · Any medicines, herbs, or supplements you use, including those you buy over the counter  · Any problems with your vision you may be having, such as blurred or double vision, problems seeing at night, or flashes or floaters   Your eye exam  Your eye healthcare provider uses an eye chart and other tools to check your vision. Then he or she examines your eyes for signs of disease. You are given eye drops to widen (dilate) your pupils. You may have one or more of the following tests:  · Tonometry to measure fluid pressure inside the eye.  · Slit lamp exam to allow the healthcare provider to view the structures of your eye.  · Ultrasound to create an image of the eye using sound waves. Ultrasound may be used if blood is found in the clear gel that fills the eye (vitreous).  · Ocular coherence tomography  (OCT) to create an image of the retina using light waves. This shows if there is fluid leaking into certain parts of the eye. It can also measure the thickness of the retina.  Fluorescein angiography  This test may be done to check the health of the inside lining of the eye (retina). It also checks the tiny blood vessels (capillaries) that carry blood to the retina. During the test:  · Photographs are taken of the retina.  · A dye is then injected into the bloodstream through the arm or hand. The dye travels to the capillaries in the eye.  · More photographs are taken of the retina. The dye causes the capillaries to stand out on the photographs.  You may feel brief nausea during the procedure. For a few hours after the test, your skin, eyes, and urine may appear yellow. Talk with your healthcare provider for more information about this test.   Date Last Reviewed: 6/1/2016  © 5876-2604 [employer]. [address] [place] [address], [place] [address]. All rights reserved. This information is not intended as a substitute for professional medical care. Always follow your healthcare professional's instructions.

## 2020-10-21 ENCOUNTER — TELEPHONE (OUTPATIENT)
Dept: ENDOCRINOLOGY | Facility: CLINIC | Age: 55
End: 2020-10-21

## 2020-10-21 ENCOUNTER — PATIENT MESSAGE (OUTPATIENT)
Dept: ENDOCRINOLOGY | Facility: CLINIC | Age: 55
End: 2020-10-21

## 2020-10-21 NOTE — TELEPHONE ENCOUNTER
Reviewed recent labs from [facility]. Your a1c is very high at 13.2%. Send your log. Your blood count is still low. Thyroid hormones, electrolytes are normal. Cholesterol is in the desired range. Creatinine is elevated. Vitamin D is very low. Start taking 10,000 IU of Vitamin D daily. This can be found in the vitamin section of any pharmacy.     Vitamin D 9.3  TSH 1.00  FT4 1.2  A1c 13.2 %  H/H 33.5/31.5  Cholesterol 161  HDL 65  Trig 151  LDL 66  Creatinine 4.48  BUN 41

## 2021-05-31 ENCOUNTER — HOSPITAL ENCOUNTER (INPATIENT)
Facility: HOSPITAL | Age: 56
LOS: 3 days | Discharge: HOME HEALTH | DRG: 391 | End: 2021-06-03
Attending: EMERGENCY MEDICINE | Admitting: INTERNAL MEDICINE
Payer: MEDICARE

## 2021-05-31 DIAGNOSIS — R91.8 LUNG MASS: Primary | ICD-10-CM

## 2021-05-31 DIAGNOSIS — E86.0 DEHYDRATION: ICD-10-CM

## 2021-05-31 DIAGNOSIS — E43 SEVERE MALNUTRITION: ICD-10-CM

## 2021-05-31 DIAGNOSIS — K56.7 ILEUS: ICD-10-CM

## 2021-05-31 DIAGNOSIS — I95.9 HYPOTENSION, UNSPECIFIED HYPOTENSION TYPE: ICD-10-CM

## 2021-05-31 DIAGNOSIS — Z99.2 STAGE 5 CHRONIC KIDNEY DISEASE ON CHRONIC DIALYSIS: ICD-10-CM

## 2021-05-31 DIAGNOSIS — R09.02 HYPOXIA: ICD-10-CM

## 2021-05-31 DIAGNOSIS — J90 PLEURAL EFFUSION ON RIGHT: ICD-10-CM

## 2021-05-31 DIAGNOSIS — Z91.89 AT HIGH RISK FOR PRESSURE INJURY OF SKIN: ICD-10-CM

## 2021-05-31 DIAGNOSIS — N18.6 STAGE 5 CHRONIC KIDNEY DISEASE ON CHRONIC DIALYSIS: ICD-10-CM

## 2021-05-31 DIAGNOSIS — N18.6 END STAGE RENAL DISEASE: ICD-10-CM

## 2021-05-31 DIAGNOSIS — R53.1 WEAKNESS: ICD-10-CM

## 2021-05-31 DIAGNOSIS — K86.1 CHRONIC PANCREATITIS, UNSPECIFIED PANCREATITIS TYPE: ICD-10-CM

## 2021-05-31 DIAGNOSIS — Z72.0 TOBACCO ABUSE: ICD-10-CM

## 2021-05-31 PROBLEM — R11.2 NAUSEA VOMITING AND DIARRHEA: Status: ACTIVE | Noted: 2021-05-31

## 2021-05-31 PROBLEM — R19.7 NAUSEA VOMITING AND DIARRHEA: Status: ACTIVE | Noted: 2021-05-31

## 2021-05-31 PROBLEM — N30.01 ACUTE CYSTITIS WITH HEMATURIA: Status: ACTIVE | Noted: 2021-05-31

## 2021-05-31 LAB
ALBUMIN SERPL BCP-MCNC: 2.7 G/DL (ref 3.5–5.2)
ALP SERPL-CCNC: 134 U/L (ref 55–135)
ALT SERPL W/O P-5'-P-CCNC: 18 U/L (ref 10–44)
ANION GAP SERPL CALC-SCNC: 18 MMOL/L (ref 8–16)
AST SERPL-CCNC: 20 U/L (ref 10–40)
BACTERIA #/AREA URNS HPF: ABNORMAL /HPF
BASOPHILS # BLD AUTO: 0.03 K/UL (ref 0–0.2)
BASOPHILS NFR BLD: 0.2 % (ref 0–1.9)
BILIRUB SERPL-MCNC: 0.3 MG/DL (ref 0.1–1)
BILIRUB UR QL STRIP: ABNORMAL
BUN SERPL-MCNC: 45 MG/DL (ref 6–20)
CALCIUM SERPL-MCNC: 9.5 MG/DL (ref 8.7–10.5)
CHLORIDE SERPL-SCNC: 94 MMOL/L (ref 95–110)
CLARITY UR: ABNORMAL
CO2 SERPL-SCNC: 28 MMOL/L (ref 23–29)
COLOR UR: ABNORMAL
CREAT SERPL-MCNC: 4.6 MG/DL (ref 0.5–1.4)
DIFFERENTIAL METHOD: ABNORMAL
EOSINOPHIL # BLD AUTO: 0 K/UL (ref 0–0.5)
EOSINOPHIL NFR BLD: 0 % (ref 0–8)
ERYTHROCYTE [DISTWIDTH] IN BLOOD BY AUTOMATED COUNT: 13.1 % (ref 11.5–14.5)
EST. GFR  (AFRICAN AMERICAN): 11 ML/MIN/1.73 M^2
EST. GFR  (NON AFRICAN AMERICAN): 10 ML/MIN/1.73 M^2
GLUCOSE SERPL-MCNC: 350 MG/DL (ref 70–110)
GLUCOSE UR QL STRIP: NEGATIVE
HCT VFR BLD AUTO: 34.3 % (ref 37–48.5)
HGB BLD-MCNC: 11.7 G/DL (ref 12–16)
HGB UR QL STRIP: ABNORMAL
HYALINE CASTS #/AREA URNS LPF: 0 /LPF
IMM GRANULOCYTES # BLD AUTO: 0.13 K/UL (ref 0–0.04)
IMM GRANULOCYTES NFR BLD AUTO: 0.7 % (ref 0–0.5)
KETONES UR QL STRIP: ABNORMAL
LEUKOCYTE ESTERASE UR QL STRIP: ABNORMAL
LIPASE SERPL-CCNC: 125 U/L (ref 4–60)
LYMPHOCYTES # BLD AUTO: 0.7 K/UL (ref 1–4.8)
LYMPHOCYTES NFR BLD: 4 % (ref 18–48)
MAGNESIUM SERPL-MCNC: 1.9 MG/DL (ref 1.6–2.6)
MCH RBC QN AUTO: 33.1 PG (ref 27–31)
MCHC RBC AUTO-ENTMCNC: 34.1 G/DL (ref 32–36)
MCV RBC AUTO: 97 FL (ref 82–98)
MICROSCOPIC COMMENT: ABNORMAL
MONOCYTES # BLD AUTO: 0.8 K/UL (ref 0.3–1)
MONOCYTES NFR BLD: 4.1 % (ref 4–15)
NEUTROPHILS # BLD AUTO: 16.8 K/UL (ref 1.8–7.7)
NEUTROPHILS NFR BLD: 91 % (ref 38–73)
NITRITE UR QL STRIP: NEGATIVE
NRBC BLD-RTO: 0 /100 WBC
PH UR STRIP: 6 [PH] (ref 5–8)
PLATELET # BLD AUTO: 457 K/UL (ref 150–450)
PMV BLD AUTO: 9.3 FL (ref 9.2–12.9)
POCT GLUCOSE: 324 MG/DL (ref 70–110)
POCT GLUCOSE: 337 MG/DL (ref 70–110)
POTASSIUM SERPL-SCNC: 3.4 MMOL/L (ref 3.5–5.1)
PROT SERPL-MCNC: 7.3 G/DL (ref 6–8.4)
PROT UR QL STRIP: ABNORMAL
RBC # BLD AUTO: 3.53 M/UL (ref 4–5.4)
RBC #/AREA URNS HPF: 5 /HPF (ref 0–4)
SARS-COV-2 RDRP RESP QL NAA+PROBE: NEGATIVE
SODIUM SERPL-SCNC: 140 MMOL/L (ref 136–145)
SP GR UR STRIP: >=1.03 (ref 1–1.03)
SQUAMOUS #/AREA URNS HPF: 0 /HPF
TROPONIN I SERPL DL<=0.01 NG/ML-MCNC: 0.06 NG/ML (ref 0–0.03)
URN SPEC COLLECT METH UR: ABNORMAL
UROBILINOGEN UR STRIP-ACNC: NEGATIVE EU/DL
WBC # BLD AUTO: 18.45 K/UL (ref 3.9–12.7)
WBC #/AREA URNS HPF: >100 /HPF (ref 0–5)

## 2021-05-31 PROCEDURE — 85025 COMPLETE CBC W/AUTO DIFF WBC: CPT | Performed by: NURSE PRACTITIONER

## 2021-05-31 PROCEDURE — 93005 ELECTROCARDIOGRAM TRACING: CPT

## 2021-05-31 PROCEDURE — 81000 URINALYSIS NONAUTO W/SCOPE: CPT | Performed by: NURSE PRACTITIONER

## 2021-05-31 PROCEDURE — 63600175 PHARM REV CODE 636 W HCPCS: Performed by: NURSE PRACTITIONER

## 2021-05-31 PROCEDURE — 25000003 PHARM REV CODE 250: Performed by: NURSE PRACTITIONER

## 2021-05-31 PROCEDURE — 87086 URINE CULTURE/COLONY COUNT: CPT | Performed by: NURSE PRACTITIONER

## 2021-05-31 PROCEDURE — 84484 ASSAY OF TROPONIN QUANT: CPT | Performed by: NURSE PRACTITIONER

## 2021-05-31 PROCEDURE — 93010 ELECTROCARDIOGRAM REPORT: CPT | Mod: ,,, | Performed by: INTERNAL MEDICINE

## 2021-05-31 PROCEDURE — 82962 GLUCOSE BLOOD TEST: CPT

## 2021-05-31 PROCEDURE — 36415 COLL VENOUS BLD VENIPUNCTURE: CPT | Performed by: NURSE PRACTITIONER

## 2021-05-31 PROCEDURE — 12000002 HC ACUTE/MED SURGE SEMI-PRIVATE ROOM

## 2021-05-31 PROCEDURE — 25500020 PHARM REV CODE 255

## 2021-05-31 PROCEDURE — 80053 COMPREHEN METABOLIC PANEL: CPT | Performed by: NURSE PRACTITIONER

## 2021-05-31 PROCEDURE — 83690 ASSAY OF LIPASE: CPT | Performed by: NURSE PRACTITIONER

## 2021-05-31 PROCEDURE — 83735 ASSAY OF MAGNESIUM: CPT | Performed by: NURSE PRACTITIONER

## 2021-05-31 PROCEDURE — U0002 COVID-19 LAB TEST NON-CDC: HCPCS | Performed by: NURSE PRACTITIONER

## 2021-05-31 PROCEDURE — 93010 EKG 12-LEAD: ICD-10-PCS | Mod: ,,, | Performed by: INTERNAL MEDICINE

## 2021-05-31 PROCEDURE — 99285 EMERGENCY DEPT VISIT HI MDM: CPT | Mod: 25

## 2021-05-31 RX ORDER — HEPARIN SODIUM 5000 [USP'U]/ML
5000 INJECTION, SOLUTION INTRAVENOUS; SUBCUTANEOUS EVERY 8 HOURS
Status: DISCONTINUED | OUTPATIENT
Start: 2021-05-31 | End: 2021-06-03 | Stop reason: HOSPADM

## 2021-05-31 RX ORDER — ACETAMINOPHEN 325 MG/1
650 TABLET ORAL EVERY 6 HOURS PRN
Status: DISCONTINUED | OUTPATIENT
Start: 2021-05-31 | End: 2021-06-03 | Stop reason: HOSPADM

## 2021-05-31 RX ORDER — HYDROCODONE BITARTRATE AND ACETAMINOPHEN 5; 325 MG/1; MG/1
1 TABLET ORAL EVERY 6 HOURS PRN
Status: DISCONTINUED | OUTPATIENT
Start: 2021-05-31 | End: 2021-06-03 | Stop reason: HOSPADM

## 2021-05-31 RX ORDER — SODIUM CHLORIDE 0.9 % (FLUSH) 0.9 %
10 SYRINGE (ML) INJECTION
Status: DISCONTINUED | OUTPATIENT
Start: 2021-05-31 | End: 2021-06-03 | Stop reason: HOSPADM

## 2021-05-31 RX ORDER — IBUPROFEN 200 MG
24 TABLET ORAL
Status: DISCONTINUED | OUTPATIENT
Start: 2021-05-31 | End: 2021-06-03 | Stop reason: HOSPADM

## 2021-05-31 RX ORDER — INSULIN ASPART 100 [IU]/ML
0-5 INJECTION, SOLUTION INTRAVENOUS; SUBCUTANEOUS
Status: DISCONTINUED | OUTPATIENT
Start: 2021-05-31 | End: 2021-06-03 | Stop reason: HOSPADM

## 2021-05-31 RX ORDER — GLUCAGON 1 MG
1 KIT INJECTION
Status: DISCONTINUED | OUTPATIENT
Start: 2021-05-31 | End: 2021-06-03 | Stop reason: HOSPADM

## 2021-05-31 RX ORDER — SODIUM CHLORIDE 9 MG/ML
INJECTION, SOLUTION INTRAVENOUS
Status: COMPLETED | OUTPATIENT
Start: 2021-05-31 | End: 2021-05-31

## 2021-05-31 RX ORDER — LANOLIN ALCOHOL/MO/W.PET/CERES
800 CREAM (GRAM) TOPICAL
Status: DISCONTINUED | OUTPATIENT
Start: 2021-05-31 | End: 2021-06-03 | Stop reason: HOSPADM

## 2021-05-31 RX ORDER — SODIUM CHLORIDE 9 MG/ML
INJECTION, SOLUTION INTRAVENOUS ONCE
Status: COMPLETED | OUTPATIENT
Start: 2021-05-31 | End: 2021-05-31

## 2021-05-31 RX ORDER — ACETAMINOPHEN 325 MG/1
650 TABLET ORAL EVERY 4 HOURS PRN
Status: DISCONTINUED | OUTPATIENT
Start: 2021-05-31 | End: 2021-06-03 | Stop reason: HOSPADM

## 2021-05-31 RX ORDER — IBUPROFEN 200 MG
16 TABLET ORAL
Status: DISCONTINUED | OUTPATIENT
Start: 2021-05-31 | End: 2021-06-03 | Stop reason: HOSPADM

## 2021-05-31 RX ORDER — ONDANSETRON 2 MG/ML
4 INJECTION INTRAMUSCULAR; INTRAVENOUS EVERY 8 HOURS PRN
Status: DISCONTINUED | OUTPATIENT
Start: 2021-05-31 | End: 2021-06-01

## 2021-05-31 RX ORDER — AMOXICILLIN 250 MG
1 CAPSULE ORAL 2 TIMES DAILY
Status: CANCELLED | OUTPATIENT
Start: 2021-05-31

## 2021-05-31 RX ORDER — TALC
9 POWDER (GRAM) TOPICAL NIGHTLY PRN
Status: DISCONTINUED | OUTPATIENT
Start: 2021-05-31 | End: 2021-06-03 | Stop reason: HOSPADM

## 2021-05-31 RX ADMIN — SODIUM CHLORIDE: 0.9 INJECTION, SOLUTION INTRAVENOUS at 04:05

## 2021-05-31 RX ADMIN — IOHEXOL 75 ML: 350 INJECTION, SOLUTION INTRAVENOUS at 06:05

## 2021-05-31 RX ADMIN — HEPARIN SODIUM 5000 UNITS: 5000 INJECTION INTRAVENOUS; SUBCUTANEOUS at 10:05

## 2021-05-31 RX ADMIN — CEFTRIAXONE 1 G: 1 INJECTION, SOLUTION INTRAVENOUS at 10:05

## 2021-05-31 RX ADMIN — HYDROCODONE BITARTRATE AND ACETAMINOPHEN 1 TABLET: 5; 325 TABLET ORAL at 10:05

## 2021-05-31 RX ADMIN — SODIUM CHLORIDE: 0.9 INJECTION, SOLUTION INTRAVENOUS at 09:05

## 2021-06-01 PROBLEM — R11.2 NAUSEA VOMITING AND DIARRHEA: Status: RESOLVED | Noted: 2021-05-31 | Resolved: 2021-06-01

## 2021-06-01 PROBLEM — D75.839 THROMBOCYTOSIS: Status: ACTIVE | Noted: 2021-06-01

## 2021-06-01 PROBLEM — R19.7 NAUSEA VOMITING AND DIARRHEA: Status: RESOLVED | Noted: 2021-05-31 | Resolved: 2021-06-01

## 2021-06-01 LAB
ALBUMIN SERPL BCP-MCNC: 2.7 G/DL (ref 3.5–5.2)
ALP SERPL-CCNC: 137 U/L (ref 55–135)
ALT SERPL W/O P-5'-P-CCNC: 16 U/L (ref 10–44)
ANION GAP SERPL CALC-SCNC: 18 MMOL/L (ref 8–16)
AST SERPL-CCNC: 22 U/L (ref 10–40)
BASOPHILS # BLD AUTO: 0.04 K/UL (ref 0–0.2)
BASOPHILS NFR BLD: 0.2 % (ref 0–1.9)
BILIRUB SERPL-MCNC: 0.2 MG/DL (ref 0.1–1)
BUN SERPL-MCNC: 45 MG/DL (ref 6–20)
CALCIUM SERPL-MCNC: 9.1 MG/DL (ref 8.7–10.5)
CHLORIDE SERPL-SCNC: 97 MMOL/L (ref 95–110)
CO2 SERPL-SCNC: 25 MMOL/L (ref 23–29)
CREAT SERPL-MCNC: 4.4 MG/DL (ref 0.5–1.4)
DIFFERENTIAL METHOD: ABNORMAL
EOSINOPHIL # BLD AUTO: 0 K/UL (ref 0–0.5)
EOSINOPHIL NFR BLD: 0.1 % (ref 0–8)
ERYTHROCYTE [DISTWIDTH] IN BLOOD BY AUTOMATED COUNT: 13.2 % (ref 11.5–14.5)
EST. GFR  (AFRICAN AMERICAN): 12 ML/MIN/1.73 M^2
EST. GFR  (NON AFRICAN AMERICAN): 10 ML/MIN/1.73 M^2
GLUCOSE SERPL-MCNC: 214 MG/DL (ref 70–110)
HCT VFR BLD AUTO: 39.4 % (ref 37–48.5)
HGB BLD-MCNC: 13.1 G/DL (ref 12–16)
IMM GRANULOCYTES # BLD AUTO: 0.07 K/UL (ref 0–0.04)
IMM GRANULOCYTES NFR BLD AUTO: 0.4 % (ref 0–0.5)
LYMPHOCYTES # BLD AUTO: 1.7 K/UL (ref 1–4.8)
LYMPHOCYTES NFR BLD: 10 % (ref 18–48)
MAGNESIUM SERPL-MCNC: 2 MG/DL (ref 1.6–2.6)
MCH RBC QN AUTO: 32.6 PG (ref 27–31)
MCHC RBC AUTO-ENTMCNC: 33.2 G/DL (ref 32–36)
MCV RBC AUTO: 98 FL (ref 82–98)
MONOCYTES # BLD AUTO: 0.9 K/UL (ref 0.3–1)
MONOCYTES NFR BLD: 5.2 % (ref 4–15)
NEUTROPHILS # BLD AUTO: 14.3 K/UL (ref 1.8–7.7)
NEUTROPHILS NFR BLD: 84.1 % (ref 38–73)
NRBC BLD-RTO: 0 /100 WBC
PHOSPHATE SERPL-MCNC: 7 MG/DL (ref 2.7–4.5)
PLATELET # BLD AUTO: 451 K/UL (ref 150–450)
PMV BLD AUTO: 9.4 FL (ref 9.2–12.9)
POCT GLUCOSE: 159 MG/DL (ref 70–110)
POCT GLUCOSE: 193 MG/DL (ref 70–110)
POCT GLUCOSE: 205 MG/DL (ref 70–110)
POCT GLUCOSE: 210 MG/DL (ref 70–110)
POTASSIUM SERPL-SCNC: 3.9 MMOL/L (ref 3.5–5.1)
PROT SERPL-MCNC: 7.6 G/DL (ref 6–8.4)
RBC # BLD AUTO: 4.02 M/UL (ref 4–5.4)
SODIUM SERPL-SCNC: 140 MMOL/L (ref 136–145)
WBC # BLD AUTO: 17.03 K/UL (ref 3.9–12.7)

## 2021-06-01 PROCEDURE — 97116 GAIT TRAINING THERAPY: CPT

## 2021-06-01 PROCEDURE — 85025 COMPLETE CBC W/AUTO DIFF WBC: CPT | Performed by: NURSE PRACTITIONER

## 2021-06-01 PROCEDURE — 36415 COLL VENOUS BLD VENIPUNCTURE: CPT | Performed by: NURSE PRACTITIONER

## 2021-06-01 PROCEDURE — 12000002 HC ACUTE/MED SURGE SEMI-PRIVATE ROOM

## 2021-06-01 PROCEDURE — 99223 PR INITIAL HOSPITAL CARE,LEVL III: ICD-10-PCS | Mod: ,,, | Performed by: SURGERY

## 2021-06-01 PROCEDURE — 99223 1ST HOSP IP/OBS HIGH 75: CPT | Mod: ,,, | Performed by: INTERNAL MEDICINE

## 2021-06-01 PROCEDURE — 83735 ASSAY OF MAGNESIUM: CPT | Performed by: NURSE PRACTITIONER

## 2021-06-01 PROCEDURE — 99223 PR INITIAL HOSPITAL CARE,LEVL III: ICD-10-PCS | Mod: ,,, | Performed by: INTERNAL MEDICINE

## 2021-06-01 PROCEDURE — 25000003 PHARM REV CODE 250: Performed by: NURSE PRACTITIONER

## 2021-06-01 PROCEDURE — 63600175 PHARM REV CODE 636 W HCPCS: Performed by: NURSE PRACTITIONER

## 2021-06-01 PROCEDURE — 97535 SELF CARE MNGMENT TRAINING: CPT

## 2021-06-01 PROCEDURE — 84100 ASSAY OF PHOSPHORUS: CPT | Performed by: NURSE PRACTITIONER

## 2021-06-01 PROCEDURE — 25500020 PHARM REV CODE 255

## 2021-06-01 PROCEDURE — A9698 NON-RAD CONTRAST MATERIALNOC: HCPCS

## 2021-06-01 PROCEDURE — 97162 PT EVAL MOD COMPLEX 30 MIN: CPT

## 2021-06-01 PROCEDURE — 80053 COMPREHEN METABOLIC PANEL: CPT | Performed by: NURSE PRACTITIONER

## 2021-06-01 PROCEDURE — 25000003 PHARM REV CODE 250: Performed by: STUDENT IN AN ORGANIZED HEALTH CARE EDUCATION/TRAINING PROGRAM

## 2021-06-01 PROCEDURE — 80100014 HC HEMODIALYSIS 1:1

## 2021-06-01 PROCEDURE — 99223 1ST HOSP IP/OBS HIGH 75: CPT | Mod: ,,, | Performed by: SURGERY

## 2021-06-01 PROCEDURE — 97165 OT EVAL LOW COMPLEX 30 MIN: CPT

## 2021-06-01 RX ORDER — NAPROXEN SODIUM 220 MG/1
81 TABLET, FILM COATED ORAL DAILY
Status: DISCONTINUED | OUTPATIENT
Start: 2021-06-01 | End: 2021-06-03 | Stop reason: HOSPADM

## 2021-06-01 RX ORDER — SODIUM CHLORIDE 9 MG/ML
INJECTION, SOLUTION INTRAVENOUS ONCE
Status: CANCELLED | OUTPATIENT
Start: 2021-06-01 | End: 2021-06-01

## 2021-06-01 RX ORDER — ASCORBIC ACID 500 MG
500 TABLET ORAL DAILY
Status: DISCONTINUED | OUTPATIENT
Start: 2021-06-01 | End: 2021-06-03 | Stop reason: HOSPADM

## 2021-06-01 RX ORDER — LISINOPRIL 2.5 MG/1
2.5 TABLET ORAL DAILY
Status: DISCONTINUED | OUTPATIENT
Start: 2021-06-01 | End: 2021-06-03 | Stop reason: HOSPADM

## 2021-06-01 RX ORDER — MUPIROCIN 20 MG/G
OINTMENT TOPICAL 2 TIMES DAILY
Status: DISCONTINUED | OUTPATIENT
Start: 2021-06-01 | End: 2021-06-03 | Stop reason: HOSPADM

## 2021-06-01 RX ORDER — ATORVASTATIN CALCIUM 40 MG/1
80 TABLET, FILM COATED ORAL DAILY
Status: DISCONTINUED | OUTPATIENT
Start: 2021-06-01 | End: 2021-06-03 | Stop reason: HOSPADM

## 2021-06-01 RX ORDER — AMLODIPINE BESYLATE 5 MG/1
5 TABLET ORAL DAILY
Status: DISCONTINUED | OUTPATIENT
Start: 2021-06-01 | End: 2021-06-03 | Stop reason: HOSPADM

## 2021-06-01 RX ORDER — MEGESTROL ACETATE 40 MG/1
40 TABLET ORAL DAILY
Status: DISCONTINUED | OUTPATIENT
Start: 2021-06-01 | End: 2021-06-03 | Stop reason: HOSPADM

## 2021-06-01 RX ORDER — SEVELAMER CARBONATE 800 MG/1
800 TABLET, FILM COATED ORAL 2 TIMES DAILY
Status: DISCONTINUED | OUTPATIENT
Start: 2021-06-01 | End: 2021-06-03 | Stop reason: HOSPADM

## 2021-06-01 RX ORDER — QUETIAPINE FUMARATE 25 MG/1
100 TABLET, FILM COATED ORAL NIGHTLY
Status: DISCONTINUED | OUTPATIENT
Start: 2021-06-01 | End: 2021-06-03 | Stop reason: HOSPADM

## 2021-06-01 RX ORDER — FERROUS SULFATE 325(65) MG
325 TABLET, DELAYED RELEASE (ENTERIC COATED) ORAL DAILY
Status: DISCONTINUED | OUTPATIENT
Start: 2021-06-01 | End: 2021-06-01

## 2021-06-01 RX ADMIN — CEFTRIAXONE 1 G: 1 INJECTION, SOLUTION INTRAVENOUS at 10:06

## 2021-06-01 RX ADMIN — SEVELAMER CARBONATE 800 MG: 800 TABLET, FILM COATED ORAL at 03:06

## 2021-06-01 RX ADMIN — LISINOPRIL 2.5 MG: 2.5 TABLET ORAL at 03:06

## 2021-06-01 RX ADMIN — MUPIROCIN: 20 OINTMENT TOPICAL at 10:06

## 2021-06-01 RX ADMIN — OXYCODONE HYDROCHLORIDE AND ACETAMINOPHEN 500 MG: 500 TABLET ORAL at 03:06

## 2021-06-01 RX ADMIN — CEFTRIAXONE 1 G: 1 INJECTION, SOLUTION INTRAVENOUS at 09:06

## 2021-06-01 RX ADMIN — QUETIAPINE 100 MG: 25 TABLET ORAL at 10:06

## 2021-06-01 RX ADMIN — ATORVASTATIN CALCIUM 80 MG: 40 TABLET, FILM COATED ORAL at 03:06

## 2021-06-01 RX ADMIN — ASPIRIN 81 MG CHEWABLE TABLET 81 MG: 81 TABLET CHEWABLE at 03:06

## 2021-06-01 RX ADMIN — MEGESTROL ACETATE 40 MG: 40 TABLET ORAL at 03:06

## 2021-06-01 RX ADMIN — HEPARIN SODIUM 5000 UNITS: 5000 INJECTION INTRAVENOUS; SUBCUTANEOUS at 10:06

## 2021-06-01 RX ADMIN — MUPIROCIN: 20 OINTMENT TOPICAL at 09:06

## 2021-06-01 RX ADMIN — SEVELAMER CARBONATE 800 MG: 800 TABLET, FILM COATED ORAL at 10:06

## 2021-06-01 RX ADMIN — IOHEXOL 250 ML: 9 SOLUTION ORAL at 01:06

## 2021-06-01 RX ADMIN — AMLODIPINE BESYLATE 5 MG: 5 TABLET ORAL at 03:06

## 2021-06-01 RX ADMIN — HEPARIN SODIUM 5000 UNITS: 5000 INJECTION INTRAVENOUS; SUBCUTANEOUS at 03:06

## 2021-06-02 PROBLEM — E87.6 HYPOKALEMIA: Status: RESOLVED | Noted: 2019-01-06 | Resolved: 2021-06-02

## 2021-06-02 PROBLEM — N17.9 ACUTE RENAL FAILURE SUPERIMPOSED ON STAGE 4 CHRONIC KIDNEY DISEASE: Status: RESOLVED | Noted: 2019-01-06 | Resolved: 2021-06-02

## 2021-06-02 PROBLEM — E83.42 HYPOMAGNESEMIA: Status: RESOLVED | Noted: 2019-01-06 | Resolved: 2021-06-02

## 2021-06-02 PROBLEM — N18.4 ACUTE RENAL FAILURE SUPERIMPOSED ON STAGE 4 CHRONIC KIDNEY DISEASE: Status: RESOLVED | Noted: 2019-01-06 | Resolved: 2021-06-02

## 2021-06-02 PROBLEM — N39.0 UTI (URINARY TRACT INFECTION): Status: RESOLVED | Noted: 2019-01-08 | Resolved: 2021-06-02

## 2021-06-02 PROBLEM — N30.01 ACUTE CYSTITIS WITH HEMATURIA: Status: RESOLVED | Noted: 2021-05-31 | Resolved: 2021-06-02

## 2021-06-02 LAB
ALBUMIN SERPL BCP-MCNC: 2.4 G/DL (ref 3.5–5.2)
ALP SERPL-CCNC: 117 U/L (ref 55–135)
ALT SERPL W/O P-5'-P-CCNC: 13 U/L (ref 10–44)
ANION GAP SERPL CALC-SCNC: 16 MMOL/L (ref 8–16)
APPEARANCE FLD: CLEAR
AST SERPL-CCNC: 18 U/L (ref 10–40)
BACTERIA UR CULT: NO GROWTH
BASOPHILS # BLD AUTO: 0.03 K/UL (ref 0–0.2)
BASOPHILS NFR BLD: 0.3 % (ref 0–1.9)
BILIRUB SERPL-MCNC: 0.2 MG/DL (ref 0.1–1)
BODY FLD TYPE: NORMAL
BUN SERPL-MCNC: 25 MG/DL (ref 6–20)
CALCIUM SERPL-MCNC: 8.7 MG/DL (ref 8.7–10.5)
CHLORIDE SERPL-SCNC: 101 MMOL/L (ref 95–110)
CO2 SERPL-SCNC: 21 MMOL/L (ref 23–29)
COLOR FLD: YELLOW
CREAT SERPL-MCNC: 2.9 MG/DL (ref 0.5–1.4)
DIFFERENTIAL METHOD: ABNORMAL
EOSINOPHIL # BLD AUTO: 0 K/UL (ref 0–0.5)
EOSINOPHIL NFR BLD: 0.3 % (ref 0–8)
ERYTHROCYTE [DISTWIDTH] IN BLOOD BY AUTOMATED COUNT: 13.2 % (ref 11.5–14.5)
EST. GFR  (AFRICAN AMERICAN): 20 ML/MIN/1.73 M^2
EST. GFR  (NON AFRICAN AMERICAN): 17 ML/MIN/1.73 M^2
GLUCOSE SERPL-MCNC: 173 MG/DL (ref 70–110)
HBV CORE AB SERPL QL IA: NEGATIVE
HBV SURFACE AB SER-ACNC: POSITIVE M[IU]/ML
HBV SURFACE AG SERPL QL IA: NEGATIVE
HCT VFR BLD AUTO: 33.3 % (ref 37–48.5)
HGB BLD-MCNC: 11.2 G/DL (ref 12–16)
IMM GRANULOCYTES # BLD AUTO: 0.03 K/UL (ref 0–0.04)
IMM GRANULOCYTES NFR BLD AUTO: 0.3 % (ref 0–0.5)
INR PPP: 1 (ref 0.8–1.2)
LDH SERPL L TO P-CCNC: 219 U/L (ref 110–260)
LYMPHOCYTES # BLD AUTO: 1.8 K/UL (ref 1–4.8)
LYMPHOCYTES NFR BLD: 17.8 % (ref 18–48)
LYMPHOCYTES NFR FLD MANUAL: 42 %
MAGNESIUM SERPL-MCNC: 1.7 MG/DL (ref 1.6–2.6)
MCH RBC QN AUTO: 32.8 PG (ref 27–31)
MCHC RBC AUTO-ENTMCNC: 33.6 G/DL (ref 32–36)
MCV RBC AUTO: 98 FL (ref 82–98)
MESOTHL CELL NFR FLD MANUAL: 26 %
MONOCYTES # BLD AUTO: 0.6 K/UL (ref 0.3–1)
MONOCYTES NFR BLD: 6.3 % (ref 4–15)
MONOS+MACROS NFR FLD MANUAL: 18 %
NEUTROPHILS # BLD AUTO: 7.5 K/UL (ref 1.8–7.7)
NEUTROPHILS NFR BLD: 75 % (ref 38–73)
NEUTROPHILS NFR FLD MANUAL: 14 %
NRBC BLD-RTO: 0 /100 WBC
PHOSPHATE SERPL-MCNC: 4.2 MG/DL (ref 2.7–4.5)
PLATELET # BLD AUTO: 397 K/UL (ref 150–450)
PMV BLD AUTO: 9.7 FL (ref 9.2–12.9)
POCT GLUCOSE: 123 MG/DL (ref 70–110)
POCT GLUCOSE: 261 MG/DL (ref 70–110)
POCT GLUCOSE: 263 MG/DL (ref 70–110)
POTASSIUM SERPL-SCNC: 3.5 MMOL/L (ref 3.5–5.1)
PROT SERPL-MCNC: 6.7 G/DL (ref 6–8.4)
PROTHROMBIN TIME: 10.4 SEC (ref 9–12.5)
RBC # BLD AUTO: 3.41 M/UL (ref 4–5.4)
SODIUM SERPL-SCNC: 138 MMOL/L (ref 136–145)
WBC # BLD AUTO: 9.94 K/UL (ref 3.9–12.7)
WBC # FLD: 786 /CU MM

## 2021-06-02 PROCEDURE — 83615 LACTATE (LD) (LDH) ENZYME: CPT | Performed by: STUDENT IN AN ORGANIZED HEALTH CARE EDUCATION/TRAINING PROGRAM

## 2021-06-02 PROCEDURE — 80100014 HC HEMODIALYSIS 1:1

## 2021-06-02 PROCEDURE — 25000003 PHARM REV CODE 250: Performed by: NURSE PRACTITIONER

## 2021-06-02 PROCEDURE — 88112 CYTOPATH CELL ENHANCE TECH: CPT | Performed by: PATHOLOGY

## 2021-06-02 PROCEDURE — 85610 PROTHROMBIN TIME: CPT | Performed by: INTERNAL MEDICINE

## 2021-06-02 PROCEDURE — 88112 CYTOPATH CELL ENHANCE TECH: CPT | Mod: 26,,, | Performed by: PATHOLOGY

## 2021-06-02 PROCEDURE — 88112 PR  CYTOPATH, CELL ENHANCE TECH: ICD-10-PCS | Mod: 26,,, | Performed by: PATHOLOGY

## 2021-06-02 PROCEDURE — 25000003 PHARM REV CODE 250: Performed by: STUDENT IN AN ORGANIZED HEALTH CARE EDUCATION/TRAINING PROGRAM

## 2021-06-02 PROCEDURE — 80053 COMPREHEN METABOLIC PANEL: CPT | Performed by: NURSE PRACTITIONER

## 2021-06-02 PROCEDURE — 82945 GLUCOSE OTHER FLUID: CPT | Performed by: INTERNAL MEDICINE

## 2021-06-02 PROCEDURE — 87070 CULTURE OTHR SPECIMN AEROBIC: CPT | Performed by: INTERNAL MEDICINE

## 2021-06-02 PROCEDURE — 88305 TISSUE EXAM BY PATHOLOGIST: ICD-10-PCS | Mod: 26,,, | Performed by: PATHOLOGY

## 2021-06-02 PROCEDURE — 87116 MYCOBACTERIA CULTURE: CPT | Performed by: INTERNAL MEDICINE

## 2021-06-02 PROCEDURE — 87340 HEPATITIS B SURFACE AG IA: CPT | Performed by: STUDENT IN AN ORGANIZED HEALTH CARE EDUCATION/TRAINING PROGRAM

## 2021-06-02 PROCEDURE — 89051 BODY FLUID CELL COUNT: CPT | Performed by: INTERNAL MEDICINE

## 2021-06-02 PROCEDURE — 88305 TISSUE EXAM BY PATHOLOGIST: CPT | Performed by: PATHOLOGY

## 2021-06-02 PROCEDURE — 86704 HEP B CORE ANTIBODY TOTAL: CPT | Performed by: STUDENT IN AN ORGANIZED HEALTH CARE EDUCATION/TRAINING PROGRAM

## 2021-06-02 PROCEDURE — 97530 THERAPEUTIC ACTIVITIES: CPT | Mod: CQ

## 2021-06-02 PROCEDURE — 87206 SMEAR FLUORESCENT/ACID STAI: CPT | Performed by: INTERNAL MEDICINE

## 2021-06-02 PROCEDURE — 63600175 PHARM REV CODE 636 W HCPCS: Performed by: INTERNAL MEDICINE

## 2021-06-02 PROCEDURE — 12000002 HC ACUTE/MED SURGE SEMI-PRIVATE ROOM

## 2021-06-02 PROCEDURE — 99231 SBSQ HOSP IP/OBS SF/LOW 25: CPT | Mod: ,,, | Performed by: INTERNAL MEDICINE

## 2021-06-02 PROCEDURE — 36415 COLL VENOUS BLD VENIPUNCTURE: CPT | Performed by: STUDENT IN AN ORGANIZED HEALTH CARE EDUCATION/TRAINING PROGRAM

## 2021-06-02 PROCEDURE — 87075 CULTR BACTERIA EXCEPT BLOOD: CPT | Performed by: INTERNAL MEDICINE

## 2021-06-02 PROCEDURE — 88305 TISSUE EXAM BY PATHOLOGIST: CPT | Mod: 26,,, | Performed by: PATHOLOGY

## 2021-06-02 PROCEDURE — 99231 PR SUBSEQUENT HOSPITAL CARE,LEVL I: ICD-10-PCS | Mod: ,,, | Performed by: INTERNAL MEDICINE

## 2021-06-02 PROCEDURE — 84100 ASSAY OF PHOSPHORUS: CPT | Performed by: NURSE PRACTITIONER

## 2021-06-02 PROCEDURE — 83615 LACTATE (LD) (LDH) ENZYME: CPT | Mod: 91 | Performed by: INTERNAL MEDICINE

## 2021-06-02 PROCEDURE — 87102 FUNGUS ISOLATION CULTURE: CPT | Performed by: INTERNAL MEDICINE

## 2021-06-02 PROCEDURE — 63600175 PHARM REV CODE 636 W HCPCS: Performed by: NURSE PRACTITIONER

## 2021-06-02 PROCEDURE — 85025 COMPLETE CBC W/AUTO DIFF WBC: CPT | Performed by: NURSE PRACTITIONER

## 2021-06-02 PROCEDURE — 84157 ASSAY OF PROTEIN OTHER: CPT | Performed by: INTERNAL MEDICINE

## 2021-06-02 PROCEDURE — 83735 ASSAY OF MAGNESIUM: CPT | Performed by: NURSE PRACTITIONER

## 2021-06-02 PROCEDURE — 86706 HEP B SURFACE ANTIBODY: CPT | Performed by: STUDENT IN AN ORGANIZED HEALTH CARE EDUCATION/TRAINING PROGRAM

## 2021-06-02 RX ORDER — HEPARIN SODIUM 1000 [USP'U]/ML
4000 INJECTION, SOLUTION INTRAVENOUS; SUBCUTANEOUS
Status: DISCONTINUED | OUTPATIENT
Start: 2021-06-02 | End: 2021-06-03 | Stop reason: HOSPADM

## 2021-06-02 RX ADMIN — LISINOPRIL 2.5 MG: 2.5 TABLET ORAL at 08:06

## 2021-06-02 RX ADMIN — SEVELAMER CARBONATE 800 MG: 800 TABLET, FILM COATED ORAL at 08:06

## 2021-06-02 RX ADMIN — HEPARIN SODIUM 5000 UNITS: 5000 INJECTION INTRAVENOUS; SUBCUTANEOUS at 05:06

## 2021-06-02 RX ADMIN — ASPIRIN 81 MG CHEWABLE TABLET 81 MG: 81 TABLET CHEWABLE at 08:06

## 2021-06-02 RX ADMIN — MUPIROCIN: 20 OINTMENT TOPICAL at 10:06

## 2021-06-02 RX ADMIN — MEGESTROL ACETATE 40 MG: 40 TABLET ORAL at 08:06

## 2021-06-02 RX ADMIN — SEVELAMER CARBONATE 800 MG: 800 TABLET, FILM COATED ORAL at 10:06

## 2021-06-02 RX ADMIN — QUETIAPINE 100 MG: 25 TABLET ORAL at 10:06

## 2021-06-02 RX ADMIN — HEPARIN SODIUM 5000 UNITS: 5000 INJECTION INTRAVENOUS; SUBCUTANEOUS at 02:06

## 2021-06-02 RX ADMIN — HEPARIN SODIUM 5000 UNITS: 5000 INJECTION INTRAVENOUS; SUBCUTANEOUS at 10:06

## 2021-06-02 RX ADMIN — HEPARIN SODIUM 4000 UNITS: 1000 INJECTION, SOLUTION INTRAVENOUS; SUBCUTANEOUS at 05:06

## 2021-06-02 RX ADMIN — AMLODIPINE BESYLATE 5 MG: 5 TABLET ORAL at 08:06

## 2021-06-02 RX ADMIN — INSULIN ASPART 1 UNITS: 100 INJECTION, SOLUTION INTRAVENOUS; SUBCUTANEOUS at 11:06

## 2021-06-02 RX ADMIN — ATORVASTATIN CALCIUM 80 MG: 40 TABLET, FILM COATED ORAL at 08:06

## 2021-06-02 RX ADMIN — MUPIROCIN: 20 OINTMENT TOPICAL at 08:06

## 2021-06-02 RX ADMIN — OXYCODONE HYDROCHLORIDE AND ACETAMINOPHEN 500 MG: 500 TABLET ORAL at 08:06

## 2021-06-03 ENCOUNTER — TELEPHONE (OUTPATIENT)
Dept: PULMONOLOGY | Facility: CLINIC | Age: 56
End: 2021-06-03

## 2021-06-03 VITALS
WEIGHT: 89.94 LBS | HEART RATE: 84 BPM | TEMPERATURE: 97 F | OXYGEN SATURATION: 97 % | DIASTOLIC BLOOD PRESSURE: 73 MMHG | SYSTOLIC BLOOD PRESSURE: 124 MMHG | RESPIRATION RATE: 18 BRPM | HEIGHT: 67 IN | BODY MASS INDEX: 14.12 KG/M2

## 2021-06-03 PROBLEM — D75.839 THROMBOCYTOSIS: Status: RESOLVED | Noted: 2021-06-01 | Resolved: 2021-06-03

## 2021-06-03 LAB
ALBUMIN SERPL BCP-MCNC: 2.7 G/DL (ref 3.5–5.2)
ALP SERPL-CCNC: 126 U/L (ref 55–135)
ALT SERPL W/O P-5'-P-CCNC: 15 U/L (ref 10–44)
ANION GAP SERPL CALC-SCNC: 16 MMOL/L (ref 8–16)
AST SERPL-CCNC: 20 U/L (ref 10–40)
BASOPHILS # BLD AUTO: 0.02 K/UL (ref 0–0.2)
BASOPHILS NFR BLD: 0.2 % (ref 0–1.9)
BILIRUB SERPL-MCNC: 0.3 MG/DL (ref 0.1–1)
BODY FLUID SOURCE, LDH: NORMAL
BUN SERPL-MCNC: 15 MG/DL (ref 6–20)
CALCIUM SERPL-MCNC: 9.2 MG/DL (ref 8.7–10.5)
CHLORIDE SERPL-SCNC: 100 MMOL/L (ref 95–110)
CO2 SERPL-SCNC: 22 MMOL/L (ref 23–29)
CREAT SERPL-MCNC: 2.5 MG/DL (ref 0.5–1.4)
DIFFERENTIAL METHOD: ABNORMAL
EOSINOPHIL # BLD AUTO: 0 K/UL (ref 0–0.5)
EOSINOPHIL NFR BLD: 0.2 % (ref 0–8)
ERYTHROCYTE [DISTWIDTH] IN BLOOD BY AUTOMATED COUNT: 13.2 % (ref 11.5–14.5)
EST. GFR  (AFRICAN AMERICAN): 24 ML/MIN/1.73 M^2
EST. GFR  (NON AFRICAN AMERICAN): 21 ML/MIN/1.73 M^2
GLUCOSE FLD-MCNC: 161 MG/DL
GLUCOSE SERPL-MCNC: 183 MG/DL (ref 70–110)
HCT VFR BLD AUTO: 37.3 % (ref 37–48.5)
HGB BLD-MCNC: 12.5 G/DL (ref 12–16)
IMM GRANULOCYTES # BLD AUTO: 0.04 K/UL (ref 0–0.04)
IMM GRANULOCYTES NFR BLD AUTO: 0.4 % (ref 0–0.5)
LDH FLD L TO P-CCNC: 287 U/L
LYMPHOCYTES # BLD AUTO: 1.5 K/UL (ref 1–4.8)
LYMPHOCYTES NFR BLD: 15.5 % (ref 18–48)
MAGNESIUM SERPL-MCNC: 1.9 MG/DL (ref 1.6–2.6)
MCH RBC QN AUTO: 33.1 PG (ref 27–31)
MCHC RBC AUTO-ENTMCNC: 33.5 G/DL (ref 32–36)
MCV RBC AUTO: 99 FL (ref 82–98)
MONOCYTES # BLD AUTO: 0.5 K/UL (ref 0.3–1)
MONOCYTES NFR BLD: 4.6 % (ref 4–15)
NEUTROPHILS # BLD AUTO: 7.7 K/UL (ref 1.8–7.7)
NEUTROPHILS NFR BLD: 79.1 % (ref 38–73)
NRBC BLD-RTO: 0 /100 WBC
PHOSPHATE SERPL-MCNC: 2.9 MG/DL (ref 2.7–4.5)
PLATELET # BLD AUTO: 426 K/UL (ref 150–450)
PMV BLD AUTO: 9.6 FL (ref 9.2–12.9)
POCT GLUCOSE: 205 MG/DL (ref 70–110)
POCT GLUCOSE: 279 MG/DL (ref 70–110)
POTASSIUM SERPL-SCNC: 4.3 MMOL/L (ref 3.5–5.1)
PROT FLD-MCNC: 4.5 G/DL
PROT SERPL-MCNC: 7.9 G/DL (ref 6–8.4)
RBC # BLD AUTO: 3.78 M/UL (ref 4–5.4)
SODIUM SERPL-SCNC: 138 MMOL/L (ref 136–145)
SPECIMEN SOURCE: NORMAL
SPECIMEN SOURCE: NORMAL
WBC # BLD AUTO: 9.7 K/UL (ref 3.9–12.7)

## 2021-06-03 PROCEDURE — 25500020 PHARM REV CODE 255

## 2021-06-03 PROCEDURE — 25000003 PHARM REV CODE 250: Performed by: STUDENT IN AN ORGANIZED HEALTH CARE EDUCATION/TRAINING PROGRAM

## 2021-06-03 PROCEDURE — 80053 COMPREHEN METABOLIC PANEL: CPT | Performed by: NURSE PRACTITIONER

## 2021-06-03 PROCEDURE — 84100 ASSAY OF PHOSPHORUS: CPT | Performed by: NURSE PRACTITIONER

## 2021-06-03 PROCEDURE — 63600175 PHARM REV CODE 636 W HCPCS: Performed by: NURSE PRACTITIONER

## 2021-06-03 PROCEDURE — 99231 PR SUBSEQUENT HOSPITAL CARE,LEVL I: ICD-10-PCS | Mod: ,,, | Performed by: INTERNAL MEDICINE

## 2021-06-03 PROCEDURE — 97116 GAIT TRAINING THERAPY: CPT | Mod: CQ

## 2021-06-03 PROCEDURE — 25000003 PHARM REV CODE 250: Performed by: NURSE PRACTITIONER

## 2021-06-03 PROCEDURE — 83735 ASSAY OF MAGNESIUM: CPT | Performed by: NURSE PRACTITIONER

## 2021-06-03 PROCEDURE — 99231 SBSQ HOSP IP/OBS SF/LOW 25: CPT | Mod: ,,, | Performed by: INTERNAL MEDICINE

## 2021-06-03 PROCEDURE — 85025 COMPLETE CBC W/AUTO DIFF WBC: CPT | Performed by: NURSE PRACTITIONER

## 2021-06-03 PROCEDURE — 36415 COLL VENOUS BLD VENIPUNCTURE: CPT | Performed by: NURSE PRACTITIONER

## 2021-06-03 RX ADMIN — HEPARIN SODIUM 5000 UNITS: 5000 INJECTION INTRAVENOUS; SUBCUTANEOUS at 06:06

## 2021-06-03 RX ADMIN — SEVELAMER CARBONATE 800 MG: 800 TABLET, FILM COATED ORAL at 10:06

## 2021-06-03 RX ADMIN — IOHEXOL 75 ML: 350 INJECTION, SOLUTION INTRAVENOUS at 12:06

## 2021-06-03 RX ADMIN — MEGESTROL ACETATE 40 MG: 40 TABLET ORAL at 10:06

## 2021-06-03 RX ADMIN — HEPARIN SODIUM 5000 UNITS: 5000 INJECTION INTRAVENOUS; SUBCUTANEOUS at 03:06

## 2021-06-03 RX ADMIN — OXYCODONE HYDROCHLORIDE AND ACETAMINOPHEN 500 MG: 500 TABLET ORAL at 10:06

## 2021-06-03 RX ADMIN — AMLODIPINE BESYLATE 5 MG: 5 TABLET ORAL at 10:06

## 2021-06-03 RX ADMIN — ASPIRIN 81 MG CHEWABLE TABLET 81 MG: 81 TABLET CHEWABLE at 10:06

## 2021-06-03 RX ADMIN — MUPIROCIN: 20 OINTMENT TOPICAL at 10:06

## 2021-06-03 RX ADMIN — LISINOPRIL 2.5 MG: 2.5 TABLET ORAL at 10:06

## 2021-06-03 RX ADMIN — ATORVASTATIN CALCIUM 80 MG: 40 TABLET, FILM COATED ORAL at 10:06

## 2021-06-04 ENCOUNTER — TELEPHONE (OUTPATIENT)
Dept: MEDSURG UNIT | Facility: HOSPITAL | Age: 56
End: 2021-06-04

## 2021-06-07 ENCOUNTER — PATIENT OUTREACH (OUTPATIENT)
Dept: ADMINISTRATIVE | Facility: CLINIC | Age: 56
End: 2021-06-07

## 2021-06-07 LAB
BACTERIA SPEC AEROBE CULT: NO GROWTH
FINAL PATHOLOGIC DIAGNOSIS: NORMAL
Lab: NORMAL

## 2021-06-11 LAB — BACTERIA SPEC ANAEROBE CULT: NORMAL

## 2021-06-16 ENCOUNTER — TELEPHONE (OUTPATIENT)
Dept: PULMONOLOGY | Facility: CLINIC | Age: 56
End: 2021-06-16

## 2021-06-16 DIAGNOSIS — R59.0 MEDIASTINAL ADENOPATHY: Primary | ICD-10-CM

## 2021-06-23 ENCOUNTER — HOSPITAL ENCOUNTER (INPATIENT)
Facility: HOSPITAL | Age: 56
LOS: 3 days | Discharge: HOME HEALTH | DRG: 312 | End: 2021-06-28
Attending: EMERGENCY MEDICINE | Admitting: INTERNAL MEDICINE
Payer: MEDICARE

## 2021-06-23 ENCOUNTER — TELEPHONE (OUTPATIENT)
Dept: PULMONOLOGY | Facility: CLINIC | Age: 56
End: 2021-06-23

## 2021-06-23 DIAGNOSIS — I15.2 HYPERTENSION ASSOCIATED WITH DIABETES: Primary | ICD-10-CM

## 2021-06-23 DIAGNOSIS — I95.9 HYPOTENSION: ICD-10-CM

## 2021-06-23 DIAGNOSIS — E11.649 HYPOGLYCEMIA ASSOCIATED WITH DIABETES: ICD-10-CM

## 2021-06-23 DIAGNOSIS — E43 SEVERE PROTEIN-CALORIE MALNUTRITION: ICD-10-CM

## 2021-06-23 DIAGNOSIS — Z91.199 HISTORY OF NONCOMPLIANCE WITH MEDICAL TREATMENT: ICD-10-CM

## 2021-06-23 DIAGNOSIS — Z72.0 TOBACCO ABUSE: ICD-10-CM

## 2021-06-23 DIAGNOSIS — E11.59 HYPERTENSION ASSOCIATED WITH DIABETES: Primary | ICD-10-CM

## 2021-06-23 DIAGNOSIS — Z91.89 SEDENTARY LIFESTYLE: ICD-10-CM

## 2021-06-23 DIAGNOSIS — N39.0 URINARY TRACT INFECTION WITHOUT HEMATURIA, SITE UNSPECIFIED: ICD-10-CM

## 2021-06-23 LAB
ALBUMIN SERPL BCP-MCNC: 2.2 G/DL (ref 3.5–5.2)
ALP SERPL-CCNC: 116 U/L (ref 55–135)
ALT SERPL W/O P-5'-P-CCNC: 19 U/L (ref 10–44)
ANION GAP SERPL CALC-SCNC: 14 MMOL/L (ref 8–16)
AST SERPL-CCNC: 19 U/L (ref 10–40)
BASOPHILS # BLD AUTO: 0.07 K/UL (ref 0–0.2)
BASOPHILS NFR BLD: 0.8 % (ref 0–1.9)
BILIRUB SERPL-MCNC: 0.2 MG/DL (ref 0.1–1)
BUN SERPL-MCNC: 35 MG/DL (ref 6–20)
CALCIUM SERPL-MCNC: 9.8 MG/DL (ref 8.7–10.5)
CHLORIDE SERPL-SCNC: 97 MMOL/L (ref 95–110)
CO2 SERPL-SCNC: 30 MMOL/L (ref 23–29)
CREAT SERPL-MCNC: 3.7 MG/DL (ref 0.5–1.4)
DIFFERENTIAL METHOD: ABNORMAL
EOSINOPHIL # BLD AUTO: 0 K/UL (ref 0–0.5)
EOSINOPHIL NFR BLD: 0 % (ref 0–8)
ERYTHROCYTE [DISTWIDTH] IN BLOOD BY AUTOMATED COUNT: 14.2 % (ref 11.5–14.5)
EST. GFR  (AFRICAN AMERICAN): 15 ML/MIN/1.73 M^2
EST. GFR  (NON AFRICAN AMERICAN): 13 ML/MIN/1.73 M^2
GLUCOSE SERPL-MCNC: 303 MG/DL (ref 70–110)
HCT VFR BLD AUTO: 33 % (ref 37–48.5)
HGB BLD-MCNC: 10.6 G/DL (ref 12–16)
IMM GRANULOCYTES # BLD AUTO: 0.12 K/UL (ref 0–0.04)
IMM GRANULOCYTES NFR BLD AUTO: 1.3 % (ref 0–0.5)
LACTATE SERPL-SCNC: 1 MMOL/L (ref 0.5–2.2)
LACTATE SERPL-SCNC: 2.2 MMOL/L (ref 0.5–2.2)
LYMPHOCYTES # BLD AUTO: 1.2 K/UL (ref 1–4.8)
LYMPHOCYTES NFR BLD: 12.6 % (ref 18–48)
MAGNESIUM SERPL-MCNC: 2 MG/DL (ref 1.6–2.6)
MCH RBC QN AUTO: 32.1 PG (ref 27–31)
MCHC RBC AUTO-ENTMCNC: 32.1 G/DL (ref 32–36)
MCV RBC AUTO: 100 FL (ref 82–98)
MONOCYTES # BLD AUTO: 0.5 K/UL (ref 0.3–1)
MONOCYTES NFR BLD: 4.8 % (ref 4–15)
NEUTROPHILS # BLD AUTO: 7.5 K/UL (ref 1.8–7.7)
NEUTROPHILS NFR BLD: 80.5 % (ref 38–73)
NRBC BLD-RTO: 0 /100 WBC
PLATELET # BLD AUTO: 315 K/UL (ref 150–450)
PMV BLD AUTO: 10.4 FL (ref 9.2–12.9)
POTASSIUM SERPL-SCNC: 3.5 MMOL/L (ref 3.5–5.1)
PROT SERPL-MCNC: 6.6 G/DL (ref 6–8.4)
RBC # BLD AUTO: 3.3 M/UL (ref 4–5.4)
SODIUM SERPL-SCNC: 141 MMOL/L (ref 136–145)
TROPONIN I SERPL DL<=0.01 NG/ML-MCNC: 0.13 NG/ML (ref 0–0.03)
WBC # BLD AUTO: 9.31 K/UL (ref 3.9–12.7)

## 2021-06-23 PROCEDURE — 80053 COMPREHEN METABOLIC PANEL: CPT | Performed by: NURSE PRACTITIONER

## 2021-06-23 PROCEDURE — 99285 EMERGENCY DEPT VISIT HI MDM: CPT | Mod: 25

## 2021-06-23 PROCEDURE — 36415 COLL VENOUS BLD VENIPUNCTURE: CPT | Performed by: NURSE PRACTITIONER

## 2021-06-23 PROCEDURE — 83605 ASSAY OF LACTIC ACID: CPT | Performed by: NURSE PRACTITIONER

## 2021-06-23 PROCEDURE — 83735 ASSAY OF MAGNESIUM: CPT | Performed by: NURSE PRACTITIONER

## 2021-06-23 PROCEDURE — 87040 BLOOD CULTURE FOR BACTERIA: CPT | Performed by: NURSE PRACTITIONER

## 2021-06-23 PROCEDURE — G0378 HOSPITAL OBSERVATION PER HR: HCPCS

## 2021-06-23 PROCEDURE — 96361 HYDRATE IV INFUSION ADD-ON: CPT

## 2021-06-23 PROCEDURE — 93005 ELECTROCARDIOGRAM TRACING: CPT

## 2021-06-23 PROCEDURE — 25000003 PHARM REV CODE 250: Performed by: NURSE PRACTITIONER

## 2021-06-23 PROCEDURE — 84484 ASSAY OF TROPONIN QUANT: CPT | Performed by: NURSE PRACTITIONER

## 2021-06-23 PROCEDURE — 85025 COMPLETE CBC W/AUTO DIFF WBC: CPT | Performed by: NURSE PRACTITIONER

## 2021-06-23 RX ORDER — SODIUM CHLORIDE 9 MG/ML
INJECTION, SOLUTION INTRAVENOUS
Status: COMPLETED | OUTPATIENT
Start: 2021-06-23 | End: 2021-06-23

## 2021-06-23 RX ORDER — IBUPROFEN 200 MG
16 TABLET ORAL
Status: DISCONTINUED | OUTPATIENT
Start: 2021-06-23 | End: 2021-06-28 | Stop reason: HOSPADM

## 2021-06-23 RX ORDER — GLUCAGON 1 MG
1 KIT INJECTION
Status: DISCONTINUED | OUTPATIENT
Start: 2021-06-23 | End: 2021-06-28 | Stop reason: HOSPADM

## 2021-06-23 RX ORDER — ONDANSETRON 2 MG/ML
4 INJECTION INTRAMUSCULAR; INTRAVENOUS EVERY 8 HOURS PRN
Status: DISCONTINUED | OUTPATIENT
Start: 2021-06-23 | End: 2021-06-28 | Stop reason: HOSPADM

## 2021-06-23 RX ORDER — TALC
9 POWDER (GRAM) TOPICAL NIGHTLY PRN
Status: DISCONTINUED | OUTPATIENT
Start: 2021-06-23 | End: 2021-06-28 | Stop reason: HOSPADM

## 2021-06-23 RX ORDER — ACETAMINOPHEN 325 MG/1
650 TABLET ORAL EVERY 6 HOURS PRN
Status: DISCONTINUED | OUTPATIENT
Start: 2021-06-23 | End: 2021-06-28 | Stop reason: HOSPADM

## 2021-06-23 RX ORDER — INSULIN ASPART 100 [IU]/ML
0-5 INJECTION, SOLUTION INTRAVENOUS; SUBCUTANEOUS
Status: DISCONTINUED | OUTPATIENT
Start: 2021-06-23 | End: 2021-06-28 | Stop reason: HOSPADM

## 2021-06-23 RX ORDER — SODIUM CHLORIDE 0.9 % (FLUSH) 0.9 %
10 SYRINGE (ML) INJECTION
Status: DISCONTINUED | OUTPATIENT
Start: 2021-06-23 | End: 2021-06-28 | Stop reason: HOSPADM

## 2021-06-23 RX ORDER — ACETAMINOPHEN 325 MG/1
650 TABLET ORAL EVERY 4 HOURS PRN
Status: DISCONTINUED | OUTPATIENT
Start: 2021-06-23 | End: 2021-06-28 | Stop reason: HOSPADM

## 2021-06-23 RX ORDER — IBUPROFEN 200 MG
24 TABLET ORAL
Status: DISCONTINUED | OUTPATIENT
Start: 2021-06-23 | End: 2021-06-28 | Stop reason: HOSPADM

## 2021-06-23 RX ADMIN — SODIUM CHLORIDE: 0.9 INJECTION, SOLUTION INTRAVENOUS at 05:06

## 2021-06-24 PROBLEM — N39.0 UTI (URINARY TRACT INFECTION): Status: ACTIVE | Noted: 2021-06-24

## 2021-06-24 PROBLEM — N30.00 ACUTE CYSTITIS: Status: ACTIVE | Noted: 2021-05-31

## 2021-06-24 LAB
ALBUMIN SERPL BCP-MCNC: 2.2 G/DL (ref 3.5–5.2)
ALP SERPL-CCNC: 117 U/L (ref 55–135)
ALT SERPL W/O P-5'-P-CCNC: 19 U/L (ref 10–44)
ANION GAP SERPL CALC-SCNC: 12 MMOL/L (ref 8–16)
AST SERPL-CCNC: 22 U/L (ref 10–40)
BACTERIA #/AREA URNS HPF: ABNORMAL /HPF
BASOPHILS # BLD AUTO: 0.04 K/UL (ref 0–0.2)
BASOPHILS NFR BLD: 0.4 % (ref 0–1.9)
BILIRUB SERPL-MCNC: 0.2 MG/DL (ref 0.1–1)
BILIRUB UR QL STRIP: ABNORMAL
BUN SERPL-MCNC: 39 MG/DL (ref 6–20)
CALCIUM SERPL-MCNC: 9.7 MG/DL (ref 8.7–10.5)
CHLORIDE SERPL-SCNC: 97 MMOL/L (ref 95–110)
CLARITY UR: ABNORMAL
CO2 SERPL-SCNC: 32 MMOL/L (ref 23–29)
COLOR UR: ABNORMAL
CREAT SERPL-MCNC: 3.9 MG/DL (ref 0.5–1.4)
DIFFERENTIAL METHOD: ABNORMAL
EOSINOPHIL # BLD AUTO: 0 K/UL (ref 0–0.5)
EOSINOPHIL NFR BLD: 0.3 % (ref 0–8)
ERYTHROCYTE [DISTWIDTH] IN BLOOD BY AUTOMATED COUNT: 13.8 % (ref 11.5–14.5)
EST. GFR  (AFRICAN AMERICAN): 14 ML/MIN/1.73 M^2
EST. GFR  (NON AFRICAN AMERICAN): 12 ML/MIN/1.73 M^2
GLUCOSE SERPL-MCNC: 301 MG/DL (ref 70–110)
GLUCOSE UR QL STRIP: NEGATIVE
HCT VFR BLD AUTO: 32.8 % (ref 37–48.5)
HGB BLD-MCNC: 10.4 G/DL (ref 12–16)
HGB UR QL STRIP: ABNORMAL
HYALINE CASTS #/AREA URNS LPF: 0 /LPF
IMM GRANULOCYTES # BLD AUTO: 0.03 K/UL (ref 0–0.04)
IMM GRANULOCYTES NFR BLD AUTO: 0.3 % (ref 0–0.5)
KETONES UR QL STRIP: ABNORMAL
LEUKOCYTE ESTERASE UR QL STRIP: ABNORMAL
LYMPHOCYTES # BLD AUTO: 1.3 K/UL (ref 1–4.8)
LYMPHOCYTES NFR BLD: 13.8 % (ref 18–48)
MAGNESIUM SERPL-MCNC: 2.1 MG/DL (ref 1.6–2.6)
MCH RBC QN AUTO: 32 PG (ref 27–31)
MCHC RBC AUTO-ENTMCNC: 31.7 G/DL (ref 32–36)
MCV RBC AUTO: 101 FL (ref 82–98)
MICROSCOPIC COMMENT: ABNORMAL
MONOCYTES # BLD AUTO: 0.5 K/UL (ref 0.3–1)
MONOCYTES NFR BLD: 5.5 % (ref 4–15)
NEUTROPHILS # BLD AUTO: 7.5 K/UL (ref 1.8–7.7)
NEUTROPHILS NFR BLD: 79.7 % (ref 38–73)
NITRITE UR QL STRIP: POSITIVE
NRBC BLD-RTO: 0 /100 WBC
PH UR STRIP: 6 [PH] (ref 5–8)
PHOSPHATE SERPL-MCNC: 5.2 MG/DL (ref 2.7–4.5)
PLATELET # BLD AUTO: 499 K/UL (ref 150–450)
PLATELET BLD QL SMEAR: ABNORMAL
PMV BLD AUTO: 9.4 FL (ref 9.2–12.9)
POCT GLUCOSE: 183 MG/DL (ref 70–110)
POCT GLUCOSE: 242 MG/DL (ref 70–110)
POCT GLUCOSE: 304 MG/DL (ref 70–110)
POCT GLUCOSE: 456 MG/DL (ref 70–110)
POCT GLUCOSE: 458 MG/DL (ref 70–110)
POTASSIUM SERPL-SCNC: 3.3 MMOL/L (ref 3.5–5.1)
PROT SERPL-MCNC: 6.6 G/DL (ref 6–8.4)
PROT UR QL STRIP: ABNORMAL
RBC # BLD AUTO: 3.25 M/UL (ref 4–5.4)
RBC #/AREA URNS HPF: 15 /HPF (ref 0–4)
SODIUM SERPL-SCNC: 141 MMOL/L (ref 136–145)
SP GR UR STRIP: 1.02 (ref 1–1.03)
SQUAMOUS #/AREA URNS HPF: 5 /HPF
TROPONIN I SERPL DL<=0.01 NG/ML-MCNC: 0.12 NG/ML (ref 0–0.03)
URN SPEC COLLECT METH UR: ABNORMAL
UROBILINOGEN UR STRIP-ACNC: 1 EU/DL
WBC # BLD AUTO: 9.41 K/UL (ref 3.9–12.7)
WBC #/AREA URNS HPF: >100 /HPF (ref 0–5)
YEAST URNS QL MICRO: ABNORMAL

## 2021-06-24 PROCEDURE — 25000003 PHARM REV CODE 250: Performed by: INTERNAL MEDICINE

## 2021-06-24 PROCEDURE — 96374 THER/PROPH/DIAG INJ IV PUSH: CPT

## 2021-06-24 PROCEDURE — 87086 URINE CULTURE/COLONY COUNT: CPT | Performed by: NURSE PRACTITIONER

## 2021-06-24 PROCEDURE — 63600175 PHARM REV CODE 636 W HCPCS: Performed by: NURSE PRACTITIONER

## 2021-06-24 PROCEDURE — 96372 THER/PROPH/DIAG INJ SC/IM: CPT

## 2021-06-24 PROCEDURE — C9399 UNCLASSIFIED DRUGS OR BIOLOG: HCPCS | Performed by: INTERNAL MEDICINE

## 2021-06-24 PROCEDURE — 80053 COMPREHEN METABOLIC PANEL: CPT | Performed by: NURSE PRACTITIONER

## 2021-06-24 PROCEDURE — 84100 ASSAY OF PHOSPHORUS: CPT | Performed by: NURSE PRACTITIONER

## 2021-06-24 PROCEDURE — 87088 URINE BACTERIA CULTURE: CPT | Performed by: NURSE PRACTITIONER

## 2021-06-24 PROCEDURE — 81000 URINALYSIS NONAUTO W/SCOPE: CPT | Performed by: NURSE PRACTITIONER

## 2021-06-24 PROCEDURE — G0378 HOSPITAL OBSERVATION PER HR: HCPCS

## 2021-06-24 PROCEDURE — 87106 FUNGI IDENTIFICATION YEAST: CPT | Performed by: NURSE PRACTITIONER

## 2021-06-24 PROCEDURE — 83735 ASSAY OF MAGNESIUM: CPT | Performed by: NURSE PRACTITIONER

## 2021-06-24 PROCEDURE — 84484 ASSAY OF TROPONIN QUANT: CPT | Performed by: NURSE PRACTITIONER

## 2021-06-24 PROCEDURE — 63600175 PHARM REV CODE 636 W HCPCS: Performed by: INTERNAL MEDICINE

## 2021-06-24 PROCEDURE — 85025 COMPLETE CBC W/AUTO DIFF WBC: CPT | Performed by: NURSE PRACTITIONER

## 2021-06-24 RX ORDER — POTASSIUM CHLORIDE 20 MEQ/1
40 TABLET, EXTENDED RELEASE ORAL ONCE
Status: COMPLETED | OUTPATIENT
Start: 2021-06-24 | End: 2021-06-24

## 2021-06-24 RX ADMIN — INSULIN ASPART 2 UNITS: 100 INJECTION, SOLUTION INTRAVENOUS; SUBCUTANEOUS at 06:06

## 2021-06-24 RX ADMIN — INSULIN ASPART 5 UNITS: 100 INJECTION, SOLUTION INTRAVENOUS; SUBCUTANEOUS at 12:06

## 2021-06-24 RX ADMIN — POTASSIUM CHLORIDE 40 MEQ: 1500 TABLET, EXTENDED RELEASE ORAL at 12:06

## 2021-06-24 RX ADMIN — CEFTRIAXONE 1 G: 1 INJECTION, SOLUTION INTRAVENOUS at 11:06

## 2021-06-24 RX ADMIN — INSULIN DETEMIR 10 UNITS: 100 INJECTION, SOLUTION SUBCUTANEOUS at 12:06

## 2021-06-24 RX ADMIN — INSULIN DETEMIR 10 UNITS: 100 INJECTION, SOLUTION SUBCUTANEOUS at 09:06

## 2021-06-25 LAB
ALBUMIN SERPL BCP-MCNC: 2.2 G/DL (ref 3.5–5.2)
ALP SERPL-CCNC: 109 U/L (ref 55–135)
ALT SERPL W/O P-5'-P-CCNC: 20 U/L (ref 10–44)
ANION GAP SERPL CALC-SCNC: 14 MMOL/L (ref 8–16)
AST SERPL-CCNC: 20 U/L (ref 10–40)
BASOPHILS # BLD AUTO: 0.05 K/UL (ref 0–0.2)
BASOPHILS NFR BLD: 0.3 % (ref 0–1.9)
BILIRUB SERPL-MCNC: 0.2 MG/DL (ref 0.1–1)
BUN SERPL-MCNC: 46 MG/DL (ref 6–20)
CALCIUM SERPL-MCNC: 10.1 MG/DL (ref 8.7–10.5)
CHLORIDE SERPL-SCNC: 104 MMOL/L (ref 95–110)
CO2 SERPL-SCNC: 27 MMOL/L (ref 23–29)
CREAT SERPL-MCNC: 4 MG/DL (ref 0.5–1.4)
DIFFERENTIAL METHOD: ABNORMAL
EOSINOPHIL # BLD AUTO: 0 K/UL (ref 0–0.5)
EOSINOPHIL NFR BLD: 0.1 % (ref 0–8)
ERYTHROCYTE [DISTWIDTH] IN BLOOD BY AUTOMATED COUNT: 13.5 % (ref 11.5–14.5)
EST. GFR  (AFRICAN AMERICAN): 14 ML/MIN/1.73 M^2
EST. GFR  (NON AFRICAN AMERICAN): 12 ML/MIN/1.73 M^2
GLUCOSE SERPL-MCNC: 29 MG/DL (ref 70–110)
HCT VFR BLD AUTO: 33.6 % (ref 37–48.5)
HGB BLD-MCNC: 11.3 G/DL (ref 12–16)
IMM GRANULOCYTES # BLD AUTO: 0.05 K/UL (ref 0–0.04)
IMM GRANULOCYTES NFR BLD AUTO: 0.3 % (ref 0–0.5)
LYMPHOCYTES # BLD AUTO: 1.2 K/UL (ref 1–4.8)
LYMPHOCYTES NFR BLD: 7.1 % (ref 18–48)
MAGNESIUM SERPL-MCNC: 2 MG/DL (ref 1.6–2.6)
MCH RBC QN AUTO: 32.8 PG (ref 27–31)
MCHC RBC AUTO-ENTMCNC: 33.6 G/DL (ref 32–36)
MCV RBC AUTO: 98 FL (ref 82–98)
MONOCYTES # BLD AUTO: 0.9 K/UL (ref 0.3–1)
MONOCYTES NFR BLD: 5 % (ref 4–15)
NEUTROPHILS # BLD AUTO: 15 K/UL (ref 1.8–7.7)
NEUTROPHILS NFR BLD: 87.2 % (ref 38–73)
NRBC BLD-RTO: 0 /100 WBC
PHOSPHATE SERPL-MCNC: 4.3 MG/DL (ref 2.7–4.5)
PLATELET # BLD AUTO: 559 K/UL (ref 150–450)
PMV BLD AUTO: 9.3 FL (ref 9.2–12.9)
POCT GLUCOSE: 162 MG/DL (ref 70–110)
POCT GLUCOSE: 247 MG/DL (ref 70–110)
POCT GLUCOSE: 59 MG/DL (ref 70–110)
POCT GLUCOSE: <20 MG/DL (ref 70–110)
POCT GLUCOSE: <20 MG/DL (ref 70–110)
POTASSIUM SERPL-SCNC: 3 MMOL/L (ref 3.5–5.1)
PROT SERPL-MCNC: 6.8 G/DL (ref 6–8.4)
RBC # BLD AUTO: 3.44 M/UL (ref 4–5.4)
SODIUM SERPL-SCNC: 145 MMOL/L (ref 136–145)
WBC # BLD AUTO: 17.22 K/UL (ref 3.9–12.7)

## 2021-06-25 PROCEDURE — 25000003 PHARM REV CODE 250: Performed by: NURSE PRACTITIONER

## 2021-06-25 PROCEDURE — 36415 COLL VENOUS BLD VENIPUNCTURE: CPT | Performed by: NURSE PRACTITIONER

## 2021-06-25 PROCEDURE — 12000002 HC ACUTE/MED SURGE SEMI-PRIVATE ROOM

## 2021-06-25 PROCEDURE — 63600175 PHARM REV CODE 636 W HCPCS: Performed by: INTERNAL MEDICINE

## 2021-06-25 PROCEDURE — 85025 COMPLETE CBC W/AUTO DIFF WBC: CPT | Performed by: NURSE PRACTITIONER

## 2021-06-25 PROCEDURE — 83735 ASSAY OF MAGNESIUM: CPT | Performed by: NURSE PRACTITIONER

## 2021-06-25 PROCEDURE — 84100 ASSAY OF PHOSPHORUS: CPT | Performed by: NURSE PRACTITIONER

## 2021-06-25 PROCEDURE — 25000003 PHARM REV CODE 250: Performed by: INTERNAL MEDICINE

## 2021-06-25 PROCEDURE — 80100014 HC HEMODIALYSIS 1:1

## 2021-06-25 PROCEDURE — 96375 TX/PRO/DX INJ NEW DRUG ADDON: CPT

## 2021-06-25 PROCEDURE — 80053 COMPREHEN METABOLIC PANEL: CPT | Performed by: NURSE PRACTITIONER

## 2021-06-25 RX ORDER — MUPIROCIN 20 MG/G
OINTMENT TOPICAL 2 TIMES DAILY
Status: DISCONTINUED | OUTPATIENT
Start: 2021-06-25 | End: 2021-06-28 | Stop reason: HOSPADM

## 2021-06-25 RX ORDER — HEPARIN SODIUM 1000 [USP'U]/ML
4000 INJECTION, SOLUTION INTRAVENOUS; SUBCUTANEOUS ONCE
Status: COMPLETED | OUTPATIENT
Start: 2021-06-25 | End: 2021-06-25

## 2021-06-25 RX ADMIN — INSULIN ASPART 1 UNITS: 100 INJECTION, SOLUTION INTRAVENOUS; SUBCUTANEOUS at 10:06

## 2021-06-25 RX ADMIN — EPOETIN ALFA-EPBX 1800 UNITS: 4000 INJECTION, SOLUTION INTRAVENOUS; SUBCUTANEOUS at 06:06

## 2021-06-25 RX ADMIN — HEPARIN SODIUM 4000 UNITS: 1000 INJECTION, SOLUTION INTRAVENOUS; SUBCUTANEOUS at 06:06

## 2021-06-25 RX ADMIN — DEXTROSE MONOHYDRATE 25 G: 25 INJECTION, SOLUTION INTRAVENOUS at 06:06

## 2021-06-25 RX ADMIN — MUPIROCIN: 20 OINTMENT TOPICAL at 09:06

## 2021-06-25 RX ADMIN — CEFTRIAXONE 1 G: 1 INJECTION, SOLUTION INTRAVENOUS at 10:06

## 2021-06-25 RX ADMIN — Medication 16 G: at 11:06

## 2021-06-26 LAB
ALBUMIN SERPL BCP-MCNC: 1.9 G/DL (ref 3.5–5.2)
ALP SERPL-CCNC: 102 U/L (ref 55–135)
ALT SERPL W/O P-5'-P-CCNC: 22 U/L (ref 10–44)
ANION GAP SERPL CALC-SCNC: 10 MMOL/L (ref 8–16)
AST SERPL-CCNC: 32 U/L (ref 10–40)
BACTERIA UR CULT: ABNORMAL
BACTERIA UR CULT: ABNORMAL
BASOPHILS # BLD AUTO: 0.04 K/UL (ref 0–0.2)
BASOPHILS NFR BLD: 0.4 % (ref 0–1.9)
BILIRUB SERPL-MCNC: 0.2 MG/DL (ref 0.1–1)
BUN SERPL-MCNC: 30 MG/DL (ref 6–20)
CALCIUM SERPL-MCNC: 8.9 MG/DL (ref 8.7–10.5)
CHLORIDE SERPL-SCNC: 105 MMOL/L (ref 95–110)
CO2 SERPL-SCNC: 25 MMOL/L (ref 23–29)
CREAT SERPL-MCNC: 2.7 MG/DL (ref 0.5–1.4)
DIFFERENTIAL METHOD: ABNORMAL
EOSINOPHIL # BLD AUTO: 0 K/UL (ref 0–0.5)
EOSINOPHIL NFR BLD: 0.1 % (ref 0–8)
ERYTHROCYTE [DISTWIDTH] IN BLOOD BY AUTOMATED COUNT: 13.7 % (ref 11.5–14.5)
EST. GFR  (AFRICAN AMERICAN): 22 ML/MIN/1.73 M^2
EST. GFR  (NON AFRICAN AMERICAN): 19 ML/MIN/1.73 M^2
GLUCOSE SERPL-MCNC: 144 MG/DL (ref 70–110)
HCT VFR BLD AUTO: 32.6 % (ref 37–48.5)
HGB BLD-MCNC: 10.5 G/DL (ref 12–16)
IMM GRANULOCYTES # BLD AUTO: 0.03 K/UL (ref 0–0.04)
IMM GRANULOCYTES NFR BLD AUTO: 0.3 % (ref 0–0.5)
LYMPHOCYTES # BLD AUTO: 1.3 K/UL (ref 1–4.8)
LYMPHOCYTES NFR BLD: 14.1 % (ref 18–48)
MAGNESIUM SERPL-MCNC: 1.9 MG/DL (ref 1.6–2.6)
MCH RBC QN AUTO: 32.4 PG (ref 27–31)
MCHC RBC AUTO-ENTMCNC: 32.2 G/DL (ref 32–36)
MCV RBC AUTO: 101 FL (ref 82–98)
MONOCYTES # BLD AUTO: 0.5 K/UL (ref 0.3–1)
MONOCYTES NFR BLD: 5.5 % (ref 4–15)
NEUTROPHILS # BLD AUTO: 7.3 K/UL (ref 1.8–7.7)
NEUTROPHILS NFR BLD: 79.6 % (ref 38–73)
NRBC BLD-RTO: 0 /100 WBC
PHOSPHATE SERPL-MCNC: 3.1 MG/DL (ref 2.7–4.5)
PLATELET # BLD AUTO: 344 K/UL (ref 150–450)
PLATELET BLD QL SMEAR: ABNORMAL
PMV BLD AUTO: 9.6 FL (ref 9.2–12.9)
POCT GLUCOSE: 173 MG/DL (ref 70–110)
POCT GLUCOSE: 262 MG/DL (ref 70–110)
POTASSIUM SERPL-SCNC: 4.1 MMOL/L (ref 3.5–5.1)
PROT SERPL-MCNC: 6.2 G/DL (ref 6–8.4)
RBC # BLD AUTO: 3.24 M/UL (ref 4–5.4)
SODIUM SERPL-SCNC: 140 MMOL/L (ref 136–145)
WBC # BLD AUTO: 9.17 K/UL (ref 3.9–12.7)

## 2021-06-26 PROCEDURE — 63700000 PHARM REV CODE 250 ALT 637 W/O HCPCS: Performed by: INTERNAL MEDICINE

## 2021-06-26 PROCEDURE — 85025 COMPLETE CBC W/AUTO DIFF WBC: CPT | Performed by: NURSE PRACTITIONER

## 2021-06-26 PROCEDURE — 84100 ASSAY OF PHOSPHORUS: CPT | Performed by: NURSE PRACTITIONER

## 2021-06-26 PROCEDURE — 63600175 PHARM REV CODE 636 W HCPCS: Performed by: INTERNAL MEDICINE

## 2021-06-26 PROCEDURE — 80053 COMPREHEN METABOLIC PANEL: CPT | Performed by: NURSE PRACTITIONER

## 2021-06-26 PROCEDURE — 83735 ASSAY OF MAGNESIUM: CPT | Performed by: NURSE PRACTITIONER

## 2021-06-26 PROCEDURE — 12000002 HC ACUTE/MED SURGE SEMI-PRIVATE ROOM

## 2021-06-26 PROCEDURE — 25000003 PHARM REV CODE 250: Performed by: INTERNAL MEDICINE

## 2021-06-26 PROCEDURE — 36415 COLL VENOUS BLD VENIPUNCTURE: CPT | Performed by: NURSE PRACTITIONER

## 2021-06-26 PROCEDURE — S0179 MEGESTROL 20 MG: HCPCS | Performed by: INTERNAL MEDICINE

## 2021-06-26 RX ORDER — MEGESTROL ACETATE 40 MG/ML
200 SUSPENSION ORAL DAILY
Status: DISCONTINUED | OUTPATIENT
Start: 2021-06-26 | End: 2021-06-28 | Stop reason: HOSPADM

## 2021-06-26 RX ORDER — FLUCONAZOLE 100 MG/1
100 TABLET ORAL DAILY
Status: DISCONTINUED | OUTPATIENT
Start: 2021-06-26 | End: 2021-06-28 | Stop reason: HOSPADM

## 2021-06-26 RX ORDER — HEPARIN SODIUM 5000 [USP'U]/ML
5000 INJECTION, SOLUTION INTRAVENOUS; SUBCUTANEOUS EVERY 12 HOURS
Status: DISCONTINUED | OUTPATIENT
Start: 2021-06-26 | End: 2021-06-28 | Stop reason: HOSPADM

## 2021-06-26 RX ORDER — PANTOPRAZOLE SODIUM 40 MG/1
40 TABLET, DELAYED RELEASE ORAL DAILY
Status: DISCONTINUED | OUTPATIENT
Start: 2021-06-26 | End: 2021-06-28 | Stop reason: HOSPADM

## 2021-06-26 RX ADMIN — MEGESTROL ACETATE 200 MG: 40 SUSPENSION ORAL at 03:06

## 2021-06-26 RX ADMIN — MUPIROCIN: 20 OINTMENT TOPICAL at 09:06

## 2021-06-26 RX ADMIN — PANTOPRAZOLE SODIUM 40 MG: 40 TABLET, DELAYED RELEASE ORAL at 03:06

## 2021-06-26 RX ADMIN — CEFTRIAXONE 1 G: 1 INJECTION, SOLUTION INTRAVENOUS at 09:06

## 2021-06-26 RX ADMIN — FLUCONAZOLE 100 MG: 100 TABLET ORAL at 03:06

## 2021-06-26 RX ADMIN — HEPARIN SODIUM 5000 UNITS: 5000 INJECTION, SOLUTION INTRAVENOUS; SUBCUTANEOUS at 10:06

## 2021-06-26 RX ADMIN — INSULIN ASPART 3 UNITS: 100 INJECTION, SOLUTION INTRAVENOUS; SUBCUTANEOUS at 11:06

## 2021-06-26 RX ADMIN — MUPIROCIN: 20 OINTMENT TOPICAL at 10:06

## 2021-06-26 RX ADMIN — INSULIN ASPART 2 UNITS: 100 INJECTION, SOLUTION INTRAVENOUS; SUBCUTANEOUS at 10:06

## 2021-06-27 LAB
ALBUMIN SERPL BCP-MCNC: 2 G/DL (ref 3.5–5.2)
ALP SERPL-CCNC: 110 U/L (ref 55–135)
ALT SERPL W/O P-5'-P-CCNC: 35 U/L (ref 10–44)
ANION GAP SERPL CALC-SCNC: 13 MMOL/L (ref 8–16)
AST SERPL-CCNC: 56 U/L (ref 10–40)
BASOPHILS # BLD AUTO: 0.03 K/UL (ref 0–0.2)
BASOPHILS NFR BLD: 0.4 % (ref 0–1.9)
BILIRUB SERPL-MCNC: 0.2 MG/DL (ref 0.1–1)
BUN SERPL-MCNC: 38 MG/DL (ref 6–20)
CALCIUM SERPL-MCNC: 8.9 MG/DL (ref 8.7–10.5)
CHLORIDE SERPL-SCNC: 104 MMOL/L (ref 95–110)
CO2 SERPL-SCNC: 21 MMOL/L (ref 23–29)
CREAT SERPL-MCNC: 3.4 MG/DL (ref 0.5–1.4)
DIFFERENTIAL METHOD: ABNORMAL
EOSINOPHIL # BLD AUTO: 0 K/UL (ref 0–0.5)
EOSINOPHIL NFR BLD: 0.4 % (ref 0–8)
ERYTHROCYTE [DISTWIDTH] IN BLOOD BY AUTOMATED COUNT: 13.6 % (ref 11.5–14.5)
EST. GFR  (AFRICAN AMERICAN): 16 ML/MIN/1.73 M^2
EST. GFR  (NON AFRICAN AMERICAN): 14 ML/MIN/1.73 M^2
GLUCOSE SERPL-MCNC: 198 MG/DL (ref 70–110)
GLUCOSE SERPL-MCNC: 381 MG/DL (ref 70–110)
HCT VFR BLD AUTO: 33.1 % (ref 37–48.5)
HGB BLD-MCNC: 10.8 G/DL (ref 12–16)
IMM GRANULOCYTES # BLD AUTO: 0.05 K/UL (ref 0–0.04)
IMM GRANULOCYTES NFR BLD AUTO: 0.6 % (ref 0–0.5)
LYMPHOCYTES # BLD AUTO: 1.6 K/UL (ref 1–4.8)
LYMPHOCYTES NFR BLD: 18.8 % (ref 18–48)
MAGNESIUM SERPL-MCNC: 1.9 MG/DL (ref 1.6–2.6)
MCH RBC QN AUTO: 32.6 PG (ref 27–31)
MCHC RBC AUTO-ENTMCNC: 32.6 G/DL (ref 32–36)
MCV RBC AUTO: 100 FL (ref 82–98)
MONOCYTES # BLD AUTO: 0.5 K/UL (ref 0.3–1)
MONOCYTES NFR BLD: 6.3 % (ref 4–15)
NEUTROPHILS # BLD AUTO: 6.2 K/UL (ref 1.8–7.7)
NEUTROPHILS NFR BLD: 73.5 % (ref 38–73)
NRBC BLD-RTO: 0 /100 WBC
PHOSPHATE SERPL-MCNC: 3.2 MG/DL (ref 2.7–4.5)
PLATELET # BLD AUTO: 351 K/UL (ref 150–450)
PMV BLD AUTO: 9.5 FL (ref 9.2–12.9)
POCT GLUCOSE: 214 MG/DL (ref 70–110)
POCT GLUCOSE: 229 MG/DL (ref 70–110)
POCT GLUCOSE: 311 MG/DL (ref 70–110)
POCT GLUCOSE: 339 MG/DL (ref 70–110)
POCT GLUCOSE: 407 MG/DL (ref 70–110)
POCT GLUCOSE: 425 MG/DL (ref 70–110)
POTASSIUM SERPL-SCNC: 3.8 MMOL/L (ref 3.5–5.1)
PROT SERPL-MCNC: 6.4 G/DL (ref 6–8.4)
RBC # BLD AUTO: 3.31 M/UL (ref 4–5.4)
SODIUM SERPL-SCNC: 138 MMOL/L (ref 136–145)
WBC # BLD AUTO: 8.39 K/UL (ref 3.9–12.7)

## 2021-06-27 PROCEDURE — 63700000 PHARM REV CODE 250 ALT 637 W/O HCPCS: Performed by: INTERNAL MEDICINE

## 2021-06-27 PROCEDURE — 12000002 HC ACUTE/MED SURGE SEMI-PRIVATE ROOM

## 2021-06-27 PROCEDURE — 36415 COLL VENOUS BLD VENIPUNCTURE: CPT | Performed by: NURSE PRACTITIONER

## 2021-06-27 PROCEDURE — 80053 COMPREHEN METABOLIC PANEL: CPT | Performed by: NURSE PRACTITIONER

## 2021-06-27 PROCEDURE — 36415 COLL VENOUS BLD VENIPUNCTURE: CPT | Performed by: INTERNAL MEDICINE

## 2021-06-27 PROCEDURE — 25000003 PHARM REV CODE 250: Performed by: INTERNAL MEDICINE

## 2021-06-27 PROCEDURE — 84100 ASSAY OF PHOSPHORUS: CPT | Performed by: NURSE PRACTITIONER

## 2021-06-27 PROCEDURE — 85025 COMPLETE CBC W/AUTO DIFF WBC: CPT | Performed by: NURSE PRACTITIONER

## 2021-06-27 PROCEDURE — 63600175 PHARM REV CODE 636 W HCPCS: Performed by: INTERNAL MEDICINE

## 2021-06-27 PROCEDURE — S0179 MEGESTROL 20 MG: HCPCS | Performed by: INTERNAL MEDICINE

## 2021-06-27 PROCEDURE — 83735 ASSAY OF MAGNESIUM: CPT | Performed by: NURSE PRACTITIONER

## 2021-06-27 PROCEDURE — 82947 ASSAY GLUCOSE BLOOD QUANT: CPT | Performed by: INTERNAL MEDICINE

## 2021-06-27 RX ORDER — INSULIN ASPART 100 [IU]/ML
10 INJECTION, SOLUTION INTRAVENOUS; SUBCUTANEOUS
Status: DISCONTINUED | OUTPATIENT
Start: 2021-06-27 | End: 2021-06-27

## 2021-06-27 RX ADMIN — HEPARIN SODIUM 5000 UNITS: 5000 INJECTION, SOLUTION INTRAVENOUS; SUBCUTANEOUS at 09:06

## 2021-06-27 RX ADMIN — INSULIN ASPART 4 UNITS: 100 INJECTION, SOLUTION INTRAVENOUS; SUBCUTANEOUS at 12:06

## 2021-06-27 RX ADMIN — MUPIROCIN: 20 OINTMENT TOPICAL at 09:06

## 2021-06-27 RX ADMIN — FLUCONAZOLE 100 MG: 100 TABLET ORAL at 09:06

## 2021-06-27 RX ADMIN — PANTOPRAZOLE SODIUM 40 MG: 40 TABLET, DELAYED RELEASE ORAL at 09:06

## 2021-06-27 RX ADMIN — MUPIROCIN: 20 OINTMENT TOPICAL at 10:06

## 2021-06-27 RX ADMIN — MEGESTROL ACETATE 200 MG: 40 SUSPENSION ORAL at 09:06

## 2021-06-27 RX ADMIN — INSULIN ASPART 2 UNITS: 100 INJECTION, SOLUTION INTRAVENOUS; SUBCUTANEOUS at 05:06

## 2021-06-27 RX ADMIN — INSULIN ASPART 5 UNITS: 100 INJECTION, SOLUTION INTRAVENOUS; SUBCUTANEOUS at 06:06

## 2021-06-27 RX ADMIN — HEPARIN SODIUM 5000 UNITS: 5000 INJECTION, SOLUTION INTRAVENOUS; SUBCUTANEOUS at 10:06

## 2021-06-27 RX ADMIN — INSULIN ASPART 1 UNITS: 100 INJECTION, SOLUTION INTRAVENOUS; SUBCUTANEOUS at 10:06

## 2021-06-28 VITALS
OXYGEN SATURATION: 96 % | WEIGHT: 87.94 LBS | RESPIRATION RATE: 16 BRPM | HEART RATE: 89 BPM | TEMPERATURE: 97 F | HEIGHT: 67 IN | BODY MASS INDEX: 13.8 KG/M2 | DIASTOLIC BLOOD PRESSURE: 70 MMHG | SYSTOLIC BLOOD PRESSURE: 129 MMHG

## 2021-06-28 LAB
ALBUMIN SERPL BCP-MCNC: 1.9 G/DL (ref 3.5–5.2)
ALP SERPL-CCNC: 119 U/L (ref 55–135)
ALT SERPL W/O P-5'-P-CCNC: 35 U/L (ref 10–44)
ANION GAP SERPL CALC-SCNC: 13 MMOL/L (ref 8–16)
AST SERPL-CCNC: 34 U/L (ref 10–40)
BACTERIA BLD CULT: NORMAL
BACTERIA BLD CULT: NORMAL
BASOPHILS # BLD AUTO: 0.03 K/UL (ref 0–0.2)
BASOPHILS NFR BLD: 0.3 % (ref 0–1.9)
BILIRUB SERPL-MCNC: 0.2 MG/DL (ref 0.1–1)
BUN SERPL-MCNC: 50 MG/DL (ref 6–20)
CALCIUM SERPL-MCNC: 9.4 MG/DL (ref 8.7–10.5)
CHLORIDE SERPL-SCNC: 106 MMOL/L (ref 95–110)
CO2 SERPL-SCNC: 20 MMOL/L (ref 23–29)
CREAT SERPL-MCNC: 3.7 MG/DL (ref 0.5–1.4)
DIFFERENTIAL METHOD: ABNORMAL
EOSINOPHIL # BLD AUTO: 0 K/UL (ref 0–0.5)
EOSINOPHIL NFR BLD: 0.3 % (ref 0–8)
ERYTHROCYTE [DISTWIDTH] IN BLOOD BY AUTOMATED COUNT: 13.6 % (ref 11.5–14.5)
EST. GFR  (AFRICAN AMERICAN): 15 ML/MIN/1.73 M^2
EST. GFR  (NON AFRICAN AMERICAN): 13 ML/MIN/1.73 M^2
GLUCOSE SERPL-MCNC: 169 MG/DL (ref 70–110)
HCT VFR BLD AUTO: 32.9 % (ref 37–48.5)
HGB BLD-MCNC: 10.8 G/DL (ref 12–16)
IMM GRANULOCYTES # BLD AUTO: 0.03 K/UL (ref 0–0.04)
IMM GRANULOCYTES NFR BLD AUTO: 0.3 % (ref 0–0.5)
LYMPHOCYTES # BLD AUTO: 1.7 K/UL (ref 1–4.8)
LYMPHOCYTES NFR BLD: 14.9 % (ref 18–48)
MAGNESIUM SERPL-MCNC: 2 MG/DL (ref 1.6–2.6)
MCH RBC QN AUTO: 33.1 PG (ref 27–31)
MCHC RBC AUTO-ENTMCNC: 32.8 G/DL (ref 32–36)
MCV RBC AUTO: 101 FL (ref 82–98)
MONOCYTES # BLD AUTO: 0.6 K/UL (ref 0.3–1)
MONOCYTES NFR BLD: 5.3 % (ref 4–15)
NEUTROPHILS # BLD AUTO: 8.9 K/UL (ref 1.8–7.7)
NEUTROPHILS NFR BLD: 78.9 % (ref 38–73)
NRBC BLD-RTO: 0 /100 WBC
PHOSPHATE SERPL-MCNC: 3.1 MG/DL (ref 2.7–4.5)
PLATELET # BLD AUTO: 365 K/UL (ref 150–450)
PMV BLD AUTO: 9.8 FL (ref 9.2–12.9)
POCT GLUCOSE: 186 MG/DL (ref 70–110)
POCT GLUCOSE: 195 MG/DL (ref 70–110)
POCT GLUCOSE: 357 MG/DL (ref 70–110)
POTASSIUM SERPL-SCNC: 4.2 MMOL/L (ref 3.5–5.1)
PROT SERPL-MCNC: 6.2 G/DL (ref 6–8.4)
RBC # BLD AUTO: 3.26 M/UL (ref 4–5.4)
SODIUM SERPL-SCNC: 139 MMOL/L (ref 136–145)
WBC # BLD AUTO: 11.27 K/UL (ref 3.9–12.7)

## 2021-06-28 PROCEDURE — 36415 COLL VENOUS BLD VENIPUNCTURE: CPT | Performed by: NURSE PRACTITIONER

## 2021-06-28 PROCEDURE — 25000003 PHARM REV CODE 250: Performed by: INTERNAL MEDICINE

## 2021-06-28 PROCEDURE — 83735 ASSAY OF MAGNESIUM: CPT | Performed by: NURSE PRACTITIONER

## 2021-06-28 PROCEDURE — 80053 COMPREHEN METABOLIC PANEL: CPT | Performed by: NURSE PRACTITIONER

## 2021-06-28 PROCEDURE — 80100014 HC HEMODIALYSIS 1:1

## 2021-06-28 PROCEDURE — 84100 ASSAY OF PHOSPHORUS: CPT | Performed by: NURSE PRACTITIONER

## 2021-06-28 PROCEDURE — S0179 MEGESTROL 20 MG: HCPCS | Performed by: INTERNAL MEDICINE

## 2021-06-28 PROCEDURE — 63600175 PHARM REV CODE 636 W HCPCS: Performed by: INTERNAL MEDICINE

## 2021-06-28 PROCEDURE — 63700000 PHARM REV CODE 250 ALT 637 W/O HCPCS: Performed by: INTERNAL MEDICINE

## 2021-06-28 PROCEDURE — 85025 COMPLETE CBC W/AUTO DIFF WBC: CPT | Performed by: NURSE PRACTITIONER

## 2021-06-28 RX ORDER — FLUCONAZOLE 100 MG/1
100 TABLET ORAL DAILY
Qty: 5 TABLET | Refills: 0 | Status: SHIPPED | OUTPATIENT
Start: 2021-06-28 | End: 2021-07-28

## 2021-06-28 RX ORDER — PANTOPRAZOLE SODIUM 40 MG/1
40 TABLET, DELAYED RELEASE ORAL DAILY
Qty: 30 TABLET | Refills: 0 | Status: SHIPPED | OUTPATIENT
Start: 2021-06-28 | End: 2022-06-28

## 2021-06-28 RX ORDER — HEPARIN SODIUM 1000 [USP'U]/ML
4000 INJECTION, SOLUTION INTRAVENOUS; SUBCUTANEOUS
Status: DISCONTINUED | OUTPATIENT
Start: 2021-06-28 | End: 2021-06-28 | Stop reason: HOSPADM

## 2021-06-28 RX ADMIN — INSULIN ASPART 5 UNITS: 100 INJECTION, SOLUTION INTRAVENOUS; SUBCUTANEOUS at 05:06

## 2021-06-28 RX ADMIN — HEPARIN SODIUM 5000 UNITS: 5000 INJECTION, SOLUTION INTRAVENOUS; SUBCUTANEOUS at 11:06

## 2021-06-28 RX ADMIN — FLUCONAZOLE 100 MG: 100 TABLET ORAL at 11:06

## 2021-06-28 RX ADMIN — PANTOPRAZOLE SODIUM 40 MG: 40 TABLET, DELAYED RELEASE ORAL at 11:06

## 2021-06-28 RX ADMIN — MUPIROCIN: 20 OINTMENT TOPICAL at 11:06

## 2021-06-28 RX ADMIN — EPOETIN ALFA-EPBX 1800 UNITS: 4000 INJECTION, SOLUTION INTRAVENOUS; SUBCUTANEOUS at 09:06

## 2021-06-28 RX ADMIN — MEGESTROL ACETATE 200 MG: 40 SUSPENSION ORAL at 11:06

## 2021-06-29 ENCOUNTER — PATIENT OUTREACH (OUTPATIENT)
Dept: ADMINISTRATIVE | Facility: CLINIC | Age: 56
End: 2021-06-29

## 2021-06-29 ENCOUNTER — PATIENT MESSAGE (OUTPATIENT)
Dept: ADMINISTRATIVE | Facility: CLINIC | Age: 56
End: 2021-06-29

## 2021-06-30 ENCOUNTER — TELEPHONE (OUTPATIENT)
Dept: PULMONOLOGY | Facility: CLINIC | Age: 56
End: 2021-06-30

## 2021-06-30 ENCOUNTER — PATIENT MESSAGE (OUTPATIENT)
Dept: ADMINISTRATIVE | Facility: CLINIC | Age: 56
End: 2021-06-30

## 2021-07-01 ENCOUNTER — PATIENT MESSAGE (OUTPATIENT)
Dept: ADMINISTRATIVE | Facility: OTHER | Age: 56
End: 2021-07-01

## 2021-07-05 LAB — FUNGUS SPEC CULT: NORMAL

## 2021-07-07 ENCOUNTER — TELEPHONE (OUTPATIENT)
Dept: PULMONOLOGY | Facility: CLINIC | Age: 56
End: 2021-07-07

## 2021-07-08 ENCOUNTER — HOSPITAL ENCOUNTER (EMERGENCY)
Facility: HOSPITAL | Age: 56
Discharge: HOME | End: 2021-07-08
Attending: EMERGENCY MEDICINE
Payer: MEDICARE

## 2021-07-08 VITALS
TEMPERATURE: 98 F | RESPIRATION RATE: 16 BRPM | HEART RATE: 80 BPM | DIASTOLIC BLOOD PRESSURE: 68 MMHG | BODY MASS INDEX: 12.87 KG/M2 | SYSTOLIC BLOOD PRESSURE: 117 MMHG | WEIGHT: 82 LBS | HEIGHT: 67 IN | OXYGEN SATURATION: 100 %

## 2021-07-08 DIAGNOSIS — Z99.2 ESRD ON DIALYSIS: ICD-10-CM

## 2021-07-08 DIAGNOSIS — N18.6 ESRD ON DIALYSIS: ICD-10-CM

## 2021-07-08 DIAGNOSIS — N39.0 ACUTE UTI: ICD-10-CM

## 2021-07-08 DIAGNOSIS — R63.0 ANOREXIA: Primary | ICD-10-CM

## 2021-07-08 LAB
ALBUMIN SERPL BCP-MCNC: 2.3 G/DL (ref 3.5–5.2)
ALP SERPL-CCNC: 153 U/L (ref 55–135)
ALT SERPL W/O P-5'-P-CCNC: 17 U/L (ref 10–44)
ANION GAP SERPL CALC-SCNC: 17 MMOL/L (ref 8–16)
AST SERPL-CCNC: 21 U/L (ref 10–40)
BACTERIA #/AREA URNS HPF: ABNORMAL /HPF
BASOPHILS # BLD AUTO: 0.02 K/UL (ref 0–0.2)
BASOPHILS NFR BLD: 0.1 % (ref 0–1.9)
BILIRUB SERPL-MCNC: 0.3 MG/DL (ref 0.1–1)
BILIRUB UR QL STRIP: NEGATIVE
BUN SERPL-MCNC: 21 MG/DL (ref 6–20)
CALCIUM SERPL-MCNC: 10 MG/DL (ref 8.7–10.5)
CHLORIDE SERPL-SCNC: 97 MMOL/L (ref 95–110)
CLARITY UR: CLEAR
CO2 SERPL-SCNC: 26 MMOL/L (ref 23–29)
COLOR UR: YELLOW
CREAT SERPL-MCNC: 2.5 MG/DL (ref 0.5–1.4)
DIFFERENTIAL METHOD: ABNORMAL
EOSINOPHIL # BLD AUTO: 0 K/UL (ref 0–0.5)
EOSINOPHIL NFR BLD: 0.1 % (ref 0–8)
ERYTHROCYTE [DISTWIDTH] IN BLOOD BY AUTOMATED COUNT: 14.4 % (ref 11.5–14.5)
EST. GFR  (AFRICAN AMERICAN): 24 ML/MIN/1.73 M^2
EST. GFR  (NON AFRICAN AMERICAN): 21 ML/MIN/1.73 M^2
GLUCOSE SERPL-MCNC: 223 MG/DL (ref 70–110)
GLUCOSE UR QL STRIP: ABNORMAL
HCT VFR BLD AUTO: 36 % (ref 37–48.5)
HGB BLD-MCNC: 11.5 G/DL (ref 12–16)
HGB UR QL STRIP: ABNORMAL
HYALINE CASTS #/AREA URNS LPF: 0 /LPF
IMM GRANULOCYTES # BLD AUTO: 0.06 K/UL (ref 0–0.04)
IMM GRANULOCYTES NFR BLD AUTO: 0.4 % (ref 0–0.5)
KETONES UR QL STRIP: NEGATIVE
LEUKOCYTE ESTERASE UR QL STRIP: ABNORMAL
LYMPHOCYTES # BLD AUTO: 1.6 K/UL (ref 1–4.8)
LYMPHOCYTES NFR BLD: 9.7 % (ref 18–48)
MAGNESIUM SERPL-MCNC: 1.9 MG/DL (ref 1.6–2.6)
MCH RBC QN AUTO: 32.3 PG (ref 27–31)
MCHC RBC AUTO-ENTMCNC: 31.9 G/DL (ref 32–36)
MCV RBC AUTO: 101 FL (ref 82–98)
MICROSCOPIC COMMENT: ABNORMAL
MONOCYTES # BLD AUTO: 0.6 K/UL (ref 0.3–1)
MONOCYTES NFR BLD: 4 % (ref 4–15)
NEUTROPHILS # BLD AUTO: 13.7 K/UL (ref 1.8–7.7)
NEUTROPHILS NFR BLD: 85.7 % (ref 38–73)
NITRITE UR QL STRIP: NEGATIVE
NRBC BLD-RTO: 0 /100 WBC
PH UR STRIP: 6 [PH] (ref 5–8)
PLATELET # BLD AUTO: 453 K/UL (ref 150–450)
PMV BLD AUTO: 9.1 FL (ref 9.2–12.9)
POTASSIUM SERPL-SCNC: 3.3 MMOL/L (ref 3.5–5.1)
PROT SERPL-MCNC: 7.9 G/DL (ref 6–8.4)
PROT UR QL STRIP: ABNORMAL
RBC # BLD AUTO: 3.56 M/UL (ref 4–5.4)
RBC #/AREA URNS HPF: 2 /HPF (ref 0–4)
SODIUM SERPL-SCNC: 140 MMOL/L (ref 136–145)
SP GR UR STRIP: 1.02 (ref 1–1.03)
URN SPEC COLLECT METH UR: ABNORMAL
UROBILINOGEN UR STRIP-ACNC: NEGATIVE EU/DL
WBC # BLD AUTO: 15.99 K/UL (ref 3.9–12.7)
WBC #/AREA URNS HPF: >100 /HPF (ref 0–5)

## 2021-07-08 PROCEDURE — 83735 ASSAY OF MAGNESIUM: CPT | Performed by: EMERGENCY MEDICINE

## 2021-07-08 PROCEDURE — 36415 COLL VENOUS BLD VENIPUNCTURE: CPT | Performed by: EMERGENCY MEDICINE

## 2021-07-08 PROCEDURE — 81000 URINALYSIS NONAUTO W/SCOPE: CPT | Performed by: EMERGENCY MEDICINE

## 2021-07-08 PROCEDURE — 85025 COMPLETE CBC W/AUTO DIFF WBC: CPT | Performed by: EMERGENCY MEDICINE

## 2021-07-08 PROCEDURE — 87086 URINE CULTURE/COLONY COUNT: CPT | Performed by: EMERGENCY MEDICINE

## 2021-07-08 PROCEDURE — 80053 COMPREHEN METABOLIC PANEL: CPT | Performed by: EMERGENCY MEDICINE

## 2021-07-08 PROCEDURE — 99283 EMERGENCY DEPT VISIT LOW MDM: CPT

## 2021-07-09 NOTE — ASSESSMENT & PLAN NOTE
Double    Cough & runny nose    No other symptoms    I offer virtual visit but dad said he's at work and is unable to do it, he wants to know if we can send something to the pharmacy ,     [phone] Replace with oral supplementation.   Trend K

## 2021-07-10 LAB — BACTERIA UR CULT: NO GROWTH

## 2021-07-12 ENCOUNTER — TELEPHONE (OUTPATIENT)
Dept: GASTROENTEROLOGY | Facility: CLINIC | Age: 56
End: 2021-07-12

## 2021-07-16 ENCOUNTER — HOSPITAL ENCOUNTER (INPATIENT)
Facility: HOSPITAL | Age: 56
LOS: 5 days | Discharge: HOSPICE | DRG: 280 | End: 2021-07-21
Attending: EMERGENCY MEDICINE | Admitting: INTERNAL MEDICINE
Payer: MEDICARE

## 2021-07-16 DIAGNOSIS — R10.13 EPIGASTRIC ABDOMINAL PAIN: ICD-10-CM

## 2021-07-16 DIAGNOSIS — R10.13 EPIGASTRIC PAIN: Primary | ICD-10-CM

## 2021-07-16 DIAGNOSIS — I50.9 CHF (CONGESTIVE HEART FAILURE): ICD-10-CM

## 2021-07-16 DIAGNOSIS — R94.31 ABNORMAL EKG: ICD-10-CM

## 2021-07-16 DIAGNOSIS — R06.02 SOB (SHORTNESS OF BREATH): ICD-10-CM

## 2021-07-16 DIAGNOSIS — I50.33 ACUTE ON CHRONIC DIASTOLIC CONGESTIVE HEART FAILURE: ICD-10-CM

## 2021-07-16 DIAGNOSIS — R07.9 CHEST PAIN: ICD-10-CM

## 2021-07-16 PROBLEM — J96.01 ACUTE RESPIRATORY FAILURE WITH HYPOXIA: Status: ACTIVE | Noted: 2021-07-16

## 2021-07-16 LAB
ALBUMIN SERPL BCP-MCNC: 2.1 G/DL (ref 3.5–5.2)
ALP SERPL-CCNC: 161 U/L (ref 55–135)
ALT SERPL W/O P-5'-P-CCNC: 13 U/L (ref 10–44)
ANION GAP SERPL CALC-SCNC: 14 MMOL/L (ref 8–16)
ANION GAP SERPL CALC-SCNC: 9 MMOL/L (ref 8–16)
AST SERPL-CCNC: 16 U/L (ref 10–40)
BASOPHILS # BLD AUTO: 0.02 K/UL (ref 0–0.2)
BASOPHILS NFR BLD: 0.1 % (ref 0–1.9)
BILIRUB SERPL-MCNC: 0.3 MG/DL (ref 0.1–1)
BNP SERPL-MCNC: 1302 PG/ML (ref 0–99)
BUN SERPL-MCNC: 10 MG/DL (ref 6–20)
BUN SERPL-MCNC: 28 MG/DL (ref 6–20)
CALCIUM SERPL-MCNC: 10.6 MG/DL (ref 8.7–10.5)
CALCIUM SERPL-MCNC: 8.6 MG/DL (ref 8.7–10.5)
CHLORIDE SERPL-SCNC: 108 MMOL/L (ref 95–110)
CHLORIDE SERPL-SCNC: 95 MMOL/L (ref 95–110)
CO2 SERPL-SCNC: 24 MMOL/L (ref 23–29)
CO2 SERPL-SCNC: 29 MMOL/L (ref 23–29)
CREAT SERPL-MCNC: 1.1 MG/DL (ref 0.5–1.4)
CREAT SERPL-MCNC: 2.6 MG/DL (ref 0.5–1.4)
DIFFERENTIAL METHOD: ABNORMAL
EOSINOPHIL # BLD AUTO: 0 K/UL (ref 0–0.5)
EOSINOPHIL NFR BLD: 0.1 % (ref 0–8)
ERYTHROCYTE [DISTWIDTH] IN BLOOD BY AUTOMATED COUNT: 13.9 % (ref 11.5–14.5)
EST. GFR  (AFRICAN AMERICAN): 23 ML/MIN/1.73 M^2
EST. GFR  (AFRICAN AMERICAN): >60 ML/MIN/1.73 M^2
EST. GFR  (NON AFRICAN AMERICAN): 20 ML/MIN/1.73 M^2
EST. GFR  (NON AFRICAN AMERICAN): 55 ML/MIN/1.73 M^2
GLUCOSE SERPL-MCNC: 201 MG/DL (ref 70–110)
GLUCOSE SERPL-MCNC: 339 MG/DL (ref 70–110)
HCT VFR BLD AUTO: 31 % (ref 37–48.5)
HGB BLD-MCNC: 9.9 G/DL (ref 12–16)
IMM GRANULOCYTES # BLD AUTO: 0.04 K/UL (ref 0–0.04)
IMM GRANULOCYTES NFR BLD AUTO: 0.3 % (ref 0–0.5)
LACTATE SERPL-SCNC: 0.9 MMOL/L (ref 0.5–2.2)
LACTATE SERPL-SCNC: 2.7 MMOL/L (ref 0.5–2.2)
LACTATE SERPL-SCNC: 3.6 MMOL/L (ref 0.5–2.2)
LYMPHOCYTES # BLD AUTO: 1 K/UL (ref 1–4.8)
LYMPHOCYTES NFR BLD: 6.3 % (ref 18–48)
MCH RBC QN AUTO: 32.4 PG (ref 27–31)
MCHC RBC AUTO-ENTMCNC: 31.9 G/DL (ref 32–36)
MCV RBC AUTO: 101 FL (ref 82–98)
MONOCYTES # BLD AUTO: 0.5 K/UL (ref 0.3–1)
MONOCYTES NFR BLD: 3.1 % (ref 4–15)
NEUTROPHILS # BLD AUTO: 13.9 K/UL (ref 1.8–7.7)
NEUTROPHILS NFR BLD: 90.1 % (ref 38–73)
NRBC BLD-RTO: 0 /100 WBC
PLATELET # BLD AUTO: 572 K/UL (ref 150–450)
PMV BLD AUTO: 9.1 FL (ref 9.2–12.9)
POCT GLUCOSE: 163 MG/DL (ref 70–110)
POCT GLUCOSE: 251 MG/DL (ref 70–110)
POCT GLUCOSE: 264 MG/DL (ref 70–110)
POCT GLUCOSE: 349 MG/DL (ref 70–110)
POCT GLUCOSE: 413 MG/DL (ref 70–110)
POCT GLUCOSE: 445 MG/DL (ref 70–110)
POTASSIUM SERPL-SCNC: 3 MMOL/L (ref 3.5–5.1)
POTASSIUM SERPL-SCNC: 3.6 MMOL/L (ref 3.5–5.1)
PROCALCITONIN SERPL IA-MCNC: 16.34 NG/ML
PROT SERPL-MCNC: 7.3 G/DL (ref 6–8.4)
RBC # BLD AUTO: 3.06 M/UL (ref 4–5.4)
SODIUM SERPL-SCNC: 138 MMOL/L (ref 136–145)
SODIUM SERPL-SCNC: 141 MMOL/L (ref 136–145)
TROPONIN I SERPL DL<=0.01 NG/ML-MCNC: 0.13 NG/ML (ref 0–0.03)
WBC # BLD AUTO: 15.4 K/UL (ref 3.9–12.7)

## 2021-07-16 PROCEDURE — 96375 TX/PRO/DX INJ NEW DRUG ADDON: CPT

## 2021-07-16 PROCEDURE — 80048 BASIC METABOLIC PNL TOTAL CA: CPT | Performed by: NURSE PRACTITIONER

## 2021-07-16 PROCEDURE — 83605 ASSAY OF LACTIC ACID: CPT | Mod: 91 | Performed by: INTERNAL MEDICINE

## 2021-07-16 PROCEDURE — 84484 ASSAY OF TROPONIN QUANT: CPT | Performed by: EMERGENCY MEDICINE

## 2021-07-16 PROCEDURE — 83605 ASSAY OF LACTIC ACID: CPT | Mod: 91 | Performed by: NURSE PRACTITIONER

## 2021-07-16 PROCEDURE — 36415 COLL VENOUS BLD VENIPUNCTURE: CPT | Performed by: NURSE PRACTITIONER

## 2021-07-16 PROCEDURE — 63700000 PHARM REV CODE 250 ALT 637 W/O HCPCS: Performed by: NURSE PRACTITIONER

## 2021-07-16 PROCEDURE — 80100014 HC HEMODIALYSIS 1:1

## 2021-07-16 PROCEDURE — 63600175 PHARM REV CODE 636 W HCPCS: Performed by: NURSE PRACTITIONER

## 2021-07-16 PROCEDURE — 83880 ASSAY OF NATRIURETIC PEPTIDE: CPT | Performed by: EMERGENCY MEDICINE

## 2021-07-16 PROCEDURE — 25000242 PHARM REV CODE 250 ALT 637 W/ HCPCS: Performed by: EMERGENCY MEDICINE

## 2021-07-16 PROCEDURE — 99223 1ST HOSP IP/OBS HIGH 75: CPT | Mod: ,,, | Performed by: INTERNAL MEDICINE

## 2021-07-16 PROCEDURE — P9047 ALBUMIN (HUMAN), 25%, 50ML: HCPCS | Performed by: INTERNAL MEDICINE

## 2021-07-16 PROCEDURE — 80053 COMPREHEN METABOLIC PANEL: CPT | Performed by: EMERGENCY MEDICINE

## 2021-07-16 PROCEDURE — 25000003 PHARM REV CODE 250: Performed by: NURSE PRACTITIONER

## 2021-07-16 PROCEDURE — 84145 PROCALCITONIN (PCT): CPT | Performed by: NURSE PRACTITIONER

## 2021-07-16 PROCEDURE — 96365 THER/PROPH/DIAG IV INF INIT: CPT

## 2021-07-16 PROCEDURE — 94761 N-INVAS EAR/PLS OXIMETRY MLT: CPT

## 2021-07-16 PROCEDURE — 25000003 PHARM REV CODE 250: Performed by: EMERGENCY MEDICINE

## 2021-07-16 PROCEDURE — 12000002 HC ACUTE/MED SURGE SEMI-PRIVATE ROOM

## 2021-07-16 PROCEDURE — 36415 COLL VENOUS BLD VENIPUNCTURE: CPT | Performed by: INTERNAL MEDICINE

## 2021-07-16 PROCEDURE — 99285 EMERGENCY DEPT VISIT HI MDM: CPT | Mod: 25

## 2021-07-16 PROCEDURE — 84484 ASSAY OF TROPONIN QUANT: CPT | Mod: 91 | Performed by: NURSE PRACTITIONER

## 2021-07-16 PROCEDURE — 36415 COLL VENOUS BLD VENIPUNCTURE: CPT | Performed by: EMERGENCY MEDICINE

## 2021-07-16 PROCEDURE — 94640 AIRWAY INHALATION TREATMENT: CPT

## 2021-07-16 PROCEDURE — 99223 PR INITIAL HOSPITAL CARE,LEVL III: ICD-10-PCS | Mod: ,,, | Performed by: INTERNAL MEDICINE

## 2021-07-16 PROCEDURE — 93005 ELECTROCARDIOGRAM TRACING: CPT

## 2021-07-16 PROCEDURE — 27000221 HC OXYGEN, UP TO 24 HOURS

## 2021-07-16 PROCEDURE — 63600175 PHARM REV CODE 636 W HCPCS: Performed by: INTERNAL MEDICINE

## 2021-07-16 PROCEDURE — 93010 EKG 12-LEAD: ICD-10-PCS | Mod: ,,, | Performed by: INTERNAL MEDICINE

## 2021-07-16 PROCEDURE — 87040 BLOOD CULTURE FOR BACTERIA: CPT | Performed by: NURSE PRACTITIONER

## 2021-07-16 PROCEDURE — 93010 ELECTROCARDIOGRAM REPORT: CPT | Mod: ,,, | Performed by: INTERNAL MEDICINE

## 2021-07-16 PROCEDURE — 85025 COMPLETE CBC W/AUTO DIFF WBC: CPT | Performed by: EMERGENCY MEDICINE

## 2021-07-16 PROCEDURE — 63600175 PHARM REV CODE 636 W HCPCS: Performed by: EMERGENCY MEDICINE

## 2021-07-16 PROCEDURE — 25000242 PHARM REV CODE 250 ALT 637 W/ HCPCS: Performed by: NURSE PRACTITIONER

## 2021-07-16 RX ORDER — GLUCAGON 1 MG
1 KIT INJECTION
Status: DISCONTINUED | OUTPATIENT
Start: 2021-07-16 | End: 2021-07-21 | Stop reason: HOSPADM

## 2021-07-16 RX ORDER — SODIUM CHLORIDE 0.9 % (FLUSH) 0.9 %
10 SYRINGE (ML) INJECTION
Status: DISCONTINUED | OUTPATIENT
Start: 2021-07-16 | End: 2021-07-21 | Stop reason: HOSPADM

## 2021-07-16 RX ORDER — HEPARIN SODIUM 5000 [USP'U]/ML
5000 INJECTION, SOLUTION INTRAVENOUS; SUBCUTANEOUS EVERY 8 HOURS
Status: DISCONTINUED | OUTPATIENT
Start: 2021-07-16 | End: 2021-07-17

## 2021-07-16 RX ORDER — QUETIAPINE FUMARATE 25 MG/1
100 TABLET, FILM COATED ORAL NIGHTLY
Status: DISCONTINUED | OUTPATIENT
Start: 2021-07-16 | End: 2021-07-21 | Stop reason: HOSPADM

## 2021-07-16 RX ORDER — HEPARIN SODIUM 1000 [USP'U]/ML
4000 INJECTION, SOLUTION INTRAVENOUS; SUBCUTANEOUS
Status: DISCONTINUED | OUTPATIENT
Start: 2021-07-16 | End: 2021-07-21 | Stop reason: HOSPADM

## 2021-07-16 RX ORDER — INSULIN ASPART 100 [IU]/ML
1-10 INJECTION, SOLUTION INTRAVENOUS; SUBCUTANEOUS
Status: DISCONTINUED | OUTPATIENT
Start: 2021-07-16 | End: 2021-07-21 | Stop reason: HOSPADM

## 2021-07-16 RX ORDER — IBUPROFEN 200 MG
24 TABLET ORAL
Status: DISCONTINUED | OUTPATIENT
Start: 2021-07-16 | End: 2021-07-21 | Stop reason: HOSPADM

## 2021-07-16 RX ORDER — ALBUMIN HUMAN 250 G/1000ML
25 SOLUTION INTRAVENOUS ONCE
Status: COMPLETED | OUTPATIENT
Start: 2021-07-16 | End: 2021-07-16

## 2021-07-16 RX ORDER — FERROUS SULFATE 325(65) MG
325 TABLET, DELAYED RELEASE (ENTERIC COATED) ORAL 2 TIMES DAILY
Status: DISCONTINUED | OUTPATIENT
Start: 2021-07-16 | End: 2021-07-21 | Stop reason: HOSPADM

## 2021-07-16 RX ORDER — PANTOPRAZOLE SODIUM 40 MG/1
40 TABLET, DELAYED RELEASE ORAL DAILY
Status: DISCONTINUED | OUTPATIENT
Start: 2021-07-16 | End: 2021-07-19

## 2021-07-16 RX ORDER — IPRATROPIUM BROMIDE AND ALBUTEROL SULFATE 2.5; .5 MG/3ML; MG/3ML
3 SOLUTION RESPIRATORY (INHALATION) EVERY 6 HOURS
Status: DISCONTINUED | OUTPATIENT
Start: 2021-07-16 | End: 2021-07-21 | Stop reason: HOSPADM

## 2021-07-16 RX ORDER — MEGESTROL ACETATE 40 MG/1
40 TABLET ORAL DAILY
Status: DISCONTINUED | OUTPATIENT
Start: 2021-07-16 | End: 2021-07-21 | Stop reason: HOSPADM

## 2021-07-16 RX ORDER — TALC
6 POWDER (GRAM) TOPICAL NIGHTLY PRN
Status: DISCONTINUED | OUTPATIENT
Start: 2021-07-16 | End: 2021-07-21 | Stop reason: HOSPADM

## 2021-07-16 RX ORDER — IBUPROFEN 200 MG
16 TABLET ORAL
Status: DISCONTINUED | OUTPATIENT
Start: 2021-07-16 | End: 2021-07-21 | Stop reason: HOSPADM

## 2021-07-16 RX ORDER — AMOXICILLIN 250 MG
1 CAPSULE ORAL 2 TIMES DAILY PRN
Status: DISCONTINUED | OUTPATIENT
Start: 2021-07-16 | End: 2021-07-21 | Stop reason: HOSPADM

## 2021-07-16 RX ORDER — NAPROXEN SODIUM 220 MG/1
81 TABLET, FILM COATED ORAL DAILY
Status: DISCONTINUED | OUTPATIENT
Start: 2021-07-16 | End: 2021-07-17

## 2021-07-16 RX ORDER — IPRATROPIUM BROMIDE AND ALBUTEROL SULFATE 2.5; .5 MG/3ML; MG/3ML
3 SOLUTION RESPIRATORY (INHALATION)
Status: COMPLETED | OUTPATIENT
Start: 2021-07-16 | End: 2021-07-16

## 2021-07-16 RX ORDER — ACETAMINOPHEN 325 MG/1
650 TABLET ORAL EVERY 4 HOURS PRN
Status: DISCONTINUED | OUTPATIENT
Start: 2021-07-16 | End: 2021-07-21 | Stop reason: HOSPADM

## 2021-07-16 RX ORDER — FLUCONAZOLE 100 MG/1
100 TABLET ORAL DAILY
Status: DISCONTINUED | OUTPATIENT
Start: 2021-07-16 | End: 2021-07-21 | Stop reason: HOSPADM

## 2021-07-16 RX ORDER — ATORVASTATIN CALCIUM 40 MG/1
80 TABLET, FILM COATED ORAL DAILY
Status: DISCONTINUED | OUTPATIENT
Start: 2021-07-16 | End: 2021-07-19

## 2021-07-16 RX ORDER — ASCORBIC ACID 500 MG
500 TABLET ORAL NIGHTLY
Status: DISCONTINUED | OUTPATIENT
Start: 2021-07-16 | End: 2021-07-21 | Stop reason: HOSPADM

## 2021-07-16 RX ORDER — ONDANSETRON 2 MG/ML
4 INJECTION INTRAMUSCULAR; INTRAVENOUS EVERY 8 HOURS PRN
Status: DISCONTINUED | OUTPATIENT
Start: 2021-07-16 | End: 2021-07-21 | Stop reason: HOSPADM

## 2021-07-16 RX ORDER — INSULIN ASPART 100 [IU]/ML
5 INJECTION, SOLUTION INTRAVENOUS; SUBCUTANEOUS ONCE
Status: COMPLETED | OUTPATIENT
Start: 2021-07-16 | End: 2021-07-16

## 2021-07-16 RX ADMIN — FERROUS SULFATE TAB EC 325 MG (65 MG FE EQUIVALENT) 325 MG: 325 (65 FE) TABLET DELAYED RESPONSE at 09:07

## 2021-07-16 RX ADMIN — INSULIN ASPART 8 UNITS: 100 INJECTION, SOLUTION INTRAVENOUS; SUBCUTANEOUS at 09:07

## 2021-07-16 RX ADMIN — OXYCODONE HYDROCHLORIDE AND ACETAMINOPHEN 500 MG: 500 TABLET ORAL at 09:07

## 2021-07-16 RX ADMIN — IPRATROPIUM BROMIDE AND ALBUTEROL SULFATE 3 ML: .5; 3 SOLUTION RESPIRATORY (INHALATION) at 12:07

## 2021-07-16 RX ADMIN — PANTOPRAZOLE SODIUM 40 MG: 40 TABLET, DELAYED RELEASE ORAL at 09:07

## 2021-07-16 RX ADMIN — MEGESTROL ACETATE 40 MG: 40 TABLET ORAL at 09:07

## 2021-07-16 RX ADMIN — HEPARIN SODIUM 5000 UNITS: 5000 INJECTION INTRAVENOUS; SUBCUTANEOUS at 09:07

## 2021-07-16 RX ADMIN — IPRATROPIUM BROMIDE AND ALBUTEROL SULFATE 3 ML: .5; 3 SOLUTION RESPIRATORY (INHALATION) at 07:07

## 2021-07-16 RX ADMIN — PIPERACILLIN AND TAZOBACTAM 4.5 G: 4; .5 INJECTION, POWDER, LYOPHILIZED, FOR SOLUTION INTRAVENOUS; PARENTERAL at 05:07

## 2021-07-16 RX ADMIN — INSULIN ASPART 5 UNITS: 100 INJECTION, SOLUTION INTRAVENOUS; SUBCUTANEOUS at 01:07

## 2021-07-16 RX ADMIN — PIPERACILLIN AND TAZOBACTAM 4.5 G: 4; .5 INJECTION, POWDER, LYOPHILIZED, FOR SOLUTION INTRAVENOUS; PARENTERAL at 06:07

## 2021-07-16 RX ADMIN — INSULIN ASPART 3 UNITS: 100 INJECTION, SOLUTION INTRAVENOUS; SUBCUTANEOUS at 09:07

## 2021-07-16 RX ADMIN — QUETIAPINE FUMARATE 100 MG: 25 TABLET ORAL at 09:07

## 2021-07-16 RX ADMIN — ALBUMIN (HUMAN) 25 G: 12.5 SOLUTION INTRAVENOUS at 05:07

## 2021-07-16 RX ADMIN — INSULIN ASPART 2 UNITS: 100 INJECTION, SOLUTION INTRAVENOUS; SUBCUTANEOUS at 05:07

## 2021-07-16 RX ADMIN — ASPIRIN 81 MG CHEWABLE TABLET 81 MG: 81 TABLET CHEWABLE at 09:07

## 2021-07-16 RX ADMIN — HEPARIN SODIUM 4000 UNITS: 1000 INJECTION, SOLUTION INTRAVENOUS; SUBCUTANEOUS at 05:07

## 2021-07-16 RX ADMIN — FLUCONAZOLE 100 MG: 100 TABLET ORAL at 09:07

## 2021-07-16 RX ADMIN — ATORVASTATIN CALCIUM 80 MG: 40 TABLET, FILM COATED ORAL at 09:07

## 2021-07-16 RX ADMIN — IPRATROPIUM BROMIDE AND ALBUTEROL SULFATE 3 ML: .5; 2.5 SOLUTION RESPIRATORY (INHALATION) at 05:07

## 2021-07-16 RX ADMIN — VANCOMYCIN HYDROCHLORIDE 750 MG: 750 INJECTION, POWDER, LYOPHILIZED, FOR SOLUTION INTRAVENOUS at 07:07

## 2021-07-16 RX ADMIN — INSULIN ASPART 10 UNITS: 100 INJECTION, SOLUTION INTRAVENOUS; SUBCUTANEOUS at 12:07

## 2021-07-17 ENCOUNTER — OUTSIDE PLACE OF SERVICE (OUTPATIENT)
Dept: PULMONOLOGY | Facility: CLINIC | Age: 56
End: 2021-07-17
Payer: MEDICARE

## 2021-07-17 DIAGNOSIS — I50.33 ACUTE ON CHRONIC DIASTOLIC CONGESTIVE HEART FAILURE: ICD-10-CM

## 2021-07-17 PROBLEM — I21.4 NSTEMI (NON-ST ELEVATED MYOCARDIAL INFARCTION): Status: ACTIVE | Noted: 2021-07-17

## 2021-07-17 LAB
ALBUMIN SERPL BCP-MCNC: 2.7 G/DL (ref 3.5–5.2)
ALP SERPL-CCNC: 155 U/L (ref 55–135)
ALT SERPL W/O P-5'-P-CCNC: 13 U/L (ref 10–44)
ANION GAP SERPL CALC-SCNC: 13 MMOL/L (ref 8–16)
APTT BLDCRRT: 36.6 SEC (ref 21–32)
AST SERPL-CCNC: 19 U/L (ref 10–40)
BASOPHILS # BLD AUTO: 0.03 K/UL (ref 0–0.2)
BASOPHILS # BLD AUTO: 0.04 K/UL (ref 0–0.2)
BASOPHILS NFR BLD: 0.2 % (ref 0–1.9)
BASOPHILS NFR BLD: 0.2 % (ref 0–1.9)
BILIRUB SERPL-MCNC: 0.4 MG/DL (ref 0.1–1)
BNP SERPL-MCNC: 1347 PG/ML (ref 0–99)
BUN SERPL-MCNC: 15 MG/DL (ref 6–20)
CALCIUM SERPL-MCNC: 10.2 MG/DL (ref 8.7–10.5)
CHLORIDE SERPL-SCNC: 105 MMOL/L (ref 95–110)
CO2 SERPL-SCNC: 23 MMOL/L (ref 23–29)
CREAT SERPL-MCNC: 1.7 MG/DL (ref 0.5–1.4)
DIFFERENTIAL METHOD: ABNORMAL
DIFFERENTIAL METHOD: ABNORMAL
EOSINOPHIL # BLD AUTO: 0 K/UL (ref 0–0.5)
EOSINOPHIL # BLD AUTO: 0 K/UL (ref 0–0.5)
EOSINOPHIL NFR BLD: 0.1 % (ref 0–8)
EOSINOPHIL NFR BLD: 0.1 % (ref 0–8)
ERYTHROCYTE [DISTWIDTH] IN BLOOD BY AUTOMATED COUNT: 14.3 % (ref 11.5–14.5)
ERYTHROCYTE [DISTWIDTH] IN BLOOD BY AUTOMATED COUNT: 14.4 % (ref 11.5–14.5)
EST. GFR  (AFRICAN AMERICAN): 38 ML/MIN/1.73 M^2
EST. GFR  (NON AFRICAN AMERICAN): 33 ML/MIN/1.73 M^2
GLUCOSE SERPL-MCNC: 206 MG/DL (ref 70–110)
HCT VFR BLD AUTO: 27.7 % (ref 37–48.5)
HCT VFR BLD AUTO: 31.2 % (ref 37–48.5)
HGB BLD-MCNC: 10 G/DL (ref 12–16)
HGB BLD-MCNC: 9 G/DL (ref 12–16)
IMM GRANULOCYTES # BLD AUTO: 0.04 K/UL (ref 0–0.04)
IMM GRANULOCYTES # BLD AUTO: 0.07 K/UL (ref 0–0.04)
IMM GRANULOCYTES NFR BLD AUTO: 0.3 % (ref 0–0.5)
IMM GRANULOCYTES NFR BLD AUTO: 0.4 % (ref 0–0.5)
INR PPP: 1.1 (ref 0.8–1.2)
LACTATE SERPL-SCNC: 2.5 MMOL/L (ref 0.5–2.2)
LYMPHOCYTES # BLD AUTO: 0.8 K/UL (ref 1–4.8)
LYMPHOCYTES # BLD AUTO: 0.8 K/UL (ref 1–4.8)
LYMPHOCYTES NFR BLD: 4.3 % (ref 18–48)
LYMPHOCYTES NFR BLD: 6.2 % (ref 18–48)
MAGNESIUM SERPL-MCNC: 2.1 MG/DL (ref 1.6–2.6)
MCH RBC QN AUTO: 32.4 PG (ref 27–31)
MCH RBC QN AUTO: 33.1 PG (ref 27–31)
MCHC RBC AUTO-ENTMCNC: 32.1 G/DL (ref 32–36)
MCHC RBC AUTO-ENTMCNC: 32.5 G/DL (ref 32–36)
MCV RBC AUTO: 101 FL (ref 82–98)
MCV RBC AUTO: 102 FL (ref 82–98)
MONOCYTES # BLD AUTO: 0.4 K/UL (ref 0.3–1)
MONOCYTES # BLD AUTO: 0.5 K/UL (ref 0.3–1)
MONOCYTES NFR BLD: 2.8 % (ref 4–15)
MONOCYTES NFR BLD: 3 % (ref 4–15)
NEUTROPHILS # BLD AUTO: 12.1 K/UL (ref 1.8–7.7)
NEUTROPHILS # BLD AUTO: 16.2 K/UL (ref 1.8–7.7)
NEUTROPHILS NFR BLD: 90.2 % (ref 38–73)
NEUTROPHILS NFR BLD: 92.2 % (ref 38–73)
NRBC BLD-RTO: 0 /100 WBC
NRBC BLD-RTO: 0 /100 WBC
PHOSPHATE SERPL-MCNC: 2.4 MG/DL (ref 2.7–4.5)
PLATELET # BLD AUTO: 533 K/UL (ref 150–450)
PLATELET # BLD AUTO: 670 K/UL (ref 150–450)
PMV BLD AUTO: 9.3 FL (ref 9.2–12.9)
PMV BLD AUTO: 9.4 FL (ref 9.2–12.9)
POCT GLUCOSE: 192 MG/DL (ref 70–110)
POCT GLUCOSE: 210 MG/DL (ref 70–110)
POCT GLUCOSE: 213 MG/DL (ref 70–110)
POCT GLUCOSE: 236 MG/DL (ref 70–110)
POTASSIUM SERPL-SCNC: 3.3 MMOL/L (ref 3.5–5.1)
PROT SERPL-MCNC: 7.7 G/DL (ref 6–8.4)
PROTHROMBIN TIME: 11.3 SEC (ref 9–12.5)
RBC # BLD AUTO: 2.72 M/UL (ref 4–5.4)
RBC # BLD AUTO: 3.09 M/UL (ref 4–5.4)
SODIUM SERPL-SCNC: 141 MMOL/L (ref 136–145)
TROPONIN I SERPL DL<=0.01 NG/ML-MCNC: 0.08 NG/ML (ref 0–0.03)
VANCOMYCIN SERPL-MCNC: 10.2 UG/ML
WBC # BLD AUTO: 13.46 K/UL (ref 3.9–12.7)
WBC # BLD AUTO: 17.62 K/UL (ref 3.9–12.7)

## 2021-07-17 PROCEDURE — 83735 ASSAY OF MAGNESIUM: CPT | Performed by: NURSE PRACTITIONER

## 2021-07-17 PROCEDURE — 36415 COLL VENOUS BLD VENIPUNCTURE: CPT | Performed by: NURSE PRACTITIONER

## 2021-07-17 PROCEDURE — 80202 ASSAY OF VANCOMYCIN: CPT | Performed by: INTERNAL MEDICINE

## 2021-07-17 PROCEDURE — 83880 ASSAY OF NATRIURETIC PEPTIDE: CPT | Performed by: INTERNAL MEDICINE

## 2021-07-17 PROCEDURE — 99223 1ST HOSP IP/OBS HIGH 75: CPT | Mod: ,,, | Performed by: INTERNAL MEDICINE

## 2021-07-17 PROCEDURE — 85610 PROTHROMBIN TIME: CPT | Performed by: NURSE PRACTITIONER

## 2021-07-17 PROCEDURE — 80053 COMPREHEN METABOLIC PANEL: CPT | Performed by: NURSE PRACTITIONER

## 2021-07-17 PROCEDURE — 63600175 PHARM REV CODE 636 W HCPCS: Performed by: INTERNAL MEDICINE

## 2021-07-17 PROCEDURE — 25000003 PHARM REV CODE 250: Performed by: INTERNAL MEDICINE

## 2021-07-17 PROCEDURE — 83605 ASSAY OF LACTIC ACID: CPT | Performed by: NURSE PRACTITIONER

## 2021-07-17 PROCEDURE — 94761 N-INVAS EAR/PLS OXIMETRY MLT: CPT

## 2021-07-17 PROCEDURE — 85025 COMPLETE CBC W/AUTO DIFF WBC: CPT | Mod: 91 | Performed by: NURSE PRACTITIONER

## 2021-07-17 PROCEDURE — 36415 COLL VENOUS BLD VENIPUNCTURE: CPT | Performed by: INTERNAL MEDICINE

## 2021-07-17 PROCEDURE — 27000221 HC OXYGEN, UP TO 24 HOURS

## 2021-07-17 PROCEDURE — 12000002 HC ACUTE/MED SURGE SEMI-PRIVATE ROOM

## 2021-07-17 PROCEDURE — 84484 ASSAY OF TROPONIN QUANT: CPT | Performed by: INTERNAL MEDICINE

## 2021-07-17 PROCEDURE — 63600175 PHARM REV CODE 636 W HCPCS: Performed by: NURSE PRACTITIONER

## 2021-07-17 PROCEDURE — 99232 SBSQ HOSP IP/OBS MODERATE 35: CPT | Mod: S$GLB,,, | Performed by: INTERNAL MEDICINE

## 2021-07-17 PROCEDURE — 85730 THROMBOPLASTIN TIME PARTIAL: CPT | Performed by: NURSE PRACTITIONER

## 2021-07-17 PROCEDURE — 63700000 PHARM REV CODE 250 ALT 637 W/O HCPCS: Performed by: NURSE PRACTITIONER

## 2021-07-17 PROCEDURE — 99223 PR INITIAL HOSPITAL CARE,LEVL III: ICD-10-PCS | Mod: ,,, | Performed by: INTERNAL MEDICINE

## 2021-07-17 PROCEDURE — 25000242 PHARM REV CODE 250 ALT 637 W/ HCPCS: Performed by: NURSE PRACTITIONER

## 2021-07-17 PROCEDURE — 99232 PR SUBSEQUENT HOSPITAL CARE,LEVL II: ICD-10-PCS | Mod: S$GLB,,, | Performed by: INTERNAL MEDICINE

## 2021-07-17 PROCEDURE — 94640 AIRWAY INHALATION TREATMENT: CPT

## 2021-07-17 PROCEDURE — 25000003 PHARM REV CODE 250: Performed by: NURSE PRACTITIONER

## 2021-07-17 PROCEDURE — 85025 COMPLETE CBC W/AUTO DIFF WBC: CPT | Performed by: NURSE PRACTITIONER

## 2021-07-17 PROCEDURE — 84100 ASSAY OF PHOSPHORUS: CPT | Performed by: NURSE PRACTITIONER

## 2021-07-17 PROCEDURE — 25500020 PHARM REV CODE 255

## 2021-07-17 RX ORDER — HEPARIN SODIUM,PORCINE/D5W 25000/250
0-40 INTRAVENOUS SOLUTION INTRAVENOUS CONTINUOUS
Status: DISCONTINUED | OUTPATIENT
Start: 2021-07-17 | End: 2021-07-17 | Stop reason: SDUPTHER

## 2021-07-17 RX ORDER — HEPARIN SODIUM,PORCINE/D5W 25000/250
0-40 INTRAVENOUS SOLUTION INTRAVENOUS CONTINUOUS
Status: DISPENSED | OUTPATIENT
Start: 2021-07-17 | End: 2021-07-19

## 2021-07-17 RX ORDER — ASPIRIN 325 MG
325 TABLET ORAL DAILY
Status: DISCONTINUED | OUTPATIENT
Start: 2021-07-18 | End: 2021-07-18

## 2021-07-17 RX ADMIN — FLUCONAZOLE 100 MG: 100 TABLET ORAL at 08:07

## 2021-07-17 RX ADMIN — ASPIRIN 81 MG CHEWABLE TABLET 81 MG: 81 TABLET CHEWABLE at 08:07

## 2021-07-17 RX ADMIN — PIPERACILLIN AND TAZOBACTAM 4.5 G: 4; .5 INJECTION, POWDER, LYOPHILIZED, FOR SOLUTION INTRAVENOUS; PARENTERAL at 05:07

## 2021-07-17 RX ADMIN — IOHEXOL 60 ML: 350 INJECTION, SOLUTION INTRAVENOUS at 11:07

## 2021-07-17 RX ADMIN — OXYCODONE HYDROCHLORIDE AND ACETAMINOPHEN 500 MG: 500 TABLET ORAL at 11:07

## 2021-07-17 RX ADMIN — VANCOMYCIN HYDROCHLORIDE 500 MG: 500 INJECTION, POWDER, LYOPHILIZED, FOR SOLUTION INTRAVENOUS at 08:07

## 2021-07-17 RX ADMIN — PANTOPRAZOLE SODIUM 40 MG: 40 TABLET, DELAYED RELEASE ORAL at 08:07

## 2021-07-17 RX ADMIN — INSULIN ASPART 2 UNITS: 100 INJECTION, SOLUTION INTRAVENOUS; SUBCUTANEOUS at 11:07

## 2021-07-17 RX ADMIN — IPRATROPIUM BROMIDE AND ALBUTEROL SULFATE 3 ML: .5; 3 SOLUTION RESPIRATORY (INHALATION) at 01:07

## 2021-07-17 RX ADMIN — IPRATROPIUM BROMIDE AND ALBUTEROL SULFATE 3 ML: .5; 3 SOLUTION RESPIRATORY (INHALATION) at 06:07

## 2021-07-17 RX ADMIN — FERROUS SULFATE TAB EC 325 MG (65 MG FE EQUIVALENT) 325 MG: 325 (65 FE) TABLET DELAYED RESPONSE at 08:07

## 2021-07-17 RX ADMIN — FERROUS SULFATE TAB EC 325 MG (65 MG FE EQUIVALENT) 325 MG: 325 (65 FE) TABLET DELAYED RESPONSE at 11:07

## 2021-07-17 RX ADMIN — INSULIN ASPART 4 UNITS: 100 INJECTION, SOLUTION INTRAVENOUS; SUBCUTANEOUS at 05:07

## 2021-07-17 RX ADMIN — HEPARIN SODIUM 5000 UNITS: 5000 INJECTION INTRAVENOUS; SUBCUTANEOUS at 05:07

## 2021-07-17 RX ADMIN — ATORVASTATIN CALCIUM 80 MG: 40 TABLET, FILM COATED ORAL at 08:07

## 2021-07-17 RX ADMIN — IPRATROPIUM BROMIDE AND ALBUTEROL SULFATE 3 ML: .5; 3 SOLUTION RESPIRATORY (INHALATION) at 07:07

## 2021-07-17 RX ADMIN — MEGESTROL ACETATE 40 MG: 40 TABLET ORAL at 08:07

## 2021-07-17 RX ADMIN — IPRATROPIUM BROMIDE AND ALBUTEROL SULFATE 3 ML: .5; 3 SOLUTION RESPIRATORY (INHALATION) at 12:07

## 2021-07-17 RX ADMIN — POTASSIUM BICARBONATE 20 MEQ: 391 TABLET, EFFERVESCENT ORAL at 02:07

## 2021-07-17 RX ADMIN — HEPARIN SODIUM 5000 UNITS: 5000 INJECTION INTRAVENOUS; SUBCUTANEOUS at 02:07

## 2021-07-17 RX ADMIN — HEPARIN SODIUM 12 UNITS/KG/HR: 10000 INJECTION, SOLUTION INTRAVENOUS at 11:07

## 2021-07-17 RX ADMIN — QUETIAPINE FUMARATE 100 MG: 25 TABLET ORAL at 11:07

## 2021-07-18 PROBLEM — R10.13 EPIGASTRIC PAIN: Status: ACTIVE | Noted: 2021-07-18

## 2021-07-18 LAB
ABO + RH BLD: NORMAL
ALBUMIN SERPL BCP-MCNC: 2.2 G/DL (ref 3.5–5.2)
ALP SERPL-CCNC: 143 U/L (ref 55–135)
ALT SERPL W/O P-5'-P-CCNC: 11 U/L (ref 10–44)
ANION GAP SERPL CALC-SCNC: 13 MMOL/L (ref 8–16)
APTT BLDCRRT: 32.7 SEC (ref 21–32)
APTT BLDCRRT: 32.9 SEC (ref 21–32)
APTT BLDCRRT: 42.5 SEC (ref 21–32)
AST SERPL-CCNC: 16 U/L (ref 10–40)
BASOPHILS # BLD AUTO: 0.04 K/UL (ref 0–0.2)
BASOPHILS # BLD AUTO: 0.04 K/UL (ref 0–0.2)
BASOPHILS NFR BLD: 0.3 % (ref 0–1.9)
BASOPHILS NFR BLD: 0.4 % (ref 0–1.9)
BILIRUB SERPL-MCNC: 0.3 MG/DL (ref 0.1–1)
BLD GP AB SCN CELLS X3 SERPL QL: NORMAL
BUN SERPL-MCNC: 20 MG/DL (ref 6–20)
CALCIUM SERPL-MCNC: 9.9 MG/DL (ref 8.7–10.5)
CHLORIDE SERPL-SCNC: 105 MMOL/L (ref 95–110)
CO2 SERPL-SCNC: 21 MMOL/L (ref 23–29)
CREAT SERPL-MCNC: 2.4 MG/DL (ref 0.5–1.4)
DIFFERENTIAL METHOD: ABNORMAL
DIFFERENTIAL METHOD: ABNORMAL
EOSINOPHIL # BLD AUTO: 0 K/UL (ref 0–0.5)
EOSINOPHIL # BLD AUTO: 0.1 K/UL (ref 0–0.5)
EOSINOPHIL NFR BLD: 0.2 % (ref 0–8)
EOSINOPHIL NFR BLD: 0.4 % (ref 0–8)
ERYTHROCYTE [DISTWIDTH] IN BLOOD BY AUTOMATED COUNT: 14.2 % (ref 11.5–14.5)
ERYTHROCYTE [DISTWIDTH] IN BLOOD BY AUTOMATED COUNT: 14.3 % (ref 11.5–14.5)
EST. GFR  (AFRICAN AMERICAN): 25 ML/MIN/1.73 M^2
EST. GFR  (NON AFRICAN AMERICAN): 22 ML/MIN/1.73 M^2
GLUCOSE SERPL-MCNC: 145 MG/DL (ref 70–110)
HCT VFR BLD AUTO: 24 % (ref 37–48.5)
HCT VFR BLD AUTO: 27.3 % (ref 37–48.5)
HGB BLD-MCNC: 7.7 G/DL (ref 12–16)
HGB BLD-MCNC: 8.7 G/DL (ref 12–16)
IMM GRANULOCYTES # BLD AUTO: 0.04 K/UL (ref 0–0.04)
IMM GRANULOCYTES # BLD AUTO: 0.05 K/UL (ref 0–0.04)
IMM GRANULOCYTES NFR BLD AUTO: 0.4 % (ref 0–0.5)
IMM GRANULOCYTES NFR BLD AUTO: 0.4 % (ref 0–0.5)
LIPASE SERPL-CCNC: 123 U/L (ref 4–60)
LYMPHOCYTES # BLD AUTO: 0.5 K/UL (ref 1–4.8)
LYMPHOCYTES # BLD AUTO: 0.6 K/UL (ref 1–4.8)
LYMPHOCYTES NFR BLD: 4.5 % (ref 18–48)
LYMPHOCYTES NFR BLD: 4.7 % (ref 18–48)
MAGNESIUM SERPL-MCNC: 2 MG/DL (ref 1.6–2.6)
MCH RBC QN AUTO: 32.4 PG (ref 27–31)
MCH RBC QN AUTO: 32.7 PG (ref 27–31)
MCHC RBC AUTO-ENTMCNC: 31.9 G/DL (ref 32–36)
MCHC RBC AUTO-ENTMCNC: 32.1 G/DL (ref 32–36)
MCV RBC AUTO: 101 FL (ref 82–98)
MCV RBC AUTO: 103 FL (ref 82–98)
MONOCYTES # BLD AUTO: 0.4 K/UL (ref 0.3–1)
MONOCYTES # BLD AUTO: 0.4 K/UL (ref 0.3–1)
MONOCYTES NFR BLD: 2.8 % (ref 4–15)
MONOCYTES NFR BLD: 3.6 % (ref 4–15)
NEUTROPHILS # BLD AUTO: 10.1 K/UL (ref 1.8–7.7)
NEUTROPHILS # BLD AUTO: 11.9 K/UL (ref 1.8–7.7)
NEUTROPHILS NFR BLD: 90.5 % (ref 38–73)
NEUTROPHILS NFR BLD: 91.8 % (ref 38–73)
NRBC BLD-RTO: 0 /100 WBC
NRBC BLD-RTO: 0 /100 WBC
OB PNL STL: NEGATIVE
PHOSPHATE SERPL-MCNC: 3.1 MG/DL (ref 2.7–4.5)
PLATELET # BLD AUTO: 473 K/UL (ref 150–450)
PLATELET # BLD AUTO: 512 K/UL (ref 150–450)
PLATELET BLD QL SMEAR: ABNORMAL
PMV BLD AUTO: 9.8 FL (ref 9.2–12.9)
PMV BLD AUTO: 9.8 FL (ref 9.2–12.9)
POCT GLUCOSE: 143 MG/DL (ref 70–110)
POCT GLUCOSE: 180 MG/DL (ref 70–110)
POCT GLUCOSE: 191 MG/DL (ref 70–110)
POCT GLUCOSE: 210 MG/DL (ref 70–110)
POTASSIUM SERPL-SCNC: 3.2 MMOL/L (ref 3.5–5.1)
PROT SERPL-MCNC: 6.8 G/DL (ref 6–8.4)
RBC # BLD AUTO: 2.38 M/UL (ref 4–5.4)
RBC # BLD AUTO: 2.66 M/UL (ref 4–5.4)
SODIUM SERPL-SCNC: 139 MMOL/L (ref 136–145)
WBC # BLD AUTO: 11.2 K/UL (ref 3.9–12.7)
WBC # BLD AUTO: 12.97 K/UL (ref 3.9–12.7)

## 2021-07-18 PROCEDURE — 82272 OCCULT BLD FECES 1-3 TESTS: CPT | Performed by: NURSE PRACTITIONER

## 2021-07-18 PROCEDURE — 99223 PR INITIAL HOSPITAL CARE,LEVL III: ICD-10-PCS | Mod: ,,, | Performed by: INTERNAL MEDICINE

## 2021-07-18 PROCEDURE — 25000003 PHARM REV CODE 250: Performed by: NURSE PRACTITIONER

## 2021-07-18 PROCEDURE — 36415 COLL VENOUS BLD VENIPUNCTURE: CPT | Performed by: INTERNAL MEDICINE

## 2021-07-18 PROCEDURE — 25000242 PHARM REV CODE 250 ALT 637 W/ HCPCS: Performed by: NURSE PRACTITIONER

## 2021-07-18 PROCEDURE — 84100 ASSAY OF PHOSPHORUS: CPT | Performed by: NURSE PRACTITIONER

## 2021-07-18 PROCEDURE — 83735 ASSAY OF MAGNESIUM: CPT | Performed by: NURSE PRACTITIONER

## 2021-07-18 PROCEDURE — 63700000 PHARM REV CODE 250 ALT 637 W/O HCPCS: Performed by: NURSE PRACTITIONER

## 2021-07-18 PROCEDURE — 99233 SBSQ HOSP IP/OBS HIGH 50: CPT | Mod: ,,, | Performed by: INTERNAL MEDICINE

## 2021-07-18 PROCEDURE — 99233 PR SUBSEQUENT HOSPITAL CARE,LEVL III: ICD-10-PCS | Mod: ,,, | Performed by: INTERNAL MEDICINE

## 2021-07-18 PROCEDURE — 36415 COLL VENOUS BLD VENIPUNCTURE: CPT | Performed by: NURSE PRACTITIONER

## 2021-07-18 PROCEDURE — 94640 AIRWAY INHALATION TREATMENT: CPT

## 2021-07-18 PROCEDURE — 63600175 PHARM REV CODE 636 W HCPCS: Performed by: NURSE PRACTITIONER

## 2021-07-18 PROCEDURE — 85025 COMPLETE CBC W/AUTO DIFF WBC: CPT | Mod: 91 | Performed by: NURSE PRACTITIONER

## 2021-07-18 PROCEDURE — 86920 COMPATIBILITY TEST SPIN: CPT | Performed by: NURSE PRACTITIONER

## 2021-07-18 PROCEDURE — 94761 N-INVAS EAR/PLS OXIMETRY MLT: CPT

## 2021-07-18 PROCEDURE — 99900035 HC TECH TIME PER 15 MIN (STAT)

## 2021-07-18 PROCEDURE — 80053 COMPREHEN METABOLIC PANEL: CPT | Performed by: NURSE PRACTITIONER

## 2021-07-18 PROCEDURE — 12000002 HC ACUTE/MED SURGE SEMI-PRIVATE ROOM

## 2021-07-18 PROCEDURE — 25000003 PHARM REV CODE 250: Performed by: INTERNAL MEDICINE

## 2021-07-18 PROCEDURE — 85730 THROMBOPLASTIN TIME PARTIAL: CPT | Mod: 91 | Performed by: INTERNAL MEDICINE

## 2021-07-18 PROCEDURE — 99223 1ST HOSP IP/OBS HIGH 75: CPT | Mod: ,,, | Performed by: INTERNAL MEDICINE

## 2021-07-18 PROCEDURE — 86900 BLOOD TYPING SEROLOGIC ABO: CPT | Performed by: NURSE PRACTITIONER

## 2021-07-18 PROCEDURE — 83690 ASSAY OF LIPASE: CPT | Performed by: NURSE PRACTITIONER

## 2021-07-18 PROCEDURE — 93005 ELECTROCARDIOGRAM TRACING: CPT

## 2021-07-18 PROCEDURE — 85025 COMPLETE CBC W/AUTO DIFF WBC: CPT | Performed by: NURSE PRACTITIONER

## 2021-07-18 RX ORDER — MUPIROCIN 20 MG/G
OINTMENT TOPICAL 2 TIMES DAILY
Status: DISCONTINUED | OUTPATIENT
Start: 2021-07-18 | End: 2021-07-21 | Stop reason: HOSPADM

## 2021-07-18 RX ORDER — ASPIRIN 81 MG/1
81 TABLET ORAL DAILY
Status: DISCONTINUED | OUTPATIENT
Start: 2021-07-19 | End: 2021-07-21 | Stop reason: HOSPADM

## 2021-07-18 RX ORDER — METOPROLOL TARTRATE 25 MG/1
12.5 TABLET ORAL 2 TIMES DAILY
Status: DISCONTINUED | OUTPATIENT
Start: 2021-07-19 | End: 2021-07-21 | Stop reason: HOSPADM

## 2021-07-18 RX ORDER — LANOLIN ALCOHOL/MO/W.PET/CERES
800 CREAM (GRAM) TOPICAL
Status: DISCONTINUED | OUTPATIENT
Start: 2021-07-18 | End: 2021-07-21 | Stop reason: HOSPADM

## 2021-07-18 RX ORDER — HYDROCODONE BITARTRATE AND ACETAMINOPHEN 500; 5 MG/1; MG/1
TABLET ORAL
Status: DISCONTINUED | OUTPATIENT
Start: 2021-07-18 | End: 2021-07-18

## 2021-07-18 RX ADMIN — INSULIN ASPART 2 UNITS: 100 INJECTION, SOLUTION INTRAVENOUS; SUBCUTANEOUS at 11:07

## 2021-07-18 RX ADMIN — PIPERACILLIN AND TAZOBACTAM 4.5 G: 4; .5 INJECTION, POWDER, LYOPHILIZED, FOR SOLUTION INTRAVENOUS; PARENTERAL at 06:07

## 2021-07-18 RX ADMIN — FLUCONAZOLE 100 MG: 100 TABLET ORAL at 08:07

## 2021-07-18 RX ADMIN — HEPARIN SODIUM 14 UNITS/KG/HR: 10000 INJECTION, SOLUTION INTRAVENOUS at 12:07

## 2021-07-18 RX ADMIN — ASPIRIN 325 MG ORAL TABLET 325 MG: 325 PILL ORAL at 08:07

## 2021-07-18 RX ADMIN — ATORVASTATIN CALCIUM 80 MG: 40 TABLET, FILM COATED ORAL at 08:07

## 2021-07-18 RX ADMIN — POTASSIUM BICARBONATE 40 MEQ: 391 TABLET, EFFERVESCENT ORAL at 11:07

## 2021-07-18 RX ADMIN — INSULIN ASPART 2 UNITS: 100 INJECTION, SOLUTION INTRAVENOUS; SUBCUTANEOUS at 09:07

## 2021-07-18 RX ADMIN — FERROUS SULFATE TAB EC 325 MG (65 MG FE EQUIVALENT) 325 MG: 325 (65 FE) TABLET DELAYED RESPONSE at 08:07

## 2021-07-18 RX ADMIN — IPRATROPIUM BROMIDE AND ALBUTEROL SULFATE 3 ML: .5; 3 SOLUTION RESPIRATORY (INHALATION) at 07:07

## 2021-07-18 RX ADMIN — QUETIAPINE FUMARATE 100 MG: 25 TABLET ORAL at 09:07

## 2021-07-18 RX ADMIN — HEPARIN SODIUM 16 UNITS/KG/HR: 10000 INJECTION, SOLUTION INTRAVENOUS at 09:07

## 2021-07-18 RX ADMIN — PANTOPRAZOLE SODIUM 40 MG: 40 TABLET, DELAYED RELEASE ORAL at 08:07

## 2021-07-18 RX ADMIN — IPRATROPIUM BROMIDE AND ALBUTEROL SULFATE 3 ML: .5; 3 SOLUTION RESPIRATORY (INHALATION) at 12:07

## 2021-07-18 RX ADMIN — MUPIROCIN: 20 OINTMENT TOPICAL at 09:07

## 2021-07-18 RX ADMIN — INSULIN ASPART 2 UNITS: 100 INJECTION, SOLUTION INTRAVENOUS; SUBCUTANEOUS at 06:07

## 2021-07-18 RX ADMIN — OXYCODONE HYDROCHLORIDE AND ACETAMINOPHEN 500 MG: 500 TABLET ORAL at 09:07

## 2021-07-18 RX ADMIN — MEGESTROL ACETATE 40 MG: 40 TABLET ORAL at 08:07

## 2021-07-18 RX ADMIN — FERROUS SULFATE TAB EC 325 MG (65 MG FE EQUIVALENT) 325 MG: 325 (65 FE) TABLET DELAYED RESPONSE at 09:07

## 2021-07-19 PROBLEM — D63.8 ANEMIA OF CHRONIC DISEASE: Status: ACTIVE | Noted: 2021-07-19

## 2021-07-19 LAB
ALBUMIN SERPL BCP-MCNC: 2 G/DL (ref 3.5–5.2)
ALP SERPL-CCNC: 148 U/L (ref 55–135)
ALT SERPL W/O P-5'-P-CCNC: 11 U/L (ref 10–44)
ANION GAP SERPL CALC-SCNC: 15 MMOL/L (ref 8–16)
AORTIC ROOT ANNULUS: 2 CM
AORTIC VALVE CUSP SEPERATION: 1.8 CM
APTT BLDCRRT: 35.6 SEC (ref 21–32)
APTT BLDCRRT: 39 SEC (ref 21–32)
APTT BLDCRRT: 46.1 SEC (ref 21–32)
AST SERPL-CCNC: 18 U/L (ref 10–40)
AV INDEX (PROSTH): 0.7
AV MEAN GRADIENT: 6 MMHG
AV PEAK GRADIENT: 10 MMHG
AV VALVE AREA: 2.12 CM2
AV VELOCITY RATIO: 0.76
BASOPHILS # BLD AUTO: 0.06 K/UL (ref 0–0.2)
BASOPHILS NFR BLD: 0.5 % (ref 0–1.9)
BILIRUB SERPL-MCNC: 0.3 MG/DL (ref 0.1–1)
BSA FOR ECHO PROCEDURE: 1.36 M2
BUN SERPL-MCNC: 25 MG/DL (ref 6–20)
CALCIUM SERPL-MCNC: 10 MG/DL (ref 8.7–10.5)
CHLORIDE SERPL-SCNC: 107 MMOL/L (ref 95–110)
CO2 SERPL-SCNC: 19 MMOL/L (ref 23–29)
CREAT SERPL-MCNC: 3.2 MG/DL (ref 0.5–1.4)
CV ECHO LV RWT: 1.32 CM
DIFFERENTIAL METHOD: ABNORMAL
DOP CALC AO PEAK VEL: 1.61 M/S
DOP CALC AO VTI: 29.94 CM
DOP CALC LVOT AREA: 3 CM2
DOP CALC LVOT DIAMETER: 1.96 CM
DOP CALC LVOT PEAK VEL: 1.23 M/S
DOP CALC LVOT STROKE VOLUME: 63.33 CM3
DOP CALCLVOT PEAK VEL VTI: 21 CM
E WAVE DECELERATION TIME: 217.54 MSEC
E/A RATIO: 0.62
E/E' RATIO: 16 M/S
ECHO LV POSTERIOR WALL: 1.42 CM (ref 0.6–1.1)
EJECTION FRACTION: 70 %
EOSINOPHIL # BLD AUTO: 0.1 K/UL (ref 0–0.5)
EOSINOPHIL NFR BLD: 0.5 % (ref 0–8)
ERYTHROCYTE [DISTWIDTH] IN BLOOD BY AUTOMATED COUNT: 14.4 % (ref 11.5–14.5)
EST. GFR  (AFRICAN AMERICAN): 18 ML/MIN/1.73 M^2
EST. GFR  (NON AFRICAN AMERICAN): 15 ML/MIN/1.73 M^2
FRACTIONAL SHORTENING: 30 % (ref 28–44)
GLUCOSE SERPL-MCNC: 182 MG/DL (ref 70–110)
HCT VFR BLD AUTO: 26.4 % (ref 37–48.5)
HGB BLD-MCNC: 8.3 G/DL (ref 12–16)
IMM GRANULOCYTES # BLD AUTO: 0.04 K/UL (ref 0–0.04)
IMM GRANULOCYTES NFR BLD AUTO: 0.3 % (ref 0–0.5)
INTERVENTRICULAR SEPTUM: 2.17 CM (ref 0.6–1.1)
IVRT: 105.61 MSEC
LA MAJOR: 5.93 CM
LA MINOR: 3.24 CM
LA WIDTH: 3.41 CM
LEFT ATRIUM SIZE: 2.52 CM
LEFT ATRIUM VOLUME INDEX MOD: 16.8 ML/M2
LEFT ATRIUM VOLUME INDEX: 21.7 ML/M2
LEFT ATRIUM VOLUME MOD: 23.71 CM3
LEFT ATRIUM VOLUME: 30.61 CM3
LEFT INTERNAL DIMENSION IN SYSTOLE: 1.5 CM (ref 2.1–4)
LEFT VENTRICLE DIASTOLIC VOLUME INDEX: 10.87 ML/M2
LEFT VENTRICLE DIASTOLIC VOLUME: 15.32 ML
LEFT VENTRICLE MASS INDEX: 106 G/M2
LEFT VENTRICLE SYSTOLIC VOLUME INDEX: 4.3 ML/M2
LEFT VENTRICLE SYSTOLIC VOLUME: 6.11 ML
LEFT VENTRICULAR INTERNAL DIMENSION IN DIASTOLE: 2.15 CM (ref 3.5–6)
LEFT VENTRICULAR MASS: 149.68 G
LV LATERAL E/E' RATIO: 13.33 M/S
LV SEPTAL E/E' RATIO: 20 M/S
LYMPHOCYTES # BLD AUTO: 0.7 K/UL (ref 1–4.8)
LYMPHOCYTES NFR BLD: 5.4 % (ref 18–48)
MAGNESIUM SERPL-MCNC: 2.1 MG/DL (ref 1.6–2.6)
MCH RBC QN AUTO: 32.3 PG (ref 27–31)
MCHC RBC AUTO-ENTMCNC: 31.4 G/DL (ref 32–36)
MCV RBC AUTO: 103 FL (ref 82–98)
MONOCYTES # BLD AUTO: 0.5 K/UL (ref 0.3–1)
MONOCYTES NFR BLD: 4.2 % (ref 4–15)
MV A" WAVE DURATION": 8.75 MSEC
MV MEAN GRADIENT: 1 MMHG
MV PEAK A VEL: 1.3 M/S
MV PEAK E VEL: 0.8 M/S
MV PEAK GRADIENT: 9 MMHG
MV STENOSIS PRESSURE HALF TIME: 63.09 MS
MV VALVE AREA P 1/2 METHOD: 3.49 CM2
NEUTROPHILS # BLD AUTO: 11.1 K/UL (ref 1.8–7.7)
NEUTROPHILS NFR BLD: 89.1 % (ref 38–73)
NRBC BLD-RTO: 0 /100 WBC
PHOSPHATE SERPL-MCNC: 4.5 MG/DL (ref 2.7–4.5)
PISA TR MAX VEL: 3.16 M/S
PLATELET # BLD AUTO: 510 K/UL (ref 150–450)
PMV BLD AUTO: 9.7 FL (ref 9.2–12.9)
POCT GLUCOSE: 161 MG/DL (ref 70–110)
POCT GLUCOSE: 205 MG/DL (ref 70–110)
POCT GLUCOSE: 213 MG/DL (ref 70–110)
POCT GLUCOSE: 218 MG/DL (ref 70–110)
POTASSIUM SERPL-SCNC: 4.1 MMOL/L (ref 3.5–5.1)
PROT SERPL-MCNC: 6.6 G/DL (ref 6–8.4)
PV PEAK VELOCITY: 1.68 CM/S
RA MAJOR: 3.31 CM
RA PRESSURE: 3 MMHG
RA WIDTH: 3.12 CM
RBC # BLD AUTO: 2.57 M/UL (ref 4–5.4)
RIGHT VENTRICULAR END-DIASTOLIC DIMENSION: 2.02 CM
RV TISSUE DOPPLER FREE WALL SYSTOLIC VELOCITY 1 (APICAL 4 CHAMBER VIEW): 7.08 CM/S
SODIUM SERPL-SCNC: 141 MMOL/L (ref 136–145)
TDI LATERAL: 0.06 M/S
TDI SEPTAL: 0.04 M/S
TDI: 0.05 M/S
TR MAX PG: 40 MMHG
TV REST PULMONARY ARTERY PRESSURE: 43 MMHG
VANCOMYCIN SERPL-MCNC: 15.9 UG/ML
WBC # BLD AUTO: 12.49 K/UL (ref 3.9–12.7)

## 2021-07-19 PROCEDURE — 85730 THROMBOPLASTIN TIME PARTIAL: CPT | Performed by: NURSE PRACTITIONER

## 2021-07-19 PROCEDURE — C9113 INJ PANTOPRAZOLE SODIUM, VIA: HCPCS | Performed by: NURSE PRACTITIONER

## 2021-07-19 PROCEDURE — 99232 SBSQ HOSP IP/OBS MODERATE 35: CPT | Mod: ,,, | Performed by: INTERNAL MEDICINE

## 2021-07-19 PROCEDURE — 25000003 PHARM REV CODE 250: Performed by: INTERNAL MEDICINE

## 2021-07-19 PROCEDURE — 63600175 PHARM REV CODE 636 W HCPCS: Performed by: NURSE PRACTITIONER

## 2021-07-19 PROCEDURE — 25000242 PHARM REV CODE 250 ALT 637 W/ HCPCS: Performed by: NURSE PRACTITIONER

## 2021-07-19 PROCEDURE — 83735 ASSAY OF MAGNESIUM: CPT | Performed by: NURSE PRACTITIONER

## 2021-07-19 PROCEDURE — 63600175 PHARM REV CODE 636 W HCPCS: Performed by: INTERNAL MEDICINE

## 2021-07-19 PROCEDURE — 80100014 HC HEMODIALYSIS 1:1

## 2021-07-19 PROCEDURE — 12000002 HC ACUTE/MED SURGE SEMI-PRIVATE ROOM

## 2021-07-19 PROCEDURE — 94761 N-INVAS EAR/PLS OXIMETRY MLT: CPT

## 2021-07-19 PROCEDURE — 84100 ASSAY OF PHOSPHORUS: CPT | Performed by: NURSE PRACTITIONER

## 2021-07-19 PROCEDURE — 80202 ASSAY OF VANCOMYCIN: CPT | Performed by: INTERNAL MEDICINE

## 2021-07-19 PROCEDURE — 80053 COMPREHEN METABOLIC PANEL: CPT | Performed by: NURSE PRACTITIONER

## 2021-07-19 PROCEDURE — 85730 THROMBOPLASTIN TIME PARTIAL: CPT | Mod: 91 | Performed by: INTERNAL MEDICINE

## 2021-07-19 PROCEDURE — 99232 PR SUBSEQUENT HOSPITAL CARE,LEVL II: ICD-10-PCS | Mod: ,,, | Performed by: INTERNAL MEDICINE

## 2021-07-19 PROCEDURE — 25000003 PHARM REV CODE 250: Performed by: NURSE PRACTITIONER

## 2021-07-19 PROCEDURE — 94640 AIRWAY INHALATION TREATMENT: CPT

## 2021-07-19 PROCEDURE — 85025 COMPLETE CBC W/AUTO DIFF WBC: CPT | Performed by: NURSE PRACTITIONER

## 2021-07-19 PROCEDURE — 36415 COLL VENOUS BLD VENIPUNCTURE: CPT | Performed by: INTERNAL MEDICINE

## 2021-07-19 RX ORDER — CLOPIDOGREL BISULFATE 75 MG/1
75 TABLET ORAL DAILY
Status: DISCONTINUED | OUTPATIENT
Start: 2021-07-20 | End: 2021-07-21 | Stop reason: HOSPADM

## 2021-07-19 RX ORDER — ATORVASTATIN CALCIUM 10 MG/1
10 TABLET, FILM COATED ORAL DAILY
Status: DISCONTINUED | OUTPATIENT
Start: 2021-07-20 | End: 2021-07-21 | Stop reason: HOSPADM

## 2021-07-19 RX ORDER — PANTOPRAZOLE SODIUM 40 MG/10ML
40 INJECTION, POWDER, LYOPHILIZED, FOR SOLUTION INTRAVENOUS 2 TIMES DAILY
Status: DISCONTINUED | OUTPATIENT
Start: 2021-07-19 | End: 2021-07-21 | Stop reason: HOSPADM

## 2021-07-19 RX ADMIN — PANTOPRAZOLE SODIUM 40 MG: 40 INJECTION, POWDER, FOR SOLUTION INTRAVENOUS at 10:07

## 2021-07-19 RX ADMIN — HEPARIN SODIUM 18.01 UNITS/KG/HR: 10000 INJECTION, SOLUTION INTRAVENOUS at 12:07

## 2021-07-19 RX ADMIN — OXYCODONE HYDROCHLORIDE AND ACETAMINOPHEN 500 MG: 500 TABLET ORAL at 10:07

## 2021-07-19 RX ADMIN — VANCOMYCIN HYDROCHLORIDE 500 MG: 500 INJECTION, POWDER, LYOPHILIZED, FOR SOLUTION INTRAVENOUS at 06:07

## 2021-07-19 RX ADMIN — PIPERACILLIN AND TAZOBACTAM 4.5 G: 4; .5 INJECTION, POWDER, LYOPHILIZED, FOR SOLUTION INTRAVENOUS; PARENTERAL at 08:07

## 2021-07-19 RX ADMIN — INSULIN ASPART 4 UNITS: 100 INJECTION, SOLUTION INTRAVENOUS; SUBCUTANEOUS at 05:07

## 2021-07-19 RX ADMIN — INSULIN ASPART 4 UNITS: 100 INJECTION, SOLUTION INTRAVENOUS; SUBCUTANEOUS at 12:07

## 2021-07-19 RX ADMIN — EPOETIN ALFA-EPBX 3720 UNITS: 4000 INJECTION, SOLUTION INTRAVENOUS; SUBCUTANEOUS at 04:07

## 2021-07-19 RX ADMIN — QUETIAPINE FUMARATE 100 MG: 25 TABLET ORAL at 10:07

## 2021-07-19 RX ADMIN — PANTOPRAZOLE SODIUM 40 MG: 40 INJECTION, POWDER, FOR SOLUTION INTRAVENOUS at 11:07

## 2021-07-19 RX ADMIN — IPRATROPIUM BROMIDE AND ALBUTEROL SULFATE 3 ML: .5; 3 SOLUTION RESPIRATORY (INHALATION) at 01:07

## 2021-07-19 RX ADMIN — MUPIROCIN: 20 OINTMENT TOPICAL at 10:07

## 2021-07-19 RX ADMIN — INSULIN ASPART 2 UNITS: 100 INJECTION, SOLUTION INTRAVENOUS; SUBCUTANEOUS at 05:07

## 2021-07-19 RX ADMIN — MUPIROCIN: 20 OINTMENT TOPICAL at 11:07

## 2021-07-19 RX ADMIN — FERROUS SULFATE TAB EC 325 MG (65 MG FE EQUIVALENT) 325 MG: 325 (65 FE) TABLET DELAYED RESPONSE at 10:07

## 2021-07-19 RX ADMIN — HEPARIN SODIUM 4000 UNITS: 1000 INJECTION, SOLUTION INTRAVENOUS; SUBCUTANEOUS at 05:07

## 2021-07-19 RX ADMIN — IPRATROPIUM BROMIDE AND ALBUTEROL SULFATE 3 ML: .5; 3 SOLUTION RESPIRATORY (INHALATION) at 07:07

## 2021-07-19 RX ADMIN — ACETAMINOPHEN 650 MG: 325 TABLET ORAL at 04:07

## 2021-07-19 RX ADMIN — INSULIN ASPART 2 UNITS: 100 INJECTION, SOLUTION INTRAVENOUS; SUBCUTANEOUS at 11:07

## 2021-07-19 RX ADMIN — METOPROLOL TARTRATE 12.5 MG: 25 TABLET, FILM COATED ORAL at 10:07

## 2021-07-19 RX ADMIN — PIPERACILLIN AND TAZOBACTAM 4.5 G: 4; .5 INJECTION, POWDER, LYOPHILIZED, FOR SOLUTION INTRAVENOUS; PARENTERAL at 05:07

## 2021-07-20 LAB
ALBUMIN SERPL BCP-MCNC: 1.8 G/DL (ref 3.5–5.2)
ALP SERPL-CCNC: 130 U/L (ref 55–135)
ALT SERPL W/O P-5'-P-CCNC: 10 U/L (ref 10–44)
ANION GAP SERPL CALC-SCNC: 14 MMOL/L (ref 8–16)
APTT BLDCRRT: 30.3 SEC (ref 21–32)
AST SERPL-CCNC: 16 U/L (ref 10–40)
BASOPHILS # BLD AUTO: 0.07 K/UL (ref 0–0.2)
BASOPHILS NFR BLD: 0.5 % (ref 0–1.9)
BILIRUB SERPL-MCNC: 0.2 MG/DL (ref 0.1–1)
BUN SERPL-MCNC: 14 MG/DL (ref 6–20)
CALCIUM SERPL-MCNC: 9.6 MG/DL (ref 8.7–10.5)
CHLORIDE SERPL-SCNC: 111 MMOL/L (ref 95–110)
CO2 SERPL-SCNC: 20 MMOL/L (ref 23–29)
CREAT SERPL-MCNC: 2 MG/DL (ref 0.5–1.4)
DIFFERENTIAL METHOD: ABNORMAL
EOSINOPHIL # BLD AUTO: 0 K/UL (ref 0–0.5)
EOSINOPHIL NFR BLD: 0.3 % (ref 0–8)
ERYTHROCYTE [DISTWIDTH] IN BLOOD BY AUTOMATED COUNT: 14.6 % (ref 11.5–14.5)
EST. GFR  (AFRICAN AMERICAN): 31 ML/MIN/1.73 M^2
EST. GFR  (NON AFRICAN AMERICAN): 27 ML/MIN/1.73 M^2
GLUCOSE SERPL-MCNC: 85 MG/DL (ref 70–110)
HCT VFR BLD AUTO: 24.1 % (ref 37–48.5)
HGB BLD-MCNC: 7.6 G/DL (ref 12–16)
IMM GRANULOCYTES # BLD AUTO: 0.08 K/UL (ref 0–0.04)
IMM GRANULOCYTES NFR BLD AUTO: 0.6 % (ref 0–0.5)
LYMPHOCYTES # BLD AUTO: 0.7 K/UL (ref 1–4.8)
LYMPHOCYTES NFR BLD: 5 % (ref 18–48)
MAGNESIUM SERPL-MCNC: 1.8 MG/DL (ref 1.6–2.6)
MCH RBC QN AUTO: 32.6 PG (ref 27–31)
MCHC RBC AUTO-ENTMCNC: 31.5 G/DL (ref 32–36)
MCV RBC AUTO: 103 FL (ref 82–98)
MONOCYTES # BLD AUTO: 0.6 K/UL (ref 0.3–1)
MONOCYTES NFR BLD: 4.1 % (ref 4–15)
NEUTROPHILS # BLD AUTO: 12.1 K/UL (ref 1.8–7.7)
NEUTROPHILS NFR BLD: 89.5 % (ref 38–73)
NRBC BLD-RTO: 0 /100 WBC
PHOSPHATE SERPL-MCNC: 3 MG/DL (ref 2.7–4.5)
PLATELET # BLD AUTO: 463 K/UL (ref 150–450)
PMV BLD AUTO: 9.2 FL (ref 9.2–12.9)
POCT GLUCOSE: 101 MG/DL (ref 70–110)
POCT GLUCOSE: 160 MG/DL (ref 70–110)
POCT GLUCOSE: 192 MG/DL (ref 70–110)
POTASSIUM SERPL-SCNC: 3.7 MMOL/L (ref 3.5–5.1)
PROT SERPL-MCNC: 6.3 G/DL (ref 6–8.4)
RBC # BLD AUTO: 2.33 M/UL (ref 4–5.4)
SODIUM SERPL-SCNC: 145 MMOL/L (ref 136–145)
WBC # BLD AUTO: 13.49 K/UL (ref 3.9–12.7)

## 2021-07-20 PROCEDURE — 99232 PR SUBSEQUENT HOSPITAL CARE,LEVL II: ICD-10-PCS | Mod: ,,, | Performed by: INTERNAL MEDICINE

## 2021-07-20 PROCEDURE — 12000002 HC ACUTE/MED SURGE SEMI-PRIVATE ROOM

## 2021-07-20 PROCEDURE — 63600175 PHARM REV CODE 636 W HCPCS: Performed by: NURSE PRACTITIONER

## 2021-07-20 PROCEDURE — 25000003 PHARM REV CODE 250: Performed by: INTERNAL MEDICINE

## 2021-07-20 PROCEDURE — 97161 PT EVAL LOW COMPLEX 20 MIN: CPT

## 2021-07-20 PROCEDURE — 25000003 PHARM REV CODE 250: Performed by: NURSE PRACTITIONER

## 2021-07-20 PROCEDURE — 63700000 PHARM REV CODE 250 ALT 637 W/O HCPCS: Performed by: NURSE PRACTITIONER

## 2021-07-20 PROCEDURE — C9113 INJ PANTOPRAZOLE SODIUM, VIA: HCPCS | Performed by: NURSE PRACTITIONER

## 2021-07-20 PROCEDURE — 85730 THROMBOPLASTIN TIME PARTIAL: CPT | Performed by: NURSE PRACTITIONER

## 2021-07-20 PROCEDURE — 94761 N-INVAS EAR/PLS OXIMETRY MLT: CPT

## 2021-07-20 PROCEDURE — 94640 AIRWAY INHALATION TREATMENT: CPT

## 2021-07-20 PROCEDURE — 25000242 PHARM REV CODE 250 ALT 637 W/ HCPCS: Performed by: NURSE PRACTITIONER

## 2021-07-20 PROCEDURE — 83735 ASSAY OF MAGNESIUM: CPT | Performed by: NURSE PRACTITIONER

## 2021-07-20 PROCEDURE — 97110 THERAPEUTIC EXERCISES: CPT

## 2021-07-20 PROCEDURE — 99232 SBSQ HOSP IP/OBS MODERATE 35: CPT | Mod: ,,, | Performed by: INTERNAL MEDICINE

## 2021-07-20 PROCEDURE — 84100 ASSAY OF PHOSPHORUS: CPT | Performed by: NURSE PRACTITIONER

## 2021-07-20 PROCEDURE — 80053 COMPREHEN METABOLIC PANEL: CPT | Performed by: NURSE PRACTITIONER

## 2021-07-20 PROCEDURE — 85025 COMPLETE CBC W/AUTO DIFF WBC: CPT | Performed by: NURSE PRACTITIONER

## 2021-07-20 RX ADMIN — ACETAMINOPHEN 650 MG: 325 TABLET ORAL at 11:07

## 2021-07-20 RX ADMIN — INSULIN ASPART 2 UNITS: 100 INJECTION, SOLUTION INTRAVENOUS; SUBCUTANEOUS at 05:07

## 2021-07-20 RX ADMIN — OXYCODONE HYDROCHLORIDE AND ACETAMINOPHEN 500 MG: 500 TABLET ORAL at 10:07

## 2021-07-20 RX ADMIN — ATORVASTATIN CALCIUM 10 MG: 10 TABLET, FILM COATED ORAL at 09:07

## 2021-07-20 RX ADMIN — PANTOPRAZOLE SODIUM 40 MG: 40 INJECTION, POWDER, FOR SOLUTION INTRAVENOUS at 09:07

## 2021-07-20 RX ADMIN — CLOPIDOGREL BISULFATE 75 MG: 75 TABLET, FILM COATED ORAL at 09:07

## 2021-07-20 RX ADMIN — MEGESTROL ACETATE 40 MG: 40 TABLET ORAL at 09:07

## 2021-07-20 RX ADMIN — FERROUS SULFATE TAB EC 325 MG (65 MG FE EQUIVALENT) 325 MG: 325 (65 FE) TABLET DELAYED RESPONSE at 09:07

## 2021-07-20 RX ADMIN — IPRATROPIUM BROMIDE AND ALBUTEROL SULFATE 3 ML: .5; 3 SOLUTION RESPIRATORY (INHALATION) at 12:07

## 2021-07-20 RX ADMIN — MUPIROCIN: 20 OINTMENT TOPICAL at 09:07

## 2021-07-20 RX ADMIN — METOPROLOL TARTRATE 12.5 MG: 25 TABLET, FILM COATED ORAL at 10:07

## 2021-07-20 RX ADMIN — PANTOPRAZOLE SODIUM 40 MG: 40 INJECTION, POWDER, FOR SOLUTION INTRAVENOUS at 10:07

## 2021-07-20 RX ADMIN — METOPROLOL TARTRATE 12.5 MG: 25 TABLET, FILM COATED ORAL at 09:07

## 2021-07-20 RX ADMIN — PIPERACILLIN AND TAZOBACTAM 4.5 G: 4; .5 INJECTION, POWDER, LYOPHILIZED, FOR SOLUTION INTRAVENOUS; PARENTERAL at 05:07

## 2021-07-20 RX ADMIN — MUPIROCIN: 20 OINTMENT TOPICAL at 10:07

## 2021-07-20 RX ADMIN — IPRATROPIUM BROMIDE AND ALBUTEROL SULFATE 3 ML: .5; 3 SOLUTION RESPIRATORY (INHALATION) at 07:07

## 2021-07-20 RX ADMIN — IPRATROPIUM BROMIDE AND ALBUTEROL SULFATE 3 ML: .5; 3 SOLUTION RESPIRATORY (INHALATION) at 06:07

## 2021-07-20 RX ADMIN — FERROUS SULFATE TAB EC 325 MG (65 MG FE EQUIVALENT) 325 MG: 325 (65 FE) TABLET DELAYED RESPONSE at 10:07

## 2021-07-20 RX ADMIN — ASPIRIN 81 MG: 81 TABLET, COATED ORAL at 09:07

## 2021-07-20 RX ADMIN — QUETIAPINE FUMARATE 100 MG: 25 TABLET ORAL at 10:07

## 2021-07-20 RX ADMIN — FLUCONAZOLE 100 MG: 100 TABLET ORAL at 09:07

## 2021-07-21 VITALS
OXYGEN SATURATION: 100 % | TEMPERATURE: 98 F | RESPIRATION RATE: 16 BRPM | HEART RATE: 78 BPM | WEIGHT: 86.88 LBS | HEIGHT: 67 IN | BODY MASS INDEX: 13.64 KG/M2 | DIASTOLIC BLOOD PRESSURE: 57 MMHG | SYSTOLIC BLOOD PRESSURE: 123 MMHG

## 2021-07-21 LAB
BACTERIA BLD CULT: NORMAL
POCT GLUCOSE: 236 MG/DL (ref 70–110)

## 2021-07-21 PROCEDURE — C9113 INJ PANTOPRAZOLE SODIUM, VIA: HCPCS | Performed by: NURSE PRACTITIONER

## 2021-07-21 PROCEDURE — 94640 AIRWAY INHALATION TREATMENT: CPT

## 2021-07-21 PROCEDURE — 25000003 PHARM REV CODE 250: Performed by: INTERNAL MEDICINE

## 2021-07-21 PROCEDURE — 63700000 PHARM REV CODE 250 ALT 637 W/O HCPCS: Performed by: NURSE PRACTITIONER

## 2021-07-21 PROCEDURE — 93005 ELECTROCARDIOGRAM TRACING: CPT

## 2021-07-21 PROCEDURE — 63600175 PHARM REV CODE 636 W HCPCS: Performed by: NURSE PRACTITIONER

## 2021-07-21 PROCEDURE — 25000003 PHARM REV CODE 250: Performed by: NURSE PRACTITIONER

## 2021-07-21 PROCEDURE — 27000221 HC OXYGEN, UP TO 24 HOURS

## 2021-07-21 PROCEDURE — 94761 N-INVAS EAR/PLS OXIMETRY MLT: CPT

## 2021-07-21 PROCEDURE — 25000242 PHARM REV CODE 250 ALT 637 W/ HCPCS: Performed by: NURSE PRACTITIONER

## 2021-07-21 RX ADMIN — ATORVASTATIN CALCIUM 10 MG: 10 TABLET, FILM COATED ORAL at 09:07

## 2021-07-21 RX ADMIN — IPRATROPIUM BROMIDE AND ALBUTEROL SULFATE 3 ML: .5; 3 SOLUTION RESPIRATORY (INHALATION) at 07:07

## 2021-07-21 RX ADMIN — IPRATROPIUM BROMIDE AND ALBUTEROL SULFATE 3 ML: .5; 3 SOLUTION RESPIRATORY (INHALATION) at 12:07

## 2021-07-21 RX ADMIN — MUPIROCIN: 20 OINTMENT TOPICAL at 09:07

## 2021-07-21 RX ADMIN — METOPROLOL TARTRATE 12.5 MG: 25 TABLET, FILM COATED ORAL at 09:07

## 2021-07-21 RX ADMIN — ACETAMINOPHEN 650 MG: 325 TABLET ORAL at 03:07

## 2021-07-21 RX ADMIN — FLUCONAZOLE 100 MG: 100 TABLET ORAL at 09:07

## 2021-07-21 RX ADMIN — PIPERACILLIN AND TAZOBACTAM 4.5 G: 4; .5 INJECTION, POWDER, LYOPHILIZED, FOR SOLUTION INTRAVENOUS; PARENTERAL at 06:07

## 2021-07-21 RX ADMIN — MEGESTROL ACETATE 40 MG: 40 TABLET ORAL at 09:07

## 2021-07-21 RX ADMIN — PANTOPRAZOLE SODIUM 40 MG: 40 INJECTION, POWDER, FOR SOLUTION INTRAVENOUS at 09:07

## 2021-07-21 RX ADMIN — CLOPIDOGREL BISULFATE 75 MG: 75 TABLET, FILM COATED ORAL at 09:07

## 2021-07-21 RX ADMIN — INSULIN ASPART 4 UNITS: 100 INJECTION, SOLUTION INTRAVENOUS; SUBCUTANEOUS at 12:07

## 2021-07-21 RX ADMIN — ASPIRIN 81 MG: 81 TABLET, COATED ORAL at 09:07

## 2021-07-21 RX ADMIN — FERROUS SULFATE TAB EC 325 MG (65 MG FE EQUIVALENT) 325 MG: 325 (65 FE) TABLET DELAYED RESPONSE at 09:07

## 2021-07-21 RX ADMIN — IPRATROPIUM BROMIDE AND ALBUTEROL SULFATE 3 ML: .5; 3 SOLUTION RESPIRATORY (INHALATION) at 06:07

## 2021-07-22 ENCOUNTER — TELEPHONE (OUTPATIENT)
Dept: MEDSURG UNIT | Facility: HOSPITAL | Age: 56
End: 2021-07-22

## 2021-07-22 LAB
ACID FAST MOD KINY STN SPEC: NORMAL
BLD PROD TYP BPU: NORMAL
BLOOD UNIT EXPIRATION DATE: NORMAL
BLOOD UNIT TYPE CODE: 6200
BLOOD UNIT TYPE: NORMAL
CODING SYSTEM: NORMAL
DISPENSE STATUS: NORMAL
MYCOBACTERIUM SPEC QL CULT: NORMAL
NUM UNITS TRANS PACKED RBC: NORMAL

## 2021-10-21 ENCOUNTER — EXTERNAL HOME HEALTH (OUTPATIENT)
Dept: HOME HEALTH SERVICES | Facility: HOSPITAL | Age: 56
End: 2021-10-21
Payer: MEDICARE

## 2021-10-25 ENCOUNTER — DOCUMENT SCAN (OUTPATIENT)
Dept: HOME HEALTH SERVICES | Facility: HOSPITAL | Age: 56
End: 2021-10-25
Payer: MEDICARE

## 2025-01-02 NOTE — PROGRESS NOTES
HD; treatment completed, pt stable, lines flushed, clamped, and capped. NET UF: 1L. Report called to Renetta.    Ordered CXR and hep B surf ab.  [facility] bernarda Hep A (?) and quantiferon gold was indeterminate.  Left message on voicemail  for patient and sent mychart with orders as well as faxed.

## (undated) DEVICE — SPONGE GAUZE 16PLY 4X4

## (undated) DEVICE — SEE MEDLINE ITEM 153151

## (undated) DEVICE — SOLUTION IRRI NS BOTTLE 1000ML R5200-01

## (undated) DEVICE — COVER LIGHT HANDLE LB53

## (undated) DEVICE — SPONGE GAUZE 2S 4X4 STERILE NON21424

## (undated) DEVICE — HEMOSTAT SURGICEL 4X8IN

## (undated) DEVICE — SUTURE VICRYL 4-0 27 VCP935H

## (undated) DEVICE — TRAY VASCULAR SLIDELL MEMORIAL

## (undated) DEVICE — TRAY GENERAL SURGERY

## (undated) DEVICE — DRAPE EXTREMITY FULL FABRIC

## (undated) DEVICE — LOOP MAXI RED 30-722

## (undated) DEVICE — LOOP VESSEL RED MINI STERILE

## (undated) DEVICE — SYS LABEL CORRECT MED

## (undated) DEVICE — BAG DECANTER 10-102

## (undated) DEVICE — SEE MEDLINE ITEM 157117

## (undated) DEVICE — SUTURE SILK 3-0 18 A184H

## (undated) DEVICE — SUT PROLENE 6-0 24 BV-1

## (undated) DEVICE — SLEEVE SCD EXPRESS CALF MEDIUM

## (undated) DEVICE — SUT 3-0 VICRYL / SH (J416)

## (undated) DEVICE — SOL NACL 0.9% INJ PF/100 150

## (undated) DEVICE — SEE MEDLINE ITEM 152487

## (undated) DEVICE — SOL 9P NACL IRR PIC IL

## (undated) DEVICE — DRESSING TEGADERM 4X4 3/4 TD1004

## (undated) DEVICE — CLIP LIGA MED LT200

## (undated) DEVICE — Device

## (undated) DEVICE — CANNULA VESSEL 30001

## (undated) DEVICE — DRAPE THYROID WITH ARMBOARD

## (undated) DEVICE — SUT PROLENE 7-0 BV175-6

## (undated) DEVICE — SUTURE PROLENE 5-0 BV-1 24 9702H

## (undated) DEVICE — COVER SURG LIGHT HANDLE

## (undated) DEVICE — SEE MEDLINE ITEM 152622

## (undated) DEVICE — TOWEL OR BLUE      MDT2168284

## (undated) DEVICE — BLADE SURG #15 CARBON STEEL

## (undated) DEVICE — SUTURE PROLENE 6-0 BV-1 24 8805H

## (undated) DEVICE — CONTAINER SPECIMEN STRL 4OZ

## (undated) DEVICE — NDL BOX COUNTER

## (undated) DEVICE — CLIP HEMO TTNM MED 20/CRTDG

## (undated) DEVICE — SPONGE SUPER KERLIX 6X6.75IN

## (undated) DEVICE — STRAP OR TABLE 5IN X 72IN

## (undated) DEVICE — SYR SLIP TIP 20CC

## (undated) DEVICE — SUT CTD VICRYL 4-0 BR PS-2

## (undated) DEVICE — NDL SAFETY 21G X 1 1/2 ECLPSE

## (undated) DEVICE — APPLICATOR CHLORAPREP ORN 26ML

## (undated) DEVICE — PAD BOVIE ADULT

## (undated) DEVICE — SYR 10CC LUER LOCK

## (undated) DEVICE — SUT 2-0 VICRYL / SH (J417)

## (undated) DEVICE — ELECTRODE REM PLYHSV RETURN 9

## (undated) DEVICE — SPONGE DERMACEA 4X4IN 12PLY

## (undated) DEVICE — DRESSING POST OP MEPILEX AG 4X6  498300

## (undated) DEVICE — SUTURE SILK 2-0 18 A185H

## (undated) DEVICE — POWDER ARISTA AH 3G

## (undated) DEVICE — SUTURE VICRYL 4-0 18 PS-2 VCP496H

## (undated) DEVICE — SYRINGE EAR 3OZ EAR303

## (undated) DEVICE — DRAPE C ARM 42 X 120 10/BX

## (undated) DEVICE — SYRINGE 5ML 309646

## (undated) DEVICE — SET DECANTER MEDICHOICE

## (undated) DEVICE — STAPLER SKIN ROTATING HEAD

## (undated) DEVICE — DRAPE LAPAROTOMY TRANSVERSE 89281

## (undated) DEVICE — GLOVE BIOGEL PI ORTHO PRO BROWN SZ 7

## (undated) DEVICE — DRESSING AQUACEL AG FOAM 4X4

## (undated) DEVICE — SUTURE SILK 4-0 18 A183H

## (undated) DEVICE — SUCTION YANKAUER 34970

## (undated) DEVICE — GLOVE SURG PLYSPHRN ORTH SZ7.5

## (undated) DEVICE — SUTURE PROLENE 7-0 BV175-6 24 8735H

## (undated) DEVICE — CLIP LIGACLIP LIGATING SMALL LT100

## (undated) DEVICE — TUBE SUCTION 6.5 TIP 6FR

## (undated) DEVICE — SUT LIGACLIP SMALL XTRA

## (undated) DEVICE — SUTURE MONOCRYL 4-0 PS-2 27 MCP426H

## (undated) DEVICE — PENCIL ROCKER SWITCH 10FT CORD

## (undated) DEVICE — SEE MEDLINE ITEM 146292

## (undated) DEVICE — PACK BASIC

## (undated) DEVICE — DRAPE C-ARM (FITS NEW C-ARM) 07-CA108

## (undated) DEVICE — PREP CHLORA 26ML

## (undated) DEVICE — SUT 2-0 12-18IN SILK

## (undated) DEVICE — LINER SUCTION 3000CC

## (undated) DEVICE — SUT ETHILON 3-0 PS2 18 BLK

## (undated) DEVICE — BLADE SCALPEL #11 371111

## (undated) DEVICE — SUTURE VICRYL 3-0 SH 27 VCP416H

## (undated) DEVICE — NEEDLE SAFETY ECLIPSE 25G 1-1/2IN 305767

## (undated) DEVICE — LOOP MINI BLUE 30-713

## (undated) DEVICE — SUTURE ETHILON 3-0 PS-2 18 1669H

## (undated) DEVICE — TEGADERM IV